# Patient Record
Sex: FEMALE | Race: WHITE
[De-identification: names, ages, dates, MRNs, and addresses within clinical notes are randomized per-mention and may not be internally consistent; named-entity substitution may affect disease eponyms.]

---

## 2017-03-13 ENCOUNTER — APPOINTMENT (OUTPATIENT)
Dept: HEMATOLOGY ONCOLOGY | Facility: CLINIC | Age: 77
End: 2017-03-13

## 2017-03-16 ENCOUNTER — APPOINTMENT (OUTPATIENT)
Dept: HEMATOLOGY ONCOLOGY | Facility: CLINIC | Age: 77
End: 2017-03-16

## 2017-03-16 ENCOUNTER — LABORATORY RESULT (OUTPATIENT)
Age: 77
End: 2017-03-16

## 2017-03-16 VITALS
BODY MASS INDEX: 32.57 KG/M2 | HEIGHT: 62 IN | HEART RATE: 60 BPM | DIASTOLIC BLOOD PRESSURE: 66 MMHG | WEIGHT: 177 LBS | TEMPERATURE: 97.8 F | SYSTOLIC BLOOD PRESSURE: 138 MMHG

## 2017-03-20 ENCOUNTER — APPOINTMENT (OUTPATIENT)
Dept: HEMATOLOGY ONCOLOGY | Facility: CLINIC | Age: 77
End: 2017-03-20

## 2017-03-20 VITALS
HEART RATE: 58 BPM | BODY MASS INDEX: 32.57 KG/M2 | DIASTOLIC BLOOD PRESSURE: 64 MMHG | HEIGHT: 62 IN | SYSTOLIC BLOOD PRESSURE: 126 MMHG | TEMPERATURE: 97.8 F | WEIGHT: 177 LBS

## 2017-03-20 DIAGNOSIS — D50.9 IRON DEFICIENCY ANEMIA, UNSPECIFIED: ICD-10-CM

## 2017-03-20 RX ORDER — LISINOPRIL 5 MG/1
5 TABLET ORAL
Refills: 0 | Status: ACTIVE | COMMUNITY

## 2017-03-31 LAB
HCT VFR BLD CALC: 37.7 %
HGB BLD-MCNC: 12.4 G/DL
MCHC RBC-ENTMCNC: 28.4 PG
MCHC RBC-ENTMCNC: 33 GM/DL
MCV RBC AUTO: 85.9 FL
PLATELET # BLD AUTO: 198 K/UL
RBC # BLD: 4.39 M/UL
RBC # FLD: 13.4 %
WBC # FLD AUTO: 7.3 K/UL

## 2017-06-20 ENCOUNTER — INPATIENT (INPATIENT)
Facility: HOSPITAL | Age: 77
LOS: 4 days | Discharge: HOME HEALTH | End: 2017-06-25
Attending: INTERNAL MEDICINE | Admitting: INTERNAL MEDICINE
Payer: MEDICARE

## 2017-06-20 VITALS
HEART RATE: 62 BPM | WEIGHT: 177.03 LBS | HEIGHT: 62 IN | RESPIRATION RATE: 20 BRPM | OXYGEN SATURATION: 100 % | SYSTOLIC BLOOD PRESSURE: 149 MMHG | DIASTOLIC BLOOD PRESSURE: 56 MMHG | TEMPERATURE: 98 F

## 2017-06-20 DIAGNOSIS — E11.9 TYPE 2 DIABETES MELLITUS WITHOUT COMPLICATIONS: ICD-10-CM

## 2017-06-20 DIAGNOSIS — I10 ESSENTIAL (PRIMARY) HYPERTENSION: ICD-10-CM

## 2017-06-20 DIAGNOSIS — R00.1 BRADYCARDIA, UNSPECIFIED: ICD-10-CM

## 2017-06-20 DIAGNOSIS — R55 SYNCOPE AND COLLAPSE: ICD-10-CM

## 2017-06-20 DIAGNOSIS — N18.9 CHRONIC KIDNEY DISEASE, UNSPECIFIED: ICD-10-CM

## 2017-06-20 DIAGNOSIS — J44.9 CHRONIC OBSTRUCTIVE PULMONARY DISEASE, UNSPECIFIED: ICD-10-CM

## 2017-06-20 LAB
ADD ON TEST-SPECIMEN IN LAB: SIGNIFICANT CHANGE UP
ALBUMIN SERPL ELPH-MCNC: 3.2 G/DL — LOW (ref 3.3–5)
ALP SERPL-CCNC: 103 U/L — SIGNIFICANT CHANGE UP (ref 40–120)
ALT FLD-CCNC: 24 U/L — SIGNIFICANT CHANGE UP (ref 12–78)
ANION GAP SERPL CALC-SCNC: 3 MMOL/L — LOW (ref 5–17)
ANION GAP SERPL CALC-SCNC: 6 MMOL/L — SIGNIFICANT CHANGE UP (ref 5–17)
APPEARANCE UR: CLEAR — SIGNIFICANT CHANGE UP
APTT BLD: 30.7 SEC — SIGNIFICANT CHANGE UP (ref 27.5–37.4)
AST SERPL-CCNC: 47 U/L — HIGH (ref 15–37)
BACTERIA # UR AUTO: (no result)
BASE EXCESS BLDCOV CALC-SCNC: -1.5 — SIGNIFICANT CHANGE UP
BASOPHILS # BLD AUTO: 0.1 K/UL — SIGNIFICANT CHANGE UP (ref 0–0.2)
BASOPHILS NFR BLD AUTO: 1.2 % — SIGNIFICANT CHANGE UP (ref 0–2)
BILIRUB SERPL-MCNC: 0.5 MG/DL — SIGNIFICANT CHANGE UP (ref 0.2–1.2)
BILIRUB UR-MCNC: NEGATIVE — SIGNIFICANT CHANGE UP
BUN SERPL-MCNC: 52 MG/DL — HIGH (ref 7–23)
BUN SERPL-MCNC: 55 MG/DL — HIGH (ref 7–23)
CALCIUM SERPL-MCNC: 8.7 MG/DL — SIGNIFICANT CHANGE UP (ref 8.5–10.1)
CALCIUM SERPL-MCNC: 8.8 MG/DL — SIGNIFICANT CHANGE UP (ref 8.5–10.1)
CHLORIDE SERPL-SCNC: 111 MMOL/L — HIGH (ref 96–108)
CHLORIDE SERPL-SCNC: 113 MMOL/L — HIGH (ref 96–108)
CO2 SERPL-SCNC: 25 MMOL/L — SIGNIFICANT CHANGE UP (ref 22–31)
CO2 SERPL-SCNC: 26 MMOL/L — SIGNIFICANT CHANGE UP (ref 22–31)
COLOR SPEC: YELLOW — SIGNIFICANT CHANGE UP
CREAT SERPL-MCNC: 2.05 MG/DL — HIGH (ref 0.5–1.3)
CREAT SERPL-MCNC: 2.29 MG/DL — HIGH (ref 0.5–1.3)
DIFF PNL FLD: (no result)
EOSINOPHIL # BLD AUTO: 0.2 K/UL — SIGNIFICANT CHANGE UP (ref 0–0.5)
EOSINOPHIL NFR BLD AUTO: 2.2 % — SIGNIFICANT CHANGE UP (ref 0–6)
EPI CELLS # UR: SIGNIFICANT CHANGE UP
GAS PNL BLDCOV: 7.28 — SIGNIFICANT CHANGE UP (ref 7.25–7.45)
GLUCOSE SERPL-MCNC: 150 MG/DL — HIGH (ref 70–99)
GLUCOSE SERPL-MCNC: 202 MG/DL — HIGH (ref 70–99)
GLUCOSE UR QL: NEGATIVE MG/DL — SIGNIFICANT CHANGE UP
HCO3 BLDCOV-SCNC: 26 MMOL/L — HIGH (ref 17–25)
HCT VFR BLD CALC: 35.5 % — SIGNIFICANT CHANGE UP (ref 34.5–45)
HGB BLD-MCNC: 12 G/DL — SIGNIFICANT CHANGE UP (ref 11.5–15.5)
INR BLD: 1.08 RATIO — SIGNIFICANT CHANGE UP (ref 0.88–1.16)
KETONES UR-MCNC: NEGATIVE — SIGNIFICANT CHANGE UP
LEUKOCYTE ESTERASE UR-ACNC: (no result)
LYMPHOCYTES # BLD AUTO: 0.8 K/UL — LOW (ref 1–3.3)
LYMPHOCYTES # BLD AUTO: 10.3 % — LOW (ref 13–44)
MCHC RBC-ENTMCNC: 29.9 PG — SIGNIFICANT CHANGE UP (ref 27–34)
MCHC RBC-ENTMCNC: 33.8 GM/DL — SIGNIFICANT CHANGE UP (ref 32–36)
MCV RBC AUTO: 88.3 FL — SIGNIFICANT CHANGE UP (ref 80–100)
MONOCYTES # BLD AUTO: 0.5 K/UL — SIGNIFICANT CHANGE UP (ref 0–0.9)
MONOCYTES NFR BLD AUTO: 6.1 % — SIGNIFICANT CHANGE UP (ref 2–14)
NEUTROPHILS # BLD AUTO: 6.5 K/UL — SIGNIFICANT CHANGE UP (ref 1.8–7.4)
NEUTROPHILS NFR BLD AUTO: 80.3 % — HIGH (ref 43–77)
NITRITE UR-MCNC: NEGATIVE — SIGNIFICANT CHANGE UP
NT-PROBNP SERPL-SCNC: 2308 PG/ML — HIGH (ref 0–450)
NT-PROBNP SERPL-SCNC: 3631 PG/ML — HIGH (ref 0–450)
PCO2 BLDCOV: 56 MMHG — HIGH (ref 27–49)
PH UR: 6 — SIGNIFICANT CHANGE UP (ref 5–8)
PLATELET # BLD AUTO: 178 K/UL — SIGNIFICANT CHANGE UP (ref 150–400)
PO2 BLDCOA: 73 MMHG — HIGH (ref 17–41)
POTASSIUM SERPL-MCNC: 4.9 MMOL/L — SIGNIFICANT CHANGE UP (ref 3.5–5.3)
POTASSIUM SERPL-MCNC: 5.4 MMOL/L — HIGH (ref 3.5–5.3)
POTASSIUM SERPL-SCNC: 4.9 MMOL/L — SIGNIFICANT CHANGE UP (ref 3.5–5.3)
POTASSIUM SERPL-SCNC: 5.4 MMOL/L — HIGH (ref 3.5–5.3)
PROT SERPL-MCNC: 6.6 GM/DL — SIGNIFICANT CHANGE UP (ref 6–8.3)
PROT UR-MCNC: 15 MG/DL
PROTHROM AB SERPL-ACNC: 11.7 SEC — SIGNIFICANT CHANGE UP (ref 9.8–12.7)
RBC # BLD: 4.02 M/UL — SIGNIFICANT CHANGE UP (ref 3.8–5.2)
RBC # FLD: 13.6 % — SIGNIFICANT CHANGE UP (ref 10.3–14.5)
RBC CASTS # UR COMP ASSIST: (no result) /HPF (ref 0–4)
SAO2 % BLDCOV: 92 % — HIGH (ref 20–75)
SODIUM SERPL-SCNC: 141 MMOL/L — SIGNIFICANT CHANGE UP (ref 135–145)
SODIUM SERPL-SCNC: 143 MMOL/L — SIGNIFICANT CHANGE UP (ref 135–145)
SP GR SPEC: 1.01 — SIGNIFICANT CHANGE UP (ref 1.01–1.02)
TROPONIN I SERPL-MCNC: 0.02 NG/ML — SIGNIFICANT CHANGE UP (ref 0.01–0.04)
TROPONIN I SERPL-MCNC: <0.015 NG/ML — SIGNIFICANT CHANGE UP (ref 0.01–0.04)
UROBILINOGEN FLD QL: NEGATIVE MG/DL — SIGNIFICANT CHANGE UP
WBC # BLD: 8.1 K/UL — SIGNIFICANT CHANGE UP (ref 3.8–10.5)
WBC # FLD AUTO: 8.1 K/UL — SIGNIFICANT CHANGE UP (ref 3.8–10.5)
WBC UR QL: SIGNIFICANT CHANGE UP

## 2017-06-20 PROCEDURE — 73110 X-RAY EXAM OF WRIST: CPT | Mod: 26,LT

## 2017-06-20 PROCEDURE — 73030 X-RAY EXAM OF SHOULDER: CPT | Mod: 26,RT

## 2017-06-20 PROCEDURE — 70450 CT HEAD/BRAIN W/O DYE: CPT | Mod: 26

## 2017-06-20 PROCEDURE — 72190 X-RAY EXAM OF PELVIS: CPT | Mod: 26

## 2017-06-20 PROCEDURE — 71100 X-RAY EXAM RIBS UNI 2 VIEWS: CPT | Mod: 26,RT

## 2017-06-20 PROCEDURE — 72125 CT NECK SPINE W/O DYE: CPT | Mod: 26

## 2017-06-20 PROCEDURE — 93010 ELECTROCARDIOGRAM REPORT: CPT

## 2017-06-20 PROCEDURE — 99285 EMERGENCY DEPT VISIT HI MDM: CPT

## 2017-06-20 PROCEDURE — 71010: CPT | Mod: 26

## 2017-06-20 RX ORDER — SODIUM CHLORIDE 9 MG/ML
1000 INJECTION, SOLUTION INTRAVENOUS
Qty: 0 | Refills: 0 | Status: DISCONTINUED | OUTPATIENT
Start: 2017-06-20 | End: 2017-06-25

## 2017-06-20 RX ORDER — TIOTROPIUM BROMIDE 18 UG/1
1 CAPSULE ORAL; RESPIRATORY (INHALATION) DAILY
Qty: 0 | Refills: 0 | Status: DISCONTINUED | OUTPATIENT
Start: 2017-06-20 | End: 2017-06-25

## 2017-06-20 RX ORDER — INSULIN GLARGINE 100 [IU]/ML
45 INJECTION, SOLUTION SUBCUTANEOUS AT BEDTIME
Qty: 0 | Refills: 0 | Status: DISCONTINUED | OUTPATIENT
Start: 2017-06-20 | End: 2017-06-25

## 2017-06-20 RX ORDER — GLUCAGON INJECTION, SOLUTION 0.5 MG/.1ML
1 INJECTION, SOLUTION SUBCUTANEOUS ONCE
Qty: 0 | Refills: 0 | Status: DISCONTINUED | OUTPATIENT
Start: 2017-06-20 | End: 2017-06-25

## 2017-06-20 RX ORDER — PANTOPRAZOLE SODIUM 20 MG/1
40 TABLET, DELAYED RELEASE ORAL
Qty: 0 | Refills: 0 | Status: DISCONTINUED | OUTPATIENT
Start: 2017-06-20 | End: 2017-06-25

## 2017-06-20 RX ORDER — ASPIRIN/CALCIUM CARB/MAGNESIUM 324 MG
81 TABLET ORAL DAILY
Qty: 0 | Refills: 0 | Status: DISCONTINUED | OUTPATIENT
Start: 2017-06-20 | End: 2017-06-25

## 2017-06-20 RX ORDER — DEXTROSE 50 % IN WATER 50 %
12.5 SYRINGE (ML) INTRAVENOUS ONCE
Qty: 0 | Refills: 0 | Status: DISCONTINUED | OUTPATIENT
Start: 2017-06-20 | End: 2017-06-25

## 2017-06-20 RX ORDER — ACETAMINOPHEN 500 MG
1000 TABLET ORAL ONCE
Qty: 0 | Refills: 0 | Status: COMPLETED | OUTPATIENT
Start: 2017-06-20 | End: 2017-06-20

## 2017-06-20 RX ORDER — LEVOTHYROXINE SODIUM 125 MCG
112 TABLET ORAL DAILY
Qty: 0 | Refills: 0 | Status: DISCONTINUED | OUTPATIENT
Start: 2017-06-20 | End: 2017-06-25

## 2017-06-20 RX ORDER — AMLODIPINE BESYLATE 2.5 MG/1
5 TABLET ORAL ONCE
Qty: 0 | Refills: 0 | Status: COMPLETED | OUTPATIENT
Start: 2017-06-20 | End: 2017-06-20

## 2017-06-20 RX ORDER — INSULIN LISPRO 100/ML
VIAL (ML) SUBCUTANEOUS
Qty: 0 | Refills: 0 | Status: DISCONTINUED | OUTPATIENT
Start: 2017-06-20 | End: 2017-06-25

## 2017-06-20 RX ORDER — ATORVASTATIN CALCIUM 80 MG/1
20 TABLET, FILM COATED ORAL AT BEDTIME
Qty: 0 | Refills: 0 | Status: DISCONTINUED | OUTPATIENT
Start: 2017-06-20 | End: 2017-06-25

## 2017-06-20 RX ORDER — SODIUM CHLORIDE 9 MG/ML
500 INJECTION INTRAMUSCULAR; INTRAVENOUS; SUBCUTANEOUS ONCE
Qty: 0 | Refills: 0 | Status: COMPLETED | OUTPATIENT
Start: 2017-06-20 | End: 2017-06-20

## 2017-06-20 RX ORDER — LISINOPRIL 2.5 MG/1
5 TABLET ORAL DAILY
Qty: 0 | Refills: 0 | Status: DISCONTINUED | OUTPATIENT
Start: 2017-06-20 | End: 2017-06-22

## 2017-06-20 RX ORDER — HEPARIN SODIUM 5000 [USP'U]/ML
5000 INJECTION INTRAVENOUS; SUBCUTANEOUS EVERY 12 HOURS
Qty: 0 | Refills: 0 | Status: DISCONTINUED | OUTPATIENT
Start: 2017-06-20 | End: 2017-06-25

## 2017-06-20 RX ORDER — DEXTROSE 50 % IN WATER 50 %
1 SYRINGE (ML) INTRAVENOUS ONCE
Qty: 0 | Refills: 0 | Status: DISCONTINUED | OUTPATIENT
Start: 2017-06-20 | End: 2017-06-25

## 2017-06-20 RX ORDER — SODIUM CHLORIDE 9 MG/ML
1000 INJECTION INTRAMUSCULAR; INTRAVENOUS; SUBCUTANEOUS
Qty: 0 | Refills: 0 | Status: DISCONTINUED | OUTPATIENT
Start: 2017-06-20 | End: 2017-06-21

## 2017-06-20 RX ADMIN — Medication 1000 MILLIGRAM(S): at 17:30

## 2017-06-20 RX ADMIN — Medication 400 MILLIGRAM(S): at 23:34

## 2017-06-20 RX ADMIN — SODIUM CHLORIDE 50 MILLILITER(S): 9 INJECTION INTRAMUSCULAR; INTRAVENOUS; SUBCUTANEOUS at 22:25

## 2017-06-20 RX ADMIN — Medication 400 MILLIGRAM(S): at 16:47

## 2017-06-20 RX ADMIN — SODIUM CHLORIDE 1000 MILLILITER(S): 9 INJECTION INTRAMUSCULAR; INTRAVENOUS; SUBCUTANEOUS at 14:15

## 2017-06-20 RX ADMIN — AMLODIPINE BESYLATE 5 MILLIGRAM(S): 2.5 TABLET ORAL at 22:59

## 2017-06-20 NOTE — H&P ADULT - HISTORY OF PRESENT ILLNESS
75 Y/O FEMALE WITH DIABETES, HTN, CHRONIC UTI'S, COPD - ON HOME O2 (BUT REFUSES TO WEAR IT ) PRESENTED TO ER AFTER FALLING WHILE IN THE 77 Y/O FEMALE WITH DIABETES, HTN, CHRONIC UTI'S, COPD - ON HOME O2 (BUT REFUSES TO WEAR IT ) PRESENTED TO ER AFTER FALLING WHILE IN THE KITCHEN TODAY.  STATES SHE REMEMBERS GOING UP THE STAIRS, ENTERING THE KITCHEN TO GET A GLASS OF WATER, WALKING OVER TO THE TABLE AND SEEING "LIGHTS" - THEREAFTER SHE ONLY REMEMBERS EMS BEING THERE.  HER GRANDAUGHTER WHO IS 14 HEARD A LOUD BANG AND FOUND THE PATIENT ON THE KITCHEN FLOOR, STATES SHE WAS UNRESPONSIVE, RUBBED HER CHEST HARD AND THE PATIENT AWOKE.  SHE CALLED EMS AND PT WAS BROUGHT TO THE ER.  SHE WAS AWAKE ONCE IN THE ER. SHE DOES REPORTS URINARY INCONTINENCE, HAS A CONTUSION TO HER RIGHT FOREHEAD AND REPORTS HER CHEST FEELING SORE.  SHE WAS ALSO NOTED TO HAVE RIGHT CLAVICULAR FRACTURE.  PATIENT REPORTS FEELING FINE ALL DAY AND YESTERDAY.  HER BGM WAS NORMAL, NO JERKING MOVEMENTS OF THE BODY NOTED.  PATIENT IS WIDE AWAKE NOW AND ALERT.

## 2017-06-20 NOTE — ED PROVIDER NOTE - OBJECTIVE STATEMENT
76F h/o COPD (supposed to be on home o2, does not use it), DM p/w ?syncope.  Pt states she was at home with her grandchildren and walked upstairs to get a drink of water and that is the last thing she remembers.  Granddaughter heard a thud and went upstairs and she appeared to have fallen off the chair, and was on her back unresponsive.  as per EMS pt was unresponsive and hypoxic when they got there place don o2, pt became more responsive on the way to ED.  Pt now ANOx3 c/o right shoulder pain.  Denies CP, SOB, abd pain n/v/d/hip pain or neck pain.

## 2017-06-20 NOTE — H&P ADULT - PROBLEM SELECTOR PLAN 1
UNCLEAR ETIOLOGY  POSSBILY FROM HYPOXIA, FROM BRADYCARDIA,  ADMIT TO TELE  R/O ARRYTHMIA, TACHY/ANOOP SYNDROME  CHECK ENZYMES  NEURO EVAL -- DID REPORT URINARY INCONTINENCE BUT NO OTHER SIGNS OF SEIZURE - NO POST ICTAL STATE  GENTLY HYDRATE

## 2017-06-20 NOTE — H&P ADULT - NSHPLABSRESULTS_GEN_ALL_CORE
12.0   8.1   )-----------( 178      ( 2017 13:54 )             35.5     06-20    141  |  113<H>  |  52<H>  ----------------------------<  150<H>  4.9   |  25  |  2.05<H>    Ca    8.8      2017 18:13    TPro  6.6  /  Alb  3.2<L>  /  TBili  0.5  /  DBili  x   /  AST  47<H>  /  ALT  24  /  AlkPhos  103  06-20    CARDIAC MARKERS ( 2017 21:26 )  0.021 ng/mL / x     / x     / x     / x      CARDIAC MARKERS ( 2017 13:54 )  <0.015 ng/mL / x     / x     / x     / x          LIVER FUNCTIONS - ( 2017 13:54 )  Alb: 3.2 g/dL / Pro: 6.6 gm/dL / ALK PHOS: 103 U/L / ALT: 24 U/L / AST: 47 U/L / GGT: x           PT/INR - ( 2017 13:54 )   PT: 11.7 sec;   INR: 1.08 ratio         PTT - ( 2017 13:54 )  PTT:30.7 sec  Urinalysis Basic - ( 2017 17:42 )    Color: Yellow / Appearance: Clear / S.010 / pH: x  Gluc: x / Ketone: Negative  / Bili: Negative / Urobili: Negative mg/dL   Blood: x / Protein: 15 mg/dL / Nitrite: Negative   Leuk Esterase: Trace / RBC: 3-5 /HPF / WBC 0-2   Sq Epi: x / Non Sq Epi: Occasional / Bacteria: Occasional          Blood, Urine: Trace ( @ 17:42)        EKG: SB WITH APCS      CT BRAIN                        no evidence of acute intracranial abnormality.  No evidence of   hemorrhage.      Age-related involutional changes as above.      SHOULDER COMP  MIN 2 VIEWS-RT                           Mildly displaced fracture of the distal clavicle. No   dislocation. Unremarkable soft tissues.    PELVIS: No fracture or dislocation. Vascular calcifications.    IMPRESSION:    CHEST: Mild pulmonary vascular congestion.  RIGHT SHOULDER: Fracture of the distal clavicle.  PELVIS: No fracture:

## 2017-06-20 NOTE — CONSULT NOTE ADULT - ASSESSMENT
A/P: Syncope, fall, right shoulder fracture, R forehead hematoma    -- Patient assessed by Dr. Woodruff/ trauma  -- Patient cleared from trauma-- no evidence of traumatic injury that would warrant a trauma admission  -- Admit to medicine  -- Orthopedic consult for right shoulder fracture

## 2017-06-20 NOTE — H&P ADULT - NSHPPHYSICALEXAM_GEN_ALL_CORE
Vital Signs Last 24 Hrs  T(C): 36.5, Max: 36.6 (06-20 @ 13:24)  T(F): 97.7, Max: 97.9 (06-20 @ 13:24)  HR: 55 (51 - 62)  BP: 175/37 (138/35 - 175/37)  BP(mean): --  RR: 18 (18 - 20)  SpO2: 93% (93% - 100%)    GEN: A and O, NAD,  mood stable  HEENT:   ECCHYMOSIS TO THE RIGHT FOREHEAD, EOMI, no oropharyngeal lesions, erythema, exudates, oral thrush    NECK:   supple, no palpable lymph nodes, no thyromegaly    CV:  +S1, +S2, regular, no murmurs or rubs    RESP:   lungs clear to auscultation bilaterally, no wheezing, rales, rhonchi, good air entry bilaterally, DECREASED BS ANGELICA    GI:  abdomen soft, non-tender, non-distended, normal BS, no abdominal masses, no palpable masses    RECTAL:  not examined    :  not examined    MSK:   normal muscle tone, no atrophy, no rigidity, no contractions    EXT:   no clubbing, no cyanosis, no edema, no calf pain, swelling or erythema; RUE IN SLING    VASCULAR:  pulses equal and symmetric in the upper and lower extremities    NEURO:  AAOX3, no focal neurological deficits, follows all commands, able to move extremities spontaneously    SKIN:  no ulcers, lesions or rashes

## 2017-06-20 NOTE — H&P ADULT - PMH
Chronic obstructive pulmonary disease, unspecified COPD type    Diabetes    Essential hypertension    Hyperlipidemia, unspecified hyperlipidemia type    Neuropathy

## 2017-06-20 NOTE — H&P ADULT - PSH
delivery delivered    H/O hernia repair    History of total knee replacement, unspecified laterality    S/P appendectomy

## 2017-06-20 NOTE — CONSULT NOTE ADULT - SUBJECTIVE AND OBJECTIVE BOX
Patient is a 76y old female who presents with a chief complaint of fall off chair-- patient with + syncope, does not remember what transpired.  + right shoulder pain.  Denies chest pain, SOB, dyspnea, headache.      PAST MEDICAL & SURGICAL HISTORY:  Diabetes  Chronic obstructive pulmonary disease, unspecified COPD type      Allergies    Bactrim (Other)  ciprofloxacin (Other (Mild))  clindamycin (Unknown)  ibuprofen (Unknown)  penicillins (Other)  sulfa drugs (Unknown)      Social history: former smoker      REVIEW OF SYSTEMS: Negative except for Right shoulder pain, diminished ROM        Vital Signs Last 24 Hrs  T(C): --  T(F): --  HR: 51 (51 - 62)  BP: 138/35 (138/35 - 149/56)  BP(mean): --  RR: 19 (19 - 20)  SpO2: 100% (100% - 100%)    PHYSICAL EXAM:      Constitutional: Well developed, in NAD    Eyes: good visual acuity    ENMT: R forehead STS, ecchymosis    Neck: supple    Back: non-tender    Respiratory: CTA B/L, poor inspiratory effort    Cardiovascular: S1 and S2    Gastrointestinal: + BS x 4, soft, NT    Extremities: L UE, B/L LE-- FROM x 4.  Diminished ROM R shoulder, + Tenderness upon palpation right shoulder    Neurological: A & O x 3    Skin: in tact, small abrasion r forehead                          12.0   8.1   )-----------( 178      ( 2017 13:54 )             35.5   06-20    143  |  111<H>  |  55<H>  ----------------------------<  202<H>  5.4<H>   |  26  |  2.29<H>    Ca    8.7      2017 13:54    TPro  6.6  /  Alb  3.2<L>  /  TBili  0.5  /  DBili  x   /  AST  47<H>  /  ALT  24  /  AlkPhos  103  06-20    PT/INR - ( 2017 13:54 )   PT: 11.7 sec;   INR: 1.08 ratio         PTT - ( 2017 13:54 )  PTT:30.7 secUrinalysis Basic - ( 2017 17:42 )    Color: Yellow / Appearance: Clear / S.010 / pH: x  Gluc: x / Ketone: Negative  / Bili: Negative / Urobili: Negative mg/dL   Blood: x / Protein: 15 mg/dL / Nitrite: Negative   Leuk Esterase: Trace / RBC: 3-5 /HPF / WBC 0-2   Sq Epi: x / Non Sq Epi: Occasional / Bacteria: Occasional    CT C-spine and Head: No Traumatic Injury  R shoulder x-ray" + distal clavicle fracture

## 2017-06-20 NOTE — H&P ADULT - PROBLEM SELECTOR PLAN 6
ADVANCED COPD  ON HOME O2 BUT CHOOSES NOT TO WEAR OXYGEN  NORMALLY HYPOXIC  NO ACUTE BRONCHOSPASM ON EXAM TODAY  WILL MONITOR

## 2017-06-20 NOTE — ED PROVIDER NOTE - MEDICAL DECISION MAKING DETAILS
pt with syncope possible 2/2 hypoxia vs profound bradycardia, pt has fluctuating HR between 40s-60s in the room, otherwise pt well appearing, ANOx3, GCS 15, satting in the mid 90s on 3L NC.  - CT, XRs, labs, EKG, adm

## 2017-06-20 NOTE — H&P ADULT - NSHPREVIEWOFSYSTEMS_GEN_ALL_CORE
REVIEW OF SYSTEMS:    CONSTITUTIONAL: No weakness, fevers or chills, POS FAINTING EPISODE  EYES/ENT: No visual changes;  No vertigo or throat pain POS HEADACHE  NECK: No pain or stiffness  RESPIRATORY: No cough, wheezing, hemoptysis; No shortness of breath  CARDIOVASCULAR: No chest pain or palpitation  GASTROINTESTINAL: No abdominal or epigastric pain. No nausea, vomiting, or hematemesis; No diarrhea or constipation. No melena or hematochezia.  GENITOURINARY: No dysuria, frequency or hematuria  NEUROLOGICAL: No numbness or weakness  SKIN: No itching, burning, rashes, or lesions   All other review of systems is negative unless indicated above.

## 2017-06-21 DIAGNOSIS — Z98.890 OTHER SPECIFIED POSTPROCEDURAL STATES: Chronic | ICD-10-CM

## 2017-06-21 DIAGNOSIS — Z90.49 ACQUIRED ABSENCE OF OTHER SPECIFIED PARTS OF DIGESTIVE TRACT: Chronic | ICD-10-CM

## 2017-06-21 DIAGNOSIS — S42.009A FRACTURE OF UNSPECIFIED PART OF UNSPECIFIED CLAVICLE, INITIAL ENCOUNTER FOR CLOSED FRACTURE: ICD-10-CM

## 2017-06-21 DIAGNOSIS — Z96.659 PRESENCE OF UNSPECIFIED ARTIFICIAL KNEE JOINT: Chronic | ICD-10-CM

## 2017-06-21 LAB
ANION GAP SERPL CALC-SCNC: 6 MMOL/L — SIGNIFICANT CHANGE UP (ref 5–17)
BUN SERPL-MCNC: 48 MG/DL — HIGH (ref 7–23)
CALCIUM SERPL-MCNC: 8.3 MG/DL — LOW (ref 8.5–10.1)
CHLORIDE SERPL-SCNC: 111 MMOL/L — HIGH (ref 96–108)
CHOLEST SERPL-MCNC: 111 MG/DL — SIGNIFICANT CHANGE UP (ref 10–199)
CK SERPL-CCNC: 74 U/L — SIGNIFICANT CHANGE UP (ref 26–192)
CO2 SERPL-SCNC: 26 MMOL/L — SIGNIFICANT CHANGE UP (ref 22–31)
CREAT SERPL-MCNC: 1.88 MG/DL — HIGH (ref 0.5–1.3)
GLUCOSE SERPL-MCNC: 125 MG/DL — HIGH (ref 70–99)
HBA1C BLD-MCNC: 6.5 % — HIGH (ref 4–5.6)
HCT VFR BLD CALC: 33.2 % — LOW (ref 34.5–45)
HDLC SERPL-MCNC: 29 MG/DL — LOW (ref 40–125)
HGB BLD-MCNC: 10.8 G/DL — LOW (ref 11.5–15.5)
LIPID PNL WITH DIRECT LDL SERPL: 58 MG/DL — SIGNIFICANT CHANGE UP
MAGNESIUM SERPL-MCNC: 2.2 MG/DL — SIGNIFICANT CHANGE UP (ref 1.6–2.6)
MCHC RBC-ENTMCNC: 28.8 PG — SIGNIFICANT CHANGE UP (ref 27–34)
MCHC RBC-ENTMCNC: 32.4 GM/DL — SIGNIFICANT CHANGE UP (ref 32–36)
MCV RBC AUTO: 88.8 FL — SIGNIFICANT CHANGE UP (ref 80–100)
MYOGLOBIN SERPL-MCNC: 129 NG/ML — HIGH (ref 9–82)
PHOSPHATE SERPL-MCNC: 3.9 MG/DL — SIGNIFICANT CHANGE UP (ref 2.5–4.5)
PLATELET # BLD AUTO: 188 K/UL — SIGNIFICANT CHANGE UP (ref 150–400)
POTASSIUM SERPL-MCNC: 4.4 MMOL/L — SIGNIFICANT CHANGE UP (ref 3.5–5.3)
POTASSIUM SERPL-SCNC: 4.4 MMOL/L — SIGNIFICANT CHANGE UP (ref 3.5–5.3)
RBC # BLD: 3.74 M/UL — LOW (ref 3.8–5.2)
RBC # FLD: 14 % — SIGNIFICANT CHANGE UP (ref 10.3–14.5)
SODIUM SERPL-SCNC: 143 MMOL/L — SIGNIFICANT CHANGE UP (ref 135–145)
TOTAL CHOLESTEROL/HDL RATIO MEASUREMENT: 3.8 RATIO — SIGNIFICANT CHANGE UP (ref 3.3–7.1)
TRIGL SERPL-MCNC: 120 MG/DL — SIGNIFICANT CHANGE UP (ref 10–149)
TROPONIN I SERPL-MCNC: 0.03 NG/ML — SIGNIFICANT CHANGE UP (ref 0.01–0.04)
TSH SERPL-MCNC: 0.43 UIU/ML — SIGNIFICANT CHANGE UP (ref 0.36–3.74)
WBC # BLD: 7.4 K/UL — SIGNIFICANT CHANGE UP (ref 3.8–10.5)
WBC # FLD AUTO: 7.4 K/UL — SIGNIFICANT CHANGE UP (ref 3.8–10.5)

## 2017-06-21 PROCEDURE — 99222 1ST HOSP IP/OBS MODERATE 55: CPT

## 2017-06-21 PROCEDURE — 95812 EEG 41-60 MINUTES: CPT | Mod: 26

## 2017-06-21 PROCEDURE — 93306 TTE W/DOPPLER COMPLETE: CPT | Mod: 26

## 2017-06-21 PROCEDURE — 93010 ELECTROCARDIOGRAM REPORT: CPT

## 2017-06-21 RX ORDER — MONTELUKAST 4 MG/1
10 TABLET, CHEWABLE ORAL AT BEDTIME
Qty: 0 | Refills: 0 | Status: DISCONTINUED | OUTPATIENT
Start: 2017-06-21 | End: 2017-06-25

## 2017-06-21 RX ORDER — ACETAMINOPHEN 500 MG
650 TABLET ORAL EVERY 4 HOURS
Qty: 0 | Refills: 0 | Status: DISCONTINUED | OUTPATIENT
Start: 2017-06-21 | End: 2017-06-22

## 2017-06-21 RX ORDER — LISINOPRIL 2.5 MG/1
5 TABLET ORAL ONCE
Qty: 0 | Refills: 0 | Status: COMPLETED | OUTPATIENT
Start: 2017-06-21 | End: 2017-06-21

## 2017-06-21 RX ADMIN — PANTOPRAZOLE SODIUM 40 MILLIGRAM(S): 20 TABLET, DELAYED RELEASE ORAL at 05:39

## 2017-06-21 RX ADMIN — ATORVASTATIN CALCIUM 20 MILLIGRAM(S): 80 TABLET, FILM COATED ORAL at 21:16

## 2017-06-21 RX ADMIN — Medication 1000 MILLIGRAM(S): at 00:20

## 2017-06-21 RX ADMIN — HEPARIN SODIUM 5000 UNIT(S): 5000 INJECTION INTRAVENOUS; SUBCUTANEOUS at 17:39

## 2017-06-21 RX ADMIN — Medication 81 MILLIGRAM(S): at 12:28

## 2017-06-21 RX ADMIN — Medication 650 MILLIGRAM(S): at 20:08

## 2017-06-21 RX ADMIN — Medication 150 MILLIGRAM(S): at 05:38

## 2017-06-21 RX ADMIN — LISINOPRIL 5 MILLIGRAM(S): 2.5 TABLET ORAL at 20:42

## 2017-06-21 RX ADMIN — Medication 650 MILLIGRAM(S): at 21:15

## 2017-06-21 RX ADMIN — MONTELUKAST 10 MILLIGRAM(S): 4 TABLET, CHEWABLE ORAL at 21:16

## 2017-06-21 RX ADMIN — Medication 2: at 17:38

## 2017-06-21 RX ADMIN — HEPARIN SODIUM 5000 UNIT(S): 5000 INJECTION INTRAVENOUS; SUBCUTANEOUS at 05:38

## 2017-06-21 RX ADMIN — Medication 650 MILLIGRAM(S): at 09:35

## 2017-06-21 RX ADMIN — LISINOPRIL 5 MILLIGRAM(S): 2.5 TABLET ORAL at 05:38

## 2017-06-21 RX ADMIN — INSULIN GLARGINE 45 UNIT(S): 100 INJECTION, SOLUTION SUBCUTANEOUS at 21:16

## 2017-06-21 RX ADMIN — TIOTROPIUM BROMIDE 1 CAPSULE(S): 18 CAPSULE ORAL; RESPIRATORY (INHALATION) at 07:49

## 2017-06-21 RX ADMIN — Medication 150 MILLIGRAM(S): at 17:39

## 2017-06-21 RX ADMIN — Medication 112 MICROGRAM(S): at 05:38

## 2017-06-21 RX ADMIN — Medication 4: at 12:27

## 2017-06-21 NOTE — PROVIDER CONTACT NOTE (OTHER) - SITUATION
notified service; spoke to Doug  notified orthopedic resident; spoke to notified service; spoke to Doug

## 2017-06-21 NOTE — CONSULT NOTE ADULT - ASSESSMENT
1. Syncope- differential includes vasovagal in setting of hypovolemia vs bradycardia vs hypoxia from COPD. Pt appears is azotemic and Cr is improving with IVF. Pt also admits to not drinking enough during the day. Would continue IVF, check orthostatics. 2Decho pending. Pt had outpt cardiac PET recently that was negative for ischemia.     2. Bradycardia- 1. Rule out possibility of seizure:     - EEG  - Continue Lyrica; works as A[employer]    2) Syncope / Fall - vasovagal / rule out andrez-arrythmia / rule out orthostatic hypotension    -work-up as per cardio

## 2017-06-21 NOTE — CONSULT NOTE ADULT - ASSESSMENT
76F with above hx p/w episode of syncope.    1. Syncope- differential includes vasovagal in setting of hypovolemia vs bradycardia vs hypoxia from COPD. Pt appears is azotemic and Cr is improving with IVF. Pt also admits to not drinking enough during the day. Would continue IVF, check orthostatics. 2Decho pending. Pt had outpt cardiac PET recently that was negative for ischemia.     2. Bradycardia- pt remains in 40-50s now. No dizziness currently. Hold Bbl, monitor on tele. Ambulate pt in hallway to assess ability to augment HR. Hold on EP eval for now.    3. HTN- avoid all AV ty agents for now. Continue amlodipine for BP control.     4. COPD- mgmt as per primary team.     5. DVT proph., replete lytes as needed.

## 2017-06-21 NOTE — PROGRESS NOTE ADULT - SUBJECTIVE AND OBJECTIVE BOX
History of Present Illness: 	  75 Y/O FEMALE WITH DIABETES, HTN, CHRONIC UTI'S, COPD - ON HOME O2 (BUT REFUSES TO WEAR IT ) PRESENTED TO ER AFTER FALLING WHILE IN THE KITCHEN TODAY.  STATES SHE REMEMBERS GOING UP THE STAIRS, ENTERING THE KITCHEN TO GET A GLASS OF WATER, WALKING OVER TO THE TABLE AND SEEING "LIGHTS" - THEREAFTER SHE ONLY REMEMBERS EMS BEING THERE.  HER GRANDAUGHTER WHO IS 14 HEARD A LOUD BANG AND FOUND THE PATIENT ON THE KITCHEN FLOOR, STATES SHE WAS UNRESPONSIVE, RUBBED HER CHEST HARD AND THE PATIENT AWOKE.  SHE CALLED EMS AND PT WAS BROUGHT TO THE ER.  SHE WAS AWAKE ONCE IN THE ER. SHE DOES REPORTS URINARY INCONTINENCE, HAS A CONTUSION TO HER RIGHT FOREHEAD AND REPORTS HER CHEST FEELING SORE.  SHE WAS ALSO NOTED TO HAVE RIGHT CLAVICULAR FRACTURE.    6/21/17:    ROS: as per HPI otherwise all other systems reviewed and are negative    PHYSICAL EXAM:    Vital Signs Last 24 Hrs  T(C): 36.6, Max: 36.6 (06-20 @ 13:24)  T(F): 97.8, Max: 97.9 (06-20 @ 13:24)  HR: 52 (51 - 62)  BP: 140/29 (138/35 - 175/37)  BP(mean): --  RR: 18 (18 - 20)  SpO2: 93% (93% - 100%)    GEN: A and O, NAD,  mood stable  HEENT:   ECCHYMOSIS TO THE RIGHT FOREHEAD, EOMI, no oropharyngeal lesions, erythema, exudates, oral thrush    NECK:   supple, no palpable lymph nodes, no thyromegaly    CV:  +S1, +S2, regular, no murmurs or rubs    RESP:   lungs clear to auscultation bilaterally, no wheezing, rales, rhonchi, good air entry bilaterally, DECREASED BS ANGELICA    GI:  abdomen soft, non-tender, non-distended, normal BS, no abdominal masses, no palpable masses    RECTAL:  not examined    :  not examined  MEDICATIONS  (STANDING):  heparin  Injectable 5000Unit(s) SubCutaneous every 12 hours  insulin lispro (HumaLOG) corrective regimen sliding scale  SubCutaneous three times a day before meals  dextrose 5%. 1000milliLiter(s) IV Continuous <Continuous>  dextrose 50% Injectable 12.5Gram(s) IV Push once  sodium chloride 0.9%. 1000milliLiter(s) IV Continuous <Continuous>  aspirin  chewable 81milliGRAM(s) Oral daily  lisinopril 5milliGRAM(s) Oral daily  pregabalin 150milliGRAM(s) Oral two times a day  insulin glargine Injectable (LANTUS) 45Unit(s) SubCutaneous at bedtime  atorvastatin 20milliGRAM(s) Oral at bedtime  tiotropium 18 MICROgram(s) Capsule 1Capsule(s) Inhalation daily  pantoprazole    Tablet 40milliGRAM(s) Oral before breakfast  levothyroxine 112MICROGram(s) Oral daily  montelukast 10milliGRAM(s) Oral at bedtime    MEDICATIONS  (PRN):  dextrose Gel 1Dose(s) Oral once PRN Blood Glucose LESS THAN 70 milliGRAM(s)/deciliter  glucagon  Injectable 1milliGRAM(s) IntraMuscular once PRN Glucose LESS THAN 70 milligrams/deciliter    MSK:   normal muscle tone, no atrophy, no rigidity, no contractions    EXT:   no clubbing, no cyanosis, no edema, no calf pain, swelling or erythema; RUE IN SLING    VASCULAR:  pulses equal and symmetric in the upper and lower extremities    NEURO:  AAOX3, no focal neurological deficits, follows all commands, able to move extremities spontaneously    SKIN:  no ulcers, lesions or rashes    LABS:                            10.8   7.4   )-----------( 188      ( 21 Jun 2017 03:44 )             33.2     06-21    143  |  111<H>  |  48<H>  ----------------------------<  125<H>  4.4   |  26  |  1.88<H>    Ca    8.3<L>      21 Jun 2017 03:44  Phos  3.9     06-21  Mg     2.2     06-21    TPro  6.6  /  Alb  3.2<L>  /  TBili  0.5  /  DBili  x   /  AST  47<H>  /  ALT  24  /  AlkPhos  103  06-20    CARDIAC MARKERS ( 21 Jun 2017 03:44 )  0.028 ng/mL / x     / 74 U/L / x     / x      CARDIAC MARKERS ( 20 Jun 2017 21:26 )  0.021 ng/mL / x     / x     / x     / x      CARDIAC MARKERS ( 20 Jun 2017 13:54 )  <0.015 ng/mL / x     / x     / x     / x            MEDICATIONS  (STANDING):  heparin  Injectable 5000Unit(s) SubCutaneous every 12 hours  insulin lispro (HumaLOG) corrective regimen sliding scale  SubCutaneous three times a day before meals  dextrose 5%. 1000milliLiter(s) IV Continuous <Continuous>  dextrose 50% Injectable 12.5Gram(s) IV Push once  sodium chloride 0.9%. 1000milliLiter(s) IV Continuous <Continuous>  aspirin  chewable 81milliGRAM(s) Oral daily  lisinopril 5milliGRAM(s) Oral daily  pregabalin 150milliGRAM(s) Oral two times a day  insulin glargine Injectable (LANTUS) 45Unit(s) SubCutaneous at bedtime  atorvastatin 20milliGRAM(s) Oral at bedtime  tiotropium 18 MICROgram(s) Capsule 1Capsule(s) Inhalation daily  pantoprazole    Tablet 40milliGRAM(s) Oral before breakfast  levothyroxine 112MICROGram(s) Oral daily  montelukast 10milliGRAM(s) Oral at bedtime    MEDICATIONS  (PRN):  dextrose Gel 1Dose(s) Oral once PRN Blood Glucose LESS THAN 70 milliGRAM(s)/deciliter  glucagon  Injectable 1milliGRAM(s) IntraMuscular once PRN Glucose LESS THAN 70 milligrams/deciliter History of Present Illness: 	  77 Y/O FEMALE WITH DIABETES, HTN, CHRONIC UTI'S, COPD - ON HOME O2 (BUT REFUSES TO WEAR IT ) PRESENTED TO ER AFTER FALLING WHILE IN THE KITCHEN TODAY.  STATES SHE REMEMBERS GOING UP THE STAIRS, ENTERING THE KITCHEN TO GET A GLASS OF WATER, WALKING OVER TO THE TABLE AND SEEING "LIGHTS" - THEREAFTER SHE ONLY REMEMBERS EMS BEING THERE.  HER GRANDAUGHTER WHO IS 14 HEARD A LOUD BANG AND FOUND THE PATIENT ON THE KITCHEN FLOOR, STATES SHE WAS UNRESPONSIVE, RUBBED HER CHEST HARD AND THE PATIENT AWOKE.  SHE CALLED EMS AND PT WAS BROUGHT TO THE ER.  SHE WAS AWAKE ONCE IN THE ER. SHE DOES REPORTS URINARY INCONTINENCE, HAS A CONTUSION TO HER RIGHT FOREHEAD AND REPORTS HER CHEST FEELING SORE.  SHE WAS ALSO NOTED TO HAVE RIGHT CLAVICULAR FRACTURE.    17: Pain on the side of fall - right sided chest, no SOB, headaches are better; no n/v/f/c Tele: SB 40-50's    ROS: as per HPI otherwise all other systems reviewed and are negative    PHYSICAL EXAM:    Vital Signs Last 24 Hrs  T(C): 36.6, Max: 36.6 (06-20 @ 13:24)  T(F): 97.8, Max: 97.9 (06-20 @ 13:24)  HR: 52 (51 - 62)  BP: 140/29 (138/35 - 175/37)  BP(mean): --  RR: 18 (18 - 20)  SpO2: 93% (93% - 100%)    GEN: A and O, NAD,  mood stable  HEENT:   ECCHYMOSIS TO THE RIGHT FOREHEAD, EOMI, no oropharyngeal lesions, erythema, exudates, oral thrush    NECK:   supple, no palpable lymph nodes, no thyromegaly    CV:  +S1, +S2, regular, no murmurs or rubs    RESP:   lungs clear to auscultation bilaterally, no wheezing, rales, rhonchi, good air entry bilaterally, DECREASED BS ANGELICA    GI:  abdomen soft, non-tender, non-distended, normal BS, no abdominal masses, no palpable masses    RECTAL:  not examined    :  not examined    MSK:   normal muscle tone, no atrophy, no rigidity, no contractions    EXT:   no clubbing, no cyanosis, no edema, no calf pain, swelling or erythema; RUE IN SLING    VASCULAR:  pulses equal and symmetric in the upper and lower extremities    NEURO:  AAOX3, no focal neurological deficits, follows all commands, able to move extremities spontaneously    SKIN:  no ulcers, lesions or rashes    LABS:                              10.8   7.4   )-----------( 188      ( 2017 03:44 )             33.2     -    143  |  111<H>  |  48<H>  ----------------------------<  125<H>  4.4   |  26  |  1.88<H>    Ca    8.3<L>      2017 03:44  Phos  3.9       Mg     2.2         TPro  6.6  /  Alb  3.2<L>  /  TBili  0.5  /  DBili  x   /  AST  47<H>  /  ALT  24  /  AlkPhos  103  20    CARDIAC MARKERS ( 2017 03:44 )  0.028 ng/mL / x     / 74 U/L / x     / x      CARDIAC MARKERS ( 2017 21:26 )  0.021 ng/mL / x     / x     / x     / x      CARDIAC MARKERS ( 2017 13:54 )  <0.015 ng/mL / x     / x     / x     / x          LIVER FUNCTIONS - ( 2017 13:54 )  Alb: 3.2 g/dL / Pro: 6.6 gm/dL / ALK PHOS: 103 U/L / ALT: 24 U/L / AST: 47 U/L / GGT: x           PT/INR - ( 2017 13:54 )   PT: 11.7 sec;   INR: 1.08 ratio         PTT - ( 2017 13:54 )  PTT:30.7 sec  Urinalysis Basic - ( 2017 17:42 )    Color: Yellow / Appearance: Clear / S.010 / pH: x  Gluc: x / Ketone: Negative  / Bili: Negative / Urobili: Negative mg/dL   Blood: x / Protein: 15 mg/dL / Nitrite: Negative   Leuk Esterase: Trace / RBC: 3-5 /HPF / WBC 0-2   Sq Epi: x / Non Sq Epi: Occasional / Bacteria: Occasional          Blood, Urine: Trace ( @ 17:42)      MEDICATIONS  (STANDING):  heparin  Injectable 5000Unit(s) SubCutaneous every 12 hours  insulin lispro (HumaLOG) corrective regimen sliding scale  SubCutaneous three times a day before meals  dextrose 5%. 1000milliLiter(s) IV Continuous <Continuous>  dextrose 50% Injectable 12.5Gram(s) IV Push once  aspirin  chewable 81milliGRAM(s) Oral daily  lisinopril 5milliGRAM(s) Oral daily  pregabalin 150milliGRAM(s) Oral two times a day  insulin glargine Injectable (LANTUS) 45Unit(s) SubCutaneous at bedtime  atorvastatin 20milliGRAM(s) Oral at bedtime  tiotropium 18 MICROgram(s) Capsule 1Capsule(s) Inhalation daily  pantoprazole    Tablet 40milliGRAM(s) Oral before breakfast  levothyroxine 112MICROGram(s) Oral daily  montelukast 10milliGRAM(s) Oral at bedtime    MEDICATIONS  (PRN):  dextrose Gel 1Dose(s) Oral once PRN Blood Glucose LESS THAN 70 milliGRAM(s)/deciliter  glucagon  Injectable 1milliGRAM(s) IntraMuscular once PRN Glucose LESS THAN 70 milligrams/deciliter

## 2017-06-21 NOTE — CONSULT NOTE ADULT - SUBJECTIVE AND OBJECTIVE BOX
76yFemale admitted to hospital s/p syncopal fall, consulted for R clavicle fx. +loc and likely head trauma. Denies any numbness/tingling or muscle weakness to the affected extremity. Has no other orthopedic complaints.    PMH:  Hyperlipidemia, unspecified hyperlipidemia type  Essential hypertension  Neuropathy  Diabetes  Chronic obstructive pulmonary disease, unspecified COPD type    PSH:  History of total knee replacement, unspecified laterality  H/O hernia repair   delivery delivered  S/P appendectomy    All: Bactrim, cipro, clinda, ibu, pcn    Meds: See med rec    T(C): 36.5  HR: 59  BP: 178/58  RR: 18  SpO2: 97%  Wt(kg): --    Gen: NAD, A&Ox3  HEENT: Sm contusion on forehead, otherwise NC/AT  Csp: No bony ttp  PE RUE:  Skin intact, no erythema/ecchymosis, +ttp over distal aspect of clavicle, SILT C5-T1, +AIN/PIN/Ulnar/Radial/Musc/Median, +shoulder/elbow and wrist ROM, +radial pulse, soft compartments, no calf ttp.    Secondary:  No TTP over bony landmarks, SILT BL, ROM intact BL, distal pulses palpable.    Imaging:  XR demonstrates R nondisplaced distal 1/3 clavicle fx

## 2017-06-21 NOTE — CONSULT NOTE ADULT - ASSESSMENT
76F with R nondisplaced distal 1/3rd clavicle fx  -pain control  -NWB in sling  -rest/ice  -cont current medical management  -pt instructed to f/u with Dr Jacinto as outpt one week from discharge, call office for appt  -Dr Jacinto aware and agrees with above plan  -No further acute orthopedic intervention indicated, ortho stable

## 2017-06-21 NOTE — PROGRESS NOTE ADULT - PROBLEM SELECTOR PLAN 5
CR ABOUT 2  GENTLY HYDRATE AND MONITOR  RECENT HYPERKALEMIA AS OUTPT -- BETTER NOW CR ABOUT 2  GENTLY HYDRATE AND MONITOR - CHARLIE RTODAY  RECENT HYPERKALEMIA AS OUTPT -- BETTER NOW

## 2017-06-21 NOTE — CONSULT NOTE ADULT - SUBJECTIVE AND OBJECTIVE BOX
CC: 76y old  Female who presents with a chief complaint of FALL (2017 19:51)      HPI:  75 Y/O FEMALE WITH PMH REMARKABLE DIABETES, HTN, CHRONIC UTI'S, COPD - ON HOME O2 (BUT REFUSES TO WEAR IT ) PRESENTED TO ER AFTER FALLING WHILE IN THE KITCHEN TODAY.  STATES SHE REMEMBERS GOING UP THE STAIRS, ENTERING THE KITCHEN TO GET A GLASS OF WATER, WALKING OVER TO THE TABLE AND SEEING "LIGHTS" - THEREAFTER SHE ONLY REMEMBERS EMS BEING THERE.  HER GRANDAUGHTER WHO IS 14 HEARD A LOUD BANG AND FOUND THE PATIENT ON THE KITCHEN FLOOR, STATES SHE WAS UNRESPONSIVE, RUBBED HER CHEST HARD AND THE PATIENT AWOKE.  SHE CALLED EMS AND PT WAS BROUGHT TO THE ER.  SHE WAS AWAKE ONCE IN THE ER. SHE DOES REPORTS URINARY INCONTINENCE, HAS A CONTUSION TO HER RIGHT FOREHEAD AND REPORTS HER CHEST FEELING SORE.  SHE WAS ALSO NOTED TO HAVE RIGHT CLAVICULAR FRACTURE.  PATIENT REPORTS FEELING FINE ALL DAY AND YESTERDAY.  HER BGM WAS NORMAL, NO JERKING MOVEMENTS OF THE BODY NOTED.  PATIENT IS WIDE AWAKE NOW AND ALERT. (2017 19:51)      PAST MEDICAL & SURGICAL HISTORY:  Hyperlipidemia, unspecified hyperlipidemia type  Essential hypertension  Neuropathy  Diabetes  Chronic obstructive pulmonary disease, unspecified COPD type  History of total knee replacement, unspecified laterality  H/O hernia repair   delivery delivered  S/P appendectomy      FAMILY HISTORY:  Family history of CHF (congestive heart failure) (Mother)      Social Hx:  Nonsmoker, no drug or alcohol use    MEDICATIONS  (STANDING):  heparin  Injectable 5000Unit(s) SubCutaneous every 12 hours  insulin lispro (HumaLOG) corrective regimen sliding scale  SubCutaneous three times a day before meals  dextrose 5%. 1000milliLiter(s) IV Continuous <Continuous>  dextrose 50% Injectable 12.5Gram(s) IV Push once  aspirin  chewable 81milliGRAM(s) Oral daily  lisinopril 5milliGRAM(s) Oral daily  pregabalin 150milliGRAM(s) Oral two times a day  insulin glargine Injectable (LANTUS) 45Unit(s) SubCutaneous at bedtime  atorvastatin 20milliGRAM(s) Oral at bedtime  tiotropium 18 MICROgram(s) Capsule 1Capsule(s) Inhalation daily  pantoprazole    Tablet 40milliGRAM(s) Oral before breakfast  levothyroxine 112MICROGram(s) Oral daily  montelukast 10milliGRAM(s) Oral at bedtime       Allergies    Bactrim (Other)  ciprofloxacin (Other (Mild))  clindamycin (Unknown)  ibuprofen (Unknown)  penicillins (Other)  sulfa drugs (Unknown)    Intolerances      ROS: Pertinent positives in HPI, all other ROS were reviewed and are negative.      Vital Signs Last 24 Hrs  T(C): 36.6, Max: 36.6 (-20 @ 13:24)  T(F): 97.8, Max: 97.9 (06-20 @ 13:24)  HR: 53 (51 - 62)  BP: 140/29 (138/35 - 175/37)  BP(mean): --  RR: 18 (18 - 20)  SpO2: 93% (93% - 100%)    GE:  Constitutional: awake and alert.  HEENT: PERRLA, EOMI,   Neck: Supple.  Respiratory: Breath sounds are clear bilaterally  Cardiovascular: S1 and S2, regular / irregular rhythm  Gastrointestinal: soft, nontender  Extremities:  no edema  Vascular: Caritid Bruit - no  Musculoskeletal: no joint swelling/tenderness, no abnormal movements  Skin: No rashes    Neurological exam:  HF: A x O x 3. Appropriately interactive, normal affect. Speech fluent, No Aphasia or paraphasic errors. Naming /repetition intact   CN: AYE, EOMI, VFF, facial sensation normal, no NLFD, tongue midline, Palate moves equally, SCM equal bilaterally  Motor: No pronator drift, Strength 5/5 in all 4 ext, normal bulk and tone, no tremor, rigidity or bradykinesia.    Sens: Intact to light touch / PP/ VS/ JS    Reflexes: Symmetric and normal . BJ 2+, BR 2+, KJ 2+, AJ 2+, downgoing toes b/l  Coord:  No FNFA, dysmetria, RANJIT intact   Gait/Balance: Normal/Cannot test    NIHSS:          Labs:       143  |  111<H>  |  48<H>  ----------------------------<  125<H>  4.4   |  26  |  1.88<H>    Ca    8.3<L>      2017 03:44  Phos  3.9       Mg     2.2     -    TPro  6.6  /  Alb  3.2<L>  /  TBili  0.5  /  DBili  x   /  AST  47<H>  /  ALT  24  /  AlkPhos  103  06-20    06-21 Chol 111 LDL 58 HDL 29<L> Trig 120  06- [id number] 6.5                          10.8   7.4   )-----------( 188      ( 2017 03:44 )             33.2       Radiology:  - CT Head:  - MRI brain  -MRA brain/Carotids  - EEG    A/P:    No IV tpa given because…    -ASA/PLAVIX  -Atorvastatin  -DVT prophylaxis  -Dysphagia screen  -Speech and swallow eval  -PT eval/ rehab eval    Mangement d/w Pt / family /     Total Critical Care Time spent: CC: 76y old  Female who presents with a chief complaint of FALL (2017 19:51)      HPI:  75 Y/O FEMALE WITH PMH REMARKABLE FOR DIABETES, HTN, COPD - ON HOME O2 (REFUSES TO WEAR IT ) PRESENTED TO ER AFTER SYNCOPE/FALL WHILE IN THE KITCHEN TODAY.     PT STATES SHE WAS BABYSITTING HER GREAT GRAND-DAUGHTER, SHE LEFT HER WITH THE GRAND-DAUGHTER THEN RECALLS GOING UP THE STAIRS, ENTERING THE KITCHEN TO GET A GLASS OF WATER, AND PUTTING THE BOTTLE TO HER MOUTH, THEN SHE BLACKED; UPON COMING AROUND WAS SURROUNDED BY EMS, SHE WAS CONFUSED AS TO WHAT HAD HAPPENED BUT KNEW WHERE SHE WAS AND WHAT SHE HAD BEEN DOING; SHE DOES RECALL HAVING HAD SLIGHT BLADDER INCONTINENCE,  .  AS PER THE HISTORY HER GRANDAUGHTER WHO IS 14 HEARD A LOUD BANG AND FOUND THE PATIENT ON THE KITCHEN FLOOR, SHE WAS UNRESPONSIVE, RUBBED HER CHEST HARD AND THE PATIENT AWOKE, CALLED EMS AND PT WAS BROUGHT TO THE ER, NO SEIZURE LIKE ACTIVITY WITNESSED.   .  PATIENT WAS AWAKE AND ALERT IN ER, HAS SUSTAINED CONTUSION TO HER RIGHT FOREHEAD AND FEELING SORE IN THE CHEST,  ALSO NOTED TO HAVE RIGHT CLAVICULAR FRACTURE. BGM WAS NORMAL.    PATIENT DENIES HAVING HAD EPISODES OF THIS NATURE, SHE HAS HAD ONE SYNCOPAL EPISODE PREVIOUSLY AS PER HER HUABAND.    PT DENIES HA, N, V, VERTIGO; SHE HAS NEUROPATHY; TAKES LYRICA     PAST MEDICAL & SURGICAL HISTORY:  Hyperlipidemia, unspecified hyperlipidemia type  Essential hypertension  Neuropathy  Diabetes  Chronic obstructive pulmonary disease, unspecified COPD type  History of total knee replacement, unspecified laterality  H/O hernia repair   delivery delivered  S/P appendectomy      FAMILY HISTORY:  Family history of CHF (congestive heart failure) (Mother)    Social Hx:  Former smoker, no drug or alcohol use    MEDICATIONS  (STANDING):  heparin  Injectable 5000Unit(s) SubCutaneous every 12 hours  insulin lispro (HumaLOG) corrective regimen sliding scale  SubCutaneous three times a day before meals  dextrose 5%. 1000milliLiter(s) IV Continuous <Continuous>  dextrose 50% Injectable 12.5Gram(s) IV Push once  aspirin  chewable 81milliGRAM(s) Oral daily  lisinopril 5milliGRAM(s) Oral daily  pregabalin 150milliGRAM(s) Oral two times a day  insulin glargine Injectable (LANTUS) 45Unit(s) SubCutaneous at bedtime  atorvastatin 20milliGRAM(s) Oral at bedtime  tiotropium 18 MICROgram(s) Capsule 1Capsule(s) Inhalation daily  pantoprazole    Tablet 40milliGRAM(s) Oral before breakfast  levothyroxine 112MICROGram(s) Oral daily  montelukast 10milliGRAM(s) Oral at bedtime       Allergies    Bactrim (Other)  ciprofloxacin (Other (Mild))  clindamycin (Unknown)  ibuprofen (Unknown)  penicillins (Other)  sulfa drugs (Unknown)    Intolerances    ROS: Pertinent positives in HPI, all other ROS were reviewed and are negative.      Vital Signs Last 24 Hrs  T(C): 36.6, Max: 36.6 ( @ 13:24)  T(F): 97.8, Max: 97.9 ( @ 13:24)  HR: 53 (51 - 62)  BP: 140/29 (138/35 - 175/37)  BP(mean): --  RR: 18 (18 - 20)  SpO2: 93% (93% - 100%)    GE:  Constitutional: awake and alert.  HEENT: no neck masses   Neck: Supple.  Respiratory: Breath sounds are clear bilaterally  Cardiovascular: S1 and S2, regular rhythm  Extremities:  no edema  Vascular: Caritid Bruit - no  Musculoskeletal: limited ROM right arm shoulder, no abnormal movements  Skin: hematoma right forehead    Neurological exam:  HF: A x O x 3. Speech fluent, No Aphasia or paraphasic errors. Naming /repetition intact   CN: AYE, EOMI, VFF, facial sensation normal, no NLFD, tongue midline, Palate moves equally, SCM equal bilaterally  Motor: No left pronator drift, Strength 5/5 in LUE, BLE ext, Right UE limited proximal due to clav #.    Sens: Intact to light touch     Reflexes: left BJ 2+, BR 2+, KJ 0, AJ 0, downgoing toes b/l  Coord:  No FNFA left   Gait/Balance: Not tested    Labs:       143  |  111<H>  |  48<H>  ----------------------------<  125<H>  4.4   |  26  |  1.88<H>    Ca    8.3<L>      2017 03:44  Phos  3.9       Mg     2.2         TPro  6.6  /  Alb  3.2<L>  /  TBili  0.5  /  DBili  x   /  AST  47<H>  /  ALT  24  /  AlkPhos  103   Chol 111 LDL 58 HDL 29<L> Trig 120   [id number] 6.5                          10.8   7.4   )-----------( 188      ( 2017 03:44 )             33.2       Radiology:  - CT Head:No evidence of acute intracranial abnormality.  No evidence of   hemorrhage.    Age-related involutional changes as above.

## 2017-06-21 NOTE — PROGRESS NOTE ADULT - PROBLEM SELECTOR PLAN 6
ADVANCED COPD  ON HOME O2 BUT CHOOSES NOT TO WEAR OXYGEN  NORMALLY HYPOXIC  NO ACUTE BRONCHOSPASM ON EXAM TODAY  WILL MONITOR ADVANCED COPD  ON HOME O2 BUT CHOOSES NOT TO WEAR OXYGEN  NORMALLY HYPOXIC, STABLE HERE ON O2 NASAL CANULA  NO ACUTE BRONCHOSPASM ON EXAM TODAY  WILL MONITOR

## 2017-06-21 NOTE — PROGRESS NOTE ADULT - PROBLEM SELECTOR PLAN 1
UNCLEAR ETIOLOGY  POSSBILY FROM HYPOXIA, FROM BRADYCARDIA,  ADMIT TO TELE  R/O ARRYTHMIA, TACHY/ANOOP SYNDROME  CHECK ENZYMES  NEURO EVAL -- DID REPORT URINARY INCONTINENCE BUT NO OTHER SIGNS OF SEIZURE - NO POST ICTAL STATE  GENTLY HYDRATE UNCLEAR ETIOLOGY  POSSIBLY FROM HYPOXIA, FROM BRADYCARDIA,  STILL ANOOP ON TELE - MONITOR  O2 SATS BETTER  R/O ARRYTHMIA, TACHY/ANOOP SYNDROME  CHECK ENZYMES  NEURO EVAL -- DID REPORT URINARY INCONTINENCE BUT NO OTHER SIGNS OF SEIZURE - NO POST ICTAL STATE  GENTLY HYDRATE

## 2017-06-21 NOTE — PROGRESS NOTE ADULT - PROBLEM SELECTOR PLAN 2
ON METOPROLOL  MONITOR ON TELE  HOLD OFF ON FURTHER METOPROLOL UNTIL HEART RATE IMPROVES  CARDIO EVAL ON METOPROLOL - HELD FOR NOW DUE TO BRADYCARDIA  MONITOR ON TELE  HOLD OFF ON FURTHER METOPROLOL UNTIL HEART RATE IMPROVES  CARDIO EVAL

## 2017-06-21 NOTE — CONSULT NOTE ADULT - SUBJECTIVE AND OBJECTIVE BOX
Cardiology Consultation    HPI: 75 Y/O FEMALE WITH DIABETES, HTN, CHRONIC UTI'S, COPD - ON HOME O2 (BUT REFUSES TO WEAR IT ) PRESENTED TO ER AFTER FALLING WHILE IN THE KITCHEN TODAY.  STATES SHE REMEMBERS GOING UP THE STAIRS, ENTERING THE KITCHEN TO GET A GLASS OF WATER, WALKING OVER TO THE TABLE AND SEEING "LIGHTS" - THEREAFTER SHE ONLY REMEMBERS EMS BEING THERE. HER GRANDAUGHTER WHO IS 14 HEARD A LOUD BANG AND FOUND THE PATIENT ON THE KITCHEN FLOOR, STATES SHE WAS UNRESPONSIVE, RUBBED HER CHEST HARD AND THE PATIENT AWOKE. SHE CALLED EMS AND PT WAS BROUGHT TO THE ER.  SHE WAS AWAKE ONCE IN THE ER. SHE DOES REPORTS URINARY INCONTINENCE, HAS A CONTUSION TO HER RIGHT FOREHEAD AND REPORTS HER CHEST FEELING SORE. SHE WAS ALSO NOTED TO HAVE RIGHT CLAVICULAR FRACTURE. PATIENT REPORTS FEELING FINE ALL DAY AND YESTERDAY. HER BGM WAS NORMAL, NO JERKING MOVEMENTS OF THE BODY NOTED.     - No CP/SOB. Tele- SB 40-50s. No dizziness. No fevers.     PAST MEDICAL & SURGICAL HISTORY:  Hyperlipidemia, unspecified hyperlipidemia type  Essential hypertension  Neuropathy  Diabetes  Chronic obstructive pulmonary disease, unspecified COPD type  History of total knee replacement, unspecified laterality  H/O hernia repair   delivery delivered  S/P appendectomy    Allergies  Bactrim (Other)  ciprofloxacin (Other (Mild))  clindamycin (Unknown)  ibuprofen (Unknown)  penicillins (Other)  sulfa drugs (Unknown)    SOCIAL HISTORY: Denies tobacco, etoh abuse or illicit drug use    FAMILY HISTORY: Family history of CHF (congestive heart failure) (Mother)    MEDICATIONS  (STANDING):  heparin  Injectable 5000Unit(s) SubCutaneous every 12 hours  insulin lispro (HumaLOG) corrective regimen sliding scale  SubCutaneous three times a day before meals  dextrose 5%. 1000milliLiter(s) IV Continuous <Continuous>  dextrose 50% Injectable 12.5Gram(s) IV Push once  aspirin  chewable 81milliGRAM(s) Oral daily  lisinopril 5milliGRAM(s) Oral daily  pregabalin 150milliGRAM(s) Oral two times a day  insulin glargine Injectable (LANTUS) 45Unit(s) SubCutaneous at bedtime  atorvastatin 20milliGRAM(s) Oral at bedtime  tiotropium 18 MICROgram(s) Capsule 1Capsule(s) Inhalation daily  pantoprazole    Tablet 40milliGRAM(s) Oral before breakfast  levothyroxine 112MICROGram(s) Oral daily  montelukast 10milliGRAM(s) Oral at bedtime    MEDICATIONS  (PRN):  dextrose Gel 1Dose(s) Oral once PRN Blood Glucose LESS THAN 70 milliGRAM(s)/deciliter  glucagon  Injectable 1milliGRAM(s) IntraMuscular once PRN Glucose LESS THAN 70 milligrams/deciliter  acetaminophen    Suspension. 650milliGRAM(s) Oral every 4 hours PRN Moderate Pain (4 - 6)    Vital Signs Last 24 Hrs  T(C): 36.6, Max: 36.6 (06-20 @ 13:24)  T(F): 97.8, Max: 97.9 (06-20 @ 13:24)  HR: 53 (51 - 62)  BP: 140/29 (138/35 - 175/37)  BP(mean): --  RR: 18 (18 - 20)  SpO2: 93% (93% - 100%)    REVIEW OF SYSTEMS:    CONSTITUTIONAL:  As per HPI.  HEENT:  Eyes:  No diplopia or blurred vision. ENT:  No earache, sore throat or runny nose.  CARDIOVASCULAR:  No pressure, squeezing, strangling, tightness, heaviness or aching about the chest, neck, axilla or epigastrium.  RESPIRATORY:  No cough, shortness of breath, PND or orthopnea.  GASTROINTESTINAL:  No nausea, vomiting or diarrhea.  GENITOURINARY:  No dysuria, frequency or urgency.  MUSCULOSKELETAL:  As per HPI.  SKIN:  No change in skin, hair or nails.  NEUROLOGIC:  No paresthesias, fasciculations, seizures or weakness.  PSYCHIATRIC:  No disorder of thought or mood.  ENDOCRINE:  No heat or cold intolerance, polyuria or polydipsia.  HEMATOLOGICAL:  No easy bruising or bleedings.     PHYSICAL EXAMINATION:    GENERAL APPEARANCE:  Pt. is not currently dyspneic, in no distress. Pt. is alert, oriented, and pleasant.  HEENT:  Pupils are normal and react normally. No icterus. Mucous membranes well colored.  NECK:  Supple. No lymphadenopathy. Jugular venous pressure not elevated. Carotids equal.   HEART:   The cardiac impulse has a normal quality. There are no murmurs, rubs or gallops noted  CHEST:  Chest is clear to auscultation. Normal respiratory effort.  ABDOMEN:  Soft and nontender.   EXTREMITIES:  There is no edema.     I&O's Summary    I & Os for current day (as of 2017 08:28)  =============================================  IN: 294 ml / OUT: 0 ml / NET: 294 ml    LABS:                        10.8   7.4   )-----------( 188      ( 2017 03:44 )             33.2     06-21    143  |  111<H>  |  48<H>  ----------------------------<  125<H>  4.4   |  26  |  1.88<H>    Ca    8.3<L>      2017 03:44  Phos  3.9     06-21  Mg     2.2     06-21    TPro  6.6  /  Alb  3.2<L>  /  TBili  0.5  /  DBili  x   /  AST  47<H>  /  ALT  24  /  AlkPhos  103  06-20    LIVER FUNCTIONS - ( 2017 13:54 )  Alb: 3.2 g/dL / Pro: 6.6 gm/dL / ALK PHOS: 103 U/L / ALT: 24 U/L / AST: 47 U/L / GGT: x           PT/INR - ( 2017 13:54 )   PT: 11.7 sec;   INR: 1.08 ratio       PTT - ( 2017 13:54 )  PTT:30.7 sec  CARDIAC MARKERS ( 2017 03:44 )  0.028 ng/mL / x     / 74 U/L / x     / x      CARDIAC MARKERS ( 2017 21:26 )  0.021 ng/mL / x     / x     / x     / x      CARDIAC MARKERS ( 2017 13:54 )  <0.015 ng/mL / x     / x     / x     / x        EKG: Sinus bradycardia  Otherwise normal ECG    TELEMETRY: SB 40-50s    CARDIAC TESTS: pending    RADIOLOGY & ADDITIONAL STUDIES: CT chest- Status post left lower lobe wedge resection with   postoperativechanges.  Emphysema.    ASSESSMENT & PLAN:

## 2017-06-22 LAB
ANION GAP SERPL CALC-SCNC: 6 MMOL/L — SIGNIFICANT CHANGE UP (ref 5–17)
BUN SERPL-MCNC: 44 MG/DL — HIGH (ref 7–23)
CALCIUM SERPL-MCNC: 9 MG/DL — SIGNIFICANT CHANGE UP (ref 8.5–10.1)
CHLORIDE SERPL-SCNC: 109 MMOL/L — HIGH (ref 96–108)
CO2 SERPL-SCNC: 28 MMOL/L — SIGNIFICANT CHANGE UP (ref 22–31)
CREAT SERPL-MCNC: 1.54 MG/DL — HIGH (ref 0.5–1.3)
GLUCOSE SERPL-MCNC: 163 MG/DL — HIGH (ref 70–99)
HCT VFR BLD CALC: 34.7 % — SIGNIFICANT CHANGE UP (ref 34.5–45)
HGB BLD-MCNC: 11.4 G/DL — LOW (ref 11.5–15.5)
MCHC RBC-ENTMCNC: 29 PG — SIGNIFICANT CHANGE UP (ref 27–34)
MCHC RBC-ENTMCNC: 33 GM/DL — SIGNIFICANT CHANGE UP (ref 32–36)
MCV RBC AUTO: 88 FL — SIGNIFICANT CHANGE UP (ref 80–100)
PLATELET # BLD AUTO: 196 K/UL — SIGNIFICANT CHANGE UP (ref 150–400)
POTASSIUM SERPL-MCNC: 5 MMOL/L — SIGNIFICANT CHANGE UP (ref 3.5–5.3)
POTASSIUM SERPL-SCNC: 5 MMOL/L — SIGNIFICANT CHANGE UP (ref 3.5–5.3)
RBC # BLD: 3.94 M/UL — SIGNIFICANT CHANGE UP (ref 3.8–5.2)
RBC # FLD: 14 % — SIGNIFICANT CHANGE UP (ref 10.3–14.5)
SODIUM SERPL-SCNC: 143 MMOL/L — SIGNIFICANT CHANGE UP (ref 135–145)
T3 SERPL-MCNC: 84 NG/DL — SIGNIFICANT CHANGE UP (ref 80–200)
T4 AB SER-ACNC: 7.4 UG/DL — SIGNIFICANT CHANGE UP (ref 4.6–12)
WBC # BLD: 7.5 K/UL — SIGNIFICANT CHANGE UP (ref 3.8–10.5)
WBC # FLD AUTO: 7.5 K/UL — SIGNIFICANT CHANGE UP (ref 3.8–10.5)

## 2017-06-22 PROCEDURE — 93010 ELECTROCARDIOGRAM REPORT: CPT | Mod: 76

## 2017-06-22 RX ORDER — LISINOPRIL 2.5 MG/1
10 TABLET ORAL DAILY
Qty: 0 | Refills: 0 | Status: DISCONTINUED | OUTPATIENT
Start: 2017-06-22 | End: 2017-06-24

## 2017-06-22 RX ORDER — SODIUM CHLORIDE 9 MG/ML
1000 INJECTION INTRAMUSCULAR; INTRAVENOUS; SUBCUTANEOUS
Qty: 0 | Refills: 0 | Status: DISCONTINUED | OUTPATIENT
Start: 2017-06-22 | End: 2017-06-24

## 2017-06-22 RX ORDER — ACETAMINOPHEN 500 MG
650 TABLET ORAL EVERY 4 HOURS
Qty: 0 | Refills: 0 | Status: DISCONTINUED | OUTPATIENT
Start: 2017-06-22 | End: 2017-06-25

## 2017-06-22 RX ORDER — ACETAMINOPHEN 500 MG
1000 TABLET ORAL ONCE
Qty: 0 | Refills: 0 | Status: COMPLETED | OUTPATIENT
Start: 2017-06-22 | End: 2017-06-22

## 2017-06-22 RX ADMIN — Medication 150 MILLIGRAM(S): at 05:38

## 2017-06-22 RX ADMIN — Medication 112 MICROGRAM(S): at 05:38

## 2017-06-22 RX ADMIN — Medication 81 MILLIGRAM(S): at 11:11

## 2017-06-22 RX ADMIN — LISINOPRIL 10 MILLIGRAM(S): 2.5 TABLET ORAL at 11:11

## 2017-06-22 RX ADMIN — HEPARIN SODIUM 5000 UNIT(S): 5000 INJECTION INTRAVENOUS; SUBCUTANEOUS at 17:33

## 2017-06-22 RX ADMIN — Medication 400 MILLIGRAM(S): at 17:33

## 2017-06-22 RX ADMIN — TIOTROPIUM BROMIDE 1 CAPSULE(S): 18 CAPSULE ORAL; RESPIRATORY (INHALATION) at 09:27

## 2017-06-22 RX ADMIN — MONTELUKAST 10 MILLIGRAM(S): 4 TABLET, CHEWABLE ORAL at 21:09

## 2017-06-22 RX ADMIN — Medication 2: at 08:01

## 2017-06-22 RX ADMIN — LISINOPRIL 5 MILLIGRAM(S): 2.5 TABLET ORAL at 05:38

## 2017-06-22 RX ADMIN — Medication 1000 MILLIGRAM(S): at 17:58

## 2017-06-22 RX ADMIN — HEPARIN SODIUM 5000 UNIT(S): 5000 INJECTION INTRAVENOUS; SUBCUTANEOUS at 05:38

## 2017-06-22 RX ADMIN — SODIUM CHLORIDE 50 MILLILITER(S): 9 INJECTION INTRAMUSCULAR; INTRAVENOUS; SUBCUTANEOUS at 17:36

## 2017-06-22 RX ADMIN — ATORVASTATIN CALCIUM 20 MILLIGRAM(S): 80 TABLET, FILM COATED ORAL at 21:09

## 2017-06-22 RX ADMIN — Medication 8: at 12:30

## 2017-06-22 RX ADMIN — Medication 4: at 17:15

## 2017-06-22 RX ADMIN — PANTOPRAZOLE SODIUM 40 MILLIGRAM(S): 20 TABLET, DELAYED RELEASE ORAL at 05:38

## 2017-06-22 RX ADMIN — INSULIN GLARGINE 45 UNIT(S): 100 INJECTION, SOLUTION SUBCUTANEOUS at 21:11

## 2017-06-22 RX ADMIN — Medication 150 MILLIGRAM(S): at 17:15

## 2017-06-22 NOTE — PROGRESS NOTE ADULT - SUBJECTIVE AND OBJECTIVE BOX
History of Present Illness: 	  75 Y/O FEMALE WITH DIABETES, HTN, CHRONIC UTI'S, COPD - ON HOME O2 (BUT REFUSES TO WEAR IT ) PRESENTED TO ER AFTER FALLING WHILE IN THE KITCHEN TODAY.  STATES SHE REMEMBERS GOING UP THE STAIRS, ENTERING THE KITCHEN TO GET A GLASS OF WATER, WALKING OVER TO THE TABLE AND SEEING "LIGHTS" - THEREAFTER SHE ONLY REMEMBERS EMS BEING THERE.  HER GRANDAUGHTER WHO IS 14 HEARD A LOUD BANG AND FOUND THE PATIENT ON THE KITCHEN FLOOR, STATES SHE WAS UNRESPONSIVE, RUBBED HER CHEST HARD AND THE PATIENT AWOKE.  SHE CALLED EMS AND PT WAS BROUGHT TO THE ER.  SHE WAS AWAKE ONCE IN THE ER. SHE DOES REPORTS URINARY INCONTINENCE, HAS A CONTUSION TO HER RIGHT FOREHEAD AND REPORTS HER CHEST FEELING SORE.  SHE WAS ALSO NOTED TO HAVE RIGHT CLAVICULAR FRACTURE.    17: Pain on the side of fall - right sided chest, no SOB, headaches are better; no n/v/f/c Tele: SB 40-50's  17: Left sided chest pain and shoulder pain; no dizziness, no sob; denies any n/v/f/c    ROS: as per HPI otherwise all other systems reviewed and are negative    PHYSICAL EXAM:    Vital Signs Last 24 Hrs  T(C): 36.9, Max: 36.9 (06-22 @ 04:50)  T(F): 98.5, Max: 98.5 (06-22 @ 04:50)  HR: 62 (56 - 62)  BP: 178/36 (152/33 - 178/58)  BP(mean): --  RR: 18 (18 - 19)  SpO2: 96% (96% - 98%)    GEN: A and O, NAD,  mood stable  HEENT:   ECCHYMOSIS TO THE RIGHT FOREHEAD, EOMI, no oropharyngeal lesions, erythema, exudates, oral thrush    NECK:   supple, no palpable lymph nodes, no thyromegaly    CV:  +S1, +S2, regular, no murmurs or rubs    RESP:   lungs clear to auscultation bilaterally, no wheezing, rales, rhonchi, good air entry bilaterally, DECREASED BS ANGELICA    GI:  abdomen soft, non-tender, non-distended, normal BS, no abdominal masses, no palpable masses    RECTAL:  not examined    :  not examined    MSK:   normal muscle tone, no atrophy, no rigidity, no contractions    EXT:   no clubbing, no cyanosis, no edema, no calf pain, swelling or erythema; RUE IN SLING    VASCULAR:  pulses equal and symmetric in the upper and lower extremities    NEURO:  AAOX3, no focal neurological deficits, follows all commands, able to move extremities spontaneously    SKIN:  no ulcers, lesions or rashes    LABS:                              11.4   7.5   )-----------( 196      ( 2017 06:20 )             34.7     06-22    143  |  109<H>  |  44<H>  ----------------------------<  163<H>  5.0   |  28  |  1.54<H>    Ca    9.0      2017 06:20  Phos  3.9     06-21  Mg     2.2     -21    TPro  6.6  /  Alb  3.2<L>  /  TBili  0.5  /  DBili  x   /  AST  47<H>  /  ALT  24  /  AlkPhos  103  -20    CARDIAC MARKERS ( 2017 03:44 )  0.028 ng/mL / x     / 74 U/L / x     / x      CARDIAC MARKERS ( 2017 21:26 )  0.021 ng/mL / x     / x     / x     / x      CARDIAC MARKERS ( 2017 13:54 )  <0.015 ng/mL / x     / x     / x     / x          LIVER FUNCTIONS - ( 2017 13:54 )  Alb: 3.2 g/dL / Pro: 6.6 gm/dL / ALK PHOS: 103 U/L / ALT: 24 U/L / AST: 47 U/L / GGT: x           PT/INR - ( 2017 13:54 )   PT: 11.7 sec;   INR: 1.08 ratio         PTT - ( 2017 13:54 )  PTT:30.7 sec  Urinalysis Basic - ( 2017 17:42 )    Color: Yellow / Appearance: Clear / S.010 / pH: x  Gluc: x / Ketone: Negative  / Bili: Negative / Urobili: Negative mg/dL   Blood: x / Protein: 15 mg/dL / Nitrite: Negative   Leuk Esterase: Trace / RBC: 3-5 /HPF / WBC 0-2   Sq Epi: x / Non Sq Epi: Occasional / Bacteria: Occasional        MEDICATIONS  (STANDING):  heparin  Injectable 5000Unit(s) SubCutaneous every 12 hours  insulin lispro (HumaLOG) corrective regimen sliding scale  SubCutaneous three times a day before meals  dextrose 5%. 1000milliLiter(s) IV Continuous <Continuous>  dextrose 50% Injectable 12.5Gram(s) IV Push once  aspirin  chewable 81milliGRAM(s) Oral daily  pregabalin 150milliGRAM(s) Oral two times a day  insulin glargine Injectable (LANTUS) 45Unit(s) SubCutaneous at bedtime  atorvastatin 20milliGRAM(s) Oral at bedtime  tiotropium 18 MICROgram(s) Capsule 1Capsule(s) Inhalation daily  pantoprazole    Tablet 40milliGRAM(s) Oral before breakfast  levothyroxine 112MICROGram(s) Oral daily  montelukast 10milliGRAM(s) Oral at bedtime  lisinopril 10milliGRAM(s) Oral daily    MEDICATIONS  (PRN):  dextrose Gel 1Dose(s) Oral once PRN Blood Glucose LESS THAN 70 milliGRAM(s)/deciliter  glucagon  Injectable 1milliGRAM(s) IntraMuscular once PRN Glucose LESS THAN 70 milligrams/deciliter  acetaminophen    Suspension. 650milliGRAM(s) Oral every 4 hours PRN Moderate Pain (4 - 6)

## 2017-06-22 NOTE — PROGRESS NOTE ADULT - PROBLEM SELECTOR PLAN 6
ADVANCED COPD  ON HOME O2 BUT CHOOSES NOT TO WEAR OXYGEN  NORMALLY HYPOXIC, STABLE HERE ON O2 NASAL CANULA  NO ACUTE BRONCHOSPASM ON EXAM TODAY  WILL MONITOR

## 2017-06-22 NOTE — PHYSICAL THERAPY INITIAL EVALUATION ADULT - GAIT DEVIATIONS NOTED, PT EVAL
Noted mild LOB w/ pt. able to self correct when pt. rotated head. VCs to decrease rotation of head and increase LUE arm swing to improve balance.

## 2017-06-22 NOTE — PHYSICAL THERAPY INITIAL EVALUATION ADULT - PERTINENT HX OF CURRENT PROBLEM, REHAB EVAL
Pt. presents to ER after falling while in kitchen. In ED, pt. noted to have sustained a R nondisplaced distal 1/3rd clavicle fracture and HR in 40s and 50s. No surgical intervention at present time. Pt. is on 2L O2 at home, however refuses to wear O2.

## 2017-06-22 NOTE — PHYSICAL THERAPY INITIAL EVALUATION ADULT - LEVEL OF INDEPENDENCE: STAIR NEGOTIATION, REHAB EVAL
Pt. refused to ambulate steps at present time due to c/o increased back pain which was relieved w/ sitting.

## 2017-06-22 NOTE — PHYSICAL THERAPY INITIAL EVALUATION ADULT - ADDITIONAL COMMENTS
Pt. resides in a private house w/ , daughter and granddaughter. There are 3 NIRU house w/ no HR and 13 steps inside w/ unilateral HR. Pt. ambulates w/out an AD at baseline and wears 2L O2 at home.

## 2017-06-22 NOTE — PROGRESS NOTE ADULT - PROBLEM SELECTOR PLAN 2
ON METOPROLOL - HELD FOR NOW DUE TO BRADYCARDIA  MONITOR ON TELE  HOLD OFF ON FURTHER METOPROLOL UNTIL HEART RATE IMPROVES  CARDIO EVAL

## 2017-06-22 NOTE — PROGRESS NOTE ADULT - PROBLEM SELECTOR PLAN 1
UNCLEAR ETIOLOGY TELE - WITH ANOOP WHICH IS NOW IMPROVING  NO PAUSES  CONT TO HOLD METOPROLOL  EEG DONE TO R/O SEIZURES  ORTHOSTATIC -- GIVE MORE HYDRATION  POSSIBLY FROM HYPOXIA, FROM BRADYCARDIA,  STILL ANOOP ON TELE - MONITOR  O2 SATS BETTER

## 2017-06-22 NOTE — CDI QUERY NOTE - NSCDIOTHERTXTBX_GEN_ALL_CORE_HH
1.     Documentation reveals patient presents found on floor unresponsive on EMS arrival/now A+Ox3. As per EMS pt was unresponsive and hypoxic when they got there place don o2. Pre hospital documentation reveals RR= 20, O2 Sat 76% and placed on NRB.  Also noted . ADVANCED COPD  ON HOME O2 BUT CHOOSES NOT TO WEAR OXYGEN, NORMALLY HYPOXIC, STABLE HERE ON O2 NASAL CANULA          Can you please clarify if these findings demonstrate a diagnosis    IE: Acute respiratory Failure   IE: Acute on Chronic Respiratory failure   IE: Other, please specify    2.      ACUTE ON CHRONIC KIDNEY DISEASE has been documented. Noted   6/20  BUN/Creat =55/2.29  GFR = 20 > BUN/Creat = 52/20.5  GFR = 23    6/21  BUN /Creat = 48/1.88  GFR = 25    6/22  BUN/Creat = 44/1.54  GFR = 32.           Can you please further clarify    ACUTE ON CHRONIC KIDNEY DISEASE     IE: ANI with CKD (Please clarify stage of CKD    IE:  CKD  only with no ANI (please clarify stage of CKD    IE: Other, please specify    3. Syncope has been documented. Please clarify underlying etiology when known so all diagnoses are reflected in record.  Thank you

## 2017-06-22 NOTE — PROGRESS NOTE ADULT - SUBJECTIVE AND OBJECTIVE BOX
Cardiology Consultation    HPI: 77 Y/O FEMALE WITH DIABETES, HTN, CHRONIC UTI'S, COPD - ON HOME O2 (BUT REFUSES TO WEAR IT ) PRESENTED TO ER AFTER FALLING WHILE IN THE KITCHEN.  STATES SHE REMEMBERS GOING UP THE STAIRS, ENTERING THE KITCHEN TO GET A GLASS OF WATER, WALKING OVER TO THE TABLE AND SEEING "LIGHTS" - THEREAFTER SHE ONLY REMEMBERS EMS BEING THERE. HER GRANDAUGHTER WHO IS 14 HEARD A LOUD BANG AND FOUND THE PATIENT ON THE KITCHEN FLOOR, STATES SHE WAS UNRESPONSIVE, RUBBED HER CHEST HARD AND THE PATIENT AWOKE. SHE CALLED EMS AND PT WAS BROUGHT TO THE ER.  SHE WAS AWAKE ONCE IN THE ER. SHE DOES REPORTS URINARY INCONTINENCE, HAS A CONTUSION TO HER RIGHT FOREHEAD AND REPORTS HER CHEST FEELING SORE. SHE WAS ALSO NOTED TO HAVE RIGHT CLAVICULAR FRACTURE. PATIENT REPORTS FEELING FINE ALL DAY AND YESTERDAY. HER BGM WAS NORMAL, NO JERKING MOVEMENTS OF THE BODY NOTED.     - No CP/SOB. Tele- SB 40-50s. No dizziness. No fevers.   - pt seen and examined by me today. Denies any symptoms.    PAST MEDICAL & SURGICAL HISTORY:  Hyperlipidemia, unspecified hyperlipidemia type  Essential hypertension  Neuropathy  Diabetes  Chronic obstructive pulmonary disease, unspecified COPD type  History of total knee replacement, unspecified laterality  H/O hernia repair   delivery delivered  S/P appendectomy    Allergies  Bactrim (Other)  ciprofloxacin (Other (Mild))  clindamycin (Unknown)  ibuprofen (Unknown)  penicillins (Other)  sulfa drugs (Unknown)    SOCIAL HISTORY: Denies tobacco, etoh abuse or illicit drug use    FAMILY HISTORY: Family history of CHF (congestive heart failure) (Mother)    MEDICATIONS  (STANDING):  heparin  Injectable 5000Unit(s) SubCutaneous every 12 hours  insulin lispro (HumaLOG) corrective regimen sliding scale  SubCutaneous three times a day before meals  dextrose 5%. 1000milliLiter(s) IV Continuous <Continuous>  dextrose 50% Injectable 12.5Gram(s) IV Push once  aspirin  chewable 81milliGRAM(s) Oral daily  lisinopril 5milliGRAM(s) Oral daily  pregabalin 150milliGRAM(s) Oral two times a day  insulin glargine Injectable (LANTUS) 45Unit(s) SubCutaneous at bedtime  atorvastatin 20milliGRAM(s) Oral at bedtime  tiotropium 18 MICROgram(s) Capsule 1Capsule(s) Inhalation daily  pantoprazole    Tablet 40milliGRAM(s) Oral before breakfast  levothyroxine 112MICROGram(s) Oral daily  montelukast 10milliGRAM(s) Oral at bedtime    MEDICATIONS  (PRN):  dextrose Gel 1Dose(s) Oral once PRN Blood Glucose LESS THAN 70 milliGRAM(s)/deciliter  glucagon  Injectable 1milliGRAM(s) IntraMuscular once PRN Glucose LESS THAN 70 milligrams/deciliter  acetaminophen    Suspension. 650milliGRAM(s) Oral every 4 hours PRN Moderate Pain (4 - 6)    Vital Signs Last 24 Hrs  T(C): 36.6, Max: 36.6 (06-20 @ 13:24)  T(F): 97.8, Max: 97.9 (06-20 @ 13:24)  HR: 53 (51 - 62)  BP: 140/29 (138/35 - 175/37)  BP(mean): --  RR: 18 (18 - 20)  SpO2: 93% (93% - 100%)    REVIEW OF SYSTEMS:    CONSTITUTIONAL:  As per HPI.  HEENT:  Eyes:  No diplopia or blurred vision. ENT:  No earache, sore throat or runny nose.  CARDIOVASCULAR:  No pressure, squeezing, strangling, tightness, heaviness or aching about the chest, neck, axilla or epigastrium.  RESPIRATORY:  No cough, shortness of breath, PND or orthopnea.  GASTROINTESTINAL:  No nausea, vomiting or diarrhea.  GENITOURINARY:  No dysuria, frequency or urgency.  MUSCULOSKELETAL:  As per HPI.  SKIN:  No change in skin, hair or nails.  NEUROLOGIC:  No paresthesias, fasciculations, seizures or weakness.  PSYCHIATRIC:  No disorder of thought or mood.  ENDOCRINE:  No heat or cold intolerance, polyuria or polydipsia.  HEMATOLOGICAL:  No easy bruising or bleedings.     PHYSICAL EXAMINATION:    GENERAL APPEARANCE:  Pt. is in no distress. Pt. is alert, oriented, and pleasant.  HEENT:  Pupils are normal and react normally. No icterus. Mucous membranes well colored.  NECK:  Supple. No lymphadenopathy. Jugular venous pressure not elevated. Carotids equal.   HEART:   The cardiac impulse has a normal quality. There are no murmurs, rubs or gallops noted  CHEST:  Chest is clear to auscultation. Normal respiratory effort.  ABDOMEN:  Soft and nontender.   EXTREMITIES:  There is no edema.   Skin- bruise noted  on the right temple area    I&O's Summary    I & Os for current day (as of 2017 08:28)  =============================================  IN: 294 ml / OUT: 0 ml / NET: 294 ml    LABS:                        10.8   7.4   )-----------( 188      ( 2017 03:44 )             33.2     06-21    143  |  111<H>  |  48<H>  ----------------------------<  125<H>  4.4   |  26  |  1.88<H>    Ca    8.3<L>      2017 03:44  Phos  3.9     -21  Mg     2.2     -21    TPro  6.6  /  Alb  3.2<L>  /  TBili  0.5  /  DBili  x   /  AST  47<H>  /  ALT  24  /  AlkPhos  103  06-20    LIVER FUNCTIONS - ( 2017 13:54 )  Alb: 3.2 g/dL / Pro: 6.6 gm/dL / ALK PHOS: 103 U/L / ALT: 24 U/L / AST: 47 U/L / GGT: x           PT/INR - ( 2017 13:54 )   PT: 11.7 sec;   INR: 1.08 ratio       PTT - ( 2017 13:54 )  PTT:30.7 sec  CARDIAC MARKERS ( 2017 03:44 )  0.028 ng/mL / x     / 74 U/L / x     / x      CARDIAC MARKERS ( 2017 21:26 )  0.021 ng/mL / x     / x     / x     / x      CARDIAC MARKERS ( 2017 13:54 )  <0.015 ng/mL / x     / x     / x     / x        EKG: Sinus bradycardia  Otherwise normal ECG    TELEMETRY: SB 40-50s    CARDIAC TESTS: pending    RADIOLOGY & ADDITIONAL STUDIES: CT chest- Status post left lower lobe wedge resection with   postoperativechanges.  Emphysema.    ASSESSMENT & PLAN:

## 2017-06-23 LAB
ANION GAP SERPL CALC-SCNC: 5 MMOL/L — SIGNIFICANT CHANGE UP (ref 5–17)
BUN SERPL-MCNC: 42 MG/DL — HIGH (ref 7–23)
CALCIUM SERPL-MCNC: 8.9 MG/DL — SIGNIFICANT CHANGE UP (ref 8.5–10.1)
CHLORIDE SERPL-SCNC: 109 MMOL/L — HIGH (ref 96–108)
CO2 SERPL-SCNC: 26 MMOL/L — SIGNIFICANT CHANGE UP (ref 22–31)
CREAT SERPL-MCNC: 1.28 MG/DL — SIGNIFICANT CHANGE UP (ref 0.5–1.3)
GLUCOSE SERPL-MCNC: 163 MG/DL — HIGH (ref 70–99)
HCT VFR BLD CALC: 33.5 % — LOW (ref 34.5–45)
HGB BLD-MCNC: 11.1 G/DL — LOW (ref 11.5–15.5)
MCHC RBC-ENTMCNC: 28.9 PG — SIGNIFICANT CHANGE UP (ref 27–34)
MCHC RBC-ENTMCNC: 33 GM/DL — SIGNIFICANT CHANGE UP (ref 32–36)
MCV RBC AUTO: 87.5 FL — SIGNIFICANT CHANGE UP (ref 80–100)
PLATELET # BLD AUTO: 177 K/UL — SIGNIFICANT CHANGE UP (ref 150–400)
POTASSIUM SERPL-MCNC: 5 MMOL/L — SIGNIFICANT CHANGE UP (ref 3.5–5.3)
POTASSIUM SERPL-SCNC: 5 MMOL/L — SIGNIFICANT CHANGE UP (ref 3.5–5.3)
RBC # BLD: 3.83 M/UL — SIGNIFICANT CHANGE UP (ref 3.8–5.2)
RBC # FLD: 13.8 % — SIGNIFICANT CHANGE UP (ref 10.3–14.5)
SODIUM SERPL-SCNC: 140 MMOL/L — SIGNIFICANT CHANGE UP (ref 135–145)
WBC # BLD: 7.3 K/UL — SIGNIFICANT CHANGE UP (ref 3.8–10.5)
WBC # FLD AUTO: 7.3 K/UL — SIGNIFICANT CHANGE UP (ref 3.8–10.5)

## 2017-06-23 PROCEDURE — 93010 ELECTROCARDIOGRAM REPORT: CPT

## 2017-06-23 RX ORDER — LISINOPRIL 2.5 MG/1
20 TABLET ORAL DAILY
Qty: 0 | Refills: 0 | Status: DISCONTINUED | OUTPATIENT
Start: 2017-06-23 | End: 2017-06-24

## 2017-06-23 RX ORDER — TRAMADOL HYDROCHLORIDE 50 MG/1
25 TABLET ORAL EVERY 12 HOURS
Qty: 0 | Refills: 0 | Status: DISCONTINUED | OUTPATIENT
Start: 2017-06-23 | End: 2017-06-25

## 2017-06-23 RX ORDER — TRAMADOL HYDROCHLORIDE 50 MG/1
25 TABLET ORAL EVERY 12 HOURS
Qty: 0 | Refills: 0 | Status: DISCONTINUED | OUTPATIENT
Start: 2017-06-23 | End: 2017-06-23

## 2017-06-23 RX ADMIN — INSULIN GLARGINE 45 UNIT(S): 100 INJECTION, SOLUTION SUBCUTANEOUS at 21:15

## 2017-06-23 RX ADMIN — Medication 650 MILLIGRAM(S): at 12:12

## 2017-06-23 RX ADMIN — Medication 650 MILLIGRAM(S): at 23:50

## 2017-06-23 RX ADMIN — LISINOPRIL 10 MILLIGRAM(S): 2.5 TABLET ORAL at 05:04

## 2017-06-23 RX ADMIN — HEPARIN SODIUM 5000 UNIT(S): 5000 INJECTION INTRAVENOUS; SUBCUTANEOUS at 17:07

## 2017-06-23 RX ADMIN — Medication 150 MILLIGRAM(S): at 17:10

## 2017-06-23 RX ADMIN — MONTELUKAST 10 MILLIGRAM(S): 4 TABLET, CHEWABLE ORAL at 21:14

## 2017-06-23 RX ADMIN — Medication 150 MILLIGRAM(S): at 05:04

## 2017-06-23 RX ADMIN — PANTOPRAZOLE SODIUM 40 MILLIGRAM(S): 20 TABLET, DELAYED RELEASE ORAL at 05:04

## 2017-06-23 RX ADMIN — Medication 4: at 17:07

## 2017-06-23 RX ADMIN — Medication 6: at 12:11

## 2017-06-23 RX ADMIN — Medication 650 MILLIGRAM(S): at 22:54

## 2017-06-23 RX ADMIN — Medication 650 MILLIGRAM(S): at 05:05

## 2017-06-23 RX ADMIN — Medication 81 MILLIGRAM(S): at 12:06

## 2017-06-23 RX ADMIN — HEPARIN SODIUM 5000 UNIT(S): 5000 INJECTION INTRAVENOUS; SUBCUTANEOUS at 05:05

## 2017-06-23 RX ADMIN — LISINOPRIL 20 MILLIGRAM(S): 2.5 TABLET ORAL at 12:13

## 2017-06-23 RX ADMIN — Medication 112 MICROGRAM(S): at 05:04

## 2017-06-23 RX ADMIN — ATORVASTATIN CALCIUM 20 MILLIGRAM(S): 80 TABLET, FILM COATED ORAL at 21:14

## 2017-06-23 RX ADMIN — Medication 2: at 08:25

## 2017-06-23 RX ADMIN — TIOTROPIUM BROMIDE 1 CAPSULE(S): 18 CAPSULE ORAL; RESPIRATORY (INHALATION) at 08:13

## 2017-06-23 NOTE — PROGRESS NOTE ADULT - PROBLEM SELECTOR PROBLEM 6
Chronic obstructive pulmonary disease, unspecified COPD type

## 2017-06-23 NOTE — PROGRESS NOTE ADULT - PROBLEM SELECTOR PLAN 7
RIGHT FRACTURE  PAIN CONTROL ALTHOUGH EXTREMELY SENSITIVE TO NARCOTICS  TYLENOL  ORTHO EVAL
RIGHT FRACTURE  PAIN CONTROL ALTHOUGH EXTREMELY SENSITIVE TO NARCOTICS  TYLENOL  ORTHO EVAL  CONT SLING
RIGHT FRACTURE  PAIN CONTROL ALTHOUGH EXTREMELY SENSITIVE TO NARCOTICS  TYLENOL

## 2017-06-23 NOTE — PROGRESS NOTE ADULT - PROBLEM SELECTOR PLAN 4
PLACE ON LANTUS WITH ISS  CHECK BGMS
PLACE ON LANTUS WITH ISS  CHECK BGMS  STABLE
PLACE ON LANTUS WITH ISS  CHECK BGMS  STABLE

## 2017-06-23 NOTE — PROGRESS NOTE ADULT - PROBLEM SELECTOR PLAN 2
ON METOPROLOL - HELD FOR NOW DUE TO BRADYCARDIA  MONITOR ON TELE  HOLD OFF ON FURTHER METOPROLOL UNTIL HEART RATE IMPROVES  IMPROVED  CARDIO EVAL

## 2017-06-23 NOTE — PROGRESS NOTE ADULT - PROBLEM SELECTOR PLAN 1
UNCLEAR ETIOLOGY TELE - WITH ANOOP WHICH IS NOW IMPROVING  NO PAUSES  CONT TO HOLD METOPROLOL - D/W CARDIO DC BB NOW  EEG DONE TO R/O SEIZURES  ORTHOSTATIC -- GIVE MORE HYDRATION  POSSIBLY FROM HYPOXIA, FROM BRADYCARDIA,  STILL ANOOP ON TELE - MONITOR  O2 SATS BETTER  EEG NORMAL

## 2017-06-23 NOTE — PROGRESS NOTE ADULT - PROBLEM SELECTOR PLAN 3
CONT HOME MEDS  HOLD DIURETIC FOR NOW
BP ELEVATED  INCREASE LISINOPRIL FURTHER TODAY  MONITOR BP
CONT HOME MEDS  HOLD DIURETIC FOR NOW  INCREASE LISINOPRIL FOR CHARLIE RBP CONTROL

## 2017-06-23 NOTE — PROGRESS NOTE ADULT - SUBJECTIVE AND OBJECTIVE BOX
History of Present Illness: 	  77 Y/O FEMALE WITH DIABETES, HTN, CHRONIC UTI'S, COPD - ON HOME O2 (BUT REFUSES TO WEAR IT ) PRESENTED TO ER AFTER FALLING WHILE IN THE KITCHEN TODAY.  STATES SHE REMEMBERS GOING UP THE STAIRS, ENTERING THE KITCHEN TO GET A GLASS OF WATER, WALKING OVER TO THE TABLE AND SEEING "LIGHTS" - THEREAFTER SHE ONLY REMEMBERS EMS BEING THERE.  HER GRANDAUGHTER WHO IS 14 HEARD A LOUD BANG AND FOUND THE PATIENT ON THE KITCHEN FLOOR, STATES SHE WAS UNRESPONSIVE, RUBBED HER CHEST HARD AND THE PATIENT AWOKE.  SHE CALLED EMS AND PT WAS BROUGHT TO THE ER.  SHE WAS AWAKE ONCE IN THE ER. SHE DOES REPORTS URINARY INCONTINENCE, HAS A CONTUSION TO HER RIGHT FOREHEAD AND REPORTS HER CHEST FEELING SORE.  SHE WAS ALSO NOTED TO HAVE RIGHT CLAVICULAR FRACTURE.    6/21/17: Pain on the side of fall - right sided chest, no SOB, headaches are better; no n/v/f/c Tele: SB 40-50's  6/22/17: Left sided chest pain and shoulder pain; no dizziness, no sob; denies any n/v/f/c  6/23/17: Left side pain where she fell; no sob; ambulated yest.     ROS: as per HPI otherwise all other systems reviewed and are negative    PHYSICAL EXAM:    Vital Signs Last 24 Hrs  T(C): 36.7, Max: 36.7 (06-23 @ 05:56)  T(F): 98.1, Max: 98.1 (06-23 @ 05:56)  HR: 60 (58 - 60)  BP: 186/50 (140/40 - 186/50)  BP(mean): --  RR: 18 (16 - 18)  SpO2: 99% (95% - 99%)    GEN: A and O, NAD,  mood stable  HEENT:   ECCHYMOSIS TO THE RIGHT FOREHEAD, EOMI, no oropharyngeal lesions, erythema, exudates, oral thrush    NECK:   supple, no palpable lymph nodes, no thyromegaly    CV:  +S1, +S2, regular, no murmurs or rubs    RESP:   lungs clear to auscultation bilaterally, rhonchi, good air entry bilaterally, DECREASED BS ANGELICA, SCATTERED CRACKLES BASES    GI:  abdomen soft, non-tender, non-distended, normal BS, no abdominal masses, no palpable masses    RECTAL:  not examined    :  not examined    MSK:   normal muscle tone, no atrophy, no rigidity, no contractions    EXT:   no clubbing, no cyanosis, no edema, no calf pain, swelling or erythema; RUE IN SLING    VASCULAR:  pulses equal and symmetric in the upper and lower extremities    NEURO:  AAOX3, no focal neurological deficits, follows all commands, able to move extremities spontaneously    SKIN:  no ulcers, lesions or rashes    LABS:                              11.1   7.3   )-----------( 177      ( 23 Jun 2017 05:37 )             33.5     06-23    140  |  109<H>  |  42<H>  ----------------------------<  163<H>  5.0   |  26  |  1.28    Ca    8.9      23 Jun 2017 05:37        MEDICATIONS  (STANDING):  heparin  Injectable 5000Unit(s) SubCutaneous every 12 hours  insulin lispro (HumaLOG) corrective regimen sliding scale  SubCutaneous three times a day before meals  dextrose 5%. 1000milliLiter(s) IV Continuous <Continuous>  dextrose 50% Injectable 12.5Gram(s) IV Push once  aspirin  chewable 81milliGRAM(s) Oral daily  pregabalin 150milliGRAM(s) Oral two times a day  insulin glargine Injectable (LANTUS) 45Unit(s) SubCutaneous at bedtime  atorvastatin 20milliGRAM(s) Oral at bedtime  tiotropium 18 MICROgram(s) Capsule 1Capsule(s) Inhalation daily  pantoprazole    Tablet 40milliGRAM(s) Oral before breakfast  levothyroxine 112MICROGram(s) Oral daily  montelukast 10milliGRAM(s) Oral at bedtime  lisinopril 10milliGRAM(s) Oral daily  sodium chloride 0.9%. 1000milliLiter(s) IV Continuous <Continuous>  lisinopril 20milliGRAM(s) Oral daily  traMADol 25milliGRAM(s) Oral every 12 hours    MEDICATIONS  (PRN):  dextrose Gel 1Dose(s) Oral once PRN Blood Glucose LESS THAN 70 milliGRAM(s)/deciliter  glucagon  Injectable 1milliGRAM(s) IntraMuscular once PRN Glucose LESS THAN 70 milligrams/deciliter  acetaminophen   Tablet. 650milliGRAM(s) Oral every 4 hours PRN Moderate Pain (4 - 6)

## 2017-06-23 NOTE — PROGRESS NOTE ADULT - SUBJECTIVE AND OBJECTIVE BOX
Cardiology Consultation    HPI: 75 Y/O FEMALE WITH DIABETES, HTN, CHRONIC UTI'S, COPD - ON HOME O2 (BUT REFUSES TO WEAR IT ) PRESENTED TO ER AFTER FALLING WHILE IN THE KITCHEN TODAY.  STATES SHE REMEMBERS GOING UP THE STAIRS, ENTERING THE KITCHEN TO GET A GLASS OF WATER, WALKING OVER TO THE TABLE AND SEEING "LIGHTS" - THEREAFTER SHE ONLY REMEMBERS EMS BEING THERE. HER GRANDAUGHTER WHO IS 14 HEARD A LOUD BANG AND FOUND THE PATIENT ON THE KITCHEN FLOOR, STATES SHE WAS UNRESPONSIVE, RUBBED HER CHEST HARD AND THE PATIENT AWOKE. SHE CALLED EMS AND PT WAS BROUGHT TO THE ER.  SHE WAS AWAKE ONCE IN THE ER. SHE DOES REPORTS URINARY INCONTINENCE, HAS A CONTUSION TO HER RIGHT FOREHEAD AND REPORTS HER CHEST FEELING SORE. SHE WAS ALSO NOTED TO HAVE RIGHT CLAVICULAR FRACTURE. PATIENT REPORTS FEELING FINE ALL DAY AND YESTERDAY. HER BGM WAS NORMAL, NO JERKING MOVEMENTS OF THE BODY NOTED.     - No CP/SOB. Tele- SB 40-50s. No dizziness. No fevers.     - Feels better today. HR improved 50-70 range on tele now. No CP/SOB, no dizziness.     PAST MEDICAL & SURGICAL HISTORY:  Hyperlipidemia, unspecified hyperlipidemia type  Essential hypertension  Neuropathy  Diabetes  Chronic obstructive pulmonary disease, unspecified COPD type  History of total knee replacement, unspecified laterality  H/O hernia repair   delivery delivered  S/P appendectomy    Allergies  Bactrim (Other)  ciprofloxacin (Other (Mild))  clindamycin (Unknown)  ibuprofen (Unknown)  penicillins (Other)  sulfa drugs (Unknown)    SOCIAL HISTORY: Denies tobacco, etoh abuse or illicit drug use    FAMILY HISTORY: Family history of CHF (congestive heart failure) (Mother)    MEDICATIONS  (STANDING):  heparin  Injectable 5000Unit(s) SubCutaneous every 12 hours  insulin lispro (HumaLOG) corrective regimen sliding scale  SubCutaneous three times a day before meals  dextrose 5%. 1000milliLiter(s) IV Continuous <Continuous>  dextrose 50% Injectable 12.5Gram(s) IV Push once  aspirin  chewable 81milliGRAM(s) Oral daily  lisinopril 5milliGRAM(s) Oral daily  pregabalin 150milliGRAM(s) Oral two times a day  insulin glargine Injectable (LANTUS) 45Unit(s) SubCutaneous at bedtime  atorvastatin 20milliGRAM(s) Oral at bedtime  tiotropium 18 MICROgram(s) Capsule 1Capsule(s) Inhalation daily  pantoprazole    Tablet 40milliGRAM(s) Oral before breakfast  levothyroxine 112MICROGram(s) Oral daily  montelukast 10milliGRAM(s) Oral at bedtime    MEDICATIONS  (PRN):  dextrose Gel 1Dose(s) Oral once PRN Blood Glucose LESS THAN 70 milliGRAM(s)/deciliter  glucagon  Injectable 1milliGRAM(s) IntraMuscular once PRN Glucose LESS THAN 70 milligrams/deciliter  acetaminophen    Suspension. 650milliGRAM(s) Oral every 4 hours PRN Moderate Pain (4 - 6)    Vital Signs Last 24 Hrs  T(C): 36.6, Max: 36.6 (06-20 @ 13:24)  T(F): 97.8, Max: 97.9 (06-20 @ 13:24)  HR: 53 (51 - 62)  BP: 140/29 (138/35 - 175/37)  BP(mean): --  RR: 18 (18 - 20)  SpO2: 93% (93% - 100%)    REVIEW OF SYSTEMS:    CONSTITUTIONAL:  As per HPI.  HEENT:  Eyes:  No diplopia or blurred vision. ENT:  No earache, sore throat or runny nose.  CARDIOVASCULAR:  No pressure, squeezing, strangling, tightness, heaviness or aching about the chest, neck, axilla or epigastrium.  RESPIRATORY:  No cough, shortness of breath, PND or orthopnea.  GASTROINTESTINAL:  No nausea, vomiting or diarrhea.  GENITOURINARY:  No dysuria, frequency or urgency.  MUSCULOSKELETAL:  As per HPI.  SKIN:  No change in skin, hair or nails.  NEUROLOGIC:  No paresthesias, fasciculations, seizures or weakness.  PSYCHIATRIC:  No disorder of thought or mood.  ENDOCRINE:  No heat or cold intolerance, polyuria or polydipsia.  HEMATOLOGICAL:  No easy bruising or bleedings.     PHYSICAL EXAMINATION:    GENERAL APPEARANCE:  Pt. is not currently dyspneic, in no distress. Pt. is alert, oriented, and pleasant.  HEENT:  Pupils are normal and react normally. No icterus. Mucous membranes well colored.  NECK:  Supple. No lymphadenopathy. Jugular venous pressure not elevated. Carotids equal.   HEART:   The cardiac impulse has a normal quality. There are no murmurs, rubs or gallops noted  CHEST:  Chest is clear to auscultation. Normal respiratory effort.  ABDOMEN:  Soft and nontender.   EXTREMITIES:  There is no edema.     I&O's Summary    I & Os for current day (as of 2017 08:28)  =============================================  IN: 294 ml / OUT: 0 ml / NET: 294 ml    LABS:                        10.8   7.4   )-----------( 188      ( 2017 03:44 )             33.2     06-21    143  |  111<H>  |  48<H>  ----------------------------<  125<H>  4.4   |  26  |  1.88<H>    Ca    8.3<L>      2017 03:44  Phos  3.9     06-21  Mg     2.2     06-21    TPro  6.6  /  Alb  3.2<L>  /  TBili  0.5  /  DBili  x   /  AST  47<H>  /  ALT  24  /  AlkPhos  103  06-20    LIVER FUNCTIONS - ( 2017 13:54 )  Alb: 3.2 g/dL / Pro: 6.6 gm/dL / ALK PHOS: 103 U/L / ALT: 24 U/L / AST: 47 U/L / GGT: x           PT/INR - ( 2017 13:54 )   PT: 11.7 sec;   INR: 1.08 ratio       PTT - ( 2017 13:54 )  PTT:30.7 sec  CARDIAC MARKERS ( 2017 03:44 )  0.028 ng/mL / x     / 74 U/L / x     / x      CARDIAC MARKERS ( 2017 21:26 )  0.021 ng/mL / x     / x     / x     / x      CARDIAC MARKERS ( 2017 13:54 )  <0.015 ng/mL / x     / x     / x     / x        EKG: Sinus bradycardia  Otherwise normal ECG    TELEMETRY: SB 40-50s    CARDIAC TESTS: 2Decho- Fibrocalcific changes noted to the mitral valve leaflets with minimally   restricted leaflet excursion.   Moderate mitral annular calcification is present.   The mitral valve leaflets appear thickened.   Mild (1+) mitral regurgitationis present.   Fibrocalcific changes noted to the aortic valve leaflets with preserved   excursion.   Trace aortic regurgitation is present.   Moderate (2+) tricuspid valve regurgitation is present.   Mild to Moderate pulmonic valvular regurgitation (2+) is present.   Normal appearing left atrium.   The left ventricle is normal in size, wall motion and contractility.   minimal concentric left ventricular hypertrophy is present.   Estimated left ventricular ejection fraction is 60-65 %.   Normal appearing right atrium.   The right ventricle appears minimally dilated.    RADIOLOGY & ADDITIONAL STUDIES: CT chest- Status post left lower lobe wedge resection with   postoperativechanges.  Emphysema.    ASSESSMENT & PLAN:

## 2017-06-24 LAB
ANION GAP SERPL CALC-SCNC: 4 MMOL/L — LOW (ref 5–17)
BUN SERPL-MCNC: 39 MG/DL — HIGH (ref 7–23)
CALCIUM SERPL-MCNC: 8.9 MG/DL — SIGNIFICANT CHANGE UP (ref 8.5–10.1)
CHLORIDE SERPL-SCNC: 109 MMOL/L — HIGH (ref 96–108)
CO2 SERPL-SCNC: 27 MMOL/L — SIGNIFICANT CHANGE UP (ref 22–31)
CREAT SERPL-MCNC: 1.28 MG/DL — SIGNIFICANT CHANGE UP (ref 0.5–1.3)
GLUCOSE SERPL-MCNC: 155 MG/DL — HIGH (ref 70–99)
HCT VFR BLD CALC: 34.1 % — LOW (ref 34.5–45)
HGB BLD-MCNC: 11.2 G/DL — LOW (ref 11.5–15.5)
MCHC RBC-ENTMCNC: 29 PG — SIGNIFICANT CHANGE UP (ref 27–34)
MCHC RBC-ENTMCNC: 32.8 GM/DL — SIGNIFICANT CHANGE UP (ref 32–36)
MCV RBC AUTO: 88.3 FL — SIGNIFICANT CHANGE UP (ref 80–100)
PLATELET # BLD AUTO: 181 K/UL — SIGNIFICANT CHANGE UP (ref 150–400)
POTASSIUM SERPL-MCNC: 5 MMOL/L — SIGNIFICANT CHANGE UP (ref 3.5–5.3)
POTASSIUM SERPL-SCNC: 5 MMOL/L — SIGNIFICANT CHANGE UP (ref 3.5–5.3)
RBC # BLD: 3.86 M/UL — SIGNIFICANT CHANGE UP (ref 3.8–5.2)
RBC # FLD: 13.5 % — SIGNIFICANT CHANGE UP (ref 10.3–14.5)
SODIUM SERPL-SCNC: 140 MMOL/L — SIGNIFICANT CHANGE UP (ref 135–145)
WBC # BLD: 6.8 K/UL — SIGNIFICANT CHANGE UP (ref 3.8–10.5)
WBC # FLD AUTO: 6.8 K/UL — SIGNIFICANT CHANGE UP (ref 3.8–10.5)

## 2017-06-24 RX ORDER — LISINOPRIL 2.5 MG/1
40 TABLET ORAL DAILY
Qty: 0 | Refills: 0 | Status: DISCONTINUED | OUTPATIENT
Start: 2017-06-24 | End: 2017-06-24

## 2017-06-24 RX ORDER — LISINOPRIL 2.5 MG/1
20 TABLET ORAL DAILY
Qty: 0 | Refills: 0 | Status: DISCONTINUED | OUTPATIENT
Start: 2017-06-24 | End: 2017-06-24

## 2017-06-24 RX ORDER — FUROSEMIDE 40 MG
1 TABLET ORAL
Qty: 0 | Refills: 0 | COMMUNITY

## 2017-06-24 RX ORDER — AMLODIPINE BESYLATE 2.5 MG/1
10 TABLET ORAL DAILY
Qty: 0 | Refills: 0 | Status: DISCONTINUED | OUTPATIENT
Start: 2017-06-24 | End: 2017-06-25

## 2017-06-24 RX ORDER — TRAMADOL HYDROCHLORIDE 50 MG/1
0.5 TABLET ORAL
Qty: 0 | Refills: 0 | COMMUNITY
Start: 2017-06-24

## 2017-06-24 RX ORDER — LISINOPRIL 2.5 MG/1
1 TABLET ORAL
Qty: 0 | Refills: 0 | COMMUNITY

## 2017-06-24 RX ORDER — LISINOPRIL 2.5 MG/1
1 TABLET ORAL
Qty: 0 | Refills: 0 | COMMUNITY
Start: 2017-06-24

## 2017-06-24 RX ORDER — METOPROLOL TARTRATE 50 MG
1 TABLET ORAL
Qty: 0 | Refills: 0 | COMMUNITY

## 2017-06-24 RX ADMIN — PANTOPRAZOLE SODIUM 40 MILLIGRAM(S): 20 TABLET, DELAYED RELEASE ORAL at 05:43

## 2017-06-24 RX ADMIN — LISINOPRIL 10 MILLIGRAM(S): 2.5 TABLET ORAL at 05:43

## 2017-06-24 RX ADMIN — MONTELUKAST 10 MILLIGRAM(S): 4 TABLET, CHEWABLE ORAL at 21:20

## 2017-06-24 RX ADMIN — Medication 4: at 17:08

## 2017-06-24 RX ADMIN — Medication 112 MICROGRAM(S): at 05:43

## 2017-06-24 RX ADMIN — Medication 650 MILLIGRAM(S): at 22:49

## 2017-06-24 RX ADMIN — Medication 150 MILLIGRAM(S): at 05:43

## 2017-06-24 RX ADMIN — TIOTROPIUM BROMIDE 1 CAPSULE(S): 18 CAPSULE ORAL; RESPIRATORY (INHALATION) at 10:44

## 2017-06-24 RX ADMIN — ATORVASTATIN CALCIUM 20 MILLIGRAM(S): 80 TABLET, FILM COATED ORAL at 21:20

## 2017-06-24 RX ADMIN — Medication 650 MILLIGRAM(S): at 10:00

## 2017-06-24 RX ADMIN — Medication 4: at 13:06

## 2017-06-24 RX ADMIN — AMLODIPINE BESYLATE 10 MILLIGRAM(S): 2.5 TABLET ORAL at 17:04

## 2017-06-24 RX ADMIN — Medication 650 MILLIGRAM(S): at 09:23

## 2017-06-24 RX ADMIN — Medication 81 MILLIGRAM(S): at 13:06

## 2017-06-24 RX ADMIN — Medication 150 MILLIGRAM(S): at 17:05

## 2017-06-24 RX ADMIN — Medication 650 MILLIGRAM(S): at 21:21

## 2017-06-24 RX ADMIN — HEPARIN SODIUM 5000 UNIT(S): 5000 INJECTION INTRAVENOUS; SUBCUTANEOUS at 05:44

## 2017-06-24 RX ADMIN — INSULIN GLARGINE 45 UNIT(S): 100 INJECTION, SOLUTION SUBCUTANEOUS at 21:20

## 2017-06-24 RX ADMIN — HEPARIN SODIUM 5000 UNIT(S): 5000 INJECTION INTRAVENOUS; SUBCUTANEOUS at 17:12

## 2017-06-24 NOTE — DISCHARGE NOTE ADULT - MEDICATION SUMMARY - MEDICATIONS TO CHANGE
I will SWITCH the dose or number of times a day I take the medications listed below when I get home from the hospital:    lisinopril 5 mg oral tablet  -- 1 tab(s) by mouth once a day  -- hold for SBP <120 I will SWITCH the dose or number of times a day I take the medications listed below when I get home from the hospital:  None

## 2017-06-24 NOTE — PROGRESS NOTE ADULT - SUBJECTIVE AND OBJECTIVE BOX
Cardiology Consultation    HPI: 77 Y/O FEMALE WITH DIABETES, HTN, CHRONIC UTI'S, COPD - ON HOME O2 (BUT REFUSES TO WEAR IT ) PRESENTED TO ER AFTER FALLING WHILE IN THE KITCHEN TODAY.  STATES SHE REMEMBERS GOING UP THE STAIRS, ENTERING THE KITCHEN TO GET A GLASS OF WATER, WALKING OVER TO THE TABLE AND SEEING "LIGHTS" - THEREAFTER SHE ONLY REMEMBERS EMS BEING THERE. HER GRANDAUGHTER WHO IS 14 HEARD A LOUD BANG AND FOUND THE PATIENT ON THE KITCHEN FLOOR, STATES SHE WAS UNRESPONSIVE, RUBBED HER CHEST HARD AND THE PATIENT AWOKE. SHE CALLED EMS AND PT WAS BROUGHT TO THE ER.  SHE WAS AWAKE ONCE IN THE ER. SHE DOES REPORTS URINARY INCONTINENCE, HAS A CONTUSION TO HER RIGHT FOREHEAD AND REPORTS HER CHEST FEELING SORE. SHE WAS ALSO NOTED TO HAVE RIGHT CLAVICULAR FRACTURE. PATIENT REPORTS FEELING FINE ALL DAY AND YESTERDAY. HER BGM WAS NORMAL, NO JERKING MOVEMENTS OF THE BODY NOTED.     - No CP/SOB. Tele- SB 40-50s. No dizziness. No fevers.     - Feels better today. HR improved 50-70 range on tele now. No CP/SOB, no dizziness.     - Feels better today. No CP/SOB. No events last pm. Case d/w daughter and pt at bedside.     PAST MEDICAL & SURGICAL HISTORY:  Hyperlipidemia, unspecified hyperlipidemia type  Essential hypertension  Neuropathy  Diabetes  Chronic obstructive pulmonary disease, unspecified COPD type  History of total knee replacement, unspecified laterality  H/O hernia repair   delivery delivered  S/P appendectomy    Allergies  Bactrim (Other)  ciprofloxacin (Other (Mild))  clindamycin (Unknown)  ibuprofen (Unknown)  penicillins (Other)  sulfa drugs (Unknown)    SOCIAL HISTORY: Denies tobacco, etoh abuse or illicit drug use    FAMILY HISTORY: Family history of CHF (congestive heart failure) (Mother)    MEDICATIONS  (STANDING):  heparin  Injectable 5000Unit(s) SubCutaneous every 12 hours  insulin lispro (HumaLOG) corrective regimen sliding scale  SubCutaneous three times a day before meals  dextrose 5%. 1000milliLiter(s) IV Continuous <Continuous>  dextrose 50% Injectable 12.5Gram(s) IV Push once  aspirin  chewable 81milliGRAM(s) Oral daily  lisinopril 5milliGRAM(s) Oral daily  pregabalin 150milliGRAM(s) Oral two times a day  insulin glargine Injectable (LANTUS) 45Unit(s) SubCutaneous at bedtime  atorvastatin 20milliGRAM(s) Oral at bedtime  tiotropium 18 MICROgram(s) Capsule 1Capsule(s) Inhalation daily  pantoprazole    Tablet 40milliGRAM(s) Oral before breakfast  levothyroxine 112MICROGram(s) Oral daily  montelukast 10milliGRAM(s) Oral at bedtime    MEDICATIONS  (PRN):  dextrose Gel 1Dose(s) Oral once PRN Blood Glucose LESS THAN 70 milliGRAM(s)/deciliter  glucagon  Injectable 1milliGRAM(s) IntraMuscular once PRN Glucose LESS THAN 70 milligrams/deciliter  acetaminophen    Suspension. 650milliGRAM(s) Oral every 4 hours PRN Moderate Pain (4 - 6)    Vital Signs Last 24 Hrs  T(C): 36.6, Max: 36.6 (06-20 @ 13:24)  T(F): 97.8, Max: 97.9 (06-20 @ 13:24)  HR: 53 (51 - 62)  BP: 140/29 (138/35 - 175/37)  BP(mean): --  RR: 18 (18 - 20)  SpO2: 93% (93% - 100%)    REVIEW OF SYSTEMS:    CONSTITUTIONAL:  As per HPI.  HEENT:  Eyes:  No diplopia or blurred vision. ENT:  No earache, sore throat or runny nose.  CARDIOVASCULAR:  No pressure, squeezing, strangling, tightness, heaviness or aching about the chest, neck, axilla or epigastrium.  RESPIRATORY:  No cough, shortness of breath, PND or orthopnea.  GASTROINTESTINAL:  No nausea, vomiting or diarrhea.  GENITOURINARY:  No dysuria, frequency or urgency.  MUSCULOSKELETAL:  As per HPI.  SKIN:  No change in skin, hair or nails.  NEUROLOGIC:  No paresthesias, fasciculations, seizures or weakness.  PSYCHIATRIC:  No disorder of thought or mood.  ENDOCRINE:  No heat or cold intolerance, polyuria or polydipsia.  HEMATOLOGICAL:  No easy bruising or bleedings.     PHYSICAL EXAMINATION:    GENERAL APPEARANCE:  Pt. is not currently dyspneic, in no distress. Pt. is alert, oriented, and pleasant.  HEENT:  Pupils are normal and react normally. No icterus. Mucous membranes well colored.  NECK:  Supple. No lymphadenopathy. Jugular venous pressure not elevated. Carotids equal.   HEART:   The cardiac impulse has a normal quality. There are no murmurs, rubs or gallops noted  CHEST:  Chest is clear to auscultation. Normal respiratory effort.  ABDOMEN:  Soft and nontender.   EXTREMITIES:  There is no edema.     I&O's Summary    I & Os for current day (as of 2017 08:28)  =============================================  IN: 294 ml / OUT: 0 ml / NET: 294 ml    LABS:                        10.8   7.4   )-----------( 188      ( 2017 03:44 )             33.2     06-21    143  |  111<H>  |  48<H>  ----------------------------<  125<H>  4.4   |  26  |  1.88<H>    Ca    8.3<L>      2017 03:44  Phos  3.9     06-21  Mg     2.2     06-21    TPro  6.6  /  Alb  3.2<L>  /  TBili  0.5  /  DBili  x   /  AST  47<H>  /  ALT  24  /  AlkPhos  103  06-20    LIVER FUNCTIONS - ( 2017 13:54 )  Alb: 3.2 g/dL / Pro: 6.6 gm/dL / ALK PHOS: 103 U/L / ALT: 24 U/L / AST: 47 U/L / GGT: x           PT/INR - ( 2017 13:54 )   PT: 11.7 sec;   INR: 1.08 ratio       PTT - ( 2017 13:54 )  PTT:30.7 sec  CARDIAC MARKERS ( 2017 03:44 )  0.028 ng/mL / x     / 74 U/L / x     / x      CARDIAC MARKERS ( 2017 21:26 )  0.021 ng/mL / x     / x     / x     / x      CARDIAC MARKERS ( 2017 13:54 )  <0.015 ng/mL / x     / x     / x     / x        EKG: Sinus bradycardia  Otherwise normal ECG    TELEMETRY: SR 50-70s    CARDIAC TESTS: 2Decho- Fibrocalcific changes noted to the mitral valve leaflets with minimally   restricted leaflet excursion.   Moderate mitral annular calcification is present.   The mitral valve leaflets appear thickened.   Mild (1+) mitral regurgitationis present.   Fibrocalcific changes noted to the aortic valve leaflets with preserved   excursion.   Trace aortic regurgitation is present.   Moderate (2+) tricuspid valve regurgitation is present.   Mild to Moderate pulmonic valvular regurgitation (2+) is present.   Normal appearing left atrium.   The left ventricle is normal in size, wall motion and contractility.   minimal concentric left ventricular hypertrophy is present.   Estimated left ventricular ejection fraction is 60-65 %.   Normal appearing right atrium.   The right ventricle appears minimally dilated.    RADIOLOGY & ADDITIONAL STUDIES: CT chest- Status post left lower lobe wedge resection with   postoperativechanges.  Emphysema.    ASSESSMENT & PLAN:

## 2017-06-24 NOTE — DISCHARGE NOTE ADULT - MEDICATION SUMMARY - MEDICATIONS TO TAKE
I will START or STAY ON the medications listed below when I get home from the hospital:    aspirin 81 mg oral tablet  -- 1 tab(s) by mouth once a day  -- Indication: For Heart Disease    traMADol 50 mg oral tablet  -- 0.5 tab(s) by mouth every 12 hours, As needed, pain  -- Indication: For Clavicle fracture pain    lisinopril 20 mg oral tablet  -- 1 tab(s) by mouth once a day  -- Indication: For Htn    Lyrica 150 mg oral capsule  -- 1 cap(s) by mouth 2 times a day  -- Indication: For Neuropathy    Toujeo SoloStar 300 units/mL subcutaneous solution  -- 45 unit(s) subcutaneous once a day (at bedtime)  -- Indication: For Diabetes    HumaLOG 100 units/mL subcutaneous solution  -- 16 unit(s) subcutaneous before breakfast  -- Indication: For Diabetes    HumaLOG 100 units/mL subcutaneous solution  -- 16 unit(s) subcutaneous before lunch  -- Indication: For Diabetes    HumaLOG 100 units/mL subcutaneous solution  -- 16 unit(s) subcutaneous before dinner   -- Indication: For Diabetes    HumaLOG 100 units/mL subcutaneous solution  --  subcutaneous sliding scale     -- Indication: For Diabetes    atorvastatin 20 mg oral tablet  -- 1 tab(s) by mouth once a day (in the evening)  -- Indication: For HL    Advair Diskus 250 mcg-50 mcg inhalation powder  -- 1 puff(s) inhaled 2 times a day  -- Indication: For COPD    Spiriva 18 mcg inhalation capsule  -- 1 cap(s) inhaled once a day (in the evening)  -- Indication: For COPD    montelukast 10 mg oral tablet  -- 1 tab(s) by mouth once a day (in the evening)  -- Indication: For Allergies    omeprazole 20 mg oral delayed release capsule  -- 1 cap(s) by mouth once a day  -- Indication: For Gerd    levothyroxine 112 mcg (0.112 mg) oral tablet  -- 1 tab(s) by mouth once a day  -- Indication: For Hypothyroidism    methenamine hippurate 1 g oral tablet  -- 1 tab(s) by mouth 2 times a day  -- Indication: For Chronic UTIs    multivitamin  --     -- Indication: For General Health    folic acid 1 mg oral tablet  -- 1 tab(s) by mouth once a day  -- Indication: For [facility] I will START or STAY ON the medications listed below when I get home from the hospital:    aspirin 81 mg oral tablet  -- 1 tab(s) by mouth once a day  -- Indication: For Heart Disease    Lyrica 150 mg oral capsule  -- 1 cap(s) by mouth 2 times a day  -- Indication: For Neuropathy    Toujeo SoloStar 300 units/mL subcutaneous solution  -- 45 unit(s) subcutaneous once a day (at bedtime)  -- Indication: For Diabetes    HumaLOG 100 units/mL subcutaneous solution  -- 16 unit(s) subcutaneous before breakfast  -- Indication: For Diabetes    HumaLOG 100 units/mL subcutaneous solution  -- 16 unit(s) subcutaneous before lunch  -- Indication: For Diabetes    HumaLOG 100 units/mL subcutaneous solution  -- 16 unit(s) subcutaneous before dinner   -- Indication: For Diabetes    HumaLOG 100 units/mL subcutaneous solution  --  subcutaneous sliding scale     -- Indication: For Diabetes    atorvastatin 20 mg oral tablet  -- 1 tab(s) by mouth once a day (in the evening)  -- Indication: For HL    Advair Diskus 250 mcg-50 mcg inhalation powder  -- 1 puff(s) inhaled 2 times a day  -- Indication: For COPD    Spiriva 18 mcg inhalation capsule  -- 1 cap(s) inhaled once a day (in the evening)  -- Indication: For COPD    amLODIPine 10 mg oral tablet  -- 1 tab(s) by mouth once a day  -- It is very important that you take or use this exactly as directed.  Do not skip doses or discontinue unless directed by your doctor.  Some non-prescription drugs may aggravate your condition.  Read all labels carefully.  If a warning appears, check with your doctor before taking.    -- Indication: For HTN    montelukast 10 mg oral tablet  -- 1 tab(s) by mouth once a day (in the evening)  -- Indication: For Allergies    omeprazole 20 mg oral delayed release capsule  -- 1 cap(s) by mouth once a day  -- Indication: For Gerd    levothyroxine 112 mcg (0.112 mg) oral tablet  -- 1 tab(s) by mouth once a day  -- Indication: For Hypothyroidism    methenamine hippurate 1 g oral tablet  -- 1 tab(s) by mouth 2 times a day  -- Indication: For Chronic UTIs    multivitamin  --     -- Indication: For General Health    folic acid 1 mg oral tablet  -- 1 tab(s) by mouth once a day  -- Indication: For [facility]

## 2017-06-24 NOTE — DISCHARGE NOTE ADULT - HOSPITAL COURSE
77 y/o female with DM, COPD (on home O2), HTN, CKD, HL, Hypothyroidism admitted with syncope. She was noted to be extremely bradycardic and suffered a right clavicle fracture.  She was admitted to [facility], ruled out for arrythmia and MI and taken off her beta blocker.  Heart rate improved and she will remain off metoprolol for now.  SHe was also ntoed to be orthostatic and was hydrated with Lasix held.  Htn was noted towards the end of her hospitalization -- medications will continue to be titrated on an outpatient basis.  EEG done was negative, 75 y/o female with DM, COPD (on home O2), HTN, CKD, HL, Hypothyroidism admitted with syncope. She was noted to be extremely bradycardic and suffered a right clavicle fracture.  She was admitted to [facility], ruled out for arrythmia and MI and taken off her beta blocker.  Heart rate improved and she will remain off metoprolol for now.  SHe was also ntoed to be orthostatic and was hydrated with Lasix held.  Htn was noted towards the end of her hospitalization -- medications will continue to be titrated on an outpatient basis.  EEG done was negative,

## 2017-06-24 NOTE — DISCHARGE NOTE ADULT - CARE PLAN
Principal Discharge DX:	Syncope, unspecified syncope type  Goal:	no further episodes  Instructions for follow-up, activity and diet:	Follow up with Cardio  No further Metoprolol  Secondary Diagnosis:	Bradycardia  Goal:	resolved  Instructions for follow-up, activity and diet:	Stop Metoprolol  Secondary Diagnosis:	Chronic kidney disease, unspecified stage  Goal:	Stable  Instructions for follow-up, activity and diet:	Follow up with Dr Cutler  Secondary Diagnosis:	Chronic obstructive pulmonary disease, unspecified COPD type  Goal:	Stable pulmonary status  Instructions for follow-up, activity and diet:	Wear continuous oxygen  Secondary Diagnosis:	Clavicle fracture  Goal:	pain control  Instructions for follow-up, activity and diet:	Follow up with Ortho  Secondary Diagnosis:	Diabetes  Goal:	good glycemic control  Instructions for follow-up, activity and diet:	Cont Insulin  Secondary Diagnosis:	Essential hypertension  Goal:	Stable BP levels  Instructions for follow-up, activity and diet:	Cont Lisinopril\  F/U with Dr Phelps within 1 week of discharge for BP check

## 2017-06-24 NOTE — DISCHARGE NOTE ADULT - PLAN OF CARE
no further episodes Follow up with Cardio  No further Metoprolol Stop Metoprolol resolved Stable Follow up with Dr Cutler Stable pulmonary status Wear continuous oxygen pain control Follow up with Ortho good glycemic control Cont Insulin Stable BP levels Cont Lisinopril\  F/U with Dr Phelps within 1 week of discharge for BP check

## 2017-06-24 NOTE — DISCHARGE NOTE ADULT - CARE PROVIDER_API CALL
Kathie Marquez (JUAN), Cardiovascular Disease; Internal Medicine  [address] [place] [address]  Phone: [phone]  Fax: [phone]    Toribio Cutler (DO), Nephrology  [address] [facility] [address] [place] [address]  Phone: [phone]  Fax: [phone]    Soco Phelps), Internal Medicine  [address] [place] [address] [address] [place] [address]  Phone: [phone]  Fax: [phone]

## 2017-06-24 NOTE — DISCHARGE NOTE ADULT - SECONDARY DIAGNOSIS.
Bradycardia Chronic kidney disease, unspecified stage Chronic obstructive pulmonary disease, unspecified COPD type Clavicle fracture Diabetes Essential hypertension

## 2017-06-24 NOTE — DISCHARGE NOTE ADULT - PATIENT PORTAL LINK FT
“You can access the [facility] Patient Portal, offered by [facility], by registering with the following website: [email or web address]”

## 2017-06-24 NOTE — DISCHARGE NOTE ADULT - FINDINGS/TREATMENT
stop metoprolol, furosemide and lisinopril.  F/u with cardiology.  Rt Clavicle fracture, NWB, wear sling, tylenol for pain, f/u with Dr Jacinto in 1-2 week Stop lasix and lisinorpil  Repeat blood work and f/u with Dr Aragon and Lenin in a few days.

## 2017-06-24 NOTE — PROGRESS NOTE ADULT - SUBJECTIVE AND OBJECTIVE BOX
Pt seen and examined, chart reviewed, case d/w Dr Phelps.  Pt c/o Rt shoulder pain, no headaches, CP or SOB.    Vital Signs Last 24 Hrs  T(C): 36.6, Max: 36.7 (06-23 @ 16:58)  T(F): 97.8, Max: 98.1 (06-23 @ 16:58)  HR: 83 (63 - 83)  BP: 177/48 (177/48 - 191/60)  BP(mean): --  RR: 18 (18 - 18)  SpO2: 100% (96% - 100%)    Daily     Daily     I&O's Detail    I & Os for current day (as of 24 Jun 2017 13:12)  =============================================  IN:    Oral Fluid: 800 ml    Total IN: 800 ml  ---------------------------------------------  OUT:    Total OUT: 0 ml  ---------------------------------------------  Total NET: 800 ml      CAPILLARY BLOOD GLUCOSE  236 (24 Jun 2017 12:04)  133 (24 Jun 2017 08:22)  225 (23 Jun 2017 21:10)                                      11.2   6.8   )-----------( 181      ( 24 Jun 2017 05:52 )             34.1       06-24    140  |  109<H>  |  39<H>  ----------------------------<  155<H>  5.0   |  27  |  1.28    Ca    8.9      24 Jun 2017 05:52                        MEDICATIONS  (STANDING):  heparin  Injectable 5000Unit(s) SubCutaneous every 12 hours  insulin lispro (HumaLOG) corrective regimen sliding scale  SubCutaneous three times a day before meals  dextrose 5%. 1000milliLiter(s) IV Continuous <Continuous>  dextrose 50% Injectable 12.5Gram(s) IV Push once  aspirin  chewable 81milliGRAM(s) Oral daily  pregabalin 150milliGRAM(s) Oral two times a day  insulin glargine Injectable (LANTUS) 45Unit(s) SubCutaneous at bedtime  atorvastatin 20milliGRAM(s) Oral at bedtime  tiotropium 18 MICROgram(s) Capsule 1Capsule(s) Inhalation daily  pantoprazole    Tablet 40milliGRAM(s) Oral before breakfast  levothyroxine 112MICROGram(s) Oral daily  montelukast 10milliGRAM(s) Oral at bedtime  amLODIPine   Tablet 10milliGRAM(s) Oral daily    MEDICATIONS  (PRN):  dextrose Gel 1Dose(s) Oral once PRN Blood Glucose LESS THAN 70 milliGRAM(s)/deciliter  glucagon  Injectable 1milliGRAM(s) IntraMuscular once PRN Glucose LESS THAN 70 milligrams/deciliter  acetaminophen   Tablet. 650milliGRAM(s) Oral every 4 hours PRN Moderate Pain (4 - 6)  traMADol 25milliGRAM(s) Oral every 12 hours PRN pain

## 2017-06-25 VITALS
HEART RATE: 67 BPM | OXYGEN SATURATION: 95 % | RESPIRATION RATE: 16 BRPM | DIASTOLIC BLOOD PRESSURE: 31 MMHG | SYSTOLIC BLOOD PRESSURE: 142 MMHG | TEMPERATURE: 98 F

## 2017-06-25 LAB
ANION GAP SERPL CALC-SCNC: 7 MMOL/L — SIGNIFICANT CHANGE UP (ref 5–17)
BUN SERPL-MCNC: 47 MG/DL — HIGH (ref 7–23)
CALCIUM SERPL-MCNC: 9.2 MG/DL — SIGNIFICANT CHANGE UP (ref 8.5–10.1)
CHLORIDE SERPL-SCNC: 109 MMOL/L — HIGH (ref 96–108)
CO2 SERPL-SCNC: 27 MMOL/L — SIGNIFICANT CHANGE UP (ref 22–31)
CREAT SERPL-MCNC: 1.43 MG/DL — HIGH (ref 0.5–1.3)
GLUCOSE SERPL-MCNC: 134 MG/DL — HIGH (ref 70–99)
POTASSIUM SERPL-MCNC: 5.2 MMOL/L — SIGNIFICANT CHANGE UP (ref 3.5–5.3)
POTASSIUM SERPL-SCNC: 5.2 MMOL/L — SIGNIFICANT CHANGE UP (ref 3.5–5.3)
SODIUM SERPL-SCNC: 143 MMOL/L — SIGNIFICANT CHANGE UP (ref 135–145)

## 2017-06-25 RX ORDER — AMLODIPINE BESYLATE 2.5 MG/1
1 TABLET ORAL
Qty: 30 | Refills: 0 | OUTPATIENT
Start: 2017-06-25 | End: 2017-07-25

## 2017-06-25 RX ORDER — AMLODIPINE BESYLATE 2.5 MG/1
0.5 TABLET ORAL
Qty: 30 | Refills: 0 | COMMUNITY
Start: 2017-06-25 | End: 2017-07-25

## 2017-06-25 RX ADMIN — TIOTROPIUM BROMIDE 1 CAPSULE(S): 18 CAPSULE ORAL; RESPIRATORY (INHALATION) at 14:29

## 2017-06-25 RX ADMIN — Medication 112 MICROGRAM(S): at 05:38

## 2017-06-25 RX ADMIN — Medication 81 MILLIGRAM(S): at 12:37

## 2017-06-25 RX ADMIN — Medication 2: at 12:32

## 2017-06-25 RX ADMIN — Medication 150 MILLIGRAM(S): at 05:38

## 2017-06-25 RX ADMIN — Medication 650 MILLIGRAM(S): at 10:16

## 2017-06-25 RX ADMIN — Medication 650 MILLIGRAM(S): at 09:18

## 2017-06-25 RX ADMIN — PANTOPRAZOLE SODIUM 40 MILLIGRAM(S): 20 TABLET, DELAYED RELEASE ORAL at 05:39

## 2017-06-25 RX ADMIN — HEPARIN SODIUM 5000 UNIT(S): 5000 INJECTION INTRAVENOUS; SUBCUTANEOUS at 05:38

## 2017-06-25 RX ADMIN — AMLODIPINE BESYLATE 10 MILLIGRAM(S): 2.5 TABLET ORAL at 05:38

## 2017-06-25 NOTE — PROGRESS NOTE ADULT - ASSESSMENT
75 Y/O FEMALE WITH THE ABOVE MED HISTORY ADMITTED WITH SYNCOPE.
75 Y/O FEMALE WITH THE ABOVE MED HISTORY ADMITTED WITH SYNCOPE.
76F with above hx p/w episode of syncope.    1. Syncope- differential includes vasovagal in setting of hypovolemia vs bradycardia vs hypoxia from COPD vs seizures.   Pt appears is azotemic and Cr is improving with IVF. Pt admits to not drinking enough during the day. Would continue IVF, check orthostatics today.  Pt had outpt cardiac PET recently that was negative for ischemia.   her echo showed normal LVEF.  EEG pending.  Bradycardia on tele is not severe. Her HR had been the lowest in 40/min. Off betablocker now. Will continue to hold it.      2.  HTN- poorly controlled. Will increase the lisinopril to 10mgpo daily.    4. COPD- mgmt as per primary team.     5. DVT proph., replete lytes as needed.
76F with above hx p/w episode of syncope.    1. Syncope- differential includes vasovagal in setting of hypovolemia vs bradycardia vs hypoxia from COPD. Pt appears euvolemic- encourage PO fluids. 2Decho reviewed with pt- Nl LV fxn., mild MR, mod TR, LVH.. Pt had outpt cardiac PET recently that was negative for ischemia. HR improved on tele.     2. Bradycardia- HR improved off Bbl- would continue to hold. No dizziness currently. Ambulate pt in hallway to assess ability to augment HR. Hold on EP eval for now.    3. HTN- avoid all AV ty agents for now. Will in crease lisinopril to BID dosing.      4. COPD- mgmt as per primary team.     5. DVT proph., replete lytes as needed.     6. F/u with Dr. Griggs in 1 week.
76F with above hx p/w episode of syncope.    1. Syncope- differential includes vasovagal in setting of hypovolemia vs bradycardia vs hypoxia from COPD. Pt appears is azotemic and Cr is improving with IVF. Pt also admits to not drinking enough during the day. Would continue IVF, check orthostatics. 2Decho- Nl LV fxn, mild MR, mod TR, LVH.. Pt had outpt cardiac PET recently that was negative for ischemia. EEG pending as well.    2. Bradycardia- HR improved off Bbl- would continue to hold. No dizziness currently. Ambulate pt in hallway to assess ability to augment HR. Hold on EP eval for now.    3. HTN- avoid all AV ty agents for now. Lisinopril just increased for better BP control. May need to increase therapy further.     4. COPD- mgmt as per primary team.     5. DVT proph., replete lytes as needed.
77 Y/O FEMALE WITH THE ABOVE MED HISTORY ADMITTED WITH SYNCOPE.
Pt is a 77 y/o female with h/o type II diabetes, HTN, chronic UTI's, stage 3 CKD, COPD with chronic hypoxic respiratory failure (refuses to wear O2) ,who presented with syncope, ANI on stage 3 CKD, Rt clavicle fracture and bradycardia.      * Syncope-? vagal ? bradyarrythmia exacerbated by metoprolol in setting of low CRCL ? from dehydration or combination.  HR improved off BB and avoid ty blockers in the future.    * HTN-better controlled on norvasc, avoid lisinopril especially with borderline potassium and CKD.  * ANI on stage 3 CKD- pre-renal, CR slightly inc ? still pre-renal or from ACE- which were d/c'ed.  1/2 NS at 75 cc/hr for 4 hours than d/c home, instructed pt to drink plenty of fluids. Monitor off lisinopril as outpt with Dr Aragon (Dr Aragon will see her in the office in 1-2 days).  * Rt Clavicle Facture supportive care with sling, pain control, PT, f/u with Dr Jacinto as outpt.  * Hypothyroidism continue synthroid  * Type II diabetes-continue insulin for now.  * Disp-OOB, d/c home today  * Comm- d/w pt and daughter in details, KALA
Pt is a 75 y/o female with h/o type II diabetes, HTN, chronic UTI's, stage 3 CKD, COPD with chronic hypoxic respiratory failure (refuses to wear O2) ,who presented with syncope, ANI on stage 3 CKD, Rt clavicle fracture and bradycardia.      * Syncope-? vagal ? bradyarrythmia exacerbated by metoprolol in setting of low CRCL ? from dehydration.  HR improved off BB and avoid ty blockers in the future.    * HTN-not well controlled, add norvasc, avoid lisinopril especially with borderline potassium and CKD.  * ANI on stage 3 CKD- pre-renal, improved.  Monitor off lisinopril for now.  * Rt Clavicle Facture supportive care with sling, pain control, PT, f/u with Dr Jacinto as outpt.  * Hypothyroidism continue synthroid  * Type II diabetes-continue insulin for now.  * Disp-OOB, PT, monitor for 1 more day for better bp control   * Comm- d/w pt and daughter in details, KALA

## 2017-06-25 NOTE — PROGRESS NOTE ADULT - SUBJECTIVE AND OBJECTIVE BOX
Pt looks and feels better, no CP or SOB.  Her BP improved, uneventful night.    Vital Signs Last 24 Hrs  T(C): 36.6, Max: 36.6 (06-24 @ 17:35)  T(F): 97.8, Max: 97.9 (06-24 @ 17:35)  HR: 61 (57 - 65)  BP: 123/48 (123/48 - 183/42)  BP(mean): --  RR: 16 (16 - 18)  SpO2: 95% (95% - 98%)    Daily     Daily     I&O's Detail      CAPILLARY BLOOD GLUCOSE  240 (24 Jun 2017 21:18)                                      11.2   6.8   )-----------( 181      ( 24 Jun 2017 05:52 )             34.1       06-25    143  |  109<H>  |  47<H>  ----------------------------<  134<H>  5.2   |  27  |  1.43<H>    Ca    9.2      25 Jun 2017 06:38                        MEDICATIONS  (STANDING):  heparin  Injectable 5000Unit(s) SubCutaneous every 12 hours  insulin lispro (HumaLOG) corrective regimen sliding scale  SubCutaneous three times a day before meals  dextrose 5%. 1000milliLiter(s) IV Continuous <Continuous>  dextrose 50% Injectable 12.5Gram(s) IV Push once  aspirin  chewable 81milliGRAM(s) Oral daily  pregabalin 150milliGRAM(s) Oral two times a day  insulin glargine Injectable (LANTUS) 45Unit(s) SubCutaneous at bedtime  atorvastatin 20milliGRAM(s) Oral at bedtime  tiotropium 18 MICROgram(s) Capsule 1Capsule(s) Inhalation daily  pantoprazole    Tablet 40milliGRAM(s) Oral before breakfast  levothyroxine 112MICROGram(s) Oral daily  montelukast 10milliGRAM(s) Oral at bedtime  amLODIPine   Tablet 10milliGRAM(s) Oral daily    MEDICATIONS  (PRN):  dextrose Gel 1Dose(s) Oral once PRN Blood Glucose LESS THAN 70 milliGRAM(s)/deciliter  glucagon  Injectable 1milliGRAM(s) IntraMuscular once PRN Glucose LESS THAN 70 milligrams/deciliter  acetaminophen   Tablet. 650milliGRAM(s) Oral every 4 hours PRN Moderate Pain (4 - 6)  traMADol 25milliGRAM(s) Oral every 12 hours PRN pain

## 2017-07-07 DIAGNOSIS — T44.7X5A ADVERSE EFFECT OF BETA-ADRENORECEPTOR ANTAGONISTS, INITIAL ENCOUNTER: ICD-10-CM

## 2017-07-07 DIAGNOSIS — J44.9 CHRONIC OBSTRUCTIVE PULMONARY DISEASE, UNSPECIFIED: ICD-10-CM

## 2017-07-07 DIAGNOSIS — Z99.81 DEPENDENCE ON SUPPLEMENTAL OXYGEN: ICD-10-CM

## 2017-07-07 DIAGNOSIS — R55 SYNCOPE AND COLLAPSE: ICD-10-CM

## 2017-07-07 DIAGNOSIS — E03.9 HYPOTHYROIDISM, UNSPECIFIED: ICD-10-CM

## 2017-07-07 DIAGNOSIS — I12.9 HYPERTENSIVE CHRONIC KIDNEY DISEASE WITH STAGE 1 THROUGH STAGE 4 CHRONIC KIDNEY DISEASE, OR UNSPECIFIED CHRONIC KIDNEY DISEASE: ICD-10-CM
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
## 2017-07-07 DIAGNOSIS — S42.031A DISPLACED FRACTURE OF LATERAL END OF RIGHT CLAVICLE, INITIAL ENCOUNTER FOR CLOSED FRACTURE: ICD-10-CM

## 2017-07-07 DIAGNOSIS — N17.9 ACUTE KIDNEY FAILURE, UNSPECIFIED: ICD-10-CM

## 2017-07-07 DIAGNOSIS — Z79.4 LONG TERM (CURRENT) USE OF INSULIN: ICD-10-CM
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
## 2017-07-07 DIAGNOSIS — E78.5 HYPERLIPIDEMIA, UNSPECIFIED: ICD-10-CM

## 2017-07-07 DIAGNOSIS — Z87.891 PERSONAL HISTORY OF NICOTINE DEPENDENCE: ICD-10-CM

## 2017-07-07 DIAGNOSIS — E86.0 DEHYDRATION: ICD-10-CM

## 2017-07-07 DIAGNOSIS — Z91.19 PATIENT'S NONCOMPLIANCE WITH OTHER MEDICAL TREATMENT AND REGIMEN: ICD-10-CM

## 2017-07-07 DIAGNOSIS — S00.83XA CONTUSION OF OTHER PART OF HEAD, INITIAL ENCOUNTER: ICD-10-CM

## 2017-07-07 DIAGNOSIS — J96.11 CHRONIC RESPIRATORY FAILURE WITH HYPOXIA: ICD-10-CM

## 2017-07-07 DIAGNOSIS — R00.1 BRADYCARDIA, UNSPECIFIED: ICD-10-CM

## 2017-07-07 DIAGNOSIS — G62.9 POLYNEUROPATHY, UNSPECIFIED: ICD-10-CM

## 2017-07-07 DIAGNOSIS — N18.3 CHRONIC KIDNEY DISEASE, STAGE 3 (MODERATE): ICD-10-CM

## 2017-07-07 DIAGNOSIS — E11.9 TYPE 2 DIABETES MELLITUS WITHOUT COMPLICATIONS: ICD-10-CM

## 2017-07-21 ENCOUNTER — INPATIENT (INPATIENT)
Facility: HOSPITAL | Age: 77
LOS: 8 days | Discharge: HOME | End: 2017-07-30
Attending: INTERNAL MEDICINE | Admitting: INTERNAL MEDICINE
Payer: MEDICARE

## 2017-07-21 VITALS
WEIGHT: 186.07 LBS | HEIGHT: 62 IN | SYSTOLIC BLOOD PRESSURE: 179 MMHG | OXYGEN SATURATION: 92 % | DIASTOLIC BLOOD PRESSURE: 65 MMHG | RESPIRATION RATE: 18 BRPM | HEART RATE: 70 BPM

## 2017-07-21 DIAGNOSIS — Z96.659 PRESENCE OF UNSPECIFIED ARTIFICIAL KNEE JOINT: Chronic | ICD-10-CM

## 2017-07-21 DIAGNOSIS — Z90.49 ACQUIRED ABSENCE OF OTHER SPECIFIED PARTS OF DIGESTIVE TRACT: Chronic | ICD-10-CM

## 2017-07-21 DIAGNOSIS — Z98.890 OTHER SPECIFIED POSTPROCEDURAL STATES: Chronic | ICD-10-CM

## 2017-07-21 PROBLEM — E11.9 TYPE 2 DIABETES MELLITUS WITHOUT COMPLICATIONS: Chronic | Status: ACTIVE | Noted: 2017-06-20

## 2017-07-21 PROBLEM — J44.9 CHRONIC OBSTRUCTIVE PULMONARY DISEASE, UNSPECIFIED: Chronic | Status: ACTIVE | Noted: 2017-06-20

## 2017-07-21 LAB
ALBUMIN SERPL ELPH-MCNC: 3.7 G/DL — SIGNIFICANT CHANGE UP (ref 3.3–5)
ALP SERPL-CCNC: 120 U/L — SIGNIFICANT CHANGE UP (ref 40–120)
ALT FLD-CCNC: 20 U/L — SIGNIFICANT CHANGE UP (ref 12–78)
ANION GAP SERPL CALC-SCNC: 5 MMOL/L — SIGNIFICANT CHANGE UP (ref 5–17)
AST SERPL-CCNC: 14 U/L — LOW (ref 15–37)
BASE EXCESS BLDA CALC-SCNC: 0 MMOL/L — SIGNIFICANT CHANGE UP (ref -2–2)
BASOPHILS # BLD AUTO: 0.1 K/UL — SIGNIFICANT CHANGE UP (ref 0–0.2)
BASOPHILS NFR BLD AUTO: 0.6 % — SIGNIFICANT CHANGE UP (ref 0–2)
BILIRUB SERPL-MCNC: 0.6 MG/DL — SIGNIFICANT CHANGE UP (ref 0.2–1.2)
BLOOD GAS COMMENTS ARTERIAL: SIGNIFICANT CHANGE UP
BUN SERPL-MCNC: 41 MG/DL — HIGH (ref 7–23)
CALCIUM SERPL-MCNC: 9.3 MG/DL — SIGNIFICANT CHANGE UP (ref 8.5–10.1)
CHLORIDE SERPL-SCNC: 111 MMOL/L — HIGH (ref 96–108)
CO2 SERPL-SCNC: 28 MMOL/L — SIGNIFICANT CHANGE UP (ref 22–31)
CREAT SERPL-MCNC: 1.62 MG/DL — HIGH (ref 0.5–1.3)
D DIMER BLD IA.RAPID-MCNC: 328 NG/ML DDU — HIGH
EOSINOPHIL # BLD AUTO: 0.2 K/UL — SIGNIFICANT CHANGE UP (ref 0–0.5)
EOSINOPHIL NFR BLD AUTO: 1.9 % — SIGNIFICANT CHANGE UP (ref 0–6)
GAS PNL BLDA: SIGNIFICANT CHANGE UP
GLUCOSE SERPL-MCNC: 214 MG/DL — HIGH (ref 70–99)
HCO3 BLDA-SCNC: 25 MMOL/L — SIGNIFICANT CHANGE UP (ref 21–29)
HCT VFR BLD CALC: 33.4 % — LOW (ref 34.5–45)
HGB BLD-MCNC: 11 G/DL — LOW (ref 11.5–15.5)
LACTATE SERPL-SCNC: 0.7 MMOL/L — SIGNIFICANT CHANGE UP (ref 0.7–2)
LYMPHOCYTES # BLD AUTO: 1 K/UL — SIGNIFICANT CHANGE UP (ref 1–3.3)
LYMPHOCYTES # BLD AUTO: 12.2 % — LOW (ref 13–44)
MCHC RBC-ENTMCNC: 29.7 PG — SIGNIFICANT CHANGE UP (ref 27–34)
MCHC RBC-ENTMCNC: 33 GM/DL — SIGNIFICANT CHANGE UP (ref 32–36)
MCV RBC AUTO: 90.1 FL — SIGNIFICANT CHANGE UP (ref 80–100)
MONOCYTES # BLD AUTO: 0.5 K/UL — SIGNIFICANT CHANGE UP (ref 0–0.9)
MONOCYTES NFR BLD AUTO: 6.3 % — SIGNIFICANT CHANGE UP (ref 2–14)
NEUTROPHILS # BLD AUTO: 6.3 K/UL — SIGNIFICANT CHANGE UP (ref 1.8–7.4)
NEUTROPHILS NFR BLD AUTO: 78.9 % — HIGH (ref 43–77)
NT-PROBNP SERPL-SCNC: 2969 PG/ML — HIGH (ref 0–450)
PCO2 BLDA: 42 MMHG — SIGNIFICANT CHANGE UP (ref 32–46)
PH BLDA: 7.39 — SIGNIFICANT CHANGE UP (ref 7.35–7.45)
PLATELET # BLD AUTO: 200 K/UL — SIGNIFICANT CHANGE UP (ref 150–400)
PO2 BLDA: 66 — SIGNIFICANT CHANGE UP
POTASSIUM SERPL-MCNC: 5 MMOL/L — SIGNIFICANT CHANGE UP (ref 3.5–5.3)
POTASSIUM SERPL-SCNC: 5 MMOL/L — SIGNIFICANT CHANGE UP (ref 3.5–5.3)
PROT SERPL-MCNC: 6.9 GM/DL — SIGNIFICANT CHANGE UP (ref 6–8.3)
RBC # BLD: 3.71 M/UL — LOW (ref 3.8–5.2)
RBC # FLD: 15.3 % — HIGH (ref 10.3–14.5)
SAO2 % BLDA: 92 — SIGNIFICANT CHANGE UP
SODIUM SERPL-SCNC: 144 MMOL/L — SIGNIFICANT CHANGE UP (ref 135–145)
TROPONIN I SERPL-MCNC: <0.015 NG/ML — SIGNIFICANT CHANGE UP (ref 0.01–0.04)
WBC # BLD: 8 K/UL — SIGNIFICANT CHANGE UP (ref 3.8–10.5)
WBC # FLD AUTO: 8 K/UL — SIGNIFICANT CHANGE UP (ref 3.8–10.5)

## 2017-07-21 PROCEDURE — 71250 CT THORAX DX C-: CPT | Mod: 26

## 2017-07-21 PROCEDURE — 71010: CPT | Mod: 26

## 2017-07-21 PROCEDURE — 93010 ELECTROCARDIOGRAM REPORT: CPT

## 2017-07-21 PROCEDURE — 99285 EMERGENCY DEPT VISIT HI MDM: CPT | Mod: 25

## 2017-07-21 RX ORDER — LEVOTHYROXINE SODIUM 125 MCG
112 TABLET ORAL DAILY
Qty: 0 | Refills: 0 | Status: DISCONTINUED | OUTPATIENT
Start: 2017-07-21 | End: 2017-07-30

## 2017-07-21 RX ORDER — GLUCAGON INJECTION, SOLUTION 0.5 MG/.1ML
1 INJECTION, SOLUTION SUBCUTANEOUS ONCE
Qty: 0 | Refills: 0 | Status: DISCONTINUED | OUTPATIENT
Start: 2017-07-21 | End: 2017-07-30

## 2017-07-21 RX ORDER — DEXTROSE 50 % IN WATER 50 %
1 SYRINGE (ML) INTRAVENOUS ONCE
Qty: 0 | Refills: 0 | Status: DISCONTINUED | OUTPATIENT
Start: 2017-07-21 | End: 2017-07-30

## 2017-07-21 RX ORDER — INSULIN LISPRO 100/ML
16 VIAL (ML) SUBCUTANEOUS
Qty: 0 | Refills: 0 | COMMUNITY

## 2017-07-21 RX ORDER — CEFEPIME 1 G/1
1000 INJECTION, POWDER, FOR SOLUTION INTRAMUSCULAR; INTRAVENOUS EVERY 12 HOURS
Qty: 0 | Refills: 0 | Status: DISCONTINUED | OUTPATIENT
Start: 2017-07-22 | End: 2017-07-25

## 2017-07-21 RX ORDER — ATORVASTATIN CALCIUM 80 MG/1
20 TABLET, FILM COATED ORAL AT BEDTIME
Qty: 0 | Refills: 0 | Status: DISCONTINUED | OUTPATIENT
Start: 2017-07-21 | End: 2017-07-30

## 2017-07-21 RX ORDER — CEFEPIME 1 G/1
1000 INJECTION, POWDER, FOR SOLUTION INTRAMUSCULAR; INTRAVENOUS ONCE
Qty: 0 | Refills: 0 | Status: COMPLETED | OUTPATIENT
Start: 2017-07-21 | End: 2017-07-21

## 2017-07-21 RX ORDER — IPRATROPIUM/ALBUTEROL SULFATE 18-103MCG
3 AEROSOL WITH ADAPTER (GRAM) INHALATION EVERY 6 HOURS
Qty: 0 | Refills: 0 | Status: DISCONTINUED | OUTPATIENT
Start: 2017-07-21 | End: 2017-07-24

## 2017-07-21 RX ORDER — INSULIN LISPRO 100/ML
VIAL (ML) SUBCUTANEOUS
Qty: 0 | Refills: 0 | Status: DISCONTINUED | OUTPATIENT
Start: 2017-07-21 | End: 2017-07-30

## 2017-07-21 RX ORDER — CEFEPIME 1 G/1
INJECTION, POWDER, FOR SOLUTION INTRAMUSCULAR; INTRAVENOUS
Qty: 0 | Refills: 0 | Status: DISCONTINUED | OUTPATIENT
Start: 2017-07-21 | End: 2017-07-25

## 2017-07-21 RX ORDER — DEXTROSE 50 % IN WATER 50 %
25 SYRINGE (ML) INTRAVENOUS ONCE
Qty: 0 | Refills: 0 | Status: DISCONTINUED | OUTPATIENT
Start: 2017-07-21 | End: 2017-07-30

## 2017-07-21 RX ORDER — INSULIN LISPRO 100/ML
VIAL (ML) SUBCUTANEOUS AT BEDTIME
Qty: 0 | Refills: 0 | Status: DISCONTINUED | OUTPATIENT
Start: 2017-07-21 | End: 2017-07-30

## 2017-07-21 RX ORDER — ASPIRIN/CALCIUM CARB/MAGNESIUM 324 MG
81 TABLET ORAL DAILY
Qty: 0 | Refills: 0 | Status: DISCONTINUED | OUTPATIENT
Start: 2017-07-21 | End: 2017-07-21

## 2017-07-21 RX ORDER — SODIUM CHLORIDE 9 MG/ML
1000 INJECTION INTRAMUSCULAR; INTRAVENOUS; SUBCUTANEOUS ONCE
Qty: 0 | Refills: 0 | Status: COMPLETED | OUTPATIENT
Start: 2017-07-21 | End: 2017-07-21

## 2017-07-21 RX ORDER — CITALOPRAM 10 MG/1
10 TABLET, FILM COATED ORAL DAILY
Qty: 0 | Refills: 0 | Status: DISCONTINUED | OUTPATIENT
Start: 2017-07-21 | End: 2017-07-30

## 2017-07-21 RX ORDER — INSULIN GLARGINE 100 [IU]/ML
45 INJECTION, SOLUTION SUBCUTANEOUS
Qty: 0 | Refills: 0 | COMMUNITY

## 2017-07-21 RX ORDER — LEVOTHYROXINE SODIUM 125 MCG
1 TABLET ORAL
Qty: 0 | Refills: 0 | COMMUNITY

## 2017-07-21 RX ORDER — FOLIC ACID 0.8 MG
1 TABLET ORAL
Qty: 0 | Refills: 0 | COMMUNITY

## 2017-07-21 RX ORDER — SODIUM CHLORIDE 9 MG/ML
1000 INJECTION, SOLUTION INTRAVENOUS
Qty: 0 | Refills: 0 | Status: DISCONTINUED | OUTPATIENT
Start: 2017-07-21 | End: 2017-07-22

## 2017-07-21 RX ORDER — FOLIC ACID 0.8 MG
1 TABLET ORAL DAILY
Qty: 0 | Refills: 0 | Status: DISCONTINUED | OUTPATIENT
Start: 2017-07-21 | End: 2017-07-30

## 2017-07-21 RX ORDER — DEXTROSE 50 % IN WATER 50 %
12.5 SYRINGE (ML) INTRAVENOUS ONCE
Qty: 0 | Refills: 0 | Status: DISCONTINUED | OUTPATIENT
Start: 2017-07-21 | End: 2017-07-30

## 2017-07-21 RX ORDER — HEPARIN SODIUM 5000 [USP'U]/ML
5000 INJECTION INTRAVENOUS; SUBCUTANEOUS EVERY 12 HOURS
Qty: 0 | Refills: 0 | Status: DISCONTINUED | OUTPATIENT
Start: 2017-07-21 | End: 2017-07-30

## 2017-07-21 RX ORDER — MONTELUKAST 4 MG/1
10 TABLET, CHEWABLE ORAL AT BEDTIME
Qty: 0 | Refills: 0 | Status: DISCONTINUED | OUTPATIENT
Start: 2017-07-21 | End: 2017-07-30

## 2017-07-21 RX ORDER — SODIUM CHLORIDE 9 MG/ML
1000 INJECTION, SOLUTION INTRAVENOUS
Qty: 0 | Refills: 0 | Status: DISCONTINUED | OUTPATIENT
Start: 2017-07-21 | End: 2017-07-30

## 2017-07-21 RX ORDER — CHOLECALCIFEROL (VITAMIN D3) 125 MCG
1000 CAPSULE ORAL DAILY
Qty: 0 | Refills: 0 | Status: DISCONTINUED | OUTPATIENT
Start: 2017-07-21 | End: 2017-07-30

## 2017-07-21 RX ORDER — PANTOPRAZOLE SODIUM 20 MG/1
40 TABLET, DELAYED RELEASE ORAL
Qty: 0 | Refills: 0 | Status: DISCONTINUED | OUTPATIENT
Start: 2017-07-21 | End: 2017-07-30

## 2017-07-21 RX ORDER — METHENAMINE MANDELATE 1 G
1 TABLET ORAL
Qty: 0 | Refills: 0 | COMMUNITY

## 2017-07-21 RX ORDER — OMEPRAZOLE 10 MG/1
1 CAPSULE, DELAYED RELEASE ORAL
Qty: 0 | Refills: 0 | COMMUNITY

## 2017-07-21 RX ORDER — IPRATROPIUM/ALBUTEROL SULFATE 18-103MCG
3 AEROSOL WITH ADAPTER (GRAM) INHALATION
Qty: 0 | Refills: 0 | Status: COMPLETED | OUTPATIENT
Start: 2017-07-21 | End: 2017-07-21

## 2017-07-21 RX ORDER — ASPIRIN/CALCIUM CARB/MAGNESIUM 324 MG
81 TABLET ORAL DAILY
Qty: 0 | Refills: 0 | Status: DISCONTINUED | OUTPATIENT
Start: 2017-07-21 | End: 2017-07-30

## 2017-07-21 RX ORDER — INSULIN GLARGINE 100 [IU]/ML
40 INJECTION, SOLUTION SUBCUTANEOUS AT BEDTIME
Qty: 0 | Refills: 0 | Status: DISCONTINUED | OUTPATIENT
Start: 2017-07-21 | End: 2017-07-23

## 2017-07-21 RX ADMIN — ATORVASTATIN CALCIUM 20 MILLIGRAM(S): 80 TABLET, FILM COATED ORAL at 22:09

## 2017-07-21 RX ADMIN — Medication 3 MILLILITER(S): at 16:46

## 2017-07-21 RX ADMIN — SODIUM CHLORIDE 1000 MILLILITER(S): 9 INJECTION INTRAMUSCULAR; INTRAVENOUS; SUBCUTANEOUS at 16:57

## 2017-07-21 RX ADMIN — MONTELUKAST 10 MILLIGRAM(S): 4 TABLET, CHEWABLE ORAL at 22:09

## 2017-07-21 RX ADMIN — INSULIN GLARGINE 40 UNIT(S): 100 INJECTION, SOLUTION SUBCUTANEOUS at 22:37

## 2017-07-21 RX ADMIN — Medication 3 MILLILITER(S): at 16:58

## 2017-07-21 RX ADMIN — Medication 3 MILLILITER(S): at 16:57

## 2017-07-21 RX ADMIN — SODIUM CHLORIDE 50 MILLILITER(S): 9 INJECTION, SOLUTION INTRAVENOUS at 22:35

## 2017-07-21 RX ADMIN — CEFEPIME 100 MILLIGRAM(S): 1 INJECTION, POWDER, FOR SOLUTION INTRAMUSCULAR; INTRAVENOUS at 20:10

## 2017-07-21 RX ADMIN — Medication 125 MILLIGRAM(S): at 16:57

## 2017-07-21 RX ADMIN — Medication 4: at 22:10

## 2017-07-21 RX ADMIN — Medication 40 MILLIGRAM(S): at 22:49

## 2017-07-21 RX ADMIN — Medication 150 MILLIGRAM(S): at 22:09

## 2017-07-21 NOTE — ED PROVIDER NOTE - CARE PLAN
Principal Discharge DX:	Chronic obstructive pulmonary disease, unspecified COPD type  Secondary Diagnosis:	Hypoxia

## 2017-07-21 NOTE — ED PROVIDER NOTE - OBJECTIVE STATEMENT
75 y/o F with a h/o COPD on 2 L home O2, orthostatic hypotension, renal insufficiency, leg swelling, DM, BIB daughter from Dr. Forte's (pulm) office for SOB and O2 sat in the 60s-70s today. +TINEO. Daughter states pt has never been intubated. Denies fevers, chills, cough. No h/o blood clots. Dr. Cutler (neph), Dr. Freitas (endo), Dr. Phelps (PCP).

## 2017-07-21 NOTE — ED ADULT NURSE NOTE - OBJECTIVE STATEMENT
Sent in from Dr Forte's office for low SpO2 in office on 2 L O2 (O2 dependent), per pt increased to 3L O2 and still had low SpO2. Denies CP/SOB, reports no symptoms at this time.

## 2017-07-21 NOTE — H&P ADULT - HISTORY OF PRESENT ILLNESS
Pt is a 75 y/o female with h/o type II diabetes, HTN, chronic UTI's, stage 3 CKD, COPD with chronic hypoxic respiratory failure (refuses to wear O2) ,who is know to me from a recent admission at  for syncope, ANI on stage 3 CKD, Rt clavicle fracture and bradycardia.  Since than she had been doing okay except for the past few days started to develop increasing SOB with TINEO.  She went to Dr Forte today and sats 60-70's, sent to ER.  Presently O2 sats 92% on 4L NC, presently no SOB.  Of note she was recently started on lasix for lower ext edema.  Pt denies any cough, CP, fevers or chills.         Vital Signs Last 24 Hrs  T(C): --  T(F): --  HR: 70 (21 Jul 2017 15:40) (70 - 70)  BP: 179/65 (21 Jul 2017 15:40) (179/65 - 179/65)  BP(mean): --  RR: 18 (21 Jul 2017 15:40) (18 - 18)  SpO2: 92% (21 Jul 2017 15:40) (92% - 92%)    I&O's Detail      Daily Height in cm: 157.48 (21 Jul 2017 15:40)    Daily     CARDIAC MARKERS ( 21 Jul 2017 16:01 )  <0.015 ng/mL / x     / x     / x     / x            ABG - ( 21 Jul 2017 16:40 )  pH: 7.39  /  pCO2: 42    /  pO2: 66    / HCO3: 25    / Base Excess: 0     /  SaO2: 92                                        11.0   8.0   )-----------( 200      ( 21 Jul 2017 16:01 )             33.4       07-21    144  |  111<H>  |  41<H>  ----------------------------<  214<H>  5.0   |  28  |  1.62<H>    Ca    9.3      21 Jul 2017 16:01    TPro  6.9  /  Alb  3.7  /  TBili  0.6  /  DBili  x   /  AST  14<L>  /  ALT  20  /  AlkPhos  120  07-21      LIVER FUNCTIONS - ( 21 Jul 2017 16:01 )  Alb: 3.7 g/dL / Pro: 6.9 gm/dL / ALK PHOS: 120 U/L / ALT: 20 U/L / AST: 14 U/L / GGT: x                       MEDICATIONS  (STANDING):    MEDICATIONS  (PRN):

## 2017-07-21 NOTE — H&P ADULT - ASSESSMENT
Pt is a 75 y/o female with h/o type II diabetes, HTN, chronic UTI's, stage 3 CKD, COPD with chronic hypoxic respiratory failure (refuses to wear O2) ,who is know to me from a recent admission at  for syncope, ANI on stage 3 CKD, Rt clavicle fracture and bradycardia.  Since than she had been doing okay except for the past few days started to develop increasing SOB with TINEO.  She went to Dr Forte today and sats 60-70's, sent to ER.  Presently O2 sats 92% on 4L NC, presently no SOB.  Of note she was recently started on lasix for lower ext edema.  She is being admitted for acute hypoxic respiratory failure.    * Acute Hypoxic respiratory failure-suspect due to ACOPD exacerbation with probable RLL pneumonia over atelesis given acute decline.  Admit, O2, IV solumedrol, IV cefepime, nebs, monitor response, pulm eval.  PE less likely given only mild elevation in this elderly female.  * HTN-stop amlodipine due to blt lower ext edema, start hydralazine.  * ANI on stage 3 CKD- pre-renal from poor po intake in setting of lasix.  Avoid lasix, gentle IVF, monitor, renal eval.  * Recent Rt Clavicle Facture supportive care with sling, pain control, PT   * Hypothyroidism continue synthroid  * Type II diabetes- lantus and ISS for now    * Disp-admit  * Proph-heparin  * Comm- d/w pt and daughter in details, ER MD

## 2017-07-22 LAB
ANION GAP SERPL CALC-SCNC: 6 MMOL/L — SIGNIFICANT CHANGE UP (ref 5–17)
ANION GAP SERPL CALC-SCNC: 6 MMOL/L — SIGNIFICANT CHANGE UP (ref 5–17)
BUN SERPL-MCNC: 42 MG/DL — HIGH (ref 7–23)
BUN SERPL-MCNC: 46 MG/DL — HIGH (ref 7–23)
CALCIUM SERPL-MCNC: 8.6 MG/DL — SIGNIFICANT CHANGE UP (ref 8.5–10.1)
CALCIUM SERPL-MCNC: 9.1 MG/DL — SIGNIFICANT CHANGE UP (ref 8.5–10.1)
CHLORIDE SERPL-SCNC: 109 MMOL/L — HIGH (ref 96–108)
CHLORIDE SERPL-SCNC: 109 MMOL/L — HIGH (ref 96–108)
CO2 SERPL-SCNC: 25 MMOL/L — SIGNIFICANT CHANGE UP (ref 22–31)
CO2 SERPL-SCNC: 25 MMOL/L — SIGNIFICANT CHANGE UP (ref 22–31)
CREAT SERPL-MCNC: 1.58 MG/DL — HIGH (ref 0.5–1.3)
CREAT SERPL-MCNC: 1.72 MG/DL — HIGH (ref 0.5–1.3)
GLUCOSE SERPL-MCNC: 408 MG/DL — HIGH (ref 70–99)
GLUCOSE SERPL-MCNC: 409 MG/DL — HIGH (ref 70–99)
HCT VFR BLD CALC: 29.2 % — LOW (ref 34.5–45)
HGB BLD-MCNC: 9.4 G/DL — LOW (ref 11.5–15.5)
MCHC RBC-ENTMCNC: 29.5 PG — SIGNIFICANT CHANGE UP (ref 27–34)
MCHC RBC-ENTMCNC: 32.2 GM/DL — SIGNIFICANT CHANGE UP (ref 32–36)
MCV RBC AUTO: 91.5 FL — SIGNIFICANT CHANGE UP (ref 80–100)
PLATELET # BLD AUTO: 170 K/UL — SIGNIFICANT CHANGE UP (ref 150–400)
POTASSIUM SERPL-MCNC: 5.1 MMOL/L — SIGNIFICANT CHANGE UP (ref 3.5–5.3)
POTASSIUM SERPL-MCNC: 5.8 MMOL/L — HIGH (ref 3.5–5.3)
POTASSIUM SERPL-SCNC: 5.1 MMOL/L — SIGNIFICANT CHANGE UP (ref 3.5–5.3)
POTASSIUM SERPL-SCNC: 5.8 MMOL/L — HIGH (ref 3.5–5.3)
RBC # BLD: 3.19 M/UL — LOW (ref 3.8–5.2)
RBC # FLD: 15.3 % — HIGH (ref 10.3–14.5)
SODIUM SERPL-SCNC: 140 MMOL/L — SIGNIFICANT CHANGE UP (ref 135–145)
SODIUM SERPL-SCNC: 140 MMOL/L — SIGNIFICANT CHANGE UP (ref 135–145)
WBC # BLD: 5.8 K/UL — SIGNIFICANT CHANGE UP (ref 3.8–10.5)
WBC # FLD AUTO: 5.8 K/UL — SIGNIFICANT CHANGE UP (ref 3.8–10.5)

## 2017-07-22 PROCEDURE — 93010 ELECTROCARDIOGRAM REPORT: CPT

## 2017-07-22 PROCEDURE — 93970 EXTREMITY STUDY: CPT | Mod: 26

## 2017-07-22 RX ORDER — INSULIN LISPRO 100/ML
14 VIAL (ML) SUBCUTANEOUS
Qty: 0 | Refills: 0 | Status: DISCONTINUED | OUTPATIENT
Start: 2017-07-22 | End: 2017-07-23

## 2017-07-22 RX ADMIN — HEPARIN SODIUM 5000 UNIT(S): 5000 INJECTION INTRAVENOUS; SUBCUTANEOUS at 05:30

## 2017-07-22 RX ADMIN — Medication 112 MICROGRAM(S): at 05:30

## 2017-07-22 RX ADMIN — Medication 40 MILLIGRAM(S): at 05:30

## 2017-07-22 RX ADMIN — Medication: at 17:27

## 2017-07-22 RX ADMIN — Medication 81 MILLIGRAM(S): at 12:04

## 2017-07-22 RX ADMIN — Medication 6: at 12:02

## 2017-07-22 RX ADMIN — CEFEPIME 100 MILLIGRAM(S): 1 INJECTION, POWDER, FOR SOLUTION INTRAMUSCULAR; INTRAVENOUS at 17:26

## 2017-07-22 RX ADMIN — Medication 1 MILLIGRAM(S): at 12:04

## 2017-07-22 RX ADMIN — Medication 40 MILLIGRAM(S): at 21:08

## 2017-07-22 RX ADMIN — Medication 8: at 21:09

## 2017-07-22 RX ADMIN — Medication 14 UNIT(S): at 17:27

## 2017-07-22 RX ADMIN — Medication 40 MILLIGRAM(S): at 14:16

## 2017-07-22 RX ADMIN — Medication 3 MILLILITER(S): at 00:37

## 2017-07-22 RX ADMIN — Medication 3 MILLILITER(S): at 07:51

## 2017-07-22 RX ADMIN — PANTOPRAZOLE SODIUM 40 MILLIGRAM(S): 20 TABLET, DELAYED RELEASE ORAL at 05:30

## 2017-07-22 RX ADMIN — CITALOPRAM 10 MILLIGRAM(S): 10 TABLET, FILM COATED ORAL at 12:04

## 2017-07-22 RX ADMIN — ATORVASTATIN CALCIUM 20 MILLIGRAM(S): 80 TABLET, FILM COATED ORAL at 21:07

## 2017-07-22 RX ADMIN — Medication 6: at 08:10

## 2017-07-22 RX ADMIN — Medication 150 MILLIGRAM(S): at 21:08

## 2017-07-22 RX ADMIN — CEFEPIME 100 MILLIGRAM(S): 1 INJECTION, POWDER, FOR SOLUTION INTRAMUSCULAR; INTRAVENOUS at 06:03

## 2017-07-22 RX ADMIN — Medication 1000 UNIT(S): at 12:04

## 2017-07-22 RX ADMIN — HEPARIN SODIUM 5000 UNIT(S): 5000 INJECTION INTRAVENOUS; SUBCUTANEOUS at 17:26

## 2017-07-22 RX ADMIN — MONTELUKAST 10 MILLIGRAM(S): 4 TABLET, CHEWABLE ORAL at 21:07

## 2017-07-22 RX ADMIN — Medication 3 MILLILITER(S): at 14:03

## 2017-07-22 RX ADMIN — INSULIN GLARGINE 40 UNIT(S): 100 INJECTION, SOLUTION SUBCUTANEOUS at 21:08

## 2017-07-22 RX ADMIN — Medication 3 MILLILITER(S): at 19:41

## 2017-07-22 NOTE — CONSULT NOTE ADULT - ASSESSMENT
75 yo woman  with COPD, and CKD admitted with resp insuff.   Pt was recently started on diuretic which may explain increase in creat on admission.  However CT scan c/w pulmonary edema.  Therefore, will need to evaluate for possible cardiac decompensation to explain recent event of ANI and CHF.  Will d/c further IVF.  Since resp status has stabilized, will hold off on adding diuretics until cardiology evaluation done.  Hyperkalemia of unclear reason.  Recheck to see if rx needed.

## 2017-07-22 NOTE — CONSULT NOTE ADULT - SUBJECTIVE AND OBJECTIVE BOX
Chief complaints.  Pt presented with several days hx of worsening SOB.    HPI:  77 yo woman with multiple medical problems including CKD (with baseline creat 1.2-1.4) and COPD admitted with complaints of SOB for several days.  Noted to be hypoxic in Dr Forte's office and sent to ER>  Pt apparently was started on Lasix for worsening LE edema.      PMHX and PSHX.  1.CKD (baseline creat 1.2-1.4)  2.CAD  3.COPD  4.Hypothyroidism  5.Anemia  6.Hx of Depression and Anxiety  7.HTN  8.Recurrent UTIs  9.Syncope--recent hosp.    FAMILY HISTORY:  Family history of CHF (congestive heart failure) (Mother)      SOCIAL HISTORY :  Former smoker, No ETOH.  Allergies    Bactrim (Other)  ciprofloxacin (Other (Mild))  clindamycin (Unknown)  ibuprofen (Unknown)  penicillins (Other)  sulfa drugs (Unknown)    Review of Systems.        MEDICATIONS  (STANDING):  pregabalin 150 milliGRAM(s) Oral at bedtime  citalopram 10 milliGRAM(s) Oral daily  atorvastatin 20 milliGRAM(s) Oral at bedtime  montelukast 10 milliGRAM(s) Oral at bedtime  pantoprazole    Tablet 40 milliGRAM(s) Oral before breakfast  levothyroxine 112 MICROGram(s) Oral daily  folic acid 1 milliGRAM(s) Oral daily  cholecalciferol 1000 Unit(s) Oral daily  aspirin  chewable 81 milliGRAM(s) Oral daily  heparin  Injectable 5000 Unit(s) SubCutaneous every 12 hours  sodium chloride 0.45%. 1000 milliLiter(s) (50 mL/Hr) IV Continuous <Continuous>  insulin glargine Injectable (LANTUS) 40 Unit(s) SubCutaneous at bedtime  insulin lispro (HumaLOG) corrective regimen sliding scale   SubCutaneous three times a day before meals  insulin lispro (HumaLOG) corrective regimen sliding scale   SubCutaneous at bedtime  dextrose 5%. 1000 milliLiter(s) (50 mL/Hr) IV Continuous <Continuous>  dextrose 50% Injectable 12.5 Gram(s) IV Push once  dextrose 50% Injectable 25 Gram(s) IV Push once  dextrose 50% Injectable 25 Gram(s) IV Push once  ALBUTerol/ipratropium for Nebulization 3 milliLiter(s) Nebulizer every 6 hours  cefepime  IVPB 1000 milliGRAM(s) IV Intermittent every 12 hours  cefepime  IVPB   IV Intermittent   methylPREDNISolone sodium succinate Injectable 40 milliGRAM(s) IV Push every 8 hours    MEDICATIONS  (PRN):  dextrose Gel 1 Dose(s) Oral once PRN Blood Glucose LESS THAN 70 milliGRAM(s)/deciliter  glucagon  Injectable 1 milliGRAM(s) IntraMuscular once PRN Glucose LESS THAN 70 milligrams/deciliter         Vital Signs Last 24 Hrs  T(C): 36.4 (2017 05:27), Max: 36.6 (2017 21:30)  T(F): 97.6 (2017 05:27), Max: 97.9 (2017 21:30)  HR: 80 (2017 05:27) (70 - 92)  BP: 162/40 (2017 05:27) (162/40 - 179/65)  BP(mean): 73 (2017 05:27) (73 - 73)  RR: 20 (2017 21:30) (18 - 20)  SpO2: 92% (2017 05:27) (92% - 92%)  Daily Height in cm: 157.48 (2017 15:40)    Daily Weight in k.9 (2017 21:30)  I&O's Summary    2017 07:01  -  2017 07:00  --------------------------------------------------------  IN: 120 mL / OUT: 0 mL / NET: 120 mL        PHYSICAL EXAM:  GEN:   HEENT:   NECK   CV:   LUNGS:   ABD:   EXT:     LABS:                        9.4    5.8   )-----------( 170      ( 2017 05:26 )             29.2     07-22    140  |  109<H>  |  42<H>  ----------------------------<  408<H>  5.8<H>   |  25  |  1.58<H>    Ca    8.6      2017 05:26    TPro  6.9  /  Alb  3.7  /  TBili  0.6  /  DBili  x   /  AST  14<L>  /  ALT  20  /  AlkPhos  120  07-21          ABG - ( 2017 16:40 )  pH: 7.39  /  pCO2: 42    /  pO2: 66    / HCO3: 25    / Base Excess: 0     /  SaO2: 92 Chief complaints.  Pt presented with several days hx of worsening SOB.    HPI:  77 yo woman with multiple medical problems including CKD (with baseline creat 1.2-1.4) and COPD admitted with complaints of SOB for several days.  Noted to be hypoxic in Dr Forte's office and sent to ER.   Pt apparently was started on Lasix for worsening LE edema. by Dr Cutler last week.  Pt had not been on diuretic therapy for sometime until started by Dr Cutler.  Pt also had her Amlodipine dose reduced since LE edema increased after pt's meds were adjusted during previous hosp.  On admission. noted with worsened renal fx and has been started on gentle hydration.      PMHX and PSHX.  1.CKD (baseline creat 1.2-1.4)  2.CAD  3.COPD  4.Hypothyroidism  5.Anemia  6.Hx of Depression and Anxiety  7.HTN  8.Recurrent UTIs  9.Syncope--recent hosp.    FAMILY HISTORY:  Family history of CHF (congestive heart failure) (Mother)      SOCIAL HISTORY :  Former smoker, No ETOH.  Allergies    Bactrim (Other)  ciprofloxacin (Other (Mild))  clindamycin (Unknown)  ibuprofen (Unknown)  penicillins (Other)  sulfa drugs (Unknown)    Review of Systems.  No acute symptoms this am  SOB much improved  Reports wheezing yesterday.  Denies fever/chills  Denies chest pain  Denies abdominal discomfort  Denies dysuria.        MEDICATIONS  (STANDING):  pregabalin 150 milliGRAM(s) Oral at bedtime  citalopram 10 milliGRAM(s) Oral daily  atorvastatin 20 milliGRAM(s) Oral at bedtime  montelukast 10 milliGRAM(s) Oral at bedtime  pantoprazole    Tablet 40 milliGRAM(s) Oral before breakfast  levothyroxine 112 MICROGram(s) Oral daily  folic acid 1 milliGRAM(s) Oral daily  cholecalciferol 1000 Unit(s) Oral daily  aspirin  chewable 81 milliGRAM(s) Oral daily  heparin  Injectable 5000 Unit(s) SubCutaneous every 12 hours  sodium chloride 0.45%. 1000 milliLiter(s) (50 mL/Hr) IV Continuous <Continuous>  insulin glargine Injectable (LANTUS) 40 Unit(s) SubCutaneous at bedtime  insulin lispro (HumaLOG) corrective regimen sliding scale   SubCutaneous three times a day before meals  insulin lispro (HumaLOG) corrective regimen sliding scale   SubCutaneous at bedtime  dextrose 5%. 1000 milliLiter(s) (50 mL/Hr) IV Continuous <Continuous>  dextrose 50% Injectable 12.5 Gram(s) IV Push once  dextrose 50% Injectable 25 Gram(s) IV Push once  dextrose 50% Injectable 25 Gram(s) IV Push once  ALBUTerol/ipratropium for Nebulization 3 milliLiter(s) Nebulizer every 6 hours  cefepime  IVPB 1000 milliGRAM(s) IV Intermittent every 12 hours  cefepime  IVPB   IV Intermittent   methylPREDNISolone sodium succinate Injectable 40 milliGRAM(s) IV Push every 8 hours    MEDICATIONS  (PRN):  dextrose Gel 1 Dose(s) Oral once PRN Blood Glucose LESS THAN 70 milliGRAM(s)/deciliter  glucagon  Injectable 1 milliGRAM(s) IntraMuscular once PRN Glucose LESS THAN 70 milligrams/deciliter         Vital Signs Last 24 Hrs  T(C): 36.4 (2017 05:27), Max: 36.6 (2017 21:30)  T(F): 97.6 (2017 05:27), Max: 97.9 (2017 21:30)  HR: 80 (2017 05:27) (70 - 92)  BP: 162/40 (2017 05:27) (162/40 - 179/65)  BP(mean): 73 (2017 05:27) (73 - 73)  RR: 20 (2017 21:30) (18 - 20)  SpO2: 92% (2017 05:27) (92% - 92%)  Daily Height in cm: 157.48 (2017 15:40)    Daily Weight in k.9 (2017 21:30)  I&O's Summary    2017 07:01  -  2017 07:00  --------------------------------------------------------  IN: 120 mL / OUT: 0 mL / NET: 120 mL        PHYSICAL EXAM:  Alert and appropriate  GEN: No acute distress.  HEENT: WNL  NECK : supple  CV: S1S2 RRR  LUNGS: basilar crackles.  ABD: Obese  EXT: trace edema    LABS:                        9.4    5.8   )-----------( 170      ( 2017 05:26 )             29.2     -22    140  |  109<H>  |  42<H>  ----------------------------<  408<H>  5.8<H>   |  25  |  1.58<H>    Ca    8.6      2017 05:26    TPro  6.9  /  Alb  3.7  /  TBili  0.6  /  DBili  x   /  AST  14<L>  /  ALT  20  /  AlkPhos  120  07-          ABG - ( 2017 16:40 )  pH: 7.39  /  pCO2: 42    /  pO2: 66    / HCO3: 25    / Base Excess: 0     /  SaO2: 92

## 2017-07-22 NOTE — CONSULT NOTE ADULT - SUBJECTIVE AND OBJECTIVE BOX
Patient is a 76y old  Female who presents with a chief complaint of I can't breath.    HPI:  Pt is a 77 y/o female with h/o type II diabetes, HTN, chronic UTI's, stage 3 CKD, COPD with chronic hypoxic respiratory failure (refuses to wear O2) , ANI on stage 3 CKD, Rt clavicle fracture.  For the past few days started to develop increasing SOB with TINEO.  She was seen by Dr Forte and O2  sat noted to be  60-70's and was sent to ER.      She had prior cardiac hx of NSTEMI and abnormal stress test in   showing small sized fixed defect in apical area .    She was having pedal edema few months ago and her lasix was adjusted to 40mg po daily instead of 20mg po BID and her pedal edema resolved.  She has very poor intake of fluids in day time and partly her dementia is playing a role with compliance.      Vital Signs Last 24 Hrs  T(C): --  T(F): --  HR: 70 (2017 15:40) (70 - 70)  BP: 179/65 (2017 15:40) (179/65 - 179/65)  BP(mean): --  RR: 18 (2017 15:40) (18 - 18)  SpO2: 92% (2017 15:40) (92% - 92%)    I&O's Detail      Daily Height in cm: 157.48 (2017 15:40)    Daily     CARDIAC MARKERS ( 2017 16:01 )  <0.015 ng/mL / x     / x     / x     / x            ABG - ( 2017 16:40 )  pH: 7.39  /  pCO2: 42    /  pO2: 66    / HCO3: 25    / Base Excess: 0     /  SaO2: 92                                        11.0   8.0   )-----------( 200      ( 2017 16:01 )             33.4       07-21    144  |  111<H>  |  41<H>  ----------------------------<  214<H>  5.0   |  28  |  1.62<H>    Ca    9.3      2017 16:01    TPro  6.9  /  Alb  3.7  /  TBili  0.6  /  DBili  x   /  AST  14<L>  /  ALT  20  /  AlkPhos  120  07-21      LIVER FUNCTIONS - ( 2017 16:01 )  Alb: 3.7 g/dL / Pro: 6.9 gm/dL / ALK PHOS: 120 U/L / ALT: 20 U/L / AST: 14 U/L / GGT: x                       MEDICATIONS  (STANDING):    MEDICATIONS  (PRN): (2017 19:26)      PAST MEDICAL & SURGICAL HISTORY:  Hyperlipidemia, unspecified hyperlipidemia type  Essential hypertension  Neuropathy  Diabetes  Chronic obstructive pulmonary disease, unspecified COPD type  History of total knee replacement, unspecified laterality  H/O hernia repair   delivery delivered  S/P appendectomy      MEDICATIONS  (STANDING):  pregabalin 150 milliGRAM(s) Oral at bedtime  citalopram 10 milliGRAM(s) Oral daily  atorvastatin 20 milliGRAM(s) Oral at bedtime  montelukast 10 milliGRAM(s) Oral at bedtime  pantoprazole    Tablet 40 milliGRAM(s) Oral before breakfast  levothyroxine 112 MICROGram(s) Oral daily  folic acid 1 milliGRAM(s) Oral daily  cholecalciferol 1000 Unit(s) Oral daily  aspirin  chewable 81 milliGRAM(s) Oral daily  heparin  Injectable 5000 Unit(s) SubCutaneous every 12 hours  insulin glargine Injectable (LANTUS) 40 Unit(s) SubCutaneous at bedtime  insulin lispro (HumaLOG) corrective regimen sliding scale   SubCutaneous three times a day before meals  insulin lispro (HumaLOG) corrective regimen sliding scale   SubCutaneous at bedtime  dextrose 5%. 1000 milliLiter(s) (50 mL/Hr) IV Continuous <Continuous>  dextrose 50% Injectable 12.5 Gram(s) IV Push once  dextrose 50% Injectable 25 Gram(s) IV Push once  dextrose 50% Injectable 25 Gram(s) IV Push once  ALBUTerol/ipratropium for Nebulization 3 milliLiter(s) Nebulizer every 6 hours  cefepime  IVPB 1000 milliGRAM(s) IV Intermittent every 12 hours  cefepime  IVPB   IV Intermittent   methylPREDNISolone sodium succinate Injectable 40 milliGRAM(s) IV Push every 8 hours    MEDICATIONS  (PRN):  dextrose Gel 1 Dose(s) Oral once PRN Blood Glucose LESS THAN 70 milliGRAM(s)/deciliter  glucagon  Injectable 1 milliGRAM(s) IntraMuscular once PRN Glucose LESS THAN 70 milligrams/deciliter      FAMILY HISTORY:  Family history of CHF (congestive heart failure) (Mother)      SOCIAL HISTORY:  non smoker, no alcohol use in recent past      REVIEW OF SYSTEMS:  CONSTITUTIONAL:  No night sweats.  No fatigue, malaise, lethargy.  No fever or chills.  HEENT:  Eyes:  No visual changes.  No eye pain.      ENT:  No runny nose.  No epistaxis.  No sinus pain.  No sore throat.  No odynophagia.  No ear pain.  No congestion.  RESPIRATORY:  No cough.  No wheeze.  No hemoptysis.  c/o  shortness of breath.  CARDIOVASCULAR:  No chest pains.  No palpitations. c/o shortness of breath and TINEO, No orthopnea or PND.  GASTROINTESTINAL:  No abdominal pain.  No nausea or vomiting.  No diarrhea or constipation.  No hematemesis.  No hematochezia.  No melena.  GENITOURINARY:  No urgency.  No frequency.  No dysuria.  No hematuria.  No obstructive symptoms.  No discharge.  No pain.  No significant abnormal bleeding.  MUSCULOSKELETAL:  No musculoskeletal pain.  No joint swelling.  No arthritis.  NEUROLOGICAL:  No tingling or numbness or weakness.  PSYCHIATRIC:  No confusion  SKIN:  No rashes.  No lesions.  No wounds.  ENDOCRINE:  No unexplained weight loss.  No polydipsia.  No polyuria.  No polyphagia.  HEMATOLOGIC:  No anemia.  No purpura.  No petechiae.  No prolonged or excessive bleeding.   ALLERGIC AND IMMUNOLOGIC:  No pruritus.  No swelling.         Vital Signs Last 24 Hrs  T(C): 36.4 (2017 05:27), Max: 36.6 (2017 21:30)  T(F): 97.6 (2017 05:27), Max: 97.9 (2017 21:30)  HR: 80 (2017 08:28) (70 - 92)  BP: 162/40 (2017 05:27) (162/40 - 179/65)  BP(mean): 73 (2017 05:27) (73 - 73)  RR: 20 (2017 21:30) (18 - 20)  SpO2: 99% (2017 08:30) (92% - 99%)    PHYSICAL EXAM-    Constitutional: The patient appears to be normal, well developed, well nourished and alert and oriented to time, place and person. The patient does not appear acutely ill.     Head: Head is normocephalic and atraumatic.      Neck: The patient's neck is supple without enlargement, has no palpable thyromegaly nor thyroid nodules and has no jugular venous distention. No audible carotid bruits. There are strong carotid pulses bilaterally. No JVD.     Cardiovascular: Regular rate and rhythm without S3, S4. No murmurs or rubs are appreciated.      Respiratory: Breath sounds are normal. No rales. No wheezing.    Abdomen: Soft, nontender, nondistended with positive bowel sounds.      Extremity: No tenderness. No  pitting edema No skin discoloration No clubbing No cyanosis.     Neurologic: The patient is alert and oriented.      Skin: No rash, no obvious lesions noted.      Psychiatric: The patient appears to be emotionally stable.      INTERPRETATION OF TELEMETRY: sinus rythm with PVCs     ECG: sinus rythm, normal axis and PVCs.    I&O's Detail    2017 07:  -  2017 07:00  --------------------------------------------------------  IN:    Oral Fluid: 120 mL  Total IN: 120 mL    OUT:  Total OUT: 0 mL    Total NET: 120 mL      2017 07:01  -  2017 10:56  --------------------------------------------------------  IN:    sodium chloride 0.45%: 495 mL  Total IN: 495 mL    OUT:  Total OUT: 0 mL    Total NET: 495 mL          LABS:                        9.4    5.8   )-----------( 170      ( 2017 05:26 )             29.2     -    140  |  109<H>  |  46<H>  ----------------------------<  409<H>  5.1   |  25  |  1.72<H>    Ca    9.1      2017 09:34    TPro  6.9  /  Alb  3.7  /  TBili  0.6  /  DBili  x   /  AST  14<L>  /  ALT  20  /  AlkPhos  120      CARDIAC MARKERS ( 2017 16:01 )  <0.015 ng/mL / x     / x     / x     / x              I&O's Summary    2017 07:  -  2017 07:00  --------------------------------------------------------  IN: 120 mL / OUT: 0 mL / NET: 120 mL    2017 07:  -  2017 10:56  --------------------------------------------------------  IN: 495 mL / OUT: 0 mL / NET: 495 mL      BNPSerum Pro-Brain Natriuretic Peptide: 2969 pg/mL ( @ 16:01)    RADIOLOGY & ADDITIONAL STUDIES:    < from: CT Chest No Cont (17 @ 18:52) >  EXAM:  CT CHEST                            PROCEDURE DATE:  2017          INTERPRETATION:  CT CHEST    HISTORY:  Shortness of breath                     TECHNIQUE: Noncontrast CT of the chest was performed. Coronal and   sagittal images were reconstructed. This study was performed using   automatic exposure control (radiation dose reduction software) to obtain   a diagnostic image quality scan with patient dose as low as reasonably   achievable.    COMPARISON: Chest x-ray 1 hour prior, chest CT 3/5/2013    FINDINGS:    LUNGS, AIRWAYS: The central airways are patent. Prior left lower lobe   wedge resection. Diffuse bilateral groundglass opacities and septal   thickening.    PLEURA: Small bilateral pleural effusions.    HEART AND VESSELS: Mild cardiomegaly. No pericardial effusion. Extensive   coronary artery, aortic valve, and mitral annular calcification. Normal   caliber of the thoracic aorta. Main pulmonary artery is enlarged up to   3.9 cm.    MEDIASTINUM AND LAVELL: Mild adenopathy, likely reactive.    UPPER ABDOMEN: Limited visualization shows cholecystectomy clips and   diffuse arterial vascular calcification.    BONES AND SOFT TISSUES: No acute bony abnormality.    IMPRESSION:     Pulmonary edema and small bilateral pleural effusions.                SAMEERA GIBBS   This document has been electronically signed. 2017  7:22PM                < end of copied text >  Blood Gas Profile - Arterial (17 @ 16:40)    pH, Arterial: 7.39    pCO2, Arterial: 42 mmHg    pO2, Arterial: 66    HCO3, Arterial: 25 mmoL/L    Base Excess, Arterial: 0 mmol/L    Oxygen Saturation, Arterial: 92    Blood Gas Comments Arterial: 5 LPM    Blood Gas Source Arterial: Arterial

## 2017-07-22 NOTE — CONSULT NOTE ADULT - ASSESSMENT
SOB/ TINEO and profound hypoxia- she clinically does not appear volume overloaded to the degree that it would cause O2 sat of 60%.  Her creatinine is elevated and so her proBNP.  She has hx of Chronic HFpEF.     Will hold lasix for now given her renal insufficiency.  Please evaluate for other etiologies for hypoxia like PE, COPD.  Venous doppler being ordered by Dr Jackson.  Discussed with Dr Kumar.  Continue O2 supplemenation.    HTN- BP noted to be high persistently and so will start her on low dose cardizem which would help her PVCs as well.  Had orthostatic hypotension in past and needs close monitoring.    Ventricular ectopy- Potassium and magnesium levels are optimal in the last 2 days.  Cardizem to be started as stated above.    CAD, NSTEMI in past- will continue ASA and statin.    Other medical issues- hypoxia, COPD, CKD, anemia, DM- Management pre primary team.    Thank you for allowing me to participate in the care of this patient. Please feel free to contact me with any questions.

## 2017-07-22 NOTE — PROGRESS NOTE ADULT - SUBJECTIVE AND OBJECTIVE BOX
Pt seen earlier this morning with daughter at bedside.  Pt feels better, less SOB, no CP.    Tele- nsr to sinus tach.    Vital Signs Last 24 Hrs  T(C): 36.4 (2017 05:27), Max: 36.6 (2017 21:30)  T(F): 97.6 (2017 05:27), Max: 97.9 (2017 21:30)  HR: 93 (2017 14:22) (78 - 95)  BP: 162/40 (2017 05:27) (162/40 - 169/114)  BP(mean): 73 (2017 05:27) (73 - 73)  RR: 20 (2017 21:30) (20 - 20)  SpO2: 94% (2017 14:22) (92% - 99%)    Daily     Daily Weight in k.9 (2017 21:30)    I&O's Detail    2017 07:01  -  2017 07:00  --------------------------------------------------------  IN:    Oral Fluid: 120 mL  Total IN: 120 mL    OUT:  Total OUT: 0 mL    Total NET: 120 mL      2017 07:01  -  2017 16:44  --------------------------------------------------------  IN:    sodium chloride 0.45%: 495 mL  Total IN: 495 mL    OUT:  Total OUT: 0 mL    Total NET: 495 mL          CAPILLARY BLOOD GLUCOSE  336 (2017 22:04)          ABG - ( 2017 16:40 )  pH: 7.39  /  pCO2: 42    /  pO2: 66    / HCO3: 25    / Base Excess: 0     /  SaO2: 92                  CARDIAC MARKERS ( 2017 16:01 )  <0.015 ng/mL / x     / x     / x     / x                                  9.4    5.8   )-----------( 170      ( 2017 05:26 )             29.2       07-    140  |  109<H>  |  46<H>  ----------------------------<  409<H>  5.1   |  25  |  1.72<H>    Ca    9.1      2017 09:34    TPro  6.9  /  Alb  3.7  /  TBili  0.6  /  DBili  x   /  AST  14<L>  /  ALT  20  /  AlkPhos  120  07-21          LIVER FUNCTIONS - ( 2017 16:01 )  Alb: 3.7 g/dL / Pro: 6.9 gm/dL / ALK PHOS: 120 U/L / ALT: 20 U/L / AST: 14 U/L / GGT: x                     MEDICATIONS  (STANDING):  pregabalin 150 milliGRAM(s) Oral at bedtime  citalopram 10 milliGRAM(s) Oral daily  atorvastatin 20 milliGRAM(s) Oral at bedtime  montelukast 10 milliGRAM(s) Oral at bedtime  pantoprazole    Tablet 40 milliGRAM(s) Oral before breakfast  levothyroxine 112 MICROGram(s) Oral daily  folic acid 1 milliGRAM(s) Oral daily  cholecalciferol 1000 Unit(s) Oral daily  aspirin  chewable 81 milliGRAM(s) Oral daily  heparin  Injectable 5000 Unit(s) SubCutaneous every 12 hours  insulin glargine Injectable (LANTUS) 40 Unit(s) SubCutaneous at bedtime  insulin lispro (HumaLOG) corrective regimen sliding scale   SubCutaneous three times a day before meals  insulin lispro (HumaLOG) corrective regimen sliding scale   SubCutaneous at bedtime  dextrose 5%. 1000 milliLiter(s) (50 mL/Hr) IV Continuous <Continuous>  dextrose 50% Injectable 12.5 Gram(s) IV Push once  dextrose 50% Injectable 25 Gram(s) IV Push once  dextrose 50% Injectable 25 Gram(s) IV Push once  ALBUTerol/ipratropium for Nebulization 3 milliLiter(s) Nebulizer every 6 hours  cefepime  IVPB 1000 milliGRAM(s) IV Intermittent every 12 hours  cefepime  IVPB   IV Intermittent   methylPREDNISolone sodium succinate Injectable 40 milliGRAM(s) IV Push every 8 hours  insulin lispro Injectable (HumaLOG) 14 Unit(s) SubCutaneous three times a day before meals    MEDICATIONS  (PRN):  dextrose Gel 1 Dose(s) Oral once PRN Blood Glucose LESS THAN 70 milliGRAM(s)/deciliter  glucagon  Injectable 1 milliGRAM(s) IntraMuscular once PRN Glucose LESS THAN 70 milligrams/deciliter

## 2017-07-22 NOTE — PROGRESS NOTE ADULT - ASSESSMENT
Pt is a 75 y/o female with h/o type II diabetes, HTN, chronic UTI's, stage 3 CKD, COPD with chronic hypoxic respiratory failure (refuses to wear O2) ,who is know to me from a recent admission at  for syncope, ANI on stage 3 CKD, Rt clavicle fracture and bradycardia.  Since than she had been doing okay except for the past few days started to develop increasing SOB with TINEO.  She went to Dr Forte today and sats 60-70's, sent to ER.  Presently O2 sats 92% on 4L NC, presently no SOB.  Of note she was recently started on lasix for lower ext edema.  She is being admitted for acute hypoxic respiratory failure.    * Acute Hypoxic respiratory failure-suspect due to ACOPD exacerbation with possible RLL pneumonia over atelesis given acute decline ? diastolic CHF ? PE (less likely).  Input from all consultants noted, monitor, O2, IV solumedrol, IV cefepime, nebs, monitor response, f/u dopplers.  * HTN- cardizem.  * ANI on stage 3 CKD- ? pre-renal from poor po intake in setting of lasix, renal following, monitor, hyperkalemia improved.  * Recent Rt Clavicle Facture supportive care with sling, pain control, PT   * Hypothyroidism continue synthroid  * Type II diabetes- uncontrolled due to steroids, continue lantus, add humalog pre-meal along with ISS for now    * Disp-OOB, ambulate with PT.  * Proph- heparin  * Comm- d/w pt and daughter in details, Dr Grijalva

## 2017-07-22 NOTE — CONSULT NOTE ADULT - SUBJECTIVE AND OBJECTIVE BOX
Chief Complaint: Shortness of breath    HPI: Pt is a 75 y/o female with h/o type II diabetes, HTN, chronic UTI's, stage 3 CKD, COPD with chronic hypoxic respiratory failure with a recent admission to  for syncope, who saw Dr. Forte in the office yesterday with c/o progressive dyspnea over the past week. She was sent to the ER from the office because of a low O2 sat. The patient was started on Lasix @ 1 week PTA by Dr. Cutler as she was noted  to have developed bilateral leg edema. She denies having a fever BUT was c/o feeling cold. She denies chest pain.       : Patient is presently breathing comfortably. She feels better since admission and treatment with steroids, antibiotics, and nebulized bronchodilators.        Vital Signs Last 24 Hrs  T(C): --  T(F): --  HR: 70 (2017 15:40) (70 - 70)  BP: 179/65 (2017 15:40) (179/65 - 179/65)  BP(mean): --  RR: 18 (2017 15:40) (18 - 18)  SpO2: 92% (2017 15:40) (92% - 92%)    I&O's Detail      Daily Height in cm: 157.48 (2017 15:40)    Daily     CARDIAC MARKERS ( 2017 16:01 )  <0.015 ng/mL / x     / x     / x     / x            ABG - ( 2017 16:40 )  pH: 7.39  /  pCO2: 42    /  pO2: 66    / HCO3: 25    / Base Excess: 0     /  SaO2: 92            LABS:                            11.0   8.0   )-----------( 200      ( 2017 16:01 )             33.4       07-21    144  |  111<H>  |  41<H>  ----------------------------<  214<H>  5.0   |  28  |  1.62<H>    Ca    9.3      2017 16:01    TPro  6.9  /  Alb  3.7  /  TBili  0.6  /  DBili  x   /  AST  14<L>  /  ALT  20  /  AlkPhos  120  07-      LIVER FUNCTIONS - ( 2017 16:01 )  Alb: 3.7 g/dL / Pro: 6.9 gm/dL / ALK PHOS: 120 U/L / ALT: 20 U/L / AST: 14 U/L / GGT: x                       MEDICATIONS  (STANDING):    MEDICATIONS  (PRN): (2017 19:26)      PAST MEDICAL & SURGICAL HISTORY:  Hyperlipidemia, unspecified hyperlipidemia type  Essential hypertension  Neuropathy  Diabetes  Chronic obstructive pulmonary disease, unspecified COPD type  History of total knee replacement, unspecified laterality  H/O hernia repair   delivery delivered  S/P appendectomy  CKD  Chronic dementia    REVIEW OF SYSTEMS:    CONSTITUTIONAL: No weakness, fevers or chills  EYES/ENT: No visual changes;  No vertigo or throat pain   NECK: No pain or stiffness  RESPIRATORY: No cough, wheezing, hemoptysis; No shortness of breath  CARDIOVASCULAR: No chest pain or palpitations  GASTROINTESTINAL: No abdominal or epigastric pain. No nausea, vomiting, or hematemesis; No diarrhea or constipation. No melena or hematochezia.  GENITOURINARY: No dysuria, frequency or hematuria  NEUROLOGICAL: No numbness or weakness  SKIN: No itching, burning, rashes, or lesions   All other review of systems is negative unless indicated above    Vital Signs Last 24 Hrs  T(C): 36.4 (2017 05:27), Max: 36.6 (2017 21:30)  T(F): 97.6 (2017 05:27), Max: 97.9 (2017 21:30)  HR: 80 (2017 08:28) (70 - 92)  BP: 162/40 (2017 05:27) (162/40 - 179/65)  BP(mean): 73 (2017 05:27) (73 - 73)  RR: 20 (2017 21:30) (18 - 20)  SpO2: 99% (2017 08:30) (92% - 99%)    I&O's Summary    2017 07:01  -  2017 07:00  --------------------------------------------------------  IN: 120 mL / OUT: 0 mL / NET: 120 mL    2017 07:01  -  2017 10:44  --------------------------------------------------------  IN: 495 mL / OUT: 0 mL / NET: 495 mL        PHYSICAL EXAM:    Constitutional: NAD, awake and alert, well-developed  Neck: Soft and supple, No LAD, No JVD  Respiratory: Breath sounds are equal bilaterally Decreased at bases. No wheezing. Crackles at bases.   Cardiovascular: S1 and S2  Gastrointestinal: Bowel Sounds present, soft, nontender, nondistended, no guarding, no rebound  Extremities: No peripheral edema  Neurological: A/O x 3, no focal deficits  Skin: No rashes    Medications:  MEDICATIONS  (STANDING):  pregabalin 150 milliGRAM(s) Oral at bedtime  citalopram 10 milliGRAM(s) Oral daily  atorvastatin 20 milliGRAM(s) Oral at bedtime  montelukast 10 milliGRAM(s) Oral at bedtime  pantoprazole    Tablet 40 milliGRAM(s) Oral before breakfast  levothyroxine 112 MICROGram(s) Oral daily  folic acid 1 milliGRAM(s) Oral daily  cholecalciferol 1000 Unit(s) Oral daily  aspirin  chewable 81 milliGRAM(s) Oral daily  heparin  Injectable 5000 Unit(s) SubCutaneous every 12 hours  insulin glargine Injectable (LANTUS) 40 Unit(s) SubCutaneous at bedtime  insulin lispro (HumaLOG) corrective regimen sliding scale   SubCutaneous three times a day before meals  insulin lispro (HumaLOG) corrective regimen sliding scale   SubCutaneous at bedtime  dextrose 5%. 1000 milliLiter(s) (50 mL/Hr) IV Continuous <Continuous>  dextrose 50% Injectable 12.5 Gram(s) IV Push once  dextrose 50% Injectable 25 Gram(s) IV Push once  dextrose 50% Injectable 25 Gram(s) IV Push once  ALBUTerol/ipratropium for Nebulization 3 milliLiter(s) Nebulizer every 6 hours  cefepime  IVPB 1000 milliGRAM(s) IV Intermittent every 12 hours  cefepime  IVPB   IV Intermittent   methylPREDNISolone sodium succinate Injectable 40 milliGRAM(s) IV Push every 8 hours      Labs: All Labs Reviewed:                        9.4    5.8   )-----------( 170      ( 2017 05:26 )             29.2     07-    140  |  109<H>  |  46<H>  ----------------------------<  409<H>  5.1   |  25  |  1.72<H>    Ca    9.1      2017 09:34    TPro  6.9  /  Alb  3.7  /  TBili  0.6  /  DBili  x   /  AST  14<L>  /  ALT  20  /  AlkPhos  120  07-21      CARDIAC MARKERS ( 2017 16:01 )  <0.015 ng/mL / x     / x     / x     / x          Blood Culture:     RADIOLOGY:      EXAM:  CT CHEST                            PROCEDURE DATE:  2017          INTERPRETATION:  CT CHEST    HISTORY:  Shortness of breath                     TECHNIQUE: Noncontrast CT of the chest was performed. Coronal and   sagittal images were reconstructed. This study was performed using   automatic exposure control (radiation dose reduction software) to obtain   a diagnostic image quality scan with patient dose as low as reasonably   achievable.    COMPARISON: Chest x-ray 1 hour prior, chest CT 3/5/2013    FINDINGS:    LUNGS, AIRWAYS: The central airways are patent. Prior left lower lobe   wedge resection. Diffuse bilateral groundglass opacities and septal   thickening.    PLEURA: Small bilateral pleural effusions.    HEART AND VESSELS: Mild cardiomegaly. No pericardial effusion. Extensive   coronary artery, aortic valve, and mitral annular calcification. Normal   caliber of the thoracic aorta. Main pulmonary artery is enlarged up to   3.9 cm.    MEDIASTINUM AND LAVELL: Mild adenopathy, likely reactive.    UPPER ABDOMEN: Limited visualization shows cholecystectomy clips and   diffuse arterial vascular calcification.    BONES AND SOFT TISSUES: No acute bony abnormality.    IMPRESSION:     Pulmonary edema and small bilateral pleural effusions.              Assessment/Plan:  1. Hypoxia.    2. COPD exacerbation.     3. CKD.    PLAN: Continue steroids, antibiotics, nebulized bronchodilators for COPD exacerbation. Fluid management per cardiology, nephrology. O2 supplement. Will order doppler studies of legs to R/O DVT; cannot have CTA because of CKD. My clinical suspicion for PE is low. Will not start full dose anticoagulation unless DVT is positive. Continue DVT prophylaxis. Any additional evaluation for PE will depend upon the clinical course.     Discussed with daughter at bedside.     Discussed plan with Dr. Egan.       Time Span: 60 minutes.

## 2017-07-23 LAB
ANION GAP SERPL CALC-SCNC: 7 MMOL/L — SIGNIFICANT CHANGE UP (ref 5–17)
BUN SERPL-MCNC: 58 MG/DL — HIGH (ref 7–23)
CALCIUM SERPL-MCNC: 9.3 MG/DL — SIGNIFICANT CHANGE UP (ref 8.5–10.1)
CHLORIDE SERPL-SCNC: 107 MMOL/L — SIGNIFICANT CHANGE UP (ref 96–108)
CO2 SERPL-SCNC: 26 MMOL/L — SIGNIFICANT CHANGE UP (ref 22–31)
CREAT SERPL-MCNC: 1.8 MG/DL — HIGH (ref 0.5–1.3)
GLUCOSE SERPL-MCNC: 387 MG/DL — HIGH (ref 70–99)
HCT VFR BLD CALC: 30.3 % — LOW (ref 34.5–45)
HGB BLD-MCNC: 9.6 G/DL — LOW (ref 11.5–15.5)
MCHC RBC-ENTMCNC: 29.3 PG — SIGNIFICANT CHANGE UP (ref 27–34)
MCHC RBC-ENTMCNC: 31.8 GM/DL — LOW (ref 32–36)
MCV RBC AUTO: 92 FL — SIGNIFICANT CHANGE UP (ref 80–100)
PLATELET # BLD AUTO: 176 K/UL — SIGNIFICANT CHANGE UP (ref 150–400)
POTASSIUM SERPL-MCNC: 5.4 MMOL/L — HIGH (ref 3.5–5.3)
POTASSIUM SERPL-SCNC: 5.4 MMOL/L — HIGH (ref 3.5–5.3)
RBC # BLD: 3.29 M/UL — LOW (ref 3.8–5.2)
RBC # FLD: 15.3 % — HIGH (ref 10.3–14.5)
SODIUM SERPL-SCNC: 140 MMOL/L — SIGNIFICANT CHANGE UP (ref 135–145)
WBC # BLD: 16 K/UL — HIGH (ref 3.8–10.5)
WBC # FLD AUTO: 16 K/UL — HIGH (ref 3.8–10.5)

## 2017-07-23 RX ORDER — INSULIN GLARGINE 100 [IU]/ML
50 INJECTION, SOLUTION SUBCUTANEOUS AT BEDTIME
Qty: 0 | Refills: 0 | Status: DISCONTINUED | OUTPATIENT
Start: 2017-07-23 | End: 2017-07-24

## 2017-07-23 RX ORDER — INSULIN LISPRO 100/ML
17 VIAL (ML) SUBCUTANEOUS
Qty: 0 | Refills: 0 | Status: DISCONTINUED | OUTPATIENT
Start: 2017-07-23 | End: 2017-07-30

## 2017-07-23 RX ORDER — SODIUM POLYSTYRENE SULFONATE 4.1 MEQ/G
30 POWDER, FOR SUSPENSION ORAL ONCE
Qty: 0 | Refills: 0 | Status: COMPLETED | OUTPATIENT
Start: 2017-07-23 | End: 2017-07-23

## 2017-07-23 RX ORDER — FUROSEMIDE 40 MG
80 TABLET ORAL ONCE
Qty: 0 | Refills: 0 | Status: COMPLETED | OUTPATIENT
Start: 2017-07-23 | End: 2017-07-23

## 2017-07-23 RX ADMIN — Medication 3 MILLILITER(S): at 20:18

## 2017-07-23 RX ADMIN — Medication 5: at 18:05

## 2017-07-23 RX ADMIN — Medication 80 MILLIGRAM(S): at 17:58

## 2017-07-23 RX ADMIN — Medication 150 MILLIGRAM(S): at 21:36

## 2017-07-23 RX ADMIN — SODIUM POLYSTYRENE SULFONATE 30 GRAM(S): 4.1 POWDER, FOR SUSPENSION ORAL at 14:40

## 2017-07-23 RX ADMIN — HEPARIN SODIUM 5000 UNIT(S): 5000 INJECTION INTRAVENOUS; SUBCUTANEOUS at 05:22

## 2017-07-23 RX ADMIN — Medication 17 UNIT(S): at 12:08

## 2017-07-23 RX ADMIN — Medication 6: at 08:29

## 2017-07-23 RX ADMIN — Medication 3 MILLILITER(S): at 08:23

## 2017-07-23 RX ADMIN — HEPARIN SODIUM 5000 UNIT(S): 5000 INJECTION INTRAVENOUS; SUBCUTANEOUS at 18:06

## 2017-07-23 RX ADMIN — CEFEPIME 100 MILLIGRAM(S): 1 INJECTION, POWDER, FOR SOLUTION INTRAMUSCULAR; INTRAVENOUS at 05:19

## 2017-07-23 RX ADMIN — Medication 40 MILLIGRAM(S): at 21:37

## 2017-07-23 RX ADMIN — Medication 6: at 12:08

## 2017-07-23 RX ADMIN — Medication 17 UNIT(S): at 18:05

## 2017-07-23 RX ADMIN — INSULIN GLARGINE 50 UNIT(S): 100 INJECTION, SOLUTION SUBCUTANEOUS at 21:39

## 2017-07-23 RX ADMIN — PANTOPRAZOLE SODIUM 40 MILLIGRAM(S): 20 TABLET, DELAYED RELEASE ORAL at 05:19

## 2017-07-23 RX ADMIN — Medication 81 MILLIGRAM(S): at 11:22

## 2017-07-23 RX ADMIN — Medication 112 MICROGRAM(S): at 05:19

## 2017-07-23 RX ADMIN — CITALOPRAM 10 MILLIGRAM(S): 10 TABLET, FILM COATED ORAL at 11:23

## 2017-07-23 RX ADMIN — CEFEPIME 100 MILLIGRAM(S): 1 INJECTION, POWDER, FOR SOLUTION INTRAMUSCULAR; INTRAVENOUS at 17:57

## 2017-07-23 RX ADMIN — Medication 40 MILLIGRAM(S): at 05:22

## 2017-07-23 RX ADMIN — Medication 1 MILLIGRAM(S): at 11:22

## 2017-07-23 RX ADMIN — Medication 1000 UNIT(S): at 11:22

## 2017-07-23 RX ADMIN — Medication 3 MILLILITER(S): at 02:02

## 2017-07-23 RX ADMIN — Medication 40 MILLIGRAM(S): at 14:40

## 2017-07-23 RX ADMIN — Medication 14 UNIT(S): at 08:29

## 2017-07-23 RX ADMIN — MONTELUKAST 10 MILLIGRAM(S): 4 TABLET, CHEWABLE ORAL at 21:36

## 2017-07-23 RX ADMIN — ATORVASTATIN CALCIUM 20 MILLIGRAM(S): 80 TABLET, FILM COATED ORAL at 21:36

## 2017-07-23 RX ADMIN — Medication 4: at 21:37

## 2017-07-23 NOTE — PROGRESS NOTE ADULT - ASSESSMENT
Pt is a 77 y/o female with h/o type II diabetes, HTN, chronic UTI's, stage 3 CKD, COPD with chronic hypoxic respiratory failure (refuses to wear O2) ,who is know to me from a recent admission at  for syncope, ANI on stage 3 CKD, Rt clavicle fracture and bradycardia.  Since than she had been doing okay except for the past few days started to develop increasing SOB with TINEO.  She went to Dr Forte today and sats 60-70's, sent to ER.  Presently O2 sats 92% on 4L NC, presently no SOB.  Of note she was recently started on lasix for lower ext edema.  She is being admitted for acute hypoxic respiratory failure.    * Acute Hypoxic respiratory failure-suspect due to ACOPD exacerbation with possible RLL pneumonia over atelesis given acute decline ? PE dopplers negative and mildly elevated d-dimer.  Input from all consultants noted, monitor, O2, IV solumedrol, IV cefepime, nebs, monitor response.  She is still dyspnic, order V/Q scan although I feel this is predominately COPD exacerbation.   * HTN- bp remains high, increase Cardizem, titrate further as needed  * ANI on stage 3 CKD- ? pre-renal from poor po intake in setting of lasix, renal following, monitor ? IVF since she has poor po intake.  * Recent Rt Clavicle Facture supportive care with sling, pain control, PT   * Hypothyroidism continue synthroid  * Type II diabetes- uncontrolled due to steroids, increase lantus, and pre meal humalog, continue ISS, titrate further as needed.    * Disp-OOB, ambulate with PT.  * Proph- heparin  * Comm- d/w pt and daughter in details, all questions answered, Dr Grijalva

## 2017-07-23 NOTE — PROGRESS NOTE ADULT - ASSESSMENT
75 yo woman  with COPD, and CKD admitted with resp insuff.   Pt was recently started on diuretic which may explain increase in creat on admission.  However CT scan c/w pulmonary edema.  Therefore, will need to evaluate for possible cardiac decompensation to explain recent event of ANI and CHF.  Will d/c further IVF.  Since resp status has stabilized, will hold off on adding diuretics until cardiology evaluation done.  Hyperkalemia of unclear reason.  Recheck to see if rx needed.    7/23 SY  --NAI/CKD    Unclear baseline fx at this point,  Renal parameters are quite variable.  Unclear if due to hyperglycemia as well.  --Resp insuff is persistent and very limiting in terms of pt's fx.   Therefore, will give a trial of IV lasix.     If resp status responds to diuresis, then will need to accept increased creat and continue ciuretics.  --Will check to see pt is able to fully able to empty bladder.

## 2017-07-23 NOTE — PROGRESS NOTE ADULT - SUBJECTIVE AND OBJECTIVE BOX
NEPHROLOGY INTERVAL HPI/OVERNIGHT EVENTS:  7/23 SY  Appearing fairly comfortable.  However reports significant TINEO with just walking to bathroom this am.    HPI:  75 yo woman with multiple medical problems including CKD (with baseline creat 1.2-1.4) and COPD admitted with complaints of SOB for several days.  Noted to be hypoxic in Dr Forte's office and sent to ER.   Pt apparently was started on Lasix for worsening LE edema. by Dr Cutler last week.  Pt had not been on diuretic therapy for sometime until started by Dr Cutler.  Pt also had her Amlodipine dose reduced since LE edema increased after pt's meds were adjusted during previous hosp.  On admission. noted with worsened renal fx and has been started on gentle hydration.      PMHX and PSHX.  1.CKD (baseline creat 1.2-1.4)  2.CAD  3.COPD  4.Hypothyroidism  5.Anemia  6.Hx of Depression and Anxiety  7.HTN  8.Recurrent UTIs  9.Syncope--recent hosp.      MEDICATIONS  (STANDING):  pregabalin 150 milliGRAM(s) Oral at bedtime  citalopram 10 milliGRAM(s) Oral daily  atorvastatin 20 milliGRAM(s) Oral at bedtime  montelukast 10 milliGRAM(s) Oral at bedtime  pantoprazole    Tablet 40 milliGRAM(s) Oral before breakfast  levothyroxine 112 MICROGram(s) Oral daily  folic acid 1 milliGRAM(s) Oral daily  cholecalciferol 1000 Unit(s) Oral daily  aspirin  chewable 81 milliGRAM(s) Oral daily  heparin  Injectable 5000 Unit(s) SubCutaneous every 12 hours  insulin lispro (HumaLOG) corrective regimen sliding scale   SubCutaneous three times a day before meals  insulin lispro (HumaLOG) corrective regimen sliding scale   SubCutaneous at bedtime  dextrose 5%. 1000 milliLiter(s) (50 mL/Hr) IV Continuous <Continuous>  dextrose 50% Injectable 12.5 Gram(s) IV Push once  dextrose 50% Injectable 25 Gram(s) IV Push once  dextrose 50% Injectable 25 Gram(s) IV Push once  ALBUTerol/ipratropium for Nebulization 3 milliLiter(s) Nebulizer every 6 hours  cefepime  IVPB 1000 milliGRAM(s) IV Intermittent every 12 hours  cefepime  IVPB   IV Intermittent   methylPREDNISolone sodium succinate Injectable 40 milliGRAM(s) IV Push every 8 hours  insulin glargine Injectable (LANTUS) 50 Unit(s) SubCutaneous at bedtime  insulin lispro Injectable (HumaLOG) 17 Unit(s) SubCutaneous three times a day before meals  diltiazem    Tablet 60 milliGRAM(s) Oral four times a day    MEDICATIONS  (PRN):  dextrose Gel 1 Dose(s) Oral once PRN Blood Glucose LESS THAN 70 milliGRAM(s)/deciliter  glucagon  Injectable 1 milliGRAM(s) IntraMuscular once PRN Glucose LESS THAN 70 milligrams/deciliter          Vital Signs Last 24 Hrs  T(C): 36.5 (23 Jul 2017 10:30), Max: 36.7 (22 Jul 2017 17:10)  T(F): 97.7 (23 Jul 2017 10:30), Max: 98.1 (22 Jul 2017 17:10)  HR: 73 (23 Jul 2017 10:30) (73 - 92)  BP: 160/40 (23 Jul 2017 10:30) (153/69 - 179/51)  BP(mean): --  RR: 17 (23 Jul 2017 10:30) (17 - 20)  SpO2: 95% (23 Jul 2017 10:30) (91% - 96%)  Daily     Daily     07-22 @ 07:01  -  07-23 @ 07:00  --------------------------------------------------------  IN: 545 mL / OUT: 0 mL / NET: 545 mL        PHYSICAL EXAM: Alert and appropriate  GENERAL: No apparent distress  CHEST/LUNG: Bibasilar scattered rhonchi  HEART: S1S2 RRR  ABDOMEN: soft  EXTREMITIES: trace edema  SKIN:     LABS:                        9.6    16.0  )-----------( 176      ( 23 Jul 2017 12:28 )             30.3     07-23    140  |  107  |  58<H>  ----------------------------<  387<H>  5.4<H>   |  26  |  1.80<H>    Ca    9.3      23 Jul 2017 05:42    TPro  6.9  /  Alb  3.7  /  TBili  0.6  /  DBili  x   /  AST  14<L>  /  ALT  20  /  AlkPhos  120  07-21          ABG - ( 21 Jul 2017 16:40 )  pH: 7.39  /  pCO2: 42    /  pO2: 66    / HCO3: 25    / Base Excess: 0     /  SaO2: 92                    RADIOLOGY & ADDITIONAL TESTS:

## 2017-07-23 NOTE — PROGRESS NOTE ADULT - SUBJECTIVE AND OBJECTIVE BOX
OLI ESCALERA  MRN: [id number]    S: Chief Complaint: Shortness of breath    HPI: Pt is a 75 y/o female with h/o type II diabetes, HTN, chronic UTI's, stage 3 CKD, COPD with chronic hypoxic respiratory failure with a recent admission to  for syncope, who saw Dr. Forte in the office yesterday with c/o progressive dyspnea over the past week. She was sent to the ER from the office because of a low O2 sat. The patient was started on Lasix @ 1 week PTA by Dr. Cutler as she was noted  to have developed bilateral leg edema. She denies having a fever BUT was c/o feeling cold. She denies chest pain.       : Patient is presently breathing comfortably. She feels better since admission and treatment with steroids, antibiotics, and nebulized bronchodilators.       : Comfortable at rest. Still dyspneic with exertion BUT feels as though there has been some improvement since admission. Daughter agrees.     PAST MEDICAL & SURGICAL HISTORY:  Hyperlipidemia, unspecified hyperlipidemia type  Essential hypertension  Neuropathy  Diabetes  Chronic obstructive pulmonary disease, unspecified COPD type  History of total knee replacement, unspecified laterality  H/O hernia repair   delivery delivered  S/P appendectomy      O: T(C): 36.5 (17 @ 10:30), Max: 36.7 (17 @ 17:10)  HR: 73 (17 @ 10:30) (73 - 95)  BP: 160/40 (17 @ 10:30) (153/69 - 179/51)  RR: 17 (17 @ 10:30) (17 - 20)  SpO2: 95% (17 @ 10:30) (91% - 96%)  Wt(kg): --    PHYSICAL EXAM:      GENERAL: comfortable at rest    NEURO: awake/alert    NECK: No JVD    CHEST: DISTANT    CARDIAC: RR    EXT: trace edema    ABG - ( 2017 16:40 )  pH: 7.39  /  pCO2: 42    /  pO2: 66    / HCO3: 25    / Base Excess: 0     /  SaO2: 92            LABS:    D-Dimer Assay, Quantitative (17 @ 16:01)    D-Dimer Assay, Quantitative: 328: D-Dimer result less than 230 ng/mL DDU correlates with the absence of  thrombosis in a patient with a low and moderate       pre-test probability of thrombosis.      Serum Pro-Brain Natriuretic Peptide (17 @ 16:01)    Serum Pro-Brain Natriuretic Peptide: 2969 pg/mL                            9.4    5.8   )-----------( 170      ( 2017 05:26 )             29.2           140  |  107  |  58<H>  ----------------------------<  387<H>  5.4<H>   |  26  |  1.80<H>    Ca    9.3      2017 05:42    TPro  6.9  /  Alb  3.7  /  TBili  0.6  /  DBili  x   /  AST  14<L>  /  ALT  20  /  AlkPhos  120        RADIOLOGY:    EXAM:  CT CHEST                            PROCEDURE DATE:  2017          INTERPRETATION:  CT CHEST    HISTORY:  Shortness of breath                     TECHNIQUE: Noncontrast CT of the chest was performed. Coronal and   sagittal images were reconstructed. This study was performed using   automatic exposure control (radiation dose reduction software) to obtain   a diagnostic image quality scan with patient dose as low as reasonably   achievable.    COMPARISON: Chest x-ray 1 hour prior, chest CT 3/5/2013    FINDINGS:    LUNGS, AIRWAYS: The central airways are patent. Prior left lower lobe   wedge resection. Diffuse bilateral groundglass opacities and septal   thickening.    PLEURA: Small bilateral pleural effusions.    HEART AND VESSELS: Mild cardiomegaly. No pericardial effusion. Extensive   coronary artery, aortic valve, and mitral annular calcification. Normal   caliber of the thoracic aorta. Main pulmonary artery is enlarged up to   3.9 cm.    MEDIASTINUM AND LAVELL: Mild adenopathy, likely reactive.    UPPER ABDOMEN: Limited visualization shows cholecystectomy clips and   diffuse arterial vascular calcification.    BONES AND SOFT TISSUES: No acute bony abnormality.    IMPRESSION:     Pulmonary edema and small bilateral pleural effusions.      EXAM:  US DPLX LWR EXT VEINS COMPL BI                            PROCEDURE DATE:  2017      INTERPRETATION:      Ultrasound of the right and left lower extremity deep venous system         CLINICAL INFORMATION:      Pain and swelling, deep venous thrombosis.    TECHNIQUE:   Doppler ultrasonography of the bilateral lower extremity   deep venous system was performed.  The veins evaluated included the   common femoral vein, the inflow of the greater saphenous vein, the   superficial femoral vein,  the popliteal vein and the posterior tibial   vein in the proximal calf.      FINDINGS:    No previous examinations are available for review.    The bilateral lower extremity deep venous system demonstrated no   abnormality.  The veins werepatent with intact Doppler flow.   This flow   varied with respiration.   Flow augmented with ipsilateral calf   compression. On transverse and longitudinal images no intraluminal   thrombus was found.  The veins were directly compressible by the   ultrasound transducer.            IMPRESSION:   Unremarkable ultrasound of the right and left lower   extremity deep venous system.           LUL THOMPSON M.D., ATTENDING RADIOLOGIST              MEDICATIONS  (STANDING):  pregabalin 150 milliGRAM(s) Oral at bedtime  citalopram 10 milliGRAM(s) Oral daily  atorvastatin 20 milliGRAM(s) Oral at bedtime  montelukast 10 milliGRAM(s) Oral at bedtime  pantoprazole    Tablet 40 milliGRAM(s) Oral before breakfast  levothyroxine 112 MICROGram(s) Oral daily  folic acid 1 milliGRAM(s) Oral daily  cholecalciferol 1000 Unit(s) Oral daily  aspirin  chewable 81 milliGRAM(s) Oral daily  heparin  Injectable 5000 Unit(s) SubCutaneous every 12 hours  insulin lispro (HumaLOG) corrective regimen sliding scale   SubCutaneous three times a day before meals  insulin lispro (HumaLOG) corrective regimen sliding scale   SubCutaneous at bedtime  dextrose 5%. 1000 milliLiter(s) (50 mL/Hr) IV Continuous <Continuous>  dextrose 50% Injectable 12.5 Gram(s) IV Push once  dextrose 50% Injectable 25 Gram(s) IV Push once  dextrose 50% Injectable 25 Gram(s) IV Push once  ALBUTerol/ipratropium for Nebulization 3 milliLiter(s) Nebulizer every 6 hours  cefepime  IVPB 1000 milliGRAM(s) IV Intermittent every 12 hours  cefepime  IVPB   IV Intermittent   methylPREDNISolone sodium succinate Injectable 40 milliGRAM(s) IV Push every 8 hours  insulin glargine Injectable (LANTUS) 50 Unit(s) SubCutaneous at bedtime  insulin lispro Injectable (HumaLOG) 17 Unit(s) SubCutaneous three times a day before meals  diltiazem    Tablet 60 milliGRAM(s) Oral four times a day    MEDICATIONS  (PRN):  dextrose Gel 1 Dose(s) Oral once PRN Blood Glucose LESS THAN 70 milliGRAM(s)/deciliter  glucagon  Injectable 1 milliGRAM(s) IntraMuscular once PRN Glucose LESS THAN 70 milligrams/deciliter        A/P: 1. Hypoxia. Multifactorial. Continue O2 prn. Optimize tx for COPD, HFpEF. Consider possibility of PE. D/W Dr. Egan - although clinical suspicion is low, cannot entirely exclude this possibility. Dopplers of legs negative for PE. D- dimer only slightly elevated. Continue DVT prophylaxis. To have V/Q scan. Ordered by Dr. Egan. Of note, clinical improvement AND oxygenation has improved with treatment with steroids, bronchodilators, and antibiotics.     2. COPD exacerbation. Continue steroids / antibiotics; nebulized bronchodilators. Supplement O2.     3. CKD. Monitor renal function.    4. Hx of diastolic CHF / ASHD. Per cardiology.    D/W daughter at bedside.    D/W Dr. Egan and Dr. Marquez.

## 2017-07-23 NOTE — PROGRESS NOTE ADULT - SUBJECTIVE AND OBJECTIVE BOX
Pt still c/o TINEO on minimal exertion (going to bathroom), bgm high.  No CP or fevers, c/o cough.    Vital Signs Last 24 Hrs  T(C): 36.5 (23 Jul 2017 10:30), Max: 36.7 (22 Jul 2017 17:10)  T(F): 97.7 (23 Jul 2017 10:30), Max: 98.1 (22 Jul 2017 17:10)  HR: 73 (23 Jul 2017 10:30) (73 - 95)  BP: 160/40 (23 Jul 2017 10:30) (153/69 - 179/51)  BP(mean): --  RR: 17 (23 Jul 2017 10:30) (17 - 20)  SpO2: 95% (23 Jul 2017 10:30) (91% - 96%)    Daily     Daily     I&O's Detail    22 Jul 2017 07:01  -  23 Jul 2017 07:00  --------------------------------------------------------  IN:    IV PiggyBack: 50 mL    sodium chloride 0.45%: 495 mL  Total IN: 545 mL    OUT:  Total OUT: 0 mL    Total NET: 545 mL          CAPILLARY BLOOD GLUCOSE  406 (23 Jul 2017 08:28)  412 (22 Jul 2017 21:06)          ABG - ( 21 Jul 2017 16:40 )  pH: 7.39  /  pCO2: 42    /  pO2: 66    / HCO3: 25    / Base Excess: 0     /  SaO2: 92                  CARDIAC MARKERS ( 21 Jul 2017 16:01 )  <0.015 ng/mL / x     / x     / x     / x                                  9.4    5.8   )-----------( 170      ( 22 Jul 2017 05:26 )             29.2       07-23    140  |  107  |  58<H>  ----------------------------<  387<H>  5.4<H>   |  26  |  1.80<H>    Ca    9.3      23 Jul 2017 05:42    TPro  6.9  /  Alb  3.7  /  TBili  0.6  /  DBili  x   /  AST  14<L>  /  ALT  20  /  AlkPhos  120  07-21          LIVER FUNCTIONS - ( 21 Jul 2017 16:01 )  Alb: 3.7 g/dL / Pro: 6.9 gm/dL / ALK PHOS: 120 U/L / ALT: 20 U/L / AST: 14 U/L / GGT: x                     MEDICATIONS  (STANDING):  pregabalin 150 milliGRAM(s) Oral at bedtime  citalopram 10 milliGRAM(s) Oral daily  atorvastatin 20 milliGRAM(s) Oral at bedtime  montelukast 10 milliGRAM(s) Oral at bedtime  pantoprazole    Tablet 40 milliGRAM(s) Oral before breakfast  levothyroxine 112 MICROGram(s) Oral daily  folic acid 1 milliGRAM(s) Oral daily  cholecalciferol 1000 Unit(s) Oral daily  aspirin  chewable 81 milliGRAM(s) Oral daily  heparin  Injectable 5000 Unit(s) SubCutaneous every 12 hours  insulin lispro (HumaLOG) corrective regimen sliding scale   SubCutaneous three times a day before meals  insulin lispro (HumaLOG) corrective regimen sliding scale   SubCutaneous at bedtime  dextrose 5%. 1000 milliLiter(s) (50 mL/Hr) IV Continuous <Continuous>  dextrose 50% Injectable 12.5 Gram(s) IV Push once  dextrose 50% Injectable 25 Gram(s) IV Push once  dextrose 50% Injectable 25 Gram(s) IV Push once  ALBUTerol/ipratropium for Nebulization 3 milliLiter(s) Nebulizer every 6 hours  cefepime  IVPB 1000 milliGRAM(s) IV Intermittent every 12 hours  cefepime  IVPB   IV Intermittent   methylPREDNISolone sodium succinate Injectable 40 milliGRAM(s) IV Push every 8 hours  insulin glargine Injectable (LANTUS) 50 Unit(s) SubCutaneous at bedtime  insulin lispro Injectable (HumaLOG) 17 Unit(s) SubCutaneous three times a day before meals  diltiazem    Tablet 60 milliGRAM(s) Oral four times a day    MEDICATIONS  (PRN):  dextrose Gel 1 Dose(s) Oral once PRN Blood Glucose LESS THAN 70 milliGRAM(s)/deciliter  glucagon  Injectable 1 milliGRAM(s) IntraMuscular once PRN Glucose LESS THAN 70 milligrams/deciliter

## 2017-07-23 NOTE — PROGRESS NOTE ADULT - ASSESSMENT
SOB/ TINEO and profound hypoxia- she clinically does not appear volume overloaded to the degree that it would cause O2 sat of 60%.  Her creatinine is elevated and so her proBNP.  She has hx of Chronic HFpEF.     Will hold lasix for now given her renal insufficiency.  Please evaluate for other etiologies for hypoxia like PE, COPD.  Venous doppler did not reveal any DVT. V/Q scan ordered by primary team and she was being treated for COPD and pneumonia currently.  Her 2 D echo one month ago revealed LVH with normal LVEF and moderate TR.  Discussed with Dr Kumar.  Continue O2 supplementation.    HTN- cardizem po to continue for now.     Ventricular ectopy- Potassium and magnesium levels are optimal in the last 2 days.  Infact will give kayexelate for hyperkalemia today.  Cardizem  started as stated above.    CAD, NSTEMI in past- will continue ASA and statin.    Other medical issues- hypoxia, COPD, CKD, anemia, DM- Management pre primary team.    Thank you for allowing me to participate in the care of this patient. Please feel free to contact me with any questions.

## 2017-07-23 NOTE — PROGRESS NOTE ADULT - SUBJECTIVE AND OBJECTIVE BOX
Patient is a 76y old  Female who presents with a chief complaint of I can't breath.    HPI:  Pt is a 75 y/o female with h/o type II diabetes, HTN, chronic UTI's, stage 3 CKD, COPD with chronic hypoxic respiratory failure (refuses to wear O2) , ANI on stage 3 CKD, Rt clavicle fracture.  For the past few days started to develop increasing SOB with TINEO.  She was seen by Dr Forte and O2  sat noted to be  60-70's and was sent to ER.      She had prior cardiac hx of NSTEMI and abnormal stress test in   showing small sized fixed defect in apical area .    She was having pedal edema few months ago and her lasix was adjusted to 40mg po daily instead of 20mg po BID and her pedal edema resolved.  She has very poor intake of fluids in day time and partly her dementia is playing a role with compliance.     - pt seen and examined. her daughter is at bedside this am.  Pt c/o TINEO with walking few steps but no SOB at rest.     Vital Signs Last 24 Hrs  T(C): --  T(F): --  HR: 70 (2017 15:40) (70 - 70)  BP: 179/65 (2017 15:40) (179/65 - 179/65)  BP(mean): --  RR: 18 (2017 15:40) (18 - 18)  SpO2: 92% (2017 15:40) (92% - 92%)    I&O's Detail      Daily Height in cm: 157.48 (2017 15:40)    Daily     CARDIAC MARKERS ( 2017 16:01 )  <0.015 ng/mL / x     / x     / x     / x            ABG - ( 2017 16:40 )  pH: 7.39  /  pCO2: 42    /  pO2: 66    / HCO3: 25    / Base Excess: 0     /  SaO2: 92                                        11.0   8.0   )-----------( 200      ( 2017 16:01 )             33.4       07-    144  |  111<H>  |  41<H>  ----------------------------<  214<H>  5.0   |  28  |  1.62<H>    Ca    9.3      2017 16:01    TPro  6.9  /  Alb  3.7  /  TBili  0.6  /  DBili  x   /  AST  14<L>  /  ALT  20  /  AlkPhos  120  07-21      LIVER FUNCTIONS - ( 2017 16:01 )  Alb: 3.7 g/dL / Pro: 6.9 gm/dL / ALK PHOS: 120 U/L / ALT: 20 U/L / AST: 14 U/L / GGT: x                       MEDICATIONS  (STANDING):    MEDICATIONS  (PRN): (2017 19:26)      PAST MEDICAL & SURGICAL HISTORY:  Hyperlipidemia, unspecified hyperlipidemia type  Essential hypertension  Neuropathy  Diabetes  Chronic obstructive pulmonary disease, unspecified COPD type  History of total knee replacement, unspecified laterality  H/O hernia repair   delivery delivered  S/P appendectomy      MEDICATIONS  (STANDING):  pregabalin 150 milliGRAM(s) Oral at bedtime  citalopram 10 milliGRAM(s) Oral daily  atorvastatin 20 milliGRAM(s) Oral at bedtime  montelukast 10 milliGRAM(s) Oral at bedtime  pantoprazole    Tablet 40 milliGRAM(s) Oral before breakfast  levothyroxine 112 MICROGram(s) Oral daily  folic acid 1 milliGRAM(s) Oral daily  cholecalciferol 1000 Unit(s) Oral daily  aspirin  chewable 81 milliGRAM(s) Oral daily  heparin  Injectable 5000 Unit(s) SubCutaneous every 12 hours  insulin glargine Injectable (LANTUS) 40 Unit(s) SubCutaneous at bedtime  insulin lispro (HumaLOG) corrective regimen sliding scale   SubCutaneous three times a day before meals  insulin lispro (HumaLOG) corrective regimen sliding scale   SubCutaneous at bedtime  dextrose 5%. 1000 milliLiter(s) (50 mL/Hr) IV Continuous <Continuous>  dextrose 50% Injectable 12.5 Gram(s) IV Push once  dextrose 50% Injectable 25 Gram(s) IV Push once  dextrose 50% Injectable 25 Gram(s) IV Push once  ALBUTerol/ipratropium for Nebulization 3 milliLiter(s) Nebulizer every 6 hours  cefepime  IVPB 1000 milliGRAM(s) IV Intermittent every 12 hours  cefepime  IVPB   IV Intermittent   methylPREDNISolone sodium succinate Injectable 40 milliGRAM(s) IV Push every 8 hours    MEDICATIONS  (PRN):  dextrose Gel 1 Dose(s) Oral once PRN Blood Glucose LESS THAN 70 milliGRAM(s)/deciliter  glucagon  Injectable 1 milliGRAM(s) IntraMuscular once PRN Glucose LESS THAN 70 milligrams/deciliter      FAMILY HISTORY:  Family history of CHF (congestive heart failure) (Mother)      SOCIAL HISTORY:  non smoker, no alcohol use in recent past      REVIEW OF SYSTEMS:  CONSTITUTIONAL:  No night sweats.  No fatigue, malaise, lethargy.  No fever or chills.  HEENT:  Eyes:  No visual changes.  No eye pain.      ENT:  No runny nose.  No epistaxis.  No sinus pain.  No sore throat.  No odynophagia.  No ear pain.  No congestion.  RESPIRATORY:  No cough.  No wheeze.  No hemoptysis.  c/o  shortness of breath.  CARDIOVASCULAR:  No chest pains.  No palpitations. c/o shortness of breath and TINEO, No orthopnea or PND.  GASTROINTESTINAL:  No abdominal pain.  No nausea or vomiting.  No diarrhea or constipation.  No hematemesis.  No hematochezia.  No melena.  GENITOURINARY:  No urgency.  No frequency.  No dysuria.  No hematuria.  No obstructive symptoms.  No discharge.  No pain.  No significant abnormal bleeding.  MUSCULOSKELETAL:  No musculoskeletal pain.  No joint swelling.  No arthritis.  NEUROLOGICAL:  No tingling or numbness or weakness.  PSYCHIATRIC:  No confusion  SKIN:  No rashes.  No lesions.  No wounds.  ENDOCRINE:  No unexplained weight loss.  No polydipsia.  No polyuria.  No polyphagia.  HEMATOLOGIC:  No anemia.  No purpura.  No petechiae.  No prolonged or excessive bleeding.   ALLERGIC AND IMMUNOLOGIC:  No pruritus.  No swelling.         Vital Signs Last 24 Hrs  T(C): 36.4 (2017 05:27), Max: 36.6 (2017 21:30)  T(F): 97.6 (2017 05:27), Max: 97.9 (2017 21:30)  HR: 80 (2017 08:28) (70 - 92)  BP: 162/40 (2017 05:27) (162/40 - 179/65)  BP(mean): 73 (2017 05:27) (73 - 73)  RR: 20 (2017 21:30) (18 - 20)  SpO2: 99% (2017 08:30) (92% - 99%)    PHYSICAL EXAM-    Constitutional: The patient appears to be normal, well developed, well nourished and alert and oriented to time, place and person. The patient does not appear acutely ill.     Head: Head is normocephalic and atraumatic.      Neck: The patient's neck is supple without enlargement, has no palpable thyromegaly nor thyroid nodules and has no jugular venous distention. No audible carotid bruits. There are strong carotid pulses bilaterally. No JVD.     Cardiovascular: Regular rate and rhythm without S3, S4. No murmurs or rubs are appreciated.      Respiratory: Breath sounds are normal. No rales. No wheezing.    Abdomen: Soft, nontender, nondistended with positive bowel sounds.      Extremity: No tenderness. No  pitting edema No skin discoloration No clubbing No cyanosis.     Neurologic: The patient is alert and oriented.      Skin: No rash, no obvious lesions noted.      Psychiatric: The patient appears to be emotionally stable.      INTERPRETATION OF TELEMETRY: sinus rythm with PVCs     ECG: sinus rythm, normal axis and PVCs.    I&O's Detail    2017 07:  -  2017 07:00  --------------------------------------------------------  IN:    Oral Fluid: 120 mL  Total IN: 120 mL    OUT:  Total OUT: 0 mL    Total NET: 120 mL      2017 07:  -  2017 10:56  --------------------------------------------------------  IN:    sodium chloride 0.45%: 495 mL  Total IN: 495 mL    OUT:  Total OUT: 0 mL    Total NET: 495 mL          LABS:                        9.4    5.8   )-----------( 170      ( 2017 05:26 )             29.2         140  |  109<H>  |  46<H>  ----------------------------<  409<H>  5.1   |  25  |  1.72<H>    Ca    9.1      2017 09:34    TPro  6.9  /  Alb  3.7  /  TBili  0.6  /  DBili  x   /  AST  14<L>  /  ALT  20  /  AlkPhos  120      CARDIAC MARKERS ( 2017 16:01 )  <0.015 ng/mL / x     / x     / x     / x              I&O's Summary    :  -  2017 07:00  --------------------------------------------------------  IN: 120 mL / OUT: 0 mL / NET: 120 mL    2017 07:  -  2017 10:56  --------------------------------------------------------  IN: 495 mL / OUT: 0 mL / NET: 495 mL      BNPSerum Pro-Brain Natriuretic Peptide: 2969 pg/mL ( @ 16:01)    RADIOLOGY & ADDITIONAL STUDIES:    < from: CT Chest No Cont (17 @ 18:52) >  EXAM:  CT CHEST                            PROCEDURE DATE:  2017          INTERPRETATION:  CT CHEST    HISTORY:  Shortness of breath                     TECHNIQUE: Noncontrast CT of the chest was performed. Coronal and   sagittal images were reconstructed. This study was performed using   automatic exposure control (radiation dose reduction software) to obtain   a diagnostic image quality scan with patient dose as low as reasonably   achievable.    COMPARISON: Chest x-ray 1 hour prior, chest CT 3/5/2013    FINDINGS:    LUNGS, AIRWAYS: The central airways are patent. Prior left lower lobe   wedge resection. Diffuse bilateral groundglass opacities and septal   thickening.    PLEURA: Small bilateral pleural effusions.    HEART AND VESSELS: Mild cardiomegaly. No pericardial effusion. Extensive   coronary artery, aortic valve, and mitral annular calcification. Normal   caliber of the thoracic aorta. Main pulmonary artery is enlarged up to   3.9 cm.    MEDIASTINUM AND LAVELL: Mild adenopathy, likely reactive.    UPPER ABDOMEN: Limited visualization shows cholecystectomy clips and   diffuse arterial vascular calcification.    BONES AND SOFT TISSUES: No acute bony abnormality.    IMPRESSION:     Pulmonary edema and small bilateral pleural effusions.                SAMEERA GIBBS   This document has been electronically signed. 2017  7:22PM                < end of copied text >  Blood Gas Profile - Arterial (17 @ 16:40)    pH, Arterial: 7.39    pCO2, Arterial: 42 mmHg    pO2, Arterial: 66    HCO3, Arterial: 25 mmoL/L    Base Excess, Arterial: 0 mmol/L    Oxygen Saturation, Arterial: 92    Blood Gas Comments Arterial: 5 LPM    Blood Gas Source Arterial: Arterial

## 2017-07-24 LAB
ANION GAP SERPL CALC-SCNC: 10 MMOL/L — SIGNIFICANT CHANGE UP (ref 5–17)
ANION GAP SERPL CALC-SCNC: 10 MMOL/L — SIGNIFICANT CHANGE UP (ref 5–17)
BUN SERPL-MCNC: 74 MG/DL — HIGH (ref 7–23)
BUN SERPL-MCNC: 75 MG/DL — HIGH (ref 7–23)
CALCIUM SERPL-MCNC: 9.1 MG/DL — SIGNIFICANT CHANGE UP (ref 8.5–10.1)
CALCIUM SERPL-MCNC: 9.3 MG/DL — SIGNIFICANT CHANGE UP (ref 8.5–10.1)
CHLORIDE SERPL-SCNC: 105 MMOL/L — SIGNIFICANT CHANGE UP (ref 96–108)
CHLORIDE SERPL-SCNC: 106 MMOL/L — SIGNIFICANT CHANGE UP (ref 96–108)
CO2 SERPL-SCNC: 26 MMOL/L — SIGNIFICANT CHANGE UP (ref 22–31)
CO2 SERPL-SCNC: 27 MMOL/L — SIGNIFICANT CHANGE UP (ref 22–31)
CREAT SERPL-MCNC: 1.8 MG/DL — HIGH (ref 0.5–1.3)
CREAT SERPL-MCNC: 1.95 MG/DL — HIGH (ref 0.5–1.3)
GLUCOSE SERPL-MCNC: 312 MG/DL — HIGH (ref 70–99)
GLUCOSE SERPL-MCNC: 360 MG/DL — HIGH (ref 70–99)
HCT VFR BLD CALC: 29.7 % — LOW (ref 34.5–45)
HGB BLD-MCNC: 9.9 G/DL — LOW (ref 11.5–15.5)
MCHC RBC-ENTMCNC: 30.1 PG — SIGNIFICANT CHANGE UP (ref 27–34)
MCHC RBC-ENTMCNC: 33.5 GM/DL — SIGNIFICANT CHANGE UP (ref 32–36)
MCV RBC AUTO: 90 FL — SIGNIFICANT CHANGE UP (ref 80–100)
PLATELET # BLD AUTO: 186 K/UL — SIGNIFICANT CHANGE UP (ref 150–400)
POTASSIUM SERPL-MCNC: 4 MMOL/L — SIGNIFICANT CHANGE UP (ref 3.5–5.3)
POTASSIUM SERPL-MCNC: 4.1 MMOL/L — SIGNIFICANT CHANGE UP (ref 3.5–5.3)
POTASSIUM SERPL-SCNC: 4 MMOL/L — SIGNIFICANT CHANGE UP (ref 3.5–5.3)
POTASSIUM SERPL-SCNC: 4.1 MMOL/L — SIGNIFICANT CHANGE UP (ref 3.5–5.3)
RBC # BLD: 3.3 M/UL — LOW (ref 3.8–5.2)
RBC # FLD: 14.9 % — HIGH (ref 10.3–14.5)
SODIUM SERPL-SCNC: 141 MMOL/L — SIGNIFICANT CHANGE UP (ref 135–145)
SODIUM SERPL-SCNC: 143 MMOL/L — SIGNIFICANT CHANGE UP (ref 135–145)
WBC # BLD: 14.8 K/UL — HIGH (ref 3.8–10.5)
WBC # FLD AUTO: 14.8 K/UL — HIGH (ref 3.8–10.5)

## 2017-07-24 PROCEDURE — 78582 LUNG VENTILAT&PERFUS IMAGING: CPT | Mod: 26

## 2017-07-24 PROCEDURE — 76770 US EXAM ABDO BACK WALL COMP: CPT | Mod: 26

## 2017-07-24 PROCEDURE — 71010: CPT | Mod: 26

## 2017-07-24 RX ORDER — IPRATROPIUM/ALBUTEROL SULFATE 18-103MCG
3 AEROSOL WITH ADAPTER (GRAM) INHALATION EVERY 4 HOURS
Qty: 0 | Refills: 0 | Status: DISCONTINUED | OUTPATIENT
Start: 2017-07-24 | End: 2017-07-26

## 2017-07-24 RX ORDER — ALBUTEROL 90 UG/1
1 AEROSOL, METERED ORAL EVERY 4 HOURS
Qty: 0 | Refills: 0 | Status: DISCONTINUED | OUTPATIENT
Start: 2017-07-24 | End: 2017-07-26

## 2017-07-24 RX ORDER — INSULIN GLARGINE 100 [IU]/ML
55 INJECTION, SOLUTION SUBCUTANEOUS AT BEDTIME
Qty: 0 | Refills: 0 | Status: DISCONTINUED | OUTPATIENT
Start: 2017-07-24 | End: 2017-07-28

## 2017-07-24 RX ORDER — FUROSEMIDE 40 MG
80 TABLET ORAL
Qty: 0 | Refills: 0 | Status: DISCONTINUED | OUTPATIENT
Start: 2017-07-24 | End: 2017-07-25

## 2017-07-24 RX ORDER — TIOTROPIUM BROMIDE 18 UG/1
1 CAPSULE ORAL; RESPIRATORY (INHALATION) DAILY
Qty: 0 | Refills: 0 | Status: DISCONTINUED | OUTPATIENT
Start: 2017-07-24 | End: 2017-07-30

## 2017-07-24 RX ADMIN — Medication 3 MILLILITER(S): at 01:41

## 2017-07-24 RX ADMIN — Medication 6: at 12:55

## 2017-07-24 RX ADMIN — Medication 17 UNIT(S): at 10:22

## 2017-07-24 RX ADMIN — Medication 40 MILLIGRAM(S): at 18:05

## 2017-07-24 RX ADMIN — Medication 40 MILLIGRAM(S): at 05:53

## 2017-07-24 RX ADMIN — Medication 3 MILLILITER(S): at 16:10

## 2017-07-24 RX ADMIN — Medication 150 MILLIGRAM(S): at 21:00

## 2017-07-24 RX ADMIN — ATORVASTATIN CALCIUM 20 MILLIGRAM(S): 80 TABLET, FILM COATED ORAL at 21:00

## 2017-07-24 RX ADMIN — Medication 80 MILLIGRAM(S): at 20:53

## 2017-07-24 RX ADMIN — INSULIN GLARGINE 55 UNIT(S): 100 INJECTION, SOLUTION SUBCUTANEOUS at 21:00

## 2017-07-24 RX ADMIN — Medication 3: at 18:01

## 2017-07-24 RX ADMIN — CEFEPIME 100 MILLIGRAM(S): 1 INJECTION, POWDER, FOR SOLUTION INTRAMUSCULAR; INTRAVENOUS at 05:55

## 2017-07-24 RX ADMIN — Medication 112 MICROGRAM(S): at 05:55

## 2017-07-24 RX ADMIN — MONTELUKAST 10 MILLIGRAM(S): 4 TABLET, CHEWABLE ORAL at 21:00

## 2017-07-24 RX ADMIN — Medication 1000 UNIT(S): at 12:34

## 2017-07-24 RX ADMIN — HEPARIN SODIUM 5000 UNIT(S): 5000 INJECTION INTRAVENOUS; SUBCUTANEOUS at 18:00

## 2017-07-24 RX ADMIN — Medication 3 MILLILITER(S): at 12:19

## 2017-07-24 RX ADMIN — Medication 81 MILLIGRAM(S): at 12:34

## 2017-07-24 RX ADMIN — Medication 80 MILLIGRAM(S): at 14:59

## 2017-07-24 RX ADMIN — CITALOPRAM 10 MILLIGRAM(S): 10 TABLET, FILM COATED ORAL at 12:33

## 2017-07-24 RX ADMIN — PANTOPRAZOLE SODIUM 40 MILLIGRAM(S): 20 TABLET, DELAYED RELEASE ORAL at 05:55

## 2017-07-24 RX ADMIN — CEFEPIME 100 MILLIGRAM(S): 1 INJECTION, POWDER, FOR SOLUTION INTRAMUSCULAR; INTRAVENOUS at 20:05

## 2017-07-24 RX ADMIN — Medication 3 MILLILITER(S): at 08:06

## 2017-07-24 RX ADMIN — Medication 3 MILLILITER(S): at 19:29

## 2017-07-24 RX ADMIN — HEPARIN SODIUM 5000 UNIT(S): 5000 INJECTION INTRAVENOUS; SUBCUTANEOUS at 05:54

## 2017-07-24 RX ADMIN — Medication 17 UNIT(S): at 18:01

## 2017-07-24 RX ADMIN — Medication 1 MILLIGRAM(S): at 12:34

## 2017-07-24 RX ADMIN — Medication 2: at 21:01

## 2017-07-24 RX ADMIN — Medication: at 10:45

## 2017-07-24 RX ADMIN — Medication 17 UNIT(S): at 12:54

## 2017-07-24 NOTE — PHYSICAL THERAPY INITIAL EVALUATION ADULT - LIVES WITH, PROFILE
spouse/children/lives with  daughter and grand daughter in private home with 3 steps to enter without HRs,1 flight of interior steps with single HR lives with , daughter and grand daughter in private home with 3 steps to enter without HRs,1 flight of interior steps with single HR/children/spouse

## 2017-07-24 NOTE — PHYSICAL THERAPY INITIAL EVALUATION ADULT - CRITERIA FOR SKILLED THERAPEUTIC INTERVENTIONS
impairments found/risk reduction/prevention/functional limitations in following categories/predicted duration of therapy intervention/anticipated equipment needs at discharge/anticipated discharge recommendation/therapy frequency/rehab potential

## 2017-07-24 NOTE — PROGRESS NOTE ADULT - ASSESSMENT
- sob is multifactorial with the chf and copd and negative work up for the PE suspected superimposed pulmonary HTN with markedly diffusion capacity   - chf with small effusion with interstertial prominence   - D.M   - lung cancer resected stage 1 with  no recurrence     PLAN     - taper the steroids to q 12 hrly   - lasix stat dose to get the negative balance while monitoring renal function   - nebs for the wheezing   - 0 2 supplementation and vq scan low probability of PE .  - patient condition was discussed with the renal and cardio   - consider repeating the echo for the pulmonary htn and to see any new changes

## 2017-07-24 NOTE — PROGRESS NOTE ADULT - SUBJECTIVE AND OBJECTIVE BOX
7/24/17: Wheezing this am -- no cough, no cp, sob with exertion but fine at rest right now.  No n/v/f/c    ROS: as per HPI otherwise all other systems reviewed and are negative      PHYSICAL EXAM:  Vital Signs Last 24 Hrs  T(C): 36.9 (24 Jul 2017 05:39), Max: 36.9 (24 Jul 2017 05:39)  T(F): 98.4 (24 Jul 2017 05:39), Max: 98.4 (24 Jul 2017 05:39)  HR: 78 (24 Jul 2017 08:09) (73 - 91)  BP: 148/71 (24 Jul 2017 05:39) (148/71 - 171/47)  BP(mean): --  RR: 18 (24 Jul 2017 05:39) (17 - 18)  SpO2: 97% (24 Jul 2017 08:09) (92% - 97%)        GEN: A and O, NAD, NOT TACHYPNEIC AT REST, mood stable  HEENT:   NC/AT, EOMI, no oropharyngeal lesions    NECK:   supple    CV:  +S1, +S2, regular, no murmurs or rubs    RESP:   B/L EXP WHEEZES, NO RALES    GI:  abdomen soft, non-tender, non-distended, normal BS,  no abdominal masses, no palpable masses    RECTAL:  not examined    :  not examined    MSK:   normal muscle tone, no atrophy, no rigidity, no contractions    EXT:   no clubbing, no cyanosis, no edema, no calf pain, swelling or erythema    VASCULAR:  pulses equal and symmetric in the upper and lower extremities    NEURO:  AAOX3, no focal neurological deficits, follows all commands, able to move extremities spontaneously    SKIN:  no ulcers, lesions or rashes                              9.9    14.8  )-----------( 186      ( 24 Jul 2017 05:58 )             29.7     07-24    141  |  105  |  74<H>  ----------------------------<  360<H>  4.0   |  26  |  1.80<H>    Ca    9.1      24 Jul 2017 05:58      MEDICATIONS  (STANDING):  pregabalin 150 milliGRAM(s) Oral at bedtime  citalopram 10 milliGRAM(s) Oral daily  atorvastatin 20 milliGRAM(s) Oral at bedtime  montelukast 10 milliGRAM(s) Oral at bedtime  pantoprazole    Tablet 40 milliGRAM(s) Oral before breakfast  levothyroxine 112 MICROGram(s) Oral daily  folic acid 1 milliGRAM(s) Oral daily  cholecalciferol 1000 Unit(s) Oral daily  aspirin  chewable 81 milliGRAM(s) Oral daily  heparin  Injectable 5000 Unit(s) SubCutaneous every 12 hours  insulin lispro (HumaLOG) corrective regimen sliding scale   SubCutaneous three times a day before meals  insulin lispro (HumaLOG) corrective regimen sliding scale   SubCutaneous at bedtime  dextrose 5%. 1000 milliLiter(s) (50 mL/Hr) IV Continuous <Continuous>  dextrose 50% Injectable 12.5 Gram(s) IV Push once  dextrose 50% Injectable 25 Gram(s) IV Push once  dextrose 50% Injectable 25 Gram(s) IV Push once  cefepime  IVPB 1000 milliGRAM(s) IV Intermittent every 12 hours  cefepime  IVPB   IV Intermittent   methylPREDNISolone sodium succinate Injectable 40 milliGRAM(s) IV Push every 8 hours  insulin glargine Injectable (LANTUS) 50 Unit(s) SubCutaneous at bedtime  insulin lispro Injectable (HumaLOG) 17 Unit(s) SubCutaneous three times a day before meals  diltiazem    Tablet 60 milliGRAM(s) Oral four times a day  ALBUTerol/ipratropium for Nebulization 3 milliLiter(s) Nebulizer every 4 hours  ALBUTerol    90 MICROgram(s) HFA Inhaler 1 Puff(s) Inhalation every 4 hours  tiotropium 18 MICROgram(s) Capsule 1 Capsule(s) Inhalation daily    MEDICATIONS  (PRN):  dextrose Gel 1 Dose(s) Oral once PRN Blood Glucose LESS THAN 70 milliGRAM(s)/deciliter  glucagon  Injectable 1 milliGRAM(s) IntraMuscular once PRN Glucose LESS THAN 70 milligrams/deciliter                                      :

## 2017-07-24 NOTE — PROGRESS NOTE ADULT - SUBJECTIVE AND OBJECTIVE BOX
Patient is a 76y old  Female who presents with a chief complaint of I can't breath.    HPI:  Pt is a 75 y/o female with h/o type II diabetes, HTN, chronic UTI's, stage 3 CKD, COPD with chronic hypoxic respiratory failure (refuses to wear O2) , ANI on stage 3 CKD, Rt clavicle fracture.  For the past few days started to develop increasing SOB with TINEO.  She was seen by Dr Forte and O2  sat noted to be  60-70's and was sent to ER.      She had prior cardiac hx of NSTEMI and abnormal stress test in   showing small sized fixed defect in apical area .    She was having pedal edema few months ago and her lasix was adjusted to 40mg po daily instead of 20mg po BID and her pedal edema resolved.  She has very poor intake of fluids in day time and partly her dementia is playing a role with compliance.     - pt seen and examined. her daughter is at bedside this am.  Pt c/o TINEO with walking few steps but no SOB at rest.  - Pt seen and examined by me today.  She still c/o TINEO with exertion but slightly better, however last night had one episode of SOB earlier in the night .  Had been getting nebs.      Vital Signs Last 24 Hrs  T(C): --  T(F): --  HR: 70 (2017 15:40) (70 - 70)  BP: 179/65 (2017 15:40) (179/65 - 179/65)  BP(mean): --  RR: 18 (2017 15:40) (18 - 18)  SpO2: 92% (2017 15:40) (92% - 92%)    I&O's Detail      Daily Height in cm: 157.48 (2017 15:40)    Daily     CARDIAC MARKERS ( 2017 16:01 )  <0.015 ng/mL / x     / x     / x     / x            ABG - ( 2017 16:40 )  pH: 7.39  /  pCO2: 42    /  pO2: 66    / HCO3: 25    / Base Excess: 0     /  SaO2: 92                                        11.0   8.0   )-----------( 200      ( 2017 16:01 )             33.4       07-21    144  |  111<H>  |  41<H>  ----------------------------<  214<H>  5.0   |  28  |  1.62<H>    Ca    9.3      2017 16:01    TPro  6.9  /  Alb  3.7  /  TBili  0.6  /  DBili  x   /  AST  14<L>  /  ALT  20  /  AlkPhos  120  07-21      LIVER FUNCTIONS - ( 2017 16:01 )  Alb: 3.7 g/dL / Pro: 6.9 gm/dL / ALK PHOS: 120 U/L / ALT: 20 U/L / AST: 14 U/L / GGT: x                       MEDICATIONS  (STANDING):    MEDICATIONS  (PRN): (2017 19:26)      PAST MEDICAL & SURGICAL HISTORY:  Hyperlipidemia, unspecified hyperlipidemia type  Essential hypertension  Neuropathy  Diabetes  Chronic obstructive pulmonary disease, unspecified COPD type  History of total knee replacement, unspecified laterality  H/O hernia repair   delivery delivered  S/P appendectomy      MEDICATIONS  (STANDING):  pregabalin 150 milliGRAM(s) Oral at bedtime  citalopram 10 milliGRAM(s) Oral daily  atorvastatin 20 milliGRAM(s) Oral at bedtime  montelukast 10 milliGRAM(s) Oral at bedtime  pantoprazole    Tablet 40 milliGRAM(s) Oral before breakfast  levothyroxine 112 MICROGram(s) Oral daily  folic acid 1 milliGRAM(s) Oral daily  cholecalciferol 1000 Unit(s) Oral daily  aspirin  chewable 81 milliGRAM(s) Oral daily  heparin  Injectable 5000 Unit(s) SubCutaneous every 12 hours  insulin glargine Injectable (LANTUS) 40 Unit(s) SubCutaneous at bedtime  insulin lispro (HumaLOG) corrective regimen sliding scale   SubCutaneous three times a day before meals  insulin lispro (HumaLOG) corrective regimen sliding scale   SubCutaneous at bedtime  dextrose 5%. 1000 milliLiter(s) (50 mL/Hr) IV Continuous <Continuous>  dextrose 50% Injectable 12.5 Gram(s) IV Push once  dextrose 50% Injectable 25 Gram(s) IV Push once  dextrose 50% Injectable 25 Gram(s) IV Push once  ALBUTerol/ipratropium for Nebulization 3 milliLiter(s) Nebulizer every 6 hours  cefepime  IVPB 1000 milliGRAM(s) IV Intermittent every 12 hours  cefepime  IVPB   IV Intermittent   methylPREDNISolone sodium succinate Injectable 40 milliGRAM(s) IV Push every 8 hours    MEDICATIONS  (PRN):  dextrose Gel 1 Dose(s) Oral once PRN Blood Glucose LESS THAN 70 milliGRAM(s)/deciliter  glucagon  Injectable 1 milliGRAM(s) IntraMuscular once PRN Glucose LESS THAN 70 milligrams/deciliter      FAMILY HISTORY:  Family history of CHF (congestive heart failure) (Mother)      SOCIAL HISTORY:  non smoker, no alcohol use in recent past      REVIEW OF SYSTEMS:  CONSTITUTIONAL:  No night sweats.  No fatigue, malaise, lethargy.  No fever or chills.  HEENT:  Eyes:  No visual changes.  No eye pain.      ENT:  No runny nose.  No epistaxis.  No sinus pain.  No sore throat.  No odynophagia.  No ear pain.  No congestion.  RESPIRATORY:  No cough.  No wheeze.  No hemoptysis.  c/o  shortness of breath.  CARDIOVASCULAR:  No chest pains.  No palpitations. c/o shortness of breath and TINEO, No orthopnea or PND.  GASTROINTESTINAL:  No abdominal pain.  No nausea or vomiting.  No diarrhea or constipation.  No hematemesis.  No hematochezia.  No melena.  GENITOURINARY:  No urgency.  No frequency.  No dysuria.  No hematuria.  No obstructive symptoms.  No discharge.  No pain.  No significant abnormal bleeding.  MUSCULOSKELETAL:  No musculoskeletal pain.  No joint swelling.  No arthritis.  NEUROLOGICAL:  No tingling or numbness or weakness.  PSYCHIATRIC:  No confusion  SKIN:  No rashes.  No lesions.  No wounds.  ENDOCRINE:  No unexplained weight loss.  No polydipsia.  No polyuria.  No polyphagia.  HEMATOLOGIC:  No anemia.  No purpura.  No petechiae.  No prolonged or excessive bleeding.   ALLERGIC AND IMMUNOLOGIC:  No pruritus.  No swelling.         Vital Signs Last 24 Hrs  T(C): 36.4 (2017 05:27), Max: 36.6 (2017 21:30)  T(F): 97.6 (2017 05:27), Max: 97.9 (2017 21:30)  HR: 80 (2017 08:28) (70 - 92)  BP: 162/40 (2017 05:27) (162/40 - 179/65)  BP(mean): 73 (2017 05:27) (73 - 73)  RR: 20 (2017 21:30) (18 - 20)  SpO2: 99% (2017 08:30) (92% - 99%)    PHYSICAL EXAM-    Constitutional: The patient appears to be normal, well developed, well nourished and alert and oriented to time, place and person. The patient does not appear acutely ill.     Head: Head is normocephalic and atraumatic.      Neck: The patient's neck is supple without enlargement, has no palpable thyromegaly nor thyroid nodules and has no jugular venous distention. No audible carotid bruits. There are strong carotid pulses bilaterally. No JVD.     Cardiovascular: Regular rate and rhythm without S3, S4. No murmurs or rubs are appreciated.      Respiratory: B/l scattered wheezing.    Abdomen: Soft, nontender, nondistended with positive bowel sounds.      Extremity: No tenderness. No  pitting edema No skin discoloration No clubbing No cyanosis.     Neurologic: The patient is alert and oriented.      Skin: No rash, no obvious lesions noted.      Psychiatric: The patient appears to be emotionally stable.      INTERPRETATION OF TELEMETRY: sinus rythm with PVCs     ECG: sinus rythm, normal axis and PVCs.    I&O's Detail    2017 07:  -  2017 07:00  --------------------------------------------------------  IN:    Oral Fluid: 120 mL  Total IN: 120 mL    OUT:  Total OUT: 0 mL    Total NET: 120 mL      2017 07:01  -  2017 10:56  --------------------------------------------------------  IN:    sodium chloride 0.45%: 495 mL  Total IN: 495 mL    OUT:  Total OUT: 0 mL    Total NET: 495 mL          LABS:                        9.4    5.8   )-----------( 170      ( 2017 05:26 )             29.2     07-    140  |  109<H>  |  46<H>  ----------------------------<  409<H>  5.1   |  25  |  1.72<H>    Ca    9.1      2017 09:34    TPro  6.9  /  Alb  3.7  /  TBili  0.6  /  DBili  x   /  AST  14<L>  /  ALT  20  /  AlkPhos  120  07    CARDIAC MARKERS ( 2017 16:01 )  <0.015 ng/mL / x     / x     / x     / x              I&O's Summary    2017 07:  -  2017 07:00  --------------------------------------------------------  IN: 120 mL / OUT: 0 mL / NET: 120 mL    2017 07:  -  2017 10:56  --------------------------------------------------------  IN: 495 mL / OUT: 0 mL / NET: 495 mL      BNPSerum Pro-Brain Natriuretic Peptide: 2969 pg/mL ( @ 16:01)    RADIOLOGY & ADDITIONAL STUDIES:    < from: CT Chest No Cont (17 @ 18:52) >  EXAM:  CT CHEST                            PROCEDURE DATE:  2017          INTERPRETATION:  CT CHEST    HISTORY:  Shortness of breath                     TECHNIQUE: Noncontrast CT of the chest was performed. Coronal and   sagittal images were reconstructed. This study was performed using   automatic exposure control (radiation dose reduction software) to obtain   a diagnostic image quality scan with patient dose as low as reasonably   achievable.    COMPARISON: Chest x-ray 1 hour prior, chest CT 3/5/2013    FINDINGS:    LUNGS, AIRWAYS: The central airways are patent. Prior left lower lobe   wedge resection. Diffuse bilateral groundglass opacities and septal   thickening.    PLEURA: Small bilateral pleural effusions.    HEART AND VESSELS: Mild cardiomegaly. No pericardial effusion. Extensive   coronary artery, aortic valve, and mitral annular calcification. Normal   caliber of the thoracic aorta. Main pulmonary artery is enlarged up to   3.9 cm.    MEDIASTINUM AND LAVELL: Mild adenopathy, likely reactive.    UPPER ABDOMEN: Limited visualization shows cholecystectomy clips and   diffuse arterial vascular calcification.    BONES AND SOFT TISSUES: No acute bony abnormality.    IMPRESSION:     Pulmonary edema and small bilateral pleural effusions.                SAMEERA GIBBS   This document has been electronically signed. 2017  7:22PM                < end of copied text >  Blood Gas Profile - Arterial (17 @ 16:40)    pH, Arterial: 7.39    pCO2, Arterial: 42 mmHg    pO2, Arterial: 66    HCO3, Arterial: 25 mmoL/L    Base Excess, Arterial: 0 mmol/L    Oxygen Saturation, Arterial: 92    Blood Gas Comments Arterial: 5 LPM    Blood Gas Source Arterial: Arterial

## 2017-07-24 NOTE — PHYSICAL THERAPY INITIAL EVALUATION ADULT - DISCHARGE DISPOSITION, PT EVAL
outpatient PT/home w/ home PT/pt was receiving physical therapy prior to admission s/p recent R clavicular fx including home exercise program

## 2017-07-24 NOTE — PHYSICAL THERAPY INITIAL EVALUATION ADULT - DIAGNOSIS, PT EVAL
impaired endurance/activity tolerance;acute exacerbation of COPD,r/o Pulmonary embolus,?CHF,+pulmonary edema/B pleural effusions,s/p R distal clavicular fx (non-displaced) being managed conservatively in sling impaired endurance/activity tolerance;acute exacerbation of COPD,r/o Pulmonary embolus(Negative),?CHF,+pulmonary edema/B pleural effusions,s/p R distal clavicular fx (non-displaced) being managed conservatively in sling impaired endurance/activity tolerance;acute exacerbation of COPD,r/o Pulmonary embolus(Negative),?CHF,+pulmonary edema/B pleural effusions,s/p R distal clavicular fx (non-displaced) being managed conservatively ,sling discontinued

## 2017-07-24 NOTE — PHYSICAL THERAPY INITIAL EVALUATION ADULT - ADDITIONAL COMMENTS
last PT Evaluation 6/22 pt ambulated 100ft without assistive device in R arm sling last PT Evaluation 6/22 pt ambulated 100ft without assistive device in R arm sling; has a cane, rolling walker, shower chair in home; she has a full flight of stairs to climb to 2nd floor bedroom,3 steps to enter

## 2017-07-24 NOTE — PROGRESS NOTE ADULT - ASSESSMENT
Pt is a 77 y/o female with h/o type II diabetes, HTN, chronic UTI's, stage 3 CKD, COPD with chronic hypoxic respiratory failure (refuses to wear O2) ,who is know to me from a recent admission at  for syncope, ANI on stage 3 CKD, Rt clavicle fracture and bradycardia admitted for acute hypoxic respiratory failure.    * Acute Hypoxic respiratory failure-suspect due to ACOPD exacerbation with possible RLL pneumonia over atelesis given acute decline ? PE dopplers negative and mildly elevated d-dimer.  Input from all consultants noted, monitor, O2, IV solumedrol, IV cefepime, nebs, monitor response. Cont Solumedrol at current dose since still wheezing extensively; doubt CHF component.  * HTN- bp remains high, increase Cardizem, titrate further as needed  * ANI on stage 3 CKD- ? pre-renal from poor po intake in setting of lasix, renal following, monitor. CR 1.8; given dose of lasix yest  * Recent Rt Clavicle Facture supportive care with sling, pain control, PT   * Hypothyroidism continue synthroid  * Type II diabetes- uncontrolled due to steroids, increase lantus, and pre meal humalog, continue ISS, titrate further as needed.    * Disp-OOB, ambulate with PT.  * Proph- heparin  * Comm- d/w pt and daughter in details, KALA

## 2017-07-24 NOTE — PROGRESS NOTE ADULT - ASSESSMENT
77 yo woman  with COPD, and CKD admitted with resp insuff.   Pt was recently started on diuretic which may explain increase in creat on admission.  However CT scan c/w pulmonary edema.  Therefore, will need to evaluate for possible cardiac decompensation to explain recent event of ANI and CHF.  Will d/c further IVF.  Since resp status has stabilized, will hold off on adding diuretics until cardiology evaluation done.  Hyperkalemia of unclear reason.  Recheck to see if rx needed.    7/23 SY  --ANI/CKD    Unclear baseline fx at this point,  Renal parameters are quite variable.  Unclear if due to hyperglycemia as well.  --Resp insuff is persistent and very limiting in terms of pt's fx.   Therefore, will give a trial of IV lasix.     If resp status responds to diuresis, then will need to accept increased creat and continue diuretics.  --Will check to see pt is able to fully able to empty bladder.    7/24 SY  --ANI/CKD   as above    Creat now holding fairly steady.  --CXR more c/w CHF.   Will diurese more consistently and may need to allow creat to increase to stabilize resp status first.  Again, will need to consider whether this is primarily decompensated CHF.  --Follow up renal sonogram report. 77 yo woman  with COPD, and CKD admitted with resp insuff.   Pt was recently started on diuretic which may explain increase in creat on admission.  However CT scan c/w pulmonary edema.  Therefore, will need to evaluate for possible cardiac decompensation to explain recent event of ANI and CHF.  Will d/c further IVF.  Since resp status has stabilized, will hold off on adding diuretics until cardiology evaluation done.  Hyperkalemia of unclear reason.  Recheck to see if rx needed.    7/23 SY  --ANI/CKD    Unclear baseline fx at this point,  Renal parameters are quite variable.  Unclear if due to hyperglycemia as well.  --Resp insuff is persistent and very limiting in terms of pt's fx.   Therefore, will give a trial of IV lasix.     If resp status responds to diuresis, then will need to accept increased creat and continue diuretics.  --Will check to see pt is able to fully able to empty bladder.    7/24 SY  --ANI/CKD   as above    Creat now holding fairly steady.  --CXR more c/w CHF.   Will diurese more consistently and may need to allow creat to increase to stabilize resp status first.  Again, will need to consider whether this is primarily decompensated CHF.  --Follow up renal sonogram report.  --Close monitoring of I and O.

## 2017-07-24 NOTE — PROGRESS NOTE ADULT - ASSESSMENT
SOB/ TINEO and profound hypoxia- I do not think there is a major component of HFPef in causing hypoxia here.  Being treated for COPD exacerbation. Venous doppler did not reveal any DVT. V/Q scan ordered by primary team and she was being treated for COPD and pneumonia currently.  Her 2 D echo one month ago revealed LVH with normal LVEF and moderate TR.  Yesterday she received lasix.  Close monitoring of the renal funciton is recommended.  Will hold further diuretic dose today.  Continue O2 supplementation.    HTN- cardizem po to continue for now.     Ventricular ectopy-Electorlyte levels noted this am. Her potassium level is optimal after the kayexelate dose she received for hyperkalemia yesterday.    HTN-   Cardizem  to continue.    CAD, NSTEMI in past- will continue ASA and statin.    Other medical issues- hypoxia, COPD, CKD, anemia, DM- Management pre primary team.    Thank you for allowing me to participate in the care of this patient. Please feel free to contact me with any questions.

## 2017-07-24 NOTE — PHYSICAL THERAPY INITIAL EVALUATION ADULT - GENERAL OBSERVATIONS, REHAB EVAL
resting in bed with O2 via NC , ,son & dtr in law at bedside ; sling RUE was discontinued 1 week ago and patient was to complete course of physical therapy later this week

## 2017-07-24 NOTE — PHYSICAL THERAPY INITIAL EVALUATION ADULT - ACTIVE RANGE OF MOTION EXAMINATION, REHAB EVAL
bilateral upper extremity Active ROM was WFL (within functional limits)/GOOD AROM R dominant shoulder despite recent clavicular fx,has been receiving PT/bilateral  lower extremity Active ROM was WFL (within functional limits)

## 2017-07-24 NOTE — PROGRESS NOTE ADULT - SUBJECTIVE AND OBJECTIVE BOX
SUBJECTIVE     patient has an episode of sob last night and still need high flow of 02 noted to have some cough and wheeze . No  fever and went for the V.Q scan low probability for the P.E         PAST MEDICAL & SURGICAL HISTORY:  Hyperlipidemia, unspecified hyperlipidemia type  Essential hypertension  Neuropathy  Diabetes  Chronic obstructive pulmonary disease, unspecified COPD type.  lung cancer stage 1 status post wedge resection   History of total knee replacement, unspecified laterality  H/O hernia repair   delivery delivered  S/P appendectomy          OBJECTIVE       Vital Signs Last 24 Hrs  T(C): 36.9 (2017 05:39), Max: 36.9 (2017 05:39)  T(F): 98.4 (2017 05:39), Max: 98.4 (2017 05:39)  HR: 78 (2017 08:09) (75 - 91)  BP: 148/71 (2017 05:39) (148/71 - 171/47)  BP(mean): --  RR: 18 (2017 05:39) (18 - 18)  SpO2: 97% (2017 08:09) (92% - 97%)    PHYSICAL EXAM:    Constitutional: , awake and alert, not in distress.     HEENT: Normo cephalic atraumatic     Neck: Soft and supple, No J.V.D     Respiratory: vesicular breathing has few rales over the bases with diffuse bilateral wheeze     Cardiovascular: S1 and S2, regular rate .     Gastrointestinal:  soft, nontender,     Extremities: minimal edema of the feet     Neurological: A/O x 3, no focal deficits.      Medications:  MEDICATIONS  (STANDING):  pregabalin 150 milliGRAM(s) Oral at bedtime  citalopram 10 milliGRAM(s) Oral daily  atorvastatin 20 milliGRAM(s) Oral at bedtime  montelukast 10 milliGRAM(s) Oral at bedtime  pantoprazole    Tablet 40 milliGRAM(s) Oral before breakfast  levothyroxine 112 MICROGram(s) Oral daily  folic acid 1 milliGRAM(s) Oral daily  cholecalciferol 1000 Unit(s) Oral daily  aspirin  chewable 81 milliGRAM(s) Oral daily  heparin  Injectable 5000 Unit(s) SubCutaneous every 12 hours  insulin lispro (HumaLOG) corrective regimen sliding scale   SubCutaneous three times a day before meals  insulin lispro (HumaLOG) corrective regimen sliding scale   SubCutaneous at bedtime  dextrose 5%. 1000 milliLiter(s) (50 mL/Hr) IV Continuous <Continuous>  dextrose 50% Injectable 12.5 Gram(s) IV Push once  dextrose 50% Injectable 25 Gram(s) IV Push once  dextrose 50% Injectable 25 Gram(s) IV Push once  cefepime  IVPB 1000 milliGRAM(s) IV Intermittent every 12 hours  cefepime  IVPB   IV Intermittent   methylPREDNISolone sodium succinate Injectable 40 milliGRAM(s) IV Push every 8 hours  insulin lispro Injectable (HumaLOG) 17 Unit(s) SubCutaneous three times a day before meals  diltiazem    Tablet 60 milliGRAM(s) Oral four times a day  ALBUTerol/ipratropium for Nebulization 3 milliLiter(s) Nebulizer every 4 hours  ALBUTerol    90 MICROgram(s) HFA Inhaler 1 Puff(s) Inhalation every 4 hours  tiotropium 18 MICROgram(s) Capsule 1 Capsule(s) Inhalation daily  insulin glargine Injectable (LANTUS) 55 Unit(s) SubCutaneous at bedtime      Labs: All Labs Reviewed:                        9.9    14.8  )-----------( 186      ( 2017 05:58 )             29.7     07-    141  |  105  |  74<H>  ----------------------------<  360<H>  4.0   |  26  |  1.80<H>    Ca    9.1      2017 05:58

## 2017-07-24 NOTE — PROGRESS NOTE ADULT - SUBJECTIVE AND OBJECTIVE BOX
NEPHROLOGY INTERVAL HPI/OVERNIGHT EVENTS:  7/24 SY  Feeling fair.  Reports improved TINEO last night but this am again felt SOB walking back from bathroom.    7/23 SY  Appearing fairly comfortable.  However reports significant TINEO with just walking to bathroom this am.    HPI:  75 yo woman with multiple medical problems including CKD (with baseline creat 1.2-1.4) and COPD admitted with complaints of SOB for several days.  Noted to be hypoxic in Dr Forte's office and sent to ER.   Pt apparently was started on Lasix for worsening LE edema. by Dr Cutler last week.  Pt had not been on diuretic therapy for sometime until started by Dr Cutler.  Pt also had her Amlodipine dose reduced since LE edema increased after pt's meds were adjusted during previous hosp.  On admission. noted with worsened renal fx and has been started on gentle hydration.      PMHX and PSHX.  1.CKD (baseline creat 1.2-1.4)  2.CAD  3.COPD  4.Hypothyroidism  5.Anemia  6.Hx of Depression and Anxiety  7.HTN  8.Recurrent UTIs  9.Syncope--recent hosp.         MEDICATIONS  (STANDING):  pregabalin 150 milliGRAM(s) Oral at bedtime  citalopram 10 milliGRAM(s) Oral daily  atorvastatin 20 milliGRAM(s) Oral at bedtime  montelukast 10 milliGRAM(s) Oral at bedtime  pantoprazole    Tablet 40 milliGRAM(s) Oral before breakfast  levothyroxine 112 MICROGram(s) Oral daily  folic acid 1 milliGRAM(s) Oral daily  cholecalciferol 1000 Unit(s) Oral daily  aspirin  chewable 81 milliGRAM(s) Oral daily  heparin  Injectable 5000 Unit(s) SubCutaneous every 12 hours  insulin lispro (HumaLOG) corrective regimen sliding scale   SubCutaneous three times a day before meals  insulin lispro (HumaLOG) corrective regimen sliding scale   SubCutaneous at bedtime  dextrose 5%. 1000 milliLiter(s) (50 mL/Hr) IV Continuous <Continuous>  dextrose 50% Injectable 12.5 Gram(s) IV Push once  dextrose 50% Injectable 25 Gram(s) IV Push once  dextrose 50% Injectable 25 Gram(s) IV Push once  cefepime  IVPB 1000 milliGRAM(s) IV Intermittent every 12 hours  cefepime  IVPB   IV Intermittent   methylPREDNISolone sodium succinate Injectable 40 milliGRAM(s) IV Push every 8 hours  insulin lispro Injectable (HumaLOG) 17 Unit(s) SubCutaneous three times a day before meals  diltiazem    Tablet 60 milliGRAM(s) Oral four times a day  ALBUTerol/ipratropium for Nebulization 3 milliLiter(s) Nebulizer every 4 hours  ALBUTerol    90 MICROgram(s) HFA Inhaler 1 Puff(s) Inhalation every 4 hours  tiotropium 18 MICROgram(s) Capsule 1 Capsule(s) Inhalation daily  insulin glargine Injectable (LANTUS) 55 Unit(s) SubCutaneous at bedtime    MEDICATIONS  (PRN):  dextrose Gel 1 Dose(s) Oral once PRN Blood Glucose LESS THAN 70 milliGRAM(s)/deciliter  glucagon  Injectable 1 milliGRAM(s) IntraMuscular once PRN Glucose LESS THAN 70 milligrams/deciliter          Vital Signs Last 24 Hrs  T(C): 36.9 (24 Jul 2017 05:39), Max: 36.9 (24 Jul 2017 05:39)  T(F): 98.4 (24 Jul 2017 05:39), Max: 98.4 (24 Jul 2017 05:39)  HR: 78 (24 Jul 2017 08:09) (75 - 91)  BP: 148/71 (24 Jul 2017 05:39) (148/71 - 171/47)  BP(mean): --  RR: 18 (24 Jul 2017 05:39) (18 - 18)  SpO2: 97% (24 Jul 2017 08:09) (92% - 97%)  Daily     Daily       PHYSICAL EXAM:  Alert and appropriate  GENERAL: No distress  CHEST/LUNG: Bibasilar rales with wheezing  HEART: S1S2 RRR  ABDOMEN: soft  EXTREMITIES: decreased edema.  SKIN:     LABS:                        9.9    14.8  )-----------( 186      ( 24 Jul 2017 05:58 )             29.7     07-24    141  |  105  |  74<H>  ----------------------------<  360<H>  4.0   |  26  |  1.80<H>    Ca    9.1      24 Jul 2017 05:58                  RADIOLOGY & ADDITIONAL TESTS:

## 2017-07-25 LAB
ANION GAP SERPL CALC-SCNC: 10 MMOL/L — SIGNIFICANT CHANGE UP (ref 5–17)
BUN SERPL-MCNC: 84 MG/DL — HIGH (ref 7–23)
CALCIUM SERPL-MCNC: 9.1 MG/DL — SIGNIFICANT CHANGE UP (ref 8.5–10.1)
CHLORIDE SERPL-SCNC: 103 MMOL/L — SIGNIFICANT CHANGE UP (ref 96–108)
CO2 SERPL-SCNC: 29 MMOL/L — SIGNIFICANT CHANGE UP (ref 22–31)
CREAT SERPL-MCNC: 1.91 MG/DL — HIGH (ref 0.5–1.3)
GLUCOSE SERPL-MCNC: 348 MG/DL — HIGH (ref 70–99)
HCT VFR BLD CALC: 29.5 % — LOW (ref 34.5–45)
HGB BLD-MCNC: 9.6 G/DL — LOW (ref 11.5–15.5)
MCHC RBC-ENTMCNC: 29.3 PG — SIGNIFICANT CHANGE UP (ref 27–34)
MCHC RBC-ENTMCNC: 32.6 GM/DL — SIGNIFICANT CHANGE UP (ref 32–36)
MCV RBC AUTO: 89.8 FL — SIGNIFICANT CHANGE UP (ref 80–100)
PLATELET # BLD AUTO: 164 K/UL — SIGNIFICANT CHANGE UP (ref 150–400)
POTASSIUM SERPL-MCNC: 4.2 MMOL/L — SIGNIFICANT CHANGE UP (ref 3.5–5.3)
POTASSIUM SERPL-SCNC: 4.2 MMOL/L — SIGNIFICANT CHANGE UP (ref 3.5–5.3)
RBC # BLD: 3.29 M/UL — LOW (ref 3.8–5.2)
RBC # FLD: 14.9 % — HIGH (ref 10.3–14.5)
SODIUM SERPL-SCNC: 142 MMOL/L — SIGNIFICANT CHANGE UP (ref 135–145)
WBC # BLD: 12 K/UL — HIGH (ref 3.8–10.5)
WBC # FLD AUTO: 12 K/UL — HIGH (ref 3.8–10.5)

## 2017-07-25 RX ORDER — FUROSEMIDE 40 MG
40 TABLET ORAL DAILY
Qty: 0 | Refills: 0 | Status: DISCONTINUED | OUTPATIENT
Start: 2017-07-25 | End: 2017-07-26

## 2017-07-25 RX ADMIN — Medication 3 MILLILITER(S): at 07:41

## 2017-07-25 RX ADMIN — Medication 1 MILLIGRAM(S): at 11:06

## 2017-07-25 RX ADMIN — Medication 3 MILLILITER(S): at 04:11

## 2017-07-25 RX ADMIN — Medication 17 UNIT(S): at 08:07

## 2017-07-25 RX ADMIN — Medication 40 MILLIGRAM(S): at 07:51

## 2017-07-25 RX ADMIN — Medication 6: at 17:23

## 2017-07-25 RX ADMIN — Medication 6: at 12:00

## 2017-07-25 RX ADMIN — PANTOPRAZOLE SODIUM 40 MILLIGRAM(S): 20 TABLET, DELAYED RELEASE ORAL at 07:51

## 2017-07-25 RX ADMIN — HEPARIN SODIUM 5000 UNIT(S): 5000 INJECTION INTRAVENOUS; SUBCUTANEOUS at 17:24

## 2017-07-25 RX ADMIN — Medication 112 MICROGRAM(S): at 05:22

## 2017-07-25 RX ADMIN — CEFEPIME 100 MILLIGRAM(S): 1 INJECTION, POWDER, FOR SOLUTION INTRAMUSCULAR; INTRAVENOUS at 05:24

## 2017-07-25 RX ADMIN — Medication 17 UNIT(S): at 17:23

## 2017-07-25 RX ADMIN — Medication 80 MILLIGRAM(S): at 05:23

## 2017-07-25 RX ADMIN — INSULIN GLARGINE 55 UNIT(S): 100 INJECTION, SOLUTION SUBCUTANEOUS at 22:16

## 2017-07-25 RX ADMIN — Medication 5: at 08:06

## 2017-07-25 RX ADMIN — Medication 3 MILLILITER(S): at 16:29

## 2017-07-25 RX ADMIN — Medication 81 MILLIGRAM(S): at 11:06

## 2017-07-25 RX ADMIN — HEPARIN SODIUM 5000 UNIT(S): 5000 INJECTION INTRAVENOUS; SUBCUTANEOUS at 05:23

## 2017-07-25 RX ADMIN — Medication 1000 UNIT(S): at 11:06

## 2017-07-25 RX ADMIN — Medication 3 MILLILITER(S): at 00:25

## 2017-07-25 RX ADMIN — CITALOPRAM 10 MILLIGRAM(S): 10 TABLET, FILM COATED ORAL at 11:06

## 2017-07-25 RX ADMIN — ATORVASTATIN CALCIUM 20 MILLIGRAM(S): 80 TABLET, FILM COATED ORAL at 22:16

## 2017-07-25 RX ADMIN — Medication 2: at 22:16

## 2017-07-25 RX ADMIN — Medication 3 MILLILITER(S): at 20:23

## 2017-07-25 RX ADMIN — Medication 3 MILLILITER(S): at 11:26

## 2017-07-25 RX ADMIN — MONTELUKAST 10 MILLIGRAM(S): 4 TABLET, CHEWABLE ORAL at 22:16

## 2017-07-25 RX ADMIN — Medication 17 UNIT(S): at 12:00

## 2017-07-25 RX ADMIN — Medication 150 MILLIGRAM(S): at 22:18

## 2017-07-25 NOTE — PROGRESS NOTE ADULT - ASSESSMENT
77 yo woman  with COPD, and CKD admitted with resp insuff.   Pt was recently started on diuretic which may explain increase in creat on admission.  However CT scan c/w pulmonary edema.  Therefore, will need to evaluate for possible cardiac decompensation to explain recent event of ANI and CHF.  Will d/c further IVF.  Since resp status has stabilized, will hold off on adding diuretics until cardiology evaluation done.  Hyperkalemia of unclear reason.  Recheck to see if rx needed.    7/23 SY  --ANI/CKD    Unclear baseline fx at this point,  Renal parameters are quite variable.  Unclear if due to hyperglycemia as well.  --Resp insuff is persistent and very limiting in terms of pt's fx.   Therefore, will give a trial of IV lasix.     If resp status responds to diuresis, then will need to accept increased creat and continue diuretics.  --Will check to see pt is able to fully able to empty bladder.    7/24 SY  --ANI/CKD   as above    Creat now holding fairly steady.  --CXR more c/w CHF.   Will diurese more consistently and may need to allow creat to increase to stabilize resp status first.  Again, will need to consider whether this is primarily decompensated CHF.  --Follow up renal sonogram report.  --Close monitoring of I and O.    7/25  feels well on iv lasix  d/w Dr Griggs, stress test 1 yr ago, small defect.  Cont lasix 40 mg iv qd

## 2017-07-25 NOTE — PROGRESS NOTE ADULT - SUBJECTIVE AND OBJECTIVE BOX
7/24/17: Wheezing this am -- no cough, no cp, sob with exertion but fine at rest right now.  No n/v/f/c  7/25/17: Wheezing much improved; ambulated the halls this morning; feels much better - no sob this am, no cp, n/v/f/    ROS: as per HPI otherwise all other systems reviewed and are negative      PHYSICAL EXAM:    Vital Signs Last 24 Hrs  T(C): 37.2 (25 Jul 2017 05:34), Max: 37.2 (25 Jul 2017 05:34)  T(F): 99 (25 Jul 2017 05:34), Max: 99 (25 Jul 2017 05:34)  HR: 80 (25 Jul 2017 07:35) (75 - 86)  BP: 169/50 (25 Jul 2017 05:34) (154/40 - 169/50)  BP(mean): 75 (24 Jul 2017 17:23) (75 - 75)  RR: 16 (25 Jul 2017 05:34) (16 - 17)  SpO2: 94% (25 Jul 2017 05:34) (94% - 100%)    GEN: A and O, NAD, mood stable  HEENT:   NC/AT, EOMI, no oropharyngeal lesions    NECK:   supple    CV:  +S1, +S2, regular, no murmurs or rubs    RESP:   CTA ANGELICA, NO RALES OR WHEEZES THIS AM    GI:  abdomen soft, non-tender, non-distended, normal BS,  no abdominal masses, no palpable masses    RECTAL:  not examined    :  not examined    MSK:   normal muscle tone, no atrophy, no rigidity, no contractions    EXT:   no clubbing, no cyanosis, no edema, no calf pain, swelling or erythema    VASCULAR:  pulses equal and symmetric in the upper and lower extremities    NEURO:  AAOX3, no focal neurological deficits, follows all commands, able to move extremities spontaneously    SKIN:  no ulcers, lesions or rashes    LABS:                            9.6    12.0  )-----------( 164      ( 25 Jul 2017 05:35 )             29.5     07-25    142  |  103  |  84<H>  ----------------------------<  348<H>  4.2   |  29  |  1.91<H>    Ca    9.1      25 Jul 2017 05:35          MEDICATIONS  (STANDING):  pregabalin 150 milliGRAM(s) Oral at bedtime  citalopram 10 milliGRAM(s) Oral daily  atorvastatin 20 milliGRAM(s) Oral at bedtime  montelukast 10 milliGRAM(s) Oral at bedtime  pantoprazole    Tablet 40 milliGRAM(s) Oral before breakfast  levothyroxine 112 MICROGram(s) Oral daily  folic acid 1 milliGRAM(s) Oral daily  cholecalciferol 1000 Unit(s) Oral daily  aspirin  chewable 81 milliGRAM(s) Oral daily  heparin  Injectable 5000 Unit(s) SubCutaneous every 12 hours  insulin lispro (HumaLOG) corrective regimen sliding scale   SubCutaneous three times a day before meals  insulin lispro (HumaLOG) corrective regimen sliding scale   SubCutaneous at bedtime  dextrose 5%. 1000 milliLiter(s) (50 mL/Hr) IV Continuous <Continuous>  dextrose 50% Injectable 12.5 Gram(s) IV Push once  dextrose 50% Injectable 25 Gram(s) IV Push once  dextrose 50% Injectable 25 Gram(s) IV Push once  cefepime  IVPB 1000 milliGRAM(s) IV Intermittent every 12 hours  cefepime  IVPB   IV Intermittent   insulin lispro Injectable (HumaLOG) 17 Unit(s) SubCutaneous three times a day before meals  diltiazem    Tablet 60 milliGRAM(s) Oral four times a day  ALBUTerol/ipratropium for Nebulization 3 milliLiter(s) Nebulizer every 4 hours  ALBUTerol    90 MICROgram(s) HFA Inhaler 1 Puff(s) Inhalation every 4 hours  tiotropium 18 MICROgram(s) Capsule 1 Capsule(s) Inhalation daily  insulin glargine Injectable (LANTUS) 55 Unit(s) SubCutaneous at bedtime  methylPREDNISolone sodium succinate Injectable 40 milliGRAM(s) IV Push two times a day    MEDICATIONS  (PRN):  dextrose Gel 1 Dose(s) Oral once PRN Blood Glucose LESS THAN 70 milliGRAM(s)/deciliter  glucagon  Injectable 1 milliGRAM(s) IntraMuscular once PRN Glucose LESS THAN 70 milligrams/deciliter

## 2017-07-25 NOTE — PROGRESS NOTE ADULT - ASSESSMENT
Pt is a 77 y/o female with h/o type II diabetes, HTN, chronic UTI's, stage 3 CKD, COPD with chronic hypoxic respiratory failure (refuses to wear O2) ,who is know to me from a recent admission at  for syncope, ANI on stage 3 CKD, Rt clavicle fracture and bradycardia admitted for acute hypoxic respiratory failure.    * Acute Hypoxic respiratory failure-SECONDARY  to ACOPD exacerbation with possible RLL pneumonia AND COMPONENT OF CHF. MUCH IMPROVED WITH DIURESIS AND STEROIDS.  CONT STEROIDS AT LOWER DOSE AND HOLD LASIX THIS AM DUE TO ELEVATED CR LEVELS.   * HTN- bp remains high, increase Cardizem, DOES NOT TOLERATE ACEINH/ARB DUE TO HYPERKALEMIA IN THE PAST. MONITOR  * ANI on stage 3 CKD- CR INCREASED FURTHER TODAY LIKELY FROM DIURESIS; HOLD LASIX TODAY AND MONITOR  * Recent Rt Clavicle Facture supportive care with sling, pain control, PT   * Hypothyroidism continue synthroid  * Type II diabetes- uncontrolled due to steroids, INCREASED LANTUS AGAIN YEST BUT NOW STEROIDS BEING TAPERED - WILL MONITOR AND ADJUST AS NEEDED.  * Disp-OOB, ambulate with PT.  * Proph- heparin  * Comm- d/w pt and daughter in details, RN

## 2017-07-25 NOTE — PROGRESS NOTE ADULT - ASSESSMENT
- sob is multifactorial with the chf and copd improved with the iv lasix with resolution of the wheezing   - chf with small effusion with interstertial prominence   - D.M   - lung cancer resected stage 1 with  no recurrence     PLAN     - discontinue iv steroids and change to po prednisone 20 and discontinue cefipime no pneumonia   - lasix as needed to get the negative balance   - nebs for the wheezing   - 0 2 supplementation and vq scan low probability of PE .  - dvt and G.I prophylaxis

## 2017-07-25 NOTE — PROGRESS NOTE ADULT - SUBJECTIVE AND OBJECTIVE BOX
NEPHROLOGY INTERVAL HPI/OVERNIGHT EVENTS:    7/25  feels much better after IV lasix  no dyspnea  daughter at bedside    7/24 SY  Feeling fair.  Reports improved TINEO last night but this am again felt SOB walking back from bathroom.    7/23 SY  Appearing fairly comfortable.  However reports significant TINEO with just walking to bathroom this am.    HPI:  75 yo woman with multiple medical problems including CKD (with baseline creat 1.2-1.4) and COPD admitted with complaints of SOB for several days.  Noted to be hypoxic in Dr Forte's office and sent to ER.   Pt apparently was started on Lasix for worsening LE edema. by Dr Cutler last week.  Pt had not been on diuretic therapy for sometime until started by Dr Cutler.  Pt also had her Amlodipine dose reduced since LE edema increased after pt's meds were adjusted during previous hosp.  On admission. noted with worsened renal fx and has been started on gentle hydration.      PMHX and PSHX.  1.CKD (baseline creat 1.2-1.4)  2.CAD  3.COPD  4.Hypothyroidism  5.Anemia  6.Hx of Depression and Anxiety  7.HTN  8.Recurrent UTIs  9.Syncope--recent hosp.         MEDICATIONS  (STANDING):  pregabalin 150 milliGRAM(s) Oral at bedtime  citalopram 10 milliGRAM(s) Oral daily  atorvastatin 20 milliGRAM(s) Oral at bedtime  montelukast 10 milliGRAM(s) Oral at bedtime  pantoprazole    Tablet 40 milliGRAM(s) Oral before breakfast  levothyroxine 112 MICROGram(s) Oral daily  folic acid 1 milliGRAM(s) Oral daily  cholecalciferol 1000 Unit(s) Oral daily  aspirin  chewable 81 milliGRAM(s) Oral daily  heparin  Injectable 5000 Unit(s) SubCutaneous every 12 hours  insulin lispro (HumaLOG) corrective regimen sliding scale   SubCutaneous three times a day before meals  insulin lispro (HumaLOG) corrective regimen sliding scale   SubCutaneous at bedtime  dextrose 5%. 1000 milliLiter(s) (50 mL/Hr) IV Continuous <Continuous>  dextrose 50% Injectable 12.5 Gram(s) IV Push once  dextrose 50% Injectable 25 Gram(s) IV Push once  dextrose 50% Injectable 25 Gram(s) IV Push once  insulin lispro Injectable (HumaLOG) 17 Unit(s) SubCutaneous three times a day before meals  diltiazem    Tablet 60 milliGRAM(s) Oral four times a day  ALBUTerol/ipratropium for Nebulization 3 milliLiter(s) Nebulizer every 4 hours  ALBUTerol    90 MICROgram(s) HFA Inhaler 1 Puff(s) Inhalation every 4 hours  tiotropium 18 MICROgram(s) Capsule 1 Capsule(s) Inhalation daily  insulin glargine Injectable (LANTUS) 55 Unit(s) SubCutaneous at bedtime  furosemide   Injectable 40 milliGRAM(s) IV Push daily    MEDICATIONS  (PRN):  dextrose Gel 1 Dose(s) Oral once PRN Blood Glucose LESS THAN 70 milliGRAM(s)/deciliter  glucagon  Injectable 1 milliGRAM(s) IntraMuscular once PRN Glucose LESS THAN 70 milligrams/deciliter          Vital Signs Last 24 Hrs  T(C): 36.9 (24 Jul 2017 05:39), Max: 36.9 (24 Jul 2017 05:39)  T(F): 98.4 (24 Jul 2017 05:39), Max: 98.4 (24 Jul 2017 05:39)  HR: 78 (24 Jul 2017 08:09) (75 - 91)  BP: 148/71 (24 Jul 2017 05:39) (148/71 - 171/47)  BP(mean): --  RR: 18 (24 Jul 2017 05:39) (18 - 18)  SpO2: 97% (24 Jul 2017 08:09) (92% - 97%)  Daily     Daily       PHYSICAL EXAM:  Alert and appropriate  GENERAL: No distress  CHEST/LUNG: clear  HEART: S1S2 RRR  ABDOMEN: soft  EXTREMITIES: decreased edema.  SKIN:                           9.6    12.0  )-----------( 164      ( 25 Jul 2017 05:35 )             29.5     07-25    142  |  103  |  84<H>  ----------------------------<  348<H>  4.2   |  29  |  1.91<H>    Ca    9.1      25 Jul 2017 05:35

## 2017-07-25 NOTE — PROGRESS NOTE ADULT - ASSESSMENT
SOB/ TINEO and profound hypoxia- She was being treated as COPD exacerbation with steroids and nebs and HFPEF decompensation with iv diuretics.   Her renal function is slowly worsening but stable in the last 2 days.  I would recommend decreasing diuretics for now.  Close monitoring of renal function and electrolytes.  She is actively wheezing now and pt says that she is due for nebs now.   Continue O2 supplementation.  Continue nebs and management of her COPD.      HTN- Cardizem po to continue for now.     Ventricular ectopy-  close monitoring of the electrolytes  with goal K of 4 and magnesium of 2.    HTN-   Cardizem  to continue.    CAD, NSTEMI in past- will continue ASA and statin.  Her last stress test showed small fixed defect.      Other medical issues- hypoxia, COPD, CKD, anemia, DM- Management pre primary team.    Thank you for allowing me to participate in the care of this patient. Please feel free to contact me with any questions.

## 2017-07-25 NOTE — DIETITIAN INITIAL EVALUATION ADULT. - OTHER INFO
Pt w/ good po intake tolerating current diet rx. PTA followed no therapeutic restrictions. Skin intact w/ +1 edema noted.  CKD, DM, Cardiac history noted- diet appropriate at this time BG remain uncontrolled (336-412mg/dl secondary to steroid tx and DM. Consistent carb, Renal, DASH/TLC diet . Family requesting diet instruction, provided educational materials and reviewed diet.

## 2017-07-25 NOTE — PROGRESS NOTE ADULT - SUBJECTIVE AND OBJECTIVE BOX
SUBJECTIVE     patient feels better with the iv lasix yesterday received 160 mg lasix with improvement in wheezing and cr is stable and bun is elevated with the steroids . no fever or chest pain         PAST MEDICAL & SURGICAL HISTORY:  Hyperlipidemia, unspecified hyperlipidemia type  Essential hypertension  Neuropathy  Diabetes  Chronic obstructive pulmonary disease, unspecified COPD type.  lung cancer stage 1 status post wedge resection   History of total knee replacement, unspecified laterality  H/O hernia repair   delivery delivered  S/P appendectomy          OBJECTIVE       Vital Signs Last 24 Hrs  T(C): 36.6 (2017 10:45), Max: 37.2 (2017 05:34)  T(F): 97.9 (2017 10:45), Max: 99 (2017 05:34)  HR: 70 (2017 10:45) (70 - 86)  BP: 154/44 (2017 10:45) (154/40 - 169/50)  BP(mean): 75 (2017 17:23) (75 - 75)  RR: 17 (2017 10:45) (16 - 17)  SpO2: 97% (2017 10:45) (94% - 100%)    PHYSICAL EXAM:    Constitutional: , awake and alert, not in distress.     HEENT: Normo cephalic atraumatic     Neck: Soft and supple, No J.V.D     Respiratory: vesicular breathing has improved wheeze with occasional rales     Cardiovascular: S1 and S2, regular rate .     Gastrointestinal:  soft, nontender,     Extremities: minimal edema of the feet     Neurological: A/O x 3, no focal deficits.      Medications:  MEDICATIONS  (STANDING):  pregabalin 150 milliGRAM(s) Oral at bedtime  citalopram 10 milliGRAM(s) Oral daily  atorvastatin 20 milliGRAM(s) Oral at bedtime  montelukast 10 milliGRAM(s) Oral at bedtime  pantoprazole    Tablet 40 milliGRAM(s) Oral before breakfast  levothyroxine 112 MICROGram(s) Oral daily  folic acid 1 milliGRAM(s) Oral daily  cholecalciferol 1000 Unit(s) Oral daily  aspirin  chewable 81 milliGRAM(s) Oral daily  heparin  Injectable 5000 Unit(s) SubCutaneous every 12 hours  insulin lispro (HumaLOG) corrective regimen sliding scale   SubCutaneous three times a day before meals  insulin lispro (HumaLOG) corrective regimen sliding scale   SubCutaneous at bedtime  dextrose 5%. 1000 milliLiter(s) (50 mL/Hr) IV Continuous <Continuous>  dextrose 50% Injectable 12.5 Gram(s) IV Push once  dextrose 50% Injectable 25 Gram(s) IV Push once  dextrose 50% Injectable 25 Gram(s) IV Push once  cefepime  IVPB 1000 milliGRAM(s) IV Intermittent every 12 hours  cefepime  IVPB   IV Intermittent   methylPREDNISolone sodium succinate Injectable 40 milliGRAM(s) IV Push every 8 hours  insulin lispro Injectable (HumaLOG) 17 Unit(s) SubCutaneous three times a day before meals  diltiazem    Tablet 60 milliGRAM(s) Oral four times a day  ALBUTerol/ipratropium for Nebulization 3 milliLiter(s) Nebulizer every 4 hours  ALBUTerol    90 MICROgram(s) HFA Inhaler 1 Puff(s) Inhalation every 4 hours  tiotropium 18 MICROgram(s) Capsule 1 Capsule(s) Inhalation daily  insulin glargine Injectable (LANTUS) 55 Unit(s) SubCutaneous at bedtime      Labs: All Labs Reviewed:                        9.9    14.8  )-----------( 186      ( 2017 05:58 )             29.7     07-    141  |  105  |  74<H>  ----------------------------<  360<H>  4.0   |  26  |  1.80<H>    Ca    9.1      2017 05:58

## 2017-07-25 NOTE — PROGRESS NOTE ADULT - SUBJECTIVE AND OBJECTIVE BOX
Patient is a 76y old  Female who presents with a chief complaint of I can't breath.    HPI:  Pt is a 75 y/o female with h/o type II diabetes, HTN, chronic UTI's, stage 3 CKD, COPD with chronic hypoxic respiratory failure (refuses to wear O2) , ANI on stage 3 CKD, Rt clavicle fracture.  For the past few days started to develop increasing SOB with TINEO.  She was seen by Dr Forte and O2  sat noted to be  60-70's and was sent to ER.      She had prior cardiac hx of NSTEMI and abnormal stress test in   showing small sized fixed defect in apical area .    She was having pedal edema few months ago and her lasix was adjusted to 40mg po daily instead of 20mg po BID and her pedal edema resolved.  She has very poor intake of fluids in day time and partly her dementia is playing a role with compliance.     - pt seen and examined. her daughter is at bedside this am.  Pt c/o TINEO with walking few steps but no SOB at rest.  - Pt seen and examined by me today.  She still c/o TINEO with exertion but slightly better, however last night had one episode of SOB earlier in the night .  Had been getting nebs.   - Pt seen and examined by me today.  She notes improvement in her SOB.       Vital Signs Last 24 Hrs  T(C): --  T(F): --  HR: 70 (2017 15:40) (70 - 70)  BP: 179/65 (2017 15:40) (179/65 - 179/65)  BP(mean): --  RR: 18 (2017 15:40) (18 - 18)  SpO2: 92% (2017 15:40) (92% - 92%)    I&O's Detail      Daily Height in cm: 157.48 (2017 15:40)    Daily     CARDIAC MARKERS ( 2017 16:01 )  <0.015 ng/mL / x     / x     / x     / x            ABG - ( 2017 16:40 )  pH: 7.39  /  pCO2: 42    /  pO2: 66    / HCO3: 25    / Base Excess: 0     /  SaO2: 92                                        11.0   8.0   )-----------( 200      ( 2017 16:01 )             33.4       07-    144  |  111<H>  |  41<H>  ----------------------------<  214<H>  5.0   |  28  |  1.62<H>    Ca    9.3      2017 16:01    TPro  6.9  /  Alb  3.7  /  TBili  0.6  /  DBili  x   /  AST  14<L>  /  ALT  20  /  AlkPhos  120        LIVER FUNCTIONS - ( 2017 16:01 )  Alb: 3.7 g/dL / Pro: 6.9 gm/dL / ALK PHOS: 120 U/L / ALT: 20 U/L / AST: 14 U/L / GGT: x                       MEDICATIONS  (STANDING):    MEDICATIONS  (PRN): (2017 19:26)      PAST MEDICAL & SURGICAL HISTORY:  Hyperlipidemia, unspecified hyperlipidemia type  Essential hypertension  Neuropathy  Diabetes  Chronic obstructive pulmonary disease, unspecified COPD type  History of total knee replacement, unspecified laterality  H/O hernia repair   delivery delivered  S/P appendectomy      MEDICATIONS  (STANDING):  pregabalin 150 milliGRAM(s) Oral at bedtime  citalopram 10 milliGRAM(s) Oral daily  atorvastatin 20 milliGRAM(s) Oral at bedtime  montelukast 10 milliGRAM(s) Oral at bedtime  pantoprazole    Tablet 40 milliGRAM(s) Oral before breakfast  levothyroxine 112 MICROGram(s) Oral daily  folic acid 1 milliGRAM(s) Oral daily  cholecalciferol 1000 Unit(s) Oral daily  aspirin  chewable 81 milliGRAM(s) Oral daily  heparin  Injectable 5000 Unit(s) SubCutaneous every 12 hours  insulin glargine Injectable (LANTUS) 40 Unit(s) SubCutaneous at bedtime  insulin lispro (HumaLOG) corrective regimen sliding scale   SubCutaneous three times a day before meals  insulin lispro (HumaLOG) corrective regimen sliding scale   SubCutaneous at bedtime  dextrose 5%. 1000 milliLiter(s) (50 mL/Hr) IV Continuous <Continuous>  dextrose 50% Injectable 12.5 Gram(s) IV Push once  dextrose 50% Injectable 25 Gram(s) IV Push once  dextrose 50% Injectable 25 Gram(s) IV Push once  ALBUTerol/ipratropium for Nebulization 3 milliLiter(s) Nebulizer every 6 hours  cefepime  IVPB 1000 milliGRAM(s) IV Intermittent every 12 hours  cefepime  IVPB   IV Intermittent   methylPREDNISolone sodium succinate Injectable 40 milliGRAM(s) IV Push every 8 hours    MEDICATIONS  (PRN):  dextrose Gel 1 Dose(s) Oral once PRN Blood Glucose LESS THAN 70 milliGRAM(s)/deciliter  glucagon  Injectable 1 milliGRAM(s) IntraMuscular once PRN Glucose LESS THAN 70 milligrams/deciliter      FAMILY HISTORY:  Family history of CHF (congestive heart failure) (Mother)      SOCIAL HISTORY:  non smoker, no alcohol use in recent past      REVIEW OF SYSTEMS:  CONSTITUTIONAL:  No night sweats.  No fatigue, malaise, lethargy.  No fever or chills.  HEENT:  Eyes:  No visual changes.  No eye pain.      ENT:  No runny nose.  No epistaxis.  No sinus pain.  No sore throat.  No odynophagia.  No ear pain.  No congestion.  RESPIRATORY:  No cough.  No wheeze.  No hemoptysis.  c/o  shortness of breath.  CARDIOVASCULAR:  No chest pains.  No palpitations. c/o shortness of breath and TINEO, No orthopnea or PND.  GASTROINTESTINAL:  No abdominal pain.  No nausea or vomiting.  No diarrhea or constipation.  No hematemesis.  No hematochezia.  No melena.  GENITOURINARY:  No urgency.  No frequency.  No dysuria.  No hematuria.  No obstructive symptoms.  No discharge.  No pain.  No significant abnormal bleeding.  MUSCULOSKELETAL:  No musculoskeletal pain.  No joint swelling.  No arthritis.  NEUROLOGICAL:  No tingling or numbness or weakness.  PSYCHIATRIC:  No confusion  SKIN:  No rashes.  No lesions.  No wounds.  ENDOCRINE:  No unexplained weight loss.  No polydipsia.  No polyuria.  No polyphagia.  HEMATOLOGIC:  No anemia.  No purpura.  No petechiae.  No prolonged or excessive bleeding.   ALLERGIC AND IMMUNOLOGIC:  No pruritus.  No swelling.         Vital Signs Last 24 Hrs  T(C): 36.4 (2017 05:27), Max: 36.6 (2017 21:30)  T(F): 97.6 (2017 05:27), Max: 97.9 (2017 21:30)  HR: 80 (2017 08:28) (70 - 92)  BP: 162/40 (2017 05:27) (162/40 - 179/65)  BP(mean): 73 (2017 05:27) (73 - 73)  RR: 20 (2017 21:30) (18 - 20)  SpO2: 99% (2017 08:30) (92% - 99%)    PHYSICAL EXAM-    Constitutional: obese, no distress    Head: Head is normocephalic and atraumatic.      Neck: The patient's neck is supple without enlargement, has no palpable thyromegaly nor thyroid nodules and has no jugular venous distention. No audible carotid bruits. There are strong carotid pulses bilaterally. No JVD.     Cardiovascular: Regular rate and rhythm without S3, S4. No murmurs or rubs are appreciated.      Respiratory: B/l scattered wheezing.    Abdomen: Soft, nontender, nondistended with positive bowel sounds.      Extremity: No tenderness. No  pitting edema No skin discoloration No clubbing No cyanosis.     Neurologic: The patient is alert and oriented.      Skin: No rash, no obvious lesions noted.      Psychiatric: The patient appears to be emotionally stable.      INTERPRETATION OF TELEMETRY: sinus rythm with PVCs     ECG: sinus rythm, normal axis and PVCs.    I&O's Detail    2017 07:  -  2017 07:00  --------------------------------------------------------  IN:    Oral Fluid: 120 mL  Total IN: 120 mL    OUT:  Total OUT: 0 mL    Total NET: 120 mL      2017 07:01  -  2017 10:56  --------------------------------------------------------  IN:    sodium chloride 0.45%: 495 mL  Total IN: 495 mL    OUT:  Total OUT: 0 mL    Total NET: 495 mL          LABS:                        9.4    5.8   )-----------( 170      ( 2017 05:26 )             29.2     07-22    140  |  109<H>  |  46<H>  ----------------------------<  409<H>  5.1   |  25  |  1.72<H>    Ca    9.1      2017 09:34    TPro  6.9  /  Alb  3.7  /  TBili  0.6  /  DBili  x   /  AST  14<L>  /  ALT  20  /  AlkPhos  120  07    CARDIAC MARKERS ( 2017 16:01 )  <0.015 ng/mL / x     / x     / x     / x              I&O's Summary    2017 07:  -  2017 07:00  --------------------------------------------------------  IN: 120 mL / OUT: 0 mL / NET: 120 mL    2017 07:01  -  2017 10:56  --------------------------------------------------------  IN: 495 mL / OUT: 0 mL / NET: 495 mL      BNPSerum Pro-Brain Natriuretic Peptide: 2969 pg/mL ( @ 16:01)    RADIOLOGY & ADDITIONAL STUDIES:    < from: CT Chest No Cont (17 @ 18:52) >  EXAM:  CT CHEST                            PROCEDURE DATE:  2017          INTERPRETATION:  CT CHEST    HISTORY:  Shortness of breath                     TECHNIQUE: Noncontrast CT of the chest was performed. Coronal and   sagittal images were reconstructed. This study was performed using   automatic exposure control (radiation dose reduction software) to obtain   a diagnostic image quality scan with patient dose as low as reasonably   achievable.    COMPARISON: Chest x-ray 1 hour prior, chest CT 3/5/2013    FINDINGS:    LUNGS, AIRWAYS: The central airways are patent. Prior left lower lobe   wedge resection. Diffuse bilateral groundglass opacities and septal   thickening.    PLEURA: Small bilateral pleural effusions.    HEART AND VESSELS: Mild cardiomegaly. No pericardial effusion. Extensive   coronary artery, aortic valve, and mitral annular calcification. Normal   caliber of the thoracic aorta. Main pulmonary artery is enlarged up to   3.9 cm.    MEDIASTINUM AND LAVELL: Mild adenopathy, likely reactive.    UPPER ABDOMEN: Limited visualization shows cholecystectomy clips and   diffuse arterial vascular calcification.    BONES AND SOFT TISSUES: No acute bony abnormality.    IMPRESSION:     Pulmonary edema and small bilateral pleural effusions.                SAMEERA GIBBS   This document has been electronically signed. 2017  7:22PM                < end of copied text >  Blood Gas Profile - Arterial (17 @ 16:40)    pH, Arterial: 7.39    pCO2, Arterial: 42 mmHg    pO2, Arterial: 66    HCO3, Arterial: 25 mmoL/L    Base Excess, Arterial: 0 mmol/L    Oxygen Saturation, Arterial: 92    Blood Gas Comments Arterial: 5 LPM    Blood Gas Source Arterial: Arterial

## 2017-07-26 LAB
ANION GAP SERPL CALC-SCNC: 7 MMOL/L — SIGNIFICANT CHANGE UP (ref 5–17)
BUN SERPL-MCNC: 90 MG/DL — HIGH (ref 7–23)
CALCIUM SERPL-MCNC: 8.9 MG/DL — SIGNIFICANT CHANGE UP (ref 8.5–10.1)
CHLORIDE SERPL-SCNC: 104 MMOL/L — SIGNIFICANT CHANGE UP (ref 96–108)
CO2 SERPL-SCNC: 31 MMOL/L — SIGNIFICANT CHANGE UP (ref 22–31)
CREAT SERPL-MCNC: 1.82 MG/DL — HIGH (ref 0.5–1.3)
CULTURE RESULTS: SIGNIFICANT CHANGE UP
GLUCOSE SERPL-MCNC: 290 MG/DL — HIGH (ref 70–99)
GLUCOSE SERPL-MCNC: 386 MG/DL — HIGH (ref 70–99)
HCT VFR BLD CALC: 28.6 % — LOW (ref 34.5–45)
HGB BLD-MCNC: 9.5 G/DL — LOW (ref 11.5–15.5)
MCHC RBC-ENTMCNC: 29.6 PG — SIGNIFICANT CHANGE UP (ref 27–34)
MCHC RBC-ENTMCNC: 33.3 GM/DL — SIGNIFICANT CHANGE UP (ref 32–36)
MCV RBC AUTO: 88.6 FL — SIGNIFICANT CHANGE UP (ref 80–100)
PLATELET # BLD AUTO: 186 K/UL — SIGNIFICANT CHANGE UP (ref 150–400)
POTASSIUM SERPL-MCNC: 3.9 MMOL/L — SIGNIFICANT CHANGE UP (ref 3.5–5.3)
POTASSIUM SERPL-SCNC: 3.9 MMOL/L — SIGNIFICANT CHANGE UP (ref 3.5–5.3)
RBC # BLD: 3.23 M/UL — LOW (ref 3.8–5.2)
RBC # FLD: 14.7 % — HIGH (ref 10.3–14.5)
SODIUM SERPL-SCNC: 142 MMOL/L — SIGNIFICANT CHANGE UP (ref 135–145)
SPECIMEN SOURCE: SIGNIFICANT CHANGE UP
WBC # BLD: 9.8 K/UL — SIGNIFICANT CHANGE UP (ref 3.8–10.5)
WBC # FLD AUTO: 9.8 K/UL — SIGNIFICANT CHANGE UP (ref 3.8–10.5)

## 2017-07-26 RX ORDER — FUROSEMIDE 40 MG
80 TABLET ORAL DAILY
Qty: 0 | Refills: 0 | Status: DISCONTINUED | OUTPATIENT
Start: 2017-07-26 | End: 2017-07-29

## 2017-07-26 RX ADMIN — Medication 112 MICROGRAM(S): at 06:04

## 2017-07-26 RX ADMIN — Medication 3: at 17:05

## 2017-07-26 RX ADMIN — ATORVASTATIN CALCIUM 20 MILLIGRAM(S): 80 TABLET, FILM COATED ORAL at 21:45

## 2017-07-26 RX ADMIN — Medication 17 UNIT(S): at 17:05

## 2017-07-26 RX ADMIN — Medication: at 08:53

## 2017-07-26 RX ADMIN — Medication 6: at 11:31

## 2017-07-26 RX ADMIN — Medication 3 MILLILITER(S): at 04:39

## 2017-07-26 RX ADMIN — Medication 1000 UNIT(S): at 11:29

## 2017-07-26 RX ADMIN — MONTELUKAST 10 MILLIGRAM(S): 4 TABLET, CHEWABLE ORAL at 21:46

## 2017-07-26 RX ADMIN — HEPARIN SODIUM 5000 UNIT(S): 5000 INJECTION INTRAVENOUS; SUBCUTANEOUS at 06:04

## 2017-07-26 RX ADMIN — Medication 3 MILLILITER(S): at 01:37

## 2017-07-26 RX ADMIN — Medication 17 UNIT(S): at 08:54

## 2017-07-26 RX ADMIN — Medication 3 MILLILITER(S): at 07:40

## 2017-07-26 RX ADMIN — PANTOPRAZOLE SODIUM 40 MILLIGRAM(S): 20 TABLET, DELAYED RELEASE ORAL at 06:04

## 2017-07-26 RX ADMIN — HEPARIN SODIUM 5000 UNIT(S): 5000 INJECTION INTRAVENOUS; SUBCUTANEOUS at 17:04

## 2017-07-26 RX ADMIN — Medication 1 MILLIGRAM(S): at 11:29

## 2017-07-26 RX ADMIN — Medication 40 MILLIGRAM(S): at 06:04

## 2017-07-26 RX ADMIN — Medication 80 MILLIGRAM(S): at 11:31

## 2017-07-26 RX ADMIN — Medication 20 MILLIGRAM(S): at 06:04

## 2017-07-26 RX ADMIN — Medication 17 UNIT(S): at 11:31

## 2017-07-26 RX ADMIN — CITALOPRAM 10 MILLIGRAM(S): 10 TABLET, FILM COATED ORAL at 11:29

## 2017-07-26 RX ADMIN — Medication 150 MILLIGRAM(S): at 21:45

## 2017-07-26 RX ADMIN — INSULIN GLARGINE 55 UNIT(S): 100 INJECTION, SOLUTION SUBCUTANEOUS at 21:44

## 2017-07-26 RX ADMIN — Medication 81 MILLIGRAM(S): at 11:29

## 2017-07-26 NOTE — PROGRESS NOTE ADULT - SUBJECTIVE AND OBJECTIVE BOX
SUBJECTIVE     patient complains of shakiness and some chills with no fever or cough and wheezing improved         PAST MEDICAL & SURGICAL HISTORY:  Hyperlipidemia, unspecified hyperlipidemia type  Essential hypertension  Neuropathy  Diabetes  Chronic obstructive pulmonary disease, unspecified COPD type.  lung cancer stage 1 status post wedge resection   History of total knee replacement, unspecified laterality  H/O hernia repair   delivery delivered  S/P appendectomy          OBJECTIVE       Vital Signs Last 24 Hrs  T(C): 34.4 (2017 04:56), Max: 36.6 (2017 10:45)  T(F): 94 (2017 04:56), Max: 97.9 (2017 10:45)  HR: 82 (2017 07:20) (69 - 87)  BP: 157/42 (2017 04:56) (154/44 - 157/42)  BP(mean): --  RR: 18 (2017 04:56) (17 - 18)  SpO2: 94% (2017 04:56) (93% - 98%)  PHYSICAL EXAM:    Constitutional: , awake and alert, not in distress.     HEENT: Normo cephalic atraumatic     Neck: Soft and supple, No J.V.D     Respiratory: vesicular breathing has no wheeze or rales     Cardiovascular: S1 and S2, regular rate .     Gastrointestinal:  soft, nontender,     Extremities: minimal edema of the feet     Neurological: A/O x 3, no focal deficits.      Medications:  MEDICATIONS  (STANDING):  pregabalin 150 milliGRAM(s) Oral at bedtime  citalopram 10 milliGRAM(s) Oral daily  atorvastatin 20 milliGRAM(s) Oral at bedtime  montelukast 10 milliGRAM(s) Oral at bedtime  pantoprazole    Tablet 40 milliGRAM(s) Oral before breakfast  levothyroxine 112 MICROGram(s) Oral daily  folic acid 1 milliGRAM(s) Oral daily  cholecalciferol 1000 Unit(s) Oral daily  aspirin  chewable 81 milliGRAM(s) Oral daily  heparin  Injectable 5000 Unit(s) SubCutaneous every 12 hours  insulin lispro (HumaLOG) corrective regimen sliding scale   SubCutaneous three times a day before meals  insulin lispro (HumaLOG) corrective regimen sliding scale   SubCutaneous at bedtime  dextrose 5%. 1000 milliLiter(s) (50 mL/Hr) IV Continuous <Continuous>  dextrose 50% Injectable 12.5 Gram(s) IV Push once  dextrose 50% Injectable 25 Gram(s) IV Push once  dextrose 50% Injectable 25 Gram(s) IV Push once  insulin lispro Injectable (HumaLOG) 17 Unit(s) SubCutaneous three times a day before meals  diltiazem    Tablet 60 milliGRAM(s) Oral four times a day  ALBUTerol/ipratropium for Nebulization 3 milliLiter(s) Nebulizer every 4 hours  ALBUTerol    90 MICROgram(s) HFA Inhaler 1 Puff(s) Inhalation every 4 hours  tiotropium 18 MICROgram(s) Capsule 1 Capsule(s) Inhalation daily  insulin glargine Injectable (LANTUS) 55 Unit(s) SubCutaneous at bedtime  predniSONE   Tablet 20 milliGRAM(s) Oral daily  furosemide   Injectable 40 milliGRAM(s) IV Push daily      Labs: All Labs Reviewed:                                 9.5    9.8   )-----------( 186      ( 2017 05:52 )             28.6       07-    142  |  104  |  90<H>  ----------------------------<  290<H>  3.9   |  31  |  1.82<H>    Ca    8.9      2017 05:52

## 2017-07-26 NOTE — PROGRESS NOTE ADULT - ASSESSMENT
- Improved sob and wheezing with diuresis   - chf with small effusion with interstertial prominence   - D.M   - lung cancer resected stage 1 with  no recurrence     PLAN     - discontinue prednisone   - change lasix to po after renal can cardio follow up   - nebs for the wheezing only and discontinue standing   - 0 2 supplementation and vq scan low probability of PE .  - dvt and G.I prophylaxis.  - recheck sat with low flow 02   - discharge planning in A.M if stable

## 2017-07-26 NOTE — PROGRESS NOTE ADULT - ASSESSMENT
SOB/ TINEO and profound hypoxia- She was being treated as COPD exacerbation with steroids and nebs and HFPEF decompensation with iv diuretics.   Continue lasix at 40mg iv for now with close monitoring of renal function and electrolytes.  Continue O2 supplementation.  Continue nebs and management of her COPD.    She had ketchup packets from [employer] on her table in the room and she says that dietary team gave it to her with her cheese burger.  She says that she likes [employer] better than [employer].  Will recommend nutrition consultation to educate pt and family.  Pt has dementia and so the family to be educated thoroughly.  Informed nurse manager.    HTN- Cardizem po to continue for now.     Ventricular ectopy-  close monitoring of the electrolytes  with goal K of 4 and magnesium of 2.    HTN-   Cardizem  to continue.    CAD, NSTEMI in past- will continue ASA and statin.  Her last stress test showed small fixed defect.      Other medical issues- hypoxia, COPD, CKD, anemia, DM- Management pre primary team.    Thank you for allowing me to participate in the care of this patient. Please feel free to contact me with any questions.

## 2017-07-26 NOTE — PROGRESS NOTE ADULT - SUBJECTIVE AND OBJECTIVE BOX
7/24/17: Wheezing this am -- no cough, no cp, sob with exertion but fine at rest right now.  No n/v/f/c  7/25/17: Wheezing much improved; ambulated the halls this morning; feels much better - no sob this am, no cp, n/v/f/  7/26/17: Feels much better in terms of breathing this am but is very shaky from the nebs and steroids; no cough, no cp, sob, is able to ambulate    ROS: as per HPI otherwise all other systems reviewed and are negative      PHYSICAL EXAM:    Vital Signs Last 24 Hrs  T(C): 36.3 (26 Jul 2017 10:50), Max: 36.4 (25 Jul 2017 16:50)  T(F): 97.3 (26 Jul 2017 10:50), Max: 97.6 (25 Jul 2017 16:50)  HR: 75 (26 Jul 2017 10:50) (69 - 87)  BP: 160/60 (26 Jul 2017 10:50) (155/36 - 160/60)  BP(mean): --  RR: 17 (26 Jul 2017 10:50) (17 - 18)  SpO2: 96% (26 Jul 2017 10:50) (93% - 98%)    GEN: A and O, NAD, mood stable  HEENT:   NC/AT, EOMI, no oropharyngeal lesions    NECK:   supple    CV:  +S1, +S2, regular, no murmurs or rubs    RESP:   CTA ANGELICA, NO RALES OR WHEEZES THIS AM, dereased bs angelica    GI:  abdomen soft, non-tender, non-distended, normal BS,  no abdominal masses, no palpable masses    RECTAL:  not examined    :  not examined    MSK:   normal muscle tone, no atrophy, no rigidity, no contractions    EXT:   no clubbing, no cyanosis, no edema, no calf pain, swelling or erythema    VASCULAR:  pulses equal and symmetric in the upper and lower extremities    NEURO:  AAOX3, no focal neurological deficits, follows all commands, able to move extremities spontaneously    SKIN:  no ulcers, lesions or rashes    LABS:                              9.5    9.8   )-----------( 186      ( 26 Jul 2017 05:52 )             28.6     07-26    x   |  x   |  x   ----------------------------<  386<H>  x    |  x   |  x     Ca    8.9      26 Jul 2017 05:52          MEDICATIONS  (STANDING):  pregabalin 150 milliGRAM(s) Oral at bedtime  citalopram 10 milliGRAM(s) Oral daily  atorvastatin 20 milliGRAM(s) Oral at bedtime  montelukast 10 milliGRAM(s) Oral at bedtime  pantoprazole    Tablet 40 milliGRAM(s) Oral before breakfast  levothyroxine 112 MICROGram(s) Oral daily  folic acid 1 milliGRAM(s) Oral daily  cholecalciferol 1000 Unit(s) Oral daily  aspirin  chewable 81 milliGRAM(s) Oral daily  heparin  Injectable 5000 Unit(s) SubCutaneous every 12 hours  insulin lispro (HumaLOG) corrective regimen sliding scale   SubCutaneous three times a day before meals  insulin lispro (HumaLOG) corrective regimen sliding scale   SubCutaneous at bedtime  dextrose 5%. 1000 milliLiter(s) (50 mL/Hr) IV Continuous <Continuous>  dextrose 50% Injectable 12.5 Gram(s) IV Push once  dextrose 50% Injectable 25 Gram(s) IV Push once  dextrose 50% Injectable 25 Gram(s) IV Push once  insulin lispro Injectable (HumaLOG) 17 Unit(s) SubCutaneous three times a day before meals  diltiazem    Tablet 60 milliGRAM(s) Oral four times a day  tiotropium 18 MICROgram(s) Capsule 1 Capsule(s) Inhalation daily  insulin glargine Injectable (LANTUS) 55 Unit(s) SubCutaneous at bedtime  furosemide    Tablet 80 milliGRAM(s) Oral daily    MEDICATIONS  (PRN):  dextrose Gel 1 Dose(s) Oral once PRN Blood Glucose LESS THAN 70 milliGRAM(s)/deciliter  glucagon  Injectable 1 milliGRAM(s) IntraMuscular once PRN Glucose LESS THAN 70 milligrams/deciliter

## 2017-07-26 NOTE — PROGRESS NOTE ADULT - SUBJECTIVE AND OBJECTIVE BOX
NEPHROLOGY INTERVAL HPI/OVERNIGHT EVENTS:   SY  Feeling better  Ambulated x2 in the hallway.  Denies significant TINEO.      feels much better after IV lasix  no dyspnea  daughter at bedside     SY  Feeling fair.  Reports improved TINEO last night but this am again felt SOB walking back from bathroom.     SY  Appearing fairly comfortable.  However reports significant TINEO with just walking to bathroom this am.    HPI:  75 yo woman with multiple medical problems including CKD (with baseline creat 1.2-1.4) and COPD admitted with complaints of SOB for several days.  Noted to be hypoxic in Dr Forte's office and sent to ER.   Pt apparently was started on Lasix for worsening LE edema. by Dr Cutler last week.  Pt had not been on diuretic therapy for sometime until started by Dr Cutler.  Pt also had her Amlodipine dose reduced since LE edema increased after pt's meds were adjusted during previous hosp.  On admission. noted with worsened renal fx and has been started on gentle hydration.      PMHX and PSHX.  1.CKD (baseline creat 1.2-1.4)  2.CAD  3.COPD  4.Hypothyroidism  5.Anemia  6.Hx of Depression and Anxiety  7.HTN  8.Recurrent UTIs  9.Syncope--recent hosp.       feels much better after IV lasix  no dyspnea  daughter at bedside     SY  Feeling fair.  Reports improved TINEO last night but this am again felt SOB walking back from bathroom.     SY  Appearing fairly comfortable.  However reports significant TINEO with just walking to bathroom this am.    HPI:  75 yo woman with multiple medical problems including CKD (with baseline creat 1.2-1.4) and COPD admitted with complaints of SOB for several days.  Noted to be hypoxic in Dr Forte's office and sent to ER.   Pt apparently was started on Lasix for worsening LE edema. by Dr Cutler last week.  Pt had not been on diuretic therapy for sometime until started by Dr Cutler.  Pt also had her Amlodipine dose reduced since LE edema increased after pt's meds were adjusted during previous hosp.  On admission. noted with worsened renal fx and has been started on gentle hydration.      PMHX and PSHX.  1.CKD (baseline creat 1.2-1.4)  2.CAD  3.COPD  4.Hypothyroidism  5.Anemia  6.Hx of Depression and Anxiety  7.HTN  8.Recurrent UTIs  9.Syncope--recent hosp.             MEDICATIONS  (STANDING):  pregabalin 150 milliGRAM(s) Oral at bedtime  citalopram 10 milliGRAM(s) Oral daily  atorvastatin 20 milliGRAM(s) Oral at bedtime  montelukast 10 milliGRAM(s) Oral at bedtime  pantoprazole    Tablet 40 milliGRAM(s) Oral before breakfast  levothyroxine 112 MICROGram(s) Oral daily  folic acid 1 milliGRAM(s) Oral daily  cholecalciferol 1000 Unit(s) Oral daily  aspirin  chewable 81 milliGRAM(s) Oral daily  heparin  Injectable 5000 Unit(s) SubCutaneous every 12 hours  insulin lispro (HumaLOG) corrective regimen sliding scale   SubCutaneous three times a day before meals  insulin lispro (HumaLOG) corrective regimen sliding scale   SubCutaneous at bedtime  dextrose 5%. 1000 milliLiter(s) (50 mL/Hr) IV Continuous <Continuous>  dextrose 50% Injectable 12.5 Gram(s) IV Push once  dextrose 50% Injectable 25 Gram(s) IV Push once  dextrose 50% Injectable 25 Gram(s) IV Push once  insulin lispro Injectable (HumaLOG) 17 Unit(s) SubCutaneous three times a day before meals  diltiazem    Tablet 60 milliGRAM(s) Oral four times a day  tiotropium 18 MICROgram(s) Capsule 1 Capsule(s) Inhalation daily  insulin glargine Injectable (LANTUS) 55 Unit(s) SubCutaneous at bedtime  furosemide   Injectable 40 milliGRAM(s) IV Push daily    MEDICATIONS  (PRN):  dextrose Gel 1 Dose(s) Oral once PRN Blood Glucose LESS THAN 70 milliGRAM(s)/deciliter  glucagon  Injectable 1 milliGRAM(s) IntraMuscular once PRN Glucose LESS THAN 70 milligrams/deciliter          Vital Signs Last 24 Hrs  T(C): 34.4 (2017 04:56), Max: 36.6 (2017 10:45)  T(F): 94 (2017 04:56), Max: 97.9 (2017 10:45)  HR: 82 (2017 07:20) (69 - 87)  BP: 157/42 (2017 04:56) (154/44 - 157/42)  BP(mean): --  RR: 18 (2017 04:56) (17 - 18)  SpO2: 94% (2017 04:56) (93% - 98%)  Daily     Daily Weight in k.2 (2017 14:56)     @ 07:01  -   @ 07:00  --------------------------------------------------------  IN: 120 mL / OUT: 0 mL / NET: 120 mL        PHYSICAL EXAM:  Alert and approriate  GENERAL: No acute distress  CHEST/LUNG: much improved air entry  HEART: S1S2 RRR  ABDOMEN: soft  EXTREMITIES: no edema  SKIN:     LABS:                        9.5    9.8   )-----------( 186      ( 2017 05:52 )             28.6         142  |  104  |  90<H>  ----------------------------<  290<H>  3.9   |  31  |  1.82<H>    Ca    8.9      2017 05:52          RADIOLOGY & ADDITIONAL TESTS:  < from: US Kidney and Bladder (17 @ 12:06) >  EXAM:  US KIDNEYS AND BLADDER                            PROCEDURE DATE:  2017          INTERPRETATION:  Clinical information: Acute kidney injury. Chronic   kidney disease. Rule out obstruction.    COMPARISON: Report from CT scan 2016. Renal ultrasound   2016    TECHNIQUE: Sonography of the kidneys and bladder    FINDINGS:    Right kidney:  10.8 cm. No renal mass, hydronephrosis or calculus.    Normal cortical echogenicity.    Left kidney:  11.5 cm. No renal mass,hydronephrosis or calculus.  Normal   cortical echogenicity.    Urinary bladder: Within normal limits. Bilateral ureteral jets were   visualized.    IMPRESSION:     Normal renal ultrasound.      < end of copied text >

## 2017-07-26 NOTE — PROGRESS NOTE ADULT - SUBJECTIVE AND OBJECTIVE BOX
Patient is a 76y old  Female who presents with a chief complaint of I can't breath.    HPI:  Pt is a 77 y/o female with h/o type II diabetes, HTN, chronic UTI's, stage 3 CKD, COPD with chronic hypoxic respiratory failure (refuses to wear O2) , ANI on stage 3 CKD, Rt clavicle fracture.  For the past few days started to develop increasing SOB with TINEO.  She was seen by Dr Forte and O2  sat noted to be  60-70's and was sent to ER.      She had prior cardiac hx of NSTEMI and abnormal stress test in   showing small sized fixed defect in apical area .    She was having pedal edema few months ago and her lasix was adjusted to 40mg po daily instead of 20mg po BID and her pedal edema resolved.  She has very poor intake of fluids in day time and partly her dementia is playing a role with compliance.     - pt seen and examined. her daughter is at bedside this am.  Pt c/o TINEO with walking few steps but no SOB at rest.  - Pt seen and examined by me today.  She still c/o TINEO with exertion but slightly better, however last night had one episode of SOB earlier in the night .  Had been getting nebs.   - Pt seen and examined by me today.  She notes improvement in her SOB.    - Pt seen and examined by me today.  She says that her SOB improved from yesterday but still has TINEO.     Vital Signs Last 24 Hrs  T(C): --  T(F): --  HR: 70 (2017 15:40) (70 - 70)  BP: 179/65 (2017 15:40) (179/65 - 179/65)  BP(mean): --  RR: 18 (2017 15:40) (18 - 18)  SpO2: 92% (2017 15:40) (92% - 92%)    I&O's Detail      Daily Height in cm: 157.48 (2017 15:40)    Daily     CARDIAC MARKERS ( 2017 16:01 )  <0.015 ng/mL / x     / x     / x     / x            ABG - ( 2017 16:40 )  pH: 7.39  /  pCO2: 42    /  pO2: 66    / HCO3: 25    / Base Excess: 0     /  SaO2: 92                                        11.0   8.0   )-----------( 200      ( 2017 16:01 )             33.4       07-21    144  |  111<H>  |  41<H>  ----------------------------<  214<H>  5.0   |  28  |  1.62<H>    Ca    9.3      2017 16:01    TPro  6.9  /  Alb  3.7  /  TBili  0.6  /  DBili  x   /  AST  14<L>  /  ALT  20  /  AlkPhos  120  07-21      LIVER FUNCTIONS - ( 2017 16:01 )  Alb: 3.7 g/dL / Pro: 6.9 gm/dL / ALK PHOS: 120 U/L / ALT: 20 U/L / AST: 14 U/L / GGT: x                       MEDICATIONS  (STANDING):    MEDICATIONS  (PRN): (2017 19:26)      PAST MEDICAL & SURGICAL HISTORY:  Hyperlipidemia, unspecified hyperlipidemia type  Essential hypertension  Neuropathy  Diabetes  Chronic obstructive pulmonary disease, unspecified COPD type  History of total knee replacement, unspecified laterality  H/O hernia repair   delivery delivered  S/P appendectomy      MEDICATIONS  (STANDING):  pregabalin 150 milliGRAM(s) Oral at bedtime  citalopram 10 milliGRAM(s) Oral daily  atorvastatin 20 milliGRAM(s) Oral at bedtime  montelukast 10 milliGRAM(s) Oral at bedtime  pantoprazole    Tablet 40 milliGRAM(s) Oral before breakfast  levothyroxine 112 MICROGram(s) Oral daily  folic acid 1 milliGRAM(s) Oral daily  cholecalciferol 1000 Unit(s) Oral daily  aspirin  chewable 81 milliGRAM(s) Oral daily  heparin  Injectable 5000 Unit(s) SubCutaneous every 12 hours  insulin glargine Injectable (LANTUS) 40 Unit(s) SubCutaneous at bedtime  insulin lispro (HumaLOG) corrective regimen sliding scale   SubCutaneous three times a day before meals  insulin lispro (HumaLOG) corrective regimen sliding scale   SubCutaneous at bedtime  dextrose 5%. 1000 milliLiter(s) (50 mL/Hr) IV Continuous <Continuous>  dextrose 50% Injectable 12.5 Gram(s) IV Push once  dextrose 50% Injectable 25 Gram(s) IV Push once  dextrose 50% Injectable 25 Gram(s) IV Push once  ALBUTerol/ipratropium for Nebulization 3 milliLiter(s) Nebulizer every 6 hours  cefepime  IVPB 1000 milliGRAM(s) IV Intermittent every 12 hours  cefepime  IVPB   IV Intermittent   methylPREDNISolone sodium succinate Injectable 40 milliGRAM(s) IV Push every 8 hours    MEDICATIONS  (PRN):  dextrose Gel 1 Dose(s) Oral once PRN Blood Glucose LESS THAN 70 milliGRAM(s)/deciliter  glucagon  Injectable 1 milliGRAM(s) IntraMuscular once PRN Glucose LESS THAN 70 milligrams/deciliter      FAMILY HISTORY:  Family history of CHF (congestive heart failure) (Mother)      SOCIAL HISTORY:  non smoker, no alcohol use in recent past      REVIEW OF SYSTEMS:  CONSTITUTIONAL:  No night sweats.  No fatigue, malaise, lethargy.  No fever or chills.  HEENT:  Eyes:  No visual changes.  No eye pain.      ENT:  No runny nose.  No epistaxis.  No sinus pain.  No sore throat.  No odynophagia.  No ear pain.  No congestion.  RESPIRATORY:  No cough.  No wheeze.  No hemoptysis.  c/o  shortness of breath.  CARDIOVASCULAR:  No chest pains.  No palpitations. c/o shortness of breath and TINEO, No orthopnea or PND.  GASTROINTESTINAL:  No abdominal pain.  No nausea or vomiting.  No diarrhea or constipation.  No hematemesis.  No hematochezia.  No melena.  GENITOURINARY:  No urgency.  No frequency.  No dysuria.  No hematuria.  No obstructive symptoms.  No discharge.  No pain.  No significant abnormal bleeding.  MUSCULOSKELETAL:  No musculoskeletal pain.  No joint swelling.  No arthritis.  NEUROLOGICAL:  No tingling or numbness or weakness.  PSYCHIATRIC:  No confusion  SKIN:  No rashes.  No lesions.  No wounds.  ENDOCRINE:  No unexplained weight loss.  No polydipsia.  No polyuria.  No polyphagia.  HEMATOLOGIC:  No anemia.  No purpura.  No petechiae.  No prolonged or excessive bleeding.   ALLERGIC AND IMMUNOLOGIC:  No pruritus.  No swelling.         Vital Signs Last 24 Hrs  T(C): 36.4 (2017 05:27), Max: 36.6 (2017 21:30)  T(F): 97.6 (2017 05:27), Max: 97.9 (2017 21:30)  HR: 80 (2017 08:28) (70 - 92)  BP: 162/40 (2017 05:27) (162/40 - 179/65)  BP(mean): 73 (2017 05:27) (73 - 73)  RR: 20 (2017 21:30) (18 - 20)  SpO2: 99% (2017 08:30) (92% - 99%)    PHYSICAL EXAM-    Constitutional: obese, no distress    Head: Head is normocephalic and atraumatic.      Neck: The patient's neck is supple without enlargement, has no palpable thyromegaly nor thyroid nodules and has no jugular venous distention. No audible carotid bruits. There are strong carotid pulses bilaterally. No JVD.     Cardiovascular: Regular rate and rhythm without S3, S4. No murmurs or rubs are appreciated.      Respiratory: B/l scattered wheezing.    Abdomen: Soft, nontender, nondistended with positive bowel sounds.      Extremity: No tenderness. No  pitting edema No skin discoloration No clubbing No cyanosis.     Neurologic: The patient is alert and oriented.      Skin: No rash, no obvious lesions noted.      Psychiatric: The patient appears to be emotionally stable.      INTERPRETATION OF TELEMETRY: sinus rythm with PVCs     ECG: sinus rythm, normal axis and PVCs.    I&O's Detail    2017 07:  -  2017 07:00  --------------------------------------------------------  IN:    Oral Fluid: 120 mL  Total IN: 120 mL    OUT:  Total OUT: 0 mL    Total NET: 120 mL      2017 07:01  -  2017 10:56  --------------------------------------------------------  IN:    sodium chloride 0.45%: 495 mL  Total IN: 495 mL    OUT:  Total OUT: 0 mL    Total NET: 495 mL          LABS:                        9.4    5.8   )-----------( 170      ( 2017 05:26 )             29.2         140  |  109<H>  |  46<H>  ----------------------------<  409<H>  5.1   |  25  |  1.72<H>    Ca    9.1      2017 09:34    TPro  6.9  /  Alb  3.7  /  TBili  0.6  /  DBili  x   /  AST  14<L>  /  ALT  20  /  AlkPhos  120      CARDIAC MARKERS ( 2017 16:01 )  <0.015 ng/mL / x     / x     / x     / x              I&O's Summary    :  -  2017 07:00  --------------------------------------------------------  IN: 120 mL / OUT: 0 mL / NET: 120 mL    2017 07:  -  2017 10:56  --------------------------------------------------------  IN: 495 mL / OUT: 0 mL / NET: 495 mL      BNPSerum Pro-Brain Natriuretic Peptide: 2969 pg/mL ( @ 16:01)    RADIOLOGY & ADDITIONAL STUDIES:    < from: CT Chest No Cont (17 @ 18:52) >  EXAM:  CT CHEST                            PROCEDURE DATE:  2017          INTERPRETATION:  CT CHEST    HISTORY:  Shortness of breath                     TECHNIQUE: Noncontrast CT of the chest was performed. Coronal and   sagittal images were reconstructed. This study was performed using   automatic exposure control (radiation dose reduction software) to obtain   a diagnostic image quality scan with patient dose as low as reasonably   achievable.    COMPARISON: Chest x-ray 1 hour prior, chest CT 3/5/2013    FINDINGS:    LUNGS, AIRWAYS: The central airways are patent. Prior left lower lobe   wedge resection. Diffuse bilateral groundglass opacities and septal   thickening.    PLEURA: Small bilateral pleural effusions.    HEART AND VESSELS: Mild cardiomegaly. No pericardial effusion. Extensive   coronary artery, aortic valve, and mitral annular calcification. Normal   caliber of the thoracic aorta. Main pulmonary artery is enlarged up to   3.9 cm.    MEDIASTINUM AND LAVELL: Mild adenopathy, likely reactive.    UPPER ABDOMEN: Limited visualization shows cholecystectomy clips and   diffuse arterial vascular calcification.    BONES AND SOFT TISSUES: No acute bony abnormality.    IMPRESSION:     Pulmonary edema and small bilateral pleural effusions.                SAMEERAJANETTE GIBBS   This document has been electronically signed. 2017  7:22PM                < end of copied text >  Blood Gas Profile - Arterial (17 @ 16:40)    pH, Arterial: 7.39    pCO2, Arterial: 42 mmHg    pO2, Arterial: 66    HCO3, Arterial: 25 mmoL/L    Base Excess, Arterial: 0 mmol/L    Oxygen Saturation, Arterial: 92    Blood Gas Comments Arterial: 5 LPM    Blood Gas Source Arterial: Arterial

## 2017-07-26 NOTE — PROGRESS NOTE ADULT - ASSESSMENT
75 yo woman  with COPD, and CKD admitted with resp insuff.   Pt was recently started on diuretic which may explain increase in creat on admission.  However CT scan c/w pulmonary edema.  Therefore, will need to evaluate for possible cardiac decompensation to explain recent event of ANI and CHF.  Will d/c further IVF.  Since resp status has stabilized, will hold off on adding diuretics until cardiology evaluation done.  Hyperkalemia of unclear reason.  Recheck to see if rx needed.    7/23 SY  --ANI/CKD    Unclear baseline fx at this point,  Renal parameters are quite variable.  Unclear if due to hyperglycemia as well.  --Resp insuff is persistent and very limiting in terms of pt's fx.   Therefore, will give a trial of IV lasix.     If resp status responds to diuresis, then will need to accept increased creat and continue diuretics.  --Will check to see pt is able to fully able to empty bladder.    7/24 SY  --ANI/CKD   as above    Creat now holding fairly steady.  --CXR more c/w CHF.   Will diurese more consistently and may need to allow creat to increase to stabilize resp status first.  Again, will need to consider whether this is primarily decompensated CHF.  --Follow up renal sonogram report.  --Close monitoring of I and O.    7/25  feels well on iv lasix  d/w Dr Griggs, stress test 1 yr ago, small defect.  Cont lasix 40 mg iv qd    7/26 SY  --ANI/CKD   Creat holding fairly stably with diuresis.  Continue current diuretics.  Will change to po in preparation for possible d/c in am  --Resp insuff with CHF --now improved.

## 2017-07-26 NOTE — PROGRESS NOTE ADULT - ASSESSMENT
Pt is a 75 y/o female with h/o type II diabetes, HTN, chronic UTI's, stage 3 CKD, COPD with chronic hypoxic respiratory failure (refuses to wear O2) ,who is know to me from a recent admission at  for syncope, ANI on stage 3 CKD, Rt clavicle fracture and bradycardia admitted for acute hypoxic respiratory failure.    * Acute Hypoxic respiratory failure-SECONDARY  to ACOPD exacerbation wi AND COMPONENT OF CHF. MUCH IMPROVED WITH DIURESIS AND STEROIDS.  STEROIDS DISCONITNUED DUE TO SHAKINESS AND LASIX SWITCHED TO PO. CONT TO MONITOR ONE MORE DAY  * HTN- bp remains high, increase Cardizem, DOES NOT TOLERATE ACEINH/ARB DUE TO HYPERKALEMIA IN THE PAST. MONITOR SLIGHTLY BETTER TODAY  * ANI on stage 3 CKD- CR STABLE;   * Recent Rt Clavicle Facture supportive care  * Hypothyroidism continue synthroid  * Type II diabetes- uncontrolled due to steroids, LANTUS WAS INCREASED AND NOW OFF ALL STEROIDS, WILL MONITOR  * Disp-OOB, ambulate with PT.  * Proph- heparin  * Comm- d/w pt , RN  DC PLANNING FOR AM

## 2017-07-27 ENCOUNTER — TRANSCRIPTION ENCOUNTER (OUTPATIENT)
Age: 77
End: 2017-07-27

## 2017-07-27 LAB
ANION GAP SERPL CALC-SCNC: 6 MMOL/L — SIGNIFICANT CHANGE UP (ref 5–17)
BUN SERPL-MCNC: 99 MG/DL — HIGH (ref 7–23)
CALCIUM SERPL-MCNC: 8.9 MG/DL — SIGNIFICANT CHANGE UP (ref 8.5–10.1)
CHLORIDE SERPL-SCNC: 106 MMOL/L — SIGNIFICANT CHANGE UP (ref 96–108)
CO2 SERPL-SCNC: 33 MMOL/L — HIGH (ref 22–31)
CREAT SERPL-MCNC: 1.78 MG/DL — HIGH (ref 0.5–1.3)
CULTURE RESULTS: SIGNIFICANT CHANGE UP
GLUCOSE SERPL-MCNC: 50 MG/DL — LOW (ref 70–99)
HCT VFR BLD CALC: 29.4 % — LOW (ref 34.5–45)
HGB BLD-MCNC: 9.6 G/DL — LOW (ref 11.5–15.5)
MCHC RBC-ENTMCNC: 29.2 PG — SIGNIFICANT CHANGE UP (ref 27–34)
MCHC RBC-ENTMCNC: 32.7 GM/DL — SIGNIFICANT CHANGE UP (ref 32–36)
MCV RBC AUTO: 89.2 FL — SIGNIFICANT CHANGE UP (ref 80–100)
PLATELET # BLD AUTO: 171 K/UL — SIGNIFICANT CHANGE UP (ref 150–400)
POTASSIUM SERPL-MCNC: 3.6 MMOL/L — SIGNIFICANT CHANGE UP (ref 3.5–5.3)
POTASSIUM SERPL-SCNC: 3.6 MMOL/L — SIGNIFICANT CHANGE UP (ref 3.5–5.3)
RBC # BLD: 3.3 M/UL — LOW (ref 3.8–5.2)
RBC # FLD: 14.6 % — HIGH (ref 10.3–14.5)
SODIUM SERPL-SCNC: 145 MMOL/L — SIGNIFICANT CHANGE UP (ref 135–145)
SPECIMEN SOURCE: SIGNIFICANT CHANGE UP
WBC # BLD: 11.7 K/UL — HIGH (ref 3.8–10.5)
WBC # FLD AUTO: 11.7 K/UL — HIGH (ref 3.8–10.5)

## 2017-07-27 PROCEDURE — 93308 TTE F-UP OR LMTD: CPT | Mod: 26

## 2017-07-27 RX ORDER — FLUTICASONE PROPIONATE AND SALMETEROL 50; 250 UG/1; UG/1
1 POWDER ORAL; RESPIRATORY (INHALATION)
Qty: 0 | Refills: 0 | COMMUNITY

## 2017-07-27 RX ORDER — FUROSEMIDE 40 MG
1 TABLET ORAL
Qty: 0 | Refills: 0 | COMMUNITY
Start: 2017-07-27

## 2017-07-27 RX ORDER — DILTIAZEM HCL 120 MG
1 CAPSULE, EXT RELEASE 24 HR ORAL
Qty: 30 | Refills: 2 | OUTPATIENT
Start: 2017-07-27 | End: 2017-10-24

## 2017-07-27 RX ORDER — DILTIAZEM HCL 120 MG
1 CAPSULE, EXT RELEASE 24 HR ORAL
Qty: 0 | Refills: 0 | COMMUNITY
Start: 2017-07-27

## 2017-07-27 RX ORDER — BUDESONIDE AND FORMOTEROL FUMARATE DIHYDRATE 160; 4.5 UG/1; UG/1
2 AEROSOL RESPIRATORY (INHALATION)
Qty: 0 | Refills: 0 | Status: DISCONTINUED | OUTPATIENT
Start: 2017-07-27 | End: 2017-07-30

## 2017-07-27 RX ORDER — MONTELUKAST 4 MG/1
1 TABLET, CHEWABLE ORAL
Qty: 0 | Refills: 0 | COMMUNITY
Start: 2017-07-27

## 2017-07-27 RX ORDER — MONTELUKAST 4 MG/1
1 TABLET, CHEWABLE ORAL
Qty: 0 | Refills: 0 | COMMUNITY

## 2017-07-27 RX ORDER — BUMETANIDE 0.25 MG/ML
1 INJECTION INTRAMUSCULAR; INTRAVENOUS
Qty: 30 | Refills: 2 | OUTPATIENT
Start: 2017-07-27 | End: 2017-10-24

## 2017-07-27 RX ORDER — FUROSEMIDE 40 MG
1 TABLET ORAL
Qty: 0 | Refills: 0 | COMMUNITY

## 2017-07-27 RX ORDER — POTASSIUM CHLORIDE 20 MEQ
40 PACKET (EA) ORAL ONCE
Qty: 0 | Refills: 0 | Status: COMPLETED | OUTPATIENT
Start: 2017-07-27 | End: 2017-07-27

## 2017-07-27 RX ADMIN — Medication 112 MICROGRAM(S): at 05:01

## 2017-07-27 RX ADMIN — Medication 1: at 17:36

## 2017-07-27 RX ADMIN — Medication 150 MILLIGRAM(S): at 21:39

## 2017-07-27 RX ADMIN — Medication 2: at 12:18

## 2017-07-27 RX ADMIN — PANTOPRAZOLE SODIUM 40 MILLIGRAM(S): 20 TABLET, DELAYED RELEASE ORAL at 05:01

## 2017-07-27 RX ADMIN — HEPARIN SODIUM 5000 UNIT(S): 5000 INJECTION INTRAVENOUS; SUBCUTANEOUS at 17:33

## 2017-07-27 RX ADMIN — ATORVASTATIN CALCIUM 20 MILLIGRAM(S): 80 TABLET, FILM COATED ORAL at 21:39

## 2017-07-27 RX ADMIN — Medication 80 MILLIGRAM(S): at 05:01

## 2017-07-27 RX ADMIN — Medication 1 MILLIGRAM(S): at 12:33

## 2017-07-27 RX ADMIN — CITALOPRAM 10 MILLIGRAM(S): 10 TABLET, FILM COATED ORAL at 12:32

## 2017-07-27 RX ADMIN — Medication 81 MILLIGRAM(S): at 12:32

## 2017-07-27 RX ADMIN — Medication 17 UNIT(S): at 12:19

## 2017-07-27 RX ADMIN — Medication 40 MILLIEQUIVALENT(S): at 14:05

## 2017-07-27 RX ADMIN — HEPARIN SODIUM 5000 UNIT(S): 5000 INJECTION INTRAVENOUS; SUBCUTANEOUS at 05:01

## 2017-07-27 RX ADMIN — Medication 1000 UNIT(S): at 12:33

## 2017-07-27 RX ADMIN — MONTELUKAST 10 MILLIGRAM(S): 4 TABLET, CHEWABLE ORAL at 21:40

## 2017-07-27 RX ADMIN — Medication 17 UNIT(S): at 17:36

## 2017-07-27 NOTE — DISCHARGE NOTE ADULT - MEDICATION SUMMARY - MEDICATIONS TO TAKE
I will START or STAY ON the medications listed below when I get home from the hospital:    predniSONE 10 mg oral tablet  -- take 3 tabs daily for one more day, then 2 tabs daily for 2 days then 1 tab daily for 2 days then stop -for bronchospasm  -- Indication: For Copd    aspirin 81 mg oral tablet  -- 1 tab(s) by mouth once a day  -- Indication: For ht disease    Caltrate  --  by mouth   -- Indication: For Calcium    Cardizem  mg/24 hours oral capsule, extended release  -- 1 cap(s) by mouth once a day  -- It is very important that you take or use this exactly as directed.  Do not skip doses or discontinue unless directed by your doctor.  Some non-prescription drugs may aggravate your condition.  Read all labels carefully.  If a warning appears, check with your doctor before taking.  Swallow whole.  Do not crush.    -- Indication: For bp    Lyrica 150 mg oral capsule  -- 1 cap(s) by mouth once a day (at bedtime)  -- Indication: For nreuopathy    citalopram 10 mg oral tablet  -- 1 tab(s) by mouth once a day (in the morning)  -- Indication: For Depression/anxiety    Toujeo SoloStar 300 units/mL subcutaneous solution  -- 40 unit(s) subcutaneous once a day (at bedtime)  -- Indication: For Diabetes    HumaLOG 100 units/mL subcutaneous solution  -- 14 unit(s) subcutaneous at each meal  -- Indication: For Diabetes    HumaLOG 100 units/mL subcutaneous solution  --  subcutaneous sliding scale     -- Indication: For Diabetes    atorvastatin 20 mg oral tablet  -- 1 tab(s) by mouth once a day (in the evening)  -- Indication: For hyperlipidemia    Spiriva 18 mcg inhalation capsule  -- 1 cap(s) inhaled once a day (in the evening)  -- Indication: For Copd    ProAir HFA 90 mcg/inh inhalation aerosol  -- 2 puff(s) inhaled 4 times a day, As Needed  -- Indication: For Copd    furosemide 40 mg oral tablet  -- 1 tab(s) by mouth once a day  -- Indication: For CHf    montelukast 10 mg oral tablet  -- 1 tab(s) by mouth once a day (at bedtime)  -- Indication: For allergies    omeprazole 20 mg oral delayed release capsule  -- 1 cap(s) by mouth once a day (in the morning)  -- Indication: For ulcer disease/gerd    levothyroxine 112 mcg (0.112 mg) oral tablet  -- 1 tab(s) by mouth once a day (in the morning)  -- Indication: For hypothryoidism    methenamine hippurate 1 g oral tablet  -- 1 tab(s) by mouth once a day (at bedtime)  -- Indication: For CHRONIC utis    multivitamin  --     -- Indication: For gen health    Vitamin D3 1000 intl units oral capsule  -- 1 cap(s) by mouth once a day  -- Indication: For vit d def    folic acid 1 mg oral tablet  -- 1 tab(s) by mouth once a day (in the morning)  -- Indication: For genYCD Multimedia

## 2017-07-27 NOTE — PROGRESS NOTE ADULT - SUBJECTIVE AND OBJECTIVE BOX
Patient is a 76y old  Female who presents with a chief complaint of I can't breath.    HPI:  Pt is a 77 y/o female with h/o type II diabetes, HTN, chronic UTI's, stage 3 CKD, COPD with chronic hypoxic respiratory failure (refuses to wear O2) , ANI on stage 3 CKD, Rt clavicle fracture.  For the past few days started to develop increasing SOB with TINEO.  She was seen by Dr Forte and O2  sat noted to be  60-70's and was sent to ER.      She had prior cardiac hx of NSTEMI and abnormal stress test in   showing small sized fixed defect in apical area .    She was having pedal edema few months ago and her lasix was adjusted to 40mg po daily instead of 20mg po BID and her pedal edema resolved.  She has very poor intake of fluids in day time and partly her dementia is playing a role with compliance.     - pt seen and examined. her daughter is at bedside this am.  Pt c/o TINEO with walking few steps but no SOB at rest.  - Pt seen and examined by me today.  She still c/o TINEO with exertion but slightly better, however last night had one episode of SOB earlier in the night .  Had been getting nebs.   - Pt seen and examined by me today.  She notes improvement in her SOB.    - Pt seen and examined by me today.  She says that her SOB improved from yesterday but still has TINEO.  - Pt seen and examined.  this am her BS noted to be low in 50s.  She denies any CP or SOB.      Vital Signs Last 24 Hrs  T(C): --  T(F): --  HR: 70 (2017 15:40) (70 - 70)  BP: 179/65 (2017 15:40) (179/65 - 179/65)  BP(mean): --  RR: 18 (2017 15:40) (18 - 18)  SpO2: 92% (2017 15:40) (92% - 92%)    I&O's Detail      Daily Height in cm: 157.48 (2017 15:40)    Daily     CARDIAC MARKERS ( 2017 16:01 )  <0.015 ng/mL / x     / x     / x     / x            ABG - ( 2017 16:40 )  pH: 7.39  /  pCO2: 42    /  pO2: 66    / HCO3: 25    / Base Excess: 0     /  SaO2: 92                                        11.0   8.0   )-----------( 200      ( 2017 16:01 )             33.4       07-21    144  |  111<H>  |  41<H>  ----------------------------<  214<H>  5.0   |  28  |  1.62<H>    Ca    9.3      2017 16:01    TPro  6.9  /  Alb  3.7  /  TBili  0.6  /  DBili  x   /  AST  14<L>  /  ALT  20  /  AlkPhos  120        LIVER FUNCTIONS - ( 2017 16:01 )  Alb: 3.7 g/dL / Pro: 6.9 gm/dL / ALK PHOS: 120 U/L / ALT: 20 U/L / AST: 14 U/L / GGT: x                       MEDICATIONS  (STANDING):    MEDICATIONS  (PRN): (2017 19:26)      PAST MEDICAL & SURGICAL HISTORY:  Hyperlipidemia, unspecified hyperlipidemia type  Essential hypertension  Neuropathy  Diabetes  Chronic obstructive pulmonary disease, unspecified COPD type  History of total knee replacement, unspecified laterality  H/O hernia repair   delivery delivered  S/P appendectomy      MEDICATIONS  (STANDING):  pregabalin 150 milliGRAM(s) Oral at bedtime  citalopram 10 milliGRAM(s) Oral daily  atorvastatin 20 milliGRAM(s) Oral at bedtime  montelukast 10 milliGRAM(s) Oral at bedtime  pantoprazole    Tablet 40 milliGRAM(s) Oral before breakfast  levothyroxine 112 MICROGram(s) Oral daily  folic acid 1 milliGRAM(s) Oral daily  cholecalciferol 1000 Unit(s) Oral daily  aspirin  chewable 81 milliGRAM(s) Oral daily  heparin  Injectable 5000 Unit(s) SubCutaneous every 12 hours  insulin glargine Injectable (LANTUS) 40 Unit(s) SubCutaneous at bedtime  insulin lispro (HumaLOG) corrective regimen sliding scale   SubCutaneous three times a day before meals  insulin lispro (HumaLOG) corrective regimen sliding scale   SubCutaneous at bedtime  dextrose 5%. 1000 milliLiter(s) (50 mL/Hr) IV Continuous <Continuous>  dextrose 50% Injectable 12.5 Gram(s) IV Push once  dextrose 50% Injectable 25 Gram(s) IV Push once  dextrose 50% Injectable 25 Gram(s) IV Push once  ALBUTerol/ipratropium for Nebulization 3 milliLiter(s) Nebulizer every 6 hours  cefepime  IVPB 1000 milliGRAM(s) IV Intermittent every 12 hours  cefepime  IVPB   IV Intermittent   methylPREDNISolone sodium succinate Injectable 40 milliGRAM(s) IV Push every 8 hours    MEDICATIONS  (PRN):  dextrose Gel 1 Dose(s) Oral once PRN Blood Glucose LESS THAN 70 milliGRAM(s)/deciliter  glucagon  Injectable 1 milliGRAM(s) IntraMuscular once PRN Glucose LESS THAN 70 milligrams/deciliter      FAMILY HISTORY:  Family history of CHF (congestive heart failure) (Mother)      SOCIAL HISTORY:  non smoker, no alcohol use in recent past      REVIEW OF SYSTEMS:  CONSTITUTIONAL:  No night sweats.  No fatigue, malaise, lethargy.  No fever or chills.  HEENT:  Eyes:  No visual changes.  No eye pain.      ENT:  No runny nose.  No epistaxis.  No sinus pain.  No sore throat.  No odynophagia.  No ear pain.  No congestion.  RESPIRATORY:  No cough.  No wheeze.  No hemoptysis.  c/o  shortness of breath.  CARDIOVASCULAR:  No chest pains.  No palpitations. c/o shortness of breath and TINEO, No orthopnea or PND.  GASTROINTESTINAL:  No abdominal pain.  No nausea or vomiting.  No diarrhea or constipation.  No hematemesis.  No hematochezia.  No melena.  GENITOURINARY:  No urgency.  No frequency.  No dysuria.  No hematuria.  No obstructive symptoms.  No discharge.  No pain.  No significant abnormal bleeding.  MUSCULOSKELETAL:  No musculoskeletal pain.  No joint swelling.  No arthritis.  NEUROLOGICAL:  No tingling or numbness or weakness.  PSYCHIATRIC:  No confusion  SKIN:  No rashes.  No lesions.  No wounds.  ENDOCRINE:  No unexplained weight loss.  No polydipsia.  No polyuria.  No polyphagia.  HEMATOLOGIC:  No anemia.  No purpura.  No petechiae.  No prolonged or excessive bleeding.   ALLERGIC AND IMMUNOLOGIC:  No pruritus.  No swelling.         Vital Signs Last 24 Hrs  T(C): 36.4 (2017 05:27), Max: 36.6 (2017 21:30)  T(F): 97.6 (2017 05:27), Max: 97.9 (2017 21:30)  HR: 80 (2017 08:28) (70 - 92)  BP: 162/40 (2017 05:27) (162/40 - 179/65)  BP(mean): 73 (2017 05:27) (73 - 73)  RR: 20 (2017 21:30) (18 - 20)  SpO2: 99% (2017 08:30) (92% - 99%)    PHYSICAL EXAM-    Constitutional: obese, no distress    Head: Head is normocephalic and atraumatic.      Neck: The patient's neck is supple without enlargement, has no palpable thyromegaly nor thyroid nodules and has no jugular venous distention. No audible carotid bruits. There are strong carotid pulses bilaterally. No JVD.     Cardiovascular: Regular rate and rhythm without S3, S4. No murmurs or rubs are appreciated.      Respiratory: B/l scattered wheezing.    Abdomen: Soft, nontender, nondistended with positive bowel sounds.      Extremity: No tenderness. No  pitting edema No skin discoloration No clubbing No cyanosis.     Neurologic: The patient is alert and oriented.      Skin: No rash, no obvious lesions noted.      Psychiatric: The patient appears to be emotionally stable.      INTERPRETATION OF TELEMETRY: sinus rythm with PVCs     ECG: sinus rythm, normal axis and PVCs.    I&O's Detail    2017 07:  -  2017 07:00  --------------------------------------------------------  IN:    Oral Fluid: 120 mL  Total IN: 120 mL    OUT:  Total OUT: 0 mL    Total NET: 120 mL      2017 07:  -  2017 10:56  --------------------------------------------------------  IN:    sodium chloride 0.45%: 495 mL  Total IN: 495 mL    OUT:  Total OUT: 0 mL    Total NET: 495 mL          LABS:                        9.4    5.8   )-----------( 170      ( 2017 05:26 )             29.2         140  |  109<H>  |  46<H>  ----------------------------<  409<H>  5.1   |  25  |  1.72<H>    Ca    9.1      2017 09:34    TPro  6.9  /  Alb  3.7  /  TBili  0.6  /  DBili  x   /  AST  14<L>  /  ALT  20  /  AlkPhos  120      CARDIAC MARKERS ( 2017 16:01 )  <0.015 ng/mL / x     / x     / x     / x              I&O's Summary    2017 07:  -  2017 07:00  --------------------------------------------------------  IN: 120 mL / OUT: 0 mL / NET: 120 mL    2017 07:  -  2017 10:56  --------------------------------------------------------  IN: 495 mL / OUT: 0 mL / NET: 495 mL      BNPSerum Pro-Brain Natriuretic Peptide: 2969 pg/mL ( @ 16:01)    RADIOLOGY & ADDITIONAL STUDIES:    < from: CT Chest No Cont (17 @ 18:52) >  EXAM:  CT CHEST                            PROCEDURE DATE:  2017          INTERPRETATION:  CT CHEST    HISTORY:  Shortness of breath                     TECHNIQUE: Noncontrast CT of the chest was performed. Coronal and   sagittal images were reconstructed. This study was performed using   automatic exposure control (radiation dose reduction software) to obtain   a diagnostic image quality scan with patient dose as low as reasonably   achievable.    COMPARISON: Chest x-ray 1 hour prior, chest CT 3/5/2013    FINDINGS:    LUNGS, AIRWAYS: The central airways are patent. Prior left lower lobe   wedge resection. Diffuse bilateral groundglass opacities and septal   thickening.    PLEURA: Small bilateral pleural effusions.    HEART AND VESSELS: Mild cardiomegaly. No pericardial effusion. Extensive   coronary artery, aortic valve, and mitral annular calcification. Normal   caliber of the thoracic aorta. Main pulmonary artery is enlarged up to   3.9 cm.    MEDIASTINUM AND LAVELL: Mild adenopathy, likely reactive.    UPPER ABDOMEN: Limited visualization shows cholecystectomy clips and   diffuse arterial vascular calcification.    BONES AND SOFT TISSUES: No acute bony abnormality.    IMPRESSION:     Pulmonary edema and small bilateral pleural effusions.                SAMEERAJANETTE GIBBS   This document has been electronically signed. 2017  7:22PM                < end of copied text >  Blood Gas Profile - Arterial (17 @ 16:40)    pH, Arterial: 7.39    pCO2, Arterial: 42 mmHg    pO2, Arterial: 66    HCO3, Arterial: 25 mmoL/L    Base Excess, Arterial: 0 mmol/L    Oxygen Saturation, Arterial: 92    Blood Gas Comments Arterial: 5 LPM    Blood Gas Source Arterial: Arterial

## 2017-07-27 NOTE — PROGRESS NOTE ADULT - ASSESSMENT
Pt is a 77 y/o female with h/o type II diabetes, HTN, chronic UTI's, stage 3 CKD, COPD with chronic hypoxic respiratory failure (refuses to wear O2) ,who is know to me from a recent admission at  for syncope, ANI on stage 3 CKD, Rt clavicle fracture and bradycardia admitted for acute hypoxic respiratory failure.    * Acute Hypoxic respiratory failure-SECONDARY  to ACOPD exacerbation wi AND COMPONENT OF CHF. MUCH IMPROVED WITH DIURESIS AND STEROIDS. OFF STEROIDS NOW COMPLETELY AND STABLE, CHANGED TO BUMEX FOR DIURESIS  * HTN- bp BETTER CONTROLLED, increase Cardizem, DOES NOT TOLERATE ACEINH/ARB DUE TO HYPERKALEMIA IN THE PAST.   * ANI on stage 3 CKD- CR STABLE;   * Recent Rt Clavicle Facture supportive care  * Hypothyroidism continue synthroid  * Type II diabetes- uncontrolled due to steroids, HYPOGLYCEMIA THIS AM -- LIKELY SINCE STEROIDS DISCONTINUED COMPLETELY; GO BACK TO USUAL DOSE OF 40UNITS DAILY; CONT TO MONITOR BLOOD SUGARS  * Disp-OOB, ambulate with PT.  * Proph- heparin  * Comm- d/w pt , RN  DC TODAY

## 2017-07-27 NOTE — DISCHARGE NOTE ADULT - PLAN OF CARE
resolution of shortness of breath cont o2 supplementation  off steroids  cont bronchodilators  pulm follow up  cont diuretics for any component of CHF return to usual dosing of tujeo  hold sliding scale insulin for today as she was low monitor bp  follow up with connie in 4-5 days after discahrge for bp checka nd labs cont o2 supplementation  prednisone taper  diuretics  cont bronchodilators  pulm follow up  cont diuretics for any component of CHF return to usual dosing of tujeo  cont humalog for coverage monitor bp  follow up with cnonie in 4-5 days after discharge for bp check and labs

## 2017-07-27 NOTE — DISCHARGE NOTE ADULT - CARE PROVIDER_API CALL
Scoo Phelps (MD), Internal Medicine  [address] [facility] [address] [place] [address]  Phone: [phone]  Fax: [phone]

## 2017-07-27 NOTE — DISCHARGE NOTE ADULT - CARE PLAN
Principal Discharge DX:	Chronic obstructive pulmonary disease, unspecified COPD type  Goal:	resolution of shortness of breath  Instructions for follow-up, activity and diet:	cont o2 supplementation  off steroids  cont bronchodilators  pulm follow up  cont diuretics for any component of CHF  Secondary Diagnosis:	Diabetes  Instructions for follow-up, activity and diet:	return to usual dosing of tujeo  hold sliding scale insulin for today as she was low  Secondary Diagnosis:	Essential hypertension  Instructions for follow-up, activity and diet:	monitor bp  follow up with connie in 4-5 days after discahrge for bp checka nd labs Principal Discharge DX:	Chronic obstructive pulmonary disease, unspecified COPD type  Goal:	resolution of shortness of breath  Instructions for follow-up, activity and diet:	cont o2 supplementation  prednisone taper  diuretics  cont bronchodilators  pulm follow up  cont diuretics for any component of CHF  Secondary Diagnosis:	Diabetes  Instructions for follow-up, activity and diet:	return to usual dosing of tujeo  cont humalog for coverage  Secondary Diagnosis:	Essential hypertension  Instructions for follow-up, activity and diet:	monitor bp  follow up with connie in 4-5 days after discharge for bp check and labs

## 2017-07-27 NOTE — PROGRESS NOTE ADULT - SUBJECTIVE AND OBJECTIVE BOX
SUBJECTIVE     patient still has episodes of low sat with the ambulation with desaturation to 80 . comfortable at rest with the 02 on 3lit nasal canula . no cough or discolored sputum.        PAST MEDICAL & SURGICAL HISTORY:  Hyperlipidemia, unspecified hyperlipidemia type  Essential hypertension  Neuropathy  Diabetes  Chronic obstructive pulmonary disease, unspecified COPD type.  lung cancer stage 1 status post wedge resection   History of total knee replacement, unspecified laterality  H/O hernia repair   delivery delivered  S/P appendectomy          OBJECTIVE       Vital Signs Last 24 Hrs  T(C): 36.7 (2017 17:34), Max: 36.7 (2017 11:35)  T(F): 98.1 (2017 17:34), Max: 98.1 (2017 11:35)  HR: 58 (2017 17:34) (58 - 75)  BP: 138/35 (2017 17:34) (133/46 - 145/37)  BP(mean): 67 (2017 11:35) (67 - 68)  RR: 17 (2017 17:34) (16 - 17)  SpO2: 97% (2017 17:34) (90% - 97%)  PHYSICAL EXAM:    Constitutional: , awake and alert, not in distress.     HEENT: Normo cephalic atraumatic     Neck: Soft and supple, No J.V.D     Respiratory: vesicular breathing has no wheeze or rales has mildly  decreased breath sound in the bases     Cardiovascular: S1 and S2, regular rate .     Gastrointestinal:  soft, nontender,     Extremities: minimal edema of the feet     Neurological: A/O x 3, no focal deficits.      Medications:    MEDICATIONS  (STANDING):  pregabalin 150 milliGRAM(s) Oral at bedtime  citalopram 10 milliGRAM(s) Oral daily  atorvastatin 20 milliGRAM(s) Oral at bedtime  montelukast 10 milliGRAM(s) Oral at bedtime  pantoprazole    Tablet 40 milliGRAM(s) Oral before breakfast  levothyroxine 112 MICROGram(s) Oral daily  folic acid 1 milliGRAM(s) Oral daily  cholecalciferol 1000 Unit(s) Oral daily  aspirin  chewable 81 milliGRAM(s) Oral daily  heparin  Injectable 5000 Unit(s) SubCutaneous every 12 hours  insulin lispro (HumaLOG) corrective regimen sliding scale   SubCutaneous three times a day before meals  insulin lispro (HumaLOG) corrective regimen sliding scale   SubCutaneous at bedtime  dextrose 5%. 1000 milliLiter(s) (50 mL/Hr) IV Continuous <Continuous>  dextrose 50% Injectable 12.5 Gram(s) IV Push once  dextrose 50% Injectable 25 Gram(s) IV Push once  dextrose 50% Injectable 25 Gram(s) IV Push once  insulin lispro Injectable (HumaLOG) 17 Unit(s) SubCutaneous three times a day before meals  diltiazem    Tablet 60 milliGRAM(s) Oral four times a day  tiotropium 18 MICROgram(s) Capsule 1 Capsule(s) Inhalation daily  insulin glargine Injectable (LANTUS) 55 Unit(s) SubCutaneous at bedtime  furosemide    Tablet 80 milliGRAM(s) Oral daily      Labs: All Labs Reviewed:                                 9.5    9.8   )-----------( 186      ( 2017 05:52 )             28.6           142  |  104  |  90<H>  ----------------------------<  290<H>  3.9   |  31  |  1.82<H>    Ca    8.9      2017 05:52

## 2017-07-27 NOTE — PROGRESS NOTE ADULT - SUBJECTIVE AND OBJECTIVE BOX
7/24/17: Wheezing this am -- no cough, no cp, sob with exertion but fine at rest right now.  No n/v/f/c  7/25/17: Wheezing much improved; ambulated the halls this morning; feels much better - no sob this am, no cp, n/v/f/  7/26/17: Feels much better in terms of breathing this am but is very shaky from the nebs and steroids; no cough, no cp, sob, is able to ambulate  7/27/17: No cp, sob, n/v/f/c; was hypoglycemic this am -- sugar better now; but feels very tired.    ROS: as per HPI otherwise all other systems reviewed and are negative      PHYSICAL EXAM:  Vital Signs Last 24 Hrs  T(C): 36.6 (27 Jul 2017 05:04), Max: 36.6 (27 Jul 2017 05:04)  T(F): 97.9 (27 Jul 2017 05:04), Max: 97.9 (27 Jul 2017 05:04)  HR: 61 (27 Jul 2017 05:04) (61 - 75)  BP: 143/40 (27 Jul 2017 05:04) (143/40 - 160/60)  BP(mean): 68 (27 Jul 2017 05:04) (68 - 68)  RR: 17 (26 Jul 2017 16:43) (17 - 17)  SpO2: 97% (27 Jul 2017 05:04) (96% - 100%)    GEN: A and O, NAD, mood stable, fatigued  HEENT:   NC/AT, EOMI, no oropharyngeal lesions    NECK:   supple    CV:  +S1, +S2, regular, no murmurs or rubs    RESP:   CTA ANGELICA, NO RALES OR WHEEZES THIS AM    GI:  abdomen soft, non-tender, non-distended, normal BS,  no abdominal masses, no palpable masses    RECTAL:  not examined    :  not examined    MSK:   normal muscle tone, no atrophy, no rigidity, no contractions    EXT:   no clubbing, no cyanosis, no edema, no calf pain, swelling or erythema    VASCULAR:  pulses equal and symmetric in the upper and lower extremities    NEURO:  AAOX3, no focal neurological deficits, follows all commands, able to move extremities spontaneously    SKIN:  no ulcers, lesions or rashes    LABS:                              9.6    11.7  )-----------( 171      ( 27 Jul 2017 05:48 )             29.4     07-27    145  |  106  |  99<H>  ----------------------------<  50<L>  3.6   |  33<H>  |  1.78<H>    Ca    8.9      27 Jul 2017 05:48    MEDICATIONS  (STANDING):  pregabalin 150 milliGRAM(s) Oral at bedtime  citalopram 10 milliGRAM(s) Oral daily  atorvastatin 20 milliGRAM(s) Oral at bedtime  montelukast 10 milliGRAM(s) Oral at bedtime  pantoprazole    Tablet 40 milliGRAM(s) Oral before breakfast  levothyroxine 112 MICROGram(s) Oral daily  folic acid 1 milliGRAM(s) Oral daily  cholecalciferol 1000 Unit(s) Oral daily  aspirin  chewable 81 milliGRAM(s) Oral daily  heparin  Injectable 5000 Unit(s) SubCutaneous every 12 hours  insulin lispro (HumaLOG) corrective regimen sliding scale   SubCutaneous three times a day before meals  insulin lispro (HumaLOG) corrective regimen sliding scale   SubCutaneous at bedtime  dextrose 5%. 1000 milliLiter(s) (50 mL/Hr) IV Continuous <Continuous>  dextrose 50% Injectable 12.5 Gram(s) IV Push once  dextrose 50% Injectable 25 Gram(s) IV Push once  dextrose 50% Injectable 25 Gram(s) IV Push once  insulin lispro Injectable (HumaLOG) 17 Unit(s) SubCutaneous three times a day before meals  diltiazem    Tablet 60 milliGRAM(s) Oral four times a day  tiotropium 18 MICROgram(s) Capsule 1 Capsule(s) Inhalation daily  insulin glargine Injectable (LANTUS) 55 Unit(s) SubCutaneous at bedtime  furosemide    Tablet 80 milliGRAM(s) Oral daily    MEDICATIONS  (PRN):  dextrose Gel 1 Dose(s) Oral once PRN Blood Glucose LESS THAN 70 milliGRAM(s)/deciliter  glucagon  Injectable 1 milliGRAM(s) IntraMuscular once PRN Glucose LESS THAN 70 milligrams/deciliter

## 2017-07-27 NOTE — DISCHARGE NOTE ADULT - PATIENT PORTAL LINK FT
“You can access the [employer] Patient Portal, offered by [facility], by registering with the following website: [email or web address]”

## 2017-07-27 NOTE — PROGRESS NOTE ADULT - ASSESSMENT
SOB/ TINEO and profound hypoxia- Combination of COPD exacerbation and decompensated HFpEF- NYHA class III- Will DC home on bumex 2mg po daily and potassium 40meq po today once.  Continue O2 supplementation.  Continue nebs and management of her COPD.    Low sodium diet strictly recommended.    HTN- Cardizem po to continue for now.     Ventricular ectopy-  close monitoring of the electrolytes  with goal K of 4 and magnesium of 2.    HTN-   Cardizem  to continue.    CAD, NSTEMI in past- will continue ASA and statin.  Her last stress test showed small fixed defect.      Other medical issues- hypoxia, COPD, CKD, anemia, DM- Management pre primary team.    Thank you for allowing me to participate in the care of this patient. Please feel free to contact me with any questions.

## 2017-07-27 NOTE — PROGRESS NOTE ADULT - ASSESSMENT
- desaturation with ambulation and doutful copd symptoms   - chf with small effusion with interstertial prominence   - D.M   - lung cancer resected stage 1 with  no recurrence     PLAN     - continue with po lasix and would add symbicort    - continue with 02 suplementation   - nebs for the wheezing only and discontinue standing   - consider repeating echo after cardiology follow up .  - dvt and G.I prophylaxis.  - might need suplemental 02 with the combination of copd chf.  - monitor BGM closely as the patient is off the steroids

## 2017-07-27 NOTE — DISCHARGE NOTE ADULT - HOSPITAL COURSE
Patient was admitted for acute hypoxic respiratory failure secondary to copd exacerbation and CHF.  She was diuresed and improved with steroids.  Please see today's progress notes for full exam, details of plan of care.

## 2017-07-28 RX ORDER — POTASSIUM CHLORIDE 20 MEQ
40 PACKET (EA) ORAL EVERY 4 HOURS
Qty: 0 | Refills: 0 | Status: COMPLETED | OUTPATIENT
Start: 2017-07-28 | End: 2017-07-28

## 2017-07-28 RX ORDER — DEXTROSE 50 % IN WATER 50 %
25 SYRINGE (ML) INTRAVENOUS ONCE
Qty: 0 | Refills: 0 | Status: COMPLETED | OUTPATIENT
Start: 2017-07-28 | End: 2017-07-28

## 2017-07-28 RX ORDER — ACETYLCYSTEINE 200 MG/ML
1200 VIAL (ML) MISCELLANEOUS
Qty: 0 | Refills: 0 | Status: DISCONTINUED | OUTPATIENT
Start: 2017-07-28 | End: 2017-07-30

## 2017-07-28 RX ORDER — INSULIN GLARGINE 100 [IU]/ML
20 INJECTION, SOLUTION SUBCUTANEOUS AT BEDTIME
Qty: 0 | Refills: 0 | Status: DISCONTINUED | OUTPATIENT
Start: 2017-07-28 | End: 2017-07-30

## 2017-07-28 RX ADMIN — Medication 1200 MILLIGRAM(S): at 16:46

## 2017-07-28 RX ADMIN — Medication 81 MILLIGRAM(S): at 12:58

## 2017-07-28 RX ADMIN — ATORVASTATIN CALCIUM 20 MILLIGRAM(S): 80 TABLET, FILM COATED ORAL at 22:39

## 2017-07-28 RX ADMIN — BUDESONIDE AND FORMOTEROL FUMARATE DIHYDRATE 2 PUFF(S): 160; 4.5 AEROSOL RESPIRATORY (INHALATION) at 13:38

## 2017-07-28 RX ADMIN — Medication 25 GRAM(S): at 06:56

## 2017-07-28 RX ADMIN — HEPARIN SODIUM 5000 UNIT(S): 5000 INJECTION INTRAVENOUS; SUBCUTANEOUS at 18:05

## 2017-07-28 RX ADMIN — CITALOPRAM 10 MILLIGRAM(S): 10 TABLET, FILM COATED ORAL at 12:59

## 2017-07-28 RX ADMIN — Medication 2: at 18:01

## 2017-07-28 RX ADMIN — INSULIN GLARGINE 20 UNIT(S): 100 INJECTION, SOLUTION SUBCUTANEOUS at 22:39

## 2017-07-28 RX ADMIN — BUDESONIDE AND FORMOTEROL FUMARATE DIHYDRATE 2 PUFF(S): 160; 4.5 AEROSOL RESPIRATORY (INHALATION) at 19:35

## 2017-07-28 RX ADMIN — Medication 40 MILLIEQUIVALENT(S): at 13:06

## 2017-07-28 RX ADMIN — PANTOPRAZOLE SODIUM 40 MILLIGRAM(S): 20 TABLET, DELAYED RELEASE ORAL at 06:39

## 2017-07-28 RX ADMIN — Medication 17 UNIT(S): at 18:03

## 2017-07-28 RX ADMIN — Medication 150 MILLIGRAM(S): at 22:39

## 2017-07-28 RX ADMIN — Medication 1 MILLIGRAM(S): at 12:59

## 2017-07-28 RX ADMIN — Medication 40 MILLIEQUIVALENT(S): at 18:15

## 2017-07-28 RX ADMIN — Medication 1000 UNIT(S): at 12:58

## 2017-07-28 RX ADMIN — Medication 1200 MILLIGRAM(S): at 19:46

## 2017-07-28 RX ADMIN — Medication 112 MICROGRAM(S): at 06:39

## 2017-07-28 RX ADMIN — MONTELUKAST 10 MILLIGRAM(S): 4 TABLET, CHEWABLE ORAL at 22:40

## 2017-07-28 RX ADMIN — Medication 80 MILLIGRAM(S): at 06:39

## 2017-07-28 NOTE — PROGRESS NOTE ADULT - SUBJECTIVE AND OBJECTIVE BOX
Patient is a 76y old  Female who presents with a chief complaint of I can't breath.    HPI:  Pt is a 75 y/o female with h/o type II diabetes, HTN, chronic UTI's, stage 3 CKD, COPD with chronic hypoxic respiratory failure (refuses to wear O2) , ANI on stage 3 CKD, Rt clavicle fracture.  For the past few days started to develop increasing SOB with TINEO.  She was seen by Dr Forte and O2  sat noted to be  60-70's and was sent to ER.      She had prior cardiac hx of NSTEMI and abnormal stress test in   showing small sized fixed defect in apical area .    She was having pedal edema few months ago and her lasix was adjusted to 40mg po daily instead of 20mg po BID and her pedal edema resolved.  She has very poor intake of fluids in day time and partly her dementia is playing a role with compliance.     - pt seen and examined. her daughter is at bedside this am.  Pt c/o TINEO with walking few steps but no SOB at rest.  - Pt seen and examined by me today.  She still c/o TINEO with exertion but slightly better, however last night had one episode of SOB earlier in the night .  Had been getting nebs.   - Pt seen and examined by me today.  She notes improvement in her SOB.    - Pt seen and examined by me today.  She says that her SOB improved from yesterday but still has TINEO.  - Pt seen and examined.  this am her BS noted to be low in 50s.  She denies any CP or SOB.     - Denies any SOB at rest. yesterday hypoxic with ambulation.     Vital Signs Last 24 Hrs  T(C): --  T(F): --  HR: 70 (2017 15:40) (70 - 70)  BP: 179/65 (2017 15:40) (179/65 - 179/65)  BP(mean): --  RR: 18 (2017 15:40) (18 - 18)  SpO2: 92% (2017 15:40) (92% - 92%)    I&O's Detail      Daily Height in cm: 157.48 (2017 15:40)    Daily     CARDIAC MARKERS ( 2017 16:01 )  <0.015 ng/mL / x     / x     / x     / x            ABG - ( 2017 16:40 )  pH: 7.39  /  pCO2: 42    /  pO2: 66    / HCO3: 25    / Base Excess: 0     /  SaO2: 92                                        11.0   8.0   )-----------( 200      ( 2017 16:01 )             33.4       07-21    144  |  111<H>  |  41<H>  ----------------------------<  214<H>  5.0   |  28  |  1.62<H>    Ca    9.3      2017 16:01    TPro  6.9  /  Alb  3.7  /  TBili  0.6  /  DBili  x   /  AST  14<L>  /  ALT  20  /  AlkPhos  120        LIVER FUNCTIONS - ( 2017 16:01 )  Alb: 3.7 g/dL / Pro: 6.9 gm/dL / ALK PHOS: 120 U/L / ALT: 20 U/L / AST: 14 U/L / GGT: x                       MEDICATIONS  (STANDING):    MEDICATIONS  (PRN): (2017 19:26)      PAST MEDICAL & SURGICAL HISTORY:  Hyperlipidemia, unspecified hyperlipidemia type  Essential hypertension  Neuropathy  Diabetes  Chronic obstructive pulmonary disease, unspecified COPD type  History of total knee replacement, unspecified laterality  H/O hernia repair   delivery delivered  S/P appendectomy      MEDICATIONS  (STANDING):  pregabalin 150 milliGRAM(s) Oral at bedtime  citalopram 10 milliGRAM(s) Oral daily  atorvastatin 20 milliGRAM(s) Oral at bedtime  montelukast 10 milliGRAM(s) Oral at bedtime  pantoprazole    Tablet 40 milliGRAM(s) Oral before breakfast  levothyroxine 112 MICROGram(s) Oral daily  folic acid 1 milliGRAM(s) Oral daily  cholecalciferol 1000 Unit(s) Oral daily  aspirin  chewable 81 milliGRAM(s) Oral daily  heparin  Injectable 5000 Unit(s) SubCutaneous every 12 hours  insulin glargine Injectable (LANTUS) 40 Unit(s) SubCutaneous at bedtime  insulin lispro (HumaLOG) corrective regimen sliding scale   SubCutaneous three times a day before meals  insulin lispro (HumaLOG) corrective regimen sliding scale   SubCutaneous at bedtime  dextrose 5%. 1000 milliLiter(s) (50 mL/Hr) IV Continuous <Continuous>  dextrose 50% Injectable 12.5 Gram(s) IV Push once  dextrose 50% Injectable 25 Gram(s) IV Push once  dextrose 50% Injectable 25 Gram(s) IV Push once  ALBUTerol/ipratropium for Nebulization 3 milliLiter(s) Nebulizer every 6 hours  cefepime  IVPB 1000 milliGRAM(s) IV Intermittent every 12 hours  cefepime  IVPB   IV Intermittent   methylPREDNISolone sodium succinate Injectable 40 milliGRAM(s) IV Push every 8 hours    MEDICATIONS  (PRN):  dextrose Gel 1 Dose(s) Oral once PRN Blood Glucose LESS THAN 70 milliGRAM(s)/deciliter  glucagon  Injectable 1 milliGRAM(s) IntraMuscular once PRN Glucose LESS THAN 70 milligrams/deciliter      FAMILY HISTORY:  Family history of CHF (congestive heart failure) (Mother)      SOCIAL HISTORY:  non smoker, no alcohol use in recent past      REVIEW OF SYSTEMS:  CONSTITUTIONAL:  No night sweats.  No fatigue, malaise, lethargy.  No fever or chills.  HEENT:  Eyes:  No visual changes.  No eye pain.      ENT:  No runny nose.  No epistaxis.  No sinus pain.  No sore throat.  No odynophagia.  No ear pain.  No congestion.  RESPIRATORY:  No cough.  No wheeze.  No hemoptysis.  c/o  TINEO  CARDIOVASCULAR:  No chest pains.  No palpitations. c/o TINEO, No orthopnea or PND.  GASTROINTESTINAL:  No abdominal pain.  No nausea or vomiting.  No diarrhea or constipation.  No hematemesis.  No hematochezia.  No melena.  GENITOURINARY:  No urgency.  No frequency.  No dysuria.  No hematuria.  No obstructive symptoms.  No discharge.  No pain.  No significant abnormal bleeding.  MUSCULOSKELETAL:  No musculoskeletal pain.  No joint swelling.  No arthritis.  NEUROLOGICAL:  No tingling or numbness or weakness.  PSYCHIATRIC:  No confusion  SKIN:  No rashes.  No lesions.  No wounds.  ENDOCRINE:  No unexplained weight loss.  No polydipsia.  No polyuria.  No polyphagia.  HEMATOLOGIC:  No anemia.  No purpura.  No petechiae.  No prolonged or excessive bleeding.   ALLERGIC AND IMMUNOLOGIC:  No pruritus.  No swelling.         Vital Signs Last 24 Hrs  T(C): 36.4 (2017 05:27), Max: 36.6 (2017 21:30)  T(F): 97.6 (2017 05:27), Max: 97.9 (2017 21:30)  HR: 80 (2017 08:28) (70 - 92)  BP: 162/40 (2017 05:27) (162/40 - 179/65)  BP(mean): 73 (2017 05:27) (73 - 73)  RR: 20 (2017 21:30) (18 - 20)  SpO2: 99% (2017 08:30) (92% - 99%)    PHYSICAL EXAM-    Constitutional: obese, no distress    Head: Head is normocephalic and atraumatic.      Neck: The patient's neck is supple without enlargement, has no palpable thyromegaly nor thyroid nodules and has no jugular venous distention. No audible carotid bruits. There are strong carotid pulses bilaterally. No JVD.     Cardiovascular: Regular rate and rhythm without S3, S4. No murmurs or rubs are appreciated.      Respiratory: CTA b/l with scattered wheeze occasionally    Abdomen: Soft, nontender, nondistended with positive bowel sounds.      Extremity: No tenderness. No  pitting edema No skin discoloration No clubbing No cyanosis.     Neurologic: The patient is alert and oriented.      Skin: No rash, no obvious lesions noted.      Psychiatric: The patient appears to be emotionally stable.      INTERPRETATION OF TELEMETRY: sinus rythm with PVCs     ECG: sinus rythm, normal axis and PVCs.    I&O's Detail    2017 07:  -  2017 07:00  --------------------------------------------------------  IN:    Oral Fluid: 120 mL  Total IN: 120 mL    OUT:  Total OUT: 0 mL    Total NET: 120 mL      2017 07:  -  2017 10:56  --------------------------------------------------------  IN:    sodium chloride 0.45%: 495 mL  Total IN: 495 mL    OUT:  Total OUT: 0 mL    Total NET: 495 mL          LABS:                        9.4    5.8   )-----------( 170      ( 2017 05:26 )             29.2         140  |  109<H>  |  46<H>  ----------------------------<  409<H>  5.1   |  25  |  1.72<H>    Ca    9.1      2017 09:34    TPro  6.9  /  Alb  3.7  /  TBili  0.6  /  DBili  x   /  AST  14<L>  /  ALT  20  /  AlkPhos  120      CARDIAC MARKERS ( 2017 16:01 )  <0.015 ng/mL / x     / x     / x     / x              I&O's Summary    2017 07:  -  2017 07:00  --------------------------------------------------------  IN: 120 mL / OUT: 0 mL / NET: 120 mL    2017 07:  -  2017 10:56  --------------------------------------------------------  IN: 495 mL / OUT: 0 mL / NET: 495 mL      BNPSerum Pro-Brain Natriuretic Peptide: 2969 pg/mL ( @ 16:01)    RADIOLOGY & ADDITIONAL STUDIES:    < from: CT Chest No Cont (17 @ 18:52) >  EXAM:  CT CHEST                            PROCEDURE DATE:  2017          INTERPRETATION:  CT CHEST    HISTORY:  Shortness of breath                     TECHNIQUE: Noncontrast CT of the chest was performed. Coronal and   sagittal images were reconstructed. This study was performed using   automatic exposure control (radiation dose reduction software) to obtain   a diagnostic image quality scan with patient dose as low as reasonably   achievable.    COMPARISON: Chest x-ray 1 hour prior, chest CT 3/5/2013    FINDINGS:    LUNGS, AIRWAYS: The central airways are patent. Prior left lower lobe   wedge resection. Diffuse bilateral groundglass opacities and septal   thickening.    PLEURA: Small bilateral pleural effusions.    HEART AND VESSELS: Mild cardiomegaly. No pericardial effusion. Extensive   coronary artery, aortic valve, and mitral annular calcification. Normal   caliber of the thoracic aorta. Main pulmonary artery is enlarged up to   3.9 cm.    MEDIASTINUM AND LAVELL: Mild adenopathy, likely reactive.    UPPER ABDOMEN: Limited visualization shows cholecystectomy clips and   diffuse arterial vascular calcification.    BONES AND SOFT TISSUES: No acute bony abnormality.    IMPRESSION:     Pulmonary edema and small bilateral pleural effusions.                SAMEERA GIBBS   This document has been electronically signed. 2017  7:22PM                < end of copied text >  Blood Gas Profile - Arterial (17 @ 16:40)    pH, Arterial: 7.39    pCO2, Arterial: 42 mmHg    pO2, Arterial: 66    HCO3, Arterial: 25 mmoL/L    Base Excess, Arterial: 0 mmol/L    Oxygen Saturation, Arterial: 92    Blood Gas Comments Arterial: 5 LPM    Blood Gas Source Arterial: Arterial

## 2017-07-28 NOTE — PROGRESS NOTE ADULT - SUBJECTIVE AND OBJECTIVE BOX
Patient underwent right hear catheterization this am for quantification of her hemodynamic parameters.  Her hemodynamic parameters do not suggest florid volume overload to account for patient symptoms and her severe hypoxia of 60% o2 sat.    I would recommend continuing lasix iv as long as pt is in the hospital and once she is ready to be discharged she can be started on bumex 2mg po daily since per pt po lasix is not giving her significant diuresis.  Continue treatment of COPD per primary and pulmonary team.    Discussed with patient, her daughter Sara and her  at length and they expressed full understanding of this.

## 2017-07-28 NOTE — PROGRESS NOTE ADULT - ASSESSMENT
75 yo woman  with COPD, and CKD admitted with resp insuff.   Pt was recently started on diuretic which may explain increase in creat on admission.  However CT scan c/w pulmonary edema.  Therefore, will need to evaluate for possible cardiac decompensation to explain recent event of ANI and CHF.  Will d/c further IVF.  Since resp status has stabilized, will hold off on adding diuretics until cardiology evaluation done.  Hyperkalemia of unclear reason.  Recheck to see if rx needed.    7/23 SY  --ANI/CKD    Unclear baseline fx at this point,  Renal parameters are quite variable.  Unclear if due to hyperglycemia as well.  --Resp insuff is persistent and very limiting in terms of pt's fx.   Therefore, will give a trial of IV lasix.     If resp status responds to diuresis, then will need to accept increased creat and continue diuretics.  --Will check to see pt is able to fully able to empty bladder.    7/24 SY  --ANI/CKD   as above    Creat now holding fairly steady.  --CXR more c/w CHF.   Will diurese more consistently and may need to allow creat to increase to stabilize resp status first.  Again, will need to consider whether this is primarily decompensated CHF.  --Follow up renal sonogram report.  --Close monitoring of I and O.    7/25  feels well on iv lasix  d/w Dr Griggs, stress test 1 yr ago, small defect.  Cont lasix 40 mg iv qd    7/26 SY  --ANI/CKD   Creat holding fairly stably with diuresis.  Continue current diuretics.  Will change to po in preparation for possible d/c in am  --Resp insuff with CHF --now improved.    7/28  Hypoxic while ambulating yesterday, Cath today in 1/2 hour  Will start acetyl cysteine 1200 mg po bid x 2 days, 1st dose stat   informed pt and family of risk of ANI, MIGUEL A

## 2017-07-28 NOTE — PROGRESS NOTE ADULT - ASSESSMENT
Pt is a 75 y/o female with h/o type II diabetes, HTN, chronic UTI's, stage 3 CKD, COPD with chronic hypoxic respiratory failure (refuses to wear O2) ,who is know to me from a recent admission at  for syncope, ANI on stage 3 CKD, Rt clavicle fracture and bradycardia admitted for acute hypoxic respiratory failure.    * Acute Hypoxic respiratory failure-SECONDARY  to ACOPD exacerbation wi AND COMPONENT OF CHF. MUCH IMPROVED WITH DIURESIS AND STEROIDS. PRIOR TO DC YEST SHE BECAME HYPOXIC AGAIN WITH AMBULATION  2D ECHO DONE LAST NIGHT  AND FOR CATH TODAY  DC WAS HELD  * HTN- bp BETTER CONTROLLED, increase Cardizem, DOES NOT TOLERATE ACEINH/ARB DUE TO HYPERKALEMIA IN THE PAST.   * ANI on stage 3 CKD- CR STABLE;   * Recent Rt Clavicle Facture supportive care  * Hypothyroidism continue synthroid  * Type II diabetes- uncontrolled due to steroids, HYPOGLYCEMIA THIS AM -- CONT TO HOLD LANTUS UNTIL BLOOD SUGARS IMPROVE' WILL MONITOR CLOSELY (D/W NURSE YAN)  * Disp-OOB, ambulate with PT.  * Proph- heparin  * Comm- d/w pt , RN, DR ADAMS, DR MEANS

## 2017-07-28 NOTE — PROGRESS NOTE ADULT - SUBJECTIVE AND OBJECTIVE BOX
7/24/17: Wheezing this am -- no cough, no cp, sob with exertion but fine at rest right now.  No n/v/f/c  7/25/17: Wheezing much improved; ambulated the halls this morning; feels much better - no sob this am, no cp, n/v/f/  7/26/17: Feels much better in terms of breathing this am but is very shaky from the nebs and steroids; no cough, no cp, sob, is able to ambulate  7/27/17: No cp, sob, n/v/f/c; was hypoglycemic this am -- sugar better now; but feels very tired.  7/28/17: No cp, sob, n/v/f/c; pt is fatigued as blood sugar was low this am again and she didnt receive any lantus last night; Pt was to be discharged but then desatted with ambulation and started wheezing    ROS: as per HPI otherwise all other systems reviewed and are negative      PHYSICAL EXAM:  Vital Signs Last 24 Hrs  T(C): 36.9 (28 Jul 2017 05:30), Max: 36.9 (28 Jul 2017 05:30)  T(F): 98.5 (28 Jul 2017 05:30), Max: 98.5 (28 Jul 2017 05:30)  HR: 63 (28 Jul 2017 05:30) (58 - 64)  BP: 137/47 (28 Jul 2017 05:30) (133/46 - 138/35)  BP(mean): 67 (27 Jul 2017 11:35) (67 - 67)  RR: 18 (28 Jul 2017 05:30) (16 - 18)  SpO2: 98% (28 Jul 2017 05:30) (90% - 98%)    GEN: A and O, NAD, mood stable, FATIGUED  HEENT:   NC/AT, EOMI, no oropharyngeal lesions    NECK:   supple    CV:  +S1, +S2, regular, no murmurs or rubs    RESP:   CTA ANGELICA, NO RALES OR WHEEZES THIS AM    GI:  abdomen soft, non-tender, non-distended, normal BS,  no abdominal masses, no palpable masses    RECTAL:  not examined    :  not examined    MSK:   normal muscle tone, no atrophy, no rigidity, no contractions    EXT:   no clubbing, no cyanosis, no edema, no calf pain, swelling or erythema    VASCULAR:  pulses equal and symmetric in the upper and lower extremities    NEURO:  AAOX3, no focal neurological deficits, follows all commands, able to move extremities spontaneously    SKIN:  no ulcers, lesions or rashes    LABS:                                9.6    11.7  )-----------( 171      ( 27 Jul 2017 05:48 )             29.4     07-27    145  |  106  |  99<H>  ----------------------------<  50<L>  3.6   |  33<H>  |  1.78<H>    Ca    8.9      27 Jul 2017 05:48        MEDICATIONS  (STANDING):  pregabalin 150 milliGRAM(s) Oral at bedtime  citalopram 10 milliGRAM(s) Oral daily  atorvastatin 20 milliGRAM(s) Oral at bedtime  montelukast 10 milliGRAM(s) Oral at bedtime  pantoprazole    Tablet 40 milliGRAM(s) Oral before breakfast  levothyroxine 112 MICROGram(s) Oral daily  folic acid 1 milliGRAM(s) Oral daily  cholecalciferol 1000 Unit(s) Oral daily  aspirin  chewable 81 milliGRAM(s) Oral daily  heparin  Injectable 5000 Unit(s) SubCutaneous every 12 hours  insulin lispro (HumaLOG) corrective regimen sliding scale   SubCutaneous three times a day before meals  insulin lispro (HumaLOG) corrective regimen sliding scale   SubCutaneous at bedtime  dextrose 5%. 1000 milliLiter(s) (50 mL/Hr) IV Continuous <Continuous>  dextrose 50% Injectable 12.5 Gram(s) IV Push once  dextrose 50% Injectable 25 Gram(s) IV Push once  dextrose 50% Injectable 25 Gram(s) IV Push once  insulin lispro Injectable (HumaLOG) 17 Unit(s) SubCutaneous three times a day before meals  diltiazem    Tablet 60 milliGRAM(s) Oral four times a day  tiotropium 18 MICROgram(s) Capsule 1 Capsule(s) Inhalation daily  insulin glargine Injectable (LANTUS) 55 Unit(s) SubCutaneous at bedtime  furosemide    Tablet 80 milliGRAM(s) Oral daily  buDESOnide 160 MICROgram(s)/formoterol 4.5 MICROgram(s) Inhaler 2 Puff(s) Inhalation two times a day  potassium chloride    Tablet ER 40 milliEquivalent(s) Oral every 4 hours    MEDICATIONS  (PRN):  dextrose Gel 1 Dose(s) Oral once PRN Blood Glucose LESS THAN 70 milliGRAM(s)/deciliter  glucagon  Injectable 1 milliGRAM(s) IntraMuscular once PRN Glucose LESS THAN 70 milligrams/deciliter

## 2017-07-28 NOTE — PROGRESS NOTE ADULT - ASSESSMENT
- copd restarted on low dose prednisone and status post right cath with low pressure  - chf with small effusion with interstertial prominence with the last cxr   - D.M   - lung cancer resected stage 1 with  no recurrence     PLAN     - continue with po lasix with Symbicort and spiriva along with the 02 for the copd and patient does have markedly reduced diffusion capacity .  - patient takes advair as an out pt in the place of symbicort .   - cardio follow up   - patient encourage to use the 02 continuously now - copd restarted on low dose prednisone and status post right cath with mildly elevated wedge pressure which is more than 18 indicative of heart failure component   - chf with small effusion with interstertial prominence with the last cxr   - D.M   - lung cancer resected stage 1 with  no recurrence     PLAN     - continue with po lasix with Symbicort and spiriva along with the 02 for the copd and patient does have markedly reduced diffusion capacity as an out pt with the copd and probable pulmonary HTN   - patient takes advair as an out pt in the place of Symbicort .   - cardio follow up for the optimization of diuretic regimen   - patient encourage to use the 02 continuously now

## 2017-07-28 NOTE — PROGRESS NOTE ADULT - SUBJECTIVE AND OBJECTIVE BOX
s/p RHC revealing normal pressures    Patient feels well.  Denies chest pain, shortness of breath, dizziness or palpitations at this time    Right brachial/cephalic site dressing and site CDI, no bleeding, no hematoma, able to move all digits with capillary refill < 3 seconds, fingers warm       Vital Signs Last 24 Hrs  T(C): --  T(F): --  HR: 70 (21 Jul 2017 15:40) (70 - 70)  BP: 179/65 (21 Jul 2017 15:40) (179/65 - 179/65)  BP(mean): --  RR: 18 (21 Jul 2017 15:40) (18 - 18)  SpO2: 92% (21 Jul 2017 15:40) (92% - 92%)      HPI:  Pt is a 77 y/o female with h/o type II diabetes, HTN, chronic UTI's, stage 3 CKD, COPD with chronic hypoxic respiratory failure (refuses to wear O2) recent admission  for syncope, ANI on stage 3 CKD, Rt clavicle fracture and bradycardia.  started to develop increasing SOB with TINEO.  She went to Dr Forte and sats 60-70's, sent to  ER.    now s/p RHC revealing normal R sided heart pressures    -continue tele  -continue hospitalist service, MD Phelps notified of RHC results  -vital signs, labs, diet and activity per post procedure orders  -ambulate ad mihir post bedrest  -plan of care discussed with patient, family and MD Kelly  -continue medical management  -Discussed therapeutic lifestyle changes to reduce risk factors such as following a cardiac diet, weight loss, maintaining a healthy weight, exercise, smoking cessation, medication compliance, and regular follow-up  with MD to know your numbers (BP, cholesterol, weight, and glucose) s/p RHC revealing mildly elevated PCWP   plan to Manage with medical therapy.    Patient feels well.  Denies chest pain, shortness of breath, dizziness or palpitations at this time    Right brachial/cephalic site dressing and site CDI, no bleeding, no hematoma, able to move all digits with capillary refill < 3 seconds, fingers warm       Vital Signs Last 24 Hrs  T(C): --  T(F): --  HR: 70 (21 Jul 2017 15:40) (70 - 70)  BP: 179/65 (21 Jul 2017 15:40) (179/65 - 179/65)  BP(mean): --  RR: 18 (21 Jul 2017 15:40) (18 - 18)  SpO2: 92% (21 Jul 2017 15:40) (92% - 92%)      HPI:  Pt is a 75 y/o female with h/o type II diabetes, HTN, chronic UTI's, stage 3 CKD, COPD with chronic hypoxic respiratory failure (refuses to wear O2) recent admission  for syncope, ANI on stage 3 CKD, Rt clavicle fracture and bradycardia.  started to develop increasing SOB with TINEO.  She went to Dr Forte and sats 60-70's, sent to  ER.    now s/p RHC revealing mildly elevated PCWP     Plan:  -Manage with medical therapy  -continue tele  -continue hospitalist service, MD Phelps notified of RHC results  -vital signs, labs, diet and activity per post procedure orders  -ambulate ad mihir post bedrest  -plan of care discussed with patient, family and MD Kelly  -continue medical management  -Discussed therapeutic lifestyle changes to reduce risk factors such as following a cardiac diet, weight loss, maintaining a healthy weight, exercise, smoking cessation, medication compliance, and regular follow-up  with MD to know your numbers (BP, cholesterol, weight, and glucose) s/p RHC revealing mildly elevated PCWP   plan to Manage with medical therapy.    Patient feels well.  Denies chest pain, shortness of breath, dizziness or palpitations at this time    Right basilic site dressing and site CDI, no bleeding, no hematoma, able to move all digits with capillary refill < 3 seconds, fingers warm       Vital Signs Last 24 Hrs  T(C): --  T(F): --  HR: 70 (21 Jul 2017 15:40) (70 - 70)  BP: 179/65 (21 Jul 2017 15:40) (179/65 - 179/65)  BP(mean): --  RR: 18 (21 Jul 2017 15:40) (18 - 18)  SpO2: 92% (21 Jul 2017 15:40) (92% - 92%)      HPI:  Pt is a 75 y/o female with h/o type II diabetes, HTN, chronic UTI's, stage 3 CKD, COPD with chronic hypoxic respiratory failure (refuses to wear O2) recent admission  for syncope, ANI on stage 3 CKD, Rt clavicle fracture and bradycardia.  started to develop increasing SOB with TINEO.  She went to Dr Forte and sats 60-70's, sent to  ER.    now s/p RHC revealing mildly elevated PCWP     Plan:  -Manage with medical therapy  -continue tele  -continue hospitalist service, MD Phelps notified of RHC results  -vital signs, labs, diet and activity per post procedure orders  -ambulate ad mihir post bedrest  -plan of care discussed with patient, family and MD Kelly  -continue medical management  -cardiology to follow  -Discussed therapeutic lifestyle changes to reduce risk factors such as following a cardiac diet, weight loss, maintaining a healthy weight, exercise, smoking cessation, medication compliance, and regular follow-up  with MD to know your numbers (BP, cholesterol, weight, and glucose)

## 2017-07-28 NOTE — PROGRESS NOTE ADULT - ASSESSMENT
SOB/ TINEO and profound hypoxia- Combination of COPD exacerbation and decompensated HFpEF- NYHA class III- Conitnue iv lasix . Right heart cath today.  Echo results pending.  Continue O2 supplementation.  Continue nebs and management of her COPD.    Low sodium diet strictly recommended.    HTN- Cardizem po to continue for now.     Ventricular ectopy-  close monitoring of the electrolytes  with goal K of 4 and magnesium of 2.    HTN-   Cardizem  to continue.    CAD, NSTEMI in past- will continue ASA and statin.  Her last stress test showed small fixed defect.      Other medical issues- hypoxia, COPD, CKD, anemia, DM- Management pre primary team.    Thank you for allowing me to participate in the care of this patient. Please feel free to contact me with any questions.

## 2017-07-28 NOTE — PROGRESS NOTE ADULT - SUBJECTIVE AND OBJECTIVE BOX
SUBJECTIVE     patient has right cath and has low wedge pressures . today denies any wheezing and started on low dose prednisone for the copd component .         PAST MEDICAL & SURGICAL HISTORY:  Hyperlipidemia, unspecified hyperlipidemia type  Essential hypertension  Neuropathy  Diabetes  Chronic obstructive pulmonary disease, unspecified COPD type.  lung cancer stage 1 status post wedge resection   History of total knee replacement, unspecified laterality  H/O hernia repair   delivery delivered  S/P appendectomy          OBJECTIVE     Vital Signs Last 24 Hrs  T(C): 36.9 (2017 05:30), Max: 36.9 (2017 05:30)  T(F): 98.5 (2017 05:30), Max: 98.5 (2017 05:30)  HR: 70 (2017 14:14) (58 - 72)  BP: 152/54 (2017 13:04) (133/46 - 166/50)  BP(mean): --  RR: 18 (2017 12:15) (17 - 18)  SpO2: 97% (2017 12:15) (93% - 100%)      PHYSICAL EXAM:    Constitutional: , awake and alert, not in distress.     HEENT: Normo cephalic atraumatic     Neck: Soft and supple, No J.V.D     Respiratory: vesicular breathing has no wheeze  mildly  decreased breath sound in the bases     Cardiovascular: S1 and S2, regular rate .     Gastrointestinal:  soft, nontender,     Extremities: minimal edema of the feet     Neurological: A/O x 3, no focal deficits.      Medications:    MEDICATIONS  (STANDING):  pregabalin 150 milliGRAM(s) Oral at bedtime  citalopram 10 milliGRAM(s) Oral daily  atorvastatin 20 milliGRAM(s) Oral at bedtime  montelukast 10 milliGRAM(s) Oral at bedtime  pantoprazole    Tablet 40 milliGRAM(s) Oral before breakfast  levothyroxine 112 MICROGram(s) Oral daily  folic acid 1 milliGRAM(s) Oral daily  cholecalciferol 1000 Unit(s) Oral daily  aspirin  chewable 81 milliGRAM(s) Oral daily  heparin  Injectable 5000 Unit(s) SubCutaneous every 12 hours  insulin lispro (HumaLOG) corrective regimen sliding scale   SubCutaneous three times a day before meals  insulin lispro (HumaLOG) corrective regimen sliding scale   SubCutaneous at bedtime  dextrose 5%. 1000 milliLiter(s) (50 mL/Hr) IV Continuous <Continuous>  dextrose 50% Injectable 12.5 Gram(s) IV Push once  dextrose 50% Injectable 25 Gram(s) IV Push once  dextrose 50% Injectable 25 Gram(s) IV Push once  insulin lispro Injectable (HumaLOG) 17 Unit(s) SubCutaneous three times a day before meals  diltiazem    Tablet 60 milliGRAM(s) Oral four times a day  tiotropium 18 MICROgram(s) Capsule 1 Capsule(s) Inhalation daily  furosemide    Tablet 80 milliGRAM(s) Oral daily  buDESOnide 160 MICROgram(s)/formoterol 4.5 MICROgram(s) Inhaler 2 Puff(s) Inhalation two times a day  potassium chloride    Tablet ER 40 milliEquivalent(s) Oral every 4 hours  insulin glargine Injectable (LANTUS) 20 Unit(s) SubCutaneous at bedtime  acetylcysteine  Oral Solution 1200 milliGRAM(s) Oral two times a day  predniSONE   Tablet 40 milliGRAM(s) Oral daily      Labs: All Labs Reviewed:                                 9.5    9.8   )-----------( 186      ( 2017 05:52 )             28.6           142  |  104  |  90<H>  ----------------------------<  290<H>  3.9   |  31  |  1.82<H>    Ca    8.9      2017 05:52 SUBJECTIVE     patient has right cath and has mildly elevated wedge pressures of 21 with normal of 12 indicative component of left heart failure . she also has mild pulmonary htn likely secondary  . today denies any wheezing and started on low dose prednisone for the copd component .         PAST MEDICAL & SURGICAL HISTORY:  Hyperlipidemia, unspecified hyperlipidemia type  Essential hypertension  Neuropathy  Diabetes  Chronic obstructive pulmonary disease, unspecified COPD type.  lung cancer stage 1 status post wedge resection   History of total knee replacement, unspecified laterality  H/O hernia repair   delivery delivered  S/P appendectomy          OBJECTIVE     Vital Signs Last 24 Hrs  T(C): 36.9 (2017 05:30), Max: 36.9 (2017 05:30)  T(F): 98.5 (2017 05:30), Max: 98.5 (2017 05:30)  HR: 70 (2017 14:14) (58 - 72)  BP: 152/54 (2017 13:04) (133/46 - 166/50)  BP(mean): --  RR: 18 (2017 12:15) (17 - 18)  SpO2: 97% (2017 12:15) (93% - 100%)      PHYSICAL EXAM:    Constitutional: , awake and alert, not in distress.     HEENT: Normo cephalic atraumatic     Neck: Soft and supple, No J.V.D     Respiratory: vesicular breathing has no wheeze  mildly  decreased breath sound in the bases     Cardiovascular: S1 and S2, regular rate .     Gastrointestinal:  soft, nontender,     Extremities: minimal edema of the feet     Neurological: A/O x 3, no focal deficits.      Medications:    MEDICATIONS  (STANDING):  pregabalin 150 milliGRAM(s) Oral at bedtime  citalopram 10 milliGRAM(s) Oral daily  atorvastatin 20 milliGRAM(s) Oral at bedtime  montelukast 10 milliGRAM(s) Oral at bedtime  pantoprazole    Tablet 40 milliGRAM(s) Oral before breakfast  levothyroxine 112 MICROGram(s) Oral daily  folic acid 1 milliGRAM(s) Oral daily  cholecalciferol 1000 Unit(s) Oral daily  aspirin  chewable 81 milliGRAM(s) Oral daily  heparin  Injectable 5000 Unit(s) SubCutaneous every 12 hours  insulin lispro (HumaLOG) corrective regimen sliding scale   SubCutaneous three times a day before meals  insulin lispro (HumaLOG) corrective regimen sliding scale   SubCutaneous at bedtime  dextrose 5%. 1000 milliLiter(s) (50 mL/Hr) IV Continuous <Continuous>  dextrose 50% Injectable 12.5 Gram(s) IV Push once  dextrose 50% Injectable 25 Gram(s) IV Push once  dextrose 50% Injectable 25 Gram(s) IV Push once  insulin lispro Injectable (HumaLOG) 17 Unit(s) SubCutaneous three times a day before meals  diltiazem    Tablet 60 milliGRAM(s) Oral four times a day  tiotropium 18 MICROgram(s) Capsule 1 Capsule(s) Inhalation daily  furosemide    Tablet 80 milliGRAM(s) Oral daily  buDESOnide 160 MICROgram(s)/formoterol 4.5 MICROgram(s) Inhaler 2 Puff(s) Inhalation two times a day  potassium chloride    Tablet ER 40 milliEquivalent(s) Oral every 4 hours  insulin glargine Injectable (LANTUS) 20 Unit(s) SubCutaneous at bedtime  acetylcysteine  Oral Solution 1200 milliGRAM(s) Oral two times a day  predniSONE   Tablet 40 milliGRAM(s) Oral daily      Labs: All Labs Reviewed:                                 9.5    9.8   )-----------( 186      ( 2017 05:52 )             28.6           142  |  104  |  90<H>  ----------------------------<  290<H>  3.9   |  31  |  1.82<H>    Ca    8.9      2017 05:52

## 2017-07-28 NOTE — PROGRESS NOTE ADULT - SUBJECTIVE AND OBJECTIVE BOX
NEPHROLOGY INTERVAL HPI/OVERNIGHT EVENTS:      Hypoxic yesterday ambulating  getting cardiac cath today  acetylcysteine ordered     SY  Feeling better  Ambulated x2 in the hallway.  Denies significant TINEO.      feels much better after IV lasix  no dyspnea  daughter at bedside     SY  Feeling fair.  Reports improved TINEO last night but this am again felt SOB walking back from bathroom.     SY  Appearing fairly comfortable.  However reports significant TINEO with just walking to bathroom this am.    HPI:  75 yo woman with multiple medical problems including CKD (with baseline creat 1.2-1.4) and COPD admitted with complaints of SOB for several days.  Noted to be hypoxic in Dr Forte's office and sent to ER.   Pt apparently was started on Lasix for worsening LE edema. by Dr Cutler last week.  Pt had not been on diuretic therapy for sometime until started by Dr Cutler.  Pt also had her Amlodipine dose reduced since LE edema increased after pt's meds were adjusted during previous hosp.  On admission. noted with worsened renal fx and has been started on gentle hydration.      PMHX and PSHX.  1.CKD (baseline creat 1.2-1.4)  2.CAD  3.COPD  4.Hypothyroidism  5.Anemia  6.Hx of Depression and Anxiety  7.HTN  8.Recurrent UTIs  9.Syncope--recent hosp.         PMHX and PSHX.  1.CKD (baseline creat 1.2-1.4)  2.CAD  3.COPD  4.Hypothyroidism  5.Anemia  6.Hx of Depression and Anxiety  7.HTN  8.Recurrent UTIs  9.Syncope--recent hosp.             MEDICATIONS  (STANDING):  pregabalin 150 milliGRAM(s) Oral at bedtime  citalopram 10 milliGRAM(s) Oral daily  atorvastatin 20 milliGRAM(s) Oral at bedtime  montelukast 10 milliGRAM(s) Oral at bedtime  pantoprazole    Tablet 40 milliGRAM(s) Oral before breakfast  levothyroxine 112 MICROGram(s) Oral daily  folic acid 1 milliGRAM(s) Oral daily  cholecalciferol 1000 Unit(s) Oral daily  aspirin  chewable 81 milliGRAM(s) Oral daily  heparin  Injectable 5000 Unit(s) SubCutaneous every 12 hours  insulin lispro (HumaLOG) corrective regimen sliding scale   SubCutaneous three times a day before meals  insulin lispro (HumaLOG) corrective regimen sliding scale   SubCutaneous at bedtime  dextrose 5%. 1000 milliLiter(s) (50 mL/Hr) IV Continuous <Continuous>  dextrose 50% Injectable 12.5 Gram(s) IV Push once  dextrose 50% Injectable 25 Gram(s) IV Push once  dextrose 50% Injectable 25 Gram(s) IV Push once  insulin lispro Injectable (HumaLOG) 17 Unit(s) SubCutaneous three times a day before meals  diltiazem    Tablet 60 milliGRAM(s) Oral four times a day  tiotropium 18 MICROgram(s) Capsule 1 Capsule(s) Inhalation daily  furosemide    Tablet 80 milliGRAM(s) Oral daily  buDESOnide 160 MICROgram(s)/formoterol 4.5 MICROgram(s) Inhaler 2 Puff(s) Inhalation two times a day  potassium chloride    Tablet ER 40 milliEquivalent(s) Oral every 4 hours  insulin glargine Injectable (LANTUS) 20 Unit(s) SubCutaneous at bedtime  acetylcysteine  Oral Solution 1200 milliGRAM(s) Oral two times a day    MEDICATIONS  (PRN):  dextrose Gel 1 Dose(s) Oral once PRN Blood Glucose LESS THAN 70 milliGRAM(s)/deciliter  glucagon  Injectable 1 milliGRAM(s) IntraMuscular once PRN Glucose LESS THAN 70 milligrams/deciliter            Vital Signs Last 24 Hrs  T(C): 34.4 (2017 04:56), Max: 36.6 (2017 10:45)  T(F): 94 (2017 04:56), Max: 97.9 (2017 10:45)  HR: 82 (2017 07:20) (69 - 87)  BP: 157/42 (2017 04:56) (154/44 - 157/42)  BP(mean): --  RR: 18 (2017 04:56) (17 - 18)  SpO2: 94% (2017 04:56) (93% - 98%)  Daily     Daily Weight in k.2 (2017 14:56)     @ 07:01  -  - @ 07:00  --------------------------------------------------------  IN: 120 mL / OUT: 0 mL / NET: 120 mL        PHYSICAL EXAM:  Alert and appropriate  GENERAL: No acute distress  CHEST/LUNG: much improved air entry  HEART: S1S2 RRR  ABDOMEN: soft  EXTREMITIES: no edema  SKIN:                           9.6    11.7  )-----------( 171      ( 2017 05:48 )             29.4       07-    145  |  106  |  99<H>  ----------------------------<  50<L>  3.6   |  33<H>  |  1.78<H>    Ca    8.9      2017 05:48            INTERPRETATION:  Clinical information: Acute kidney injury. Chronic   kidney disease. Rule out obstruction.    COMPARISON: Report from CT scan 2016. Renal ultrasound   2016    TECHNIQUE: Sonography of the kidneys and bladder    FINDINGS:    Right kidney:  10.8 cm. No renal mass, hydronephrosis or calculus.    Normal cortical echogenicity.    Left kidney:  11.5 cm. No renal mass,hydronephrosis or calculus.  Normal   cortical echogenicity.    Urinary bladder: Within normal limits. Bilateral ureteral jets were   visualized.    IMPRESSION:     Normal renal ultrasound.      < end of copied text >

## 2017-07-29 RX ORDER — FUROSEMIDE 40 MG
40 TABLET ORAL DAILY
Qty: 0 | Refills: 0 | Status: DISCONTINUED | OUTPATIENT
Start: 2017-07-29 | End: 2017-07-30

## 2017-07-29 RX ADMIN — CITALOPRAM 10 MILLIGRAM(S): 10 TABLET, FILM COATED ORAL at 12:24

## 2017-07-29 RX ADMIN — Medication 81 MILLIGRAM(S): at 12:24

## 2017-07-29 RX ADMIN — HEPARIN SODIUM 5000 UNIT(S): 5000 INJECTION INTRAVENOUS; SUBCUTANEOUS at 17:34

## 2017-07-29 RX ADMIN — ATORVASTATIN CALCIUM 20 MILLIGRAM(S): 80 TABLET, FILM COATED ORAL at 21:38

## 2017-07-29 RX ADMIN — Medication 30 MILLIGRAM(S): at 17:34

## 2017-07-29 RX ADMIN — Medication 80 MILLIGRAM(S): at 05:57

## 2017-07-29 RX ADMIN — HEPARIN SODIUM 5000 UNIT(S): 5000 INJECTION INTRAVENOUS; SUBCUTANEOUS at 05:58

## 2017-07-29 RX ADMIN — Medication 17 UNIT(S): at 17:34

## 2017-07-29 RX ADMIN — PANTOPRAZOLE SODIUM 40 MILLIGRAM(S): 20 TABLET, DELAYED RELEASE ORAL at 05:57

## 2017-07-29 RX ADMIN — BUDESONIDE AND FORMOTEROL FUMARATE DIHYDRATE 2 PUFF(S): 160; 4.5 AEROSOL RESPIRATORY (INHALATION) at 07:58

## 2017-07-29 RX ADMIN — MONTELUKAST 10 MILLIGRAM(S): 4 TABLET, CHEWABLE ORAL at 21:38

## 2017-07-29 RX ADMIN — Medication 40 MILLIGRAM(S): at 12:24

## 2017-07-29 RX ADMIN — Medication 17 UNIT(S): at 08:29

## 2017-07-29 RX ADMIN — Medication 112 MICROGRAM(S): at 05:57

## 2017-07-29 RX ADMIN — Medication 1000 UNIT(S): at 12:24

## 2017-07-29 RX ADMIN — Medication 1 MILLIGRAM(S): at 12:24

## 2017-07-29 RX ADMIN — Medication 1200 MILLIGRAM(S): at 17:34

## 2017-07-29 RX ADMIN — BUDESONIDE AND FORMOTEROL FUMARATE DIHYDRATE 2 PUFF(S): 160; 4.5 AEROSOL RESPIRATORY (INHALATION) at 19:37

## 2017-07-29 RX ADMIN — Medication 1200 MILLIGRAM(S): at 05:57

## 2017-07-29 RX ADMIN — Medication 17 UNIT(S): at 12:23

## 2017-07-29 NOTE — PROGRESS NOTE ADULT - SUBJECTIVE AND OBJECTIVE BOX
SUBJECTIVE     patient denies any new symptoms and comfortable at base line with no sob or new wheezing or fever or chest pain .       PAST MEDICAL & SURGICAL HISTORY:  Hyperlipidemia, unspecified hyperlipidemia type  Essential hypertension  Neuropathy  Diabetes  Chronic obstructive pulmonary disease, unspecified COPD type.  lung cancer stage 1 status post wedge resection   History of total knee replacement, unspecified laterality  H/O hernia repair   delivery delivered  S/P appendectomy          OBJECTIVE     Vital Signs Last 24 Hrs  T(C): 37.1 (2017 05:38), Max: 37.1 (2017 05:38)  T(F): 98.8 (2017 05:38), Max: 98.8 (2017 05:38)  HR: 66 (2017 07:59) (60 - 72)  BP: 137/40 (2017 05:38) (113/36 - 166/50)  BP(mean): 40 (2017 05:38) (40 - 40)  RR: 17 (2017 05:38) (17 - 18)  SpO2: 98% (2017 05:38) (97% - 100%)    PHYSICAL EXAM:    Constitutional: , awake and alert, not in distress.     HEENT: Normo cephalic atraumatic     Neck: Soft and supple, No J.V.D     Respiratory: vesicular breathing has no wheeze  mildly  decreased breath sound in the base on the right side with no wheeze      Cardiovascular: S1 and S2, regular rate .     Gastrointestinal:  soft, nontender,     Extremities: no  edema of the feet     Neurological: A/O x 3, no focal deficits.      Medications:    MEDICATIONS  (STANDING):  pregabalin 150 milliGRAM(s) Oral at bedtime  citalopram 10 milliGRAM(s) Oral daily  atorvastatin 20 milliGRAM(s) Oral at bedtime  montelukast 10 milliGRAM(s) Oral at bedtime  pantoprazole    Tablet 40 milliGRAM(s) Oral before breakfast  levothyroxine 112 MICROGram(s) Oral daily  folic acid 1 milliGRAM(s) Oral daily  cholecalciferol 1000 Unit(s) Oral daily  aspirin  chewable 81 milliGRAM(s) Oral daily  heparin  Injectable 5000 Unit(s) SubCutaneous every 12 hours  insulin lispro (HumaLOG) corrective regimen sliding scale   SubCutaneous three times a day before meals  insulin lispro (HumaLOG) corrective regimen sliding scale   SubCutaneous at bedtime  dextrose 5%. 1000 milliLiter(s) (50 mL/Hr) IV Continuous <Continuous>  dextrose 50% Injectable 12.5 Gram(s) IV Push once  dextrose 50% Injectable 25 Gram(s) IV Push once  dextrose 50% Injectable 25 Gram(s) IV Push once  insulin lispro Injectable (HumaLOG) 17 Unit(s) SubCutaneous three times a day before meals  diltiazem    Tablet 60 milliGRAM(s) Oral four times a day  tiotropium 18 MICROgram(s) Capsule 1 Capsule(s) Inhalation daily  furosemide    Tablet 80 milliGRAM(s) Oral daily  buDESOnide 160 MICROgram(s)/formoterol 4.5 MICROgram(s) Inhaler 2 Puff(s) Inhalation two times a day  potassium chloride    Tablet ER 40 milliEquivalent(s) Oral every 4 hours  insulin glargine Injectable (LANTUS) 20 Unit(s) SubCutaneous at bedtime  acetylcysteine  Oral Solution 1200 milliGRAM(s) Oral two times a day  predniSONE   Tablet 40 milliGRAM(s) Oral daily      Labs: All Labs Reviewed:

## 2017-07-29 NOTE — PROGRESS NOTE ADULT - SUBJECTIVE AND OBJECTIVE BOX
7/24/17: Wheezing this am -- no cough, no cp, sob with exertion but fine at rest right now.  No n/v/f/c  7/25/17: Wheezing much improved; ambulated the halls this morning; feels much better - no sob this am, no cp, n/v/f/  7/26/17: Feels much better in terms of breathing this am but is very shaky from the nebs and steroids; no cough, no cp, sob, is able to ambulate  7/27/17: No cp, sob, n/v/f/c; was hypoglycemic this am -- sugar better now; but feels very tired.  7/28/17: No cp, sob, n/v/f/c; pt is fatigued as blood sugar was low this am again and she didnt receive any lantus last night; Pt was to be discharged but then desatted with ambulation and started wheezing  7/29/17: Feels better; s/p cath yest - ambulated but tired this morning now; no cp, sob, n/v/f/c    ROS: as per HPI otherwise all other systems reviewed and are negative      PHYSICAL EXAM:  Vital Signs Last 24 Hrs  T(C): 37.1 (29 Jul 2017 05:38), Max: 37.1 (29 Jul 2017 05:38)  T(F): 98.8 (29 Jul 2017 05:38), Max: 98.8 (29 Jul 2017 05:38)  HR: 66 (29 Jul 2017 07:59) (60 - 72)  BP: 137/40 (29 Jul 2017 05:38) (113/36 - 166/50)  BP(mean): 40 (29 Jul 2017 05:38) (40 - 40)  RR: 17 (29 Jul 2017 05:38) (17 - 18)  SpO2: 98% (29 Jul 2017 05:38) (97% - 100%)    GEN: A and O, NAD, mood stable  HEENT:   NC/AT, EOMI, no oropharyngeal lesions    NECK:   supple    CV:  +S1, +S2, regular, no murmurs or rubs    RESP:   CTA ANGELICA, NO RALES OR WHEEZES    GI:  abdomen soft, non-tender, non-distended, normal BS,  no abdominal masses, no palpable masses    RECTAL:  not examined    :  not examined    MSK:   normal muscle tone, no atrophy, no rigidity, no contractions    EXT:   no clubbing, no cyanosis, no edema, no calf pain, swelling or erythema    VASCULAR:  pulses equal and symmetric in the upper and lower extremities    NEURO:  AAOX3, no focal neurological deficits, follows all commands, able to move extremities spontaneously    SKIN:  no ulcers, lesions or rashes    LABS:    NO NEW LABS    MEDICATIONS  (STANDING):  citalopram 10 milliGRAM(s) Oral daily  atorvastatin 20 milliGRAM(s) Oral at bedtime  montelukast 10 milliGRAM(s) Oral at bedtime  pantoprazole    Tablet 40 milliGRAM(s) Oral before breakfast  levothyroxine 112 MICROGram(s) Oral daily  folic acid 1 milliGRAM(s) Oral daily  cholecalciferol 1000 Unit(s) Oral daily  aspirin  chewable 81 milliGRAM(s) Oral daily  heparin  Injectable 5000 Unit(s) SubCutaneous every 12 hours  insulin lispro (HumaLOG) corrective regimen sliding scale   SubCutaneous three times a day before meals  insulin lispro (HumaLOG) corrective regimen sliding scale   SubCutaneous at bedtime  dextrose 5%. 1000 milliLiter(s) (50 mL/Hr) IV Continuous <Continuous>  dextrose 50% Injectable 12.5 Gram(s) IV Push once  dextrose 50% Injectable 25 Gram(s) IV Push once  dextrose 50% Injectable 25 Gram(s) IV Push once  insulin lispro Injectable (HumaLOG) 17 Unit(s) SubCutaneous three times a day before meals  diltiazem    Tablet 60 milliGRAM(s) Oral four times a day  tiotropium 18 MICROgram(s) Capsule 1 Capsule(s) Inhalation daily  furosemide    Tablet 80 milliGRAM(s) Oral daily  buDESOnide 160 MICROgram(s)/formoterol 4.5 MICROgram(s) Inhaler 2 Puff(s) Inhalation two times a day  insulin glargine Injectable (LANTUS) 20 Unit(s) SubCutaneous at bedtime  acetylcysteine  Oral Solution 1200 milliGRAM(s) Oral two times a day  predniSONE   Tablet 40 milliGRAM(s) Oral daily    MEDICATIONS  (PRN):  dextrose Gel 1 Dose(s) Oral once PRN Blood Glucose LESS THAN 70 milliGRAM(s)/deciliter  glucagon  Injectable 1 milliGRAM(s) IntraMuscular once PRN Glucose LESS THAN 70 milligrams/deciliter

## 2017-07-29 NOTE — PROGRESS NOTE ADULT - ASSESSMENT
- copd restarted on low dose prednisone and status post right cath with mildly elevated wedge pressure which is more than 18 indicative of heart failure component   - chf with small effusion   - D.M   - lung cancer resected stage 1 with  no recurrence     PLAN     - continue with po lasix with Symbicort and spiriva along with the 02 for the copd and patient does have markedly reduced diffusion capacity as an out pt with the copd and probable pulmonary HTN   - patient takes advair as an out pt in the place of Symbicort .   - cardio follow up for the optimization of diuretic regimen   - patient encourage to use the 02 continuously now   - taper the prednisone to 30 mg daily dose   - repeat baseline cxr for future follow up

## 2017-07-29 NOTE — PROGRESS NOTE ADULT - ASSESSMENT
Pt is a 75 y/o female with h/o type II diabetes, HTN, chronic UTI's, stage 3 CKD, COPD with chronic hypoxic respiratory failure (refuses to wear O2) ,who is know to me from a recent admission at  for syncope, ANI on stage 3 CKD, Rt clavicle fracture and bradycardia admitted for acute hypoxic respiratory failure.    * Acute Hypoxic respiratory failure-SECONDARY  to ACOPD exacerbation wi AND COMPONENT OF CHF. MUCH IMPROVED WITH DIURESIS AND STEROIDS. PRIOR TO DC THE OTHER DAY SHE BECAME HYPOXIC AGAIN WITH AMBULATION; S/P CATH YEST CVP 10, PWP 21; RESTARTED ON A STEROID TAPER AND WILL CONT DIURETICS.  AMBULATE TODAY AND MONITOR SATS  2D ECHO EF 65%, PULM HTN  * HTN- bp BETTER CONTROLLED, DOES NOT TOLERATE ACEINH/ARB DUE TO HYPERKALEMIA IN THE PAST. HAD SOME BRADYCARDIA IN THE 30-40'S YEST, WILL DECREASED DOSE OF CARDIZEM  * ANI on stage 3 CKD- CR STABLE;   * Recent Rt Clavicle Facture supportive care  * Hypothyroidism continue synthroid  * Type II diabetes- uncontrolled due to steroids, HYPOGLYCEMIA RESOLVED AS LANTUS DOSE WAS DECREASED   * Disp-OOB, ambulate with PT.  * Proph- heparin  * Comm- d/w pt , RN, DR ADAMS,

## 2017-07-29 NOTE — PROGRESS NOTE ADULT - SUBJECTIVE AND OBJECTIVE BOX
NEPHROLOGY INTERVAL HPI/OVERNIGHT EVENTS:  no c/o doing better.  ambulating in hallway without any evidence of SOB/TINEO.  much improved symptoms          Vital Signs Last 24 Hrs  T(C): --  T(F): --  HR: 70 (21 Jul 2017 15:40) (70 - 70)  BP: 179/65 (21 Jul 2017 15:40) (179/65 - 179/65)  BP(mean): --  RR: 18 (21 Jul 2017 15:40) (18 - 18)  SpO2: 92% (21 Jul 2017 15:40) (92% - 92%)    I&O's Detail      Daily Height in cm: 157.48 (21 Jul 2017 15:40)    Daily     CARDIAC MARKERS ( 21 Jul 2017 16:01 )  <0.015 ng/mL / x     / x     / x     / x            ABG - ( 21 Jul 2017 16:40 )  pH: 7.39  /  pCO2: 42    /  pO2: 66    / HCO3: 25    / Base Excess: 0     /  SaO2: 92                                        11.0   8.0   )-----------( 200      ( 21 Jul 2017 16:01 )             33.4       07-21    144  |  111<H>  |  41<H>  ----------------------------<  214<H>  5.0   |  28  |  1.62<H>    Ca    9.3      21 Jul 2017 16:01    TPro  6.9  /  Alb  3.7  /  TBili  0.6  /  DBili  x   /  AST  14<L>  /  ALT  20  /  AlkPhos  120  07-21      LIVER FUNCTIONS - ( 21 Jul 2017 16:01 )  Alb: 3.7 g/dL / Pro: 6.9 gm/dL / ALK PHOS: 120 U/L / ALT: 20 U/L / AST: 14 U/L / GGT: x                       MEDICATIONS  (STANDING):    MEDICATIONS  (PRN): (21 Jul 2017 19:26)      Patient is a 76y old  Female who presents with a chief complaint of I can't breath (27 Jul 2017 09:13)      MEDICATIONS  (STANDING):  citalopram 10 milliGRAM(s) Oral daily  atorvastatin 20 milliGRAM(s) Oral at bedtime  montelukast 10 milliGRAM(s) Oral at bedtime  pantoprazole    Tablet 40 milliGRAM(s) Oral before breakfast  levothyroxine 112 MICROGram(s) Oral daily  folic acid 1 milliGRAM(s) Oral daily  cholecalciferol 1000 Unit(s) Oral daily  aspirin  chewable 81 milliGRAM(s) Oral daily  heparin  Injectable 5000 Unit(s) SubCutaneous every 12 hours  insulin lispro (HumaLOG) corrective regimen sliding scale   SubCutaneous three times a day before meals  insulin lispro (HumaLOG) corrective regimen sliding scale   SubCutaneous at bedtime  dextrose 5%. 1000 milliLiter(s) (50 mL/Hr) IV Continuous <Continuous>  dextrose 50% Injectable 12.5 Gram(s) IV Push once  dextrose 50% Injectable 25 Gram(s) IV Push once  dextrose 50% Injectable 25 Gram(s) IV Push once  insulin lispro Injectable (HumaLOG) 17 Unit(s) SubCutaneous three times a day before meals  tiotropium 18 MICROgram(s) Capsule 1 Capsule(s) Inhalation daily  buDESOnide 160 MICROgram(s)/formoterol 4.5 MICROgram(s) Inhaler 2 Puff(s) Inhalation two times a day  insulin glargine Injectable (LANTUS) 20 Unit(s) SubCutaneous at bedtime  acetylcysteine  Oral Solution 1200 milliGRAM(s) Oral two times a day  predniSONE   Tablet 40 milliGRAM(s) Oral daily  diltiazem    Tablet 60 milliGRAM(s) Oral every 8 hours  predniSONE   Tablet 30 milliGRAM(s) Oral daily  furosemide    Tablet 40 milliGRAM(s) Oral daily    MEDICATIONS  (PRN):  dextrose Gel 1 Dose(s) Oral once PRN Blood Glucose LESS THAN 70 milliGRAM(s)/deciliter  glucagon  Injectable 1 milliGRAM(s) IntraMuscular once PRN Glucose LESS THAN 70 milligrams/deciliter      Allergies    Bactrim (Other)  ciprofloxacin (Other (Mild))  clindamycin (Unknown)  ibuprofen (Unknown)  penicillins (Other)  sulfa drugs (Unknown)    Intolerances        I&O's Detail    28 Jul 2017 07:01  -  29 Jul 2017 07:00  --------------------------------------------------------  IN:  Total IN: 0 mL    OUT:    Voided: 900 mL  Total OUT: 900 mL    Total NET: -900 mL              Vital Signs Last 24 Hrs  T(C): 36.6 (29 Jul 2017 11:00), Max: 37.1 (29 Jul 2017 05:38)  T(F): 97.8 (29 Jul 2017 11:00), Max: 98.8 (29 Jul 2017 05:38)  HR: 65 (29 Jul 2017 11:00) (60 - 68)  BP: 132/40 (29 Jul 2017 11:00) (113/36 - 137/40)  BP(mean): 62 (29 Jul 2017 11:00) (40 - 62)  RR: 12 (29 Jul 2017 11:00) (12 - 18)  SpO2: 100% (29 Jul 2017 11:00) (98% - 100%)  Daily     Daily     PHYSICAL EXAM:  General: alert. awake Ox3  HEENT: MMM  CV: s1s2 rrr  LUNGS: B/L CTA  EXT: no edema    LABS:

## 2017-07-29 NOTE — PROGRESS NOTE ADULT - ASSESSMENT
77 yo woman  with COPD, and CKD admitted with resp insuff.   Pt was recently started on diuretic which may explain increase in creat on admission.  However CT scan c/w pulmonary edema.  Therefore, will need to evaluate for possible cardiac decompensation to explain recent event of ANI and CHF.  Will d/c further IVF.  Since resp status has stabilized, will hold off on adding diuretics until cardiology evaluation done.  Hyperkalemia of unclear reason.  Recheck to see if rx needed.    7/23 SY  --ANI/CKD    Unclear baseline fx at this point,  Renal parameters are quite variable.  Unclear if due to hyperglycemia as well.  --Resp insuff is persistent and very limiting in terms of pt's fx.   Therefore, will give a trial of IV lasix.     If resp status responds to diuresis, then will need to accept increased creat and continue diuretics.  --Will check to see pt is able to fully able to empty bladder.    7/24 SY  --ANI/CKD   as above    Creat now holding fairly steady.  --CXR more c/w CHF.   Will diurese more consistently and may need to allow creat to increase to stabilize resp status first.  Again, will need to consider whether this is primarily decompensated CHF.  --Follow up renal sonogram report.  --Close monitoring of I and O.    7/25  feels well on iv lasix  d/w Dr Griggs, stress test 1 yr ago, small defect.  Cont lasix 40 mg iv qd    7/26 SY  --ANI/CKD   Creat holding fairly stably with diuresis.  Continue current diuretics.  Will change to po in preparation for possible d/c in am  --Resp insuff with CHF --now improved.    7/28  Hypoxic while ambulating yesterday, Cath today in 1/2 hour  Will start acetyl cysteine 1200 mg po bid x 2 days, 1st dose stat   informed pt and family of risk of ANI, MIGUEL A    7/29 MK  - CKD with stable renal parameters  - SOB; with cath negative, continue with lasix and DB, steroids as per dr gould  - fluid restriction along with low salt discussed with pt  - not renal barrier for dc tomorrow if with stable renal parameters.

## 2017-07-30 VITALS
SYSTOLIC BLOOD PRESSURE: 152 MMHG | RESPIRATION RATE: 15 BRPM | HEART RATE: 61 BPM | DIASTOLIC BLOOD PRESSURE: 45 MMHG | TEMPERATURE: 98 F | OXYGEN SATURATION: 99 %

## 2017-07-30 LAB
ALBUMIN SERPL ELPH-MCNC: 2.7 G/DL — LOW (ref 3.3–5)
ALP SERPL-CCNC: 95 U/L — SIGNIFICANT CHANGE UP (ref 40–120)
ALT FLD-CCNC: 30 U/L — SIGNIFICANT CHANGE UP (ref 12–78)
ANION GAP SERPL CALC-SCNC: 11 MMOL/L — SIGNIFICANT CHANGE UP (ref 5–17)
AST SERPL-CCNC: 25 U/L — SIGNIFICANT CHANGE UP (ref 15–37)
BILIRUB DIRECT SERPL-MCNC: <0.1 MG/DL — SIGNIFICANT CHANGE UP (ref 0–0.2)
BILIRUB INDIRECT FLD-MCNC: >0.4 MG/DL — SIGNIFICANT CHANGE UP (ref 0.2–1)
BILIRUB SERPL-MCNC: 0.5 MG/DL — SIGNIFICANT CHANGE UP (ref 0.2–1.2)
BUN SERPL-MCNC: 82 MG/DL — HIGH (ref 7–23)
CALCIUM SERPL-MCNC: 8.7 MG/DL — SIGNIFICANT CHANGE UP (ref 8.5–10.1)
CHLORIDE SERPL-SCNC: 101 MMOL/L — SIGNIFICANT CHANGE UP (ref 96–108)
CO2 SERPL-SCNC: 26 MMOL/L — SIGNIFICANT CHANGE UP (ref 22–31)
CREAT SERPL-MCNC: 2.04 MG/DL — HIGH (ref 0.5–1.3)
GLUCOSE SERPL-MCNC: 388 MG/DL — HIGH (ref 70–99)
HCT VFR BLD CALC: 30.4 % — LOW (ref 34.5–45)
HGB BLD-MCNC: 9.8 G/DL — LOW (ref 11.5–15.5)
MCHC RBC-ENTMCNC: 29.3 PG — SIGNIFICANT CHANGE UP (ref 27–34)
MCHC RBC-ENTMCNC: 32.4 GM/DL — SIGNIFICANT CHANGE UP (ref 32–36)
MCV RBC AUTO: 90.7 FL — SIGNIFICANT CHANGE UP (ref 80–100)
PLATELET # BLD AUTO: 188 K/UL — SIGNIFICANT CHANGE UP (ref 150–400)
POTASSIUM SERPL-MCNC: 5 MMOL/L — SIGNIFICANT CHANGE UP (ref 3.5–5.3)
POTASSIUM SERPL-SCNC: 5 MMOL/L — SIGNIFICANT CHANGE UP (ref 3.5–5.3)
PROT SERPL-MCNC: 6 GM/DL — SIGNIFICANT CHANGE UP (ref 6–8.3)
RBC # BLD: 3.35 M/UL — LOW (ref 3.8–5.2)
RBC # FLD: 14.6 % — HIGH (ref 10.3–14.5)
SODIUM SERPL-SCNC: 138 MMOL/L — SIGNIFICANT CHANGE UP (ref 135–145)
WBC # BLD: 14.5 K/UL — HIGH (ref 3.8–10.5)
WBC # FLD AUTO: 14.5 K/UL — HIGH (ref 3.8–10.5)

## 2017-07-30 RX ORDER — FUROSEMIDE 40 MG
1 TABLET ORAL
Qty: 0 | Refills: 0 | COMMUNITY
Start: 2017-07-30

## 2017-07-30 RX ORDER — DILTIAZEM HCL 120 MG
1 CAPSULE, EXT RELEASE 24 HR ORAL
Qty: 30 | Refills: 0 | OUTPATIENT
Start: 2017-07-30

## 2017-07-30 RX ADMIN — CITALOPRAM 10 MILLIGRAM(S): 10 TABLET, FILM COATED ORAL at 10:49

## 2017-07-30 RX ADMIN — Medication 1200 MILLIGRAM(S): at 05:16

## 2017-07-30 RX ADMIN — Medication 30 MILLIGRAM(S): at 06:00

## 2017-07-30 RX ADMIN — BUDESONIDE AND FORMOTEROL FUMARATE DIHYDRATE 2 PUFF(S): 160; 4.5 AEROSOL RESPIRATORY (INHALATION) at 08:37

## 2017-07-30 RX ADMIN — Medication 17 UNIT(S): at 08:15

## 2017-07-30 RX ADMIN — Medication 1 MILLIGRAM(S): at 10:49

## 2017-07-30 RX ADMIN — Medication 40 MILLIGRAM(S): at 05:17

## 2017-07-30 RX ADMIN — PANTOPRAZOLE SODIUM 40 MILLIGRAM(S): 20 TABLET, DELAYED RELEASE ORAL at 05:16

## 2017-07-30 RX ADMIN — Medication 81 MILLIGRAM(S): at 10:49

## 2017-07-30 RX ADMIN — Medication 6: at 08:15

## 2017-07-30 RX ADMIN — Medication 1000 UNIT(S): at 10:49

## 2017-07-30 RX ADMIN — HEPARIN SODIUM 5000 UNIT(S): 5000 INJECTION INTRAVENOUS; SUBCUTANEOUS at 05:20

## 2017-07-30 RX ADMIN — Medication 112 MICROGRAM(S): at 05:16

## 2017-07-30 NOTE — PROGRESS NOTE ADULT - SUBJECTIVE AND OBJECTIVE BOX
7/24/17: Wheezing this am -- no cough, no cp, sob with exertion but fine at rest right now.  No n/v/f/c  7/25/17: Wheezing much improved; ambulated the halls this morning; feels much better - no sob this am, no cp, n/v/f/  7/26/17: Feels much better in terms of breathing this am but is very shaky from the nebs and steroids; no cough, no cp, sob, is able to ambulate  7/27/17: No cp, sob, n/v/f/c; was hypoglycemic this am -- sugar better now; but feels very tired.  7/28/17: No cp, sob, n/v/f/c; pt is fatigued as blood sugar was low this am again and she didnt receive any lantus last night; Pt was to be discharged but then desatted with ambulation and started wheezing  7/29/17: Feels better; s/p cath yest - ambulated but tired this morning now; no cp, sob, n/v/f/c  7/30/17: No cp, sob, n/v/f/c; ambulated without difficulty; feels great today and rady to go home    ROS: as per HPI otherwise all other systems reviewed and are negative      PHYSICAL EXAM:  Vital Signs Last 24 Hrs  T(C): 37.2 (30 Jul 2017 05:06), Max: 37.2 (30 Jul 2017 05:06)  T(F): 98.9 (30 Jul 2017 05:06), Max: 98.9 (30 Jul 2017 05:06)  HR: 65 (30 Jul 2017 05:06) (65 - 69)  BP: 146/35 (30 Jul 2017 05:06) (120/45 - 146/35)  BP(mean): 65 (29 Jul 2017 16:24) (62 - 65)  RR: 14 (30 Jul 2017 05:06) (12 - 14)  SpO2: 98% (30 Jul 2017 05:06) (98% - 100%)  GEN: A and O, NAD, mood stable  HEENT:   NC/AT, EOMI, no oropharyngeal lesions    NECK:   supple    CV:  +S1, +S2, regular, no murmurs or rubs    RESP:   CTA ANGELICA, NO RALES OR WHEEZES    GI:  abdomen soft, non-tender, non-distended, normal BS,  no abdominal masses, no palpable masses    RECTAL:  not examined    :  not examined    MSK:   normal muscle tone, no atrophy, no rigidity, no contractions    EXT:   no clubbing, no cyanosis, no edema, no calf pain, swelling or erythema    VASCULAR:  pulses equal and symmetric in the upper and lower extremities    NEURO:  AAOX3, no focal neurological deficits, follows all commands, able to move extremities spontaneously    SKIN:  no ulcers, lesions or rashes    LABS:                              9.8    14.5  )-----------( 188      ( 30 Jul 2017 05:41 )             30.4     07-30    138  |  101  |  82<H>  ----------------------------<  388<H>  5.0   |  26  |  2.04<H>    Ca    8.7      30 Jul 2017 05:41    TPro  6.0  /  Alb  2.7<L>  /  TBili  0.5  /  DBili  <0.1  /  AST  25  /  ALT  30  /  AlkPhos  95  07-30        LIVER FUNCTIONS - ( 30 Jul 2017 05:41 )  Alb: 2.7 g/dL / Pro: 6.0 gm/dL / ALK PHOS: 95 U/L / ALT: 30 U/L / AST: 25 U/L / GGT: x             MEDICATIONS  (STANDING):  citalopram 10 milliGRAM(s) Oral daily  atorvastatin 20 milliGRAM(s) Oral at bedtime  montelukast 10 milliGRAM(s) Oral at bedtime  pantoprazole    Tablet 40 milliGRAM(s) Oral before breakfast  levothyroxine 112 MICROGram(s) Oral daily  folic acid 1 milliGRAM(s) Oral daily  cholecalciferol 1000 Unit(s) Oral daily  aspirin  chewable 81 milliGRAM(s) Oral daily  heparin  Injectable 5000 Unit(s) SubCutaneous every 12 hours  insulin lispro (HumaLOG) corrective regimen sliding scale   SubCutaneous three times a day before meals  insulin lispro (HumaLOG) corrective regimen sliding scale   SubCutaneous at bedtime  dextrose 5%. 1000 milliLiter(s) (50 mL/Hr) IV Continuous <Continuous>  dextrose 50% Injectable 12.5 Gram(s) IV Push once  dextrose 50% Injectable 25 Gram(s) IV Push once  dextrose 50% Injectable 25 Gram(s) IV Push once  insulin lispro Injectable (HumaLOG) 17 Unit(s) SubCutaneous three times a day before meals  tiotropium 18 MICROgram(s) Capsule 1 Capsule(s) Inhalation daily  buDESOnide 160 MICROgram(s)/formoterol 4.5 MICROgram(s) Inhaler 2 Puff(s) Inhalation two times a day  insulin glargine Injectable (LANTUS) 20 Unit(s) SubCutaneous at bedtime  acetylcysteine  Oral Solution 1200 milliGRAM(s) Oral two times a day  diltiazem    Tablet 60 milliGRAM(s) Oral every 8 hours  predniSONE   Tablet 30 milliGRAM(s) Oral daily  furosemide    Tablet 40 milliGRAM(s) Oral daily    MEDICATIONS  (PRN):  dextrose Gel 1 Dose(s) Oral once PRN Blood Glucose LESS THAN 70 milliGRAM(s)/deciliter  glucagon  Injectable 1 milliGRAM(s) IntraMuscular once PRN Glucose LESS THAN 70 milligrams/deciliter

## 2017-07-30 NOTE — PROGRESS NOTE ADULT - ASSESSMENT
- copd stable on tapering steroids   - chf on po lasix with mildly elevated wedge pressure   - D.M   - lung cancer resected stage 1 with  no recurrence .  - moderate pulmonary HTN     PLAN     - continue with po lasix with Symbicort and spiriva along with the 02 for the copd and patient does have markedly reduced diffusion capacity as an out pt with the copd and probable pulmonary HTN   - patient takes advair as an out pt in the place of Symbicort .   - continue with po lasix while monitoring daily weights  - taper the prednisone 30 mg po daily for 2 days then 20 mg po daily for 3 days then 10 mg po daily for 3 days .  - use 02 all the time for now and out pt follow up in 10 days to see me as an out pt .

## 2017-07-30 NOTE — PROGRESS NOTE ADULT - SUBJECTIVE AND OBJECTIVE BOX
SUBJECTIVE     patient clinically feeling better with no cough and wheezing and on 3 lit nasal canula . lasix has been changed to po and echo noted       PAST MEDICAL & SURGICAL HISTORY:  Hyperlipidemia, unspecified hyperlipidemia type  Essential hypertension  Neuropathy  Diabetes  Chronic obstructive pulmonary disease, unspecified COPD type.  lung cancer stage 1 status post wedge resection   History of total knee replacement, unspecified laterality  H/O hernia repair   delivery delivered  S/P appendectomy          OBJECTIVE     Vital Signs Last 24 Hrs  T(C): 37.2 (2017 05:06), Max: 37.2 (2017 05:06)  T(F): 98.9 (2017 05:06), Max: 98.9 (2017 05:06)  HR: 65 (2017 05:06) (65 - 69)  BP: 146/35 (2017 05:06) (120/45 - 146/35)  BP(mean): 65 (2017 16:24) (62 - 65)  RR: 14 (2017 05:06) (12 - 14)  SpO2: 98% (2017 05:06) (98% - 100%)  PHYSICAL EXAM:    Constitutional: , awake and alert, not in distress.     HEENT: Normo cephalic atraumatic     Neck: Soft and supple, No J.V.D     Respiratory: vesicular breathing has no wheeze or rales today      Cardiovascular: S1 and S2, regular rate .     Gastrointestinal:  soft, nontender,     Extremities: no  edema of the feet     Neurological: A/O x 3, no focal deficits.      Medications:    MEDICATIONS  (STANDING):  citalopram 10 milliGRAM(s) Oral daily  atorvastatin 20 milliGRAM(s) Oral at bedtime  montelukast 10 milliGRAM(s) Oral at bedtime  pantoprazole    Tablet 40 milliGRAM(s) Oral before breakfast  levothyroxine 112 MICROGram(s) Oral daily  folic acid 1 milliGRAM(s) Oral daily  cholecalciferol 1000 Unit(s) Oral daily  aspirin  chewable 81 milliGRAM(s) Oral daily  heparin  Injectable 5000 Unit(s) SubCutaneous every 12 hours  insulin lispro (HumaLOG) corrective regimen sliding scale   SubCutaneous three times a day before meals  insulin lispro (HumaLOG) corrective regimen sliding scale   SubCutaneous at bedtime  dextrose 5%. 1000 milliLiter(s) (50 mL/Hr) IV Continuous <Continuous>  dextrose 50% Injectable 12.5 Gram(s) IV Push once  dextrose 50% Injectable 25 Gram(s) IV Push once  dextrose 50% Injectable 25 Gram(s) IV Push once  insulin lispro Injectable (HumaLOG) 17 Unit(s) SubCutaneous three times a day before meals  tiotropium 18 MICROgram(s) Capsule 1 Capsule(s) Inhalation daily  buDESOnide 160 MICROgram(s)/formoterol 4.5 MICROgram(s) Inhaler 2 Puff(s) Inhalation two times a day  insulin glargine Injectable (LANTUS) 20 Unit(s) SubCutaneous at bedtime  acetylcysteine  Oral Solution 1200 milliGRAM(s) Oral two times a day  diltiazem    Tablet 60 milliGRAM(s) Oral every 8 hours  predniSONE   Tablet 30 milliGRAM(s) Oral daily  furosemide    Tablet 40 milliGRAM(s) Oral daily

## 2017-08-02 DIAGNOSIS — I25.10 ATHEROSCLEROTIC HEART DISEASE OF NATIVE CORONARY ARTERY WITHOUT ANGINA PECTORIS: ICD-10-CM

## 2017-08-02 DIAGNOSIS — D64.9 ANEMIA, UNSPECIFIED: ICD-10-CM

## 2017-08-02 DIAGNOSIS — Z88.0 ALLERGY STATUS TO PENICILLIN: ICD-10-CM

## 2017-08-02 DIAGNOSIS — E11.9 TYPE 2 DIABETES MELLITUS WITHOUT COMPLICATIONS: ICD-10-CM

## 2017-08-02 DIAGNOSIS — F32.9 MAJOR DEPRESSIVE DISORDER, SINGLE EPISODE, UNSPECIFIED: ICD-10-CM

## 2017-08-02 DIAGNOSIS — J44.1 CHRONIC OBSTRUCTIVE PULMONARY DISEASE WITH (ACUTE) EXACERBATION: ICD-10-CM

## 2017-08-02 DIAGNOSIS — Z85.118 PERSONAL HISTORY OF OTHER MALIGNANT NEOPLASM OF BRONCHUS AND LUNG: ICD-10-CM

## 2017-08-02 DIAGNOSIS — I25.2 OLD MYOCARDIAL INFARCTION: ICD-10-CM

## 2017-08-02 DIAGNOSIS — Z79.4 LONG TERM (CURRENT) USE OF INSULIN: ICD-10-CM

## 2017-08-02 DIAGNOSIS — J18.9 PNEUMONIA, UNSPECIFIED ORGANISM: ICD-10-CM

## 2017-08-02 DIAGNOSIS — N18.3 CHRONIC KIDNEY DISEASE, STAGE 3 (MODERATE): ICD-10-CM

## 2017-08-02 DIAGNOSIS — I50.33 ACUTE ON CHRONIC DIASTOLIC (CONGESTIVE) HEART FAILURE: ICD-10-CM

## 2017-08-02 DIAGNOSIS — Z87.440 PERSONAL HISTORY OF URINARY (TRACT) INFECTIONS: ICD-10-CM

## 2017-08-02 DIAGNOSIS — E03.9 HYPOTHYROIDISM, UNSPECIFIED: ICD-10-CM

## 2017-08-02 DIAGNOSIS — I27.2 OTHER SECONDARY PULMONARY HYPERTENSION: ICD-10-CM

## 2017-08-02 DIAGNOSIS — Z79.899 OTHER LONG TERM (CURRENT) DRUG THERAPY: ICD-10-CM

## 2017-08-02 DIAGNOSIS — Z79.82 LONG TERM (CURRENT) USE OF ASPIRIN: ICD-10-CM

## 2017-08-02 DIAGNOSIS — Z91.19 PATIENT'S NONCOMPLIANCE WITH OTHER MEDICAL TREATMENT AND REGIMEN: ICD-10-CM

## 2017-08-02 DIAGNOSIS — J96.21 ACUTE AND CHRONIC RESPIRATORY FAILURE WITH HYPOXIA: ICD-10-CM

## 2017-08-02 DIAGNOSIS — I12.9 HYPERTENSIVE CHRONIC KIDNEY DISEASE WITH STAGE 1 THROUGH STAGE 4 CHRONIC KIDNEY DISEASE, OR UNSPECIFIED CHRONIC KIDNEY DISEASE: ICD-10-CM

## 2017-08-02 DIAGNOSIS — F41.9 ANXIETY DISORDER, UNSPECIFIED: ICD-10-CM

## 2017-08-02 DIAGNOSIS — J44.0 CHRONIC OBSTRUCTIVE PULMONARY DISEASE WITH ACUTE LOWER RESPIRATORY INFECTION: ICD-10-CM

## 2017-08-02 DIAGNOSIS — N17.9 ACUTE KIDNEY FAILURE, UNSPECIFIED: ICD-10-CM

## 2017-08-02 DIAGNOSIS — Z88.8 ALLERGY STATUS TO OTHER DRUGS, MEDICAMENTS AND BIOLOGICAL SUBSTANCES STATUS: ICD-10-CM

## 2017-08-02 DIAGNOSIS — Z88.2 ALLERGY STATUS TO SULFONAMIDES: ICD-10-CM

## 2017-08-03 DIAGNOSIS — I50.30 UNSPECIFIED DIASTOLIC (CONGESTIVE) HEART FAILURE: ICD-10-CM

## 2017-08-28 RX ORDER — CHOLECALCIFEROL (VITAMIN D3) 125 MCG
1 CAPSULE ORAL
Qty: 0 | Refills: 0 | COMMUNITY

## 2017-08-28 RX ORDER — INSULIN GLARGINE 100 [IU]/ML
40 INJECTION, SOLUTION SUBCUTANEOUS
Qty: 0 | Refills: 0 | COMMUNITY

## 2017-08-28 RX ORDER — CALCIUM CARBONATE 500(1250)
0 TABLET ORAL
Qty: 0 | Refills: 0 | COMMUNITY

## 2017-08-28 RX ORDER — ALBUTEROL 90 UG/1
2 AEROSOL, METERED ORAL
Qty: 0 | Refills: 0 | COMMUNITY

## 2017-08-28 RX ORDER — INSULIN LISPRO 100/ML
14 VIAL (ML) SUBCUTANEOUS
Qty: 0 | Refills: 0 | COMMUNITY

## 2017-08-28 NOTE — ASU PATIENT PROFILE, ADULT - MEDICATIONS TO HOLD
Hold humalog insulin and bumetanide in a.m. of procedure; take 1/2 toujeo dose the night before procedure

## 2017-08-29 NOTE — H&P ADULT - HISTORY OF PRESENT ILLNESS
76 year old female with PMHx HTN, HLD, MI, chronic diastolic heart failure and CKD with recent admission with severe hypoxia . Referred for Cardio Mems.

## 2017-08-29 NOTE — H&P ADULT - PMH
Chronic diastolic congestive heart failure    Chronic obstructive pulmonary disease, unspecified COPD type    Diabetes    Essential hypertension    Hyperlipidemia, unspecified hyperlipidemia type    Neuropathy

## 2017-08-30 ENCOUNTER — OUTPATIENT (OUTPATIENT)
Dept: OUTPATIENT SERVICES | Facility: HOSPITAL | Age: 77
LOS: 1 days | Discharge: HOME | End: 2017-08-30

## 2017-08-30 VITALS
WEIGHT: 179.9 LBS | TEMPERATURE: 97 F | DIASTOLIC BLOOD PRESSURE: 72 MMHG | RESPIRATION RATE: 16 BRPM | HEIGHT: 62 IN | SYSTOLIC BLOOD PRESSURE: 196 MMHG | HEART RATE: 67 BPM | OXYGEN SATURATION: 95 %

## 2017-08-30 VITALS
OXYGEN SATURATION: 92 % | HEART RATE: 72 BPM | RESPIRATION RATE: 16 BRPM | SYSTOLIC BLOOD PRESSURE: 124 MMHG | DIASTOLIC BLOOD PRESSURE: 64 MMHG

## 2017-08-30 DIAGNOSIS — Z90.49 ACQUIRED ABSENCE OF OTHER SPECIFIED PARTS OF DIGESTIVE TRACT: Chronic | ICD-10-CM

## 2017-08-30 DIAGNOSIS — Z96.659 PRESENCE OF UNSPECIFIED ARTIFICIAL KNEE JOINT: Chronic | ICD-10-CM

## 2017-08-30 DIAGNOSIS — Z98.890 OTHER SPECIFIED POSTPROCEDURAL STATES: Chronic | ICD-10-CM

## 2017-08-30 RX ORDER — ASPIRIN/CALCIUM CARB/MAGNESIUM 324 MG
1 TABLET ORAL
Qty: 0 | Refills: 0 | COMMUNITY
Start: 2017-08-30

## 2017-08-30 RX ORDER — INSULIN LISPRO 100/ML
0 VIAL (ML) SUBCUTANEOUS
Qty: 0 | Refills: 0 | COMMUNITY

## 2017-08-30 RX ORDER — DILTIAZEM HCL 120 MG
1 CAPSULE, EXT RELEASE 24 HR ORAL
Qty: 30 | Refills: 3 | OUTPATIENT
Start: 2017-08-30 | End: 2017-12-27

## 2017-08-30 RX ORDER — ASPIRIN/CALCIUM CARB/MAGNESIUM 324 MG
1 TABLET ORAL
Qty: 0 | Refills: 0 | COMMUNITY

## 2017-08-30 RX ORDER — SODIUM CHLORIDE 9 MG/ML
1000 INJECTION INTRAMUSCULAR; INTRAVENOUS; SUBCUTANEOUS
Qty: 0 | Refills: 0 | Status: DISCONTINUED | OUTPATIENT
Start: 2017-08-30 | End: 2017-09-14

## 2017-08-30 RX ORDER — CLOPIDOGREL BISULFATE 75 MG/1
1 TABLET, FILM COATED ORAL
Qty: 30 | Refills: 3 | OUTPATIENT
Start: 2017-08-30 | End: 2017-12-27

## 2017-08-30 RX ORDER — FUROSEMIDE 40 MG
80 TABLET ORAL ONCE
Qty: 0 | Refills: 0 | Status: COMPLETED | OUTPATIENT
Start: 2017-08-30 | End: 2017-08-30

## 2017-08-30 RX ADMIN — Medication 80 MILLIGRAM(S): at 10:23

## 2017-08-30 NOTE — CHART NOTE - NSCHARTNOTEFT_GEN_A_CORE
Nurse Practitioner Progress note:     HPI:  76 year old female with PMHx HTN, HLD, MI, chronic diastolic heart failure and CKD with recent admission with severe hypoxia . Referred for Cardio Mems. (29 Aug 2017 16:22)    T(C): 36.1 (08-30-17 @ 08:00), Max: 36.1 (08-30-17 @ 07:58)  HR: 70 (08-30-17 @ 12:00) (67 - 73)  BP: 175/50 (08-30-17 @ 12:00) (165/46 - 206/62)  RR: 18 (08-30-17 @ 12:00) (16 - 18)  SpO2: 97% (08-30-17 @ 12:00) (94% - 97%)  Wt(kg): --    PHYSICAL EXAM:  Neurologic: Non-focal, AxOx3.  No neuro deficits  Vascular: Peripheral pulses palpable 2+ bilaterally  Procedure Site: Rt. femoral venous sheath pulled manual pressure applied x20 minutes no bleeding no hematoma +1PP        PROCEDURE RESULTS: Cardio Mems placed     ASSESSMENT/PLAN: 	  76 year old female with PMHx HTN, HLD, MI, chronic diastolic heart failure and CKD with recent admission with severe hypoxia . S/P Cardio Mems.      -VS, labs, diet, activity as per post cath orders  -IV hydration  -Encourage PO fluids  -Continue current medications  -Pt. to be discharged home today  -Plan of care D/W pt. and MD  -Post cath instructions reviewed with pt., pt. verbalizes and understands instructions  -Follow-up with attending

## 2017-08-30 NOTE — PACU DISCHARGE NOTE - COMMENTS
Discharged to home w/ daughter; dtr. and pt. verb. and teach back understanding to written and verbal discharge instructions, follow up w/ MD, discharge medications and care & maintenance of right femoral punct. site.

## 2017-09-06 ENCOUNTER — OUTPATIENT (OUTPATIENT)
Dept: OUTPATIENT SERVICES | Facility: HOSPITAL | Age: 77
LOS: 1 days | Discharge: HOME | End: 2017-09-06
Payer: MEDICARE

## 2017-09-06 VITALS
HEART RATE: 63 BPM | DIASTOLIC BLOOD PRESSURE: 44 MMHG | SYSTOLIC BLOOD PRESSURE: 173 MMHG | WEIGHT: 178.35 LBS | OXYGEN SATURATION: 93 % | HEIGHT: 62 IN | TEMPERATURE: 98 F | RESPIRATION RATE: 18 BRPM

## 2017-09-06 VITALS
HEART RATE: 65 BPM | RESPIRATION RATE: 18 BRPM | SYSTOLIC BLOOD PRESSURE: 139 MMHG | DIASTOLIC BLOOD PRESSURE: 40 MMHG | OXYGEN SATURATION: 90 %

## 2017-09-06 DIAGNOSIS — Z96.659 PRESENCE OF UNSPECIFIED ARTIFICIAL KNEE JOINT: Chronic | ICD-10-CM

## 2017-09-06 DIAGNOSIS — Z90.49 ACQUIRED ABSENCE OF OTHER SPECIFIED PARTS OF DIGESTIVE TRACT: Chronic | ICD-10-CM

## 2017-09-06 DIAGNOSIS — Z98.890 OTHER SPECIFIED POSTPROCEDURAL STATES: Chronic | ICD-10-CM

## 2017-09-06 LAB
ADD ON TEST-SPECIMEN IN LAB: SIGNIFICANT CHANGE UP
ANION GAP SERPL CALC-SCNC: 4 MMOL/L — LOW (ref 5–17)
BUN SERPL-MCNC: 51 MG/DL — HIGH (ref 7–23)
CALCIUM SERPL-MCNC: 8.8 MG/DL — SIGNIFICANT CHANGE UP (ref 8.5–10.1)
CHLORIDE SERPL-SCNC: 106 MMOL/L — SIGNIFICANT CHANGE UP (ref 96–108)
CO2 SERPL-SCNC: 31 MMOL/L — SIGNIFICANT CHANGE UP (ref 22–31)
CREAT SERPL-MCNC: 1.98 MG/DL — HIGH (ref 0.5–1.3)
GLUCOSE SERPL-MCNC: 160 MG/DL — HIGH (ref 70–99)
HCT VFR BLD CALC: 31.6 % — LOW (ref 34.5–45)
HGB BLD-MCNC: 10.4 G/DL — LOW (ref 11.5–15.5)
MAGNESIUM SERPL-MCNC: 2.2 MG/DL — SIGNIFICANT CHANGE UP (ref 1.6–2.6)
MCHC RBC-ENTMCNC: 28.6 PG — SIGNIFICANT CHANGE UP (ref 27–34)
MCHC RBC-ENTMCNC: 32.9 GM/DL — SIGNIFICANT CHANGE UP (ref 32–36)
MCV RBC AUTO: 86.7 FL — SIGNIFICANT CHANGE UP (ref 80–100)
PLATELET # BLD AUTO: 243 K/UL — SIGNIFICANT CHANGE UP (ref 150–400)
POTASSIUM SERPL-MCNC: 4.7 MMOL/L — SIGNIFICANT CHANGE UP (ref 3.5–5.3)
POTASSIUM SERPL-SCNC: 4.7 MMOL/L — SIGNIFICANT CHANGE UP (ref 3.5–5.3)
RBC # BLD: 3.64 M/UL — LOW (ref 3.8–5.2)
RBC # FLD: 14 % — SIGNIFICANT CHANGE UP (ref 10.3–14.5)
SODIUM SERPL-SCNC: 141 MMOL/L — SIGNIFICANT CHANGE UP (ref 135–145)
WBC # BLD: 7.5 K/UL — SIGNIFICANT CHANGE UP (ref 3.8–10.5)
WBC # FLD AUTO: 7.5 K/UL — SIGNIFICANT CHANGE UP (ref 3.8–10.5)

## 2017-09-06 PROCEDURE — 93312 ECHO TRANSESOPHAGEAL: CPT | Mod: 26

## 2017-09-06 NOTE — PACU DISCHARGE NOTE - COMMENTS
Pt. and  verb. agreement and understanding to discharge instructions; MD follow up.  Discharged to home w/ ; escorted to private auto via w/c.

## 2017-09-06 NOTE — BEDSIDE PROCEDURE TIME OUT CHECKLIST - NSPOSTCOMMENTSFT_GEN_ALL_CORE
Sedation medications initiated/ administered at 0820; probe inserted by Dr. Marquez without difficulty at 0831; bubble study completed at 0842; probe removed by Dr. Marquez at 0846; jude wallace

## 2017-09-07 DIAGNOSIS — I25.10 ATHEROSCLEROTIC HEART DISEASE OF NATIVE CORONARY ARTERY WITHOUT ANGINA PECTORIS: ICD-10-CM

## 2017-09-07 DIAGNOSIS — Z96.659 PRESENCE OF UNSPECIFIED ARTIFICIAL KNEE JOINT: ICD-10-CM

## 2017-09-07 DIAGNOSIS — E78.2 MIXED HYPERLIPIDEMIA: ICD-10-CM

## 2017-09-07 DIAGNOSIS — E11.22 TYPE 2 DIABETES MELLITUS WITH DIABETIC CHRONIC KIDNEY DISEASE: ICD-10-CM

## 2017-09-07 DIAGNOSIS — I87.2 VENOUS INSUFFICIENCY (CHRONIC) (PERIPHERAL): ICD-10-CM

## 2017-09-07 DIAGNOSIS — I25.2 OLD MYOCARDIAL INFARCTION: ICD-10-CM

## 2017-09-07 DIAGNOSIS — Z79.4 LONG TERM (CURRENT) USE OF INSULIN: ICD-10-CM

## 2017-09-07 DIAGNOSIS — N18.9 CHRONIC KIDNEY DISEASE, UNSPECIFIED: ICD-10-CM

## 2017-09-07 DIAGNOSIS — I11.0 HYPERTENSIVE HEART DISEASE WITH HEART FAILURE: ICD-10-CM

## 2017-09-07 DIAGNOSIS — G62.9 POLYNEUROPATHY, UNSPECIFIED: ICD-10-CM

## 2017-09-07 DIAGNOSIS — E66.9 OBESITY, UNSPECIFIED: ICD-10-CM

## 2017-09-07 DIAGNOSIS — I50.32 CHRONIC DIASTOLIC (CONGESTIVE) HEART FAILURE: ICD-10-CM

## 2017-09-07 DIAGNOSIS — J44.9 CHRONIC OBSTRUCTIVE PULMONARY DISEASE, UNSPECIFIED: ICD-10-CM

## 2017-09-07 DIAGNOSIS — I13.0 HYPERTENSIVE HEART AND CHRONIC KIDNEY DISEASE WITH HEART FAILURE AND STAGE 1 THROUGH STAGE 4 CHRONIC KIDNEY DISEASE, OR UNSPECIFIED CHRONIC KIDNEY DISEASE: ICD-10-CM

## 2017-09-08 DIAGNOSIS — N18.9 CHRONIC KIDNEY DISEASE, UNSPECIFIED: ICD-10-CM

## 2017-09-08 DIAGNOSIS — I34.0 NONRHEUMATIC MITRAL (VALVE) INSUFFICIENCY: ICD-10-CM

## 2017-09-08 DIAGNOSIS — I13.0 HYPERTENSIVE HEART AND CHRONIC KIDNEY DISEASE WITH HEART FAILURE AND STAGE 1 THROUGH STAGE 4 CHRONIC KIDNEY DISEASE, OR UNSPECIFIED CHRONIC KIDNEY DISEASE: ICD-10-CM

## 2017-09-08 DIAGNOSIS — I87.2 VENOUS INSUFFICIENCY (CHRONIC) (PERIPHERAL): ICD-10-CM

## 2017-09-08 DIAGNOSIS — I25.2 OLD MYOCARDIAL INFARCTION: ICD-10-CM

## 2017-09-08 DIAGNOSIS — I08.8 OTHER RHEUMATIC MULTIPLE VALVE DISEASES: ICD-10-CM

## 2017-09-08 DIAGNOSIS — E66.9 OBESITY, UNSPECIFIED: ICD-10-CM

## 2017-09-08 DIAGNOSIS — I50.32 CHRONIC DIASTOLIC (CONGESTIVE) HEART FAILURE: ICD-10-CM

## 2017-09-08 DIAGNOSIS — E78.2 MIXED HYPERLIPIDEMIA: ICD-10-CM

## 2017-09-27 ENCOUNTER — INPATIENT (INPATIENT)
Facility: HOSPITAL | Age: 77
LOS: 14 days | Discharge: NURSING, REHAB OR LONG-TERM CARE | End: 2017-10-12
Attending: INTERNAL MEDICINE | Admitting: INTERNAL MEDICINE
Payer: MEDICARE

## 2017-09-27 VITALS
WEIGHT: 177.91 LBS | RESPIRATION RATE: 20 BRPM | TEMPERATURE: 98 F | HEART RATE: 51 BPM | HEIGHT: 62 IN | OXYGEN SATURATION: 95 % | SYSTOLIC BLOOD PRESSURE: 126 MMHG | DIASTOLIC BLOOD PRESSURE: 50 MMHG

## 2017-09-27 DIAGNOSIS — E78.5 HYPERLIPIDEMIA, UNSPECIFIED: ICD-10-CM

## 2017-09-27 DIAGNOSIS — N18.9 CHRONIC KIDNEY DISEASE, UNSPECIFIED: ICD-10-CM

## 2017-09-27 DIAGNOSIS — I10 ESSENTIAL (PRIMARY) HYPERTENSION: ICD-10-CM

## 2017-09-27 DIAGNOSIS — J44.9 CHRONIC OBSTRUCTIVE PULMONARY DISEASE, UNSPECIFIED: ICD-10-CM

## 2017-09-27 DIAGNOSIS — Z90.49 ACQUIRED ABSENCE OF OTHER SPECIFIED PARTS OF DIGESTIVE TRACT: Chronic | ICD-10-CM

## 2017-09-27 DIAGNOSIS — Z98.890 OTHER SPECIFIED POSTPROCEDURAL STATES: Chronic | ICD-10-CM

## 2017-09-27 DIAGNOSIS — I50.32 CHRONIC DIASTOLIC (CONGESTIVE) HEART FAILURE: ICD-10-CM

## 2017-09-27 DIAGNOSIS — Z96.659 PRESENCE OF UNSPECIFIED ARTIFICIAL KNEE JOINT: Chronic | ICD-10-CM

## 2017-09-27 DIAGNOSIS — E11.9 TYPE 2 DIABETES MELLITUS WITHOUT COMPLICATIONS: ICD-10-CM

## 2017-09-27 DIAGNOSIS — S72.145A NONDISPLACED INTERTROCHANTERIC FRACTURE OF LEFT FEMUR, INITIAL ENCOUNTER FOR CLOSED FRACTURE: ICD-10-CM

## 2017-09-27 DIAGNOSIS — I25.10 ATHEROSCLEROTIC HEART DISEASE OF NATIVE CORONARY ARTERY WITHOUT ANGINA PECTORIS: ICD-10-CM

## 2017-09-27 LAB
ALBUMIN SERPL ELPH-MCNC: 3.4 G/DL — SIGNIFICANT CHANGE UP (ref 3.3–5)
ALP SERPL-CCNC: 109 U/L — SIGNIFICANT CHANGE UP (ref 40–120)
ALT FLD-CCNC: 15 U/L — SIGNIFICANT CHANGE UP (ref 12–78)
ANION GAP SERPL CALC-SCNC: 7 MMOL/L — SIGNIFICANT CHANGE UP (ref 5–17)
ANISOCYTOSIS BLD QL: SLIGHT — SIGNIFICANT CHANGE UP
APTT BLD: 29.1 SEC — SIGNIFICANT CHANGE UP (ref 27.5–37.4)
AST SERPL-CCNC: 14 U/L — LOW (ref 15–37)
BASOPHILS # BLD AUTO: 0 K/UL — SIGNIFICANT CHANGE UP (ref 0–0.2)
BASOPHILS NFR BLD AUTO: 0.5 % — SIGNIFICANT CHANGE UP (ref 0–2)
BILIRUB SERPL-MCNC: 0.4 MG/DL — SIGNIFICANT CHANGE UP (ref 0.2–1.2)
BLD GP AB SCN SERPL QL: SIGNIFICANT CHANGE UP
BUN SERPL-MCNC: 70 MG/DL — HIGH (ref 7–23)
CALCIUM SERPL-MCNC: 8.8 MG/DL — SIGNIFICANT CHANGE UP (ref 8.5–10.1)
CHLORIDE SERPL-SCNC: 103 MMOL/L — SIGNIFICANT CHANGE UP (ref 96–108)
CO2 SERPL-SCNC: 30 MMOL/L — SIGNIFICANT CHANGE UP (ref 22–31)
CREAT SERPL-MCNC: 2.27 MG/DL — HIGH (ref 0.5–1.3)
DACRYOCYTES BLD QL SMEAR: SLIGHT — SIGNIFICANT CHANGE UP
EOSINOPHIL # BLD AUTO: 0.3 K/UL — SIGNIFICANT CHANGE UP (ref 0–0.5)
EOSINOPHIL NFR BLD AUTO: 3.1 % — SIGNIFICANT CHANGE UP (ref 0–6)
GLUCOSE SERPL-MCNC: 182 MG/DL — HIGH (ref 70–99)
HCT VFR BLD CALC: 28.9 % — LOW (ref 34.5–45)
HGB BLD-MCNC: 9.5 G/DL — LOW (ref 11.5–15.5)
INR BLD: 1.16 RATIO — SIGNIFICANT CHANGE UP (ref 0.88–1.16)
LYMPHOCYTES # BLD AUTO: 0.9 K/UL — LOW (ref 1–3.3)
LYMPHOCYTES # BLD AUTO: 9.8 % — LOW (ref 13–44)
MACROCYTES BLD QL: SLIGHT — SIGNIFICANT CHANGE UP
MANUAL DIF COMMENT BLD-IMP: SIGNIFICANT CHANGE UP
MCHC RBC-ENTMCNC: 28 PG — SIGNIFICANT CHANGE UP (ref 27–34)
MCHC RBC-ENTMCNC: 32.8 GM/DL — SIGNIFICANT CHANGE UP (ref 32–36)
MCV RBC AUTO: 85.1 FL — SIGNIFICANT CHANGE UP (ref 80–100)
MICROCYTES BLD QL: SLIGHT — SIGNIFICANT CHANGE UP
MONOCYTES # BLD AUTO: 0.7 K/UL — SIGNIFICANT CHANGE UP (ref 0–0.9)
MONOCYTES NFR BLD AUTO: 8.5 % — SIGNIFICANT CHANGE UP (ref 2–14)
NEUTROPHILS # BLD AUTO: 6.8 K/UL — SIGNIFICANT CHANGE UP (ref 1.8–7.4)
NEUTROPHILS NFR BLD AUTO: 78.1 % — HIGH (ref 43–77)
OVALOCYTES BLD QL SMEAR: SLIGHT — SIGNIFICANT CHANGE UP
PLAT MORPH BLD: NORMAL — SIGNIFICANT CHANGE UP
PLATELET # BLD AUTO: 205 K/UL — SIGNIFICANT CHANGE UP (ref 150–400)
POIKILOCYTOSIS BLD QL AUTO: SLIGHT — SIGNIFICANT CHANGE UP
POLYCHROMASIA BLD QL SMEAR: SLIGHT — SIGNIFICANT CHANGE UP
POTASSIUM SERPL-MCNC: 4.6 MMOL/L — SIGNIFICANT CHANGE UP (ref 3.5–5.3)
POTASSIUM SERPL-SCNC: 4.6 MMOL/L — SIGNIFICANT CHANGE UP (ref 3.5–5.3)
PROT SERPL-MCNC: 7 GM/DL — SIGNIFICANT CHANGE UP (ref 6–8.3)
PROTHROM AB SERPL-ACNC: 12.6 SEC — SIGNIFICANT CHANGE UP (ref 9.8–12.7)
RBC # BLD: 3.39 M/UL — LOW (ref 3.8–5.2)
RBC # FLD: 13.8 % — SIGNIFICANT CHANGE UP (ref 10.3–14.5)
RBC BLD AUTO: (no result)
SODIUM SERPL-SCNC: 140 MMOL/L — SIGNIFICANT CHANGE UP (ref 135–145)
TYPE + AB SCN PNL BLD: SIGNIFICANT CHANGE UP
WBC # BLD: 8.7 K/UL — SIGNIFICANT CHANGE UP (ref 3.8–10.5)
WBC # FLD AUTO: 8.7 K/UL — SIGNIFICANT CHANGE UP (ref 3.8–10.5)

## 2017-09-27 PROCEDURE — 70450 CT HEAD/BRAIN W/O DYE: CPT | Mod: 26

## 2017-09-27 PROCEDURE — 71010: CPT | Mod: 26

## 2017-09-27 PROCEDURE — 73552 X-RAY EXAM OF FEMUR 2/>: CPT | Mod: 26

## 2017-09-27 PROCEDURE — 73502 X-RAY EXAM HIP UNI 2-3 VIEWS: CPT | Mod: 26

## 2017-09-27 PROCEDURE — 73562 X-RAY EXAM OF KNEE 3: CPT | Mod: 26,LT

## 2017-09-27 PROCEDURE — 99285 EMERGENCY DEPT VISIT HI MDM: CPT

## 2017-09-27 PROCEDURE — 93010 ELECTROCARDIOGRAM REPORT: CPT

## 2017-09-27 PROCEDURE — 72125 CT NECK SPINE W/O DYE: CPT | Mod: 26

## 2017-09-27 RX ORDER — OXYCODONE HYDROCHLORIDE 5 MG/1
10 TABLET ORAL EVERY 4 HOURS
Qty: 0 | Refills: 0 | Status: DISCONTINUED | OUTPATIENT
Start: 2017-09-27 | End: 2017-09-28

## 2017-09-27 RX ORDER — BENZOCAINE AND MENTHOL 5; 1 G/100ML; G/100ML
1 LIQUID ORAL EVERY 4 HOURS
Qty: 0 | Refills: 0 | Status: DISCONTINUED | OUTPATIENT
Start: 2017-09-27 | End: 2017-09-29

## 2017-09-27 RX ORDER — DILTIAZEM HCL 120 MG
180 CAPSULE, EXT RELEASE 24 HR ORAL DAILY
Qty: 0 | Refills: 0 | Status: DISCONTINUED | OUTPATIENT
Start: 2017-09-27 | End: 2017-09-29

## 2017-09-27 RX ORDER — PANTOPRAZOLE SODIUM 20 MG/1
40 TABLET, DELAYED RELEASE ORAL
Qty: 0 | Refills: 0 | Status: DISCONTINUED | OUTPATIENT
Start: 2017-09-27 | End: 2017-09-29

## 2017-09-27 RX ORDER — SODIUM CHLORIDE 9 MG/ML
1000 INJECTION, SOLUTION INTRAVENOUS
Qty: 0 | Refills: 0 | Status: DISCONTINUED | OUTPATIENT
Start: 2017-09-27 | End: 2017-09-29

## 2017-09-27 RX ORDER — TIOTROPIUM BROMIDE 18 UG/1
1 CAPSULE ORAL; RESPIRATORY (INHALATION) ONCE
Qty: 0 | Refills: 0 | Status: COMPLETED | OUTPATIENT
Start: 2017-09-27 | End: 2017-09-27

## 2017-09-27 RX ORDER — DIPHENHYDRAMINE HCL 50 MG
25 CAPSULE ORAL EVERY 4 HOURS
Qty: 0 | Refills: 0 | Status: DISCONTINUED | OUTPATIENT
Start: 2017-09-27 | End: 2017-09-29

## 2017-09-27 RX ORDER — INSULIN LISPRO 100/ML
VIAL (ML) SUBCUTANEOUS
Qty: 0 | Refills: 0 | Status: DISCONTINUED | OUTPATIENT
Start: 2017-09-27 | End: 2017-09-29

## 2017-09-27 RX ORDER — MONTELUKAST 4 MG/1
10 TABLET, CHEWABLE ORAL ONCE
Qty: 0 | Refills: 0 | Status: COMPLETED | OUTPATIENT
Start: 2017-09-27 | End: 2017-09-27

## 2017-09-27 RX ORDER — CITALOPRAM 10 MG/1
10 TABLET, FILM COATED ORAL DAILY
Qty: 0 | Refills: 0 | Status: DISCONTINUED | OUTPATIENT
Start: 2017-09-27 | End: 2017-09-29

## 2017-09-27 RX ORDER — ATORVASTATIN CALCIUM 80 MG/1
20 TABLET, FILM COATED ORAL AT BEDTIME
Qty: 0 | Refills: 0 | Status: DISCONTINUED | OUTPATIENT
Start: 2017-09-27 | End: 2017-09-29

## 2017-09-27 RX ORDER — DIPHENHYDRAMINE HCL 50 MG
25 CAPSULE ORAL AT BEDTIME
Qty: 0 | Refills: 0 | Status: DISCONTINUED | OUTPATIENT
Start: 2017-09-27 | End: 2017-09-29

## 2017-09-27 RX ORDER — DEXTROSE 50 % IN WATER 50 %
25 SYRINGE (ML) INTRAVENOUS ONCE
Qty: 0 | Refills: 0 | Status: DISCONTINUED | OUTPATIENT
Start: 2017-09-27 | End: 2017-09-29

## 2017-09-27 RX ORDER — MORPHINE SULFATE 50 MG/1
4 CAPSULE, EXTENDED RELEASE ORAL ONCE
Qty: 0 | Refills: 0 | Status: DISCONTINUED | OUTPATIENT
Start: 2017-09-27 | End: 2017-09-27

## 2017-09-27 RX ORDER — HEPARIN SODIUM 5000 [USP'U]/ML
5000 INJECTION INTRAVENOUS; SUBCUTANEOUS EVERY 12 HOURS
Qty: 0 | Refills: 0 | Status: DISCONTINUED | OUTPATIENT
Start: 2017-09-27 | End: 2017-09-27

## 2017-09-27 RX ORDER — FOLIC ACID 0.8 MG
1 TABLET ORAL DAILY
Qty: 0 | Refills: 0 | Status: DISCONTINUED | OUTPATIENT
Start: 2017-09-27 | End: 2017-09-29

## 2017-09-27 RX ORDER — DOCUSATE SODIUM 100 MG
100 CAPSULE ORAL THREE TIMES A DAY
Qty: 0 | Refills: 0 | Status: DISCONTINUED | OUTPATIENT
Start: 2017-09-27 | End: 2017-09-29

## 2017-09-27 RX ORDER — OXYCODONE HYDROCHLORIDE 5 MG/1
5 TABLET ORAL EVERY 4 HOURS
Qty: 0 | Refills: 0 | Status: DISCONTINUED | OUTPATIENT
Start: 2017-09-27 | End: 2017-09-28

## 2017-09-27 RX ORDER — LEVOTHYROXINE SODIUM 125 MCG
112 TABLET ORAL DAILY
Qty: 0 | Refills: 0 | Status: DISCONTINUED | OUTPATIENT
Start: 2017-09-27 | End: 2017-09-29

## 2017-09-27 RX ORDER — GLUCAGON INJECTION, SOLUTION 0.5 MG/.1ML
1 INJECTION, SOLUTION SUBCUTANEOUS ONCE
Qty: 0 | Refills: 0 | Status: DISCONTINUED | OUTPATIENT
Start: 2017-09-27 | End: 2017-09-29

## 2017-09-27 RX ORDER — ACETAMINOPHEN 500 MG
650 TABLET ORAL EVERY 6 HOURS
Qty: 0 | Refills: 0 | Status: DISCONTINUED | OUTPATIENT
Start: 2017-09-27 | End: 2017-09-28

## 2017-09-27 RX ORDER — HYDROMORPHONE HYDROCHLORIDE 2 MG/ML
0.5 INJECTION INTRAMUSCULAR; INTRAVENOUS; SUBCUTANEOUS EVERY 4 HOURS
Qty: 0 | Refills: 0 | Status: DISCONTINUED | OUTPATIENT
Start: 2017-09-27 | End: 2017-09-29

## 2017-09-27 RX ORDER — DEXTROSE 50 % IN WATER 50 %
1 SYRINGE (ML) INTRAVENOUS ONCE
Qty: 0 | Refills: 0 | Status: DISCONTINUED | OUTPATIENT
Start: 2017-09-27 | End: 2017-09-29

## 2017-09-27 RX ORDER — DEXTROSE 50 % IN WATER 50 %
12.5 SYRINGE (ML) INTRAVENOUS ONCE
Qty: 0 | Refills: 0 | Status: DISCONTINUED | OUTPATIENT
Start: 2017-09-27 | End: 2017-09-29

## 2017-09-27 RX ORDER — INSULIN GLARGINE 100 [IU]/ML
22 INJECTION, SOLUTION SUBCUTANEOUS AT BEDTIME
Qty: 0 | Refills: 0 | Status: DISCONTINUED | OUTPATIENT
Start: 2017-09-27 | End: 2017-09-29

## 2017-09-27 RX ORDER — ONDANSETRON 8 MG/1
4 TABLET, FILM COATED ORAL ONCE
Qty: 0 | Refills: 0 | Status: COMPLETED | OUTPATIENT
Start: 2017-09-27 | End: 2017-09-27

## 2017-09-27 RX ORDER — HEPARIN SODIUM 5000 [USP'U]/ML
5000 INJECTION INTRAVENOUS; SUBCUTANEOUS EVERY 12 HOURS
Qty: 0 | Refills: 0 | Status: COMPLETED | OUTPATIENT
Start: 2017-09-27 | End: 2017-09-27

## 2017-09-27 RX ORDER — BUMETANIDE 0.25 MG/ML
1 INJECTION INTRAMUSCULAR; INTRAVENOUS ONCE
Qty: 0 | Refills: 0 | Status: DISCONTINUED | OUTPATIENT
Start: 2017-09-27 | End: 2017-09-27

## 2017-09-27 RX ORDER — SODIUM CHLORIDE 9 MG/ML
1000 INJECTION INTRAMUSCULAR; INTRAVENOUS; SUBCUTANEOUS
Qty: 0 | Refills: 0 | Status: DISCONTINUED | OUTPATIENT
Start: 2017-09-27 | End: 2017-09-28

## 2017-09-27 RX ORDER — ACETAMINOPHEN 500 MG
650 TABLET ORAL EVERY 6 HOURS
Qty: 0 | Refills: 0 | Status: DISCONTINUED | OUTPATIENT
Start: 2017-09-27 | End: 2017-09-29

## 2017-09-27 RX ORDER — SODIUM CHLORIDE 9 MG/ML
1000 INJECTION INTRAMUSCULAR; INTRAVENOUS; SUBCUTANEOUS
Qty: 0 | Refills: 0 | Status: DISCONTINUED | OUTPATIENT
Start: 2017-09-27 | End: 2017-09-27

## 2017-09-27 RX ADMIN — Medication 100 MILLIGRAM(S): at 22:44

## 2017-09-27 RX ADMIN — MORPHINE SULFATE 4 MILLIGRAM(S): 50 CAPSULE, EXTENDED RELEASE ORAL at 16:30

## 2017-09-27 RX ADMIN — MONTELUKAST 10 MILLIGRAM(S): 4 TABLET, CHEWABLE ORAL at 20:45

## 2017-09-27 RX ADMIN — ATORVASTATIN CALCIUM 20 MILLIGRAM(S): 80 TABLET, FILM COATED ORAL at 22:44

## 2017-09-27 RX ADMIN — HYDROMORPHONE HYDROCHLORIDE 0.5 MILLIGRAM(S): 2 INJECTION INTRAMUSCULAR; INTRAVENOUS; SUBCUTANEOUS at 18:50

## 2017-09-27 RX ADMIN — INSULIN GLARGINE 22 UNIT(S): 100 INJECTION, SOLUTION SUBCUTANEOUS at 22:44

## 2017-09-27 RX ADMIN — OXYCODONE HYDROCHLORIDE 5 MILLIGRAM(S): 5 TABLET ORAL at 21:21

## 2017-09-27 RX ADMIN — HEPARIN SODIUM 5000 UNIT(S): 5000 INJECTION INTRAVENOUS; SUBCUTANEOUS at 20:45

## 2017-09-27 RX ADMIN — ONDANSETRON 4 MILLIGRAM(S): 8 TABLET, FILM COATED ORAL at 16:30

## 2017-09-27 RX ADMIN — MORPHINE SULFATE 4 MILLIGRAM(S): 50 CAPSULE, EXTENDED RELEASE ORAL at 17:00

## 2017-09-27 NOTE — ED ADULT NURSE NOTE - OBJECTIVE STATEMENT
75 y/o F c/o left hip pain s/p mechanical fall at [email or web address] today. Pt denies dizziness, sob, chest pain. Pt states she was nauseous s/p fall from pain. Denies LOC, or hitting head.

## 2017-09-27 NOTE — H&P ADULT - HISTORY OF PRESENT ILLNESS
76 year old female with PMHx HTN, HLD, CAD, ADVANCED COPD ON HOME O2 (3LITERS), IDDM, CHRONIC DIASTOLIC HEART FAILURE, RECENT ADMISSIONS FOR HYPOXIC RESPIRATORY FAILURE STATES SHE WAS LEAVING [employer] EARLIER TODAY WITH HER BROTHER AND MISSTEPPED RESULTING IN A FALL.  SHE REPORTS HAVING NECK AND LOWER BACK PAIN, ALSO LEFT HIP PAIN.  PT DENIES ANY CHEST PAIN OR SHORTNESS OF BREATH, NO NAUSEA OR VOMITING. JUST RECEIVED DILAUDID AND FEELS SOMEWHAT BETTER.

## 2017-09-27 NOTE — ED ADULT NURSE REASSESSMENT NOTE - NS ED NURSE REASSESS COMMENT FT1
Ortho at the bedside evaluating pt. Will continue to monitor for safety and comfort.
As per Ortho residents. Pt to be admitted for surgery, possibly tomorrow. Pt and family aware. Will continue to monitor for safety and comfort.

## 2017-09-27 NOTE — H&P ADULT - NSHPPHYSICALEXAM_GEN_ALL_CORE
` Vital Signs Last 24 Hrs  T(C): 36.8 (27 Sep 2017 21:00), Max: 36.9 (27 Sep 2017 20:16)  T(F): 98.2 (27 Sep 2017 21:00), Max: 98.4 (27 Sep 2017 20:16)  HR: 67 (27 Sep 2017 21:00) (51 - 67)  BP: 129/41 (27 Sep 2017 21:00) (126/50 - 131/45)  BP(mean): --  RR: 13 (27 Sep 2017 20:16) (13 - 20)  SpO2: 94% (27 Sep 2017 21:00) (94% - 96%)    GEN: A and O, NAD, mood stable  HEENT:   pupils equal and reactive, EOMI, no oropharyngeal lesions, erythema,    NECK:   supple, no palpable lymph nodes,    CV:  +S1, +S2, regular, no murmurs or rubs    RESP:   lungs clear to auscultation bilaterally, no wheezing, rales, rhonchi, good air entry bilaterally, DECREASED BS ANGELICA    GI:  abdomen soft, non-tender, non-distended, normal BS, no abdominal masses, no palpable masses    RECTAL:  not examined    :  not examined    MSK:   normal muscle tone, no atrophy, no rigidity, no contractions    EXT:   no clubbing, no cyanosis, no edema, no calf pain, swelling or erythema, LEFT LE EXTERNALLY ROTATED AND SHORTENED; LEFT UE WITH ABRASIONS     VASCULAR:  pulses equal and symmetric in the upper and lower extremities    NEURO:  AAOX3, no focal neurological deficits, follows all commands, able to move extremities spontaneously    SKIN:  no ulcers, lesions or rashes

## 2017-09-27 NOTE — CONSULT NOTE ADULT - SUBJECTIVE AND OBJECTIVE BOX
76yFemale c/o L hip pain s/p mechanical fall. Patient cannot recall if she hit her head. Denies LOC. Patient denies numbness or tingling in the LLE. Patient denies any other injuries at this time.     PMH:  Chronic diastolic congestive heart failure  Hyperlipidemia, unspecified hyperlipidemia type  Essential hypertension  Neuropathy  Diabetes  Chronic obstructive pulmonary disease, unspecified COPD type  CKD  Recent Rt clavicle fracture on 17    PSH:  History of total knee replacement, unspecified laterality  H/O hernia repair   delivery x3  S/P appendectomy  S/P cholecystectomy  S/P Lt TKR in  with Dr. Pio Gurrola  S/P Rt TKR in  with Dr. Anya De La Rosa     AH: Bactrim, Cipro, Clinda, ibuprofen, Penicillin, Sulfa drugs    Meds: See med rec    T(C): 36.8 (17 @ 16:02)  HR: 51 (17 @ 16:02)  BP: 126/50 (17 @ 16:02)  RR: 20 (17 @ 16:02)  SpO2: 95% (17 @ 16:02)    PE LLE:  Skin intact, ecchymosis, Visible deformity of Left Leg, shortened and externally rotated, SILT L2-S1, +EHL/FHL/TA/Gastroc, full painless Knee/ankle ROM, hip ROM limited 2/2 pain, +log roll, DP+, soft compartments, no calf ttp.    Secondary:  Superficial abrasion over R elbow and forearm, Paraspinal TTP along C spine, mild TTP along R clavicle, no TTP along any other bony prominence, SILT BL, Full Painless ROM intact BL, - SLR on RLE, distal pulses palpable.    Imaging:  XR demonstrating L hip fracture  CXR demostrates a healing distal clavicle fracture.  CT Head/C Spine: Pending

## 2017-09-27 NOTE — H&P ADULT - NSHPLABSRESULTS_GEN_ALL_CORE
EKG: NSR@ 61BPM EKG: NSR@ 61BPM                              9.5    8.7   )-----------( 205      ( 27 Sep 2017 16:46 )             28.9     09-27    140  |  103  |  70<H>  ----------------------------<  182<H>  4.6   |  30  |  2.27<H>    Ca    8.8      27 Sep 2017 16:46    TPro  7.0  /  Alb  3.4  /  TBili  0.4  /  DBili  x   /  AST  14<L>  /  ALT  15  /  AlkPhos  109  09-27        LIVER FUNCTIONS - ( 27 Sep 2017 16:46 )  Alb: 3.4 g/dL / Pro: 7.0 gm/dL / ALK PHOS: 109 U/L / ALT: 15 U/L / AST: 14 U/L / GGT: x           PT/INR - ( 27 Sep 2017 16:46 )   PT: 12.6 sec;   INR: 1.16 ratio         PTT - ( 27 Sep 2017 16:46 )  PTT:29.1 sec      RADIOLOGY:  CT HEAD AND C SPINE - NEGATIVE  CXR: PENDING  HIP XR: LEFT INTER TROCH FX (PRELIM)

## 2017-09-27 NOTE — ED PROVIDER NOTE - MEDICAL DECISION MAKING DETAILS
Suspect femur fx. Plan for CT head, Neck, Chest, left hip and femur XR, preop labs, EKG, and pain control

## 2017-09-27 NOTE — CONSULT NOTE ADULT - ASSESSMENT
77 yo F s/p L IT Fx  Admit to Medical Team  Plan for OR Tomorrow with Dr. Jacinto  Pain Control  NWB LLE  DVT ppx- hold after midnight  NPO After breakfast  Dedicated right clavicle x-rays  FU Labs/Imaging  Will Discuss with attending and advise if plan changes

## 2017-09-27 NOTE — H&P ADULT - PROBLEM SELECTOR PLAN 2
CURRENTLY COMPENSATED  MAY ACTUALLY BE A BIT DRY  HOLD BUMEX FOR NOW  MONITOR   CARDIO EVAL IN AM  EKG STABLE  CHECK CXR

## 2017-09-27 NOTE — ED PROVIDER NOTE - CARE PLAN
Principal Discharge DX:	Closed nondisplaced intertrochanteric fracture of left femur, initial encounter

## 2017-09-27 NOTE — H&P ADULT - PROBLEM SELECTOR PLAN 8
CR USUALLY A BIT LOWER   WILL GET RENAL EVAL -- KNOWN TO DR PALMA ON OUTSIDE  POTASSIUM LEVELS STABLE  OFF ACE INH OR ARB DUE TO HYPERKALEMIA  HOLD BUMEX.   RECHECK RENAL FUNCTION IN AM

## 2017-09-27 NOTE — H&P ADULT - NSHPREVIEWOFSYSTEMS_GEN_ALL_CORE
REVIEW OF SYSTEMS:    CONSTITUTIONAL: No weakness, fevers or chills, POS FALL  EYES/ENT: No visual changes;  No vertigo or throat pain   NECK: POS NECK PAIN  RESPIRATORY: No cough, wheezing, hemoptysis; No shortness of breath  CARDIOVASCULAR: No chest pain or palpitations  GASTROINTESTINAL: No abdominal or epigastric pain. No nausea, vomiting, or hematemesis; No diarrhea or constipation. No melena or hematochezia.  GENITOURINARY: No dysuria, frequency or hematuria  NEUROLOGICAL: No numbness or weakness  SKIN: No itching, burning, rashes, or lesions   MUSCULOSKELETAL: LEFT ARM PAIN, LEFT HIP PAIN  All other review of systems is negative unless indicated above.

## 2017-09-27 NOTE — H&P ADULT - ASSESSMENT
75 Y/O FEMALE WITH THE ABOV EMED HX ADMITTED WITH LEFT INTERTROCHANTERIC FRACTURE POST MECHANICAL FALL

## 2017-09-27 NOTE — ED PROVIDER NOTE - OBJECTIVE STATEMENT
77y/o F with hx of COPD (3L oxygen), DM, B/L knee replacement with Dr Dewitt presents to the ED BIB EMS s/p fall off of a curb at [employer] while picking up her medication c/o left hip pain. Pt denies hitting her head, and landed on her left arm. Pt is allergic to penicillin, naprosyn, and Motrin. Pt denies LOC. 77y/o F with hx of COPD (3L oxygen), DM, B/L knee replacement with Dr De La Rosa presents to the ED BIB EMS s/p fall off of a curb at [employer] and landed on her left arm. Pt is c/o left hip pain. Pt denies hitting her head or LOC. Pt is allergic to penicillin, naprosyn, and Motrin. Pt denies LOC or any other sx.

## 2017-09-28 DIAGNOSIS — N18.9 CHRONIC KIDNEY DISEASE, UNSPECIFIED: ICD-10-CM

## 2017-09-28 LAB
ANION GAP SERPL CALC-SCNC: 4 MMOL/L — LOW (ref 5–17)
APPEARANCE UR: CLEAR — SIGNIFICANT CHANGE UP
APTT BLD: 28.6 SEC — SIGNIFICANT CHANGE UP (ref 27.5–37.4)
BACTERIA # UR AUTO: (no result)
BILIRUB UR-MCNC: NEGATIVE — SIGNIFICANT CHANGE UP
BUN SERPL-MCNC: 71 MG/DL — HIGH (ref 7–23)
CALCIUM SERPL-MCNC: 8.6 MG/DL — SIGNIFICANT CHANGE UP (ref 8.5–10.1)
CHLORIDE SERPL-SCNC: 104 MMOL/L — SIGNIFICANT CHANGE UP (ref 96–108)
CO2 SERPL-SCNC: 32 MMOL/L — HIGH (ref 22–31)
COLOR SPEC: YELLOW — SIGNIFICANT CHANGE UP
CREAT SERPL-MCNC: 2.59 MG/DL — HIGH (ref 0.5–1.3)
DIFF PNL FLD: NEGATIVE — SIGNIFICANT CHANGE UP
EPI CELLS # UR: SIGNIFICANT CHANGE UP
GLUCOSE SERPL-MCNC: 159 MG/DL — HIGH (ref 70–99)
GLUCOSE UR QL: NEGATIVE MG/DL — SIGNIFICANT CHANGE UP
HBA1C BLD-MCNC: 6.8 % — HIGH (ref 4–5.6)
HCT VFR BLD CALC: 27.1 % — LOW (ref 34.5–45)
HGB BLD-MCNC: 8.9 G/DL — LOW (ref 11.5–15.5)
INR BLD: 1.18 RATIO — HIGH (ref 0.88–1.16)
KETONES UR-MCNC: NEGATIVE — SIGNIFICANT CHANGE UP
LEUKOCYTE ESTERASE UR-ACNC: (no result)
MCHC RBC-ENTMCNC: 27.8 PG — SIGNIFICANT CHANGE UP (ref 27–34)
MCHC RBC-ENTMCNC: 32.8 GM/DL — SIGNIFICANT CHANGE UP (ref 32–36)
MCV RBC AUTO: 84.6 FL — SIGNIFICANT CHANGE UP (ref 80–100)
NITRITE UR-MCNC: NEGATIVE — SIGNIFICANT CHANGE UP
PH UR: 5 — SIGNIFICANT CHANGE UP (ref 5–8)
PLATELET # BLD AUTO: 185 K/UL — SIGNIFICANT CHANGE UP (ref 150–400)
POTASSIUM SERPL-MCNC: 5.3 MMOL/L — SIGNIFICANT CHANGE UP (ref 3.5–5.3)
POTASSIUM SERPL-SCNC: 5.3 MMOL/L — SIGNIFICANT CHANGE UP (ref 3.5–5.3)
PROT UR-MCNC: 30 MG/DL
PROTHROM AB SERPL-ACNC: 12.8 SEC — HIGH (ref 9.8–12.7)
RBC # BLD: 3.21 M/UL — LOW (ref 3.8–5.2)
RBC # FLD: 14.6 % — HIGH (ref 10.3–14.5)
RBC CASTS # UR COMP ASSIST: SIGNIFICANT CHANGE UP /HPF (ref 0–4)
SODIUM SERPL-SCNC: 140 MMOL/L — SIGNIFICANT CHANGE UP (ref 135–145)
SP GR SPEC: 1.01 — SIGNIFICANT CHANGE UP (ref 1.01–1.02)
UROBILINOGEN FLD QL: NEGATIVE MG/DL — SIGNIFICANT CHANGE UP
WBC # BLD: 8.9 K/UL — SIGNIFICANT CHANGE UP (ref 3.8–10.5)
WBC # FLD AUTO: 8.9 K/UL — SIGNIFICANT CHANGE UP (ref 3.8–10.5)
WBC UR QL: (no result)

## 2017-09-28 RX ORDER — HEPARIN SODIUM 5000 [USP'U]/ML
5000 INJECTION INTRAVENOUS; SUBCUTANEOUS EVERY 12 HOURS
Qty: 0 | Refills: 0 | Status: DISCONTINUED | OUTPATIENT
Start: 2017-09-28 | End: 2017-09-28

## 2017-09-28 RX ORDER — CEFTRIAXONE 500 MG/1
1 INJECTION, POWDER, FOR SOLUTION INTRAMUSCULAR; INTRAVENOUS EVERY 24 HOURS
Qty: 0 | Refills: 0 | Status: DISCONTINUED | OUTPATIENT
Start: 2017-09-29 | End: 2017-09-29

## 2017-09-28 RX ORDER — ALBUTEROL 90 UG/1
2.5 AEROSOL, METERED ORAL EVERY 6 HOURS
Qty: 0 | Refills: 0 | Status: DISCONTINUED | OUTPATIENT
Start: 2017-09-28 | End: 2017-09-29

## 2017-09-28 RX ORDER — INFLUENZA VIRUS VACCINE 15; 15; 15; 15 UG/.5ML; UG/.5ML; UG/.5ML; UG/.5ML
0.5 SUSPENSION INTRAMUSCULAR ONCE
Qty: 0 | Refills: 0 | Status: DISCONTINUED | OUTPATIENT
Start: 2017-09-28 | End: 2017-10-12

## 2017-09-28 RX ORDER — CEFTRIAXONE 500 MG/1
INJECTION, POWDER, FOR SOLUTION INTRAMUSCULAR; INTRAVENOUS
Qty: 0 | Refills: 0 | Status: DISCONTINUED | OUTPATIENT
Start: 2017-09-28 | End: 2017-09-29

## 2017-09-28 RX ORDER — SODIUM CHLORIDE 9 MG/ML
1000 INJECTION INTRAMUSCULAR; INTRAVENOUS; SUBCUTANEOUS
Qty: 0 | Refills: 0 | Status: DISCONTINUED | OUTPATIENT
Start: 2017-09-28 | End: 2017-09-29

## 2017-09-28 RX ORDER — ALBUTEROL 90 UG/1
1 AEROSOL, METERED ORAL EVERY 4 HOURS
Qty: 0 | Refills: 0 | Status: DISCONTINUED | OUTPATIENT
Start: 2017-09-28 | End: 2017-09-29

## 2017-09-28 RX ORDER — ACETAMINOPHEN 500 MG
1000 TABLET ORAL ONCE
Qty: 0 | Refills: 0 | Status: COMPLETED | OUTPATIENT
Start: 2017-09-28 | End: 2017-09-28

## 2017-09-28 RX ORDER — HEPARIN SODIUM 5000 [USP'U]/ML
5000 INJECTION INTRAVENOUS; SUBCUTANEOUS EVERY 12 HOURS
Qty: 0 | Refills: 0 | Status: COMPLETED | OUTPATIENT
Start: 2017-09-28 | End: 2017-09-28

## 2017-09-28 RX ORDER — TIOTROPIUM BROMIDE 18 UG/1
1 CAPSULE ORAL; RESPIRATORY (INHALATION) DAILY
Qty: 0 | Refills: 0 | Status: DISCONTINUED | OUTPATIENT
Start: 2017-09-28 | End: 2017-09-29

## 2017-09-28 RX ORDER — CEFTRIAXONE 500 MG/1
1 INJECTION, POWDER, FOR SOLUTION INTRAMUSCULAR; INTRAVENOUS ONCE
Qty: 0 | Refills: 0 | Status: COMPLETED | OUTPATIENT
Start: 2017-09-28 | End: 2017-09-28

## 2017-09-28 RX ORDER — BUDESONIDE AND FORMOTEROL FUMARATE DIHYDRATE 160; 4.5 UG/1; UG/1
2 AEROSOL RESPIRATORY (INHALATION)
Qty: 0 | Refills: 0 | Status: DISCONTINUED | OUTPATIENT
Start: 2017-09-28 | End: 2017-09-29

## 2017-09-28 RX ORDER — INSULIN GLARGINE 100 [IU]/ML
11 INJECTION, SOLUTION SUBCUTANEOUS ONCE
Qty: 0 | Refills: 0 | Status: COMPLETED | OUTPATIENT
Start: 2017-09-28 | End: 2017-09-28

## 2017-09-28 RX ADMIN — ATORVASTATIN CALCIUM 20 MILLIGRAM(S): 80 TABLET, FILM COATED ORAL at 21:13

## 2017-09-28 RX ADMIN — Medication 400 MILLIGRAM(S): at 13:02

## 2017-09-28 RX ADMIN — INSULIN GLARGINE 11 UNIT(S): 100 INJECTION, SOLUTION SUBCUTANEOUS at 23:39

## 2017-09-28 RX ADMIN — SODIUM CHLORIDE 50 MILLILITER(S): 9 INJECTION INTRAMUSCULAR; INTRAVENOUS; SUBCUTANEOUS at 10:10

## 2017-09-28 RX ADMIN — HYDROMORPHONE HYDROCHLORIDE 0.5 MILLIGRAM(S): 2 INJECTION INTRAMUSCULAR; INTRAVENOUS; SUBCUTANEOUS at 22:09

## 2017-09-28 RX ADMIN — Medication 100 MILLIGRAM(S): at 21:13

## 2017-09-28 RX ADMIN — HEPARIN SODIUM 5000 UNIT(S): 5000 INJECTION INTRAVENOUS; SUBCUTANEOUS at 21:12

## 2017-09-28 RX ADMIN — CEFTRIAXONE 100 GRAM(S): 500 INJECTION, POWDER, FOR SOLUTION INTRAMUSCULAR; INTRAVENOUS at 21:12

## 2017-09-28 NOTE — CONSULT NOTE ADULT - SUBJECTIVE AND OBJECTIVE BOX
NEPHROLOGY CONSULT  HPI:  76 year old female with PMHx HTN, HLD, CAD, ADVANCED COPD ON HOME O2 (3LITERS), IDDM, CHRONIC DIASTOLIC HEART FAILURE, RECENT ADMISSIONS FOR HYPOXIC RESPIRATORY FAILURE STATES SHE WAS LEAVING [facility] [employer] [facility] EARLIER TODAY WITH HER BROTHER AND MISSTEPPED RESULTING IN A FALL.  SHE REPORTS HAVING NECK AND LOWER BACK PAIN, ALSO LEFT HIP PAIN.  PT DENIES ANY CHEST PAIN OR SHORTNESS OF BREATH, NO NAUSEA OR VOMITING. JUST RECEIVED DILAUDID AND FEELS SOMEWHAT BETTER. (27 Sep 2017 19:56)  lethargic due to pain meds, last dose last night  family at bedside  for OR either today or in am        PAST MEDICAL & SURGICAL HISTORY:  Chronic diastolic congestive heart failure  Hyperlipidemia, unspecified hyperlipidemia type  Essential hypertension  Neuropathy  Diabetes  Chronic obstructive pulmonary disease, unspecified COPD type  History of total knee replacement, unspecified laterality  H/O hernia repair   delivery delivered  S/P appendectomy      FAMILY HISTORY:  Family history of CHF (congestive heart failure) (Mother)    MEDICATIONS  (STANDING):  docusate sodium 100 milliGRAM(s) Oral three times a day  diltiazem    milliGRAM(s) Oral daily  citalopram 10 milliGRAM(s) Oral daily  atorvastatin 20 milliGRAM(s) Oral at bedtime  pantoprazole    Tablet 40 milliGRAM(s) Oral before breakfast  levothyroxine 112 MICROGram(s) Oral daily  folic acid 1 milliGRAM(s) Oral daily  insulin glargine Injectable (LANTUS) 22 Unit(s) SubCutaneous at bedtime  insulin lispro (HumaLOG) corrective regimen sliding scale   SubCutaneous three times a day before meals  dextrose 5%. 1000 milliLiter(s) (50 mL/Hr) IV Continuous <Continuous>  dextrose 50% Injectable 12.5 Gram(s) IV Push once  dextrose 50% Injectable 25 Gram(s) IV Push once  dextrose 50% Injectable 25 Gram(s) IV Push once  sodium chloride 0.9%. 1000 milliLiter(s) (50 mL/Hr) IV Continuous <Continuous>  influenza   Vaccine 0.5 milliLiter(s) IntraMuscular once  buDESOnide 160 MICROgram(s)/formoterol 4.5 MICROgram(s) Inhaler 2 Puff(s) Inhalation two times a day  tiotropium 18 MICROgram(s) Capsule 1 Capsule(s) Inhalation daily  ALBUTerol    90 MICROgram(s) HFA Inhaler 1 Puff(s) Inhalation every 4 hours    MEDICATIONS  (PRN):  acetaminophen   Tablet. 650 milliGRAM(s) Oral every 6 hours PRN headache  oxyCODONE    IR 10 milliGRAM(s) Oral every 4 hours PRN Moderate Pain  oxyCODONE    IR 5 milliGRAM(s) Oral every 4 hours PRN Mild Pain  HYDROmorphone  Injectable 0.5 milliGRAM(s) IV Push every 4 hours PRN Severe Pain (7 - 10)  diphenhydrAMINE   Capsule 25 milliGRAM(s) Oral at bedtime PRN Insomnia  diphenhydrAMINE   Capsule 25 milliGRAM(s) Oral every 4 hours PRN Rash and/or Itching  benzocaine 15 mG/menthol 3.6 mG Lozenge 1 Lozenge Oral every 4 hours PRN Sore Throat  dextrose Gel 1 Dose(s) Oral once PRN Blood Glucose LESS THAN 70 milliGRAM(s)/deciliter  glucagon  Injectable 1 milliGRAM(s) IntraMuscular once PRN Glucose LESS THAN 70 milligrams/deciliter  ALBUTerol    0.083% 2.5 milliGRAM(s) Nebulizer every 6 hours PRN Shortness of Breath and/or Wheezing        Allergies    Bactrim (Other)  ciprofloxacin (Other (Mild))  clindamycin (Unknown)  ibuprofen (Unknown)  penicillins (Other)  sulfa drugs (Unknown)    Intolerances        I&O's Summary    27 Sep 2017 07:01  -  28 Sep 2017 07:00  --------------------------------------------------------  IN: 0 mL / OUT: 650 mL / NET: -650 mL          REVIEW OF SYSTEMS:    CONSTITUTIONAL:  As per HPI.  CONSTITUTIONAL: No weakness, fevers or chills  EYES/ENT: No visual changes;  No vertigo or throat pain   NECK: No pain or stiffness  CARDIOVASCULAR: No chest pain or palpitations  GASTROINTESTINAL: No abdominal or epigastric pain. No nausea, vomiting, or hematemesis; No diarrhea or constipation. No melena or hematochezia.  GENITOURINARY: No dysuria, frequency or hematuria  NEUROLOGICAL: No numbness or weakness  SKIN: No itching, burning, rashes, or lesions   All other review of systems is negative unless indicated above      Vital Signs Last 24 Hrs  T(C): 38.6 (28 Sep 2017 15:10), Max: 39.4 (28 Sep 2017 05:47)  T(F): 101.4 (28 Sep 2017 15:10), Max: 102.9 (28 Sep 2017 05:47)  HR: 77 (28 Sep 2017 16:37) (66 - 86)  BP: 140/37 (28 Sep 2017 16:37) (129/41 - 144/39)  BP(mean): --  RR: 8 (28 Sep 2017 16:37) (8 - 13)  SpO2: 96% (28 Sep 2017 16:37) (92% - 96%)        PHYSICAL EXAM:    General:  lethargic got oxy codone last night    Neuro:  lethargic    HEENT:  No JVD, no masses, Eyes anicteric, No carotid bruits.No lymphadenopathy,    Cardiovascular:  Regular rate and rhythm, with normal S1 and S2. No murmurs, rubs,  or gallops. No JVD.     Lungs:  clear. no rales, no wheezing, .    Abdomen:  Normoactive bowel sounds. Soft, flat, non-tender, and non-distended.  No hepatosplenomegaly, positive bowel sounds    Skin:  Warm, dry, well-perfused. No rashes or other lesions.     Extremities:  warm no edema    LABS:                                   8.9    8.9   )-----------( 185      ( 28 Sep 2017 04:30 )             27.1     09-28    140  |  104  |  71<H>  ----------------------------<  159<H>  5.3   |  32<H>  |  2.59<H>    Ca    8.6      28 Sep 2017 04:30

## 2017-09-28 NOTE — PROGRESS NOTE ADULT - ASSESSMENT
77 YO F with L hip IT fracture  Pain control  NWB LLE  NPO  IVF  Hold AC  Plan for OR today with Dr. Jacinto PENDING MED/CARDS/PULM CLEARANCE

## 2017-09-28 NOTE — CONSULT NOTE ADULT - ASSESSMENT
Preoperative cardiac assesement- pt is euvolemic from  cardiac standpoint.  If need be we can give NSS 50cc/hr for 8 hrs.  Her last stress test was 1/2017 which showed small sized mild intensity fixed defect near apex which represents scar and priro MI.  Her last QIAN showed moderate MR with normal LV systolic function.    Patient is a low to moderate risk patient from cardiac standpoint for a moderate risk procedure with poor functional tolerance.  Patient can proceed with anticipated procedure without any further cardiac testing.  Patient is optimized from cardiac standpoint.    Close monitoring of the volume status periop.  Strict I/O and daily wt checks. Low sodium diet.   Hold diuretics today.    Chronic diastolic heart failure- euvolemic.  Baseline creatinine averages around 2.    HTN- continue current meds    Hyperlipidemia- continue statin.    Other medical issues- Management per primary team.   Thank you for allowing me to participate in the care of this patient. Please feel free to contact me with any questions.

## 2017-09-28 NOTE — CONSULT NOTE ADULT - ASSESSMENT
76 year old female with PMHx HTN, HLD, CAD, ADVANCED COPD ON HOME O2 (3LITERS), IDDM, CHRONIC DIASTOLIC HEART FAILURE, RECENT ADMISSIONS FOR HYPOXIC RESPIRATORY FAILURE STATES SHE WAS LEAVING [facility] EARLIER TODAY WITH HER BROTHER AND MISSTEPPED RESULTING IN A FALL.  SHE REPORTS HAVING NECK AND LOWER BACK PAIN, ALSO LEFT HIP PAIN.  PT DENIES ANY CHEST PAIN OR SHORTNESS OF BREATH, NO NAUSEA OR VOMITING. JUST RECEIVED DILAUDID AND FEELS SOMEWHAT BETTER. (27 Sep 2017 19:56)  lethargic due to pain meds, last dose last night  family at bedside  for OR in am    addendum:  spoke to pts nurse sol  5 pm  pt is more awake and alert 76 year old female with PMHx HTN, HLD, CAD, ADVANCED COPD ON HOME O2 (3LITERS), IDDM, CHRONIC DIASTOLIC HEART FAILURE, RECENT ADMISSIONS FOR HYPOXIC RESPIRATORY FAILURE STATES SHE WAS LEAVING [facility] EARLIER TODAY WITH HER BROTHER AND MISSTEPPED RESULTING IN A FALL.  SHE REPORTS HAVING NECK AND LOWER BACK PAIN, ALSO LEFT HIP PAIN.  PT DENIES ANY CHEST PAIN OR SHORTNESS OF BREATH, NO NAUSEA OR VOMITING. JUST RECEIVED DILAUDID AND FEELS SOMEWHAT BETTER. (27 Sep 2017 19:56)  lethargic due to pain meds, last dose last night  family at bedside  for OR in am    addendum:  spoke to pts nurse sol  5 pm  pt is more awake and alert  will d/c oxycodone 10 mg  cont oxy 5 mg and hydromorphone 0.5 mg

## 2017-09-28 NOTE — PROGRESS NOTE ADULT - ASSESSMENT
77 Y/O FEMALE WITH THE [facility] HX ADMITTED WITH LEFT INTERTROCHANTERIC FRACTURE POST MECHANICAL FALL

## 2017-09-28 NOTE — PROGRESS NOTE ADULT - PROBLEM SELECTOR PLAN 1
POST MECHANICAL FALL  FOR IM NAILING TODAY WITH DR HARPER  NEEDS CARDIO CLEARANCE.  PULM AND RENAL EVALS AS CR MORE ELEVATED AND ADVANCED COPD HX  COAGS STABLE, H/H SLIGHTLY LOW - BUT STABLE  EKG AND TELE STABLE  CXR PENDING  ELECTROLYTES STABLE  PT WILL BE MOD TO HIGH RISK FOR SURGERY DUE TO HER COMORBID CONDITIONS.    THERE ARE NO MEDICAL CONTRAINDICATIONS TO PROPOSED SURGERY  KEEP NPO POST MN TONIGHT  DECREASE INSULIN DOSING TO HALF  WOULD HOLD DIURETIC WITH ELEVATED CR LEVEL

## 2017-09-28 NOTE — CONSULT NOTE ADULT - SUBJECTIVE AND OBJECTIVE BOX
Patient is a 76y old  Female who presents with a chief complaint of S/P TRIP AND FALL WITH HIP PAIN.    HPI:  76 year old female with pmh of HTN, hyperlipidemia, CAD, MI in past, chronic diastolic heart failure, s/p ambulatory pulmonary pressure monitor implantation after the recent admission for hypoxic respiratory failure which was thought to be a combination of the diastolic heart failure and COPD exacerbation, on home O2 presented after a fall when she missed a step at [employer].  She was diagnosed with L hip fracture.  From the heart failure standpoint lately she had been doing very well.  One week ago her isosorbide was discontinued as pt was feeling dizzy and since one week her dizziness resolved.  She had sore throat for about a week.   Her pulmonary artery pressure monitor pressures were noted to be optimal.  Pt this am is sleeping but arousable.  NO CP or SOB.     PAST MEDICAL & SURGICAL HISTORY:  Chronic diastolic congestive heart failure  Hyperlipidemia, unspecified hyperlipidemia type  Essential hypertension  Neuropathy  Diabetes  Chronic obstructive pulmonary disease, unspecified COPD type  History of total knee replacement, unspecified laterality  H/O hernia repair   delivery delivered  S/P appendectomy      MEDICATIONS  (STANDING):  docusate sodium 100 milliGRAM(s) Oral three times a day  diltiazem    milliGRAM(s) Oral daily  citalopram 10 milliGRAM(s) Oral daily  atorvastatin 20 milliGRAM(s) Oral at bedtime  pantoprazole    Tablet 40 milliGRAM(s) Oral before breakfast  levothyroxine 112 MICROGram(s) Oral daily  folic acid 1 milliGRAM(s) Oral daily  insulin glargine Injectable (LANTUS) 22 Unit(s) SubCutaneous at bedtime  insulin lispro (HumaLOG) corrective regimen sliding scale   SubCutaneous three times a day before meals  dextrose 5%. 1000 milliLiter(s) (50 mL/Hr) IV Continuous <Continuous>  dextrose 50% Injectable 12.5 Gram(s) IV Push once  dextrose 50% Injectable 25 Gram(s) IV Push once  dextrose 50% Injectable 25 Gram(s) IV Push once  sodium chloride 0.9%. 1000 milliLiter(s) (50 mL/Hr) IV Continuous <Continuous>  influenza   Vaccine 0.5 milliLiter(s) IntraMuscular once    MEDICATIONS  (PRN):  acetaminophen   Tablet 650 milliGRAM(s) Oral every 6 hours PRN For Temp greater than 38 C (100.4 F)  acetaminophen   Tablet. 650 milliGRAM(s) Oral every 6 hours PRN headache  oxyCODONE    IR 10 milliGRAM(s) Oral every 4 hours PRN Moderate Pain  oxyCODONE    IR 5 milliGRAM(s) Oral every 4 hours PRN Mild Pain  HYDROmorphone  Injectable 0.5 milliGRAM(s) IV Push every 4 hours PRN Severe Pain (7 - 10)  diphenhydrAMINE   Capsule 25 milliGRAM(s) Oral at bedtime PRN Insomnia  diphenhydrAMINE   Capsule 25 milliGRAM(s) Oral every 4 hours PRN Rash and/or Itching  benzocaine 15 mG/menthol 3.6 mG Lozenge 1 Lozenge Oral every 4 hours PRN Sore Throat  dextrose Gel 1 Dose(s) Oral once PRN Blood Glucose LESS THAN 70 milliGRAM(s)/deciliter  glucagon  Injectable 1 milliGRAM(s) IntraMuscular once PRN Glucose LESS THAN 70 milligrams/deciliter      FAMILY HISTORY:  Family history of CHF (congestive heart failure) (Mother)      SOCIAL HISTORY:  non smoker, no alcohol use in recent past.    REVIEW OF SYSTEMS:  CONSTITUTIONAL:  No night sweats.  No fatigue, malaise, lethargy.  No fever or chills.  HEENT:  Eyes:  No visual changes.  No eye pain.      ENT:  No runny nose.  No epistaxis.  No sinus pain.  No sore throat.  No odynophagia.  No ear pain.  No congestion.  RESPIRATORY:  No cough.  No wheeze.  No hemoptysis.  No shortness of breath.  CARDIOVASCULAR:  No chest pains.  No palpitations. No shortness of breath, No orthopnea or PND.  GASTROINTESTINAL:  No abdominal pain.  No nausea or vomiting.  No diarrhea or constipation.  No hematemesis.  No hematochezia.  No melena.  GENITOURINARY:  No urgency.  No frequency.  No dysuria.  No hematuria.  No obstructive symptoms.  No discharge.  No pain.  No significant abnormal bleeding.  MUSCULOSKELETAL:  No musculoskeletal pain.  No joint swelling.  No arthritis.  NEUROLOGICAL:  No tingling or numbness or weakness.  PSYCHIATRIC:  No confusion  SKIN:  No rashes.  No lesions.  No wounds.  ENDOCRINE:  No unexplained weight loss.  No polydipsia.  No polyuria.  No polyphagia.  HEMATOLOGIC:  No anemia.  No purpura.  No petechiae.  No prolonged or excessive bleeding.   ALLERGIC AND IMMUNOLOGIC:  No pruritus.  No swelling.         Vital Signs Last 24 Hrs  T(C): 37.4 (28 Sep 2017 05:13), Max: 37.4 (28 Sep 2017 05:13)  T(F): 99.4 (28 Sep 2017 05:13), Max: 99.4 (28 Sep 2017 05:13)  HR: 84 (28 Sep 2017 05:13) (51 - 84)  BP: 142/38 (28 Sep 2017 05:13) (126/50 - 142/38)  BP(mean): --  RR: 13 (27 Sep 2017 20:16) (13 - 20)  SpO2: 92% (28 Sep 2017 05:13) (92% - 96%)    PHYSICAL EXAM-    Constitutional: The patient appears to be normal, well developed, well nourished and alert and oriented to time, place and person. The patient does not appear acutely ill.     Head: Head is normocephalic and atraumatic.      Neck: The patient's neck is supple without enlargement, has no palpable thyromegaly nor thyroid nodules and has no jugular venous distention. No audible carotid bruits. There are strong carotid pulses bilaterally. No JVD.     Cardiovascular: Regular rate and rhythm without S3, S4. No murmurs or rubs are appreciated.      Respiratory: Breath sounds are normal. No rales. No wheezing.    Abdomen: Soft, nontender, nondistended with positive bowel sounds.      Extremity: No tenderness. No  pitting edema No skin discoloration No clubbing No cyanosis.     Neurologic: The patient is alert and oriented.      Skin: No rash, no obvious lesions noted.      Psychiatric: The patient appears to be emotionally stable.      INTERPRETATION OF TELEMETRY: sinus rythm.     ECG: sinus rythm, normal axis, no ST T changes.     I&O's Detail    27 Sep 2017 07:01  -  28 Sep 2017 07:00  --------------------------------------------------------  IN:  Total IN: 0 mL    OUT:    Voided: 650 mL  Total OUT: 650 mL    Total NET: -650 mL          LABS:                        8.9    8.9   )-----------( 185      ( 28 Sep 2017 04:30 )             27.1         140  |  104  |  71<H>  ----------------------------<  159<H>  5.3   |  32<H>  |  2.59<H>    Ca    8.6      28 Sep 2017 04:30    TPro  7.0  /  Alb  3.4  /  TBili  0.4  /  DBili  x   /  AST  14<L>  /  ALT  15  /  AlkPhos  109          PT/INR - ( 28 Sep 2017 04:30 )   PT: 12.8 sec;   INR: 1.18 ratio         PTT - ( 28 Sep 2017 04:30 )  PTT:28.6 sec  Urinalysis Basic - ( 28 Sep 2017 06:00 )    Color: Yellow / Appearance: Clear / S.015 / pH: x  Gluc: x / Ketone: Negative  / Bili: Negative / Urobili: Negative mg/dL   Blood: x / Protein: 30 mg/dL / Nitrite: Negative   Leuk Esterase: Small / RBC: x / WBC x   Sq Epi: x / Non Sq Epi: x / Bacteria: x      I&O's Summary    27 Sep 2017 07:01  -  28 Sep 2017 07:00  --------------------------------------------------------  IN: 0 mL / OUT: 650 mL / NET: -650 mL      BNP  RADIOLOGY & ADDITIONAL STUDIES:

## 2017-09-29 ENCOUNTER — TRANSCRIPTION ENCOUNTER (OUTPATIENT)
Age: 77
End: 2017-09-29

## 2017-09-29 LAB
ANION GAP SERPL CALC-SCNC: 8 MMOL/L — SIGNIFICANT CHANGE UP (ref 5–17)
ANION GAP SERPL CALC-SCNC: 9 MMOL/L — SIGNIFICANT CHANGE UP (ref 5–17)
APTT BLD: 29.6 SEC — SIGNIFICANT CHANGE UP (ref 27.5–37.4)
BUN SERPL-MCNC: 70 MG/DL — HIGH (ref 7–23)
BUN SERPL-MCNC: 74 MG/DL — HIGH (ref 7–23)
CALCIUM SERPL-MCNC: 8.5 MG/DL — SIGNIFICANT CHANGE UP (ref 8.5–10.1)
CALCIUM SERPL-MCNC: 9 MG/DL — SIGNIFICANT CHANGE UP (ref 8.5–10.1)
CHLORIDE SERPL-SCNC: 106 MMOL/L — SIGNIFICANT CHANGE UP (ref 96–108)
CHLORIDE SERPL-SCNC: 110 MMOL/L — HIGH (ref 96–108)
CO2 SERPL-SCNC: 27 MMOL/L — SIGNIFICANT CHANGE UP (ref 22–31)
CO2 SERPL-SCNC: 30 MMOL/L — SIGNIFICANT CHANGE UP (ref 22–31)
CREAT SERPL-MCNC: 2.34 MG/DL — HIGH (ref 0.5–1.3)
CREAT SERPL-MCNC: 2.61 MG/DL — HIGH (ref 0.5–1.3)
CULTURE RESULTS: SIGNIFICANT CHANGE UP
GLUCOSE SERPL-MCNC: 143 MG/DL — HIGH (ref 70–99)
GLUCOSE SERPL-MCNC: 191 MG/DL — HIGH (ref 70–99)
HCT VFR BLD CALC: 27.9 % — LOW (ref 34.5–45)
HCT VFR BLD CALC: 28 % — LOW (ref 34.5–45)
HGB BLD-MCNC: 9.1 G/DL — LOW (ref 11.5–15.5)
HGB BLD-MCNC: 9.2 G/DL — LOW (ref 11.5–15.5)
INR BLD: 1.32 RATIO — HIGH (ref 0.88–1.16)
INR BLD: 1.38 RATIO — HIGH (ref 0.88–1.16)
MCHC RBC-ENTMCNC: 28.1 PG — SIGNIFICANT CHANGE UP (ref 27–34)
MCHC RBC-ENTMCNC: 28.1 PG — SIGNIFICANT CHANGE UP (ref 27–34)
MCHC RBC-ENTMCNC: 32.7 GM/DL — SIGNIFICANT CHANGE UP (ref 32–36)
MCHC RBC-ENTMCNC: 32.9 GM/DL — SIGNIFICANT CHANGE UP (ref 32–36)
MCV RBC AUTO: 85.4 FL — SIGNIFICANT CHANGE UP (ref 80–100)
MCV RBC AUTO: 86 FL — SIGNIFICANT CHANGE UP (ref 80–100)
PLATELET # BLD AUTO: 177 K/UL — SIGNIFICANT CHANGE UP (ref 150–400)
PLATELET # BLD AUTO: 195 K/UL — SIGNIFICANT CHANGE UP (ref 150–400)
POTASSIUM SERPL-MCNC: 4.5 MMOL/L — SIGNIFICANT CHANGE UP (ref 3.5–5.3)
POTASSIUM SERPL-MCNC: 5.1 MMOL/L — SIGNIFICANT CHANGE UP (ref 3.5–5.3)
POTASSIUM SERPL-SCNC: 4.5 MMOL/L — SIGNIFICANT CHANGE UP (ref 3.5–5.3)
POTASSIUM SERPL-SCNC: 5.1 MMOL/L — SIGNIFICANT CHANGE UP (ref 3.5–5.3)
PROTHROM AB SERPL-ACNC: 14.3 SEC — HIGH (ref 9.8–12.7)
PROTHROM AB SERPL-ACNC: 15 SEC — HIGH (ref 9.8–12.7)
RAPID RVP RESULT: SIGNIFICANT CHANGE UP
RBC # BLD: 3.25 M/UL — LOW (ref 3.8–5.2)
RBC # BLD: 3.26 M/UL — LOW (ref 3.8–5.2)
RBC # FLD: 14 % — SIGNIFICANT CHANGE UP (ref 10.3–14.5)
RBC # FLD: 14.4 % — SIGNIFICANT CHANGE UP (ref 10.3–14.5)
SODIUM SERPL-SCNC: 144 MMOL/L — SIGNIFICANT CHANGE UP (ref 135–145)
SODIUM SERPL-SCNC: 146 MMOL/L — HIGH (ref 135–145)
SPECIMEN SOURCE: SIGNIFICANT CHANGE UP
WBC # BLD: 11.8 K/UL — HIGH (ref 3.8–10.5)
WBC # BLD: 21.2 K/UL — HIGH (ref 3.8–10.5)
WBC # FLD AUTO: 11.8 K/UL — HIGH (ref 3.8–10.5)
WBC # FLD AUTO: 21.2 K/UL — HIGH (ref 3.8–10.5)

## 2017-09-29 RX ORDER — DEXTROSE 50 % IN WATER 50 %
25 SYRINGE (ML) INTRAVENOUS ONCE
Qty: 0 | Refills: 0 | Status: DISCONTINUED | OUTPATIENT
Start: 2017-09-29 | End: 2017-10-12

## 2017-09-29 RX ORDER — ONDANSETRON 8 MG/1
4 TABLET, FILM COATED ORAL ONCE
Qty: 0 | Refills: 0 | Status: DISCONTINUED | OUTPATIENT
Start: 2017-09-29 | End: 2017-09-29

## 2017-09-29 RX ORDER — PANTOPRAZOLE SODIUM 20 MG/1
40 TABLET, DELAYED RELEASE ORAL DAILY
Qty: 0 | Refills: 0 | Status: DISCONTINUED | OUTPATIENT
Start: 2017-09-29 | End: 2017-10-12

## 2017-09-29 RX ORDER — DEXTROSE 50 % IN WATER 50 %
1 SYRINGE (ML) INTRAVENOUS ONCE
Qty: 0 | Refills: 0 | Status: DISCONTINUED | OUTPATIENT
Start: 2017-09-29 | End: 2017-10-12

## 2017-09-29 RX ORDER — OXYCODONE HYDROCHLORIDE 5 MG/1
10 TABLET ORAL EVERY 4 HOURS
Qty: 0 | Refills: 0 | Status: DISCONTINUED | OUTPATIENT
Start: 2017-09-29 | End: 2017-10-06

## 2017-09-29 RX ORDER — ONDANSETRON 8 MG/1
4 TABLET, FILM COATED ORAL EVERY 6 HOURS
Qty: 0 | Refills: 0 | Status: DISCONTINUED | OUTPATIENT
Start: 2017-09-29 | End: 2017-10-12

## 2017-09-29 RX ORDER — MEPERIDINE HYDROCHLORIDE 50 MG/ML
12.5 INJECTION INTRAMUSCULAR; INTRAVENOUS; SUBCUTANEOUS
Qty: 0 | Refills: 0 | Status: DISCONTINUED | OUTPATIENT
Start: 2017-09-29 | End: 2017-09-29

## 2017-09-29 RX ORDER — INSULIN GLARGINE 100 [IU]/ML
22 INJECTION, SOLUTION SUBCUTANEOUS AT BEDTIME
Qty: 0 | Refills: 0 | Status: DISCONTINUED | OUTPATIENT
Start: 2017-09-29 | End: 2017-10-01

## 2017-09-29 RX ORDER — ATORVASTATIN CALCIUM 80 MG/1
20 TABLET, FILM COATED ORAL AT BEDTIME
Qty: 0 | Refills: 0 | Status: DISCONTINUED | OUTPATIENT
Start: 2017-09-29 | End: 2017-10-12

## 2017-09-29 RX ORDER — GLUCAGON INJECTION, SOLUTION 0.5 MG/.1ML
1 INJECTION, SOLUTION SUBCUTANEOUS ONCE
Qty: 0 | Refills: 0 | Status: DISCONTINUED | OUTPATIENT
Start: 2017-09-29 | End: 2017-10-12

## 2017-09-29 RX ORDER — SODIUM CHLORIDE 9 MG/ML
1000 INJECTION INTRAMUSCULAR; INTRAVENOUS; SUBCUTANEOUS
Qty: 0 | Refills: 0 | Status: DISCONTINUED | OUTPATIENT
Start: 2017-09-29 | End: 2017-09-29

## 2017-09-29 RX ORDER — DEXTROSE 50 % IN WATER 50 %
12.5 SYRINGE (ML) INTRAVENOUS ONCE
Qty: 0 | Refills: 0 | Status: DISCONTINUED | OUTPATIENT
Start: 2017-09-29 | End: 2017-10-12

## 2017-09-29 RX ORDER — SODIUM CHLORIDE 9 MG/ML
300 INJECTION INTRAMUSCULAR; INTRAVENOUS; SUBCUTANEOUS
Qty: 0 | Refills: 0 | Status: DISCONTINUED | OUTPATIENT
Start: 2017-09-29 | End: 2017-09-29

## 2017-09-29 RX ORDER — HYDROMORPHONE HYDROCHLORIDE 2 MG/ML
0.5 INJECTION INTRAMUSCULAR; INTRAVENOUS; SUBCUTANEOUS
Qty: 0 | Refills: 0 | Status: DISCONTINUED | OUTPATIENT
Start: 2017-09-29 | End: 2017-09-29

## 2017-09-29 RX ORDER — TIOTROPIUM BROMIDE 18 UG/1
1 CAPSULE ORAL; RESPIRATORY (INHALATION) DAILY
Qty: 0 | Refills: 0 | Status: DISCONTINUED | OUTPATIENT
Start: 2017-09-29 | End: 2017-09-30

## 2017-09-29 RX ORDER — POLYETHYLENE GLYCOL 3350 17 G/17G
17 POWDER, FOR SOLUTION ORAL DAILY
Qty: 0 | Refills: 0 | Status: DISCONTINUED | OUTPATIENT
Start: 2017-09-29 | End: 2017-10-12

## 2017-09-29 RX ORDER — HYDROMORPHONE HYDROCHLORIDE 2 MG/ML
0.5 INJECTION INTRAMUSCULAR; INTRAVENOUS; SUBCUTANEOUS ONCE
Qty: 0 | Refills: 0 | Status: DISCONTINUED | OUTPATIENT
Start: 2017-09-29 | End: 2017-09-29

## 2017-09-29 RX ORDER — HEPARIN SODIUM 5000 [USP'U]/ML
5000 INJECTION INTRAVENOUS; SUBCUTANEOUS EVERY 12 HOURS
Qty: 0 | Refills: 0 | Status: DISCONTINUED | OUTPATIENT
Start: 2017-09-30 | End: 2017-10-05

## 2017-09-29 RX ORDER — SENNA PLUS 8.6 MG/1
2 TABLET ORAL AT BEDTIME
Qty: 0 | Refills: 0 | Status: DISCONTINUED | OUTPATIENT
Start: 2017-09-29 | End: 2017-10-12

## 2017-09-29 RX ORDER — DIPHENHYDRAMINE HCL 50 MG
25 CAPSULE ORAL AT BEDTIME
Qty: 0 | Refills: 0 | Status: DISCONTINUED | OUTPATIENT
Start: 2017-09-29 | End: 2017-10-12

## 2017-09-29 RX ORDER — OXYCODONE HYDROCHLORIDE 5 MG/1
5 TABLET ORAL EVERY 4 HOURS
Qty: 0 | Refills: 0 | Status: DISCONTINUED | OUTPATIENT
Start: 2017-09-29 | End: 2017-10-06

## 2017-09-29 RX ORDER — INSULIN LISPRO 100/ML
VIAL (ML) SUBCUTANEOUS
Qty: 0 | Refills: 0 | Status: DISCONTINUED | OUTPATIENT
Start: 2017-09-29 | End: 2017-10-12

## 2017-09-29 RX ORDER — CEFTRIAXONE 500 MG/1
1 INJECTION, POWDER, FOR SOLUTION INTRAMUSCULAR; INTRAVENOUS EVERY 24 HOURS
Qty: 0 | Refills: 0 | Status: DISCONTINUED | OUTPATIENT
Start: 2017-09-29 | End: 2017-10-05

## 2017-09-29 RX ORDER — OXYCODONE HYDROCHLORIDE 5 MG/1
5 TABLET ORAL EVERY 4 HOURS
Qty: 0 | Refills: 0 | Status: DISCONTINUED | OUTPATIENT
Start: 2017-09-29 | End: 2017-09-29

## 2017-09-29 RX ORDER — ACETAMINOPHEN 500 MG
650 TABLET ORAL EVERY 6 HOURS
Qty: 0 | Refills: 0 | Status: DISCONTINUED | OUTPATIENT
Start: 2017-09-29 | End: 2017-10-12

## 2017-09-29 RX ORDER — TRAMADOL HYDROCHLORIDE 50 MG/1
50 TABLET ORAL EVERY 6 HOURS
Qty: 0 | Refills: 0 | Status: DISCONTINUED | OUTPATIENT
Start: 2017-09-29 | End: 2017-10-06

## 2017-09-29 RX ORDER — CITALOPRAM 10 MG/1
10 TABLET, FILM COATED ORAL DAILY
Qty: 0 | Refills: 0 | Status: DISCONTINUED | OUTPATIENT
Start: 2017-09-29 | End: 2017-10-07

## 2017-09-29 RX ORDER — BUDESONIDE AND FORMOTEROL FUMARATE DIHYDRATE 160; 4.5 UG/1; UG/1
2 AEROSOL RESPIRATORY (INHALATION)
Qty: 0 | Refills: 0 | Status: DISCONTINUED | OUTPATIENT
Start: 2017-09-29 | End: 2017-09-30

## 2017-09-29 RX ORDER — ALBUTEROL 90 UG/1
1 AEROSOL, METERED ORAL EVERY 4 HOURS
Qty: 0 | Refills: 0 | Status: DISCONTINUED | OUTPATIENT
Start: 2017-09-29 | End: 2017-10-06

## 2017-09-29 RX ORDER — DOCUSATE SODIUM 100 MG
100 CAPSULE ORAL THREE TIMES A DAY
Qty: 0 | Refills: 0 | Status: DISCONTINUED | OUTPATIENT
Start: 2017-09-29 | End: 2017-10-12

## 2017-09-29 RX ORDER — LEVOTHYROXINE SODIUM 125 MCG
112 TABLET ORAL DAILY
Qty: 0 | Refills: 0 | Status: DISCONTINUED | OUTPATIENT
Start: 2017-09-29 | End: 2017-10-01

## 2017-09-29 RX ADMIN — Medication 112 MICROGRAM(S): at 05:45

## 2017-09-29 RX ADMIN — Medication 180 MILLIGRAM(S): at 05:45

## 2017-09-29 RX ADMIN — HYDROMORPHONE HYDROCHLORIDE 0.5 MILLIGRAM(S): 2 INJECTION INTRAMUSCULAR; INTRAVENOUS; SUBCUTANEOUS at 04:43

## 2017-09-29 RX ADMIN — HYDROMORPHONE HYDROCHLORIDE 0.5 MILLIGRAM(S): 2 INJECTION INTRAMUSCULAR; INTRAVENOUS; SUBCUTANEOUS at 19:50

## 2017-09-29 RX ADMIN — CEFTRIAXONE 100 GRAM(S): 500 INJECTION, POWDER, FOR SOLUTION INTRAMUSCULAR; INTRAVENOUS at 21:57

## 2017-09-29 RX ADMIN — PANTOPRAZOLE SODIUM 40 MILLIGRAM(S): 20 TABLET, DELAYED RELEASE ORAL at 05:47

## 2017-09-29 RX ADMIN — HYDROMORPHONE HYDROCHLORIDE 0.5 MILLIGRAM(S): 2 INJECTION INTRAMUSCULAR; INTRAVENOUS; SUBCUTANEOUS at 20:15

## 2017-09-29 RX ADMIN — Medication 100 MILLIGRAM(S): at 05:45

## 2017-09-29 RX ADMIN — HYDROMORPHONE HYDROCHLORIDE 0.5 MILLIGRAM(S): 2 INJECTION INTRAMUSCULAR; INTRAVENOUS; SUBCUTANEOUS at 07:34

## 2017-09-29 RX ADMIN — HYDROMORPHONE HYDROCHLORIDE 0.5 MILLIGRAM(S): 2 INJECTION INTRAMUSCULAR; INTRAVENOUS; SUBCUTANEOUS at 01:36

## 2017-09-29 RX ADMIN — SODIUM CHLORIDE 100 MILLILITER(S): 9 INJECTION INTRAMUSCULAR; INTRAVENOUS; SUBCUTANEOUS at 21:57

## 2017-09-29 RX ADMIN — TIOTROPIUM BROMIDE 1 CAPSULE(S): 18 CAPSULE ORAL; RESPIRATORY (INHALATION) at 09:34

## 2017-09-29 RX ADMIN — BUDESONIDE AND FORMOTEROL FUMARATE DIHYDRATE 2 PUFF(S): 160; 4.5 AEROSOL RESPIRATORY (INHALATION) at 08:20

## 2017-09-29 RX ADMIN — HYDROMORPHONE HYDROCHLORIDE 0.5 MILLIGRAM(S): 2 INJECTION INTRAMUSCULAR; INTRAVENOUS; SUBCUTANEOUS at 12:08

## 2017-09-29 RX ADMIN — SODIUM CHLORIDE 50 MILLILITER(S): 9 INJECTION INTRAMUSCULAR; INTRAVENOUS; SUBCUTANEOUS at 15:13

## 2017-09-29 NOTE — DISCHARGE NOTE ADULT - PATIENT PORTAL LINK FT
“You can access the [facility] Patient Portal, offered by [facility], by registering with the following website: [email or web address]”

## 2017-09-29 NOTE — DISCHARGE NOTE ADULT - MEDICATION SUMMARY - MEDICATIONS TO TAKE
I will START or STAY ON the medications listed below when I get home from the hospital:    acetaminophen 325 mg oral tablet  -- 2 tab(s) by mouth every 6 hours, As needed, For Temp over 38.3 C (100.94 F)  -- Indication: For Pain    traMADol 50 mg oral tablet  -- 1 tab(s) by mouth every 8 hours, As needed, Moderate Pain (4 - 6)  -- Indication: For Pain    dilTIAZem 180 mg/24 hours oral capsule, extended release  -- 1 cap(s) by mouth once a day  -- Indication: For Htn    heparin  -- 5000 unit(s) subcutaneous every 12 hours  -- Indication: For Dvt prophy    citalopram 20 mg oral tablet  -- 1 tab(s) by mouth once a day  -- Indication: For Depression    insulin lispro 100 units/mL subcutaneous solution  -- 2  subcutaneous 3 times a day (before meals) ; 1 Unit(s) if Glucose 151 - 200  2 Unit(s) if Glucose 201 - 250  3 Unit(s) if Glucose 251 - 300  4 Unit(s) if Glucose 301 - 350  5 Unit(s) if Glucose 351 - 400  6 Unit(s) if Glucose Greater Than 400  -- Indication: For Diabetes    insulin glargine  -- 40 unit(s) subcutaneous once a day (at bedtime)  -- Indication: For Diabetes    ondansetron 2 mg/mL injectable solution  -- 4 milligram(s) injectable every 6 hours, As Needed  -- Indication: For nausea    diphenhydrAMINE 25 mg oral capsule  -- 1 cap(s) by mouth once a day (at bedtime), As needed, Insomnia  -- Indication: For itching    atorvastatin 20 mg oral tablet  -- 1 tab(s) by mouth once a day (at bedtime)  -- Indication: For Hl    clopidogrel 75 mg oral tablet  -- 1 tab(s) by mouth once a day  -- Indication: For CAD (coronary artery disease)    ALPRAZolam 0.25 mg oral tablet  -- 1 tab(s) by mouth every 8 hours, As needed, anxiety  -- Indication: For Anxiety    Spiriva 18 mcg inhalation capsule  -- 1 cap(s) inhaled once a day (in the evening)  -- Indication: For Copd    Advair Diskus 250 mcg-50 mcg inhalation powder  -- 1 puff(s) inhaled 2 times a day  -- Indication: For Copd    senna oral tablet  -- 2 tab(s) by mouth once a day (at bedtime)  -- Indication: For Constipation    pantoprazole 40 mg oral delayed release tablet  -- 1 tab(s) by mouth once a day  -- Indication: For ulcer prophy    levothyroxine 125 mcg (0.125 mg) oral tablet  -- 1 tab(s) by mouth once a day  -- Indication: For Hypothyroidism    methenamine hippurate 1 g oral tablet  -- 1 tab(s) by mouth once a day (at bedtime)  -- Indication: For recurrent uti    multivitamin  --     -- Indication: For gen health    folic acid 1 mg oral tablet  -- 1 tab(s) by mouth once a day (in the morning)  -- Indication: For gen health

## 2017-09-29 NOTE — PROGRESS NOTE ADULT - ASSESSMENT
75 yo F s/p L IMN  Analgesia  DVT ppx  Incentive spirometry  50% weight bearing LLE  FU Labs  FU transfer to Step down per medicine   P/T  Discharge planning

## 2017-09-29 NOTE — PROGRESS NOTE ADULT - ASSESSMENT
Preoperative cardiac assesement- pt is euvolemic to slightly hypovolemic from  cardiac standpoint.  Currently NPO for anticipated surgery.  NSS 50cc/hr for total of 300cc.  Her last stress test was 1/2017 which showed small sized mild intensity fixed defect near apex which represents scar and priro MI.  Her last QIAN showed moderate MR with normal LV systolic function.    Patient is a low to moderate risk patient from cardiac standpoint for a moderate risk procedure with poor functional tolerance.  Patient can proceed with anticipated procedure without any further cardiac testing.  Patient is optimized from cardiac standpoint.    Close monitoring of the volume status periop.  Strict I/O and daily wt checks. Low sodium diet.   Hold diuretics today.    Fever -  On antibiotics now.  Management pre primary team.     Chronic diastolic heart failure- euvolemic to slightly hypovolemic.   Baseline creatinine averages around 2.    HTN- continue current meds    Hyperlipidemia- continue statin.    Other medical issues- Management per primary team.   Thank you for allowing me to participate in the care of this patient. Please feel free to contact me with any questions.

## 2017-09-29 NOTE — PROGRESS NOTE ADULT - ASSESSMENT
77 YO F with L hip IT fracture  Pain control  NWB LLE  NPO  IVF  Hold AC  Plan for OR today with Dr. Jacinto

## 2017-09-29 NOTE — DISCHARGE NOTE ADULT - CARE PROVIDER_API CALL
Brandon Jacinto (MD), Orthopaedic Surgery  [address] [place] [address]  Phone: [phone]  Fax: [phone] Brandon Jacinto), Orthopaedic Surgery  [address] [facility] [place] [address]  Phone: [phone]  Fax: [phone]    Soco Phelps), Internal Medicine  [address] [place] [address] [address]  [place] [address]  Phone: [phone]  Fax: [phone]    Kathie Marquez (JUAN), Cardiovascular Disease; Internal Medicine  [address] [place] [address]  Phone: [phone]  Fax: [phone]

## 2017-09-29 NOTE — DISCHARGE NOTE ADULT - PLAN OF CARE
Return To ADL's IM Nail DC Instructions:    1.	Resume previous diet, regular or diabetic as appropriate  2.	Weight Bearing as Tolerated with assistance and rolling walker  3.	Continue DVT/PE Prophylaxis. Lovenox SC***. See Med Rec for Duration and dose.  4.	PT daily  5.	Follow up with Orthopedic Surgeon Dr. Jacinto in 10-14 Days after Discharge from the Hospital. Call Office asap For Appointment.  6.	Staples to be removed Post-Op Day 14, provided wound is healed, no open areas or copious drainage.  7.	Ice the hip as much as possible  8.	Keep Aquacel bandage on Hip. Change only if wet or soiled using Tegaderm/Paper tape and dry gauze.  9.                OK to shower with Aquacel beige bandage, otherwise sponge bathe only. Will need assistance to shower.

## 2017-09-29 NOTE — DISCHARGE NOTE ADULT - CARE PLAN
Principal Discharge DX:	Closed hip fracture, left, initial encounter  Goal:	Return To ADL's  Instructions for follow-up, activity and diet:	IM Nail DC Instructions:    1.	Resume previous diet, regular or diabetic as appropriate  2.	Weight Bearing as Tolerated with assistance and rolling walker  3.	Continue DVT/PE Prophylaxis. Lovenox SC***. See Med Rec for Duration and dose.  4.	PT daily  5.	Follow up with Orthopedic Surgeon Dr. Jacinto in 10-14 Days after Discharge from the Hospital. Call Office asap For Appointment.  6.	Staples to be removed Post-Op Day 14, provided wound is healed, no open areas or copious drainage.  7.	Ice the hip as much as possible  8.	Keep Aquacel bandage on Hip. Change only if wet or soiled using Tegaderm/Paper tape and dry gauze.  9.                OK to shower with Aquacel beige bandage, otherwise sponge bathe only. Will need assistance to shower.

## 2017-09-29 NOTE — PROGRESS NOTE ADULT - ASSESSMENT
76 year old female with PMHx HTN, HLD, CAD, ADVANCED COPD ON HOME O2 (3LITERS), IDDM, CHRONIC DIASTOLIC HEART FAILURE, RECENT ADMISSIONS FOR HYPOXIC RESPIRATORY FAILURE STATES SHE WAS LEAVING [employer] [facility] EARLIER TODAY WITH HER BROTHER AND MISSTEPPED RESULTING IN A FALL.  SHE REPORTS HAVING NECK AND LOWER BACK PAIN, ALSO LEFT HIP PAIN.  PT DENIES ANY CHEST PAIN OR SHORTNESS OF BREATH, NO NAUSEA OR VOMITING. JUST RECEIVED DILAUDID AND FEELS SOMEWHAT BETTER. (27 Sep 2017 19:56)  lethargic due to pain meds, last dose last night  family at bedside  for OR in am    addendum:  spoke to pts nurse sol  5 pm  pt is more awake and alert  will d/c oxycodone 10 mg  cont oxy 5 mg and hydromorphone 0.5 mg    9/29  for OR later vvs  low grade temp  renal function stable

## 2017-09-29 NOTE — DISCHARGE NOTE ADULT - HOSPITAL COURSE
76 y/o femalew ith multiple comorbidities including COPD (O2 dependent), CHF, HTN, IDDM s/p fall resultin gin left intertrochanteric fracture.  Patient tolerated IM nailing procedure but had significant acute renal injury post procedure.  baseline CR is about 2.2  Patient is much improved,  Please see today's progress note for full details of HPI, plan of care and exam.

## 2017-09-29 NOTE — PROGRESS NOTE ADULT - PROBLEM SELECTOR PLAN 1
POST MECHANICAL FALL  FOR IM NAILING TODAY WITH DR HARPER  CARDIO AND PULM CLEARANCES APPRECIATED  NO SIGN OF PNA ON CXR  FEVERS SUBSIDING -- BLOOD CULTURES, URINE CULTURES DRAWN, STARTED ON ROCEPHIN EMPIRICALLY ?UTI? VIRAL  H/H STABLE THIS AM, INR AT 1.32 BUT D/W ORTHO AND OK  EKG AND TELE STABLE  ELECTROLYTES STABLE  PT WILL BE MOD TO HIGH RISK FOR SURGERY DUE TO HER COMORBID CONDITIONS.    THERE ARE NO ABSOLUTE MEDICAL CONTRAINDICATIONS TO PROPOSED SURGERY  KEEP NPO   BGMS WELL CONTROLLED   IF PATIENT BEGINS SPIKING TEMPS AGAIN WOULD WAIT UNTIL CULTURES RESULTED

## 2017-09-29 NOTE — CDI QUERY NOTE - NSCDIOTHERTXTBX_GEN_ALL_CORE_HH
1) Ckd baseline around 2, documented.  BUN= 70 > 71 > 70  Creat= 2.27 > 2.59 > 2.34 ,   GFR= 20 > 17 > 20  Please clarify the Stage of the CKD ?    2) Advanced COPD on home O2 documented. O2 sats 92-96% with supplemental O2  Please clarify if this is indicative of a further diagnosis ?  ie... Chronic respiratory failure ?  ....... Chronic hypoxic respiratory failure ?  ......  No clinical indication of respiratory failure ? Other ?

## 2017-09-30 DIAGNOSIS — E87.5 HYPERKALEMIA: ICD-10-CM

## 2017-09-30 DIAGNOSIS — N17.9 ACUTE KIDNEY FAILURE, UNSPECIFIED: ICD-10-CM

## 2017-09-30 DIAGNOSIS — Z98.890 OTHER SPECIFIED POSTPROCEDURAL STATES: ICD-10-CM

## 2017-09-30 LAB
ANION GAP SERPL CALC-SCNC: 10 MMOL/L — SIGNIFICANT CHANGE UP (ref 5–17)
ANION GAP SERPL CALC-SCNC: 12 MMOL/L — SIGNIFICANT CHANGE UP (ref 5–17)
BUN SERPL-MCNC: 90 MG/DL — HIGH (ref 7–23)
BUN SERPL-MCNC: 98 MG/DL — HIGH (ref 7–23)
CALCIUM SERPL-MCNC: 8.6 MG/DL — SIGNIFICANT CHANGE UP (ref 8.5–10.1)
CALCIUM SERPL-MCNC: 8.7 MG/DL — SIGNIFICANT CHANGE UP (ref 8.5–10.1)
CHLORIDE SERPL-SCNC: 109 MMOL/L — HIGH (ref 96–108)
CHLORIDE SERPL-SCNC: 110 MMOL/L — HIGH (ref 96–108)
CO2 SERPL-SCNC: 23 MMOL/L — SIGNIFICANT CHANGE UP (ref 22–31)
CO2 SERPL-SCNC: 25 MMOL/L — SIGNIFICANT CHANGE UP (ref 22–31)
CREAT SERPL-MCNC: 3.73 MG/DL — HIGH (ref 0.5–1.3)
CREAT SERPL-MCNC: 4.49 MG/DL — HIGH (ref 0.5–1.3)
GLUCOSE SERPL-MCNC: 204 MG/DL — HIGH (ref 70–99)
GLUCOSE SERPL-MCNC: 231 MG/DL — HIGH (ref 70–99)
HCT VFR BLD CALC: 25.6 % — LOW (ref 34.5–45)
HGB BLD-MCNC: 8.7 G/DL — LOW (ref 11.5–15.5)
MCHC RBC-ENTMCNC: 28.9 PG — SIGNIFICANT CHANGE UP (ref 27–34)
MCHC RBC-ENTMCNC: 33.8 GM/DL — SIGNIFICANT CHANGE UP (ref 32–36)
MCV RBC AUTO: 85.5 FL — SIGNIFICANT CHANGE UP (ref 80–100)
PLATELET # BLD AUTO: 173 K/UL — SIGNIFICANT CHANGE UP (ref 150–400)
POTASSIUM SERPL-MCNC: 5.4 MMOL/L — HIGH (ref 3.5–5.3)
POTASSIUM SERPL-MCNC: 5.5 MMOL/L — HIGH (ref 3.5–5.3)
POTASSIUM SERPL-SCNC: 5.4 MMOL/L — HIGH (ref 3.5–5.3)
POTASSIUM SERPL-SCNC: 5.5 MMOL/L — HIGH (ref 3.5–5.3)
RBC # BLD: 3 M/UL — LOW (ref 3.8–5.2)
RBC # FLD: 14.6 % — HIGH (ref 10.3–14.5)
SODIUM SERPL-SCNC: 144 MMOL/L — SIGNIFICANT CHANGE UP (ref 135–145)
SODIUM SERPL-SCNC: 145 MMOL/L — SIGNIFICANT CHANGE UP (ref 135–145)
WBC # BLD: 15.1 K/UL — HIGH (ref 3.8–10.5)
WBC # FLD AUTO: 15.1 K/UL — HIGH (ref 3.8–10.5)

## 2017-09-30 PROCEDURE — 71010: CPT | Mod: 26

## 2017-09-30 PROCEDURE — 99223 1ST HOSP IP/OBS HIGH 75: CPT

## 2017-09-30 RX ORDER — HYDROMORPHONE HYDROCHLORIDE 2 MG/ML
0.5 INJECTION INTRAMUSCULAR; INTRAVENOUS; SUBCUTANEOUS
Qty: 0 | Refills: 0 | Status: DISCONTINUED | OUTPATIENT
Start: 2017-09-30 | End: 2017-10-07

## 2017-09-30 RX ORDER — SODIUM CHLORIDE 9 MG/ML
1000 INJECTION INTRAMUSCULAR; INTRAVENOUS; SUBCUTANEOUS
Qty: 0 | Refills: 0 | Status: DISCONTINUED | OUTPATIENT
Start: 2017-09-30 | End: 2017-10-01

## 2017-09-30 RX ORDER — IPRATROPIUM/ALBUTEROL SULFATE 18-103MCG
3 AEROSOL WITH ADAPTER (GRAM) INHALATION EVERY 6 HOURS
Qty: 0 | Refills: 0 | Status: DISCONTINUED | OUTPATIENT
Start: 2017-09-30 | End: 2017-10-06

## 2017-09-30 RX ORDER — FUROSEMIDE 40 MG
100 TABLET ORAL ONCE
Qty: 0 | Refills: 0 | Status: COMPLETED | OUTPATIENT
Start: 2017-09-30 | End: 2017-09-30

## 2017-09-30 RX ORDER — FUROSEMIDE 40 MG
60 TABLET ORAL ONCE
Qty: 0 | Refills: 0 | Status: COMPLETED | OUTPATIENT
Start: 2017-09-30 | End: 2017-09-30

## 2017-09-30 RX ADMIN — Medication 60 MILLIGRAM(S): at 16:27

## 2017-09-30 RX ADMIN — HEPARIN SODIUM 5000 UNIT(S): 5000 INJECTION INTRAVENOUS; SUBCUTANEOUS at 06:26

## 2017-09-30 RX ADMIN — Medication 60 MILLIGRAM(S): at 21:55

## 2017-09-30 RX ADMIN — HYDROMORPHONE HYDROCHLORIDE 0.5 MILLIGRAM(S): 2 INJECTION INTRAMUSCULAR; INTRAVENOUS; SUBCUTANEOUS at 18:33

## 2017-09-30 RX ADMIN — HEPARIN SODIUM 5000 UNIT(S): 5000 INJECTION INTRAVENOUS; SUBCUTANEOUS at 18:33

## 2017-09-30 RX ADMIN — Medication 3 MILLILITER(S): at 14:04

## 2017-09-30 RX ADMIN — Medication 2: at 13:51

## 2017-09-30 RX ADMIN — HYDROMORPHONE HYDROCHLORIDE 0.5 MILLIGRAM(S): 2 INJECTION INTRAMUSCULAR; INTRAVENOUS; SUBCUTANEOUS at 06:08

## 2017-09-30 RX ADMIN — CEFTRIAXONE 100 GRAM(S): 500 INJECTION, POWDER, FOR SOLUTION INTRAMUSCULAR; INTRAVENOUS at 22:12

## 2017-09-30 RX ADMIN — Medication 3: at 18:53

## 2017-09-30 RX ADMIN — Medication 3 MILLILITER(S): at 20:11

## 2017-09-30 NOTE — PROGRESS NOTE ADULT - ASSESSMENT
75 yo F s/p L IMN POD 1  Analgesia  DVT ppx  Incentive spirometry  50% Weight Bearing LLE   Continue medical management in Step down per primary team   P/T  Dispo Planning

## 2017-09-30 NOTE — PROGRESS NOTE ADULT - ASSESSMENT
76 year old female with PMHx HTN, HLD, CAD, ADVANCED COPD ON HOME O2 (3LITERS), IDDM, CHRONIC DIASTOLIC HEART FAILURE, RECENT ADMISSIONS FOR HYPOXIC RESPIRATORY FAILURE STATES SHE WAS LEAVING [employer] [facility] EARLIER TODAY WITH HER BROTHER AND MISSTEPPED RESULTING IN A FALL.  SHE REPORTS HAVING NECK AND LOWER BACK PAIN, ALSO LEFT HIP PAIN.  PT DENIES ANY CHEST PAIN OR SHORTNESS OF BREATH, NO NAUSEA OR VOMITING. JUST RECEIVED DILAUDID AND FEELS SOMEWHAT BETTER. (27 Sep 2017 19:56)  lethargic due to pain meds, last dose last night  family at bedside  for OR in am    addendum:  spoke to pts nurse sol  5 pm  pt is more awake and alert  will d/c oxycodone 10 mg  cont oxy 5 mg and hydromorphone 0.5 mg    9/29  for OR later vvs  low grade temp  renal function stable    9/30  discussed w daughter in law  creatinine climbing  pt w altered mental state due to pain meds anesthesia  has h/o intolerance to narcotics as per daughter  no po intake  on ivf 50 ml/hr, increased to 75 ml/hr  will get cxr due to h/o chf  Sánchez in place, purulent, urine c/s neg due to abx received prior to cxs  may need iv diuretic prn if uo does not  after adequate hydration

## 2017-09-30 NOTE — PROGRESS NOTE ADULT - PROBLEM SELECTOR PLAN 1
POD#1  TOLERATED PROCEDURE WELL  CONFUSION LIKELY FROM ANESTHESIA AND PAIN MEDS  INCENTIVE SPIROMETRY  KEEP ON STEPDOWN FOR CLOSER MONITORING  PHYSICAL THERAPY ONCE MORE AWAKE  MONITOR UOP - HAD TO STRAIGHT CATH A FEW TIMES, MAY NEED TO PLACE SAEED FOR BETTER UOP MONITORING  LEUKOCYTOSIS - STRESS RESPONSE WITH POSSIBLE INFECTION; UTI? UNCLEAR AS INITIAL URINE CULTURE WAS NEGATIVE

## 2017-09-30 NOTE — PROGRESS NOTE ADULT - ASSESSMENT
Preoperative cardiac assesemen    t- pt is euvolemic from  cardiac standpoint.  Her last stress test was 1/2017 which showed small sized mild intensity fixed defect near apex which represents scar and priro MI.  Her last QIAN showed moderate MR with normal LV systolic function.    Pt. is s/p hip repair and is doing well postoperatively except for some lethargy from pain med    Close monitoring of the volume status periop.  Strict I/O and daily wt checks. Low sodium diet.   Hold diuretics today.    Chronic diastolic heart failure- euvolemic.  Baseline creatinine averages around 2.    HTN- continue current meds    Hyperlipidemia- continue statin.    Other medical issues- Management per primary team.   Thank you for allowing me to participate in the care of this patient. Please feel free to contact me with any questions.

## 2017-09-30 NOTE — CONSULT NOTE ADULT - ASSESSMENT
This is a 76 year old female s/p fall causing fx of her left hip.  Now s/p Left hip IM nail.  Pt has high thrombosis risk and requires anticoagulation prophylaxis.    :heparin 5,000 units sq q12h will increase to q8h once pt is stable  :daily cbc/bmp  :le venodynes  :increase mobility as tolerated.  :thanks for consult will f/u

## 2017-09-30 NOTE — CONSULT NOTE ADULT - SUBJECTIVE AND OBJECTIVE BOX
HPI:  76 year old female with PMHx HTN, HLD, CAD, ADVANCED COPD ON HOME O2 (3LITERS), IDDM, CHRONIC DIASTOLIC HEART FAILURE, RECENT ADMISSIONS FOR HYPOXIC RESPIRATORY FAILURE STATES SHE WAS LEAVING [facility] EARLIER TODAY WITH HER BROTHER AND MISSTEPPED RESULTING IN A FALL.  SHE REPORTS HAVING NECK AND LOWER BACK PAIN, ALSO LEFT HIP PAIN.  PT DENIES ANY CHEST PAIN OR SHORTNESS OF BREATH, NO NAUSEA OR VOMITING. JUST RECEIVED DILAUDID AND FEELS SOMEWHAT BETTER. (27 Sep 2017 19:56)      Patient is a 76y old  Female who presents with a chief complaint of Left Hip Fracture (29 Sep 2017 20:21)  s/p IMN ON 17    Consulted by Dr. Brandon Jacinto for VTE prophylaxis, risk stratification, and anticoagulation management.    PAST MEDICAL & SURGICAL HISTORY:  Chronic diastolic congestive heart failure  Hyperlipidemia, unspecified hyperlipidemia type  Essential hypertension  Neuropathy  Diabetes  Chronic obstructive pulmonary disease, unspecified COPD type  History of total knee replacement, unspecified laterality  H/O hernia repair   delivery delivered  S/P appendectomy    Caprini VTE Risk Score: 5 HIGH    IMPROVE Bleeding Risk Score: 6.5 HIGH    Falls Risk:   High ( X )  Mod (  )  Low (  )    EBL:   200ml  CR CL: 16.35  17 PT SEEN AT BEDSIDE IN sd.  pT IS NON VERBAL. EYES OPEN, Moving arms and rt leg.  Vital Signs Last 24 Hrs  T(C): 36.2 (30 Sep 2017 08:50), Max: 37.8 (29 Sep 2017 23:39)  T(F): 97.1 (30 Sep 2017 08:50), Max: 100.1 (29 Sep 2017 23:39)  HR: 91 (30 Sep 2017 13:00) (75 - 91)  BP: 149/38 (30 Sep 2017 13:00) (115/33 - 149/38)  BP(mean): 64 (30 Sep 2017 13:00) (53 - 77)  RR: 16 (30 Sep 2017 13:00) (9 - 20)  SpO2: 91% (30 Sep 2017 13:00) (91% - 100%)  FAMILY HISTORY:  Family history of CHF (congestive heart failure) (Mother)    Denies any personal or familial history of clotting or bleeding disorders.    Allergies    Bactrim (Other)  ciprofloxacin (Other (Mild))  clindamycin (Unknown)  ibuprofen (Unknown)  penicillins (Other)  sulfa drugs (Unknown)    Intolerances    REVIEW OF SYSTEMS    (  )Fever	     (  )Constipation	(  )SOB				(  )Headache	(  )Dysuria  (  )Chills	     (  )Melena	(  )Dyspnea present on exertion	                    (  )Dizziness                    (  )Polyuria  (  )Nausea	     (  )Hematochezia	(  )Cough			                    (  )Syncope   	(  )Hematuria  (  )Vomiting    (  )Chest Pain	(  )Wheezing			(  )Weakness  (  )Diarrhea     (  )Palpitations	(  )Anorexia			(  )Myalgia    Unable to obtain review of systems due to: pt is somulent      PHYSICAL EXAM:    Constitutional: Appears Well    Neurological: non verbal    Skin: Warm    Respiratory and Thorax: normal effort; Breath sounds: normal; No rales/wheezing/rhonchi  	  Cardiovascular: S1, S2, regular, NMBR	    Gastrointestinal: BS + x 4Q, nontender	    Genitourinary:  Bladder nondistended, nontender    Musculoskeletal:   General Right:   no muscle/joint tenderness,   normal tone, no joint swelling,   ROM: full	    General Left:   no muscle/joint tenderness,   normal tone, no joint swelling,   ROM: limitedl    Hip: Left: Dressing CDI;    Lower extrems:   Right: no calf tenderness              negative gamal's sign               + pedal pulses    Left:   no calf tenderness              negative gamal's sign               + pedal pulses                          8.7    15.1  )-----------( 173      ( 30 Sep 2017 05:30 )             25.6       09-30    145  |  110<H>  |  98<H>  ----------------------------<  231<H>  5.5<H>   |  23  |  4.49<H>    Ca    8.7      30 Sep 2017 13:31        PT/INR - ( 29 Sep 2017 20:30 )   PT: 15.0 sec;   INR: 1.38 ratio         PTT - ( 29 Sep 2017 06:08 )  PTT:29.6 sec				    MEDICATIONS  (STANDING):  influenza   Vaccine 0.5 milliLiter(s) IntraMuscular once  pantoprazole    Tablet 40 milliGRAM(s) Oral daily  polyethylene glycol 3350 17 Gram(s) Oral daily  senna 2 Tablet(s) Oral at bedtime  docusate sodium 100 milliGRAM(s) Oral three times a day  diltiazem    milliGRAM(s) Oral daily  citalopram 10 milliGRAM(s) Oral daily  atorvastatin 20 milliGRAM(s) Oral at bedtime  levothyroxine 112 MICROGram(s) Oral daily  insulin glargine Injectable (LANTUS) 22 Unit(s) SubCutaneous at bedtime  insulin lispro (HumaLOG) corrective regimen sliding scale   SubCutaneous three times a day before meals  dextrose 50% Injectable 12.5 Gram(s) IV Push once  dextrose 50% Injectable 25 Gram(s) IV Push once  dextrose 50% Injectable 25 Gram(s) IV Push once  ALBUTerol    90 MICROgram(s) HFA Inhaler 1 Puff(s) Inhalation every 4 hours  cefTRIAXone   IVPB 1 Gram(s) IV Intermittent every 24 hours  heparin  Injectable 5000 Unit(s) SubCutaneous every 12 hours  sodium chloride 0.9%. 1000 milliLiter(s) IV Continuous <Continuous>  ALBUTerol/ipratropium for Nebulization 3 milliLiter(s) Nebulizer every 6 hours          DVT Prophylaxis:  LMWH                   (  )  Heparin SQ           ( x )  Coumadin             (  )  Xarelto                  (  )  Eliquis                   (  )  Venodynes           ( x )  Ambulation          (x  )  UFH                       (  )  Contraindicated  (  )

## 2017-10-01 DIAGNOSIS — R47.01 APHASIA: ICD-10-CM

## 2017-10-01 LAB
ANION GAP SERPL CALC-SCNC: 8 MMOL/L — SIGNIFICANT CHANGE UP (ref 5–17)
ANION GAP SERPL CALC-SCNC: 8 MMOL/L — SIGNIFICANT CHANGE UP (ref 5–17)
BLD GP AB SCN SERPL QL: SIGNIFICANT CHANGE UP
BUN SERPL-MCNC: 105 MG/DL — HIGH (ref 7–23)
BUN SERPL-MCNC: 113 MG/DL — HIGH (ref 7–23)
CALCIUM SERPL-MCNC: 8.4 MG/DL — LOW (ref 8.5–10.1)
CALCIUM SERPL-MCNC: 8.6 MG/DL — SIGNIFICANT CHANGE UP (ref 8.5–10.1)
CHLORIDE SERPL-SCNC: 110 MMOL/L — HIGH (ref 96–108)
CHLORIDE SERPL-SCNC: 115 MMOL/L — HIGH (ref 96–108)
CO2 SERPL-SCNC: 23 MMOL/L — SIGNIFICANT CHANGE UP (ref 22–31)
CO2 SERPL-SCNC: 27 MMOL/L — SIGNIFICANT CHANGE UP (ref 22–31)
CREAT SERPL-MCNC: 5.44 MG/DL — HIGH (ref 0.5–1.3)
CREAT SERPL-MCNC: 5.68 MG/DL — HIGH (ref 0.5–1.3)
GLUCOSE SERPL-MCNC: 217 MG/DL — HIGH (ref 70–99)
GLUCOSE SERPL-MCNC: 262 MG/DL — HIGH (ref 70–99)
HCT VFR BLD CALC: 23.1 % — LOW (ref 34.5–45)
HGB BLD-MCNC: 7.5 G/DL — LOW (ref 11.5–15.5)
MCHC RBC-ENTMCNC: 28.1 PG — SIGNIFICANT CHANGE UP (ref 27–34)
MCHC RBC-ENTMCNC: 32.7 GM/DL — SIGNIFICANT CHANGE UP (ref 32–36)
MCV RBC AUTO: 85.8 FL — SIGNIFICANT CHANGE UP (ref 80–100)
PLATELET # BLD AUTO: 175 K/UL — SIGNIFICANT CHANGE UP (ref 150–400)
POTASSIUM SERPL-MCNC: 5.4 MMOL/L — HIGH (ref 3.5–5.3)
POTASSIUM SERPL-MCNC: 5.5 MMOL/L — HIGH (ref 3.5–5.3)
POTASSIUM SERPL-SCNC: 5.4 MMOL/L — HIGH (ref 3.5–5.3)
POTASSIUM SERPL-SCNC: 5.5 MMOL/L — HIGH (ref 3.5–5.3)
RBC # BLD: 2.69 M/UL — LOW (ref 3.8–5.2)
RBC # FLD: 14.6 % — HIGH (ref 10.3–14.5)
SODIUM SERPL-SCNC: 145 MMOL/L — SIGNIFICANT CHANGE UP (ref 135–145)
SODIUM SERPL-SCNC: 146 MMOL/L — HIGH (ref 135–145)
TYPE + AB SCN PNL BLD: SIGNIFICANT CHANGE UP
WBC # BLD: 11.5 K/UL — HIGH (ref 3.8–10.5)
WBC # FLD AUTO: 11.5 K/UL — HIGH (ref 3.8–10.5)

## 2017-10-01 PROCEDURE — 70450 CT HEAD/BRAIN W/O DYE: CPT | Mod: 26

## 2017-10-01 PROCEDURE — 71010: CPT | Mod: 26

## 2017-10-01 RX ORDER — SODIUM CHLORIDE 9 MG/ML
500 INJECTION INTRAMUSCULAR; INTRAVENOUS; SUBCUTANEOUS ONCE
Qty: 0 | Refills: 0 | Status: COMPLETED | OUTPATIENT
Start: 2017-10-01 | End: 2017-10-01

## 2017-10-01 RX ORDER — INSULIN GLARGINE 100 [IU]/ML
28 INJECTION, SOLUTION SUBCUTANEOUS AT BEDTIME
Qty: 0 | Refills: 0 | Status: DISCONTINUED | OUTPATIENT
Start: 2017-10-01 | End: 2017-10-02

## 2017-10-01 RX ORDER — SODIUM CHLORIDE 9 MG/ML
1000 INJECTION INTRAMUSCULAR; INTRAVENOUS; SUBCUTANEOUS
Qty: 0 | Refills: 0 | Status: DISCONTINUED | OUTPATIENT
Start: 2017-10-01 | End: 2017-10-02

## 2017-10-01 RX ORDER — FUROSEMIDE 40 MG
60 TABLET ORAL ONCE
Qty: 0 | Refills: 0 | Status: COMPLETED | OUTPATIENT
Start: 2017-10-01 | End: 2017-10-01

## 2017-10-01 RX ORDER — LEVOTHYROXINE SODIUM 125 MCG
60 TABLET ORAL DAILY
Qty: 0 | Refills: 0 | Status: DISCONTINUED | OUTPATIENT
Start: 2017-10-01 | End: 2017-10-08

## 2017-10-01 RX ADMIN — HYDROMORPHONE HYDROCHLORIDE 0.5 MILLIGRAM(S): 2 INJECTION INTRAMUSCULAR; INTRAVENOUS; SUBCUTANEOUS at 13:30

## 2017-10-01 RX ADMIN — SODIUM CHLORIDE 500 MILLILITER(S): 9 INJECTION INTRAMUSCULAR; INTRAVENOUS; SUBCUTANEOUS at 09:08

## 2017-10-01 RX ADMIN — Medication 3 MILLILITER(S): at 01:18

## 2017-10-01 RX ADMIN — Medication 3: at 07:47

## 2017-10-01 RX ADMIN — Medication 60 MILLIGRAM(S): at 18:19

## 2017-10-01 RX ADMIN — Medication 3 MILLILITER(S): at 20:48

## 2017-10-01 RX ADMIN — Medication 60 MICROGRAM(S): at 12:28

## 2017-10-01 RX ADMIN — Medication 3 MILLILITER(S): at 10:20

## 2017-10-01 RX ADMIN — HYDROMORPHONE HYDROCHLORIDE 0.5 MILLIGRAM(S): 2 INJECTION INTRAMUSCULAR; INTRAVENOUS; SUBCUTANEOUS at 13:07

## 2017-10-01 RX ADMIN — HYDROMORPHONE HYDROCHLORIDE 0.5 MILLIGRAM(S): 2 INJECTION INTRAMUSCULAR; INTRAVENOUS; SUBCUTANEOUS at 04:27

## 2017-10-01 RX ADMIN — Medication 2: at 12:27

## 2017-10-01 RX ADMIN — INSULIN GLARGINE 28 UNIT(S): 100 INJECTION, SOLUTION SUBCUTANEOUS at 22:30

## 2017-10-01 RX ADMIN — CEFTRIAXONE 100 GRAM(S): 500 INJECTION, POWDER, FOR SOLUTION INTRAMUSCULAR; INTRAVENOUS at 21:00

## 2017-10-01 RX ADMIN — SODIUM CHLORIDE 125 MILLILITER(S): 9 INJECTION INTRAMUSCULAR; INTRAVENOUS; SUBCUTANEOUS at 09:09

## 2017-10-01 RX ADMIN — Medication 3 MILLILITER(S): at 15:48

## 2017-10-01 RX ADMIN — HYDROMORPHONE HYDROCHLORIDE 0.5 MILLIGRAM(S): 2 INJECTION INTRAMUSCULAR; INTRAVENOUS; SUBCUTANEOUS at 20:00

## 2017-10-01 RX ADMIN — HEPARIN SODIUM 5000 UNIT(S): 5000 INJECTION INTRAVENOUS; SUBCUTANEOUS at 06:44

## 2017-10-01 RX ADMIN — HYDROMORPHONE HYDROCHLORIDE 0.5 MILLIGRAM(S): 2 INJECTION INTRAMUSCULAR; INTRAVENOUS; SUBCUTANEOUS at 19:50

## 2017-10-01 RX ADMIN — HEPARIN SODIUM 5000 UNIT(S): 5000 INJECTION INTRAVENOUS; SUBCUTANEOUS at 17:27

## 2017-10-01 RX ADMIN — HYDROMORPHONE HYDROCHLORIDE 0.5 MILLIGRAM(S): 2 INJECTION INTRAMUSCULAR; INTRAVENOUS; SUBCUTANEOUS at 05:00

## 2017-10-01 RX ADMIN — Medication 2: at 17:26

## 2017-10-01 RX ADMIN — INSULIN GLARGINE 22 UNIT(S): 100 INJECTION, SOLUTION SUBCUTANEOUS at 00:05

## 2017-10-01 NOTE — PROGRESS NOTE ADULT - ASSESSMENT
76 year old female with PMHx HTN, HLD, CAD, ADVANCED COPD ON HOME O2 (3LITERS), IDDM, CHRONIC DIASTOLIC HEART FAILURE, RECENT ADMISSIONS FOR HYPOXIC RESPIRATORY FAILURE STATES SHE WAS LEAVING [facility] EARLIER TODAY WITH HER BROTHER AND MISSTEPPED RESULTING IN A FALL.  SHE REPORTS HAVING NECK AND LOWER BACK PAIN, ALSO LEFT HIP PAIN.  PT DENIES ANY CHEST PAIN OR SHORTNESS OF BREATH, NO NAUSEA OR VOMITING. JUST RECEIVED DILAUDID AND FEELS SOMEWHAT BETTER. (27 Sep 2017 19:56)  lethargic due to pain meds, last dose last night  family at bedside  for OR in am    addendum:  spoke to pts nurse sol  5 pm  pt is more awake and alert  will d/c oxycodone 10 mg  cont oxy 5 mg and hydromorphone 0.5 mg    9/29  for OR later vvs  low grade temp  renal function stable    9/30  discussed w daughter in law  creatinine climbing  pt w altered mental state due to pain meds anesthesia  has h/o intolerance to narcotics as per daughter  no po intake  on ivf 50 ml/hr, increased to 75 ml/hr  will get cxr due to h/o chf  Sharma in place, purulent, urine c/s neg due to abx received prior to cxs  may need iv diuretic prn if uo does not  after adequate hydration    10/1  more awake alert, although still lethargic  VVS  got 60 then 100 lasix IV yesterday  was anuric after sharma placement (300 ml residual) on 9/30  UO last night increased to 150 ml night shift, now 20-45 ml/hr  IV bolus 500 ml over an hour given  now on 125 ml/hr  hgb down to 7.5, may be due to dilution + some blood loss in wound  case d/w Dr Phelps and pts family  check cxr to r/o CHF

## 2017-10-01 NOTE — PROGRESS NOTE ADULT - PROBLEM SELECTOR PLAN 1
POD#2  TOLERATED PROCEDURE WELL BUT POST OP COMPLICATED BY ANI  CONFUSION LIKELY FROM ANESTHESIA AND PAIN MEDS VERSUS UREMIA  KEEP ON STEPDOWN FOR CLOSER MONITORING  LEUKOCYTOSIS - IMPROVING ON ROCEPHIN; CLOUDY URINE, REPEAT URINE CULTURE SENT YEST.

## 2017-10-01 NOTE — PHYSICAL THERAPY INITIAL EVALUATION ADULT - IMPAIRMENTS FOUND, PT EVAL
aerobic capacity/endurance/muscle strength/gait, locomotion, and balance/ROM/arousal, attention, and cognition/cognitive impairment/gross motor

## 2017-10-01 NOTE — PHYSICAL THERAPY INITIAL EVALUATION ADULT - GENERAL OBSERVATIONS, REHAB EVAL
Patient received in bed in SDU, 2 daughters at bedside.  Patient with IV, 4L O2 via nc, Flowtrons, IV in use.  Out of bed held today as patient with low H/H (7.5/23.1) and lethargic

## 2017-10-01 NOTE — PROGRESS NOTE ADULT - ASSESSMENT
75 yo F s/p L IMN POD 2  Analgesia  DVT ppx  Incentive spirometry  50% Weight Bearing LLE   Continue medical management in Step down per primary team   P/T  Dispo Planning

## 2017-10-01 NOTE — PHYSICAL THERAPY INITIAL EVALUATION ADULT - ADDITIONAL COMMENTS
Community ambulator.  Daughter reported low endurance due to COPD on home O2 3L.  All hx from family as patient non verbal.  Baseline mentation is A and O x4.

## 2017-10-01 NOTE — PHYSICAL THERAPY INITIAL EVALUATION ADULT - PASSIVE RANGE OF MOTION EXAMINATION, REHAB EVAL
B LE: difficult to assess as patient not allowing knee or hip flexion due to non-verbal signs of pain./bilateral upper extremity Passive ROM was WNL (within normal limits)

## 2017-10-02 LAB
ANION GAP SERPL CALC-SCNC: 11 MMOL/L — SIGNIFICANT CHANGE UP (ref 5–17)
ANION GAP SERPL CALC-SCNC: 9 MMOL/L — SIGNIFICANT CHANGE UP (ref 5–17)
BUN SERPL-MCNC: 112 MG/DL — HIGH (ref 7–23)
BUN SERPL-MCNC: 112 MG/DL — HIGH (ref 7–23)
CALCIUM SERPL-MCNC: 8.8 MG/DL — SIGNIFICANT CHANGE UP (ref 8.5–10.1)
CALCIUM SERPL-MCNC: 8.9 MG/DL — SIGNIFICANT CHANGE UP (ref 8.5–10.1)
CHLORIDE SERPL-SCNC: 116 MMOL/L — HIGH (ref 96–108)
CHLORIDE SERPL-SCNC: 116 MMOL/L — HIGH (ref 96–108)
CO2 SERPL-SCNC: 21 MMOL/L — LOW (ref 22–31)
CO2 SERPL-SCNC: 24 MMOL/L — SIGNIFICANT CHANGE UP (ref 22–31)
CREAT SERPL-MCNC: 5.04 MG/DL — HIGH (ref 0.5–1.3)
CREAT SERPL-MCNC: 5.14 MG/DL — HIGH (ref 0.5–1.3)
GLUCOSE SERPL-MCNC: 221 MG/DL — HIGH (ref 70–99)
GLUCOSE SERPL-MCNC: 233 MG/DL — HIGH (ref 70–99)
HCT VFR BLD CALC: 23.8 % — LOW (ref 34.5–45)
HGB BLD-MCNC: 7.8 G/DL — LOW (ref 11.5–15.5)
MCHC RBC-ENTMCNC: 28.7 PG — SIGNIFICANT CHANGE UP (ref 27–34)
MCHC RBC-ENTMCNC: 32.8 GM/DL — SIGNIFICANT CHANGE UP (ref 32–36)
MCV RBC AUTO: 87.6 FL — SIGNIFICANT CHANGE UP (ref 80–100)
PLATELET # BLD AUTO: 169 K/UL — SIGNIFICANT CHANGE UP (ref 150–400)
POTASSIUM SERPL-MCNC: 5 MMOL/L — SIGNIFICANT CHANGE UP (ref 3.5–5.3)
POTASSIUM SERPL-MCNC: 5.5 MMOL/L — HIGH (ref 3.5–5.3)
POTASSIUM SERPL-SCNC: 5 MMOL/L — SIGNIFICANT CHANGE UP (ref 3.5–5.3)
POTASSIUM SERPL-SCNC: 5.5 MMOL/L — HIGH (ref 3.5–5.3)
RBC # BLD: 2.72 M/UL — LOW (ref 3.8–5.2)
RBC # FLD: 14.6 % — HIGH (ref 10.3–14.5)
SODIUM SERPL-SCNC: 148 MMOL/L — HIGH (ref 135–145)
SODIUM SERPL-SCNC: 149 MMOL/L — HIGH (ref 135–145)
WBC # BLD: 7.9 K/UL — SIGNIFICANT CHANGE UP (ref 3.8–10.5)
WBC # FLD AUTO: 7.9 K/UL — SIGNIFICANT CHANGE UP (ref 3.8–10.5)

## 2017-10-02 PROCEDURE — 99233 SBSQ HOSP IP/OBS HIGH 50: CPT

## 2017-10-02 PROCEDURE — 71010: CPT | Mod: 26

## 2017-10-02 RX ORDER — SODIUM CHLORIDE 9 MG/ML
1000 INJECTION, SOLUTION INTRAVENOUS
Qty: 0 | Refills: 0 | Status: DISCONTINUED | OUTPATIENT
Start: 2017-10-02 | End: 2017-10-03

## 2017-10-02 RX ORDER — INSULIN GLARGINE 100 [IU]/ML
34 INJECTION, SOLUTION SUBCUTANEOUS AT BEDTIME
Qty: 0 | Refills: 0 | Status: DISCONTINUED | OUTPATIENT
Start: 2017-10-02 | End: 2017-10-04

## 2017-10-02 RX ORDER — FUROSEMIDE 40 MG
60 TABLET ORAL ONCE
Qty: 0 | Refills: 0 | Status: COMPLETED | OUTPATIENT
Start: 2017-10-02 | End: 2017-10-02

## 2017-10-02 RX ADMIN — HYDROMORPHONE HYDROCHLORIDE 0.5 MILLIGRAM(S): 2 INJECTION INTRAMUSCULAR; INTRAVENOUS; SUBCUTANEOUS at 10:53

## 2017-10-02 RX ADMIN — HYDROMORPHONE HYDROCHLORIDE 0.5 MILLIGRAM(S): 2 INJECTION INTRAMUSCULAR; INTRAVENOUS; SUBCUTANEOUS at 17:17

## 2017-10-02 RX ADMIN — Medication 2: at 08:13

## 2017-10-02 RX ADMIN — HEPARIN SODIUM 5000 UNIT(S): 5000 INJECTION INTRAVENOUS; SUBCUTANEOUS at 05:45

## 2017-10-02 RX ADMIN — CITALOPRAM 10 MILLIGRAM(S): 10 TABLET, FILM COATED ORAL at 12:36

## 2017-10-02 RX ADMIN — HYDROMORPHONE HYDROCHLORIDE 0.5 MILLIGRAM(S): 2 INJECTION INTRAMUSCULAR; INTRAVENOUS; SUBCUTANEOUS at 00:00

## 2017-10-02 RX ADMIN — INSULIN GLARGINE 34 UNIT(S): 100 INJECTION, SOLUTION SUBCUTANEOUS at 21:55

## 2017-10-02 RX ADMIN — Medication 3 MILLILITER(S): at 14:00

## 2017-10-02 RX ADMIN — HYDROMORPHONE HYDROCHLORIDE 0.5 MILLIGRAM(S): 2 INJECTION INTRAMUSCULAR; INTRAVENOUS; SUBCUTANEOUS at 21:54

## 2017-10-02 RX ADMIN — SODIUM CHLORIDE 50 MILLILITER(S): 9 INJECTION, SOLUTION INTRAVENOUS at 07:57

## 2017-10-02 RX ADMIN — Medication 3: at 17:19

## 2017-10-02 RX ADMIN — Medication 3 MILLILITER(S): at 07:39

## 2017-10-02 RX ADMIN — Medication 60 MILLIGRAM(S): at 08:11

## 2017-10-02 RX ADMIN — Medication 60 MICROGRAM(S): at 06:19

## 2017-10-02 RX ADMIN — HYDROMORPHONE HYDROCHLORIDE 0.5 MILLIGRAM(S): 2 INJECTION INTRAMUSCULAR; INTRAVENOUS; SUBCUTANEOUS at 22:10

## 2017-10-02 RX ADMIN — Medication 2: at 12:36

## 2017-10-02 RX ADMIN — HEPARIN SODIUM 5000 UNIT(S): 5000 INJECTION INTRAVENOUS; SUBCUTANEOUS at 17:18

## 2017-10-02 RX ADMIN — CEFTRIAXONE 100 GRAM(S): 500 INJECTION, POWDER, FOR SOLUTION INTRAMUSCULAR; INTRAVENOUS at 21:53

## 2017-10-02 NOTE — PROGRESS NOTE ADULT - PROBLEM SELECTOR PLAN 1
POD#3  TOLERATED PROCEDURE WELL BUT POST OP COMPLICATED BY ANI  CONFUSION LIKELY FROM ANESTHESIA AND PAIN MEDS VERSUS UREMIA  KEEP ON STEPDOWN FOR CLOSER MONITORING  LEUKOCYTOSIS - IMPROVING ON ROCEPHIN; CLOUDY URINE, REPEAT URINE CULTURE SENT YEST. NOR NORMAL WBC TODAY

## 2017-10-02 NOTE — PROGRESS NOTE ADULT - ASSESSMENT
Chronic diastolic heart failure now complicated by ANI postop L hip surgery-  Nephrology team in put appreciated.  Close monitoring of the volume status is recommended.  The oversigns of fluid overload she tends to develop are hypoxia and calf edema without ankle edema.  Please monitor her for these signs.  Now the family could not bring her pulmonary pressure monitor pillow to hospital. At this time she was even not able to move with her hip pain.    Anemia- postop.  Please maintain Hgb 9 with her prior MI and CAD history.    HTN- continue current meds    Hyperlipidemia- continue statin.    Other medical issues- Management per primary team.   Thank you for allowing me to participate in the care of this patient. Please feel free to contact me with any questions.

## 2017-10-02 NOTE — PROGRESS NOTE ADULT - ASSESSMENT
76 year old female with PMHx HTN, HLD, CAD, ADVANCED COPD ON HOME O2 (3LITERS), IDDM, CHRONIC DIASTOLIC HEART FAILURE, RECENT ADMISSIONS FOR HYPOXIC RESPIRATORY FAILURE STATES SHE WAS LEAVING [facility] EARLIER TODAY WITH HER BROTHER AND MISSTEPPED RESULTING IN A FALL.  SHE REPORTS HAVING NECK AND LOWER BACK PAIN, ALSO LEFT HIP PAIN.  PT DENIES ANY CHEST PAIN OR SHORTNESS OF BREATH, NO NAUSEA OR VOMITING. JUST RECEIVED DILAUDID AND FEELS SOMEWHAT BETTER. (27 Sep 2017 19:56)  lethargic due to pain meds, last dose last night  family at bedside  for OR in am    addendum:  spoke to pts nurse sol  5 pm  pt is more awake and alert  will d/c oxycodone 10 mg  cont oxy 5 mg and hydromorphone 0.5 mg    9/29  for OR later vvs  low grade temp  renal function stable    9/30  discussed w daughter in law  creatinine climbing  pt w altered mental state due to pain meds anesthesia  has h/o intolerance to narcotics as per daughter  no po intake  on ivf 50 ml/hr, increased to 75 ml/hr  will get cxr due to h/o chf  Sharma in place, purulent, urine c/s neg due to abx received prior to cxs  may need iv diuretic prn if uo does not  after adequate hydration    10/1  more awake alert, although still lethargic  VVS  got 60 then 100 lasix IV yesterday  was anuric after sharma placement (300 ml residual) on 9/30  UO last night increased to 150 ml night shift, now 20-45 ml/hr  IV bolus 500 ml over an hour given  now on 125 ml/hr  hgb down to 7.5, may be due to dilution + some blood loss in wound  case d/w Dr Phelps and pts family  check cxr to r/o CHF    10/2 SY  --ANI/CKD   responding to IVF with increased urine output.  Continue current 1/2 NS and follow.  No signs of fluid overload.  --Left Hip fx --post repair.  Rehab  --Fluid status stable.  Electrolytes acceptable/

## 2017-10-03 LAB
ANION GAP SERPL CALC-SCNC: 10 MMOL/L — SIGNIFICANT CHANGE UP (ref 5–17)
ANION GAP SERPL CALC-SCNC: 9 MMOL/L — SIGNIFICANT CHANGE UP (ref 5–17)
BUN SERPL-MCNC: 107 MG/DL — HIGH (ref 7–23)
BUN SERPL-MCNC: 92 MG/DL — HIGH (ref 7–23)
CALCIUM SERPL-MCNC: 8.6 MG/DL — SIGNIFICANT CHANGE UP (ref 8.5–10.1)
CALCIUM SERPL-MCNC: 8.9 MG/DL — SIGNIFICANT CHANGE UP (ref 8.5–10.1)
CHLORIDE SERPL-SCNC: 115 MMOL/L — HIGH (ref 96–108)
CHLORIDE SERPL-SCNC: 116 MMOL/L — HIGH (ref 96–108)
CO2 SERPL-SCNC: 23 MMOL/L — SIGNIFICANT CHANGE UP (ref 22–31)
CO2 SERPL-SCNC: 25 MMOL/L — SIGNIFICANT CHANGE UP (ref 22–31)
CREAT SERPL-MCNC: 3.94 MG/DL — HIGH (ref 0.5–1.3)
CREAT SERPL-MCNC: 4.56 MG/DL — HIGH (ref 0.5–1.3)
GLUCOSE SERPL-MCNC: 218 MG/DL — HIGH (ref 70–99)
GLUCOSE SERPL-MCNC: 258 MG/DL — HIGH (ref 70–99)
HCT VFR BLD CALC: 25.1 % — LOW (ref 34.5–45)
HGB BLD-MCNC: 8.3 G/DL — LOW (ref 11.5–15.5)
MCHC RBC-ENTMCNC: 28.9 PG — SIGNIFICANT CHANGE UP (ref 27–34)
MCHC RBC-ENTMCNC: 33.1 GM/DL — SIGNIFICANT CHANGE UP (ref 32–36)
MCV RBC AUTO: 87.4 FL — SIGNIFICANT CHANGE UP (ref 80–100)
PLATELET # BLD AUTO: 191 K/UL — SIGNIFICANT CHANGE UP (ref 150–400)
POTASSIUM SERPL-MCNC: 4 MMOL/L — SIGNIFICANT CHANGE UP (ref 3.5–5.3)
POTASSIUM SERPL-MCNC: 4.3 MMOL/L — SIGNIFICANT CHANGE UP (ref 3.5–5.3)
POTASSIUM SERPL-SCNC: 4 MMOL/L — SIGNIFICANT CHANGE UP (ref 3.5–5.3)
POTASSIUM SERPL-SCNC: 4.3 MMOL/L — SIGNIFICANT CHANGE UP (ref 3.5–5.3)
RBC # BLD: 2.87 M/UL — LOW (ref 3.8–5.2)
RBC # FLD: 14.7 % — HIGH (ref 10.3–14.5)
SODIUM SERPL-SCNC: 147 MMOL/L — HIGH (ref 135–145)
SODIUM SERPL-SCNC: 151 MMOL/L — HIGH (ref 135–145)
WBC # BLD: 7.1 K/UL — SIGNIFICANT CHANGE UP (ref 3.8–10.5)
WBC # FLD AUTO: 7.1 K/UL — SIGNIFICANT CHANGE UP (ref 3.8–10.5)

## 2017-10-03 PROCEDURE — 99233 SBSQ HOSP IP/OBS HIGH 50: CPT

## 2017-10-03 RX ORDER — CLOPIDOGREL BISULFATE 75 MG/1
75 TABLET, FILM COATED ORAL DAILY
Qty: 0 | Refills: 0 | Status: DISCONTINUED | OUTPATIENT
Start: 2017-10-03 | End: 2017-10-12

## 2017-10-03 RX ORDER — HYDRALAZINE HCL 50 MG
20 TABLET ORAL ONCE
Qty: 0 | Refills: 0 | Status: COMPLETED | OUTPATIENT
Start: 2017-10-03 | End: 2017-10-03

## 2017-10-03 RX ORDER — HYDRALAZINE HCL 50 MG
10 TABLET ORAL EVERY 8 HOURS
Qty: 0 | Refills: 0 | Status: DISCONTINUED | OUTPATIENT
Start: 2017-10-03 | End: 2017-10-12

## 2017-10-03 RX ORDER — SODIUM CHLORIDE 9 MG/ML
1000 INJECTION, SOLUTION INTRAVENOUS
Qty: 0 | Refills: 0 | Status: DISCONTINUED | OUTPATIENT
Start: 2017-10-03 | End: 2017-10-04

## 2017-10-03 RX ORDER — DILTIAZEM HCL 120 MG
180 CAPSULE, EXT RELEASE 24 HR ORAL DAILY
Qty: 0 | Refills: 0 | Status: DISCONTINUED | OUTPATIENT
Start: 2017-10-03 | End: 2017-10-12

## 2017-10-03 RX ADMIN — HYDROMORPHONE HYDROCHLORIDE 0.5 MILLIGRAM(S): 2 INJECTION INTRAMUSCULAR; INTRAVENOUS; SUBCUTANEOUS at 15:23

## 2017-10-03 RX ADMIN — Medication 2: at 16:49

## 2017-10-03 RX ADMIN — HYDROMORPHONE HYDROCHLORIDE 0.5 MILLIGRAM(S): 2 INJECTION INTRAMUSCULAR; INTRAVENOUS; SUBCUTANEOUS at 14:39

## 2017-10-03 RX ADMIN — Medication 2: at 07:47

## 2017-10-03 RX ADMIN — HYDROMORPHONE HYDROCHLORIDE 0.5 MILLIGRAM(S): 2 INJECTION INTRAMUSCULAR; INTRAVENOUS; SUBCUTANEOUS at 18:34

## 2017-10-03 RX ADMIN — Medication 60 MICROGRAM(S): at 05:20

## 2017-10-03 RX ADMIN — Medication 3 MILLILITER(S): at 20:45

## 2017-10-03 RX ADMIN — HYDROMORPHONE HYDROCHLORIDE 0.5 MILLIGRAM(S): 2 INJECTION INTRAMUSCULAR; INTRAVENOUS; SUBCUTANEOUS at 01:39

## 2017-10-03 RX ADMIN — CITALOPRAM 10 MILLIGRAM(S): 10 TABLET, FILM COATED ORAL at 12:21

## 2017-10-03 RX ADMIN — HYDROMORPHONE HYDROCHLORIDE 0.5 MILLIGRAM(S): 2 INJECTION INTRAMUSCULAR; INTRAVENOUS; SUBCUTANEOUS at 11:34

## 2017-10-03 RX ADMIN — PANTOPRAZOLE SODIUM 40 MILLIGRAM(S): 20 TABLET, DELAYED RELEASE ORAL at 12:22

## 2017-10-03 RX ADMIN — Medication 100 MILLIGRAM(S): at 13:50

## 2017-10-03 RX ADMIN — CLOPIDOGREL BISULFATE 75 MILLIGRAM(S): 75 TABLET, FILM COATED ORAL at 12:22

## 2017-10-03 RX ADMIN — INSULIN GLARGINE 34 UNIT(S): 100 INJECTION, SOLUTION SUBCUTANEOUS at 23:05

## 2017-10-03 RX ADMIN — SODIUM CHLORIDE 75 MILLILITER(S): 9 INJECTION, SOLUTION INTRAVENOUS at 12:46

## 2017-10-03 RX ADMIN — HYDROMORPHONE HYDROCHLORIDE 0.5 MILLIGRAM(S): 2 INJECTION INTRAMUSCULAR; INTRAVENOUS; SUBCUTANEOUS at 12:22

## 2017-10-03 RX ADMIN — Medication 10 MILLIGRAM(S): at 16:36

## 2017-10-03 RX ADMIN — Medication 3 MILLILITER(S): at 07:45

## 2017-10-03 RX ADMIN — HEPARIN SODIUM 5000 UNIT(S): 5000 INJECTION INTRAVENOUS; SUBCUTANEOUS at 17:16

## 2017-10-03 RX ADMIN — HYDROMORPHONE HYDROCHLORIDE 0.5 MILLIGRAM(S): 2 INJECTION INTRAMUSCULAR; INTRAVENOUS; SUBCUTANEOUS at 05:45

## 2017-10-03 RX ADMIN — POLYETHYLENE GLYCOL 3350 17 GRAM(S): 17 POWDER, FOR SOLUTION ORAL at 13:50

## 2017-10-03 RX ADMIN — CEFTRIAXONE 100 GRAM(S): 500 INJECTION, POWDER, FOR SOLUTION INTRAMUSCULAR; INTRAVENOUS at 21:24

## 2017-10-03 RX ADMIN — HYDROMORPHONE HYDROCHLORIDE 0.5 MILLIGRAM(S): 2 INJECTION INTRAMUSCULAR; INTRAVENOUS; SUBCUTANEOUS at 05:29

## 2017-10-03 RX ADMIN — Medication 20 MILLIGRAM(S): at 21:24

## 2017-10-03 RX ADMIN — Medication 3 MILLILITER(S): at 13:25

## 2017-10-03 RX ADMIN — HYDROMORPHONE HYDROCHLORIDE 0.5 MILLIGRAM(S): 2 INJECTION INTRAMUSCULAR; INTRAVENOUS; SUBCUTANEOUS at 02:00

## 2017-10-03 RX ADMIN — HEPARIN SODIUM 5000 UNIT(S): 5000 INJECTION INTRAVENOUS; SUBCUTANEOUS at 05:21

## 2017-10-03 RX ADMIN — Medication 1: at 12:20

## 2017-10-03 NOTE — PROGRESS NOTE ADULT - PROBLEM SELECTOR PLAN 1
POD#4  TOLERATED PROCEDURE WELL BUT POST OP COMPLICATED BY ANI  CONFUSION LIKELY FROM ANESTHESIA AND PAIN MEDS VERSUS UREMIA  KEEP ON STEPDOWN FOR CLOSER MONITORING  LEUKOCYTOSIS - NOW RESOLVED; LIKELY URINE INF ALTHOUGH CULTURE WAS CONTAMINANT  ON ROCEPHIN. DC CHRISTOPHE TOMORROW IF STABLE

## 2017-10-03 NOTE — PROVIDER CONTACT NOTE (CHANGE IN STATUS NOTIFICATION) - SITUATION
spoke to Dr Phelps regarding high . She is aware hydralazine was given earlier and that patient was going to be medicated for pain now

## 2017-10-03 NOTE — PROGRESS NOTE ADULT - ASSESSMENT
75 Y/O FEMALE WITH THE ABOVE MED HX ADMITTED WITH LEFT INTERTROCHANTERIC FRACTURE POST MECHANICAL FALL

## 2017-10-03 NOTE — PROGRESS NOTE ADULT - ASSESSMENT
76 year old female with PMHx HTN, HLD, CAD, ADVANCED COPD ON HOME O2 (3LITERS), IDDM, CHRONIC DIASTOLIC HEART FAILURE, RECENT ADMISSIONS FOR HYPOXIC RESPIRATORY FAILURE STATES SHE WAS LEAVING [facility] EARLIER TODAY WITH HER BROTHER AND MISSTEPPED RESULTING IN A FALL.  SHE REPORTS HAVING NECK AND LOWER BACK PAIN, ALSO LEFT HIP PAIN.  PT DENIES ANY CHEST PAIN OR SHORTNESS OF BREATH, NO NAUSEA OR VOMITING. JUST RECEIVED DILAUDID AND FEELS SOMEWHAT BETTER. (27 Sep 2017 19:56)  lethargic due to pain meds, last dose last night  family at bedside  for OR in am    addendum:  spoke to pts nurse sol  5 pm  pt is more awake and alert  will d/c oxycodone 10 mg  cont oxy 5 mg and hydromorphone 0.5 mg    9/29  for OR later vvs  low grade temp  renal function stable    9/30  discussed w daughter in law  creatinine climbing  pt w altered mental state due to pain meds anesthesia  has h/o intolerance to narcotics as per daughter  no po intake  on ivf 50 ml/hr, increased to 75 ml/hr  will get cxr due to h/o chf  Sharma in place, purulent, urine c/s neg due to abx received prior to cxs  may need iv diuretic prn if uo does not  after adequate hydration    10/1  more awake alert, although still lethargic  VVS  got 60 then 100 lasix IV yesterday  was anuric after sharma placement (300 ml residual) on 9/30  UO last night increased to 150 ml night shift, now 20-45 ml/hr  IV bolus 500 ml over an hour given  now on 125 ml/hr  hgb down to 7.5, may be due to dilution + some blood loss in wound  case d/w Dr Phelps and pts family  check cxr to r/o CHF    10/2 SY  --ANI/CKD   responding to IVF with increased urine output.  Continue current 1/2 NS and follow.  No signs of fluid overload.  --Left Hip fx --post repair.  Rehab  --Fluid status stable.  Electrolytes acceptable    10/3  hypernatremia due to Saline and diuretics  will switch to d5w 75 ml/hr x 12 hrs then dc   AM labs, will determine fluids  d/w Dr Forte

## 2017-10-03 NOTE — PROGRESS NOTE ADULT - ASSESSMENT
This is a 76 year old female s/p fall causing fx of her left hip.  Now s/p Left hip IM nail.  Pt has high thrombosis risk and requires anticoagulation prophylaxis.    Plan:  ::heparin 5,000 units sq q12h will increase to q8h once pt/ HH is stable  ::daily cbc/bmp  ::le venodynes  ::increase mobility as tolerated.      Will continue to follow.

## 2017-10-03 NOTE — PROGRESS NOTE ADULT - ASSESSMENT
Chronic diastolic heart failure now complicated by ANI postop L hip surgery- complicated by hypernatremia as well  Nephrology team in put appreciated.  Close monitoring of the volume status is recommended.  The overtsigns of fluid overload she tends to develop are hypoxia and calf edema without ankle edema.  Please monitor her for these signs.  I advised the  family to bring in  her pulmonary pressure monitor pillow to hospital today since pt was able to move and sit up so we can insert it in the back and measure the pressures.    Anemia- postop.  Please maintain Hgb 9 with her prior MI and CAD history.    HTN- continue current meds    Hyperlipidemia- continue statin.    Other medical issues- Management per primary team.   Thank you for allowing me to participate in the care of this patient. Please feel free to contact me with any questions.

## 2017-10-04 LAB
ANION GAP SERPL CALC-SCNC: 9 MMOL/L — SIGNIFICANT CHANGE UP (ref 5–17)
BUN SERPL-MCNC: 92 MG/DL — HIGH (ref 7–23)
CALCIUM SERPL-MCNC: 8.6 MG/DL — SIGNIFICANT CHANGE UP (ref 8.5–10.1)
CHLORIDE SERPL-SCNC: 116 MMOL/L — HIGH (ref 96–108)
CO2 SERPL-SCNC: 25 MMOL/L — SIGNIFICANT CHANGE UP (ref 22–31)
CREAT SERPL-MCNC: 3.92 MG/DL — HIGH (ref 0.5–1.3)
CULTURE RESULTS: NO GROWTH — SIGNIFICANT CHANGE UP
CULTURE RESULTS: SIGNIFICANT CHANGE UP
GLUCOSE SERPL-MCNC: 263 MG/DL — HIGH (ref 70–99)
HCT VFR BLD CALC: 27.1 % — LOW (ref 34.5–45)
HGB BLD-MCNC: 9 G/DL — LOW (ref 11.5–15.5)
MCHC RBC-ENTMCNC: 28.4 PG — SIGNIFICANT CHANGE UP (ref 27–34)
MCHC RBC-ENTMCNC: 33.2 GM/DL — SIGNIFICANT CHANGE UP (ref 32–36)
MCV RBC AUTO: 85.6 FL — SIGNIFICANT CHANGE UP (ref 80–100)
PLATELET # BLD AUTO: 218 K/UL — SIGNIFICANT CHANGE UP (ref 150–400)
POTASSIUM SERPL-MCNC: 3.8 MMOL/L — SIGNIFICANT CHANGE UP (ref 3.5–5.3)
POTASSIUM SERPL-SCNC: 3.8 MMOL/L — SIGNIFICANT CHANGE UP (ref 3.5–5.3)
RBC # BLD: 3.17 M/UL — LOW (ref 3.8–5.2)
RBC # FLD: 14.5 % — SIGNIFICANT CHANGE UP (ref 10.3–14.5)
SODIUM SERPL-SCNC: 150 MMOL/L — HIGH (ref 135–145)
SPECIMEN SOURCE: SIGNIFICANT CHANGE UP
SPECIMEN SOURCE: SIGNIFICANT CHANGE UP
WBC # BLD: 11.9 K/UL — HIGH (ref 3.8–10.5)
WBC # FLD AUTO: 11.9 K/UL — HIGH (ref 3.8–10.5)

## 2017-10-04 PROCEDURE — 99233 SBSQ HOSP IP/OBS HIGH 50: CPT

## 2017-10-04 RX ORDER — HYDRALAZINE HCL 50 MG
20 TABLET ORAL ONCE
Qty: 0 | Refills: 0 | Status: COMPLETED | OUTPATIENT
Start: 2017-10-04 | End: 2017-10-04

## 2017-10-04 RX ORDER — SODIUM CHLORIDE 9 MG/ML
1000 INJECTION, SOLUTION INTRAVENOUS
Qty: 0 | Refills: 0 | Status: DISCONTINUED | OUTPATIENT
Start: 2017-10-04 | End: 2017-10-05

## 2017-10-04 RX ORDER — INSULIN GLARGINE 100 [IU]/ML
40 INJECTION, SOLUTION SUBCUTANEOUS AT BEDTIME
Qty: 0 | Refills: 0 | Status: DISCONTINUED | OUTPATIENT
Start: 2017-10-04 | End: 2017-10-05

## 2017-10-04 RX ADMIN — CLOPIDOGREL BISULFATE 75 MILLIGRAM(S): 75 TABLET, FILM COATED ORAL at 12:33

## 2017-10-04 RX ADMIN — Medication 10 MILLIGRAM(S): at 12:33

## 2017-10-04 RX ADMIN — Medication 10 MILLIGRAM(S): at 23:24

## 2017-10-04 RX ADMIN — POLYETHYLENE GLYCOL 3350 17 GRAM(S): 17 POWDER, FOR SOLUTION ORAL at 12:32

## 2017-10-04 RX ADMIN — Medication 3 MILLILITER(S): at 02:22

## 2017-10-04 RX ADMIN — CITALOPRAM 10 MILLIGRAM(S): 10 TABLET, FILM COATED ORAL at 12:33

## 2017-10-04 RX ADMIN — PANTOPRAZOLE SODIUM 40 MILLIGRAM(S): 20 TABLET, DELAYED RELEASE ORAL at 12:32

## 2017-10-04 RX ADMIN — HYDROMORPHONE HYDROCHLORIDE 0.5 MILLIGRAM(S): 2 INJECTION INTRAMUSCULAR; INTRAVENOUS; SUBCUTANEOUS at 11:30

## 2017-10-04 RX ADMIN — Medication 3: at 12:33

## 2017-10-04 RX ADMIN — Medication 20 MILLIGRAM(S): at 05:18

## 2017-10-04 RX ADMIN — ATORVASTATIN CALCIUM 20 MILLIGRAM(S): 80 TABLET, FILM COATED ORAL at 22:21

## 2017-10-04 RX ADMIN — HEPARIN SODIUM 5000 UNIT(S): 5000 INJECTION INTRAVENOUS; SUBCUTANEOUS at 05:18

## 2017-10-04 RX ADMIN — HYDROMORPHONE HYDROCHLORIDE 0.5 MILLIGRAM(S): 2 INJECTION INTRAMUSCULAR; INTRAVENOUS; SUBCUTANEOUS at 11:13

## 2017-10-04 RX ADMIN — Medication 3: at 08:14

## 2017-10-04 RX ADMIN — CEFTRIAXONE 100 GRAM(S): 500 INJECTION, POWDER, FOR SOLUTION INTRAMUSCULAR; INTRAVENOUS at 22:20

## 2017-10-04 RX ADMIN — HYDROMORPHONE HYDROCHLORIDE 0.5 MILLIGRAM(S): 2 INJECTION INTRAMUSCULAR; INTRAVENOUS; SUBCUTANEOUS at 20:52

## 2017-10-04 RX ADMIN — Medication 3 MILLILITER(S): at 14:50

## 2017-10-04 RX ADMIN — SODIUM CHLORIDE 100 MILLILITER(S): 9 INJECTION, SOLUTION INTRAVENOUS at 10:29

## 2017-10-04 RX ADMIN — Medication 60 MICROGRAM(S): at 05:18

## 2017-10-04 RX ADMIN — HYDROMORPHONE HYDROCHLORIDE 0.5 MILLIGRAM(S): 2 INJECTION INTRAMUSCULAR; INTRAVENOUS; SUBCUTANEOUS at 17:00

## 2017-10-04 RX ADMIN — INSULIN GLARGINE 40 UNIT(S): 100 INJECTION, SOLUTION SUBCUTANEOUS at 22:21

## 2017-10-04 RX ADMIN — HYDROMORPHONE HYDROCHLORIDE 0.5 MILLIGRAM(S): 2 INJECTION INTRAMUSCULAR; INTRAVENOUS; SUBCUTANEOUS at 03:31

## 2017-10-04 RX ADMIN — Medication 3: at 17:31

## 2017-10-04 RX ADMIN — Medication 100 MILLIGRAM(S): at 13:51

## 2017-10-04 RX ADMIN — HYDROMORPHONE HYDROCHLORIDE 0.5 MILLIGRAM(S): 2 INJECTION INTRAMUSCULAR; INTRAVENOUS; SUBCUTANEOUS at 21:10

## 2017-10-04 RX ADMIN — HEPARIN SODIUM 5000 UNIT(S): 5000 INJECTION INTRAVENOUS; SUBCUTANEOUS at 17:31

## 2017-10-04 RX ADMIN — HYDROMORPHONE HYDROCHLORIDE 0.5 MILLIGRAM(S): 2 INJECTION INTRAMUSCULAR; INTRAVENOUS; SUBCUTANEOUS at 16:40

## 2017-10-04 NOTE — PROGRESS NOTE ADULT - ASSESSMENT
76 year old female with PMHx HTN, HLD, CAD, ADVANCED COPD ON HOME O2 (3LITERS), IDDM, CHRONIC DIASTOLIC HEART FAILURE, RECENT ADMISSIONS FOR HYPOXIC RESPIRATORY FAILURE STATES SHE WAS LEAVING [facility] EARLIER TODAY WITH HER BROTHER AND MISSTEPPED RESULTING IN A FALL.  SHE REPORTS HAVING NECK AND LOWER BACK PAIN, ALSO LEFT HIP PAIN.  PT DENIES ANY CHEST PAIN OR SHORTNESS OF BREATH, NO NAUSEA OR VOMITING. JUST RECEIVED DILAUDID AND FEELS SOMEWHAT BETTER. (27 Sep 2017 19:56)  lethargic due to pain meds, last dose last night  family at bedside  for OR in am    addendum:  spoke to pts nurse sol  5 pm  pt is more awake and alert  will d/c oxycodone 10 mg  cont oxy 5 mg and hydromorphone 0.5 mg    9/29  for OR later vvs  low grade temp  renal function stable    9/30  discussed w daughter in law  creatinine climbing  pt w altered mental state due to pain meds anesthesia  has h/o intolerance to narcotics as per daughter  no po intake  on ivf 50 ml/hr, increased to 75 ml/hr  will get cxr due to h/o chf  Sharma in place, purulent, urine c/s neg due to abx received prior to cxs  may need iv diuretic prn if uo does not  after adequate hydration    10/1  more awake alert, although still lethargic  VVS  got 60 then 100 lasix IV yesterday  was anuric after sharma placement (300 ml residual) on 9/30  UO last night increased to 150 ml night shift, now 20-45 ml/hr  IV bolus 500 ml over an hour given  now on 125 ml/hr  hgb down to 7.5, may be due to dilution + some blood loss in wound  case d/w Dr Phelps and pts family  check cxr to r/o CHF    10/2 SY  --ANI/CKD   responding to IVF with increased urine output.  Continue current 1/2 NS and follow.  No signs of fluid overload.  --Left Hip fx --post repair.  Rehab  --Fluid status stable.  Electrolytes acceptable    10/3  hypernatremia due to Saline and diuretics  will switch to d5w 75 ml/hr x 12 hrs then dc   AM labs, will determine fluids  d/w Dr Forte    10/4 SY  --ANI/CKD    Renal fx improving with increased urine output.   Pt with no po intake.  Continue maintenance fluid.  --Hypernatremia--Continue and increase D5W.  --Left Hip fx  Post repair  For rehab.

## 2017-10-04 NOTE — PROGRESS NOTE ADULT - PROBLEM SELECTOR PLAN 1
POD#5  TOLERATED PROCEDURE WELL BUT POST OP COMPLICATED BY ANI  CONFUSION LIKELY FROM ANESTHESIA AND PAIN MEDS -- NOW GETTING A BIT BETTER  KEEP ON STEPDOWN FOR CLOSER MONITORING  LEUKOCYTOSIS - SLIGHT AGAIN, MONITOR FOR TEMPS  ON ROCEPHIN. BONITA SAEED TOMORROW IF STABLE

## 2017-10-04 NOTE — PROGRESS NOTE ADULT - ASSESSMENT
77 yo F s/p L IMN POD 5  Analgesia  DVT ppx  Incentive spirometry  50% Weight Bearing LLE   Continue medical management in Step down per primary team   P/T  Orthopedic stable for discharge, reconsult as necessary  FOllow up as outpatient in 7-10 days from discharge

## 2017-10-04 NOTE — PROGRESS NOTE ADULT - ASSESSMENT
77 Y/O FEMALE WITH THE ABOVE MED HX ADMITTED WITH LEFT INTERTROCHANTERIC FRACTURE POST MECHANICAL FALL

## 2017-10-04 NOTE — ADVANCED PRACTICE NURSE CONSULT - RECOMMEDATIONS
1) Turn and position every 2 hours   2) Elevate heels off mattress  3) Apply Bubba to inner buttocks.  4) Would suggest not using diapers to prevent moisture build up of gluteal cleft.

## 2017-10-04 NOTE — ADVANCED PRACTICE NURSE CONSULT - ASSESSMENT
This is a 76 year old female that was admitted to the hospital on 9/27/2017 for left hip fracture. PMH-  HTN, HLD, CAD, ADVANCED COPD ON HOME O2 (3LITERS), IDDM, CHRONIC DIASTOLIC HEART FAILURE.     Requested by MD to assess patient's right buttock and sacrum. Patient presents on an [employer] 9000 MRS on her backside. Upon assessment of sacrum and right buttocks, no pressure ulcers assessed. This was also confirmed with 2 RNs. Gluteal cleft noted to have linear tissue loss, mostly likely related to moisture buildup from patient being incontinent of stool. Would classify as intertrigo. Bubba moisture barrier applied for protection. Right buttocks intact as well as patient's entire upper and lower back. Patient noted to be incontinent of loose stool. Skin care rendered. Patient positioned to her left side with a pillow. Both heels elevated off mattress.

## 2017-10-04 NOTE — CDI QUERY NOTE - NSCDIOTHERTXTBX_GEN_ALL_CORE_HH
1) Ckd baseline around 2, documented.  BUN= 70 > 71 > 70,   Creat= 2.27 > 2.59 > 2.34 ,   GFR= 20 > 17 > 20  Please clarify the Stage of the CKD ?    2)  AS of 9/30 patient with altered mental status due to pain meds anesthesia.  Now with improvement in mental status.    Can the AMS be further clarified ?  ie... Toxic-metabolic encephalopathy now resolving ?....... Toxic encephalopathy now resolving ?  ....... Other encephalopathy ?   ......  Other clinical diagnosis ? Please clarify

## 2017-10-04 NOTE — PROGRESS NOTE ADULT - ASSESSMENT
Chronic diastolic heart failure now complicated by ANI postop L hip surgery- complicated by hypernatremia as well  Nephrology team in put appreciated.  Close monitoring of the volume status is recommended.  No overtsigns of heart failure noted.  On IVF and lasix with improving renal function.  Pulmonary pressure monitor pillow to be brought in by family.    Anemia- postop.  Please maintain Hgb 9 with her prior MI and CAD history.    HTN- continue current meds    Hyperlipidemia- continue statin.    Other medical issues- Management per primary team.   Thank you for allowing me to participate in the care of this patient. Please feel free to contact me with any questions.

## 2017-10-05 LAB
ANION GAP SERPL CALC-SCNC: 10 MMOL/L — SIGNIFICANT CHANGE UP (ref 5–17)
BUN SERPL-MCNC: 82 MG/DL — HIGH (ref 7–23)
CALCIUM SERPL-MCNC: 8.7 MG/DL — SIGNIFICANT CHANGE UP (ref 8.5–10.1)
CHLORIDE SERPL-SCNC: 111 MMOL/L — HIGH (ref 96–108)
CO2 SERPL-SCNC: 23 MMOL/L — SIGNIFICANT CHANGE UP (ref 22–31)
CREAT SERPL-MCNC: 3.24 MG/DL — HIGH (ref 0.5–1.3)
GLUCOSE SERPL-MCNC: 370 MG/DL — HIGH (ref 70–99)
HCT VFR BLD CALC: 27.5 % — LOW (ref 34.5–45)
HGB BLD-MCNC: 9.1 G/DL — LOW (ref 11.5–15.5)
MCHC RBC-ENTMCNC: 28.6 PG — SIGNIFICANT CHANGE UP (ref 27–34)
MCHC RBC-ENTMCNC: 33.2 GM/DL — SIGNIFICANT CHANGE UP (ref 32–36)
MCV RBC AUTO: 86.1 FL — SIGNIFICANT CHANGE UP (ref 80–100)
PLATELET # BLD AUTO: 233 K/UL — SIGNIFICANT CHANGE UP (ref 150–400)
POTASSIUM SERPL-MCNC: 3.7 MMOL/L — SIGNIFICANT CHANGE UP (ref 3.5–5.3)
POTASSIUM SERPL-SCNC: 3.7 MMOL/L — SIGNIFICANT CHANGE UP (ref 3.5–5.3)
RBC # BLD: 3.2 M/UL — LOW (ref 3.8–5.2)
RBC # FLD: 15.4 % — HIGH (ref 10.3–14.5)
SODIUM SERPL-SCNC: 144 MMOL/L — SIGNIFICANT CHANGE UP (ref 135–145)
WBC # BLD: 14.7 K/UL — HIGH (ref 3.8–10.5)
WBC # FLD AUTO: 14.7 K/UL — HIGH (ref 3.8–10.5)

## 2017-10-05 PROCEDURE — 99233 SBSQ HOSP IP/OBS HIGH 50: CPT

## 2017-10-05 RX ORDER — INSULIN GLARGINE 100 [IU]/ML
48 INJECTION, SOLUTION SUBCUTANEOUS AT BEDTIME
Qty: 0 | Refills: 0 | Status: DISCONTINUED | OUTPATIENT
Start: 2017-10-05 | End: 2017-10-07

## 2017-10-05 RX ORDER — HEPARIN SODIUM 5000 [USP'U]/ML
5000 INJECTION INTRAVENOUS; SUBCUTANEOUS EVERY 8 HOURS
Qty: 0 | Refills: 0 | Status: DISCONTINUED | OUTPATIENT
Start: 2017-10-05 | End: 2017-10-12

## 2017-10-05 RX ORDER — DILTIAZEM HCL 120 MG
180 CAPSULE, EXT RELEASE 24 HR ORAL ONCE
Qty: 0 | Refills: 0 | Status: COMPLETED | OUTPATIENT
Start: 2017-10-05 | End: 2017-10-05

## 2017-10-05 RX ORDER — ALPRAZOLAM 0.25 MG
0.25 TABLET ORAL ONCE
Qty: 0 | Refills: 0 | Status: DISCONTINUED | OUTPATIENT
Start: 2017-10-05 | End: 2017-10-05

## 2017-10-05 RX ADMIN — Medication 5: at 08:31

## 2017-10-05 RX ADMIN — CLOPIDOGREL BISULFATE 75 MILLIGRAM(S): 75 TABLET, FILM COATED ORAL at 12:19

## 2017-10-05 RX ADMIN — Medication 3 MILLILITER(S): at 07:53

## 2017-10-05 RX ADMIN — HYDROMORPHONE HYDROCHLORIDE 0.5 MILLIGRAM(S): 2 INJECTION INTRAMUSCULAR; INTRAVENOUS; SUBCUTANEOUS at 17:15

## 2017-10-05 RX ADMIN — HEPARIN SODIUM 5000 UNIT(S): 5000 INJECTION INTRAVENOUS; SUBCUTANEOUS at 06:14

## 2017-10-05 RX ADMIN — HYDROMORPHONE HYDROCHLORIDE 0.5 MILLIGRAM(S): 2 INJECTION INTRAMUSCULAR; INTRAVENOUS; SUBCUTANEOUS at 10:30

## 2017-10-05 RX ADMIN — Medication 3 MILLILITER(S): at 00:27

## 2017-10-05 RX ADMIN — ATORVASTATIN CALCIUM 20 MILLIGRAM(S): 80 TABLET, FILM COATED ORAL at 23:53

## 2017-10-05 RX ADMIN — PANTOPRAZOLE SODIUM 40 MILLIGRAM(S): 20 TABLET, DELAYED RELEASE ORAL at 12:18

## 2017-10-05 RX ADMIN — Medication 0.25 MILLIGRAM(S): at 23:53

## 2017-10-05 RX ADMIN — Medication 3 MILLILITER(S): at 20:45

## 2017-10-05 RX ADMIN — Medication 180 MILLIGRAM(S): at 15:37

## 2017-10-05 RX ADMIN — HYDROMORPHONE HYDROCHLORIDE 0.5 MILLIGRAM(S): 2 INJECTION INTRAMUSCULAR; INTRAVENOUS; SUBCUTANEOUS at 16:48

## 2017-10-05 RX ADMIN — Medication 3: at 12:19

## 2017-10-05 RX ADMIN — CITALOPRAM 10 MILLIGRAM(S): 10 TABLET, FILM COATED ORAL at 12:20

## 2017-10-05 RX ADMIN — HYDROMORPHONE HYDROCHLORIDE 0.5 MILLIGRAM(S): 2 INJECTION INTRAMUSCULAR; INTRAVENOUS; SUBCUTANEOUS at 10:09

## 2017-10-05 RX ADMIN — INSULIN GLARGINE 48 UNIT(S): 100 INJECTION, SOLUTION SUBCUTANEOUS at 23:53

## 2017-10-05 RX ADMIN — Medication 60 MICROGRAM(S): at 06:13

## 2017-10-05 RX ADMIN — Medication 3 MILLILITER(S): at 15:22

## 2017-10-05 RX ADMIN — Medication 2: at 17:21

## 2017-10-05 RX ADMIN — HEPARIN SODIUM 5000 UNIT(S): 5000 INJECTION INTRAVENOUS; SUBCUTANEOUS at 23:57

## 2017-10-05 RX ADMIN — POLYETHYLENE GLYCOL 3350 17 GRAM(S): 17 POWDER, FOR SOLUTION ORAL at 12:18

## 2017-10-05 RX ADMIN — HEPARIN SODIUM 5000 UNIT(S): 5000 INJECTION INTRAVENOUS; SUBCUTANEOUS at 13:58

## 2017-10-05 NOTE — PROGRESS NOTE ADULT - ASSESSMENT
This is a 76 year old female s/p fall causing fx of her left hip.  Now s/p Left hip IM nail.  Pt has high thrombosis risk and requires anticoagulation prophylaxis.    Plan:  ::cont heparin 5,000 units sq q12h will increase to q8h once pt/ HH is stable  ::daily cbc/bmp  ::le venodynes  ::increase mobility as tolerated.      Will continue to follow.

## 2017-10-05 NOTE — PROGRESS NOTE ADULT - ASSESSMENT
Chronic diastolic heart failure now complicated by ANI postop L hip surgery- complicated by hypernatremia as well  Nephrology team in put appreciated.  Close monitoring of the volume status is recommended.  No overtsigns of heart failure noted.  On IVF and lasix with improving renal function.  Pulmonary pressure monitor pillow to be brought in by family.    Anemia- postop.  Please maintain Hgb 9 with her prior MI and CAD history.    HTN- will giv give a dose of cardizem now.     Hyperlipidemia- continue statin.    Other medical issues- Management per primary team.   Thank you for allowing me to participate in the care of this patient. Please feel free to contact me with any questions.

## 2017-10-05 NOTE — PROGRESS NOTE ADULT - PROBLEM SELECTOR PLAN 1
POD#6  TOLERATED PROCEDURE WELL BUT POST OP COMPLICATED BY ANI  CONFUSION LIKELY FROM ANESTHESIA AND PAIN MEDS -- NOW IMPROVING  KEEP ON STEPDOWN FOR CLOSER MONITORING  LEUKOCYTOSIS - INCREASING AGAIN; DC'D ROCEPHIN; CULTURES WERE NEGATIVE; DC SAEED; MONITOR FOR TEMPS

## 2017-10-05 NOTE — PROGRESS NOTE ADULT - ASSESSMENT
76 year old female with PMHx HTN, HLD, CAD, ADVANCED COPD ON HOME O2 (3LITERS), IDDM, CHRONIC DIASTOLIC HEART FAILURE, RECENT ADMISSIONS FOR HYPOXIC RESPIRATORY FAILURE STATES SHE WAS LEAVING [facility] EARLIER TODAY WITH HER BROTHER AND MISSTEPPED RESULTING IN A FALL.  SHE REPORTS HAVING NECK AND LOWER BACK PAIN, ALSO LEFT HIP PAIN.  PT DENIES ANY CHEST PAIN OR SHORTNESS OF BREATH, NO NAUSEA OR VOMITING. JUST RECEIVED DILAUDID AND FEELS SOMEWHAT BETTER. (27 Sep 2017 19:56)  lethargic due to pain meds, last dose last night  family at bedside  for OR in am    addendum:  spoke to pts nurse sol  5 pm  pt is more awake and alert  will d/c oxycodone 10 mg  cont oxy 5 mg and hydromorphone 0.5 mg    9/29  for OR later vvs  low grade temp  renal function stable    9/30  discussed w daughter in law  creatinine climbing  pt w altered mental state due to pain meds anesthesia  has h/o intolerance to narcotics as per daughter  no po intake  on ivf 50 ml/hr, increased to 75 ml/hr  will get cxr due to h/o chf  Sharma in place, purulent, urine c/s neg due to abx received prior to cxs  may need iv diuretic prn if uo does not  after adequate hydration    10/1  more awake alert, although still lethargic  VVS  got 60 then 100 lasix IV yesterday  was anuric after sharma placement (300 ml residual) on 9/30  UO last night increased to 150 ml night shift, now 20-45 ml/hr  IV bolus 500 ml over an hour given  now on 125 ml/hr  hgb down to 7.5, may be due to dilution + some blood loss in wound  case d/w Dr Phelps and pts family  check cxr to r/o CHF    10/2 SY  --ANI/CKD   responding to IVF with increased urine output.  Continue current 1/2 NS and follow.  No signs of fluid overload.  --Left Hip fx --post repair.  Rehab  --Fluid status stable.  Electrolytes acceptable    10/3  hypernatremia due to Saline and diuretics  will switch to d5w 75 ml/hr x 12 hrs then dc   AM labs, will determine fluids  d/w Dr Forte    10/4 SY  --ANI/CKD    Renal fx improving with increased urine output.   Pt with no po intake.  Continue maintenance fluid.  --Hypernatremia--Continue and increase D5W.  --Left Hip fx  Post repair  For rehab.    10/5 SY  --ANI/CKD   Renal fx improving.  Urine output adequate.  Hypernatremia improved.   Will d/c D5W since pt now with increased po intake with concern for hyperglycemia and at risk for CHF.  --Left Hip fx repair.   PT and rehab.  --Resp status stable for now

## 2017-10-06 LAB
ANION GAP SERPL CALC-SCNC: 8 MMOL/L — SIGNIFICANT CHANGE UP (ref 5–17)
BUN SERPL-MCNC: 84 MG/DL — HIGH (ref 7–23)
CALCIUM SERPL-MCNC: 8.9 MG/DL — SIGNIFICANT CHANGE UP (ref 8.5–10.1)
CHLORIDE SERPL-SCNC: 112 MMOL/L — HIGH (ref 96–108)
CO2 SERPL-SCNC: 26 MMOL/L — SIGNIFICANT CHANGE UP (ref 22–31)
CREAT SERPL-MCNC: 3.32 MG/DL — HIGH (ref 0.5–1.3)
GLUCOSE SERPL-MCNC: 138 MG/DL — HIGH (ref 70–99)
HCT VFR BLD CALC: 26.2 % — LOW (ref 34.5–45)
HGB BLD-MCNC: 8.7 G/DL — LOW (ref 11.5–15.5)
MCHC RBC-ENTMCNC: 28.7 PG — SIGNIFICANT CHANGE UP (ref 27–34)
MCHC RBC-ENTMCNC: 33.1 GM/DL — SIGNIFICANT CHANGE UP (ref 32–36)
MCV RBC AUTO: 86.8 FL — SIGNIFICANT CHANGE UP (ref 80–100)
PLATELET # BLD AUTO: 268 K/UL — SIGNIFICANT CHANGE UP (ref 150–400)
POTASSIUM SERPL-MCNC: 3.6 MMOL/L — SIGNIFICANT CHANGE UP (ref 3.5–5.3)
POTASSIUM SERPL-SCNC: 3.6 MMOL/L — SIGNIFICANT CHANGE UP (ref 3.5–5.3)
RBC # BLD: 3.02 M/UL — LOW (ref 3.8–5.2)
RBC # FLD: 15.4 % — HIGH (ref 10.3–14.5)
SODIUM SERPL-SCNC: 146 MMOL/L — HIGH (ref 135–145)
WBC # BLD: 10.5 K/UL — SIGNIFICANT CHANGE UP (ref 3.8–10.5)
WBC # FLD AUTO: 10.5 K/UL — SIGNIFICANT CHANGE UP (ref 3.8–10.5)

## 2017-10-06 PROCEDURE — 99233 SBSQ HOSP IP/OBS HIGH 50: CPT

## 2017-10-06 RX ORDER — BUDESONIDE AND FORMOTEROL FUMARATE DIHYDRATE 160; 4.5 UG/1; UG/1
2 AEROSOL RESPIRATORY (INHALATION)
Qty: 0 | Refills: 0 | Status: DISCONTINUED | OUTPATIENT
Start: 2017-10-06 | End: 2017-10-12

## 2017-10-06 RX ORDER — ALPRAZOLAM 0.25 MG
0.25 TABLET ORAL ONCE
Qty: 0 | Refills: 0 | Status: DISCONTINUED | OUTPATIENT
Start: 2017-10-06 | End: 2017-10-06

## 2017-10-06 RX ORDER — TIOTROPIUM BROMIDE 18 UG/1
1 CAPSULE ORAL; RESPIRATORY (INHALATION) DAILY
Qty: 0 | Refills: 0 | Status: DISCONTINUED | OUTPATIENT
Start: 2017-10-06 | End: 2017-10-12

## 2017-10-06 RX ADMIN — PANTOPRAZOLE SODIUM 40 MILLIGRAM(S): 20 TABLET, DELAYED RELEASE ORAL at 14:23

## 2017-10-06 RX ADMIN — HEPARIN SODIUM 5000 UNIT(S): 5000 INJECTION INTRAVENOUS; SUBCUTANEOUS at 14:05

## 2017-10-06 RX ADMIN — Medication 100 MILLIGRAM(S): at 14:04

## 2017-10-06 RX ADMIN — SENNA PLUS 2 TABLET(S): 8.6 TABLET ORAL at 21:12

## 2017-10-06 RX ADMIN — CLOPIDOGREL BISULFATE 75 MILLIGRAM(S): 75 TABLET, FILM COATED ORAL at 11:24

## 2017-10-06 RX ADMIN — Medication 0.25 MILLIGRAM(S): at 22:54

## 2017-10-06 RX ADMIN — HYDROMORPHONE HYDROCHLORIDE 0.5 MILLIGRAM(S): 2 INJECTION INTRAMUSCULAR; INTRAVENOUS; SUBCUTANEOUS at 19:56

## 2017-10-06 RX ADMIN — Medication 3 MILLILITER(S): at 01:46

## 2017-10-06 RX ADMIN — HYDROMORPHONE HYDROCHLORIDE 0.5 MILLIGRAM(S): 2 INJECTION INTRAMUSCULAR; INTRAVENOUS; SUBCUTANEOUS at 08:33

## 2017-10-06 RX ADMIN — POLYETHYLENE GLYCOL 3350 17 GRAM(S): 17 POWDER, FOR SOLUTION ORAL at 11:25

## 2017-10-06 RX ADMIN — CITALOPRAM 10 MILLIGRAM(S): 10 TABLET, FILM COATED ORAL at 11:29

## 2017-10-06 RX ADMIN — Medication 60 MICROGRAM(S): at 05:41

## 2017-10-06 RX ADMIN — Medication 3 MILLILITER(S): at 07:55

## 2017-10-06 RX ADMIN — Medication 180 MILLIGRAM(S): at 05:39

## 2017-10-06 RX ADMIN — INSULIN GLARGINE 48 UNIT(S): 100 INJECTION, SOLUTION SUBCUTANEOUS at 21:38

## 2017-10-06 RX ADMIN — HYDROMORPHONE HYDROCHLORIDE 0.5 MILLIGRAM(S): 2 INJECTION INTRAMUSCULAR; INTRAVENOUS; SUBCUTANEOUS at 05:39

## 2017-10-06 RX ADMIN — HEPARIN SODIUM 5000 UNIT(S): 5000 INJECTION INTRAVENOUS; SUBCUTANEOUS at 21:13

## 2017-10-06 RX ADMIN — OXYCODONE HYDROCHLORIDE 10 MILLIGRAM(S): 5 TABLET ORAL at 11:08

## 2017-10-06 RX ADMIN — Medication 1: at 14:04

## 2017-10-06 RX ADMIN — ATORVASTATIN CALCIUM 20 MILLIGRAM(S): 80 TABLET, FILM COATED ORAL at 21:12

## 2017-10-06 RX ADMIN — HYDROMORPHONE HYDROCHLORIDE 0.5 MILLIGRAM(S): 2 INJECTION INTRAMUSCULAR; INTRAVENOUS; SUBCUTANEOUS at 06:00

## 2017-10-06 RX ADMIN — Medication 100 MILLIGRAM(S): at 21:12

## 2017-10-06 RX ADMIN — BUDESONIDE AND FORMOTEROL FUMARATE DIHYDRATE 2 PUFF(S): 160; 4.5 AEROSOL RESPIRATORY (INHALATION) at 19:44

## 2017-10-06 RX ADMIN — HEPARIN SODIUM 5000 UNIT(S): 5000 INJECTION INTRAVENOUS; SUBCUTANEOUS at 05:39

## 2017-10-06 NOTE — PROGRESS NOTE ADULT - ASSESSMENT
Calf pain L - check venous doppler to asses for DVT.     Chronic diastolic heart failure- stable  Pt family to check the MEMS readings today.  Today there are crackles noted in her lungs.        ANI-  complicated by hypernatremia as well  Nephrology team in put appreciated.  Close monitoring of the volume status is recommended.    On IVF and lasix with stable renal function.       Anemia- postop.  Please maintain Hgb 9 with her prior MI and CAD history.    HTN- continue current meds.    Hyperlipidemia- continue statin.    Other medical issues- Management per primary team.   Thank you for allowing me to participate in the care of this patient. Please feel free to contact me with any questions.

## 2017-10-06 NOTE — PROGRESS NOTE ADULT - ASSESSMENT
76 year old female with PMHx HTN, HLD, CAD, ADVANCED COPD ON HOME O2 (3LITERS), IDDM, CHRONIC DIASTOLIC HEART FAILURE, RECENT ADMISSIONS FOR HYPOXIC RESPIRATORY FAILURE STATES SHE WAS LEAVING [facility] EARLIER TODAY WITH HER BROTHER AND MISSTEPPED RESULTING IN A FALL.  SHE REPORTS HAVING NECK AND LOWER BACK PAIN, ALSO LEFT HIP PAIN.  PT DENIES ANY CHEST PAIN OR SHORTNESS OF BREATH, NO NAUSEA OR VOMITING. JUST RECEIVED DILAUDID AND FEELS SOMEWHAT BETTER. (27 Sep 2017 19:56)  lethargic due to pain meds, last dose last night  family at bedside  for OR in am    addendum:  spoke to pts nurse sol  5 pm  pt is more awake and alert  will d/c oxycodone 10 mg  cont oxy 5 mg and hydromorphone 0.5 mg    9/29  for OR later vvs  low grade temp  renal function stable    9/30  discussed w daughter in law  creatinine climbing  pt w altered mental state due to pain meds anesthesia  has h/o intolerance to narcotics as per daughter  no po intake  on ivf 50 ml/hr, increased to 75 ml/hr  will get cxr due to h/o chf  Sharma in place, purulent, urine c/s neg due to abx received prior to cxs  may need iv diuretic prn if uo does not  after adequate hydration    10/1  more awake alert, although still lethargic  VVS  got 60 then 100 lasix IV yesterday  was anuric after sharma placement (300 ml residual) on 9/30  UO last night increased to 150 ml night shift, now 20-45 ml/hr  IV bolus 500 ml over an hour given  now on 125 ml/hr  hgb down to 7.5, may be due to dilution + some blood loss in wound  case d/w Dr Phelps and pts family  check cxr to r/o CHF    10/2 SY  --ANI/CKD   responding to IVF with increased urine output.  Continue current 1/2 NS and follow.  No signs of fluid overload.  --Left Hip fx --post repair.  Rehab  --Fluid status stable.  Electrolytes acceptable    10/3  hypernatremia due to Saline and diuretics  will switch to d5w 75 ml/hr x 12 hrs then dc   AM labs, will determine fluids  d/w Dr Forte    10/4 SY  --ANI/CKD    Renal fx improving with increased urine output.   Pt with no po intake.  Continue maintenance fluid.  --Hypernatremia--Continue and increase D5W.  --Left Hip fx  Post repair  For rehab.    10/5 SY  --ANI/CKD   Renal fx improving.  Urine output adequate.  Hypernatremia improved.   Will d/c D5W since pt now with increased po intake with concern for hyperglycemia and at risk for CHF.  --Left Hip fx repair.   PT and rehab.  --Resp status stable for now    10/6 MK  - ANI/CKD slowly improving with variability due to intake  - Hypermatremia: will continue to encourage po fluid intake  - left hip fracture: s/p repair

## 2017-10-06 NOTE — PROGRESS NOTE ADULT - PROBLEM SELECTOR PLAN 1
POD#7  TOLERATED PROCEDURE WELL BUT POST OP COMPLICATED BY ANI  POST OP CONFUSION NOW FINALLY RESOLVED  CAN DOWNGRADE TO TELE  LEUKOCYTOSIS - RESOLVED, MONITOR  PHYSICAL THERAPY

## 2017-10-07 LAB
ANION GAP SERPL CALC-SCNC: 8 MMOL/L — SIGNIFICANT CHANGE UP (ref 5–17)
BUN SERPL-MCNC: 86 MG/DL — HIGH (ref 7–23)
CALCIUM SERPL-MCNC: 8.9 MG/DL — SIGNIFICANT CHANGE UP (ref 8.5–10.1)
CHLORIDE SERPL-SCNC: 115 MMOL/L — HIGH (ref 96–108)
CO2 SERPL-SCNC: 24 MMOL/L — SIGNIFICANT CHANGE UP (ref 22–31)
CREAT SERPL-MCNC: 3.56 MG/DL — HIGH (ref 0.5–1.3)
GLUCOSE SERPL-MCNC: 102 MG/DL — HIGH (ref 70–99)
GLUCOSE SERPL-MCNC: 47 MG/DL — LOW (ref 70–99)
HCT VFR BLD CALC: 28 % — LOW (ref 34.5–45)
HGB BLD-MCNC: 9.1 G/DL — LOW (ref 11.5–15.5)
MCHC RBC-ENTMCNC: 28 PG — SIGNIFICANT CHANGE UP (ref 27–34)
MCHC RBC-ENTMCNC: 32.4 GM/DL — SIGNIFICANT CHANGE UP (ref 32–36)
MCV RBC AUTO: 86.5 FL — SIGNIFICANT CHANGE UP (ref 80–100)
PLATELET # BLD AUTO: 313 K/UL — SIGNIFICANT CHANGE UP (ref 150–400)
POTASSIUM SERPL-MCNC: 3.5 MMOL/L — SIGNIFICANT CHANGE UP (ref 3.5–5.3)
POTASSIUM SERPL-SCNC: 3.5 MMOL/L — SIGNIFICANT CHANGE UP (ref 3.5–5.3)
RBC # BLD: 3.24 M/UL — LOW (ref 3.8–5.2)
RBC # FLD: 15.4 % — HIGH (ref 10.3–14.5)
SODIUM SERPL-SCNC: 147 MMOL/L — HIGH (ref 135–145)
WBC # BLD: 10.2 K/UL — SIGNIFICANT CHANGE UP (ref 3.8–10.5)
WBC # FLD AUTO: 10.2 K/UL — SIGNIFICANT CHANGE UP (ref 3.8–10.5)

## 2017-10-07 PROCEDURE — 93970 EXTREMITY STUDY: CPT | Mod: 26

## 2017-10-07 PROCEDURE — 99233 SBSQ HOSP IP/OBS HIGH 50: CPT

## 2017-10-07 RX ORDER — INSULIN GLARGINE 100 [IU]/ML
40 INJECTION, SOLUTION SUBCUTANEOUS AT BEDTIME
Qty: 0 | Refills: 0 | Status: DISCONTINUED | OUTPATIENT
Start: 2017-10-07 | End: 2017-10-12

## 2017-10-07 RX ORDER — SODIUM CHLORIDE 9 MG/ML
1000 INJECTION, SOLUTION INTRAVENOUS
Qty: 0 | Refills: 0 | Status: DISCONTINUED | OUTPATIENT
Start: 2017-10-07 | End: 2017-10-12

## 2017-10-07 RX ORDER — ALPRAZOLAM 0.25 MG
0.25 TABLET ORAL ONCE
Qty: 0 | Refills: 0 | Status: DISCONTINUED | OUTPATIENT
Start: 2017-10-07 | End: 2017-10-07

## 2017-10-07 RX ORDER — CITALOPRAM 10 MG/1
20 TABLET, FILM COATED ORAL DAILY
Qty: 0 | Refills: 0 | Status: DISCONTINUED | OUTPATIENT
Start: 2017-10-07 | End: 2017-10-12

## 2017-10-07 RX ADMIN — Medication 0.25 MILLIGRAM(S): at 21:18

## 2017-10-07 RX ADMIN — BUDESONIDE AND FORMOTEROL FUMARATE DIHYDRATE 2 PUFF(S): 160; 4.5 AEROSOL RESPIRATORY (INHALATION) at 08:27

## 2017-10-07 RX ADMIN — HYDROMORPHONE HYDROCHLORIDE 0.5 MILLIGRAM(S): 2 INJECTION INTRAMUSCULAR; INTRAVENOUS; SUBCUTANEOUS at 14:04

## 2017-10-07 RX ADMIN — HYDROMORPHONE HYDROCHLORIDE 0.5 MILLIGRAM(S): 2 INJECTION INTRAMUSCULAR; INTRAVENOUS; SUBCUTANEOUS at 10:40

## 2017-10-07 RX ADMIN — CITALOPRAM 20 MILLIGRAM(S): 10 TABLET, FILM COATED ORAL at 22:43

## 2017-10-07 RX ADMIN — BUDESONIDE AND FORMOTEROL FUMARATE DIHYDRATE 2 PUFF(S): 160; 4.5 AEROSOL RESPIRATORY (INHALATION) at 08:29

## 2017-10-07 RX ADMIN — POLYETHYLENE GLYCOL 3350 17 GRAM(S): 17 POWDER, FOR SOLUTION ORAL at 12:35

## 2017-10-07 RX ADMIN — SODIUM CHLORIDE 50 MILLILITER(S): 9 INJECTION, SOLUTION INTRAVENOUS at 15:41

## 2017-10-07 RX ADMIN — CITALOPRAM 10 MILLIGRAM(S): 10 TABLET, FILM COATED ORAL at 12:35

## 2017-10-07 RX ADMIN — PANTOPRAZOLE SODIUM 40 MILLIGRAM(S): 20 TABLET, DELAYED RELEASE ORAL at 12:35

## 2017-10-07 RX ADMIN — Medication 100 MILLIGRAM(S): at 21:19

## 2017-10-07 RX ADMIN — HYDROMORPHONE HYDROCHLORIDE 0.5 MILLIGRAM(S): 2 INJECTION INTRAMUSCULAR; INTRAVENOUS; SUBCUTANEOUS at 14:10

## 2017-10-07 RX ADMIN — Medication 180 MILLIGRAM(S): at 06:01

## 2017-10-07 RX ADMIN — SENNA PLUS 2 TABLET(S): 8.6 TABLET ORAL at 21:20

## 2017-10-07 RX ADMIN — BUDESONIDE AND FORMOTEROL FUMARATE DIHYDRATE 2 PUFF(S): 160; 4.5 AEROSOL RESPIRATORY (INHALATION) at 19:41

## 2017-10-07 RX ADMIN — Medication 60 MICROGRAM(S): at 06:02

## 2017-10-07 RX ADMIN — HEPARIN SODIUM 5000 UNIT(S): 5000 INJECTION INTRAVENOUS; SUBCUTANEOUS at 06:02

## 2017-10-07 RX ADMIN — CLOPIDOGREL BISULFATE 75 MILLIGRAM(S): 75 TABLET, FILM COATED ORAL at 12:35

## 2017-10-07 RX ADMIN — ATORVASTATIN CALCIUM 20 MILLIGRAM(S): 80 TABLET, FILM COATED ORAL at 21:19

## 2017-10-07 RX ADMIN — TIOTROPIUM BROMIDE 1 CAPSULE(S): 18 CAPSULE ORAL; RESPIRATORY (INHALATION) at 08:28

## 2017-10-07 RX ADMIN — HEPARIN SODIUM 5000 UNIT(S): 5000 INJECTION INTRAVENOUS; SUBCUTANEOUS at 14:12

## 2017-10-07 RX ADMIN — Medication 100 MILLIGRAM(S): at 06:01

## 2017-10-07 RX ADMIN — HEPARIN SODIUM 5000 UNIT(S): 5000 INJECTION INTRAVENOUS; SUBCUTANEOUS at 21:19

## 2017-10-07 NOTE — DIETITIAN INITIAL EVALUATION ADULT. - SIGNS/SYMPTOMS
BMI 30.6, Edema, Uncontrolled BG, Renal labs ^ Poor intake/appetite <50% x 10 days, +Signif. wt loss, Edema, Total assist. w/ meals, Unstageable PU

## 2017-10-07 NOTE — DIETITIAN INITIAL EVALUATION ADULT. - NUTRITIONGOAL OUTCOME1
Adherence to diet. BMI <25, controlled BG, No signs of edema Rosalind of dt and supplements with >75% intake, No s/s of malnutrition, No signs of edema, healed wounds

## 2017-10-07 NOTE — DIETITIAN INITIAL EVALUATION ADULT. - PROBLEM SELECTOR PLAN 1
POST MECHANICAL FALL  FOR IM NAILING TOMORROW WITH DR HARPER  NEEDS CARDIO CLEARANCE.    KEEP NPO POST MN TONIGHT  DECREASE INSULIN DOSING TO HALF  WOULD HOLD DIURETIC WITH ELEVATED CR LEVEL

## 2017-10-07 NOTE — PROGRESS NOTE ADULT - ASSESSMENT
This is a 76 year old female s/p fall causing fx of her left hip.  Now s/p Left hip IM nail.  Pt has high thrombosis risk and requires anticoagulation prophylaxis.    Plan: awaiting doppler LLE R/O DVT  :cont heparin 5,000 units sq q8h  ::daily cbc/bmp  ::le venodynes  ::increase mobility as tolerated.      Will continue to follow.

## 2017-10-07 NOTE — PROGRESS NOTE ADULT - ASSESSMENT
76 year old female with PMHx HTN, HLD, CAD, ADVANCED COPD ON HOME O2 (3LITERS), IDDM, CHRONIC DIASTOLIC HEART FAILURE, RECENT ADMISSIONS FOR HYPOXIC RESPIRATORY FAILURE STATES SHE WAS LEAVING [facility] EARLIER TODAY WITH HER BROTHER AND MISSTEPPED RESULTING IN A FALL.  SHE REPORTS HAVING NECK AND LOWER BACK PAIN, ALSO LEFT HIP PAIN.  PT DENIES ANY CHEST PAIN OR SHORTNESS OF BREATH, NO NAUSEA OR VOMITING. JUST RECEIVED DILAUDID AND FEELS SOMEWHAT BETTER. (27 Sep 2017 19:56)  lethargic due to pain meds, last dose last night  family at bedside  for OR in am    addendum:  spoke to pts nurse sol  5 pm  pt is more awake and alert  will d/c oxycodone 10 mg  cont oxy 5 mg and hydromorphone 0.5 mg    9/29  for OR later vvs  low grade temp  renal function stable    9/30  discussed w daughter in law  creatinine climbing  pt w altered mental state due to pain meds anesthesia  has h/o intolerance to narcotics as per daughter  no po intake  on ivf 50 ml/hr, increased to 75 ml/hr  will get cxr due to h/o chf  Sharma in place, purulent, urine c/s neg due to abx received prior to cxs  may need iv diuretic prn if uo does not  after adequate hydration    10/1  more awake alert, although still lethargic  VVS  got 60 then 100 lasix IV yesterday  was anuric after sharma placement (300 ml residual) on 9/30  UO last night increased to 150 ml night shift, now 20-45 ml/hr  IV bolus 500 ml over an hour given  now on 125 ml/hr  hgb down to 7.5, may be due to dilution + some blood loss in wound  case d/w Dr Phelps and pts family  check cxr to r/o CHF    10/2 SY  --ANI/CKD   responding to IVF with increased urine output.  Continue current 1/2 NS and follow.  No signs of fluid overload.  --Left Hip fx --post repair.  Rehab  --Fluid status stable.  Electrolytes acceptable    10/3  hypernatremia due to Saline and diuretics  will switch to d5w 75 ml/hr x 12 hrs then dc   AM labs, will determine fluids  d/w Dr Forte    10/4 SY  --ANI/CKD    Renal fx improving with increased urine output.   Pt with no po intake.  Continue maintenance fluid.  --Hypernatremia--Continue and increase D5W.  --Left Hip fx  Post repair  For rehab.    10/5 SY  --ANI/CKD   Renal fx improving.  Urine output adequate.  Hypernatremia improved.   Will d/c D5W since pt now with increased po intake with concern for hyperglycemia and at risk for CHF.  --Left Hip fx repair.   PT and rehab.  --Resp status stable for now    10/6 MK  - ANI/CKD slowly improving with variability due to intake  - Hypermatremia: will continue to encourage po fluid intake  - left hip fracture: s/p repair    10/7 MK  - ANI/CKD with worsening creatinine and hypernatremia: restart 1/2 ns at low rate and monitor response  - Anxiety: will increase celexa and xanax x1 now  aline lion at bedside

## 2017-10-07 NOTE — CHART NOTE - NSCHARTNOTEFT_GEN_A_CORE
Upon Nutritional Assessment by the Registered Dietitian your patient was determined to meet criteria has evidence of the following diagnosis/diagnoses:        [ ]  Mild Protein Calorie Malnutrition        [ ]  Moderate Protein Calorie Malnutrition        [ x] Severe Protein Calorie Malnutrition        [ ] Unspecified Protein Calorie Malnutrition    Findings as based on:  •  Comprehensive nutrition assessment and Nutrition focused physical examination  •  Insufficient food/energy intake  •  Weight loss over time(Please specify time period)  •  Loss of muscle mass  •  Loss of fat mass  •  Fluid accumulation    Findings relevant to patient:  1. Fluid accumulation +1 generalized edema  2. Poor intake <50% x 10 days  3 Unhealed PU (unstageable)  4. unable to feed self  5. Significant weight loss x 2.5 months (~10%).  6. AMS    Nutrition Interventions:  1. Prosource 1oz. po TID mixed with juice  2. Glucerna 8oz. po TID   3. MVI w/ minerals, Vitamin C, and Zinc sulfate  4. Assistance with meals as needed.       PROVIDER Section:     By signing this assessment you are acknowledging and agree with the diagnosis/diagnoses assigned by the Registered Dietitian    Comments:

## 2017-10-07 NOTE — DIETITIAN INITIAL EVALUATION ADULT. - NS AS NUTRI DX NUTRIENT
Malnutrition/Pt meets criteria for severe protein/calorie malnutrition in context of acute illness secondary to poor intake <50% of estimated needs > 5 days, and Significant weight loss ~10% x 2.5 months.

## 2017-10-07 NOTE — DIETITIAN INITIAL EVALUATION ADULT. - OTHER INFO
Pt w/ good po intake tolerating current diet rx. PTA followed no therapeutic restrictions. Skin intact w/ +1 edema noted.  CKD, DM, Cardiac history noted- diet appropriate at this time BG remain uncontrolled (336-412mg/dl secondary to steroid tx and DM. Consistent carb, Renal, DASH/TLC diet . Family requesting diet instruction, provided educational materials and reviewed diet. Pt with poor intake <50% of meals consumed since admission and before as per daughter. Pt appears slightly confused (AMS noted). Total assistance needed during meals currently. Skin: Medial sacrum unstageable, w/ +1 generalized edema noted. Significant weight loss secondary to a combination of fluid loss and poor intake. S/P fall with hip fx (S/P surgery). Pt meets criteria for severe protein/calorie malnutrition in context of acute illness secondary to poor intake <50% of estimated needs > 5 days, and Significant weight loss ~10% x 2.5 months. Recommend: 1) Prosource 1oz. po TID mixed with juice. 2) Glucerna 8oz po TID due to poor intake/malnutrition. 3) MVI w/ minerals, Vitamin C, and Zinc Sulfate for wound healing. 4) continue to order meals at  for optimal intake and food preferences.

## 2017-10-07 NOTE — DIETITIAN INITIAL EVALUATION ADULT. - ETIOLOGY
Pt not following therapeutic restictions (consumes foods high in Na+/Chol/Fat/Carbohydrates) S/P hip fx, AMS, unhealed wounds

## 2017-10-08 LAB
ANION GAP SERPL CALC-SCNC: 7 MMOL/L — SIGNIFICANT CHANGE UP (ref 5–17)
BUN SERPL-MCNC: 82 MG/DL — HIGH (ref 7–23)
CALCIUM SERPL-MCNC: 8.3 MG/DL — LOW (ref 8.5–10.1)
CHLORIDE SERPL-SCNC: 111 MMOL/L — HIGH (ref 96–108)
CO2 SERPL-SCNC: 24 MMOL/L — SIGNIFICANT CHANGE UP (ref 22–31)
CREAT SERPL-MCNC: 3.41 MG/DL — HIGH (ref 0.5–1.3)
GLUCOSE SERPL-MCNC: 106 MG/DL — HIGH (ref 70–99)
HCT VFR BLD CALC: 26.1 % — LOW (ref 34.5–45)
HGB BLD-MCNC: 8.6 G/DL — LOW (ref 11.5–15.5)
MCHC RBC-ENTMCNC: 28.4 PG — SIGNIFICANT CHANGE UP (ref 27–34)
MCHC RBC-ENTMCNC: 32.9 GM/DL — SIGNIFICANT CHANGE UP (ref 32–36)
MCV RBC AUTO: 86.6 FL — SIGNIFICANT CHANGE UP (ref 80–100)
PLATELET # BLD AUTO: 295 K/UL — SIGNIFICANT CHANGE UP (ref 150–400)
POTASSIUM SERPL-MCNC: 3.7 MMOL/L — SIGNIFICANT CHANGE UP (ref 3.5–5.3)
POTASSIUM SERPL-SCNC: 3.7 MMOL/L — SIGNIFICANT CHANGE UP (ref 3.5–5.3)
RBC # BLD: 3.02 M/UL — LOW (ref 3.8–5.2)
RBC # FLD: 16 % — HIGH (ref 10.3–14.5)
SODIUM SERPL-SCNC: 142 MMOL/L — SIGNIFICANT CHANGE UP (ref 135–145)
WBC # BLD: 8.9 K/UL — SIGNIFICANT CHANGE UP (ref 3.8–10.5)
WBC # FLD AUTO: 8.9 K/UL — SIGNIFICANT CHANGE UP (ref 3.8–10.5)

## 2017-10-08 RX ORDER — LEVOTHYROXINE SODIUM 125 MCG
125 TABLET ORAL DAILY
Qty: 0 | Refills: 0 | Status: DISCONTINUED | OUTPATIENT
Start: 2017-10-08 | End: 2017-10-12

## 2017-10-08 RX ORDER — HYDROMORPHONE HYDROCHLORIDE 2 MG/ML
0.5 INJECTION INTRAMUSCULAR; INTRAVENOUS; SUBCUTANEOUS
Qty: 0 | Refills: 0 | Status: DISCONTINUED | OUTPATIENT
Start: 2017-10-08 | End: 2017-10-10

## 2017-10-08 RX ORDER — ALPRAZOLAM 0.25 MG
0.25 TABLET ORAL EVERY 8 HOURS
Qty: 0 | Refills: 0 | Status: DISCONTINUED | OUTPATIENT
Start: 2017-10-08 | End: 2017-10-12

## 2017-10-08 RX ADMIN — HYDROMORPHONE HYDROCHLORIDE 0.5 MILLIGRAM(S): 2 INJECTION INTRAMUSCULAR; INTRAVENOUS; SUBCUTANEOUS at 15:07

## 2017-10-08 RX ADMIN — PANTOPRAZOLE SODIUM 40 MILLIGRAM(S): 20 TABLET, DELAYED RELEASE ORAL at 15:06

## 2017-10-08 RX ADMIN — Medication 0.25 MILLIGRAM(S): at 08:02

## 2017-10-08 RX ADMIN — HEPARIN SODIUM 5000 UNIT(S): 5000 INJECTION INTRAVENOUS; SUBCUTANEOUS at 21:34

## 2017-10-08 RX ADMIN — CLOPIDOGREL BISULFATE 75 MILLIGRAM(S): 75 TABLET, FILM COATED ORAL at 14:54

## 2017-10-08 RX ADMIN — BUDESONIDE AND FORMOTEROL FUMARATE DIHYDRATE 2 PUFF(S): 160; 4.5 AEROSOL RESPIRATORY (INHALATION) at 19:50

## 2017-10-08 RX ADMIN — ATORVASTATIN CALCIUM 20 MILLIGRAM(S): 80 TABLET, FILM COATED ORAL at 21:34

## 2017-10-08 RX ADMIN — Medication 180 MILLIGRAM(S): at 06:35

## 2017-10-08 RX ADMIN — CITALOPRAM 20 MILLIGRAM(S): 10 TABLET, FILM COATED ORAL at 14:54

## 2017-10-08 RX ADMIN — Medication 100 MILLIGRAM(S): at 06:35

## 2017-10-08 RX ADMIN — Medication 60 MICROGRAM(S): at 06:34

## 2017-10-08 RX ADMIN — SODIUM CHLORIDE 50 MILLILITER(S): 9 INJECTION, SOLUTION INTRAVENOUS at 11:08

## 2017-10-08 RX ADMIN — HYDROMORPHONE HYDROCHLORIDE 0.5 MILLIGRAM(S): 2 INJECTION INTRAMUSCULAR; INTRAVENOUS; SUBCUTANEOUS at 17:30

## 2017-10-08 RX ADMIN — HEPARIN SODIUM 5000 UNIT(S): 5000 INJECTION INTRAVENOUS; SUBCUTANEOUS at 06:34

## 2017-10-08 RX ADMIN — BUDESONIDE AND FORMOTEROL FUMARATE DIHYDRATE 2 PUFF(S): 160; 4.5 AEROSOL RESPIRATORY (INHALATION) at 07:42

## 2017-10-08 RX ADMIN — HYDROMORPHONE HYDROCHLORIDE 0.5 MILLIGRAM(S): 2 INJECTION INTRAMUSCULAR; INTRAVENOUS; SUBCUTANEOUS at 08:01

## 2017-10-08 RX ADMIN — Medication 100 MILLIGRAM(S): at 21:33

## 2017-10-08 RX ADMIN — TIOTROPIUM BROMIDE 1 CAPSULE(S): 18 CAPSULE ORAL; RESPIRATORY (INHALATION) at 07:44

## 2017-10-08 RX ADMIN — ONDANSETRON 4 MILLIGRAM(S): 8 TABLET, FILM COATED ORAL at 06:48

## 2017-10-08 RX ADMIN — HYDROMORPHONE HYDROCHLORIDE 0.5 MILLIGRAM(S): 2 INJECTION INTRAMUSCULAR; INTRAVENOUS; SUBCUTANEOUS at 14:55

## 2017-10-08 RX ADMIN — HEPARIN SODIUM 5000 UNIT(S): 5000 INJECTION INTRAVENOUS; SUBCUTANEOUS at 15:30

## 2017-10-08 RX ADMIN — SENNA PLUS 2 TABLET(S): 8.6 TABLET ORAL at 21:33

## 2017-10-08 NOTE — PROGRESS NOTE ADULT - ASSESSMENT
76 year old female with PMHx HTN, HLD, CAD, ADVANCED COPD ON HOME O2 (3LITERS), IDDM, CHRONIC DIASTOLIC HEART FAILURE, RECENT ADMISSIONS FOR HYPOXIC RESPIRATORY FAILURE STATES SHE WAS LEAVING [facility] EARLIER TODAY WITH HER BROTHER AND MISSTEPPED RESULTING IN A FALL.  SHE REPORTS HAVING NECK AND LOWER BACK PAIN, ALSO LEFT HIP PAIN.  PT DENIES ANY CHEST PAIN OR SHORTNESS OF BREATH, NO NAUSEA OR VOMITING. JUST RECEIVED DILAUDID AND FEELS SOMEWHAT BETTER. (27 Sep 2017 19:56)  lethargic due to pain meds, last dose last night  family at bedside  for OR in am    addendum:  spoke to pts nurse sol  5 pm  pt is more awake and alert  will d/c oxycodone 10 mg  cont oxy 5 mg and hydromorphone 0.5 mg    9/29  for OR later vvs  low grade temp  renal function stable    9/30  discussed w daughter in law  creatinine climbing  pt w altered mental state due to pain meds anesthesia  has h/o intolerance to narcotics as per daughter  no po intake  on ivf 50 ml/hr, increased to 75 ml/hr  will get cxr due to h/o chf  Sharma in place, purulent, urine c/s neg due to abx received prior to cxs  may need iv diuretic prn if uo does not  after adequate hydration    10/1  more awake alert, although still lethargic  VVS  got 60 then 100 lasix IV yesterday  was anuric after sharma placement (300 ml residual) on 9/30  UO last night increased to 150 ml night shift, now 20-45 ml/hr  IV bolus 500 ml over an hour given  now on 125 ml/hr  hgb down to 7.5, may be due to dilution + some blood loss in wound  case d/w Dr Phelps and pts family  check cxr to r/o CHF    10/2 SY  --ANI/CKD   responding to IVF with increased urine output.  Continue current 1/2 NS and follow.  No signs of fluid overload.  --Left Hip fx --post repair.  Rehab  --Fluid status stable.  Electrolytes acceptable    10/3  hypernatremia due to Saline and diuretics  will switch to d5w 75 ml/hr x 12 hrs then dc   AM labs, will determine fluids  d/w Dr Forte    10/4 SY  --ANI/CKD    Renal fx improving with increased urine output.   Pt with no po intake.  Continue maintenance fluid.  --Hypernatremia--Continue and increase D5W.  --Left Hip fx  Post repair  For rehab.    10/5 SY  --ANI/CKD   Renal fx improving.  Urine output adequate.  Hypernatremia improved.   Will d/c D5W since pt now with increased po intake with concern for hyperglycemia and at risk for CHF.  --Left Hip fx repair.   PT and rehab.  --Resp status stable for now    10/6 MK  - ANI/CKD slowly improving with variability due to intake  - Hypermatremia: will continue to encourage po fluid intake  - left hip fracture: s/p repair    10/7 MK  - ANI/CKD with worsening creatinine and hypernatremia: restart 1/2 ns at low rate and monitor response  - Anxiety: will increase celexa and xanax x1 now  aline lion at bedside     10/8 MK  - ANI/CKD, improving with ivf, will plant to monitor for s/sx of CHF.  continue current rate and composition  - Anorexia: monitor on increased celexa  - Anxiety : better with xanax prn  - s/p left hip s/p repair; pain control

## 2017-10-09 PROCEDURE — 99233 SBSQ HOSP IP/OBS HIGH 50: CPT

## 2017-10-09 PROCEDURE — 71010: CPT | Mod: 26

## 2017-10-09 RX ADMIN — Medication 125 MICROGRAM(S): at 06:14

## 2017-10-09 RX ADMIN — HEPARIN SODIUM 5000 UNIT(S): 5000 INJECTION INTRAVENOUS; SUBCUTANEOUS at 05:16

## 2017-10-09 RX ADMIN — BUDESONIDE AND FORMOTEROL FUMARATE DIHYDRATE 2 PUFF(S): 160; 4.5 AEROSOL RESPIRATORY (INHALATION) at 19:36

## 2017-10-09 RX ADMIN — PANTOPRAZOLE SODIUM 40 MILLIGRAM(S): 20 TABLET, DELAYED RELEASE ORAL at 11:45

## 2017-10-09 RX ADMIN — CLOPIDOGREL BISULFATE 75 MILLIGRAM(S): 75 TABLET, FILM COATED ORAL at 11:45

## 2017-10-09 RX ADMIN — HEPARIN SODIUM 5000 UNIT(S): 5000 INJECTION INTRAVENOUS; SUBCUTANEOUS at 15:35

## 2017-10-09 RX ADMIN — ONDANSETRON 4 MILLIGRAM(S): 8 TABLET, FILM COATED ORAL at 17:31

## 2017-10-09 RX ADMIN — BUDESONIDE AND FORMOTEROL FUMARATE DIHYDRATE 2 PUFF(S): 160; 4.5 AEROSOL RESPIRATORY (INHALATION) at 08:10

## 2017-10-09 RX ADMIN — Medication 100 MILLIGRAM(S): at 06:13

## 2017-10-09 RX ADMIN — Medication 1: at 11:41

## 2017-10-09 RX ADMIN — HYDROMORPHONE HYDROCHLORIDE 0.5 MILLIGRAM(S): 2 INJECTION INTRAMUSCULAR; INTRAVENOUS; SUBCUTANEOUS at 11:49

## 2017-10-09 RX ADMIN — Medication 180 MILLIGRAM(S): at 06:14

## 2017-10-09 RX ADMIN — Medication 100 MILLIGRAM(S): at 21:45

## 2017-10-09 RX ADMIN — ATORVASTATIN CALCIUM 20 MILLIGRAM(S): 80 TABLET, FILM COATED ORAL at 21:45

## 2017-10-09 RX ADMIN — HYDROMORPHONE HYDROCHLORIDE 0.5 MILLIGRAM(S): 2 INJECTION INTRAMUSCULAR; INTRAVENOUS; SUBCUTANEOUS at 10:49

## 2017-10-09 RX ADMIN — POLYETHYLENE GLYCOL 3350 17 GRAM(S): 17 POWDER, FOR SOLUTION ORAL at 11:45

## 2017-10-09 RX ADMIN — HYDROMORPHONE HYDROCHLORIDE 0.5 MILLIGRAM(S): 2 INJECTION INTRAMUSCULAR; INTRAVENOUS; SUBCUTANEOUS at 06:42

## 2017-10-09 RX ADMIN — HYDROMORPHONE HYDROCHLORIDE 0.5 MILLIGRAM(S): 2 INJECTION INTRAMUSCULAR; INTRAVENOUS; SUBCUTANEOUS at 22:02

## 2017-10-09 RX ADMIN — TIOTROPIUM BROMIDE 1 CAPSULE(S): 18 CAPSULE ORAL; RESPIRATORY (INHALATION) at 08:10

## 2017-10-09 RX ADMIN — HEPARIN SODIUM 5000 UNIT(S): 5000 INJECTION INTRAVENOUS; SUBCUTANEOUS at 21:45

## 2017-10-09 RX ADMIN — CITALOPRAM 20 MILLIGRAM(S): 10 TABLET, FILM COATED ORAL at 11:37

## 2017-10-09 RX ADMIN — HYDROMORPHONE HYDROCHLORIDE 0.5 MILLIGRAM(S): 2 INJECTION INTRAMUSCULAR; INTRAVENOUS; SUBCUTANEOUS at 05:16

## 2017-10-09 RX ADMIN — Medication 100 MILLIGRAM(S): at 15:36

## 2017-10-09 RX ADMIN — Medication 0.25 MILLIGRAM(S): at 17:55

## 2017-10-09 NOTE — PROGRESS NOTE ADULT - ASSESSMENT
76 year old female with PMHx HTN, HLD, CAD, ADVANCED COPD ON HOME O2 (3LITERS), IDDM, CHRONIC DIASTOLIC HEART FAILURE, RECENT ADMISSIONS FOR HYPOXIC RESPIRATORY FAILURE STATES SHE WAS LEAVING [facility] EARLIER TODAY WITH HER BROTHER AND MISSTEPPED RESULTING IN A FALL.  SHE REPORTS HAVING NECK AND LOWER BACK PAIN, ALSO LEFT HIP PAIN.  PT DENIES ANY CHEST PAIN OR SHORTNESS OF BREATH, NO NAUSEA OR VOMITING. JUST RECEIVED DILAUDID AND FEELS SOMEWHAT BETTER. (27 Sep 2017 19:56)  lethargic due to pain meds, last dose last night  family at bedside  for OR in am    addendum:  spoke to pts nurse sol  5 pm  pt is more awake and alert  will d/c oxycodone 10 mg  cont oxy 5 mg and hydromorphone 0.5 mg    9/29  for OR later vvs  low grade temp  renal function stable    9/30  discussed w daughter in law  creatinine climbing  pt w altered mental state due to pain meds anesthesia  has h/o intolerance to narcotics as per daughter  no po intake  on ivf 50 ml/hr, increased to 75 ml/hr  will get cxr due to h/o chf  Sharma in place, purulent, urine c/s neg due to abx received prior to cxs  may need iv diuretic prn if uo does not  after adequate hydration    10/1  more awake alert, although still lethargic  VVS  got 60 then 100 lasix IV yesterday  was anuric after sharma placement (300 ml residual) on 9/30  UO last night increased to 150 ml night shift, now 20-45 ml/hr  IV bolus 500 ml over an hour given  now on 125 ml/hr  hgb down to 7.5, may be due to dilution + some blood loss in wound  case d/w Dr Phelps and pts family  check cxr to r/o CHF    10/2 SY  --ANI/CKD   responding to IVF with increased urine output.  Continue current 1/2 NS and follow.  No signs of fluid overload.  --Left Hip fx --post repair.  Rehab  --Fluid status stable.  Electrolytes acceptable    10/3  hypernatremia due to Saline and diuretics  will switch to d5w 75 ml/hr x 12 hrs then dc   AM labs, will determine fluids  d/w Dr Forte    10/4 SY  --ANI/CKD    Renal fx improving with increased urine output.   Pt with no po intake.  Continue maintenance fluid.  --Hypernatremia--Continue and increase D5W.  --Left Hip fx  Post repair  For rehab.    10/5 SY  --ANI/CKD   Renal fx improving.  Urine output adequate.  Hypernatremia improved.   Will d/c D5W since pt now with increased po intake with concern for hyperglycemia and at risk for CHF.  --Left Hip fx repair.   PT and rehab.  --Resp status stable for now    10/6 MK  - ANI/CKD slowly improving with variability due to intake  - Hypermatremia: will continue to encourage po fluid intake  - left hip fracture: s/p repair    10/7 MK  - ANI/CKD with worsening creatinine and hypernatremia: restart 1/2 ns at low rate and monitor response  - Anxiety: will increase celexa and xanax x1 now  aline lion at bedside     10/8 MK  - ANI/CKD, improving with ivf, will plant to monitor for s/sx of CHF.  continue current rate and composition  - Anorexia: monitor on increased celexa  - Anxiety : better with xanax prn  - s/p left hip s/p repair; pain control    10/9 SY  --ANI/CKD   Responding to IVF.   Continue 1/2 NS  --Electrolytes stable and acceptable  --Post Left Hip fx repair.   Pain control acceptable.  PT.  --Continue to monitor po intake.

## 2017-10-09 NOTE — PROGRESS NOTE ADULT - PROBLEM SELECTOR PLAN 1
POD#8  TOLERATED PROCEDURE WELL BUT POST OP COMPLICATED BY ANI  POST OP CONFUSION NOW FINALLY RESOLVED  LEUKOCYTOSIS - RESOLVED, MONITOR  PHYSICAL THERAPY

## 2017-10-09 NOTE — PROGRESS NOTE ADULT - ASSESSMENT
Calf pain L - no evidence of DVT.    Chronic diastolic heart failure- stable  Advised pt to record the cardioMEMS readings today.  Today there are crackles noted in her lungs.  Close monitoring of the volume status needed with the [facility] challange nephrology team recommending.   So far improvement in her renal function noted.      ANI-  complicated by hypernatremia as well which is  improving. Currently on half NSS.   Nephrology team in put appreciated.  Close monitoring of the volume status is recommended.      Anemia- postop.  Please maintain Hgb 9 with her prior MI and CAD history.    HTN- continue current meds.    Hyperlipidemia- continue statin.    L hip fracture s/p surgery- recommend daily PT while she is here.  Management pre primary team and ortho team.    Other medical issues- Management per primary team.   Thank you for allowing me to participate in the care of this patient. Please feel free to contact me with any questions.

## 2017-10-10 LAB
ANION GAP SERPL CALC-SCNC: 9 MMOL/L — SIGNIFICANT CHANGE UP (ref 5–17)
BUN SERPL-MCNC: 67 MG/DL — HIGH (ref 7–23)
CALCIUM SERPL-MCNC: 8.8 MG/DL — SIGNIFICANT CHANGE UP (ref 8.5–10.1)
CHLORIDE SERPL-SCNC: 110 MMOL/L — HIGH (ref 96–108)
CO2 SERPL-SCNC: 21 MMOL/L — LOW (ref 22–31)
CREAT SERPL-MCNC: 2.83 MG/DL — HIGH (ref 0.5–1.3)
GLUCOSE BLDC GLUCOMTR-MCNC: 216 MG/DL — HIGH (ref 70–99)
GLUCOSE BLDC GLUCOMTR-MCNC: 232 MG/DL — HIGH (ref 70–99)
GLUCOSE SERPL-MCNC: 181 MG/DL — HIGH (ref 70–99)
HCT VFR BLD CALC: 25.1 % — LOW (ref 34.5–45)
HGB BLD-MCNC: 8.2 G/DL — LOW (ref 11.5–15.5)
MCHC RBC-ENTMCNC: 28.6 PG — SIGNIFICANT CHANGE UP (ref 27–34)
MCHC RBC-ENTMCNC: 32.8 GM/DL — SIGNIFICANT CHANGE UP (ref 32–36)
MCV RBC AUTO: 87.3 FL — SIGNIFICANT CHANGE UP (ref 80–100)
PLATELET # BLD AUTO: 299 K/UL — SIGNIFICANT CHANGE UP (ref 150–400)
POTASSIUM SERPL-MCNC: 4 MMOL/L — SIGNIFICANT CHANGE UP (ref 3.5–5.3)
POTASSIUM SERPL-SCNC: 4 MMOL/L — SIGNIFICANT CHANGE UP (ref 3.5–5.3)
RBC # BLD: 2.88 M/UL — LOW (ref 3.8–5.2)
RBC # FLD: 16.2 % — HIGH (ref 10.3–14.5)
SODIUM SERPL-SCNC: 140 MMOL/L — SIGNIFICANT CHANGE UP (ref 135–145)
WBC # BLD: 10.7 K/UL — HIGH (ref 3.8–10.5)
WBC # FLD AUTO: 10.7 K/UL — HIGH (ref 3.8–10.5)

## 2017-10-10 RX ORDER — TRAMADOL HYDROCHLORIDE 50 MG/1
50 TABLET ORAL EVERY 8 HOURS
Qty: 0 | Refills: 0 | Status: DISCONTINUED | OUTPATIENT
Start: 2017-10-10 | End: 2017-10-12

## 2017-10-10 RX ADMIN — HEPARIN SODIUM 5000 UNIT(S): 5000 INJECTION INTRAVENOUS; SUBCUTANEOUS at 22:36

## 2017-10-10 RX ADMIN — Medication 2: at 18:36

## 2017-10-10 RX ADMIN — Medication 180 MILLIGRAM(S): at 05:19

## 2017-10-10 RX ADMIN — ONDANSETRON 4 MILLIGRAM(S): 8 TABLET, FILM COATED ORAL at 12:33

## 2017-10-10 RX ADMIN — HEPARIN SODIUM 5000 UNIT(S): 5000 INJECTION INTRAVENOUS; SUBCUTANEOUS at 05:19

## 2017-10-10 RX ADMIN — CLOPIDOGREL BISULFATE 75 MILLIGRAM(S): 75 TABLET, FILM COATED ORAL at 12:32

## 2017-10-10 RX ADMIN — Medication 10 MILLIGRAM(S): at 05:18

## 2017-10-10 RX ADMIN — Medication 1: at 12:29

## 2017-10-10 RX ADMIN — CITALOPRAM 20 MILLIGRAM(S): 10 TABLET, FILM COATED ORAL at 12:32

## 2017-10-10 RX ADMIN — BUDESONIDE AND FORMOTEROL FUMARATE DIHYDRATE 2 PUFF(S): 160; 4.5 AEROSOL RESPIRATORY (INHALATION) at 10:28

## 2017-10-10 RX ADMIN — BUDESONIDE AND FORMOTEROL FUMARATE DIHYDRATE 2 PUFF(S): 160; 4.5 AEROSOL RESPIRATORY (INHALATION) at 19:57

## 2017-10-10 RX ADMIN — HEPARIN SODIUM 5000 UNIT(S): 5000 INJECTION INTRAVENOUS; SUBCUTANEOUS at 16:04

## 2017-10-10 RX ADMIN — INSULIN GLARGINE 40 UNIT(S): 100 INJECTION, SOLUTION SUBCUTANEOUS at 23:57

## 2017-10-10 RX ADMIN — SENNA PLUS 2 TABLET(S): 8.6 TABLET ORAL at 22:36

## 2017-10-10 RX ADMIN — PANTOPRAZOLE SODIUM 40 MILLIGRAM(S): 20 TABLET, DELAYED RELEASE ORAL at 12:39

## 2017-10-10 RX ADMIN — Medication 100 MILLIGRAM(S): at 22:36

## 2017-10-10 RX ADMIN — TIOTROPIUM BROMIDE 1 CAPSULE(S): 18 CAPSULE ORAL; RESPIRATORY (INHALATION) at 10:27

## 2017-10-10 RX ADMIN — TRAMADOL HYDROCHLORIDE 50 MILLIGRAM(S): 50 TABLET ORAL at 22:41

## 2017-10-10 RX ADMIN — POLYETHYLENE GLYCOL 3350 17 GRAM(S): 17 POWDER, FOR SOLUTION ORAL at 16:03

## 2017-10-10 RX ADMIN — Medication 125 MICROGRAM(S): at 05:19

## 2017-10-10 RX ADMIN — Medication 1: at 09:35

## 2017-10-10 RX ADMIN — Medication 100 MILLIGRAM(S): at 16:04

## 2017-10-10 RX ADMIN — ONDANSETRON 4 MILLIGRAM(S): 8 TABLET, FILM COATED ORAL at 02:06

## 2017-10-10 RX ADMIN — Medication 10 MILLIGRAM(S): at 19:33

## 2017-10-10 RX ADMIN — ATORVASTATIN CALCIUM 20 MILLIGRAM(S): 80 TABLET, FILM COATED ORAL at 22:36

## 2017-10-10 RX ADMIN — TRAMADOL HYDROCHLORIDE 50 MILLIGRAM(S): 50 TABLET ORAL at 15:16

## 2017-10-10 NOTE — PROGRESS NOTE ADULT - ASSESSMENT
76 year old female with PMHx HTN, HLD, CAD, ADVANCED COPD ON HOME O2 (3LITERS), IDDM, CHRONIC DIASTOLIC HEART FAILURE, RECENT ADMISSIONS FOR HYPOXIC RESPIRATORY FAILURE STATES SHE WAS LEAVING [facility] EARLIER TODAY WITH HER BROTHER AND MISSTEPPED RESULTING IN A FALL.  SHE REPORTS HAVING NECK AND LOWER BACK PAIN, ALSO LEFT HIP PAIN.  PT DENIES ANY CHEST PAIN OR SHORTNESS OF BREATH, NO NAUSEA OR VOMITING. JUST RECEIVED DILAUDID AND FEELS SOMEWHAT BETTER. (27 Sep 2017 19:56)  lethargic due to pain meds, last dose last night  family at bedside  for OR in am    addendum:  spoke to pts nurse sol  5 pm  pt is more awake and alert  will d/c oxycodone 10 mg  cont oxy 5 mg and hydromorphone 0.5 mg    9/29  for OR later vvs  low grade temp  renal function stable    9/30  discussed w daughter in law  creatinine climbing  pt w altered mental state due to pain meds anesthesia  has h/o intolerance to narcotics as per daughter  no po intake  on ivf 50 ml/hr, increased to 75 ml/hr  will get cxr due to h/o chf  Sharma in place, purulent, urine c/s neg due to abx received prior to cxs  may need iv diuretic prn if uo does not  after adequate hydration    10/1  more awake alert, although still lethargic  VVS  got 60 then 100 lasix IV yesterday  was anuric after sharma placement (300 ml residual) on 9/30  UO last night increased to 150 ml night shift, now 20-45 ml/hr  IV bolus 500 ml over an hour given  now on 125 ml/hr  hgb down to 7.5, may be due to dilution + some blood loss in wound  case d/w Dr Phelps and pts family  check cxr to r/o CHF    10/2 SY  --ANI/CKD   responding to IVF with increased urine output.  Continue current 1/2 NS and follow.  No signs of fluid overload.  --Left Hip fx --post repair.  Rehab  --Fluid status stable.  Electrolytes acceptable    10/3  hypernatremia due to Saline and diuretics  will switch to d5w 75 ml/hr x 12 hrs then dc   AM labs, will determine fluids  d/w Dr Forte    10/4 SY  --ANI/CKD    Renal fx improving with increased urine output.   Pt with no po intake.  Continue maintenance fluid.  --Hypernatremia--Continue and increase D5W.  --Left Hip fx  Post repair  For rehab.    10/5 SY  --ANI/CKD   Renal fx improving.  Urine output adequate.  Hypernatremia improved.   Will d/c D5W since pt now with increased po intake with concern for hyperglycemia and at risk for CHF.  --Left Hip fx repair.   PT and rehab.  --Resp status stable for now    10/6 MK  - ANI/CKD slowly improving with variability due to intake  - Hypermatremia: will continue to encourage po fluid intake  - left hip fracture: s/p repair    10/7 MK  - ANI/CKD with worsening creatinine and hypernatremia: restart 1/2 ns at low rate and monitor response  - Anxiety: will increase celexa and xanax x1 now  aline lion at bedside     10/8 MK  - ANI/CKD, improving with ivf, will plant to monitor for s/sx of CHF.  continue current rate and composition  - Anorexia: monitor on increased celexa  - Anxiety : better with xanax prn  - s/p left hip s/p repair; pain control    10/9 SY  --ANI/CKD   Responding to IVF.   Continue 1/2 NS  --Electrolytes stable and acceptable  --Post Left Hip fx repair.   Pain control acceptable.  PT.  --Continue to monitor po intake.    10/10  awake  feels better but not eating, still in pain  more appropriate, but still confused and slow in mentation   at the bedside  d/w Dr Phelps, will change pain med to tramadol, monitor for seizure, tramadol may lower the threshold  cont IVF at 50 ml/hr since not taking much po

## 2017-10-10 NOTE — PROGRESS NOTE ADULT - PROBLEM SELECTOR PLAN 1
POD#9  TOLERATED PROCEDURE WELL BUT POST OP COMPLICATED BY ANI  POST OP CONFUSION NOW FINALLY RESOLVED  LEUKOCYTOSIS - SLIGHT BUT NO SIGNS OF ACUTE INFECTION  PHYSICAL THERAPY  DC DILAUDID AS MAY BE MAKING HER NAUSEATED  TRIAL OF TRAMADOL

## 2017-10-10 NOTE — PROGRESS NOTE ADULT - ASSESSMENT
Anxiety- Management pre primary team. Already on prn xanax.     Chronic diastolic heart failure- stable  Advised pt to record the cardioMEMS readings but she does not have the whole set up here to do this and I advised pts daughter that we need the monitor to be brought in to the hospital.  So far improvement in her renal function noted.      ANI-  improving.  Nephrology team evaluating pt.      Anemia- postop.  Please maintain Hgb 9 with her prior MI and CAD history.    HTN- continue current meds.    Hyperlipidemia- continue statin.    L hip fracture s/p surgery- recommend daily PT while she is here.  pt anxious and refused her PT today.  Management pre primary team and ortho team.    Other medical issues- Management per primary team.   Thank you for allowing me to participate in the care of this patient. Please feel free to contact me with any questions.

## 2017-10-10 NOTE — PROGRESS NOTE ADULT - ASSESSMENT
This is a 76 year old female s/p fall causing fx of her left hip.  Now s/p Left hip IM nail.  Pt has high thrombosis risk and requires anticoagulation prophylaxis.  10-7-17 doppler neg bl le.  Plan:   :cont heparin 5,000 units sq q8h  ::daily cbc/bmp  ::le venodynes  ::increase mobility as tolerated.      Will continue to follow.

## 2017-10-11 LAB
ANION GAP SERPL CALC-SCNC: 8 MMOL/L — SIGNIFICANT CHANGE UP (ref 5–17)
BUN SERPL-MCNC: 53 MG/DL — HIGH (ref 7–23)
CALCIUM SERPL-MCNC: 9.1 MG/DL — SIGNIFICANT CHANGE UP (ref 8.5–10.1)
CHLORIDE SERPL-SCNC: 112 MMOL/L — HIGH (ref 96–108)
CO2 SERPL-SCNC: 24 MMOL/L — SIGNIFICANT CHANGE UP (ref 22–31)
CREAT SERPL-MCNC: 2.54 MG/DL — HIGH (ref 0.5–1.3)
GLUCOSE BLDC GLUCOMTR-MCNC: 223 MG/DL — HIGH (ref 70–99)
GLUCOSE BLDC GLUCOMTR-MCNC: 86 MG/DL — SIGNIFICANT CHANGE UP (ref 70–99)
GLUCOSE BLDC GLUCOMTR-MCNC: 90 MG/DL — SIGNIFICANT CHANGE UP (ref 70–99)
GLUCOSE SERPL-MCNC: 73 MG/DL — SIGNIFICANT CHANGE UP (ref 70–99)
HCT VFR BLD CALC: 25.6 % — LOW (ref 34.5–45)
HGB BLD-MCNC: 8.6 G/DL — LOW (ref 11.5–15.5)
MCHC RBC-ENTMCNC: 29 PG — SIGNIFICANT CHANGE UP (ref 27–34)
MCHC RBC-ENTMCNC: 33.6 GM/DL — SIGNIFICANT CHANGE UP (ref 32–36)
MCV RBC AUTO: 86.1 FL — SIGNIFICANT CHANGE UP (ref 80–100)
PLATELET # BLD AUTO: 361 K/UL — SIGNIFICANT CHANGE UP (ref 150–400)
POTASSIUM SERPL-MCNC: 4.1 MMOL/L — SIGNIFICANT CHANGE UP (ref 3.5–5.3)
POTASSIUM SERPL-SCNC: 4.1 MMOL/L — SIGNIFICANT CHANGE UP (ref 3.5–5.3)
RBC # BLD: 2.97 M/UL — LOW (ref 3.8–5.2)
RBC # FLD: 16.8 % — HIGH (ref 10.3–14.5)
SODIUM SERPL-SCNC: 144 MMOL/L — SIGNIFICANT CHANGE UP (ref 135–145)
WBC # BLD: 12.4 K/UL — HIGH (ref 3.8–10.5)
WBC # FLD AUTO: 12.4 K/UL — HIGH (ref 3.8–10.5)

## 2017-10-11 PROCEDURE — 99233 SBSQ HOSP IP/OBS HIGH 50: CPT

## 2017-10-11 RX ADMIN — PANTOPRAZOLE SODIUM 40 MILLIGRAM(S): 20 TABLET, DELAYED RELEASE ORAL at 12:54

## 2017-10-11 RX ADMIN — BUDESONIDE AND FORMOTEROL FUMARATE DIHYDRATE 2 PUFF(S): 160; 4.5 AEROSOL RESPIRATORY (INHALATION) at 19:32

## 2017-10-11 RX ADMIN — Medication 10 MILLIGRAM(S): at 23:44

## 2017-10-11 RX ADMIN — ATORVASTATIN CALCIUM 20 MILLIGRAM(S): 80 TABLET, FILM COATED ORAL at 22:07

## 2017-10-11 RX ADMIN — SENNA PLUS 2 TABLET(S): 8.6 TABLET ORAL at 22:07

## 2017-10-11 RX ADMIN — TRAMADOL HYDROCHLORIDE 50 MILLIGRAM(S): 50 TABLET ORAL at 20:30

## 2017-10-11 RX ADMIN — ONDANSETRON 4 MILLIGRAM(S): 8 TABLET, FILM COATED ORAL at 19:44

## 2017-10-11 RX ADMIN — HEPARIN SODIUM 5000 UNIT(S): 5000 INJECTION INTRAVENOUS; SUBCUTANEOUS at 06:01

## 2017-10-11 RX ADMIN — CLOPIDOGREL BISULFATE 75 MILLIGRAM(S): 75 TABLET, FILM COATED ORAL at 12:54

## 2017-10-11 RX ADMIN — TRAMADOL HYDROCHLORIDE 50 MILLIGRAM(S): 50 TABLET ORAL at 19:43

## 2017-10-11 RX ADMIN — Medication 100 MILLIGRAM(S): at 22:07

## 2017-10-11 RX ADMIN — ONDANSETRON 4 MILLIGRAM(S): 8 TABLET, FILM COATED ORAL at 11:25

## 2017-10-11 RX ADMIN — INSULIN GLARGINE 40 UNIT(S): 100 INJECTION, SOLUTION SUBCUTANEOUS at 22:08

## 2017-10-11 RX ADMIN — HEPARIN SODIUM 5000 UNIT(S): 5000 INJECTION INTRAVENOUS; SUBCUTANEOUS at 22:07

## 2017-10-11 RX ADMIN — BUDESONIDE AND FORMOTEROL FUMARATE DIHYDRATE 2 PUFF(S): 160; 4.5 AEROSOL RESPIRATORY (INHALATION) at 10:34

## 2017-10-11 RX ADMIN — Medication 180 MILLIGRAM(S): at 06:01

## 2017-10-11 RX ADMIN — TIOTROPIUM BROMIDE 1 CAPSULE(S): 18 CAPSULE ORAL; RESPIRATORY (INHALATION) at 10:34

## 2017-10-11 RX ADMIN — HEPARIN SODIUM 5000 UNIT(S): 5000 INJECTION INTRAVENOUS; SUBCUTANEOUS at 15:19

## 2017-10-11 RX ADMIN — Medication 125 MICROGRAM(S): at 06:01

## 2017-10-11 RX ADMIN — CITALOPRAM 20 MILLIGRAM(S): 10 TABLET, FILM COATED ORAL at 12:54

## 2017-10-11 RX ADMIN — Medication 100 MILLIGRAM(S): at 06:01

## 2017-10-11 NOTE — PROGRESS NOTE ADULT - ASSESSMENT
Anxiety- Management pre primary team. Already on prn xanax.     Chronic diastolic heart failure- she might be becoming slightly hypervolemic.  am labs pending now.  I think if renal function is further improving it will be a good idea to hold IVF at this time.  Her respiratory status is very delicate with severe heart failure and COPD she has .  She is currently on 4 lit O2 supplmentation.      ANI-  improving.  Nephrology team evaluating pt.      Anemia- postop.  Please maintain Hgb 9 with her prior MI and CAD history.    HTN- continue current meds.    Hyperlipidemia- continue statin.    L hip fracture s/p surgery- recommend daily PT while she is here.    Management pre primary team and ortho team.    Other medical issues- Management per primary team.   Thank you for allowing me to participate in the care of this patient. Please feel free to contact me with any questions.

## 2017-10-11 NOTE — PROGRESS NOTE ADULT - PROBLEM SELECTOR PLAN 1
POD#10  TOLERATED PROCEDURE WELL BUT POST OP COMPLICATED BY ANI  POST OP CONFUSION NOW FINALLY RESOLVED  LEUKOCYTOSIS - SLIGHT BUT NO SIGNS OF ACUTE INFECTION  PHYSICAL THERAPY  DC DILAUDID AS MAY BE MAKING HER NAUSEATED  TRIAL OF TRAMADOL  DOING BETTER POD#10  TOLERATED PROCEDURE WELL BUT POST OP COMPLICATED BY ANI  POST OP CONFUSION s=SECONDARY TO ACUE METABOLIC ENCEPHALOPATHY NOW RESOLVED AS ACUTE RENAL FUNCTION RESOLVING.  LEUKOCYTOSIS - SLIGHT BUT NO SIGNS OF ACUTE INFECTION  PHYSICAL THERAPY  DC DILAUDID AS MAY BE MAKING HER NAUSEATED  TRIAL OF TRAMADOL  DOING BETTER

## 2017-10-11 NOTE — PROGRESS NOTE ADULT - ASSESSMENT
76 year old female with PMHx HTN, HLD, CAD, ADVANCED COPD ON HOME O2 (3LITERS), IDDM, CHRONIC DIASTOLIC HEART FAILURE, RECENT ADMISSIONS FOR HYPOXIC RESPIRATORY FAILURE STATES SHE WAS LEAVING [facility] EARLIER TODAY WITH HER BROTHER AND MISSTEPPED RESULTING IN A FALL.  SHE REPORTS HAVING NECK AND LOWER BACK PAIN, ALSO LEFT HIP PAIN.  PT DENIES ANY CHEST PAIN OR SHORTNESS OF BREATH, NO NAUSEA OR VOMITING. JUST RECEIVED DILAUDID AND FEELS SOMEWHAT BETTER. (27 Sep 2017 19:56)  lethargic due to pain meds, last dose last night  family at bedside  for OR in am    addendum:  spoke to pts nurse sol  5 pm  pt is more awake and alert  will d/c oxycodone 10 mg  cont oxy 5 mg and hydromorphone 0.5 mg    9/29  for OR later vvs  low grade temp  renal function stable    9/30  discussed w daughter in law  creatinine climbing  pt w altered mental state due to pain meds anesthesia  has h/o intolerance to narcotics as per daughter  no po intake  on ivf 50 ml/hr, increased to 75 ml/hr  will get cxr due to h/o chf  Sharma in place, purulent, urine c/s neg due to abx received prior to cxs  may need iv diuretic prn if uo does not  after adequate hydration    10/1  more awake alert, although still lethargic  VVS  got 60 then 100 lasix IV yesterday  was anuric after sharma placement (300 ml residual) on 9/30  UO last night increased to 150 ml night shift, now 20-45 ml/hr  IV bolus 500 ml over an hour given  now on 125 ml/hr  hgb down to 7.5, may be due to dilution + some blood loss in wound  case d/w Dr Phelps and pts family  check cxr to r/o CHF    10/2 SY  --ANI/CKD   responding to IVF with increased urine output.  Continue current 1/2 NS and follow.  No signs of fluid overload.  --Left Hip fx --post repair.  Rehab  --Fluid status stable.  Electrolytes acceptable    10/3  hypernatremia due to Saline and diuretics  will switch to d5w 75 ml/hr x 12 hrs then dc   AM labs, will determine fluids  d/w Dr Forte    10/4 SY  --ANI/CKD    Renal fx improving with increased urine output.   Pt with no po intake.  Continue maintenance fluid.  --Hypernatremia--Continue and increase D5W.  --Left Hip fx  Post repair  For rehab.    10/5 SY  --ANI/CKD   Renal fx improving.  Urine output adequate.  Hypernatremia improved.   Will d/c D5W since pt now with increased po intake with concern for hyperglycemia and at risk for CHF.  --Left Hip fx repair.   PT and rehab.  --Resp status stable for now    10/6 MK  - ANI/CKD slowly improving with variability due to intake  - Hypermatremia: will continue to encourage po fluid intake  - left hip fracture: s/p repair    10/7 MK  - ANI/CKD with worsening creatinine and hypernatremia: restart 1/2 ns at low rate and monitor response  - Anxiety: will increase celexa and xanax x1 now  aline lion at bedside     10/8 MK  - ANI/CKD, improving with ivf, will plant to monitor for s/sx of CHF.  continue current rate and composition  - Anorexia: monitor on increased celexa  - Anxiety : better with xanax prn  - s/p left hip s/p repair; pain control    10/9 SY  --ANI/CKD   Responding to IVF.   Continue 1/2 NS  --Electrolytes stable and acceptable  --Post Left Hip fx repair.   Pain control acceptable.  PT.  --Continue to monitor po intake.    10/10  awake  feels better but not eating, still in pain  more appropriate, but still confused and slow in mentation   at the bedside  d/w Dr Phelps, will change pain med to tramadol, monitor for seizure, tramadol may lower the threshold  cont IVF at 50 ml/hr since not taking much po    10/11 MK  - ANI/CKD, improving with ivf, continue with 1/2 ns  - s/p left hip fx repair: pain controlled

## 2017-10-12 VITALS
TEMPERATURE: 98 F | HEART RATE: 66 BPM | RESPIRATION RATE: 16 BRPM | DIASTOLIC BLOOD PRESSURE: 36 MMHG | OXYGEN SATURATION: 99 % | SYSTOLIC BLOOD PRESSURE: 142 MMHG

## 2017-10-12 LAB
ANION GAP SERPL CALC-SCNC: 8 MMOL/L — SIGNIFICANT CHANGE UP (ref 5–17)
BUN SERPL-MCNC: 47 MG/DL — HIGH (ref 7–23)
CALCIUM SERPL-MCNC: 9 MG/DL — SIGNIFICANT CHANGE UP (ref 8.5–10.1)
CHLORIDE SERPL-SCNC: 112 MMOL/L — HIGH (ref 96–108)
CO2 SERPL-SCNC: 22 MMOL/L — SIGNIFICANT CHANGE UP (ref 22–31)
CREAT SERPL-MCNC: 2.53 MG/DL — HIGH (ref 0.5–1.3)
GLUCOSE BLDC GLUCOMTR-MCNC: 139 MG/DL — HIGH (ref 70–99)
GLUCOSE BLDC GLUCOMTR-MCNC: 160 MG/DL — HIGH (ref 70–99)
GLUCOSE SERPL-MCNC: 96 MG/DL — SIGNIFICANT CHANGE UP (ref 70–99)
HCT VFR BLD CALC: 28 % — LOW (ref 34.5–45)
HGB BLD-MCNC: 8.8 G/DL — LOW (ref 11.5–15.5)
MCHC RBC-ENTMCNC: 27.6 PG — SIGNIFICANT CHANGE UP (ref 27–34)
MCHC RBC-ENTMCNC: 31.6 GM/DL — LOW (ref 32–36)
MCV RBC AUTO: 87.6 FL — SIGNIFICANT CHANGE UP (ref 80–100)
PLATELET # BLD AUTO: 345 K/UL — SIGNIFICANT CHANGE UP (ref 150–400)
POTASSIUM SERPL-MCNC: 4.3 MMOL/L — SIGNIFICANT CHANGE UP (ref 3.5–5.3)
POTASSIUM SERPL-SCNC: 4.3 MMOL/L — SIGNIFICANT CHANGE UP (ref 3.5–5.3)
RBC # BLD: 3.2 M/UL — LOW (ref 3.8–5.2)
RBC # FLD: 17.4 % — HIGH (ref 10.3–14.5)
SODIUM SERPL-SCNC: 142 MMOL/L — SIGNIFICANT CHANGE UP (ref 135–145)
WBC # BLD: 11.2 K/UL — HIGH (ref 3.8–10.5)
WBC # FLD AUTO: 11.2 K/UL — HIGH (ref 3.8–10.5)

## 2017-10-12 PROCEDURE — 99233 SBSQ HOSP IP/OBS HIGH 50: CPT

## 2017-10-12 RX ORDER — CITALOPRAM 10 MG/1
1 TABLET, FILM COATED ORAL
Qty: 0 | Refills: 0 | COMMUNITY
Start: 2017-10-12

## 2017-10-12 RX ORDER — LEVOTHYROXINE SODIUM 125 MCG
1 TABLET ORAL
Qty: 0 | Refills: 0 | COMMUNITY

## 2017-10-12 RX ORDER — INSULIN GLARGINE 100 [IU]/ML
40 INJECTION, SOLUTION SUBCUTANEOUS
Qty: 0 | Refills: 0 | COMMUNITY
Start: 2017-10-12

## 2017-10-12 RX ORDER — DILTIAZEM HCL 120 MG
1 CAPSULE, EXT RELEASE 24 HR ORAL
Qty: 0 | Refills: 0 | DISCHARGE
Start: 2017-10-12

## 2017-10-12 RX ORDER — SENNA PLUS 8.6 MG/1
2 TABLET ORAL
Qty: 0 | Refills: 0 | COMMUNITY
Start: 2017-10-12

## 2017-10-12 RX ORDER — ACETAMINOPHEN 500 MG
2 TABLET ORAL
Qty: 0 | Refills: 0 | COMMUNITY
Start: 2017-10-12

## 2017-10-12 RX ORDER — ONDANSETRON 8 MG/1
4 TABLET, FILM COATED ORAL
Qty: 0 | Refills: 0 | COMMUNITY
Start: 2017-10-12

## 2017-10-12 RX ORDER — INSULIN LISPRO 100/ML
10 VIAL (ML) SUBCUTANEOUS
Qty: 0 | Refills: 0 | COMMUNITY

## 2017-10-12 RX ORDER — HEPARIN SODIUM 5000 [USP'U]/ML
5000 INJECTION INTRAVENOUS; SUBCUTANEOUS
Qty: 0 | Refills: 0 | COMMUNITY
Start: 2017-10-12

## 2017-10-12 RX ORDER — LEVOTHYROXINE SODIUM 125 MCG
1 TABLET ORAL
Qty: 0 | Refills: 0 | DISCHARGE
Start: 2017-10-12

## 2017-10-12 RX ORDER — BUMETANIDE 0.25 MG/ML
1 INJECTION INTRAMUSCULAR; INTRAVENOUS
Qty: 0 | Refills: 0 | COMMUNITY

## 2017-10-12 RX ORDER — LEVOTHYROXINE SODIUM 125 MCG
1 TABLET ORAL
Qty: 0 | Refills: 0 | COMMUNITY
Start: 2017-10-12

## 2017-10-12 RX ORDER — INSULIN GLARGINE 100 [IU]/ML
45 INJECTION, SOLUTION SUBCUTANEOUS
Qty: 0 | Refills: 0 | COMMUNITY

## 2017-10-12 RX ORDER — ATORVASTATIN CALCIUM 80 MG/1
1 TABLET, FILM COATED ORAL
Qty: 0 | Refills: 0 | COMMUNITY

## 2017-10-12 RX ORDER — OMEPRAZOLE 10 MG/1
1 CAPSULE, DELAYED RELEASE ORAL
Qty: 0 | Refills: 0 | COMMUNITY

## 2017-10-12 RX ORDER — CITALOPRAM 10 MG/1
1 TABLET, FILM COATED ORAL
Qty: 0 | Refills: 0 | COMMUNITY

## 2017-10-12 RX ORDER — ATORVASTATIN CALCIUM 80 MG/1
1 TABLET, FILM COATED ORAL
Qty: 0 | Refills: 0 | DISCHARGE
Start: 2017-10-12

## 2017-10-12 RX ORDER — DILTIAZEM HCL 120 MG
1 CAPSULE, EXT RELEASE 24 HR ORAL
Qty: 0 | Refills: 0 | COMMUNITY
Start: 2017-10-12

## 2017-10-12 RX ORDER — DILTIAZEM HCL 120 MG
120 CAPSULE, EXT RELEASE 24 HR ORAL ONCE
Qty: 0 | Refills: 0 | Status: COMPLETED | OUTPATIENT
Start: 2017-10-12 | End: 2017-10-12

## 2017-10-12 RX ORDER — INSULIN LISPRO 100/ML
2 VIAL (ML) SUBCUTANEOUS
Qty: 0 | Refills: 0 | COMMUNITY
Start: 2017-10-12

## 2017-10-12 RX ORDER — ALPRAZOLAM 0.25 MG
1 TABLET ORAL
Qty: 0 | Refills: 0 | COMMUNITY
Start: 2017-10-12

## 2017-10-12 RX ORDER — PANTOPRAZOLE SODIUM 20 MG/1
1 TABLET, DELAYED RELEASE ORAL
Qty: 0 | Refills: 0 | COMMUNITY
Start: 2017-10-12

## 2017-10-12 RX ORDER — CLOPIDOGREL BISULFATE 75 MG/1
1 TABLET, FILM COATED ORAL
Qty: 0 | Refills: 0 | COMMUNITY
Start: 2017-10-12

## 2017-10-12 RX ORDER — TRAMADOL HYDROCHLORIDE 50 MG/1
1 TABLET ORAL
Qty: 0 | Refills: 0 | COMMUNITY
Start: 2017-10-12

## 2017-10-12 RX ORDER — DIPHENHYDRAMINE HCL 50 MG
1 CAPSULE ORAL
Qty: 0 | Refills: 0 | COMMUNITY
Start: 2017-10-12

## 2017-10-12 RX ADMIN — Medication 1: at 13:23

## 2017-10-12 RX ADMIN — TRAMADOL HYDROCHLORIDE 50 MILLIGRAM(S): 50 TABLET ORAL at 19:47

## 2017-10-12 RX ADMIN — PANTOPRAZOLE SODIUM 40 MILLIGRAM(S): 20 TABLET, DELAYED RELEASE ORAL at 11:28

## 2017-10-12 RX ADMIN — TRAMADOL HYDROCHLORIDE 50 MILLIGRAM(S): 50 TABLET ORAL at 06:45

## 2017-10-12 RX ADMIN — BUDESONIDE AND FORMOTEROL FUMARATE DIHYDRATE 2 PUFF(S): 160; 4.5 AEROSOL RESPIRATORY (INHALATION) at 19:07

## 2017-10-12 RX ADMIN — CITALOPRAM 20 MILLIGRAM(S): 10 TABLET, FILM COATED ORAL at 11:28

## 2017-10-12 RX ADMIN — Medication 0.25 MILLIGRAM(S): at 07:07

## 2017-10-12 RX ADMIN — HEPARIN SODIUM 5000 UNIT(S): 5000 INJECTION INTRAVENOUS; SUBCUTANEOUS at 06:46

## 2017-10-12 RX ADMIN — HEPARIN SODIUM 5000 UNIT(S): 5000 INJECTION INTRAVENOUS; SUBCUTANEOUS at 15:18

## 2017-10-12 RX ADMIN — Medication 100 MILLIGRAM(S): at 06:45

## 2017-10-12 RX ADMIN — Medication 180 MILLIGRAM(S): at 06:45

## 2017-10-12 RX ADMIN — Medication 125 MICROGRAM(S): at 06:45

## 2017-10-12 RX ADMIN — TIOTROPIUM BROMIDE 1 CAPSULE(S): 18 CAPSULE ORAL; RESPIRATORY (INHALATION) at 08:16

## 2017-10-12 RX ADMIN — TRAMADOL HYDROCHLORIDE 50 MILLIGRAM(S): 50 TABLET ORAL at 07:30

## 2017-10-12 RX ADMIN — Medication 120 MILLIGRAM(S): at 09:52

## 2017-10-12 RX ADMIN — BUDESONIDE AND FORMOTEROL FUMARATE DIHYDRATE 2 PUFF(S): 160; 4.5 AEROSOL RESPIRATORY (INHALATION) at 08:16

## 2017-10-12 RX ADMIN — CLOPIDOGREL BISULFATE 75 MILLIGRAM(S): 75 TABLET, FILM COATED ORAL at 11:28

## 2017-10-12 NOTE — PROGRESS NOTE ADULT - PROBLEM SELECTOR PROBLEM 9
Anemia due to chronic kidney disease
Chronic obstructive pulmonary disease, unspecified COPD type
Anemia due to chronic kidney disease
Chronic obstructive pulmonary disease, unspecified COPD type

## 2017-10-12 NOTE — PROGRESS NOTE ADULT - PROBLEM SELECTOR PLAN 6
RESTART ASPIRIN AND PLAVIX ONCE ASTRID PO  UNABLE TO TOLERATE ACEINH OR ARB DUE TO HYPERKALEMIA
RESTART ASPIRIN AND PLAVIX ONCE ASTRID PO  UNABLE TO TOLERATE ACEINH OR ARB DUE TO HYPERKALEMIA
RESTARTED ASPIRIN AND PLAVIX   UNABLE TO TOLERATE ACEINH OR ARB DUE TO HYPERKALEMIA
CAN HOLD ASA AND PLAVIX  UNABLE TO TOLERATE ACEINH OR ARB DUE TO HYPERKALEMIA
RESTARTED ASPIRIN AND PLAVIX   UNABLE TO TOLERATE ACEINH OR ARB DUE TO HYPERKALEMIA
RESTARTED ASPIRIN AND PLAVIX   UNABLE TO TOLERATE ACEINH OR ARB DUE TO HYPERKALEMIA
CAN HOLD ASA AND PLAVIX  UNABLE TO TOLERATE ACEINH OR ARB DUE TO HYPERKALEMIA
CAN HOLD ASA AND PLAVIX  UNABLE TO TOLERATE ACEINH OR ARB DUE TO HYPERKALEMIA
DECREASE BASAL INSULIN TO HALF TONIGHT SINCE WILL BE NPO  PLACE ON ISS  HOLD ANY ORAL AGENTS
RESTART ASPIRIN AND PLAVIX ONCE ASTRID PO  UNABLE TO TOLERATE ACEINH OR ARB DUE TO HYPERKALEMIA
RESTARTED ASPIRIN AND PLAVIX   UNABLE TO TOLERATE ACEINH OR ARB DUE TO HYPERKALEMIA
DECREASED BASAL INSULIN TO HALF LAST NIGHT SINCE WILL BE NPO  PLACE ON ISS  HOLD ANY ORAL AGENTS

## 2017-10-12 NOTE — PROGRESS NOTE ADULT - PROBLEM SELECTOR PROBLEM 1
Aphasia
Postprocedural state
Closed nondisplaced intertrochanteric fracture of left femur, initial encounter
Postprocedural state
Closed nondisplaced intertrochanteric fracture of left femur, initial encounter
Postprocedural state

## 2017-10-12 NOTE — PROGRESS NOTE ADULT - PROBLEM SELECTOR PLAN 4
EUVOLEMIC  BEING HYDRATED WITH LASIX  MONITOR   CARDIO EVAL NOTED
EUVOLEMIC  OFF DIURETICS AND IVF FOR NOW
EUVOLEMIC  OFF DIURETICS AND IVF FOR NOW
- well controlled
CURRENTLY VOLUME DEPLETED  CONT TO HYDRATE  ALSO GIVING LASIX  MONITOR   CARDIO EVAL NOTED  EKG STABLE  CLEARED BY CARDIO FOR PROCEDURE
EUVOLEMIC  OFF DIURETICS AND IVF FOR NOW
EUVOLEMIC  OFF DIURETICS AND IVF FOR NOW
CAN HOLD ASA AND PLAVIX  UNABLE TO TOLERATE ACEINH OR ARB DUE TO HYPERKALEMIA
CURRENTLY COMPENSATED  CONT TO HYDRATE  HOLD BUMEX FOR NOW  MONITOR   CARDIO EVAL NOTED  EKG STABLE  CLEARED BY CARDIO FOR PROCEDURE
CURRENTLY COMPENSATED  CONT TO HYDRATE  MAY GIVE LASIX LATER TODAY AS PER RENAL IF NO URINE OUTPUT  MONITOR   CARDIO EVAL NOTED  EKG STABLE  CLEARED BY CARDIO FOR PROCEDURE
EUVOLEMIC  OFF DIURETICS AND IVF FOR NOW
VOLUME DEPLETED  BEING HYDRATED WITH LASIX  MONITOR   CARDIO EVAL NOTED  EKG STABLE  CLEARED BY CARDIO FOR PROCEDURE
CAN HOLD ASA AND PLAVIX  UNABLE TO TOLERATE ACEINH OR ARB DUE TO HYPERKALEMIA

## 2017-10-12 NOTE — PROGRESS NOTE ADULT - SUBJECTIVE AND OBJECTIVE BOX
Chief Complaint/Reason for Admission: S/P TRIP AND FALL WITH HIP PAIN	  History of Present Illness: 	  76 year old female with PMHx HTN, HLD, CAD, ADVANCED COPD ON HOME O2 (3LITERS), IDDM, CHRONIC DIASTOLIC HEART FAILURE, RECENT ADMISSIONS FOR HYPOXIC RESPIRATORY FAILURE STATES SHE WAS LEAVING [employer] EARLIER TODAY WITH HER BROTHER AND MISSTEPPED RESULTING IN A FALL.    9/27/17: SHE REPORTS HAVING NECK AND LOWER BACK PAIN, ALSO LEFT HIP PAIN.  PT DENIES ANY CHEST PAIN OR SHORTNESS OF BREATH, NO NAUSEA OR VOMITING. JUST RECEIVED DILAUDID AND FEELS SOMEWHAT BETTER.    9/28/17: She is tired this morning, knows where she is. No cp, sob, but reports feeling very tired. Hip pain is controlled; She was straight cathed this am  9/29/17: Hip pain currently, awaiting for pain meds; did better overnight; received dilaudid without much lethargy per nurse.  Denies any CP, SOB, N/V/F/C  9/30/17: s/p left hip surgery last night -- confused this am but stable, doesnt appear in pain, opening eyes to her name but not answering questions appropritately. Cant remember where she is.  10/02/17: Still confused but more awake this am; made urine overnight; is mendy fluids. unable to understand patient clearly  ROS: CONFUSED UNABLE TO OBTAIN CLEAR ROS    PHYSICAL EXAM:  Vital Signs Last 24 Hrs  T(C): 36.4 (02 Oct 2017 08:34), Max: 37 (01 Oct 2017 22:15)  T(F): 97.5 (02 Oct 2017 08:34), Max: 98.6 (01 Oct 2017 22:15)  HR: 83 (02 Oct 2017 07:58) (80 - 90)  BP: 141/41 (02 Oct 2017 04:00) (123/37 - 159/45)  BP(mean): 67 (02 Oct 2017 04:00) (56 - 75)  RR: 10 (02 Oct 2017 05:00) (8 - 18)  SpO2: 100% (02 Oct 2017 05:00) (94% - 100%)    GEN: MORE AWAKE AND ALERT, mood stable, CONFUSED THIS AM  HEENT:  NC/AT, EOMI, no oropharyngeal lesions, erythema, exudates, oral thrush    NECK:   supple    CV:  +S1, +S2, regular, no murmurs or rubs    RESP:   lungs clear to auscultation bilaterally, no wheezing, rales, DECREASED BS BASES, scattered crackles at bases    GI:  abdomen soft, non-tender, non-distended, normal BS,  no abdominal masses, no palpable masses    RECTAL:  not examined    :  not examined    MSK:   normal muscle tone, no atrophy, no rigidity, no contractions    EXT:   no clubbing, no cyanosis, no edema, no calf pain, swelling or erythema, LEFT HIP DRESSING C/D/I    VASCULAR:  pulses equal and symmetric in the upper and lower extremities    NEURO:  AAOX3, no focal neurological deficits, follows all commands, able to move extremities spontaneously,     SKIN:  no ulcers, lesions or rashes    LABS:                              7.8    7.9   )-----------( 169      ( 02 Oct 2017 06:10 )             23.8     10-02    149<H>  |  116<H>  |  112<H>  ----------------------------<  221<H>  5.0   |  24  |  5.14<H>    Ca    8.8      02 Oct 2017 06:10        MEDICATIONS  (STANDING):  influenza   Vaccine 0.5 milliLiter(s) IntraMuscular once  pantoprazole    Tablet 40 milliGRAM(s) Oral daily  polyethylene glycol 3350 17 Gram(s) Oral daily  senna 2 Tablet(s) Oral at bedtime  docusate sodium 100 milliGRAM(s) Oral three times a day  citalopram 10 milliGRAM(s) Oral daily  atorvastatin 20 milliGRAM(s) Oral at bedtime  insulin lispro (HumaLOG) corrective regimen sliding scale   SubCutaneous three times a day before meals  dextrose 50% Injectable 12.5 Gram(s) IV Push once  dextrose 50% Injectable 25 Gram(s) IV Push once  dextrose 50% Injectable 25 Gram(s) IV Push once  ALBUTerol    90 MICROgram(s) HFA Inhaler 1 Puff(s) Inhalation every 4 hours  cefTRIAXone   IVPB 1 Gram(s) IV Intermittent every 24 hours  heparin  Injectable 5000 Unit(s) SubCutaneous every 12 hours  ALBUTerol/ipratropium for Nebulization 3 milliLiter(s) Nebulizer every 6 hours  levothyroxine Injectable 60 MICROGram(s) IV Push daily  sodium chloride 0.45%. 1000 milliLiter(s) (50 mL/Hr) IV Continuous <Continuous>  insulin glargine Injectable (LANTUS) 34 Unit(s) SubCutaneous at bedtime    MEDICATIONS  (PRN):  acetaminophen   Tablet 650 milliGRAM(s) Oral every 6 hours PRN For Temp over 38.3 C (100.94 F)  oxyCODONE    IR 5 milliGRAM(s) Oral every 4 hours PRN Moderate Pain (4 - 6)  oxyCODONE    IR 10 milliGRAM(s) Oral every 4 hours PRN Severe Pain (7 - 10)  traMADol 50 milliGRAM(s) Oral every 6 hours PRN Mild Pain (1 - 3)  ondansetron Injectable 4 milliGRAM(s) IV Push every 6 hours PRN Nausea and/or Vomiting  diphenhydrAMINE   Capsule 25 milliGRAM(s) Oral at bedtime PRN Insomnia  bisacodyl Suppository 10 milliGRAM(s) Rectal daily PRN If no bowel movement by POD#2  dextrose Gel 1 Dose(s) Oral once PRN Blood Glucose LESS THAN 70 milliGRAM(s)/deciliter  glucagon  Injectable 1 milliGRAM(s) IntraMuscular once PRN Glucose LESS THAN 70 milligrams/deciliter  HYDROmorphone  Injectable 0.5 milliGRAM(s) IV Push every 3 hours PRN Severe Pain (7 - 10)                                                                                            EKG:     RADIOLOGY STUDIES:
Chief Complaint/Reason for Admission: S/P TRIP AND FALL WITH HIP PAIN	  History of Present Illness: 	  76 year old female with PMHx HTN, HLD, CAD, ADVANCED COPD ON HOME O2 (3LITERS), IDDM, CHRONIC DIASTOLIC HEART FAILURE, RECENT ADMISSIONS FOR HYPOXIC RESPIRATORY FAILURE STATES SHE WAS LEAVING [employer] EARLIER TODAY WITH HER BROTHER AND MISSTEPPED RESULTING IN A FALL.    9/27/17: SHE REPORTS HAVING NECK AND LOWER BACK PAIN, ALSO LEFT HIP PAIN.  PT DENIES ANY CHEST PAIN OR SHORTNESS OF BREATH, NO NAUSEA OR VOMITING. JUST RECEIVED DILAUDID AND FEELS SOMEWHAT BETTER.    9/28/17: She is tired this morning, knows where she is. No cp, sob, but reports feeling very tired. Hip pain is controlled; She was straight cathed this am  9/29/17: Hip pain currently, awaiting for pain meds; did better overnight; received dilaudid without much lethargy per nurse.  Denies any CP, SOB, N/V/F/C  9/30/17: s/p left hip surgery last night -- confused this am but stable, doesnt appear in pain, opening eyes to her name but not answering questions appropriately. Cant remember where she is.  10/02/17: Still confused but more awake this am; made urine overnight; is mendy fluids. unable to understand patient clearly  10/03/17: More awake today but emotionally labile -- crying, unable to obtain clear ros; then falls asleep; Doesnt appear in pain. Does look at you to verbal stimuli.  10/04/17: Pt is more awake today, looking at me, remembered my name but still not eating much on her own.  HTn was an issue last night  10/05/17: Much more awake, sitting in a chair; no cp, sob, states left hip is sore  10/06/17: Feels much better and awake and alert ; doesnt exactly remember why she had surgery or events of the past few days but now aware she is in the hospital. very conversant  denies any cp, sob, n/v/f/c; left hip is slightly sore    ROS: AS PER HPI OTHERWISE ALL OTHER SYSTEMS REVIEWED AND ARE NEGATIVE    PHYSICAL EXAM:  Vital Signs Last 24 Hrs  T(C): 36.9 (06 Oct 2017 05:58), Max: 36.9 (06 Oct 2017 05:58)  T(F): 98.4 (06 Oct 2017 05:58), Max: 98.4 (06 Oct 2017 05:58)  HR: 68 (06 Oct 2017 08:00) (63 - 91)  BP: 130/36 (06 Oct 2017 08:00) (102/84 - 154/39)  BP(mean): 59 (06 Oct 2017 08:00) (0 - 91)  RR: 12 (06 Oct 2017 08:00) (10 - 24)  SpO2: 100% (06 Oct 2017 08:00) (90% - 100%)    GEN: MORE AWAKE AND ALERT, mood stable,   HEENT:  NC/AT, EOMI, no oropharyngeal lesions    NECK:   supple    CV:  +S1, +S2, regular, no murmurs or rubs    RESP:   lungs clear to auscultation bilaterally, no wheezing, rales, DECREASED BS BASES,  crackles at bases    GI:  abdomen soft, non-tender, non-distended, normal BS,  no abdominal masses, no palpable masses    RECTAL:  not examined    :  SAEED OUT    MSK:   normal muscle tone, no atrophy, no rigidity, no contractions    EXT:   no clubbing, no cyanosis, no edema, no calf pain, swelling or erythema, LEFT HIP DRESSING C/D/I    VASCULAR:  pulses equal and symmetric in the upper and lower extremities    NEURO:  AAOX3, no focal neurological deficits, follows all commands, able to move extremities spontaneously,     SKIN:  no ulcers, lesions or rashes    LABS:                              8.7    10.5  )-----------( 268      ( 06 Oct 2017 05:39 )             26.2     10-06    146<H>  |  112<H>  |  84<H>  ----------------------------<  138<H>  3.6   |  26  |  3.32<H>    Ca    8.9      06 Oct 2017 05:39                    MEDICATIONS  (STANDING):  ALBUTerol    90 MICROgram(s) HFA Inhaler 1 Puff(s) Inhalation every 4 hours  ALBUTerol/ipratropium for Nebulization 3 milliLiter(s) Nebulizer every 6 hours  atorvastatin 20 milliGRAM(s) Oral at bedtime  citalopram 10 milliGRAM(s) Oral daily  clopidogrel Tablet 75 milliGRAM(s) Oral daily  dextrose 50% Injectable 12.5 Gram(s) IV Push once  dextrose 50% Injectable 25 Gram(s) IV Push once  dextrose 50% Injectable 25 Gram(s) IV Push once  diltiazem    milliGRAM(s) Oral daily  docusate sodium 100 milliGRAM(s) Oral three times a day  heparin  Injectable 5000 Unit(s) SubCutaneous every 8 hours  influenza   Vaccine 0.5 milliLiter(s) IntraMuscular once  insulin glargine Injectable (LANTUS) 48 Unit(s) SubCutaneous at bedtime  insulin lispro (HumaLOG) corrective regimen sliding scale   SubCutaneous three times a day before meals  levothyroxine Injectable 60 MICROGram(s) IV Push daily  pantoprazole    Tablet 40 milliGRAM(s) Oral daily  polyethylene glycol 3350 17 Gram(s) Oral daily  senna 2 Tablet(s) Oral at bedtime    MEDICATIONS  (PRN):  acetaminophen   Tablet 650 milliGRAM(s) Oral every 6 hours PRN For Temp over 38.3 C (100.94 F)  bisacodyl Suppository 10 milliGRAM(s) Rectal daily PRN If no bowel movement by POD#2  dextrose Gel 1 Dose(s) Oral once PRN Blood Glucose LESS THAN 70 milliGRAM(s)/deciliter  diphenhydrAMINE   Capsule 25 milliGRAM(s) Oral at bedtime PRN Insomnia  glucagon  Injectable 1 milliGRAM(s) IntraMuscular once PRN Glucose LESS THAN 70 milligrams/deciliter  hydrALAZINE Injectable 10 milliGRAM(s) IV Push every 8 hours PRN SBP >170  HYDROmorphone  Injectable 0.5 milliGRAM(s) IV Push every 3 hours PRN Severe Pain (7 - 10)  ondansetron Injectable 4 milliGRAM(s) IV Push every 6 hours PRN Nausea and/or Vomiting  oxyCODONE    IR 5 milliGRAM(s) Oral every 4 hours PRN Moderate Pain (4 - 6)  oxyCODONE    IR 10 milliGRAM(s) Oral every 4 hours PRN Severe Pain (7 - 10)  traMADol 50 milliGRAM(s) Oral every 6 hours PRN Mild Pain (1 - 3)                                                                                                                                                                                                                                                                                EKG:     RADIOLOGY STUDIES:
HPI:  76 year old female with PMHx HTN, HLD, CAD, ADVANCED COPD ON HOME O2 (3LITERS), IDDM, CHRONIC DIASTOLIC HEART FAILURE, RECENT ADMISSIONS FOR HYPOXIC RESPIRATORY FAILURE STATES SHE WAS LEAVING [facility] EARLIER TODAY WITH HER BROTHER AND MISSTEPPED RESULTING IN A FALL.  SHE REPORTS HAVING NECK AND LOWER BACK PAIN, ALSO LEFT HIP PAIN.  PT DENIES ANY CHEST PAIN OR SHORTNESS OF BREATH, NO NAUSEA OR VOMITING. JUST RECEIVED DILAUDID AND FEELS SOMEWHAT BETTER. (27 Sep 2017 19:56)      Patient is a 76y old  Female who presents with a chief complaint of Left Hip Fracture (29 Sep 2017 20:21)  s/p IMN ON 17    Consulted by Dr. Brandon Jacinto for VTE prophylaxis, risk stratification, and anticoagulation management.    PAST MEDICAL & SURGICAL HISTORY:  Chronic diastolic congestive heart failure  Hyperlipidemia, unspecified hyperlipidemia type  Essential hypertension  Neuropathy  Diabetes  Chronic obstructive pulmonary disease, unspecified COPD type  History of total knee replacement, unspecified laterality  H/O hernia repair   delivery delivered  S/P appendectomy    Caprini VTE Risk Score: 5 HIGH    IMPROVE Bleeding Risk Score: 6.5 HIGH    Falls Risk:   High ( X )  Mod (  )  Low (  )    EBL:   200ml  CR CL: 16.35  17 PT SEEN AT BEDSIDE IN sd.  pT IS NON VERBAL. EYES OPEN, Moving arms and rt leg.  10--17 pt seen at bedside more alert then before looks in direction of verbal stimuli.    Vital Signs Last 24 Hrs  T(C): 36.2 (30 Sep 2017 08:50), Max: 37.8 (29 Sep 2017 23:39)  T(F): 97.1 (30 Sep 2017 08:50), Max: 100.1 (29 Sep 2017 23:39)  HR: 91 (30 Sep 2017 13:00) (75 - 91)  BP: 149/38 (30 Sep 2017 13:00) (115/33 - 149/38)  BP(mean): 64 (30 Sep 2017 13:00) (53 - 77)  RR: 16 (30 Sep 2017 13:00) (9 - 20)  SpO2: 91% (30 Sep 2017 13:00) (91% - 100%)  FAMILY HISTORY:  Family history of CHF (congestive heart failure) (Mother)    Denies any personal or familial history of clotting or bleeding disorders.    Allergies    Bactrim (Other)  ciprofloxacin (Other (Mild))  clindamycin (Unknown)  ibuprofen (Unknown)  penicillins (Other)  sulfa drugs (Unknown)    Intolerances    REVIEW OF SYSTEMS    (  )Fever	     (  )Constipation	(  )SOB				(  )Headache	(  )Dysuria  (  )Chills	     (  )Melena	(  )Dyspnea present on exertion	                    (  )Dizziness                    (  )Polyuria  (  )Nausea	     (  )Hematochezia	(  )Cough			                    (  )Syncope   	(  )Hematuria  (  )Vomiting    (  )Chest Pain	(  )Wheezing			(  )Weakness  (  )Diarrhea     (  )Palpitations	(  )Anorexia			(  )Myalgia    Unable to obtain review of systems due to: lethargic      PHYSICAL EXAM:    Constitutional: Appears Well    Neurological: non verbal    Skin: Warm    Respiratory and Thorax: normal effort; Breath sounds: normal; No rales/wheezing/rhonchi  	  Cardiovascular: S1, S2, regular, NMBR	    Gastrointestinal: BS + x 4Q, nontender	    Genitourinary:  Bladder nondistended, nontender    Musculoskeletal:   General Right:   no muscle/joint tenderness,   normal tone, no joint swelling,   ROM: full	    General Left:   no muscle/joint tenderness,   normal tone, no joint swelling,   ROM: limited    Hip: Left: Dressing CDI;    Lower extrems:   Right: no calf tenderness              negative gamal's sign               + pedal pulses    Left:   no calf tenderness              negative gamal's sign               + pedal pulses                        7.8    7.9   )-----------( 169      ( 02 Oct 2017 06:10 )             23.8       10-02    148<H>  |  116<H>  |  112<H>  ----------------------------<  233<H>  5.5<H>   |  21<L>  |  5.04<H>    Ca    8.9      02 Oct 2017 12:23                                8.7    15.1  )-----------( 173      ( 30 Sep 2017 05:30 )             25.6       09-30    145  |  110<H>  |  98<H>  ----------------------------<  231<H>  5.5<H>   |  23  |  4.49<H>    Ca    8.7      30 Sep 2017 13:31        PT/INR - ( 29 Sep 2017 20:30 )   PT: 15.0 sec;   INR: 1.38 ratio         PTT - ( 29 Sep 2017 06:08 )  PTT:29.6 sec				    MEDICATIONS  (STANDING):  influenza   Vaccine 0.5 milliLiter(s) IntraMuscular once  pantoprazole    Tablet 40 milliGRAM(s) Oral daily  polyethylene glycol 3350 17 Gram(s) Oral daily  senna 2 Tablet(s) Oral at bedtime  docusate sodium 100 milliGRAM(s) Oral three times a day  citalopram 10 milliGRAM(s) Oral daily  atorvastatin 20 milliGRAM(s) Oral at bedtime  insulin lispro (HumaLOG) corrective regimen sliding scale   SubCutaneous three times a day before meals  dextrose 50% Injectable 12.5 Gram(s) IV Push once  dextrose 50% Injectable 25 Gram(s) IV Push once  dextrose 50% Injectable 25 Gram(s) IV Push once  ALBUTerol    90 MICROgram(s) HFA Inhaler 1 Puff(s) Inhalation every 4 hours  cefTRIAXone   IVPB 1 Gram(s) IV Intermittent every 24 hours  heparin  Injectable 5000 Unit(s) SubCutaneous every 12 hours  ALBUTerol/ipratropium for Nebulization 3 milliLiter(s) Nebulizer every 6 hours  levothyroxine Injectable 60 MICROGram(s) IV Push daily  sodium chloride 0.45%. 1000 milliLiter(s) IV Continuous <Continuous>  insulin glargine Injectable (LANTUS) 34 Unit(s) SubCutaneous at bedtime    DVT Prophylaxis:  LMWH                   (  )  Heparin SQ           ( x )  Coumadin             (  )  Xarelto                  (  )  Eliquis                   (  )  Venodynes           ( x )  Ambulation          (x  )  UFH                       (  )  Contraindicated  (  )
HPI:  76 year old female with PMHx HTN, HLD, CAD, ADVANCED COPD ON HOME O2 (3LITERS), IDDM, CHRONIC DIASTOLIC HEART FAILURE, RECENT ADMISSIONS FOR HYPOXIC RESPIRATORY FAILURE STATES SHE WAS LEAVING [facility] EARLIER TODAY WITH HER BROTHER AND MISSTEPPED RESULTING IN A FALL.  SHE REPORTS HAVING NECK AND LOWER BACK PAIN, ALSO LEFT HIP PAIN.  PT DENIES ANY CHEST PAIN OR SHORTNESS OF BREATH, NO NAUSEA OR VOMITING. JUST RECEIVED DILAUDID AND FEELS SOMEWHAT BETTER. (27 Sep 2017 19:56)      Patient is a 76y old  Female who presents with a chief complaint of Left Hip Fracture (29 Sep 2017 20:21)  s/p IMN ON 17    Consulted by Dr. Brandon Jacinto for VTE prophylaxis, risk stratification, and anticoagulation management.    PAST MEDICAL & SURGICAL HISTORY:  Chronic diastolic congestive heart failure  Hyperlipidemia, unspecified hyperlipidemia type  Essential hypertension  Neuropathy  Diabetes  Chronic obstructive pulmonary disease, unspecified COPD type  History of total knee replacement, unspecified laterality  H/O hernia repair   delivery delivered  S/P appendectomy    Caprini VTE Risk Score: 5 HIGH    IMPROVE Bleeding Risk Score: 6.5 HIGH    Falls Risk:   High ( X )  Mod (  )  Low (  )    EBL:   200ml  CR CL: 16.35  17 PT SEEN AT BEDSIDE IN sd.  pT IS NON VERBAL. EYES OPEN, Moving arms and rt leg.  10-2-17 pt seen at bedside more alert then before looks in direction of verbal stimuli.    10/3/2017: Patient seen at bedside working with physical therapy. Cooperative with limited verbal responses. Per nursing- she is more verbal now and appropriate but still with issues taking PO meds. Cr improving at 4.56. HH improving slowly.  10/4: Patient seen at bedside Minimal response. Only eye contact. Cr improving slowly- increased fluids, COPD stable.  HH improving.1  10-5-17 pt seen on 3E,  present.  Pt more alert today knows her name.  Discussed pt  pt's pt anticoagulation with HEPARIN.  Questions answered will reinforce as needed.  10/7/17: having LLE calf pain, awaiting dopplers today    FAMILY HISTORY:  Family history of CHF (congestive heart failure) (Mother)    Denies any personal or familial history of clotting or bleeding disorders.    Allergies    Bactrim (Other)  ciprofloxacin (Other (Mild))  clindamycin (Unknown)  ibuprofen (Unknown)  penicillins (Other)  sulfa drugs (Unknown)    Intolerances    REVIEW OF SYSTEMS    (  )Fever	     (  )Constipation	(  )SOB				(  )Headache	(  )Dysuria  (  )Chills	     (  )Melena	(  )Dyspnea present on exertion	                    (  )Dizziness                    (  )Polyuria  (  )Nausea	     (  )Hematochezia	(  )Cough			                    (  )Syncope   	(  )Hematuria  (  )Vomiting    (  )Chest Pain	(  )Wheezing			(  )Weakness  (  )Diarrhea     (  )Palpitations	(  )Anorexia			(  )Myalgia    all other ROS NEG      PHYSICAL EXAM:    Constitutional: Appears well    Neurological: oriented x1 person    Skin: Warm    Respiratory and Thorax: normal effort; Breath sounds: normal; No rales/wheezing/rhonchi  	  Cardiovascular: S1, S2, regular, NMBR; MP: RSR 70's, no ectopy	    Gastrointestinal: BS + x 4Q, nontender	    Genitourinary:  Bladder nondistended, nontender    Musculoskeletal:   General Right:   no muscle/joint tenderness,   normal tone, no joint swelling,   ROM: full	    General Left:   no muscle/joint tenderness,   normal tone, no joint swelling,   ROM: limited    Hip: Left: Dressing CDI;    Lower extrems:   Right: no calf tenderness              negative gamal's sign               + pedal pulses    Left:   + calf tenderness              negative gamal's sign               + pedal pulses                             9.1    10.2  )-----------( 313      ( 07 Oct 2017 06:21 )             28.0       10-07    x   |  x   |  x   ----------------------------<  102<H>  x    |  x   |  x     Ca    8.9      07 Oct 2017 06:21                            8.7    10.5  )-----------( 268      ( 06 Oct 2017 05:39 )             26.2       10-06    146<H>  |  112<H>  |  84<H>  ----------------------------<  138<H>  3.6   |  26  |  3.32<H>    Ca    8.9      06 Oct 2017 05:39                                 9.0    11.9  )-----------( 218      ( 04 Oct 2017 05:13 )             27.1       10-04    150<H>  |  116<H>  |  92<H>  ----------------------------<  263<H>  3.8   |  25  |  3.92<H>    Ca    8.6      04 Oct 2017 05:13  MEDICATIONS  (STANDING):  atorvastatin 20 milliGRAM(s) Oral at bedtime  buDESOnide 160 MICROgram(s)/formoterol 4.5 MICROgram(s) Inhaler 2 Puff(s) Inhalation two times a day  citalopram 10 milliGRAM(s) Oral daily  clopidogrel Tablet 75 milliGRAM(s) Oral daily  dextrose 50% Injectable 12.5 Gram(s) IV Push once  dextrose 50% Injectable 25 Gram(s) IV Push once  dextrose 50% Injectable 25 Gram(s) IV Push once  diltiazem    milliGRAM(s) Oral daily  docusate sodium 100 milliGRAM(s) Oral three times a day  heparin  Injectable 5000 Unit(s) SubCutaneous every 8 hours  influenza   Vaccine 0.5 milliLiter(s) IntraMuscular once  insulin glargine Injectable (LANTUS) 48 Unit(s) SubCutaneous at bedtime  insulin lispro (HumaLOG) corrective regimen sliding scale   SubCutaneous three times a day before meals  levothyroxine Injectable 60 MICROGram(s) IV Push daily  pantoprazole    Tablet 40 milliGRAM(s) Oral daily  polyethylene glycol 3350 17 Gram(s) Oral daily  senna 2 Tablet(s) Oral at bedtime  tiotropium 18 MICROgram(s) Capsule 1 Capsule(s) Inhalation daily     Vital Signs Last 24 Hrs  T(C): 36.4 (10-07-17 @ 10:15), Max: 37.1 (10-07-17 @ 05:52)  T(F): 97.5 (10-07-17 @ 10:15), Max: 98.7 (10-07-17 @ 05:52)  HR: 59 (10-07-17 @ 10:15) (57 - 70)  BP: 135/41 (10-07-17 @ 10:15) (135/41 - 151/45)  BP(mean): --  RR: 19 (10-07-17 @ 10:15) (17 - 20)  SpO2: 98% (10-07-17 @ 10:15) (95% - 98%)                     DVT Prophylaxis:  LMWH                   (  )  Heparin SQ           ( x )  Coumadin             (  )  Xarelto                  (  )  Eliquis                   (  )  Venodynes           ( x )  Ambulation          (x  )  UFH                       (  )  Contraindicated  (  )
Medical Coverage for Dr. Phelps 	  76 year old female with PMHx HTN, HLD, CAD, ADVANCED COPD ON HOME O2 (3LITERS), IDDM, CHRONIC DIASTOLIC HEART FAILURE, RECENT ADMISSIONS FOR HYPOXIC RESPIRATORY FAILURE STATES SHE WAS LEAV[facility] [employer] [place] TODAY WITH HER BROTHER AND MISSTEPPED RESULTING IN A FALL.    9/27/17: SHE REPORTS HAVING NECK AND LOWER BACK PAIN, ALSO LEFT HIP PAIN.  PT DENIES ANY CHEST PAIN OR SHORTNESS OF BREATH, NO NAUSEA OR VOMITING. JUST RECEIVED DILAUDID AND FEELS SOMEWHAT BETTER.    9/28/17: She is tired this morning, knows where she is. No cp, sob, but reports feeling very tired. Hip pain is controlled; She was straight cathed this am  9/29/17: Hip pain currently, awaiting for pain meds; did better overnight; received dilaudid without much lethargy per nurse.  Denies any CP, SOB, N/V/F/C  9/30/17: s/p left hip surgery last night -- confused this am but stable, doesnt appear in pain, opening eyes to her name but not answering questions appropriately. Cant remember where she is.  10/02/17: Still confused but more awake this am; made urine overnight; is mendy fluids. unable to understand patient clearly  10/03/17: More awake today but emotionally labile -- crying, unable to obtain clear ros; then falls asleep; Doesnt appear in pain. Does look at you to verbal stimuli.  10/04/17: Pt is more awake today, looking at me, remembered my name but still not eating much on her own.  HTn was an issue last night  10/05/17: Much more awake, sitting in a chair; no cp, sob, states left hip is sore  10/06/17: Feels much better and awake and alert ; doesnt exactly remember why she had surgery or events of the past few days but now aware she is in the hospital. very conversant  denies any cp, sob, n/v/f/c; left hip is slightly sore    10/7/17 complains of some nausea and some anxiety. Appetite poor and BGM's running low. Less confused.  10/8/17 Less anxiety with Xanax. Pulmonary note appreciated. No new events. Continue present Rx fornow. BGM's Improving.       ROS: AS PER HPI OTHERWISE ALL OTHER SYSTEMS REVIEWED AND ARE NEGATIVE    PHYSICAL EXAM:  Vital Signs Last 24 Hrs  T(C): 36.9 (06 Oct 2017 05:58), Max: 36.9 (06 Oct 2017 05:58)  T(F): 98.4 (06 Oct 2017 05:58), Max: 98.4 (06 Oct 2017 05:58)  HR: 68 (06 Oct 2017 08:00) (63 - 91)  BP: 130/36 (06 Oct 2017 08:00) (102/84 - 154/39)  BP(mean): 59 (06 Oct 2017 08:00) (0 - 91)  RR: 12 (06 Oct 2017 08:00) (10 - 24)  SpO2: 100% (06 Oct 2017 08:00) (90% - 100%)    GEN: MORE AWAKE AND ALERT, mood stable,   HEENT:  NC/AT, EOMI, no oropharyngeal lesions    NECK:   supple    CV:  +S1, +S2, regular, no murmurs or rubs    RESP:   lungs clear to auscultation bilaterally, no wheezing, rales, DECREASED BS BASES,  crackles at bases    GI:  abdomen soft, non-tender, non-distended, normal BS,  no abdominal masses, no palpable masses    RECTAL:  not examined    :  SAEED OUT    MSK:   normal muscle tone, no atrophy, no rigidity, no contractions    EXT:   no clubbing, no cyanosis, no edema, no calf pain, swelling or erythema, LEFT HIP DRESSING C/D/I    VASCULAR:  pulses equal and symmetric in the upper and lower extremities    NEURO:  AAOX3, no focal neurological deficits, follows all commands, able to move extremities spontaneously,     SKIN:  no ulcers, lesions or rashes    LABS:                              8.7    10.5  )-----------( 268      ( 06 Oct 2017 05:39 )             26.2     10-06    146<H>  |  112<H>  |  84<H>  ----------------------------<  138<H>  3.6   |  26  |  3.32<H>    Ca    8.9      06 Oct 2017 05:39                    MEDICATIONS  (STANDING):  ALBUTerol    90 MICROgram(s) HFA Inhaler 1 Puff(s) Inhalation every 4 hours  ALBUTerol/ipratropium for Nebulization 3 milliLiter(s) Nebulizer every 6 hours  atorvastatin 20 milliGRAM(s) Oral at bedtime  citalopram 10 milliGRAM(s) Oral daily  clopidogrel Tablet 75 milliGRAM(s) Oral daily  dextrose 50% Injectable 12.5 Gram(s) IV Push once  dextrose 50% Injectable 25 Gram(s) IV Push once  dextrose 50% Injectable 25 Gram(s) IV Push once  diltiazem    milliGRAM(s) Oral daily  docusate sodium 100 milliGRAM(s) Oral three times a day  heparin  Injectable 5000 Unit(s) SubCutaneous every 8 hours  influenza   Vaccine 0.5 milliLiter(s) IntraMuscular once  insulin glargine Injectable (LANTUS) 48 Unit(s) SubCutaneous at bedtime  insulin lispro (HumaLOG) corrective regimen sliding scale   SubCutaneous three times a day before meals  levothyroxine Injectable 60 MICROGram(s) IV Push daily  pantoprazole    Tablet 40 milliGRAM(s) Oral daily  polyethylene glycol 3350 17 Gram(s) Oral daily  senna 2 Tablet(s) Oral at bedtime    MEDICATIONS  (PRN):  acetaminophen   Tablet 650 milliGRAM(s) Oral every 6 hours PRN For Temp over 38.3 C (100.94 F)  bisacodyl Suppository 10 milliGRAM(s) Rectal daily PRN If no bowel movement by POD#2  dextrose Gel 1 Dose(s) Oral once PRN Blood Glucose LESS THAN 70 milliGRAM(s)/deciliter  diphenhydrAMINE   Capsule 25 milliGRAM(s) Oral at bedtime PRN Insomnia  glucagon  Injectable 1 milliGRAM(s) IntraMuscular once PRN Glucose LESS THAN 70 milligrams/deciliter  hydrALAZINE Injectable 10 milliGRAM(s) IV Push every 8 hours PRN SBP >170  HYDROmorphone  Injectable 0.5 milliGRAM(s) IV Push every 3 hours PRN Severe Pain (7 - 10)  ondansetron Injectable 4 milliGRAM(s) IV Push every 6 hours PRN Nausea and/or Vomiting  oxyCODONE    IR 5 milliGRAM(s) Oral every 4 hours PRN Moderate Pain (4 - 6)  oxyCODONE    IR 10 milliGRAM(s) Oral every 4 hours PRN Severe Pain (7 - 10)  traMADol 50 milliGRAM(s) Oral every 6 hours PRN Mild Pain (1 - 3)                                                                                                                                                                                                                                                                              EKG:     RADIOLOGY STUDIES:
NEPHROLOGY INTERVAL HPI/OVERNIGHT EVENTS:  10/4 SY  Awake and with limited verbal response.  Pt tearful but does not complain of pain.    10/3  more awake  making some sentences  case d/w pts family and Dr rosales  Na 151, but creat improving    10/2 SY  Arousable though with decreased response to verbal stimuli.  No apparent distress noted.    10/1  more awake alert, although still lethargic  VVS  got 60 then 100 lasix IV yesterday  was anuric after sharma placement (300 ml residual) on 9/30  UO last night increased to 150 ml night shift, now 20-45 ml/hr  IV bolus 500 ml over an hour given  now on 125 ml/hr  hgb down to 7.5, may be due to dilution + some blood loss in surgical wound  case d/w Dr Rosales and pts family      HPI:  76 year old female with PMHx HTN, HLD, CAD, ADVANCED COPD ON HOME O2 (3LITERS), IDDM, CHRONIC DIASTOLIC HEART FAILURE, RECENT ADMISSIONS FOR HYPOXIC RESPIRATORY FAILURE STATES SHE WAS LEAVING [facility] [employer] [facility] EARLIER TODAY WITH HER BROTHER AND MISSTEPPED RESULTING IN A FALL.  SHE REPORTS HAVING NECK AND LOWER BACK PAIN, ALSO LEFT HIP PAIN.  PT DENIES ANY CHEST PAIN OR SHORTNESS OF BREATH, NO NAUSEA OR VOMITING. JUST RECEIVED DILAUDID AND FEELS SOMEWHAT BETTER. (27 Sep 2017 19:56)  lethargic due to pain meds, last dose last night  family at bedside  for OR either today or in am    MEDICATIONS  (STANDING):  ALBUTerol    90 MICROgram(s) HFA Inhaler 1 Puff(s) Inhalation every 4 hours  ALBUTerol/ipratropium for Nebulization 3 milliLiter(s) Nebulizer every 6 hours  atorvastatin 20 milliGRAM(s) Oral at bedtime  cefTRIAXone   IVPB 1 Gram(s) IV Intermittent every 24 hours  citalopram 10 milliGRAM(s) Oral daily  clopidogrel Tablet 75 milliGRAM(s) Oral daily  dextrose 5%. 1000 milliLiter(s) (100 mL/Hr) IV Continuous <Continuous>  dextrose 50% Injectable 12.5 Gram(s) IV Push once  dextrose 50% Injectable 25 Gram(s) IV Push once  dextrose 50% Injectable 25 Gram(s) IV Push once  diltiazem    milliGRAM(s) Oral daily  docusate sodium 100 milliGRAM(s) Oral three times a day  heparin  Injectable 5000 Unit(s) SubCutaneous every 12 hours  influenza   Vaccine 0.5 milliLiter(s) IntraMuscular once  insulin glargine Injectable (LANTUS) 40 Unit(s) SubCutaneous at bedtime  insulin lispro (HumaLOG) corrective regimen sliding scale   SubCutaneous three times a day before meals  levothyroxine Injectable 60 MICROGram(s) IV Push daily  pantoprazole    Tablet 40 milliGRAM(s) Oral daily  polyethylene glycol 3350 17 Gram(s) Oral daily  senna 2 Tablet(s) Oral at bedtime    MEDICATIONS  (PRN):  acetaminophen   Tablet 650 milliGRAM(s) Oral every 6 hours PRN For Temp over 38.3 C (100.94 F)  bisacodyl Suppository 10 milliGRAM(s) Rectal daily PRN If no bowel movement by POD#2  dextrose Gel 1 Dose(s) Oral once PRN Blood Glucose LESS THAN 70 milliGRAM(s)/deciliter  diphenhydrAMINE   Capsule 25 milliGRAM(s) Oral at bedtime PRN Insomnia  glucagon  Injectable 1 milliGRAM(s) IntraMuscular once PRN Glucose LESS THAN 70 milligrams/deciliter  hydrALAZINE Injectable 10 milliGRAM(s) IV Push every 8 hours PRN SBP >170  HYDROmorphone  Injectable 0.5 milliGRAM(s) IV Push every 3 hours PRN Severe Pain (7 - 10)  ondansetron Injectable 4 milliGRAM(s) IV Push every 6 hours PRN Nausea and/or Vomiting  oxyCODONE    IR 5 milliGRAM(s) Oral every 4 hours PRN Moderate Pain (4 - 6)  oxyCODONE    IR 10 milliGRAM(s) Oral every 4 hours PRN Severe Pain (7 - 10)  traMADol 50 milliGRAM(s) Oral every 6 hours PRN Mild Pain (1 - 3)          Vital Signs Last 24 Hrs  T(C): 36.1 (04 Oct 2017 08:57), Max: 36.8 (03 Oct 2017 23:31)  T(F): 97 (04 Oct 2017 08:57), Max: 98.3 (03 Oct 2017 23:31)  HR: 96 (04 Oct 2017 06:00) (85 - 107)  BP: 153/46 (04 Oct 2017 06:00) (152/46 - 198/52)  BP(mean): 0 (04 Oct 2017 06:00) (0 - 116)  RR: 18 (04 Oct 2017 06:00) (12 - 22)  SpO2: 97% (04 Oct 2017 06:00) (89% - 100%)  Daily     Daily     10-03 @ 07:01  -  10-04 @ 07:00  --------------------------------------------------------  IN: 1150 mL / OUT: 1480 mL / NET: -330 mL        PHYSICAL EXAM:  Awake,   GENERAL: No acute distress  CHEST/LUNG: poor insp effort.  HEART: S1S2 RRR  ABDOMEN: soft  EXTREMITIES: trace edema  SKIN:     LABS:                        9.0    11.9  )-----------( 218      ( 04 Oct 2017 05:13 )             27.1     10-04    150<H>  |  116<H>  |  92<H>  ----------------------------<  263<H>  3.8   |  25  |  3.92<H>    Ca    8.6      04 Oct 2017 05:13                  RADIOLOGY & ADDITIONAL TESTS:
NEPHROLOGY INTERVAL HPI/OVERNIGHT EVENTS:  10/9 SY  Alert and responsive.  Continues with poor appetite and po intake.  No acute events overnight.    HPI:  76 year old female with PMHx HTN, HLD, CAD, ADVANCED COPD ON HOME O2 (3LITERS), IDDM, CHRONIC DIASTOLIC HEART FAILURE, RECENT ADMISSIONS FOR HYPOXIC RESPIRATORY FAILURE STATES SHE WAS LEAVING [place] EARLIER TODAY WITH HER BROTHER AND MISSTEPPED RESULTING IN A FALL.  SHE REPORTS HAVING NECK AND LOWER BACK PAIN, ALSO LEFT HIP PAIN.  PT DENIES ANY CHEST PAIN OR SHORTNESS OF BREATH, NO NAUSEA OR VOMITING. JUST RECEIVED DILAUDID AND FEELS SOMEWHAT BETTER. (27 Sep 2017 19:56)  lethargic due to pain meds, last dose last night  family at bedside  for OR either today or in am      MEDICATIONS  (STANDING):  atorvastatin 20 milliGRAM(s) Oral at bedtime  buDESOnide 160 MICROgram(s)/formoterol 4.5 MICROgram(s) Inhaler 2 Puff(s) Inhalation two times a day  citalopram 20 milliGRAM(s) Oral daily  clopidogrel Tablet 75 milliGRAM(s) Oral daily  dextrose 50% Injectable 12.5 Gram(s) IV Push once  dextrose 50% Injectable 25 Gram(s) IV Push once  dextrose 50% Injectable 25 Gram(s) IV Push once  diltiazem    milliGRAM(s) Oral daily  docusate sodium 100 milliGRAM(s) Oral three times a day  heparin  Injectable 5000 Unit(s) SubCutaneous every 8 hours  influenza   Vaccine 0.5 milliLiter(s) IntraMuscular once  insulin glargine Injectable (LANTUS) 40 Unit(s) SubCutaneous at bedtime  insulin lispro (HumaLOG) corrective regimen sliding scale   SubCutaneous three times a day before meals  levothyroxine 125 MICROGram(s) Oral daily  pantoprazole    Tablet 40 milliGRAM(s) Oral daily  polyethylene glycol 3350 17 Gram(s) Oral daily  senna 2 Tablet(s) Oral at bedtime  sodium chloride 0.45%. 1000 milliLiter(s) (50 mL/Hr) IV Continuous <Continuous>  tiotropium 18 MICROgram(s) Capsule 1 Capsule(s) Inhalation daily    MEDICATIONS  (PRN):  acetaminophen   Tablet 650 milliGRAM(s) Oral every 6 hours PRN For Temp over 38.3 C (100.94 F)  ALPRAZolam 0.25 milliGRAM(s) Oral every 8 hours PRN anxiety  bisacodyl Suppository 10 milliGRAM(s) Rectal daily PRN If no bowel movement by POD#2  dextrose Gel 1 Dose(s) Oral once PRN Blood Glucose LESS THAN 70 milliGRAM(s)/deciliter  diphenhydrAMINE   Capsule 25 milliGRAM(s) Oral at bedtime PRN Insomnia  glucagon  Injectable 1 milliGRAM(s) IntraMuscular once PRN Glucose LESS THAN 70 milligrams/deciliter  hydrALAZINE Injectable 10 milliGRAM(s) IV Push every 8 hours PRN SBP >170  HYDROmorphone  Injectable 0.5 milliGRAM(s) IV Push every 3 hours PRN Severe Pain (7 - 10)  ondansetron Injectable 4 milliGRAM(s) IV Push every 6 hours PRN Nausea and/or Vomiting          Vital Signs Last 24 Hrs  T(C): 36.5 (09 Oct 2017 10:33), Max: 36.7 (09 Oct 2017 05:11)  T(F): 97.7 (09 Oct 2017 10:33), Max: 98.1 (09 Oct 2017 05:11)  HR: 64 (09 Oct 2017 10:33) (56 - 75)  BP: 165/38 (09 Oct 2017 10:33) (124/49 - 165/38)  BP(mean): --  RR: 18 (09 Oct 2017 10:33) (16 - 18)  SpO2: 97% (09 Oct 2017 10:33) (96% - 100%)  Daily     Daily Weight in k (09 Oct 2017 06:54)    10-08 @ 07:01  -  10- @ 07:00  --------------------------------------------------------  IN: 1360 mL / OUT: 0 mL / NET: 1360 mL        PHYSICAL EXAM:  Alert and appropriate  GENERAL: No distress  CHEST/LUNG: fair air entry  HEART: S1S2 RRR  ABDOMEN: soft  EXTREMITIES: no edema  SKIN:     LABS:                        8.6    8.9   )-----------( 295      ( 08 Oct 2017 05:55 )             26.1     10-08    142  |  111<H>  |  82<H>  ----------------------------<  106<H>  3.7   |  24  |  3.41<H>    Ca    8.3<L>      08 Oct 2017 05:55                  RADIOLOGY & ADDITIONAL TESTS:
NEPHROLOGY INTERVAL HPI/OVERNIGHT EVENTS:  continues with poor po intake.  no sob  pain controlled        MEDICATIONS  (STANDING):  atorvastatin 20 milliGRAM(s) Oral at bedtime  buDESOnide 160 MICROgram(s)/formoterol 4.5 MICROgram(s) Inhaler 2 Puff(s) Inhalation two times a day  citalopram 20 milliGRAM(s) Oral daily  clopidogrel Tablet 75 milliGRAM(s) Oral daily  dextrose 50% Injectable 12.5 Gram(s) IV Push once  dextrose 50% Injectable 25 Gram(s) IV Push once  dextrose 50% Injectable 25 Gram(s) IV Push once  diltiazem    milliGRAM(s) Oral daily  docusate sodium 100 milliGRAM(s) Oral three times a day  heparin  Injectable 5000 Unit(s) SubCutaneous every 8 hours  influenza   Vaccine 0.5 milliLiter(s) IntraMuscular once  insulin glargine Injectable (LANTUS) 40 Unit(s) SubCutaneous at bedtime  insulin lispro (HumaLOG) corrective regimen sliding scale   SubCutaneous three times a day before meals  levothyroxine 125 MICROGram(s) Oral daily  pantoprazole    Tablet 40 milliGRAM(s) Oral daily  polyethylene glycol 3350 17 Gram(s) Oral daily  senna 2 Tablet(s) Oral at bedtime  sodium chloride 0.45%. 1000 milliLiter(s) (50 mL/Hr) IV Continuous <Continuous>  tiotropium 18 MICROgram(s) Capsule 1 Capsule(s) Inhalation daily    MEDICATIONS  (PRN):  acetaminophen   Tablet 650 milliGRAM(s) Oral every 6 hours PRN For Temp over 38.3 C (100.94 F)  ALPRAZolam 0.25 milliGRAM(s) Oral every 8 hours PRN anxiety  bisacodyl Suppository 10 milliGRAM(s) Rectal daily PRN If no bowel movement by POD#2  dextrose Gel 1 Dose(s) Oral once PRN Blood Glucose LESS THAN 70 milliGRAM(s)/deciliter  diphenhydrAMINE   Capsule 25 milliGRAM(s) Oral at bedtime PRN Insomnia  glucagon  Injectable 1 milliGRAM(s) IntraMuscular once PRN Glucose LESS THAN 70 milligrams/deciliter  hydrALAZINE Injectable 10 milliGRAM(s) IV Push every 8 hours PRN SBP >170  ondansetron Injectable 4 milliGRAM(s) IV Push every 6 hours PRN Nausea and/or Vomiting  traMADol 50 milliGRAM(s) Oral every 8 hours PRN Moderate Pain (4 - 6)      Allergies    Bactrim (Other)  ciprofloxacin (Other (Mild))  clindamycin (Unknown)  ibuprofen (Unknown)  penicillins (Other)  sulfa drugs (Unknown)    Intolerances        I&O's Detail    Vital Signs Last 24 Hrs  T(C): 36.2 (11 Oct 2017 11:25), Max: 36.8 (11 Oct 2017 00:10)  T(F): 97.2 (11 Oct 2017 11:25), Max: 98.3 (11 Oct 2017 00:10)  HR: 61 (11 Oct 2017 11:25) (61 - 78)  BP: 150/41 (11 Oct 2017 11:25) (150/38 - 173/65)  BP(mean): --  RR: 18 (11 Oct 2017 11:25) (18 - 18)  SpO2: 94% (11 Oct 2017 11:25) (94% - 98%)  Daily     Daily     PHYSICAL EXAM:  General: alert. awake   HEENT: MMM  CV: s1s2 rrr  LUNGS: B/L CTA  EXT: no edema, scd in place    LABS:                        8.6    12.4  )-----------( 361      ( 11 Oct 2017 09:52 )             25.6     10-11    144  |  112<H>  |  53<H>  ----------------------------<  73  4.1   |  24  |  2.54<H>    Ca    9.1      11 Oct 2017 09:52
OLI ESCALERA  MRN: [id number]    S: patient remained confused with no new reported respiratory symptoms of sob last night and not wearing the 02, no acute wheezing       PAST MEDICAL & SURGICAL HISTORY:  Chronic diastolic congestive heart failure  Hyperlipidemia, unspecified hyperlipidemia type  Essential hypertension  Neuropathy  Diabetes  Chronic obstructive pulmonary disease, unspecified COPD type  History of total knee replacement, unspecified laterality  H/O hernia repair   delivery delivered  S/P appendectomy      Vital Signs Last 24 Hrs  T(C): 36.1 (04 Oct 2017 08:57), Max: 36.8 (03 Oct 2017 23:31)  T(F): 97 (04 Oct 2017 08:57), Max: 98.3 (03 Oct 2017 23:31)  HR: 96 (04 Oct 2017 06:00) (85 - 107)  BP: 153/46 (04 Oct 2017 06:00) (152/46 - 198/52)  BP(mean): 0 (04 Oct 2017 06:00) (0 - 116)  RR: 18 (04 Oct 2017 06:00) (12 - 22)  SpO2: 97% (04 Oct 2017 06:00) (89% - 100%)      PHYSICAL EXAM:      GENERAL: no distress and confused with no orientation     NEURO: awake and do not follow commands and moving the extremities     NECK: no JVD    CHEST: no wheezing with the few rales over the bases how ever poor inspiratory effort     CARDIAC: s1 and s2 with gallops     EXT: no edema      LABS:                                                9.0    11.9  )-----------( 218      ( 04 Oct 2017 05:13 )             27.1   10-04    150<H>  |  116<H>  |  92<H>  ----------------------------<  263<H>  3.8   |  25  |  3.92<H>    Ca    8.6      04 Oct 2017 05:13            < from: Xray Chest 1 View AP/PA. (10.02.17 @ 11:23) >  XAM:  CHEST SINGLE VIEW FRONTAL                            PROCEDURE DATE:  10/02/2017          INTERPRETATION:  Clinical information: Shortness of breath. CHF follow-up.    Portable AP chest radiograph from 1057 hours:    COMPARISON:      FINDINGS:  The cardiac, hilar and mediastinal contours are not well   evaluated due to technique and the patient's rotated position. There is   mild pulmonary vascular congestion, unchanged. No pneumothorax.    IMPRESSION:    No change in pulmonary vascular congestion.                            MEDICATIONS  (STANDING):  ALBUTerol    90 MICROgram(s) HFA Inhaler 1 Puff(s) Inhalation every 4 hours  ALBUTerol/ipratropium for Nebulization 3 milliLiter(s) Nebulizer every 6 hours  atorvastatin 20 milliGRAM(s) Oral at bedtime  cefTRIAXone   IVPB 1 Gram(s) IV Intermittent every 24 hours  citalopram 10 milliGRAM(s) Oral daily  clopidogrel Tablet 75 milliGRAM(s) Oral daily  dextrose 5%. 1000 milliLiter(s) (75 mL/Hr) IV Continuous <Continuous>  dextrose 50% Injectable 12.5 Gram(s) IV Push once  dextrose 50% Injectable 25 Gram(s) IV Push once  dextrose 50% Injectable 25 Gram(s) IV Push once  diltiazem    milliGRAM(s) Oral daily  docusate sodium 100 milliGRAM(s) Oral three times a day  heparin  Injectable 5000 Unit(s) SubCutaneous every 12 hours  influenza   Vaccine 0.5 milliLiter(s) IntraMuscular once  insulin glargine Injectable (LANTUS) 34 Unit(s) SubCutaneous at bedtime  insulin lispro (HumaLOG) corrective regimen sliding scale   SubCutaneous three times a day before meals  levothyroxine Injectable 60 MICROGram(s) IV Push daily  pantoprazole    Tablet 40 milliGRAM(s) Oral daily  polyethylene glycol 3350 17 Gram(s) Oral daily  senna 2 Tablet(s) Oral at bedtime      A/P: 1. Hip fracture - P.O.D. #5 Pain management, P.T., DVT prophylaxis per ortho. Incentive spirometry and cautious use of large dose of narcotics with renal dysfunction     2. COPD. Nebulized bronchodilators; O2 supplement. No evidence of bronchospasm or evidence of exacerbation     3. Hx of CHF.no acute decompensation now     4. acute on chronic CKD with the fluid management as per the renal .     5 hypernatremia with poor po intake of water     PLAN     continue to monitor respiratory status closely . so far clinically patient is not in gross failure and fluid management as per the renal and continue with the nebs for the copd and use of supplemental 02 as needed for the hypoxia and cautious use of sedation with the renal dysfunction. mobilization and if patient is cooperative use of incentive spirometry . monitor wbc closely and including fever spikes
OLI ESCALERA  MRN: [id number]    S: patient remained confused how answering to simple questions and comfortable with out 02 not in distress       PAST MEDICAL & SURGICAL HISTORY:  Chronic diastolic congestive heart failure  Hyperlipidemia, unspecified hyperlipidemia type  Essential hypertension  Neuropathy  Diabetes  Chronic obstructive pulmonary disease, unspecified COPD type  History of total knee replacement, unspecified laterality  H/O hernia repair   delivery delivered  S/P appendectomy      Vital Signs Last 24 Hrs  T(C): 36.1 (05 Oct 2017 04:00), Max: 36.4 (04 Oct 2017 21:08)  T(F): 97 (05 Oct 2017 04:00), Max: 97.6 (04 Oct 2017 21:08)  HR: 87 (05 Oct 2017 07:53) (87 - 100)  BP: 150/44 (05 Oct 2017 07:00) (137/50 - 183/50)  BP(mean): 0 (05 Oct 2017 07:00) (0 - 86)  RR: 25 (05 Oct 2017 07:00) (12 - 25)  SpO2: 98% (05 Oct 2017 07:00) (91% - 99%)    PHYSICAL EXAM:      GENERAL: no distress and confused with no orientation out of bed to  chair     NEURO: awake and do not follow commands and moving the extremities     NECK: no JVD    CHEST: no wheezing with the few rales over the bases how ever poor inspiratory effort     CARDIAC: s1 and s2 with gallops     EXT: no edema      LABS:                                   9.1    14.7  )-----------( 233      ( 05 Oct 2017 05:44 )             27.5   10-05    144  |  111<H>  |  82<H>  ----------------------------<  370<H>  3.7   |  23  |  3.24<H>    Ca    8.7      05 Oct 2017 05:44            < from: Xray Chest 1 View AP/PA. (10.02.17 @ 11:23) >  XAM:  CHEST SINGLE VIEW FRONTAL                            PROCEDURE DATE:  10/02/2017          INTERPRETATION:  Clinical information: Shortness of breath. CHF follow-up.    Portable AP chest radiograph from 1057 hours:    COMPARISON:      FINDINGS:  The cardiac, hilar and mediastinal contours are not well   evaluated due to technique and the patient's rotated position. There is   mild pulmonary vascular congestion, unchanged. No pneumothorax.    IMPRESSION:    No change in pulmonary vascular congestion.                            MEDICATIONS  (STANDING):  ALBUTerol    90 MICROgram(s) HFA Inhaler 1 Puff(s) Inhalation every 4 hours  ALBUTerol/ipratropium for Nebulization 3 milliLiter(s) Nebulizer every 6 hours  atorvastatin 20 milliGRAM(s) Oral at bedtime  citalopram 10 milliGRAM(s) Oral daily  clopidogrel Tablet 75 milliGRAM(s) Oral daily  dextrose 5%. 1000 milliLiter(s) (100 mL/Hr) IV Continuous <Continuous>  dextrose 50% Injectable 12.5 Gram(s) IV Push once  dextrose 50% Injectable 25 Gram(s) IV Push once  dextrose 50% Injectable 25 Gram(s) IV Push once  diltiazem    milliGRAM(s) Oral daily  docusate sodium 100 milliGRAM(s) Oral three times a day  heparin  Injectable 5000 Unit(s) SubCutaneous every 8 hours  influenza   Vaccine 0.5 milliLiter(s) IntraMuscular once  insulin glargine Injectable (LANTUS) 48 Unit(s) SubCutaneous at bedtime  insulin lispro (HumaLOG) corrective regimen sliding scale   SubCutaneous three times a day before meals  levothyroxine Injectable 60 MICROGram(s) IV Push daily  pantoprazole    Tablet 40 milliGRAM(s) Oral daily  polyethylene glycol 3350 17 Gram(s) Oral daily  senna 2 Tablet(s) Oral at bedtime  Home Medications:  Advair Diskus 250 mcg-50 mcg inhalation powder: 1 puff(s) inhaled 2 times a day (30 Aug 2017 08:23)  aspirin 325 mg oral tablet: 1 tab(s) orally once a day (30 Aug 2017 10:31)  atorvastatin 20 mg oral tablet: 1 tab(s) orally once a day (in the evening) (30 Aug 2017 08:23)  bumetanide 1 mg oral tablet: 1 tab(s) orally once a day (30 Aug 2017 08:23)  citalopram 10 mg oral tablet: 1 tab(s) orally once a day (in the morning) (30 Aug 2017 08:23)  folic acid 1 mg oral tablet: 1 tab(s) orally once a day (in the morning) (30 Aug 2017 08:23)  HumaLOG 100 units/mL subcutaneous solution: 10 unit(s) subcutaneous 3 times a day (30 Aug 2017 08:23)  levothyroxine 112 mcg (0.112 mg) oral tablet: 1 tab(s) orally once a day (in the morning) (30 Aug 2017 08:23)  Lyrica 150 mg oral capsule: 1 cap(s) orally once a day (at bedtime) (30 Aug 2017 08:23)  methenamine hippurate 1 g oral tablet: 1 tab(s) orally once a day (at bedtime) (30 Aug 2017 08:23)  montelukast 10 mg oral tablet: 1 tab(s) orally once a day (at bedtime) (30 Aug 2017 08:23)  multivitamin:    (30 Aug 2017 08:)  omeprazole 20 mg oral delayed release capsule: 1 cap(s) orally once a day (in the morning) (30 Aug 2017 08:)  Spiriva 18 mcg inhalation capsule: 1 cap(s) inhaled once a day (in the evening) (30 Aug 2017 08:)  Toujeo SoloStar 300 units/mL subcutaneous solution: 45 unit(s) subcutaneous once a day (at bedtime) (30 Aug 2017 08:)        A/P: 1. Hip fracture - P.O.D. #6 Pain management, P.T., DVT prophylaxis per ortho currently on sq heparin  Incentive spirometry and cautious use of large dose of narcotics with renal dysfunction and restart of anticoagulation when patient can have po intake     2. COPD. Nebulized bronchodilators; O2 supplement. No evidence of bronchospasm or evidence of exacerbation     3. Hx of CHF.no acute decompensation now     4. acute on chronic CKD with the fluid management as per the renal .     5 hypernatremia with poor po intake of water improved with iv dextrose      6 mild leucocytosis observe with out any antibiotics and patient completed Rocephin     PLAN     continue to monitor respiratory status closely . so far clinically patient is not in gross failure and fluid management as per the renal and continue with the nebs for the copd and use of supplemental 02 as needed for the hypoxia and cautious use of sedation with the renal dysfunction. mobilization and if patient is cooperative use of incentive spirometry . monitor wbc closely and including fever spikes and agree with discontinued rocephin, in event of development of cough and wheezing would recommend repeat cxr
OLI ESCALERA  MRN: [id number]    S: patient still getting fluids with no acute sob or cough and wheezing and labs today are pending       PAST MEDICAL & SURGICAL HISTORY:  Chronic diastolic congestive heart failure  Hyperlipidemia, unspecified hyperlipidemia type  Essential hypertension  Neuropathy  Diabetes  Chronic obstructive pulmonary disease, unspecified COPD type  History of total knee replacement, unspecified laterality  H/O hernia repair   delivery delivered  S/P appendectomy      Vital Signs Last 24 Hrs  T(C): 36.8 (11 Oct 2017 06:00), Max: 36.8 (11 Oct 2017 00:10)  T(F): 98.3 (11 Oct 2017 06:00), Max: 98.3 (11 Oct 2017 00:10)  HR: 71 (11 Oct 2017 07:11) (70 - 78)  BP: 150/38 (11 Oct 2017 07:11) (150/38 - 173/65)  BP(mean): --  RR: 18 (11 Oct 2017 07:11) (18 - 18)  SpO2: 96% (11 Oct 2017 07:11) (95% - 98%)    PHYSICAL EXAM:      GENERAL: no distress, oriented to the person      NEURO: awake and do not follow commands and moving the extremities     NECK: no JVD    CHEST: No  wheezing with the few rales over the bases left more than right and mildly decreased breath sounds     CARDIAC: s1 and s2 with gallops     EXT: no edema      LABS:                                              8.2    10.7  )-----------( 299      ( 10 Oct 2017 05:45 )             25.1   10-10    140  |  110<H>  |  67<H>  ----------------------------<  181<H>  4.0   |  21<L>  |  2.83<H>    Ca    8.8      10 Oct 2017 05:45                                      < from: Xray Chest 1 View AP/PA. (10.09.17 @ 10:52) >  XAM:  CHEST SINGLE VIEW FRONTAL                            PROCEDURE DATE:  10/09/2017          INTERPRETATION:  Single view chest    History COPD, CHF    Comparison 7 days ago    There is a reduced inspiratory result. There are mildly increased central   vascular markings. There is no lobar consolidation or layering effusion.   The heart is normal in size.    IMPRESSION:    Mild central edema    < end of copied text >    A/P: 1. Hip fracture - status post surgery     2. COPD. Nebulized bronchodilators; O2 supplement. No evidence of bronchospasm or evidence of exacerbation continue with the  advair and spiriva for her baseline copd     3. Hx of CHF .with few rales in the bases persistent and follow up cxr show mild congestion and would recommend to discontinue the fluids further and encourage po intake     4. acute on chronic CKD with the fluid management as per the renal and cr is improving with hydration and would recommend to discontinue the fluids     5  anxiety started on celexa and xanax prn          PLAN     continue with the 02 supplementation  advair and spiriva her baseline medication for the copd . even through has rales in the lung patient clinically appears not in failure . would continue to monitor for the fluid over load . encourage po intake . monitor sat more closely
Patient is a 76y old  Female who presents with a chief complaint of S/P TRIP AND FALL WITH HIP PAIN.    HPI:  76 year old female with pmh of HTN, hyperlipidemia, CAD, MI in past, chronic diastolic heart failure, s/p ambulatory pulmonary pressure monitor implantation after the recent admission for hypoxic respiratory failure which was thought to be a combination of the diastolic heart failure and COPD exacerbation, on home O2 presented after a fall when she missed a step at "[employer].  She was diagnosed with L hip fracture. Pt underwent L hip repair and post op complicated by ANI.    10/9- Pt seen and examined by me today. She denies any symptoms.       PAST MEDICAL & SURGICAL HISTORY:  Chronic diastolic congestive heart failure  Hyperlipidemia, unspecified hyperlipidemia type  Essential hypertension  Neuropathy  Diabetes  Chronic obstructive pulmonary disease, unspecified COPD type  History of total knee replacement, unspecified laterality  H/O hernia repair   delivery delivered  S/P appendectomy      MEDICATIONS  (STANDING):  docusate sodium 100 milliGRAM(s) Oral three times a day  diltiazem    milliGRAM(s) Oral daily  citalopram 10 milliGRAM(s) Oral daily  atorvastatin 20 milliGRAM(s) Oral at bedtime  pantoprazole    Tablet 40 milliGRAM(s) Oral before breakfast  levothyroxine 112 MICROGram(s) Oral daily  folic acid 1 milliGRAM(s) Oral daily  insulin glargine Injectable (LANTUS) 22 Unit(s) SubCutaneous at bedtime  insulin lispro (HumaLOG) corrective regimen sliding scale   SubCutaneous three times a day before meals  dextrose 5%. 1000 milliLiter(s) (50 mL/Hr) IV Continuous <Continuous>  dextrose 50% Injectable 12.5 Gram(s) IV Push once  dextrose 50% Injectable 25 Gram(s) IV Push once  dextrose 50% Injectable 25 Gram(s) IV Push once  sodium chloride 0.9%. 1000 milliLiter(s) (50 mL/Hr) IV Continuous <Continuous>  influenza   Vaccine 0.5 milliLiter(s) IntraMuscular once    MEDICATIONS  (PRN):  acetaminophen   Tablet 650 milliGRAM(s) Oral every 6 hours PRN For Temp greater than 38 C (100.4 F)  acetaminophen   Tablet. 650 milliGRAM(s) Oral every 6 hours PRN headache  oxyCODONE    IR 10 milliGRAM(s) Oral every 4 hours PRN Moderate Pain  oxyCODONE    IR 5 milliGRAM(s) Oral every 4 hours PRN Mild Pain  HYDROmorphone  Injectable 0.5 milliGRAM(s) IV Push every 4 hours PRN Severe Pain (7 - 10)  diphenhydrAMINE   Capsule 25 milliGRAM(s) Oral at bedtime PRN Insomnia  diphenhydrAMINE   Capsule 25 milliGRAM(s) Oral every 4 hours PRN Rash and/or Itching  benzocaine 15 mG/menthol 3.6 mG Lozenge 1 Lozenge Oral every 4 hours PRN Sore Throat  dextrose Gel 1 Dose(s) Oral once PRN Blood Glucose LESS THAN 70 milliGRAM(s)/deciliter  glucagon  Injectable 1 milliGRAM(s) IntraMuscular once PRN Glucose LESS THAN 70 milligrams/deciliter      FAMILY HISTORY:  Family history of CHF (congestive heart failure) (Mother)      SOCIAL HISTORY:  non smoker, no alcohol use in recent past.    REVIEW OF SYSTEMS:  CONSTITUTIONAL:  No night sweats.  No fatigue, malaise, lethargy.  No fever or chills.  HEENT:  Eyes:  No visual changes.  No eye pain.      ENT:  No runny nose.  No epistaxis.  No sinus pain.  No sore throat.  No odynophagia.  No ear pain.  No congestion.  RESPIRATORY:  No cough.  No wheeze.  No hemoptysis.  No shortness of breath.  CARDIOVASCULAR:  No chest pains.  No palpitations. No shortness of breath, No orthopnea or PND.  GASTROINTESTINAL:  No abdominal pain.  No nausea or vomiting.  No diarrhea or constipation.  No hematemesis.  No hematochezia.  No melena.  GENITOURINARY:  No urgency.  No frequency.  No dysuria.  No hematuria.  No obstructive symptoms.  No discharge.  No pain.  No significant abnormal bleeding.  MUSCULOSKELETAL:  c/o L hip pain  NEUROLOGICAL:  No tingling or numbness or weakness.  PSYCHIATRIC:  No confusion  SKIN:  No rashes.  No lesions.  No wounds.  ENDOCRINE:  No unexplained weight loss.  No polydipsia.  No polyuria.  No polyphagia.  HEMATOLOGIC:  No anemia.  No purpura.  No petechiae.  No prolonged or excessive bleeding.   ALLERGIC AND IMMUNOLOGIC:  No pruritus.  No swelling.         Vital Signs Last 24 Hrs  T(C): 37.4 (28 Sep 2017 05:13), Max: 37.4 (28 Sep 2017 05:13)  T(F): 99.4 (28 Sep 2017 05:13), Max: 99.4 (28 Sep 2017 05:13)  HR: 84 (28 Sep 2017 05:13) (51 - 84)  BP: 142/38 (28 Sep 2017 05:13) (126/50 - 142/38)  BP(mean): --  RR: 13 (27 Sep 2017 20:16) (13 - 20)  SpO2: 92% (28 Sep 2017 05:13) (92% - 96%)    PHYSICAL EXAM-    Constitutional: ill looking elderly female    Head: Head is normocephalic and atraumatic.      Neck: The patient's neck is supple without enlargement, has no palpable thyromegaly nor thyroid nodules and has no jugular venous distention. No audible carotid bruits. There are strong carotid pulses bilaterally. No JVD.     Cardiovascular: Regular rate and rhythm without S3, S4. No murmurs or rubs are appreciated.      Respiratory: fine crackles b/l in lower lung fields.  Abdomen: Soft, nontender, nondistended with positive bowel sounds.      Extremity: No tenderness. No  pitting edema No skin discoloration No clubbing No cyanosis.     Neurologic: The patient is alert and oriented.      Skin: No rash, no obvious lesions noted.      Psychiatric: The patient appears to be emotionally stable.      INTERPRETATION OF TELEMETRY: sinus rythm.     ECG: sinus rythm, normal axis, no ST T changes.     I&O's Detail    27 Sep 2017 07:01  -  28 Sep 2017 07:00  --------------------------------------------------------  IN:  Total IN: 0 mL    OUT:    Voided: 650 mL  Total OUT: 650 mL    Total NET: -650 mL          LABS:                        8.9    8.9   )-----------( 185      ( 28 Sep 2017 04:30 )             27.1     09-    140  |  104  |  71<H>  ----------------------------<  159<H>  5.3   |  32<H>  |  2.59<H>    Ca    8.6      28 Sep 2017 04:30    TPro  7.0  /  Alb  3.4  /  TBili  0.4  /  DBili  x   /  AST  14<L>  /  ALT  15  /  AlkPhos  109  -        PT/INR - ( 28 Sep 2017 04:30 )   PT: 12.8 sec;   INR: 1.18 ratio         PTT - ( 28 Sep 2017 04:30 )  PTT:28.6 sec  Urinalysis Basic - ( 28 Sep 2017 06:00 )    Color: Yellow / Appearance: Clear / S.015 / pH: x  Gluc: x / Ketone: Negative  / Bili: Negative / Urobili: Negative mg/dL   Blood: x / Protein: 30 mg/dL / Nitrite: Negative   Leuk Esterase: Small / RBC: x / WBC x   Sq Epi: x / Non Sq Epi: x / Bacteria: x      I&O's Summary    27 Sep 2017 07:01  -  28 Sep 2017 07:00  --------------------------------------------------------  IN: 0 mL / OUT: 650 mL / NET: -650 mL      BNP  RADIOLOGY & ADDITIONAL STUDIES:
Subjective:    Pt. seen and examined.  Appears aphasic      MEDICATIONS  (STANDING):  influenza   Vaccine 0.5 milliLiter(s) IntraMuscular once  pantoprazole    Tablet 40 milliGRAM(s) Oral daily  polyethylene glycol 3350 17 Gram(s) Oral daily  senna 2 Tablet(s) Oral at bedtime  docusate sodium 100 milliGRAM(s) Oral three times a day  citalopram 10 milliGRAM(s) Oral daily  atorvastatin 20 milliGRAM(s) Oral at bedtime  insulin lispro (HumaLOG) corrective regimen sliding scale   SubCutaneous three times a day before meals  dextrose 50% Injectable 12.5 Gram(s) IV Push once  dextrose 50% Injectable 25 Gram(s) IV Push once  dextrose 50% Injectable 25 Gram(s) IV Push once  ALBUTerol    90 MICROgram(s) HFA Inhaler 1 Puff(s) Inhalation every 4 hours  cefTRIAXone   IVPB 1 Gram(s) IV Intermittent every 24 hours  heparin  Injectable 5000 Unit(s) SubCutaneous every 12 hours  ALBUTerol/ipratropium for Nebulization 3 milliLiter(s) Nebulizer every 6 hours  insulin glargine Injectable (LANTUS) 28 Unit(s) SubCutaneous at bedtime  sodium chloride 0.9%. 1000 milliLiter(s) (125 mL/Hr) IV Continuous <Continuous>  levothyroxine Injectable 60 MICROGram(s) IV Push daily  furosemide   Injectable 60 milliGRAM(s) IV Push once    MEDICATIONS  (PRN):  acetaminophen   Tablet 650 milliGRAM(s) Oral every 6 hours PRN For Temp over 38.3 C (100.94 F)  oxyCODONE    IR 5 milliGRAM(s) Oral every 4 hours PRN Moderate Pain (4 - 6)  oxyCODONE    IR 10 milliGRAM(s) Oral every 4 hours PRN Severe Pain (7 - 10)  traMADol 50 milliGRAM(s) Oral every 6 hours PRN Mild Pain (1 - 3)  ondansetron Injectable 4 milliGRAM(s) IV Push every 6 hours PRN Nausea and/or Vomiting  diphenhydrAMINE   Capsule 25 milliGRAM(s) Oral at bedtime PRN Insomnia  bisacodyl Suppository 10 milliGRAM(s) Rectal daily PRN If no bowel movement by POD#2  dextrose Gel 1 Dose(s) Oral once PRN Blood Glucose LESS THAN 70 milliGRAM(s)/deciliter  glucagon  Injectable 1 milliGRAM(s) IntraMuscular once PRN Glucose LESS THAN 70 milligrams/deciliter  HYDROmorphone  Injectable 0.5 milliGRAM(s) IV Push every 3 hours PRN Severe Pain (7 - 10)      Allergies    Bactrim (Other)  ciprofloxacin (Other (Mild))  clindamycin (Unknown)  ibuprofen (Unknown)  penicillins (Other)  sulfa drugs (Unknown)    Intolerances        Vital Signs Last 24 Hrs  T(C): 36.9 (01 Oct 2017 08:30), Max: 36.9 (30 Sep 2017 19:02)  T(F): 98.5 (01 Oct 2017 08:30), Max: 98.5 (01 Oct 2017 08:30)  HR: 91 (01 Oct 2017 08:00) (83 - 106)  BP: 117/30 (01 Oct 2017 08:00) (116/37 - 154/40)  BP(mean): 53 (01 Oct 2017 08:00) (51 - 66)  RR: 11 (01 Oct 2017 08:00) (8 - 17)  SpO2: 97% (01 Oct 2017 08:00) (90% - 100%)    PHYSICAL EXAMINATION:    GENERAL APPEARANCE:  Pt. is not currently dyspneic, in no distress. Pt. is alert, oriented, and pleasant.  HEENT:  Pupils are normal and react normally. No icterus. Mucous membranes well colored.  NECK:  Supple. No lymphadenopathy. Jugular venous pressure not elevated. Carotids equal.   HEART:   The cardiac impulse has a normal quality. There are no murmurs, rubs or gallops noted  CHEST:  Chest is clear to auscultation. Normal respiratory effort.  ABDOMEN:  Soft and nontender.   EXTREMITIES:  There is no edema.   SKIN:  No rash or significant lesions are noted.    LABS:                        7.5    11.5  )-----------( 175      ( 01 Oct 2017 05:33 )             23.1     10-01    145  |  110<H>  |  105<H>  ----------------------------<  262<H>  5.5<H>   |  27  |  5.68<H>    Ca    8.4<L>      01 Oct 2017 05:33      PT/INR - ( 29 Sep 2017 20:30 )   PT: 15.0 sec;   INR: 1.38 ratio                   RADIOLOGY & ADDITIONAL TESTS:
76 year old female with PMHx HTN, HLD, CAD, ADVANCED COPD ON HOME O2 (3LITERS), IDDM, CHRONIC DIASTOLIC HEART FAILURE, RECENT ADMISSIONS FOR HYPOXIC RESPIRATORY FAILURE STATES SHE WAS LEAVING [employer] [facility] EARLIER TODAY WITH HER BROTHER AND MISSTEPPED RESULTING IN A FALL.    9/27/17: SHE REPORTS HAVING NECK AND LOWER BACK PAIN, ALSO LEFT HIP PAIN.  PT DENIES ANY CHEST PAIN OR SHORTNESS OF BREATH, NO NAUSEA OR VOMITING. JUST RECEIVED DILAUDID AND FEELS SOMEWHAT BETTER.    9/28/17: She is tired this morning, knows where she is. No cp, sob, but reports feeling very tired. Hip pain is controlled; She was straight cathed this am  9/29/17: Hip pain currently, awaiting for pain meds; did better overnight; received dilaudid without much lethargy per nurse.  Denies any CP, SOB, N/V/F/C  9/30/17: s/p left hip surgery last night -- confused this am but stable, doesnt appear in pain, opening eyes to her name but not answering questions appropriately. Cant remember where she is.  10/02/17: Still confused but more awake this am; made urine overnight; is mendy fluids. unable to understand patient clearly  10/03/17: More awake today but emotionally labile -- crying, unable to obtain clear ros; then falls asleep; Doesnt appear in pain. Does look at you to verbal stimuli.  10/04/17: Pt is more awake today, looking at me, remembered my name but still not eating much on her own.  HTn was an issue last night  10/05/17: Much more awake, sitting in a chair; no cp, sob, states left hip is sore  10/06/17: Feels much better and awake and alert ; doesnt exactly remember why she had surgery or events of the past few days but now aware she is in the hospital. very conversant  denies any cp, sob, n/v/f/c; left hip is slightly sore    10/7/17 complains of some nausea and some anxiety. Appetite poor and BGM's running low. Less confused.  10/8/17 Less anxiety with Xanax. Pulmonary note appreciated. No new events. Continue present Rx fornow. BGM's Improving.   10/09/17: anxious about moving because afraid she may fall again, denies any cp, sob, f/c; some nausea, not eating much; left hip pain is controlled  10/10/17: Feels nauseated, no appetite. no cp, sob, f/c  10/11/17: nausea is better; no cp, sob, vomiting; did get up and walk a few steps yest. motivated to do more today; NO anxiety currently  10/12/17: Nausea better - didnt do much physical therapy yest; no cp, sob, n/v/f/c; hip pain is well controlled    ROS: AS PER HPI OTHERWISE ALL OTHER SYSTEMS REVIEWED AND ARE NEGATIVE    PHYSICAL EXAM:  Vital Signs Last 24 Hrs  T(C): 36.7 (12 Oct 2017 05:48), Max: 36.7 (11 Oct 2017 17:31)  T(F): 98.1 (12 Oct 2017 05:48), Max: 98.1 (11 Oct 2017 17:31)  HR: 66 (12 Oct 2017 08:21) (61 - 76)  BP: 159/39 (12 Oct 2017 05:48) (150/41 - 179/51)  BP(mean): --  RR: 18 (12 Oct 2017 05:48) (18 - 18)  SpO2: 99% (12 Oct 2017 08:21) (94% - 100%)    GEN: MORE AWAKE AND ALERT, mood stable,   HEENT:  NC/AT, EOMI, no oropharyngeal lesions    NECK:   supple    CV:  +S1, +S2, regular, no murmurs or rubs    RESP:   lungs clear to auscultation bilaterally, no wheezing, rales, DECREASED BS BASES,  crackles at LEFT base    GI:  abdomen soft, non-tender, non-distended, normal BS,  no abdominal masses, no palpable masses    RECTAL:  not examined    :  SAEED OUT    MSK:   normal muscle tone, no atrophy, no rigidity, no contractions    EXT:   no clubbing, no cyanosis, no edema, no calf pain, swelling or erythema, LEFT HIP DRESSING C/D/I    VASCULAR:  pulses equal and symmetric in the upper and lower extremities    NEURO:  AAOX3, no focal neurological deficits, follows all commands, able to move extremities spontaneously,     SKIN:  no ulcers, lesions or rashes    LABS:                              8.8    11.2  )-----------( 345      ( 12 Oct 2017 05:45 )             28.0     10-12    142  |  112<H>  |  47<H>  ----------------------------<  96  4.3   |  22  |  2.53<H>    Ca    9.0      12 Oct 2017 05:45        MEDICATIONS  (STANDING):  atorvastatin 20 milliGRAM(s) Oral at bedtime  buDESOnide 160 MICROgram(s)/formoterol 4.5 MICROgram(s) Inhaler 2 Puff(s) Inhalation two times a day  citalopram 20 milliGRAM(s) Oral daily  clopidogrel Tablet 75 milliGRAM(s) Oral daily  dextrose 50% Injectable 12.5 Gram(s) IV Push once  dextrose 50% Injectable 25 Gram(s) IV Push once  dextrose 50% Injectable 25 Gram(s) IV Push once  diltiazem    milliGRAM(s) Oral daily  diltiazem    milliGRAM(s) Oral once  docusate sodium 100 milliGRAM(s) Oral three times a day  heparin  Injectable 5000 Unit(s) SubCutaneous every 8 hours  influenza   Vaccine 0.5 milliLiter(s) IntraMuscular once  insulin glargine Injectable (LANTUS) 40 Unit(s) SubCutaneous at bedtime  insulin lispro (HumaLOG) corrective regimen sliding scale   SubCutaneous three times a day before meals  levothyroxine 125 MICROGram(s) Oral daily  pantoprazole    Tablet 40 milliGRAM(s) Oral daily  polyethylene glycol 3350 17 Gram(s) Oral daily  senna 2 Tablet(s) Oral at bedtime  sodium chloride 0.45%. 1000 milliLiter(s) (50 mL/Hr) IV Continuous <Continuous>  tiotropium 18 MICROgram(s) Capsule 1 Capsule(s) Inhalation daily    MEDICATIONS  (PRN):  acetaminophen   Tablet 650 milliGRAM(s) Oral every 6 hours PRN For Temp over 38.3 C (100.94 F)  ALPRAZolam 0.25 milliGRAM(s) Oral every 8 hours PRN anxiety  bisacodyl Suppository 10 milliGRAM(s) Rectal daily PRN If no bowel movement by POD#2  dextrose Gel 1 Dose(s) Oral once PRN Blood Glucose LESS THAN 70 milliGRAM(s)/deciliter  diphenhydrAMINE   Capsule 25 milliGRAM(s) Oral at bedtime PRN Insomnia  glucagon  Injectable 1 milliGRAM(s) IntraMuscular once PRN Glucose LESS THAN 70 milligrams/deciliter  hydrALAZINE Injectable 10 milliGRAM(s) IV Push every 8 hours PRN SBP >170  ondansetron Injectable 4 milliGRAM(s) IV Push every 6 hours PRN Nausea and/or Vomiting  traMADol 50 milliGRAM(s) Oral every 8 hours PRN Moderate Pain (4 - 6)                                                                                                                                                                                                                                                                                                                                                                                                                                                                                       EKG:     RADIOLOGY STUDIES:
76 year old female with PMHx HTN, HLD, CAD, ADVANCED COPD ON HOME O2 (3LITERS), IDDM, CHRONIC DIASTOLIC HEART FAILURE, RECENT ADMISSIONS FOR HYPOXIC RESPIRATORY FAILURE STATES SHE WAS LEAVING [employer] [facility] EARLIER TODAY WITH HER BROTHER AND MISSTEPPED RESULTING IN A FALL.    9/27/17: SHE REPORTS HAVING NECK AND LOWER BACK PAIN, ALSO LEFT HIP PAIN.  PT DENIES ANY CHEST PAIN OR SHORTNESS OF BREATH, NO NAUSEA OR VOMITING. JUST RECEIVED DILAUDID AND FEELS SOMEWHAT BETTER.    9/28/17: She is tired this morning, knows where she is. No cp, sob, but reports feeling very tired. Hip pain is controlled; She was straight cathed this am  9/29/17: Hip pain currently, awaiting for pain meds; did better overnight; received dilaudid without much lethargy per nurse.  Denies any CP, SOB, N/V/F/C  9/30/17: s/p left hip surgery last night -- confused this am but stable, doesnt appear in pain, opening eyes to her name but not answering questions appropriately. Cant remember where she is.  10/02/17: Still confused but more awake this am; made urine overnight; is mendy fluids. unable to understand patient clearly  10/03/17: More awake today but emotionally labile -- crying, unable to obtain clear ros; then falls asleep; Doesnt appear in pain. Does look at you to verbal stimuli.  10/04/17: Pt is more awake today, looking at me, remembered my name but still not eating much on her own.  HTn was an issue last night  10/05/17: Much more awake, sitting in a chair; no cp, sob, states left hip is sore  10/06/17: Feels much better and awake and alert ; doesnt exactly remember why she had surgery or events of the past few days but now aware she is in the hospital. very conversant  denies any cp, sob, n/v/f/c; left hip is slightly sore    10/7/17 complains of some nausea and some anxiety. Appetite poor and BGM's running low. Less confused.  10/8/17 Less anxiety with Xanax. Pulmonary note appreciated. No new events. Continue present Rx fornow. BGM's Improving.   10/09/17: anxious about moving because afraid she may fall again, denies any cp, sob, f/c; some nausea, not eating much; left hip pain is controlled  10/10/17: Feels nauseated, no appetite. no cp, sob, f/c  10/11/17: nausea is better; no cp, sob, vomiting; did get up and walk a few steps yest. motivated to do more today; NO anxiety currently    ROS: AS PER HPI OTHERWISE ALL OTHER SYSTEMS REVIEWED AND ARE NEGATIVE    PHYSICAL EXAM:  Vital Signs Last 24 Hrs  T(C): 36.8 (11 Oct 2017 06:00), Max: 36.8 (11 Oct 2017 00:10)  T(F): 98.3 (11 Oct 2017 06:00), Max: 98.3 (11 Oct 2017 00:10)  HR: 71 (11 Oct 2017 07:11) (70 - 78)  BP: 150/38 (11 Oct 2017 07:11) (150/38 - 173/65)  BP(mean): --  RR: 18 (11 Oct 2017 07:11) (18 - 18)  SpO2: 96% (11 Oct 2017 07:11) (95% - 98%)    GEN: MORE AWAKE AND ALERT, mood stable,   HEENT:  NC/AT, EOMI, no oropharyngeal lesions    NECK:   supple    CV:  +S1, +S2, regular, no murmurs or rubs    RESP:   lungs clear to auscultation bilaterally, no wheezing, rales, DECREASED BS BASES,  crackles at bases    GI:  abdomen soft, non-tender, non-distended, normal BS,  no abdominal masses, no palpable masses    RECTAL:  not examined    :  SAEED OUT    MSK:   normal muscle tone, no atrophy, no rigidity, no contractions    EXT:   no clubbing, no cyanosis, no edema, no calf pain, swelling or erythema, LEFT HIP DRESSING C/D/I    VASCULAR:  pulses equal and symmetric in the upper and lower extremities    NEURO:  AAOX3, no focal neurological deficits, follows all commands, able to move extremities spontaneously,     SKIN:  no ulcers, lesions or rashes    LABS:                              8.2    10.7  )-----------( 299      ( 10 Oct 2017 05:45 )             25.1     10-10    140  |  110<H>  |  67<H>  ----------------------------<  181<H>  4.0   |  21<L>  |  2.83<H>    Ca    8.8      10 Oct 2017 05:45      MEDICATIONS  (STANDING):  atorvastatin 20 milliGRAM(s) Oral at bedtime  buDESOnide 160 MICROgram(s)/formoterol 4.5 MICROgram(s) Inhaler 2 Puff(s) Inhalation two times a day  citalopram 20 milliGRAM(s) Oral daily  clopidogrel Tablet 75 milliGRAM(s) Oral daily  dextrose 50% Injectable 12.5 Gram(s) IV Push once  dextrose 50% Injectable 25 Gram(s) IV Push once  dextrose 50% Injectable 25 Gram(s) IV Push once  diltiazem    milliGRAM(s) Oral daily  docusate sodium 100 milliGRAM(s) Oral three times a day  heparin  Injectable 5000 Unit(s) SubCutaneous every 8 hours  influenza   Vaccine 0.5 milliLiter(s) IntraMuscular once  insulin glargine Injectable (LANTUS) 40 Unit(s) SubCutaneous at bedtime  insulin lispro (HumaLOG) corrective regimen sliding scale   SubCutaneous three times a day before meals  levothyroxine 125 MICROGram(s) Oral daily  pantoprazole    Tablet 40 milliGRAM(s) Oral daily  polyethylene glycol 3350 17 Gram(s) Oral daily  senna 2 Tablet(s) Oral at bedtime  sodium chloride 0.45%. 1000 milliLiter(s) (50 mL/Hr) IV Continuous <Continuous>  tiotropium 18 MICROgram(s) Capsule 1 Capsule(s) Inhalation daily    MEDICATIONS  (PRN):  acetaminophen   Tablet 650 milliGRAM(s) Oral every 6 hours PRN For Temp over 38.3 C (100.94 F)  ALPRAZolam 0.25 milliGRAM(s) Oral every 8 hours PRN anxiety  bisacodyl Suppository 10 milliGRAM(s) Rectal daily PRN If no bowel movement by POD#2  dextrose Gel 1 Dose(s) Oral once PRN Blood Glucose LESS THAN 70 milliGRAM(s)/deciliter  diphenhydrAMINE   Capsule 25 milliGRAM(s) Oral at bedtime PRN Insomnia  glucagon  Injectable 1 milliGRAM(s) IntraMuscular once PRN Glucose LESS THAN 70 milligrams/deciliter  hydrALAZINE Injectable 10 milliGRAM(s) IV Push every 8 hours PRN SBP >170  ondansetron Injectable 4 milliGRAM(s) IV Push every 6 hours PRN Nausea and/or Vomiting  traMADol 50 milliGRAM(s) Oral every 8 hours PRN Moderate Pain (4 - 6)                                                                                                                                                                                                                                                                                                                                                                                                                                         EKG:     RADIOLOGY STUDIES:
76 year old female with PMHx HTN, HLD, CAD, ADVANCED COPD ON HOME O2 (3LITERS), IDDM, CHRONIC DIASTOLIC HEART FAILURE, RECENT ADMISSIONS FOR HYPOXIC RESPIRATORY FAILURE STATES SHE WAS LEAVING [employer] EARLIER TODAY WITH HER BROTHER AND MISSTEPPED RESULTING IN A FALL.    9/27/17: SHE REPORTS HAVING NECK AND LOWER BACK PAIN, ALSO LEFT HIP PAIN.  PT DENIES ANY CHEST PAIN OR SHORTNESS OF BREATH, NO NAUSEA OR VOMITING. JUST RECEIVED DILAUDID AND FEELS SOMEWHAT BETTER.    9/28/17: She is tired this morning, knows where she is. No cp, sob, but reports feeling very tired. Hip pain is controlled; She was straight cathed this am  9/29/17: Hip pain currently, awaiting for pain meds; did better overnight; received dilaudid without much lethargy per nurse.  Denies any CP, SOB, N/V/F/C  9/30/17: s/p left hip surgery last night -- confused this am but stable, doesnt appear in pain, opening eyes to her name but not answering questions appropriately. Cant remember where she is.  10/02/17: Still confused but more awake this am; made urine overnight; is mendy fluids. unable to understand patient clearly  10/03/17: More awake today but emotionally labile -- crying, unable to obtain clear ros; then falls asleep; Doesnt appear in pain. Does look at you to verbal stimuli.  10/04/17: Pt is more awake today, looking at me, remembered my name but still not eating much on her own.  HTn was an issue last night  10/05/17: Much more awake, sitting in a chair; no cp, sob, states left hip is sore  10/06/17: Feels much better and awake and alert ; doesnt exactly remember why she had surgery or events of the past few days but now aware she is in the hospital. very conversant  denies any cp, sob, n/v/f/c; left hip is slightly sore    10/7/17 complains of some nausea and some anxiety. Appetite poor and BGM's running low. Less confused.  10/8/17 Less anxiety with Xanax. Pulmonary note appreciated. No new events. Continue present Rx fornow. BGM's Improving.   10/09/17: anxious about moving because afraid she may fall again, denies any cp, sob, f/c; some nausea, not eating much; left hip pain is controlled      ROS: AS PER HPI OTHERWISE ALL OTHER SYSTEMS REVIEWED AND ARE NEGATIVE    PHYSICAL EXAM:  Vital Signs Last 24 Hrs  T(C): 36.5 (09 Oct 2017 10:33), Max: 36.7 (09 Oct 2017 05:11)  T(F): 97.7 (09 Oct 2017 10:33), Max: 98.1 (09 Oct 2017 05:11)  HR: 64 (09 Oct 2017 10:33) (56 - 75)  BP: 165/38 (09 Oct 2017 10:33) (124/49 - 165/38)  BP(mean): --  RR: 18 (09 Oct 2017 10:33) (16 - 18)  SpO2: 97% (09 Oct 2017 10:33) (96% - 100%)    GEN: MORE AWAKE AND ALERT, mood stable,   HEENT:  NC/AT, EOMI, no oropharyngeal lesions    NECK:   supple    CV:  +S1, +S2, regular, no murmurs or rubs    RESP:   lungs clear to auscultation bilaterally, no wheezing, rales, DECREASED BS BASES,  crackles at bases    GI:  abdomen soft, non-tender, non-distended, normal BS,  no abdominal masses, no palpable masses    RECTAL:  not examined    :  SAEED OUT    MSK:   normal muscle tone, no atrophy, no rigidity, no contractions    EXT:   no clubbing, no cyanosis, no edema, no calf pain, swelling or erythema, LEFT HIP DRESSING C/D/I    VASCULAR:  pulses equal and symmetric in the upper and lower extremities    NEURO:  AAOX3, no focal neurological deficits, follows all commands, able to move extremities spontaneously,     SKIN:  no ulcers, lesions or rashes    LABS:                              8.6    8.9   )-----------( 295      ( 08 Oct 2017 05:55 )             26.1     10-08    142  |  111<H>  |  82<H>  ----------------------------<  106<H>  3.7   |  24  |  3.41<H>    Ca    8.3<L>      08 Oct 2017 05:55                                          MEDICATIONS  (STANDING):  atorvastatin 20 milliGRAM(s) Oral at bedtime  buDESOnide 160 MICROgram(s)/formoterol 4.5 MICROgram(s) Inhaler 2 Puff(s) Inhalation two times a day  citalopram 20 milliGRAM(s) Oral daily  clopidogrel Tablet 75 milliGRAM(s) Oral daily  dextrose 50% Injectable 12.5 Gram(s) IV Push once  dextrose 50% Injectable 25 Gram(s) IV Push once  dextrose 50% Injectable 25 Gram(s) IV Push once  diltiazem    milliGRAM(s) Oral daily  docusate sodium 100 milliGRAM(s) Oral three times a day  heparin  Injectable 5000 Unit(s) SubCutaneous every 8 hours  influenza   Vaccine 0.5 milliLiter(s) IntraMuscular once  insulin glargine Injectable (LANTUS) 40 Unit(s) SubCutaneous at bedtime  insulin lispro (HumaLOG) corrective regimen sliding scale   SubCutaneous three times a day before meals  levothyroxine 125 MICROGram(s) Oral daily  pantoprazole    Tablet 40 milliGRAM(s) Oral daily  polyethylene glycol 3350 17 Gram(s) Oral daily  senna 2 Tablet(s) Oral at bedtime  sodium chloride 0.45%. 1000 milliLiter(s) (50 mL/Hr) IV Continuous <Continuous>  tiotropium 18 MICROgram(s) Capsule 1 Capsule(s) Inhalation daily    MEDICATIONS  (PRN):  acetaminophen   Tablet 650 milliGRAM(s) Oral every 6 hours PRN For Temp over 38.3 C (100.94 F)  ALPRAZolam 0.25 milliGRAM(s) Oral every 8 hours PRN anxiety  bisacodyl Suppository 10 milliGRAM(s) Rectal daily PRN If no bowel movement by POD#2  dextrose Gel 1 Dose(s) Oral once PRN Blood Glucose LESS THAN 70 milliGRAM(s)/deciliter  diphenhydrAMINE   Capsule 25 milliGRAM(s) Oral at bedtime PRN Insomnia  glucagon  Injectable 1 milliGRAM(s) IntraMuscular once PRN Glucose LESS THAN 70 milligrams/deciliter  hydrALAZINE Injectable 10 milliGRAM(s) IV Push every 8 hours PRN SBP >170  HYDROmorphone  Injectable 0.5 milliGRAM(s) IV Push every 3 hours PRN Severe Pain (7 - 10)  ondansetron Injectable 4 milliGRAM(s) IV Push every 6 hours PRN Nausea and/or Vomiting        MEDICATIONS  (STANDING):  ALBUTerol    90 MICROgram(s) HFA Inhaler 1 Puff(s) Inhalation every 4 hours  ALBUTerol/ipratropium for Nebulization 3 milliLiter(s) Nebulizer every 6 hours  atorvastatin 20 milliGRAM(s) Oral at bedtime  citalopram 10 milliGRAM(s) Oral daily  clopidogrel Tablet 75 milliGRAM(s) Oral daily  dextrose 50% Injectable 12.5 Gram(s) IV Push once  dextrose 50% Injectable 25 Gram(s) IV Push once  dextrose 50% Injectable 25 Gram(s) IV Push once  diltiazem    milliGRAM(s) Oral daily  docusate sodium 100 milliGRAM(s) Oral three times a day  heparin  Injectable 5000 Unit(s) SubCutaneous every 8 hours  influenza   Vaccine 0.5 milliLiter(s) IntraMuscular once  insulin glargine Injectable (LANTUS) 48 Unit(s) SubCutaneous at bedtime  insulin lispro (HumaLOG) corrective regimen sliding scale   SubCutaneous three times a day before meals  levothyroxine Injectable 60 MICROGram(s) IV Push daily  pantoprazole    Tablet 40 milliGRAM(s) Oral daily  polyethylene glycol 3350 17 Gram(s) Oral daily  senna 2 Tablet(s) Oral at bedtime    MEDICATIONS  (PRN):  acetaminophen   Tablet 650 milliGRAM(s) Oral every 6 hours PRN For Temp over 38.3 C (100.94 F)  bisacodyl Suppository 10 milliGRAM(s) Rectal daily PRN If no bowel movement by POD#2  dextrose Gel 1 Dose(s) Oral once PRN Blood Glucose LESS THAN 70 milliGRAM(s)/deciliter  diphenhydrAMINE   Capsule 25 milliGRAM(s) Oral at bedtime PRN Insomnia  glucagon  Injectable 1 milliGRAM(s) IntraMuscular once PRN Glucose LESS THAN 70 milligrams/deciliter  hydrALAZINE Injectable 10 milliGRAM(s) IV Push every 8 hours PRN SBP >170  HYDROmorphone  Injectable 0.5 milliGRAM(s) IV Push every 3 hours PRN Severe Pain (7 - 10)  ondansetron Injectable 4 milliGRAM(s) IV Push every 6 hours PRN Nausea and/or Vomiting  oxyCODONE    IR 5 milliGRAM(s) Oral every 4 hours PRN Moderate Pain (4 - 6)  oxyCODONE    IR 10 milliGRAM(s) Oral every 4 hours PRN Severe Pain (7 - 10)  traMADol 50 milliGRAM(s) Oral every 6 hours PRN Mild Pain (1 - 3)                                                                                                                                                                                                                                                                              EKG:     RADIOLOGY STUDIES:
Chief Complaint/Reason for Admission: S/P TRIP AND FALL WITH HIP PAIN	  History of Present Illness: 	  76 year old female with PMHx HTN, HLD, CAD, ADVANCED COPD ON HOME O2 (3LITERS), IDDM, CHRONIC DIASTOLIC HEART FAILURE, RECENT ADMISSIONS FOR HYPOXIC RESPIRATORY FAILURE STATES SHE WAS LEAVING [employer] EARLIER TODAY WITH HER BROTHER AND MISSTEPPED RESULTING IN A FALL.    9/27/17: SHE REPORTS HAVING NECK AND LOWER BACK PAIN, ALSO LEFT HIP PAIN.  PT DENIES ANY CHEST PAIN OR SHORTNESS OF BREATH, NO NAUSEA OR VOMITING. JUST RECEIVED DILAUDID AND FEELS SOMEWHAT BETTER.    9/28/17: She is tired this morning, knows where she is. No cp, sob, but reports feeling very tired. Hip pain is controlled; She was straight cathed this am  9/29/17: Hip pain currently, awaiting for pain meds; did better overnight; received dilaudid without much lethargy per nurse.  Denies any CP, SOB, N/V/F/C  9/30/17: s/p left hip surgery last night -- confused this am but stable, doesnt appear in pain, opening eyes to her name but not answering questions appropriately. Cant remember where she is.  10/02/17: Still confused but more awake this am; made urine overnight; is mendy fluids. unable to understand patient clearly  10/03/17: More awake today but emotionally labile -- crying, unable to obtain clear ros; then falls asleep; Doesnt appear in pain. Does look at you to verbal stimuli.      ROS: CONFUSED UNABLE TO OBTAIN CLEAR ROS    PHYSICAL EXAM:  Vital Signs Last 24 Hrs  T(C): 36.7 (03 Oct 2017 08:00), Max: 37 (02 Oct 2017 16:49)  T(F): 98 (03 Oct 2017 08:00), Max: 98.6 (02 Oct 2017 16:49)  HR: 84 (03 Oct 2017 08:03) (81 - 98)  BP: 132/47 (03 Oct 2017 08:00) (98/41 - 169/50)  BP(mean): 66 (03 Oct 2017 08:00) (51 - 81)  RR: 14 (03 Oct 2017 08:00) (12 - 23)  SpO2: 99% (03 Oct 2017 08:00) (86% - 100%)    GEN: MORE AWAKE AND ALERT, mood stable, CONFUSED THIS AM  HEENT:  NC/AT, EOMI, no oropharyngeal lesions, erythema, exudates, oral thrush    NECK:   supple    CV:  +S1, +S2, regular, no murmurs or rubs    RESP:   lungs clear to auscultation bilaterally, no wheezing, rales, DECREASED BS BASES, scattered crackles at bases    GI:  abdomen soft, non-tender, non-distended, normal BS,  no abdominal masses, no palpable masses    RECTAL:  not examined    :  not examined    MSK:   normal muscle tone, no atrophy, no rigidity, no contractions    EXT:   no clubbing, no cyanosis, no edema, no calf pain, swelling or erythema, LEFT HIP DRESSING C/D/I    VASCULAR:  pulses equal and symmetric in the upper and lower extremities    NEURO:  AAOX3, no focal neurological deficits, follows all commands, able to move extremities spontaneously,     SKIN:  no ulcers, lesions or rashes    LABS:                              8.3    7.1   )-----------( 191      ( 03 Oct 2017 05:59 )             25.1     10-03    151<H>  |  116<H>  |  107<H>  ----------------------------<  218<H>  4.3   |  25  |  4.56<H>    Ca    8.6      03 Oct 2017 05:59        MEDICATIONS  (STANDING):  ALBUTerol    90 MICROgram(s) HFA Inhaler 1 Puff(s) Inhalation every 4 hours  ALBUTerol/ipratropium for Nebulization 3 milliLiter(s) Nebulizer every 6 hours  atorvastatin 20 milliGRAM(s) Oral at bedtime  cefTRIAXone   IVPB 1 Gram(s) IV Intermittent every 24 hours  citalopram 10 milliGRAM(s) Oral daily  clopidogrel Tablet 75 milliGRAM(s) Oral daily  dextrose 50% Injectable 12.5 Gram(s) IV Push once  dextrose 50% Injectable 25 Gram(s) IV Push once  dextrose 50% Injectable 25 Gram(s) IV Push once  docusate sodium 100 milliGRAM(s) Oral three times a day  heparin  Injectable 5000 Unit(s) SubCutaneous every 12 hours  influenza   Vaccine 0.5 milliLiter(s) IntraMuscular once  insulin glargine Injectable (LANTUS) 34 Unit(s) SubCutaneous at bedtime  insulin lispro (HumaLOG) corrective regimen sliding scale   SubCutaneous three times a day before meals  levothyroxine Injectable 60 MICROGram(s) IV Push daily  pantoprazole    Tablet 40 milliGRAM(s) Oral daily  polyethylene glycol 3350 17 Gram(s) Oral daily  senna 2 Tablet(s) Oral at bedtime  sodium chloride 0.45%. 1000 milliLiter(s) (50 mL/Hr) IV Continuous <Continuous>    MEDICATIONS  (PRN):  acetaminophen   Tablet 650 milliGRAM(s) Oral every 6 hours PRN For Temp over 38.3 C (100.94 F)  bisacodyl Suppository 10 milliGRAM(s) Rectal daily PRN If no bowel movement by POD#2  dextrose Gel 1 Dose(s) Oral once PRN Blood Glucose LESS THAN 70 milliGRAM(s)/deciliter  diphenhydrAMINE   Capsule 25 milliGRAM(s) Oral at bedtime PRN Insomnia  glucagon  Injectable 1 milliGRAM(s) IntraMuscular once PRN Glucose LESS THAN 70 milligrams/deciliter  HYDROmorphone  Injectable 0.5 milliGRAM(s) IV Push every 3 hours PRN Severe Pain (7 - 10)  ondansetron Injectable 4 milliGRAM(s) IV Push every 6 hours PRN Nausea and/or Vomiting  oxyCODONE    IR 5 milliGRAM(s) Oral every 4 hours PRN Moderate Pain (4 - 6)  oxyCODONE    IR 10 milliGRAM(s) Oral every 4 hours PRN Severe Pain (7 - 10)  traMADol 50 milliGRAM(s) Oral every 6 hours PRN Mild Pain (1 - 3)                                                                                                                                                  EKG:     RADIOLOGY STUDIES:
Chief Complaint/Reason for Admission: S/P TRIP AND FALL WITH HIP PAIN	  History of Present Illness: 	  76 year old female with PMHx HTN, HLD, CAD, ADVANCED COPD ON HOME O2 (3LITERS), IDDM, CHRONIC DIASTOLIC HEART FAILURE, RECENT ADMISSIONS FOR HYPOXIC RESPIRATORY FAILURE STATES SHE WAS LEAVING [employer] EARLIER TODAY WITH HER BROTHER AND MISSTEPPED RESULTING IN A FALL.    17: SHE REPORTS HAVING NECK AND LOWER BACK PAIN, ALSO LEFT HIP PAIN.  PT DENIES ANY CHEST PAIN OR SHORTNESS OF BREATH, NO NAUSEA OR VOMITING. JUST RECEIVED DILAUDID AND FEELS SOMEWHAT BETTER.    17: She is tired this morning, knows where she is. No cp, sob, but reports feeling very tired. Hip pain is controlled; She was straight cathed this am    ROS: AS PER HPI OTHERWISE ALL OTHER SYSTEMS REVIEWED AND ARE NEGATIVE    PHYSICAL EXAM:  Vital Signs Last 24 Hrs  T(C): 37.4 (28 Sep 2017 05:13), Max: 37.4 (28 Sep 2017 05:13)  T(F): 99.4 (28 Sep 2017 05:13), Max: 99.4 (28 Sep 2017 05:13)  HR: 84 (28 Sep 2017 05:13) (51 - 84)  BP: 142/38 (28 Sep 2017 05:13) (126/50 - 142/38)  BP(mean): --  RR: 13 (27 Sep 2017 20:16) (13 - 20)  SpO2: 92% (28 Sep 2017 05:13) (92% - 96%)    GEN: Arousable but tired, slightly confused at times mood stable  HEENT:  NC/AT, EOMI, no oropharyngeal lesions, erythema, exudates, oral thrush    NECK:   supple    CV:  +S1, +S2, regular, no murmurs or rubs    RESP:   lungs clear to auscultation bilaterally, no wheezing, rales, rhonchi, good air entry bilaterally, decreased bs [place]    GI:  abdomen soft, non-tender, non-distended, normal BS,  no abdominal masses, no palpable masses    RECTAL:  not examined    :  not examined    MSK:   normal muscle tone, no atrophy, no rigidity, no contractions    EXT:   no clubbing, no cyanosis, no edema, no calf pain, swelling or erythema, EXTERNALLY ROTATED AND SHORTENED LLE, PULSES INTACT    VASCULAR:  pulses equal and symmetric in the upper and lower extremities    NEURO:  AAOX3, no focal neurological deficits, follows all commands, able to move extremities spontaneously, unable to move lle without pain due to fracture    SKIN:  no ulcers, lesions or rashes                              8.9    8.9   )-----------( 185      ( 28 Sep 2017 04:30 )             27.1         140  |  104  |  71<H>  ----------------------------<  159<H>  5.3   |  32<H>  |  2.59<H>    Ca    8.6      28 Sep 2017 04:30    TPro  7.0  /  Alb  3.4  /  TBili  0.4  /  DBili  x   /  AST  14<L>  /  ALT  15  /  AlkPhos  109          LIVER FUNCTIONS - ( 27 Sep 2017 16:46 )  Alb: 3.4 g/dL / Pro: 7.0 gm/dL / ALK PHOS: 109 U/L / ALT: 15 U/L / AST: 14 U/L / GGT: x           PT/INR - ( 28 Sep 2017 04:30 )   PT: 12.8 sec;   INR: 1.18 ratio         PTT - ( 28 Sep 2017 04:30 )  PTT:28.6 sec  Urinalysis Basic - ( 28 Sep 2017 06:00 )    Color: Yellow / Appearance: Clear / S.015 / pH: x  Gluc: x / Ketone: Negative  / Bili: Negative / Urobili: Negative mg/dL   Blood: x / Protein: 30 mg/dL / Nitrite: Negative   Leuk Esterase: Small / RBC: x / WBC x   Sq Epi: x / Non Sq Epi: x / Bacteria: x          Blood, Urine: Negative ( @ 06:00)    MEDICATIONS  (STANDING):  docusate sodium 100 milliGRAM(s) Oral three times a day  diltiazem    milliGRAM(s) Oral daily  citalopram 10 milliGRAM(s) Oral daily  atorvastatin 20 milliGRAM(s) Oral at bedtime  pantoprazole    Tablet 40 milliGRAM(s) Oral before breakfast  levothyroxine 112 MICROGram(s) Oral daily  folic acid 1 milliGRAM(s) Oral daily  insulin glargine Injectable (LANTUS) 22 Unit(s) SubCutaneous at bedtime  insulin lispro (HumaLOG) corrective regimen sliding scale   SubCutaneous three times a day before meals  dextrose 5%. 1000 milliLiter(s) (50 mL/Hr) IV Continuous <Continuous>  dextrose 50% Injectable 12.5 Gram(s) IV Push once  dextrose 50% Injectable 25 Gram(s) IV Push once  dextrose 50% Injectable 25 Gram(s) IV Push once  sodium chloride 0.9%. 1000 milliLiter(s) (50 mL/Hr) IV Continuous <Continuous>  influenza   Vaccine 0.5 milliLiter(s) IntraMuscular once    MEDICATIONS  (PRN):  acetaminophen   Tablet 650 milliGRAM(s) Oral every 6 hours PRN For Temp greater than 38 C (100.4 F)  acetaminophen   Tablet. 650 milliGRAM(s) Oral every 6 hours PRN headache  oxyCODONE    IR 10 milliGRAM(s) Oral every 4 hours PRN Moderate Pain  oxyCODONE    IR 5 milliGRAM(s) Oral every 4 hours PRN Mild Pain  HYDROmorphone  Injectable 0.5 milliGRAM(s) IV Push every 4 hours PRN Severe Pain (7 - 10)  diphenhydrAMINE   Capsule 25 milliGRAM(s) Oral at bedtime PRN Insomnia  diphenhydrAMINE   Capsule 25 milliGRAM(s) Oral every 4 hours PRN Rash and/or Itching  benzocaine 15 mG/menthol 3.6 mG Lozenge 1 Lozenge Oral every 4 hours PRN Sore Throat  dextrose Gel 1 Dose(s) Oral once PRN Blood Glucose LESS THAN 70 milliGRAM(s)/deciliter  glucagon  Injectable 1 milliGRAM(s) IntraMuscular once PRN Glucose LESS THAN 70 milligrams/deciliter
Chief Complaint/Reason for Admission: S/P TRIP AND FALL WITH HIP PAIN	  History of Present Illness: 	  76 year old female with PMHx HTN, HLD, CAD, ADVANCED COPD ON HOME O2 (3LITERS), IDDM, CHRONIC DIASTOLIC HEART FAILURE, RECENT ADMISSIONS FOR HYPOXIC RESPIRATORY FAILURE STATES SHE WAS LEAVING [employer] EARLIER TODAY WITH HER BROTHER AND MISSTEPPED RESULTING IN A FALL.    9/27/17: SHE REPORTS HAVING NECK AND LOWER BACK PAIN, ALSO LEFT HIP PAIN.  PT DENIES ANY CHEST PAIN OR SHORTNESS OF BREATH, NO NAUSEA OR VOMITING. JUST RECEIVED DILAUDID AND FEELS SOMEWHAT BETTER.    9/28/17: She is tired this morning, knows where she is. No cp, sob, but reports feeling very tired. Hip pain is controlled; She was straight cathed this am  9/29/17: Hip pain currently, awaiting for pain meds; did better overnight; received dilaudid without much lethargy per nurse.  Denies any CP, SOB, N/V/F/C  9/30/17: s/p left hip surgery last night -- confused this am but stable, doesnt appear in pain, opening eyes to her name but not answering questions appropritately. Cant remember where she is.    ROS: AS PER HPI OTHERWISE ALL OTHER SYSTEMS REVIEWED AND ARE NEGATIVE    PHYSICAL EXAM:  Vital Signs Last 24 Hrs  T(C): 36.1 (30 Sep 2017 06:52), Max: 37.8 (29 Sep 2017 23:39)  T(F): 97 (30 Sep 2017 06:52), Max: 100.1 (29 Sep 2017 23:39)  HR: 89 (30 Sep 2017 06:01) (72 - 89)  BP: 135/35 (30 Sep 2017 06:01) (115/33 - 144/39)  BP(mean): 57 (30 Sep 2017 06:01) (57 - 77)  RR: 19 (30 Sep 2017 06:01) (11 - 20)  SpO2: 92% (30 Sep 2017 06:01) (92% - 100%)    GEN: MORE AWAKE AND ALERT, mood stable, CONFUSED THIS AM  HEENT:  NC/AT, EOMI, no oropharyngeal lesions, erythema, exudates, oral thrush    NECK:   supple    CV:  +S1, +S2, regular, no murmurs or rubs    RESP:   lungs clear to auscultation bilaterally, no wheezing, rales, DECREASED BS BASES, scattered crackles at bases    GI:  abdomen soft, non-tender, non-distended, normal BS,  no abdominal masses, no palpable masses    RECTAL:  not examined    :  not examined    MSK:   normal muscle tone, no atrophy, no rigidity, no contractions    EXT:   no clubbing, no cyanosis, no edema, no calf pain, swelling or erythema, LEFT HIP DRESSING C/D/I    VASCULAR:  pulses equal and symmetric in the upper and lower extremities    NEURO:  AAOX3, no focal neurological deficits, follows all commands, able to move extremities spontaneously,     SKIN:  no ulcers, lesions or rashes    LABS:                            7.5    11.5  )-----------( 175      ( 01 Oct 2017 05:33 )             23.1     10-01    145  |  110<H>  |  105<H>  ----------------------------<  262<H>  5.5<H>   |  27  |  5.68<H>    Ca    8.4<L>      01 Oct 2017 05:33            PT/INR - ( 29 Sep 2017 20:30 )   PT: 15.0 sec;   INR: 1.38 ratio         MEDICATIONS  (STANDING):  influenza   Vaccine 0.5 milliLiter(s) IntraMuscular once  pantoprazole    Tablet 40 milliGRAM(s) Oral daily  polyethylene glycol 3350 17 Gram(s) Oral daily  senna 2 Tablet(s) Oral at bedtime  docusate sodium 100 milliGRAM(s) Oral three times a day  citalopram 10 milliGRAM(s) Oral daily  atorvastatin 20 milliGRAM(s) Oral at bedtime  levothyroxine 112 MICROGram(s) Oral daily  insulin lispro (HumaLOG) corrective regimen sliding scale   SubCutaneous three times a day before meals  dextrose 50% Injectable 12.5 Gram(s) IV Push once  dextrose 50% Injectable 25 Gram(s) IV Push once  dextrose 50% Injectable 25 Gram(s) IV Push once  ALBUTerol    90 MICROgram(s) HFA Inhaler 1 Puff(s) Inhalation every 4 hours  cefTRIAXone   IVPB 1 Gram(s) IV Intermittent every 24 hours  heparin  Injectable 5000 Unit(s) SubCutaneous every 12 hours  sodium chloride 0.9%. 1000 milliLiter(s) (75 mL/Hr) IV Continuous <Continuous>  ALBUTerol/ipratropium for Nebulization 3 milliLiter(s) Nebulizer every 6 hours  sodium chloride 0.9%. 1000 milliLiter(s) (30 mL/Hr) IV Continuous <Continuous>  insulin glargine Injectable (LANTUS) 28 Unit(s) SubCutaneous at bedtime    MEDICATIONS  (PRN):  acetaminophen   Tablet 650 milliGRAM(s) Oral every 6 hours PRN For Temp over 38.3 C (100.94 F)  oxyCODONE    IR 5 milliGRAM(s) Oral every 4 hours PRN Moderate Pain (4 - 6)  oxyCODONE    IR 10 milliGRAM(s) Oral every 4 hours PRN Severe Pain (7 - 10)  traMADol 50 milliGRAM(s) Oral every 6 hours PRN Mild Pain (1 - 3)  ondansetron Injectable 4 milliGRAM(s) IV Push every 6 hours PRN Nausea and/or Vomiting  diphenhydrAMINE   Capsule 25 milliGRAM(s) Oral at bedtime PRN Insomnia  bisacodyl Suppository 10 milliGRAM(s) Rectal daily PRN If no bowel movement by POD#2  dextrose Gel 1 Dose(s) Oral once PRN Blood Glucose LESS THAN 70 milliGRAM(s)/deciliter  glucagon  Injectable 1 milliGRAM(s) IntraMuscular once PRN Glucose LESS THAN 70 milligrams/deciliter  HYDROmorphone  Injectable 0.5 milliGRAM(s) IV Push every 3 hours PRN Severe Pain (7 - 10)
Chief Complaint/Reason for Admission: S/P TRIP AND FALL WITH HIP PAIN	  History of Present Illness: 	  76 year old female with PMHx HTN, HLD, CAD, ADVANCED COPD ON HOME O2 (3LITERS), IDDM, CHRONIC DIASTOLIC HEART FAILURE, RECENT ADMISSIONS FOR HYPOXIC RESPIRATORY FAILURE STATES SHE WAS LEAVING [place] EARLIER TODAY WITH HER BROTHER AND MISSTEPPED RESULTING IN A FALL.    9/27/17: SHE REPORTS HAVING NECK AND LOWER BACK PAIN, ALSO LEFT HIP PAIN.  PT DENIES ANY CHEST PAIN OR SHORTNESS OF BREATH, NO NAUSEA OR VOMITING. JUST RECEIVED DILAUDID AND FEELS SOMEWHAT BETTER.    9/28/17: She is tired this morning, knows where she is. No cp, sob, but reports feeling very tired. Hip pain is controlled; She was straight cathed this am  9/29/17: Hip pain currently, awaiting for pain meds; did better overnight; received dilaudid without much lethargy per nurse.  Denies any CP, SOB, N/V/F/C  9/30/17: s/p left hip surgery last night -- confused this am but stable, doesnt appear in pain, opening eyes to her name but not answering questions appropritately. Cant remember where she is.    ROS: AS PER HPI OTHERWISE ALL OTHER SYSTEMS REVIEWED AND ARE NEGATIVE    PHYSICAL EXAM:  Vital Signs Last 24 Hrs  T(C): 36.1 (30 Sep 2017 06:52), Max: 37.8 (29 Sep 2017 23:39)  T(F): 97 (30 Sep 2017 06:52), Max: 100.1 (29 Sep 2017 23:39)  HR: 89 (30 Sep 2017 06:01) (72 - 89)  BP: 135/35 (30 Sep 2017 06:01) (115/33 - 144/39)  BP(mean): 57 (30 Sep 2017 06:01) (57 - 77)  RR: 19 (30 Sep 2017 06:01) (11 - 20)  SpO2: 92% (30 Sep 2017 06:01) (92% - 100%)    GEN: MORE AWAKE AND ALERT, mood stable, CONFUSED THIS AM  HEENT:  NC/AT, EOMI, no oropharyngeal lesions, erythema, exudates, oral thrush    NECK:   supple    CV:  +S1, +S2, regular, no murmurs or rubs    RESP:   lungs clear to auscultation bilaterally, no wheezing, rales, DECREASED BS BASES    GI:  abdomen soft, non-tender, non-distended, normal BS,  no abdominal masses, no palpable masses    RECTAL:  not examined    :  not examined    MSK:   normal muscle tone, no atrophy, no rigidity, no contractions    EXT:   no clubbing, no cyanosis, no edema, no calf pain, swelling or erythema, LEFT HIP DRESSING C/D/I    VASCULAR:  pulses equal and symmetric in the upper and lower extremities    NEURO:  AAOX3, no focal neurological deficits, follows all commands, able to move extremities spontaneously,     SKIN:  no ulcers, lesions or rashes                                8.7    15.1  )-----------( 173      ( 30 Sep 2017 05:30 )             25.6     09-30    144  |  109<H>  |  90<H>  ----------------------------<  204<H>  5.4<H>   |  25  |  3.73<H>    Ca    8.6      30 Sep 2017 05:30            PT/INR - ( 29 Sep 2017 20:30 )   PT: 15.0 sec;   INR: 1.38 ratio         PTT - ( 29 Sep 2017 06:08 )  PTT:29.6 sec    MEDICATIONS  (STANDING):  influenza   Vaccine 0.5 milliLiter(s) IntraMuscular once  pantoprazole    Tablet 40 milliGRAM(s) Oral daily  polyethylene glycol 3350 17 Gram(s) Oral daily  senna 2 Tablet(s) Oral at bedtime  docusate sodium 100 milliGRAM(s) Oral three times a day  diltiazem    milliGRAM(s) Oral daily  citalopram 10 milliGRAM(s) Oral daily  atorvastatin 20 milliGRAM(s) Oral at bedtime  levothyroxine 112 MICROGram(s) Oral daily  insulin glargine Injectable (LANTUS) 22 Unit(s) SubCutaneous at bedtime  insulin lispro (HumaLOG) corrective regimen sliding scale   SubCutaneous three times a day before meals  dextrose 50% Injectable 12.5 Gram(s) IV Push once  dextrose 50% Injectable 25 Gram(s) IV Push once  dextrose 50% Injectable 25 Gram(s) IV Push once  buDESOnide 160 MICROgram(s)/formoterol 4.5 MICROgram(s) Inhaler 2 Puff(s) Inhalation two times a day  tiotropium 18 MICROgram(s) Capsule 1 Capsule(s) Inhalation daily  ALBUTerol    90 MICROgram(s) HFA Inhaler 1 Puff(s) Inhalation every 4 hours  cefTRIAXone   IVPB 1 Gram(s) IV Intermittent every 24 hours  heparin  Injectable 5000 Unit(s) SubCutaneous every 12 hours  sodium chloride 0.9%. 1000 milliLiter(s) (75 mL/Hr) IV Continuous <Continuous>    MEDICATIONS  (PRN):  acetaminophen   Tablet 650 milliGRAM(s) Oral every 6 hours PRN For Temp over 38.3 C (100.94 F)  oxyCODONE    IR 5 milliGRAM(s) Oral every 4 hours PRN Moderate Pain (4 - 6)  oxyCODONE    IR 10 milliGRAM(s) Oral every 4 hours PRN Severe Pain (7 - 10)  traMADol 50 milliGRAM(s) Oral every 6 hours PRN Mild Pain (1 - 3)  ondansetron Injectable 4 milliGRAM(s) IV Push every 6 hours PRN Nausea and/or Vomiting  diphenhydrAMINE   Capsule 25 milliGRAM(s) Oral at bedtime PRN Insomnia  dextrose Gel 1 Dose(s) Oral once PRN Blood Glucose LESS THAN 70 milliGRAM(s)/deciliter  glucagon  Injectable 1 milliGRAM(s) IntraMuscular once PRN Glucose LESS THAN 70 milligrams/deciliter  HYDROmorphone  Injectable 0.5 milliGRAM(s) IV Push every 3 hours PRN Severe Pain (7 - 10)
Chief Complaint:    HPI:  76 year old female with PMHx HTN, HLD, CAD, ADVANCED COPD ON HOME O2 (3LITERS), IDDM, CHRONIC DIASTOLIC HEART FAILURE, RECENT ADMISSIONS FOR HYPOXIC RESPIRATORY FAILURE STATES SHE WAS LEAVING [facility] EARLIER TODAY WITH HER BROTHER AND MISSTEPPED RESULTING IN A FALL.  SHE REPORTS HAVING NECK AND LOWER BACK PAIN, ALSO LEFT HIP PAIN.  PT DENIES ANY CHEST PAIN OR SHORTNESS OF BREATH, NO NAUSEA OR VOMITING. JUST RECEIVED DILAUDID AND FEELS SOMEWHAT BETTER. (27 Sep 2017 19:56)    : History reviewed; as above. Pulmonary consult requested for pre-op pulmonary eval. Patient followed by Dr. Forte in the office. Patient is presently breathing comfortably, luing flat in bed. . Family at bedside. She is sleeping but is arousable; denies shortness of breath.    patient slightly lethargic and wakes up most likely secondary to sedation no sob and fevers are tending down , has mild cough and productive sputum. no wheezing         PAST MEDICAL & SURGICAL HISTORY:  Chronic diastolic congestive heart failure  Hyperlipidemia, unspecified hyperlipidemia type  Essential hypertension  Neuropathy  Diabetes  Chronic obstructive pulmonary disease, unspecified COPD type  History of total knee replacement, unspecified laterality  H/O hernia repair   delivery delivered  S/P appendectomy      Vital Signs Last 24 Hrs  T(C): 36.9 (29 Sep 2017 07:45), Max: 39.4 (28 Sep 2017 11:30)  T(F): 98.4 (29 Sep 2017 07:45), Max: 102.9 (28 Sep 2017 11:30)  HR: 77 (29 Sep 2017 04:46) (70 - 86)  BP: 147/36 (29 Sep 2017 04:46) (128/56 - 147/36)  BP(mean): --  RR: 18 (29 Sep 2017 04:46) (8 - 18)  SpO2: 94% (29 Sep 2017 04:46) (92% - 98%)  I&O's Summary    27 Sep 2017 07:01  -  28 Sep 2017 07:00  --------------------------------------------------------  IN: 0 mL / OUT: 650 mL / NET: -650 mL        PHYSICAL EXAM:    Constitutional: slightly lethargic   Neck: Soft and supple, No LAD, No JVD  Respiratory: Breath sounds are clear bilaterally, with few occasional rales over the bases   Cardiovascular: S1 and S2, regular rate and rhythm, no Murmurs, gallops or rubs  Gastrointestinal: Bowel Sounds present, soft, nontender, nondistended, no guarding, no rebound  Extremities: No peripheral edema and rotated left extremities   Neurological: mightly lethargic   Skin: No rashes    MEDICATIONS  (STANDING):  docusate sodium 100 milliGRAM(s) Oral three times a day  diltiazem    milliGRAM(s) Oral daily  citalopram 10 milliGRAM(s) Oral daily  atorvastatin 20 milliGRAM(s) Oral at bedtime  pantoprazole    Tablet 40 milliGRAM(s) Oral before breakfast  levothyroxine 112 MICROGram(s) Oral daily  folic acid 1 milliGRAM(s) Oral daily  insulin glargine Injectable (LANTUS) 22 Unit(s) SubCutaneous at bedtime  insulin lispro (HumaLOG) corrective regimen sliding scale   SubCutaneous three times a day before meals  dextrose 5%. 1000 milliLiter(s) (50 mL/Hr) IV Continuous <Continuous>  dextrose 50% Injectable 12.5 Gram(s) IV Push once  dextrose 50% Injectable 25 Gram(s) IV Push once  dextrose 50% Injectable 25 Gram(s) IV Push once  influenza   Vaccine 0.5 milliLiter(s) IntraMuscular once  buDESOnide 160 MICROgram(s)/formoterol 4.5 MICROgram(s) Inhaler 2 Puff(s) Inhalation two times a day  tiotropium 18 MICROgram(s) Capsule 1 Capsule(s) Inhalation daily  ALBUTerol    90 MICROgram(s) HFA Inhaler 1 Puff(s) Inhalation every 4 hours  cefTRIAXone   IVPB      cefTRIAXone   IVPB 1 Gram(s) IV Intermittent every 24 hours    LABS                           9.1    11.8  )-----------( 177      ( 29 Sep 2017 06:08 )             28.0   09-29    144  |  106  |  70<H>  ----------------------------<  143<H>  4.5   |  30  |  2.34<H>    Ca    9.0      29 Sep 2017 06:08    TPro  7.0  /  Alb  3.4  /  TBili  0.4  /  DBili  x   /  AST  14<L>  /  ALT  15  /  AlkPhos  109          PROCEDURE DATE:  2017        INTERPRETATION:  Clinical information: Fall. Left hip pain.    AP view of the chest  AP view of the pelvis; 2 views of the left hip  AP and lateral views of the left femur  AP and lateral views of the left knee    COMPARISON: Chest x-ray 2017. Pelvis x-ray 2017.    FINDINGS:  Chest: Cardiac, hilar and mediastinal contours are not well   evaluated due to AP technique. There are prominent and indistinct   bronchovascular markings bilaterally compatible with mild pulmonary   vascular congestion. There is no pneumothorax or pleural effusion. No   fractures are identified.    Pelvis/left hip/left femur/left knee:  There is a comminuted   intertrochanteric fracture of the proximal femur with mild medial   displacement of the distal fracture fragments. The distal femur is   intact. The patient is status post left total replacement.    IMPRESSION:    Chest: Mild pulmonary vascular congestion  Pelvis/left hip/left femur/left knee: Comminuted intertrochanteric   fracture.                ROSARIO JUÁREZ   This document has been electronically signed. Sep 28 2017  8:50AM          Assessment/Plan: 1. Left hip fracture following mechanical fall.      2. COPD/emphysema.seems to stable waiting for the hip repair     3. Hx of lung cancer - S/P lobectomy.    4. Chronic diastolic heart failure.     5. D.M.      6 fevers trending down with cough rule out respiratory infection     PLAN     continue with the nebs and symbicort and spiriva and 02 supplementation and continuation of rocephin for now   send rvp panel and base line cxr did not show any lung infiltrates. patient was kept NPO fractures repair . follow up cultures . If fever continued to persist would consider ct scan of the chest .  monitor for the wheezing and hypoxia in the post op phase .
HPI:  76 year old female with PMHx HTN, HLD, CAD, ADVANCED COPD ON HOME O2 (3LITERS), IDDM, CHRONIC DIASTOLIC HEART FAILURE, RECENT ADMISSIONS FOR HYPOXIC RESPIRATORY FAILURE STATES SHE WAS LEAVING [facility] EARLIER TODAY WITH HER BROTHER AND MISSTEPPED RESULTING IN A FALL.  SHE REPORTS HAVING NECK AND LOWER BACK PAIN, ALSO LEFT HIP PAIN.  PT DENIES ANY CHEST PAIN OR SHORTNESS OF BREATH, NO NAUSEA OR VOMITING. JUST RECEIVED DILAUDID AND FEELS SOMEWHAT BETTER. (27 Sep 2017 19:56)      Patient is a 76y old  Female who presents with a chief complaint of Left Hip Fracture (29 Sep 2017 20:21)  s/p IMN ON 17    Consulted by Dr. Brandon Jacinto for VTE prophylaxis, risk stratification, and anticoagulation management.    PAST MEDICAL & SURGICAL HISTORY:  Chronic diastolic congestive heart failure  Hyperlipidemia, unspecified hyperlipidemia type  Essential hypertension  Neuropathy  Diabetes  Chronic obstructive pulmonary disease, unspecified COPD type  History of total knee replacement, unspecified laterality  H/O hernia repair   delivery delivered  S/P appendectomy    Caprini VTE Risk Score: 5 HIGH    IMPROVE Bleeding Risk Score: 6.5 HIGH    Falls Risk:   High ( X )  Mod (  )  Low (  )    EBL:   200ml  CR CL: 16.35  17 PT SEEN AT BEDSIDE IN sd.  pT IS NON VERBAL. EYES OPEN, Moving arms and rt leg.  10-2-17 pt seen at bedside more alert then before looks in direction of verbal stimuli.    10/3/2017: Patient seen at bedside working with physical therapy. Copperative with limited verbal responses. Per nursing- she is more verbal now and appropriate but still with issues taking PO meds. Cr improving at 4.56. HH improving slowly.  10/4: Patient seen at bedside Minimal response. Only eye contact. Cr improving slowly- increased fluids, COPD stable.  HH improving.1    FAMILY HISTORY:  Family history of CHF (congestive heart failure) (Mother)    Denies any personal or familial history of clotting or bleeding disorders.    Allergies    Bactrim (Other)  ciprofloxacin (Other (Mild))  clindamycin (Unknown)  ibuprofen (Unknown)  penicillins (Other)  sulfa drugs (Unknown)    Intolerances    REVIEW OF SYSTEMS    (  )Fever	     (  )Constipation	(  )SOB				(  )Headache	(  )Dysuria  (  )Chills	     (  )Melena	(  )Dyspnea present on exertion	                    (  )Dizziness                    (  )Polyuria  (  )Nausea	     (  )Hematochezia	(  )Cough			                    (  )Syncope   	(  )Hematuria  (  )Vomiting    (  )Chest Pain	(  )Wheezing			(  )Weakness  (  )Diarrhea     (  )Palpitations	(  )Anorexia			(  )Myalgia    Unable to obtain review of systems due to: limited verbal responses      PHYSICAL EXAM:    Constitutional: Appears pale    Neurological: non verbal    Skin: Warm    Respiratory and Thorax: normal effort; Breath sounds: normal; No rales/wheezing/rhonchi  	  Cardiovascular: S1, S2, regular, NMBR; MP: RSR 70's, no ectopy	    Gastrointestinal: BS + x 4Q, nontender	    Genitourinary:  Bladder nondistended, nontender    Musculoskeletal:   General Right:   no muscle/joint tenderness,   normal tone, no joint swelling,   ROM: full	    General Left:   no muscle/joint tenderness,   normal tone, no joint swelling,   ROM: limited    Hip: Left: Dressing CDI;    Lower extrems:   Right: no calf tenderness              negative gamal's sign               + pedal pulses    Left:   no calf tenderness              negative gamal's sign               + pedal pulses                              9.0    11.9  )-----------( 218      ( 04 Oct 2017 05:13 )             27.1       10-    150<H>  |  116<H>  |  92<H>  ----------------------------<  263<H>  3.8   |  25  |  3.92<H>    Ca    8.6      04 Oct 2017 05:13                           8.3    7.1   )-----------( 191      ( 03 Oct 2017 05:59 )             25.1       10-    151<H>  |  116<H>  |  107<H>  ----------------------------<  218<H>  4.3   |  25  |  4.56<H>    Ca    8.6      03 Oct 2017 05:59                               7.8    7.9   )-----------( 169      ( 02 Oct 2017 06:10 )             23.8       10-    148<H>  |  116<H>  |  112<H>  ----------------------------<  233<H>  5.5<H>   |  21<L>  |  5.04<H>    Ca    8.9      02 Oct 2017 12:23      MEDICATIONS  (STANDING):  influenza   Vaccine 0.5 milliLiter(s) IntraMuscular once  pantoprazole    Tablet 40 milliGRAM(s) Oral daily  polyethylene glycol 3350 17 Gram(s) Oral daily  senna 2 Tablet(s) Oral at bedtime  docusate sodium 100 milliGRAM(s) Oral three times a day  citalopram 10 milliGRAM(s) Oral daily  atorvastatin 20 milliGRAM(s) Oral at bedtime  insulin lispro (HumaLOG) corrective regimen sliding scale   SubCutaneous three times a day before meals  dextrose 50% Injectable 12.5 Gram(s) IV Push once  dextrose 50% Injectable 25 Gram(s) IV Push once  dextrose 50% Injectable 25 Gram(s) IV Push once  ALBUTerol    90 MICROgram(s) HFA Inhaler 1 Puff(s) Inhalation every 4 hours  cefTRIAXone   IVPB 1 Gram(s) IV Intermittent every 24 hours  heparin  Injectable 5000 Unit(s) SubCutaneous every 12 hours  ALBUTerol/ipratropium for Nebulization 3 milliLiter(s) Nebulizer every 6 hours  levothyroxine Injectable 60 MICROGram(s) IV Push daily  sodium chloride 0.45%. 1000 milliLiter(s) IV Continuous <Continuous>  insulin glargine Injectable (LANTUS) 34 Unit(s) SubCutaneous at bedtime    DVT Prophylaxis:  LMWH                   (  )  Heparin SQ           ( x )  Coumadin             (  )  Xarelto                  (  )  Eliquis                   (  )  Venodynes           ( x )  Ambulation          (x  )  UFH                       (  )  Contraindicated  (  )
HPI:  76 year old female with PMHx HTN, HLD, CAD, ADVANCED COPD ON HOME O2 (3LITERS), IDDM, CHRONIC DIASTOLIC HEART FAILURE, RECENT ADMISSIONS FOR HYPOXIC RESPIRATORY FAILURE STATES SHE WAS LEAVING [facility] EARLIER TODAY WITH HER BROTHER AND MISSTEPPED RESULTING IN A FALL.  SHE REPORTS HAVING NECK AND LOWER BACK PAIN, ALSO LEFT HIP PAIN.  PT DENIES ANY CHEST PAIN OR SHORTNESS OF BREATH, NO NAUSEA OR VOMITING. JUST RECEIVED DILAUDID AND FEELS SOMEWHAT BETTER. (27 Sep 2017 19:56)      Patient is a 76y old  Female who presents with a chief complaint of Left Hip Fracture (29 Sep 2017 20:21)  s/p IMN ON 17    Consulted by Dr. Brandon Jacinto for VTE prophylaxis, risk stratification, and anticoagulation management.    PAST MEDICAL & SURGICAL HISTORY:  Chronic diastolic congestive heart failure  Hyperlipidemia, unspecified hyperlipidemia type  Essential hypertension  Neuropathy  Diabetes  Chronic obstructive pulmonary disease, unspecified COPD type  History of total knee replacement, unspecified laterality  H/O hernia repair   delivery delivered  S/P appendectomy    Caprini VTE Risk Score: 5 HIGH    IMPROVE Bleeding Risk Score: 6.5 HIGH    Falls Risk:   High ( X )  Mod (  )  Low (  )    EBL:   200ml  CR CL: 16.35  17 PT SEEN AT BEDSIDE IN sd.  pT IS NON VERBAL. EYES OPEN, Moving arms and rt leg.  10-2-17 pt seen at bedside more alert then before looks in direction of verbal stimuli.    10/3/2017: Patient seen at bedside working with physical therapy. Copperative with limited verbal responses. Per nursing- she is more verbal now and appropriate but still with issues taking PO meds. Cr improving at 4.56. HH improving slowly.    FAMILY HISTORY:  Family history of CHF (congestive heart failure) (Mother)    Denies any personal or familial history of clotting or bleeding disorders.    Allergies    Bactrim (Other)  ciprofloxacin (Other (Mild))  clindamycin (Unknown)  ibuprofen (Unknown)  penicillins (Other)  sulfa drugs (Unknown)    Intolerances    REVIEW OF SYSTEMS    (  )Fever	     (  )Constipation	(  )SOB				(  )Headache	(  )Dysuria  (  )Chills	     (  )Melena	(  )Dyspnea present on exertion	                    (  )Dizziness                    (  )Polyuria  (  )Nausea	     (  )Hematochezia	(  )Cough			                    (  )Syncope   	(  )Hematuria  (  )Vomiting    (  )Chest Pain	(  )Wheezing			(  )Weakness  (  )Diarrhea     (  )Palpitations	(  )Anorexia			(  )Myalgia    Unable to obtain review of systems due to: limited verbal responses      PHYSICAL EXAM:    Constitutional: Appears pale    Neurological: non verbal    Skin: Warm    Respiratory and Thorax: normal effort; Breath sounds: normal; No rales/wheezing/rhonchi  	  Cardiovascular: S1, S2, regular, NMBR; MP: RSR 70's, no ectopy	    Gastrointestinal: BS + x 4Q, nontender	    Genitourinary:  Bladder nondistended, nontender    Musculoskeletal:   General Right:   no muscle/joint tenderness,   normal tone, no joint swelling,   ROM: full	    General Left:   no muscle/joint tenderness,   normal tone, no joint swelling,   ROM: limited    Hip: Left: Dressing CDI;    Lower extrems:   Right: no calf tenderness              negative gamal's sign               + pedal pulses    Left:   no calf tenderness              negative gamal's sign               + pedal pulses                           8.3    7.1   )-----------( 191      ( 03 Oct 2017 05:59 )             25.1       10-03    151<H>  |  116<H>  |  107<H>  ----------------------------<  218<H>  4.3   |  25  |  4.56<H>    Ca    8.6      03 Oct 2017 05:59                               7.8    7.9   )-----------( 169      ( 02 Oct 2017 06:10 )             23.8       10-02    148<H>  |  116<H>  |  112<H>  ----------------------------<  233<H>  5.5<H>   |  21<L>  |  5.04<H>    Ca    8.9      02 Oct 2017 12:23      MEDICATIONS  (STANDING):  influenza   Vaccine 0.5 milliLiter(s) IntraMuscular once  pantoprazole    Tablet 40 milliGRAM(s) Oral daily  polyethylene glycol 3350 17 Gram(s) Oral daily  senna 2 Tablet(s) Oral at bedtime  docusate sodium 100 milliGRAM(s) Oral three times a day  citalopram 10 milliGRAM(s) Oral daily  atorvastatin 20 milliGRAM(s) Oral at bedtime  insulin lispro (HumaLOG) corrective regimen sliding scale   SubCutaneous three times a day before meals  dextrose 50% Injectable 12.5 Gram(s) IV Push once  dextrose 50% Injectable 25 Gram(s) IV Push once  dextrose 50% Injectable 25 Gram(s) IV Push once  ALBUTerol    90 MICROgram(s) HFA Inhaler 1 Puff(s) Inhalation every 4 hours  cefTRIAXone   IVPB 1 Gram(s) IV Intermittent every 24 hours  heparin  Injectable 5000 Unit(s) SubCutaneous every 12 hours  ALBUTerol/ipratropium for Nebulization 3 milliLiter(s) Nebulizer every 6 hours  levothyroxine Injectable 60 MICROGram(s) IV Push daily  sodium chloride 0.45%. 1000 milliLiter(s) IV Continuous <Continuous>  insulin glargine Injectable (LANTUS) 34 Unit(s) SubCutaneous at bedtime    DVT Prophylaxis:  LMWH                   (  )  Heparin SQ           ( x )  Coumadin             (  )  Xarelto                  (  )  Eliquis                   (  )  Venodynes           ( x )  Ambulation          (x  )  UFH                       (  )  Contraindicated  (  )
HPI:  76 year old female with PMHx HTN, HLD, CAD, ADVANCED COPD ON HOME O2 (3LITERS), IDDM, CHRONIC DIASTOLIC HEART FAILURE, RECENT ADMISSIONS FOR HYPOXIC RESPIRATORY FAILURE STATES SHE WAS LEAVING [facility] EAR[facility]ER TODAY WITH HER BROTHER AND MISSTEPPED RESULTING IN A FALL.  SHE REPORTS HAVING NECK AND LOWER BACK PAIN, ALSO LEFT HIP PAIN.  PT DENIES ANY CHEST PAIN OR SHORTNESS OF BREATH, NO NAUSEA OR VOMITING. JUST RECEIVED DILAUDID AND FEELS SOMEWHAT BETTER. (27 Sep 2017 19:56)      Patient is a 76y old  Female who presents with a chief complaint of Left Hip Fracture (29 Sep 2017 20:21)  s/p IMN ON 17    Consulted by Dr. Brandon Jacinto for VTE prophylaxis, risk stratification, and anticoagulation management.    PAST MEDICAL & SURGICAL HISTORY:  Chronic diastolic congestive heart failure  Hyperlipidemia, unspecified hyperlipidemia type  Essential hypertension  Neuropathy  Diabetes  Chronic obstructive pulmonary disease, unspecified COPD type  History of total knee replacement, unspecified laterality  H/O hernia repair   delivery delivered  S/P appendectomy    Caprini VTE Risk Score: 5 HIGH    IMPROVE Bleeding Risk Score: 6.5 HIGH    Falls Risk:   High ( X )  Mod (  )  Low (  )    EBL:   200ml  CR CL: 16.35  17 PT SEEN AT BEDSIDE IN sd.  pT IS NON VERBAL. EYES OPEN, Moving arms and rt leg.  10-2-17 pt seen at bedside more alert then before looks in direction of verbal stimuli.    10/3/2017: Patient seen at bedside working with physical therapy. Cooperative with limited verbal responses. Per nursing- she is more verbal now and appropriate but still with issues taking PO meds. Cr improving at 4.56. HH improving slowly.  10/4: Patient seen at bedside Minimal response. Only eye contact. Cr improving slowly- increased fluids, COPD stable.  HH improving.1  10-5-17 pt seen on 3E,  present.  Pt more alert today knows her name.  Discussed pt  pt's pt anticoagulation with HEPARIN.  Questions answered will reinforce as needed.  10/7/17: having LLE calf pain, awaiting dopplers today  10-9-17 PT seen at bedside more alert today.   present.  Reinforced use of heparin for prophylaxis DVT prevention. Pt may go to rehab today.    FAMILY HISTORY:  Family history of CHF (congestive heart failure) (Mother)    Denies any personal or familial history of clotting or bleeding disorders.    Allergies    Bactrim (Other)  ciprofloxacin (Other (Mild))  clindamycin (Unknown)  ibuprofen (Unknown)  penicillins (Other)  sulfa drugs (Unknown)    Intolerances    REVIEW OF SYSTEMS    (  )Fever	     (  )Constipation	(  )SOB				(  )Headache	(  )Dysuria  (  )Chills	     (  )Melena	(  )Dyspnea present on exertion	                    (  )Dizziness                    (  )Polyuria  (  )Nausea	     (  )Hematochezia	(  )Cough			                    (  )Syncope   	(  )Hematuria  (  )Vomiting    (  )Chest Pain	(  )Wheezing			(  )Weakness  (  )Diarrhea     (  )Palpitations	(  )Anorexia			(  )Myalgia    all other ROS NEG      PHYSICAL EXAM:    Constitutional: Appears well    Neurological: oriented x1 person    Skin: Warm    Respiratory and Thorax: normal effort; Breath sounds: normal; No rales/wheezing/rhonchi  	  Cardiovascular: S1, S2, regular, NMBR; MP: RSR 70's, no ectopy	    Gastrointestinal: BS + x 4Q, nontender	    Genitourinary:  Bladder nondistended, nontender    Musculoskeletal:   General Right:   no muscle/joint tenderness,   normal tone, no joint swelling,   ROM: full	    General Left:   no muscle/joint tenderness,   normal tone, no joint swelling,   ROM: limited    Hip: Left: Dressing CDI;    Lower extrems:   Right: no calf tenderness              negative gamal's sign               + pedal pulses    Left:   + calf tenderness              negative gamal's sign               + pedal pulses                        8.6    8.9   )-----------( 295      ( 08 Oct 2017 05:55 )             26.1       10-    142  |  111<H>  |  82<H>  ----------------------------<  106<H>  3.7   |  24  |  3.41<H>    Ca    8.3<L>      08 Oct 2017 05:55                                   9.1    10.2  )-----------( 313      ( 07 Oct 2017 06:21 )             28.0       10-07    x   |  x   |  x   ----------------------------<  102<H>  x    |  x   |  x     Ca    8.9      07 Oct 2017 06:21                            8.7    10.5  )-----------( 268      ( 06 Oct 2017 05:39 )             26.2       10-    146<H>  |  112<H>  |  84<H>  ----------------------------<  138<H>  3.6   |  26  |  3.32<H>    Ca    8.9      06 Oct 2017 05:39                                 9.0    11.9  )-----------( 218      ( 04 Oct 2017 05:13 )             27.1       10-    150<H>  |  116<H>  |  92<H>  ----------------------------<  263<H>  3.8   |  25  |  3.92<H>    MEDICATIONS  (STANDING):  atorvastatin 20 milliGRAM(s) Oral at bedtime  buDESOnide 160 MICROgram(s)/formoterol 4.5 MICROgram(s) Inhaler 2 Puff(s) Inhalation two times a day  citalopram 20 milliGRAM(s) Oral daily  clopidogrel Tablet 75 milliGRAM(s) Oral daily  dextrose 50% Injectable 12.5 Gram(s) IV Push once  dextrose 50% Injectable 25 Gram(s) IV Push once  dextrose 50% Injectable 25 Gram(s) IV Push once  diltiazem    milliGRAM(s) Oral daily  docusate sodium 100 milliGRAM(s) Oral three times a day  heparin  Injectable 5000 Unit(s) SubCutaneous every 8 hours  influenza   Vaccine 0.5 milliLiter(s) IntraMuscular once  insulin glargine Injectable (LANTUS) 40 Unit(s) SubCutaneous at bedtime  insulin lispro (HumaLOG) corrective regimen sliding scale   SubCutaneous three times a day before meals  levothyroxine 125 MICROGram(s) Oral daily  pantoprazole    Tablet 40 milliGRAM(s) Oral daily  polyethylene glycol 3350 17 Gram(s) Oral daily  senna 2 Tablet(s) Oral at bedtime  sodium chloride 0.45%. 1000 milliLiter(s) IV Continuous <Continuous>  tiotropium 18 MICROgram(s) Capsule 1 Capsule(s) Inhalation daily     Vital Signs Last 24 Hrs  T(C): 36.5 (10-09-17 @ 10:33), Max: 36.7 (10-09-17 @ 05:11)  T(F): 97.7 (10-09-17 @ 10:33), Max: 98.1 (10-09-17 @ 05:11)  HR: 64 (10-09-17 @ 10:33) (56 - 75)  BP: 165/38 (10-09-17 @ 10:33) (124/49 - 165/38)  BP(mean): --  RR: 18 (10-09-17 @ 10:33) (16 - 18)  SpO2: 97% (10-09-17 @ 10:33) (96% - 100%)               DVT Prophylaxis:  LMWH                   (  )  Heparin SQ           ( x )  Coumadin             (  )  Xarelto                  (  )  Eliquis                   (  )  Venodynes           ( x )  Ambulation          (x  )  UFH                       (  )  Contraindicated  (  )
HPI:  76 year old female with PMHx HTN, HLD, CAD, ADVANCED COPD ON HOME O2 (3LITERS), IDDM, CHRONIC DIASTOLIC HEART FAILURE, RECENT ADMISSIONS FOR HYPOXIC RESPIRATORY FAILURE STATES SHE WAS LEAVING [facility] EARLIER TODAY WITH HER BROTHER AND MISSTEPPED RESULTING IN A FALL.  SHE REPORTS HAVING NECK AND LOWER BACK PAIN, ALSO LEFT HIP PAIN.  PT DENIES ANY CHEST PAIN OR SHORTNESS OF BREATH, NO NAUSEA OR VOMITING. JUST RECEIVED DILAUDID AND FEELS SOMEWHAT BETTER. (27 Sep 2017 19:56)      Patient is a 76y old  Female who presents with a chief complaint of Left Hip Fracture (29 Sep 2017 20:21)  s/p IMN ON 17    Consulted by Dr. Brandon Jacinto for VTE prophylaxis, risk stratification, and anticoagulation management.    PAST MEDICAL & SURGICAL HISTORY:  Chronic diastolic congestive heart failure  Hyperlipidemia, unspecified hyperlipidemia type  Essential hypertension  Neuropathy  Diabetes  Chronic obstructive pulmonary disease, unspecified COPD type  History of total knee replacement, unspecified laterality  H/O hernia repair   delivery delivered  S/P appendectomy    Caprini VTE Risk Score: 5 HIGH    IMPROVE Bleeding Risk Score: 6.5 HIGH    Falls Risk:   High ( X )  Mod (  )  Low (  )    EBL:   200ml  CR CL: 16.35  17 PT SEEN AT BEDSIDE IN sd.  pT IS NON VERBAL. EYES OPEN, Moving arms and rt leg.  10-2-17 pt seen at bedside more alert then before looks in direction of verbal stimuli.    10/3/2017: Patient seen at bedside working with physical therapy. Cooperative with limited verbal responses. Per nursing- she is more verbal now and appropriate but still with issues taking PO meds. Cr improving at 4.56. HH improving slowly.  10/4: Patient seen at bedside Minimal response. Only eye contact. Cr improving slowly- increased fluids, COPD stable.  HH improving.1  10-5-17 pt seen on 3E,  present.  Pt more alert today knows her name.  Discussed pt  pt's pt anticoagulation with HEPARIN.  Questions answered will reinforce as needed.    FAMILY HISTORY:  Family history of CHF (congestive heart failure) (Mother)    Denies any personal or familial history of clotting or bleeding disorders.    Allergies    Bactrim (Other)  ciprofloxacin (Other (Mild))  clindamycin (Unknown)  ibuprofen (Unknown)  penicillins (Other)  sulfa drugs (Unknown)    Intolerances    REVIEW OF SYSTEMS    (  )Fever	     (  )Constipation	(  )SOB				(  )Headache	(  )Dysuria  (  )Chills	     (  )Melena	(  )Dyspnea present on exertion	                    (  )Dizziness                    (  )Polyuria  (  )Nausea	     (  )Hematochezia	(  )Cough			                    (  )Syncope   	(  )Hematuria  (  )Vomiting    (  )Chest Pain	(  )Wheezing			(  )Weakness  (  )Diarrhea     (  )Palpitations	(  )Anorexia			(  )Myalgia    all other ROS NEG      PHYSICAL EXAM:    Constitutional: Appears well    Neurological: oriented x1 person    Skin: Warm    Respiratory and Thorax: normal effort; Breath sounds: normal; No rales/wheezing/rhonchi  	  Cardiovascular: S1, S2, regular, NMBR; MP: RSR 70's, no ectopy	    Gastrointestinal: BS + x 4Q, nontender	    Genitourinary:  Bladder nondistended, nontender    Musculoskeletal:   General Right:   no muscle/joint tenderness,   normal tone, no joint swelling,   ROM: full	    General Left:   no muscle/joint tenderness,   normal tone, no joint swelling,   ROM: limited    Hip: Left: Dressing CDI;    Lower extrems:   Right: no calf tenderness              negative gamal's sign               + pedal pulses    Left:   no calf tenderness              negative gamal's sign               + pedal pulses                           8.7    10.5  )-----------( 268      ( 06 Oct 2017 05:39 )             26.2       10-06    146<H>  |  112<H>  |  84<H>  ----------------------------<  138<H>  3.6   |  26  |  3.32<H>    Ca    8.9      06 Oct 2017 05:39                                 9.0    11.9  )-----------( 218      ( 04 Oct 2017 05:13 )             27.1       10-04    150<H>  |  116<H>  |  92<H>  ----------------------------<  263<H>  3.8   |  25  |  3.92<H>    Ca    8.6      04 Oct 2017 05:13    MEDICATIONS  (STANDING):  atorvastatin 20 milliGRAM(s) Oral at bedtime  buDESOnide 160 MICROgram(s)/formoterol 4.5 MICROgram(s) Inhaler 2 Puff(s) Inhalation two times a day  citalopram 10 milliGRAM(s) Oral daily  clopidogrel Tablet 75 milliGRAM(s) Oral daily  dextrose 50% Injectable 12.5 Gram(s) IV Push once  dextrose 50% Injectable 25 Gram(s) IV Push once  dextrose 50% Injectable 25 Gram(s) IV Push once  diltiazem    milliGRAM(s) Oral daily  docusate sodium 100 milliGRAM(s) Oral three times a day  heparin  Injectable 5000 Unit(s) SubCutaneous every 8 hours  influenza   Vaccine 0.5 milliLiter(s) IntraMuscular once  insulin glargine Injectable (LANTUS) 48 Unit(s) SubCutaneous at bedtime  insulin lispro (HumaLOG) corrective regimen sliding scale   SubCutaneous three times a day before meals  levothyroxine Injectable 60 MICROGram(s) IV Push daily  pantoprazole    Tablet 40 milliGRAM(s) Oral daily  polyethylene glycol 3350 17 Gram(s) Oral daily  senna 2 Tablet(s) Oral at bedtime  tiotropium 18 MICROgram(s) Capsule 1 Capsule(s) Inhalation daily        Vital Signs Last 24 Hrs  T(C): 36.3 (10-06-17 @ 10:12), Max: 36.9 (10-06-17 @ 05:58)  T(F): 97.4 (10-06-17 @ 10:12), Max: 98.4 (10-06-17 @ 05:58)  HR: 65 (10-06-17 @ 10:12) (63 - 86)  BP: 121/39 (10-06-17 @ 10:12) (102/84 - 154/39)  BP(mean): 59 (10-06-17 @ 08:00) (59 - 91)  RR: 16 (10-06-17 @ 10:12) (10 - )  SpO2: 96% (10-06-17 @ 10:12) (90% - 100%)                       DVT Prophylaxis:  LMWH                   (  )  Heparin SQ           ( x )  Coumadin             (  )  Xarelto                  (  )  Eliquis                   (  )  Venodynes           ( x )  Ambulation          (x  )  UFH                       (  )  Contraindicated  (  )
HPI:  76 year old female with PMHx HTN, HLD, CAD, ADVANCED COPD ON HOME O2 (3LITERS), IDDM, CHRONIC DIASTOLIC HEART FAILURE, RECENT ADMISSIONS FOR HYPOXIC RESPIRATORY FAILURE STATES SHE WAS LEAVING [facility] EARLIER TODAY WITH HER BROTHER AND MISSTEPPED RESULTING IN A FALL.  SHE REPORTS HAVING NECK AND LOWER BACK PAIN, ALSO LEFT HIP PAIN.  PT DENIES ANY CHEST PAIN OR SHORTNESS OF BREATH, NO NAUSEA OR VOMITING. JUST RECEIVED DILAUDID AND FEELS SOMEWHAT BETTER. (27 Sep 2017 19:56)      Patient is a 76y old  Female who presents with a chief complaint of Left Hip Fracture (29 Sep 2017 20:21)  s/p IMN ON 17    Consulted by Dr. Brandon Jacinto for VTE prophylaxis, risk stratification, and anticoagulation management.    PAST MEDICAL & SURGICAL HISTORY:  Chronic diastolic congestive heart failure  Hyperlipidemia, unspecified hyperlipidemia type  Essential hypertension  Neuropathy  Diabetes  Chronic obstructive pulmonary disease, unspecified COPD type  History of total knee replacement, unspecified laterality  H/O hernia repair   delivery delivered  S/P appendectomy    Caprini VTE Risk Score: 5 HIGH    IMPROVE Bleeding Risk Score: 6.5 HIGH    Falls Risk:   High ( X )  Mod (  )  Low (  )    EBL:   200ml  CR CL: 16.35  17 PT SEEN AT BEDSIDE IN sd.  pT IS NON VERBAL. EYES OPEN, Moving arms and rt leg.  10-2-17 pt seen at bedside more alert then before looks in direction of verbal stimuli.    10/3/2017: Patient seen at bedside working with physical therapy. Cooperative with limited verbal responses. Per nursing- she is more verbal now and appropriate but still with issues taking PO meds. Cr improving at 4.56. HH improving slowly.  10/4: Patient seen at bedside Minimal response. Only eye contact. Cr improving slowly- increased fluids, COPD stable.  HH improving.1  10-5-17 pt seen on 3E,  present.  Pt more alert today knows her name.  Discussed pt  pt's pt anticoagulation with HEPARIN.  Questions answered will reinforce as needed.  10/7/17: having LLE calf pain, awaiting dopplers today  10-9-17 PT seen at bedside more alert today.   present.  Reinforced use of heparin for prophylaxis DVT prevention. Pt may go to rehab today.  10-10-17 pt seen at bedside upset about having  nausea. not eating well due to that.   FAMILY HISTORY:  Family history of CHF (congestive heart failure) (Mother)    Denies any personal or familial history of clotting or bleeding disorders.    Allergies    Bactrim (Other)  ciprofloxacin (Other (Mild))  clindamycin (Unknown)  ibuprofen (Unknown)  penicillins (Other)  sulfa drugs (Unknown)    Intolerances    REVIEW OF SYSTEMS    (  )Fever	     (  )Constipation	(  )SOB				(  )Headache	(  )Dysuria  (  )Chills	     (  )Melena	(  )Dyspnea present on exertion	                    (  )Dizziness                    (  )Polyuria  (  )Nausea	     (  )Hematochezia	(  )Cough			                    (  )Syncope   	(  )Hematuria  (  )Vomiting    (  )Chest Pain	(  )Wheezing			(  )Weakness  (  )Diarrhea     (  )Palpitations	(  )Anorexia			(  )Myalgia    all other ROS NEG      PHYSICAL EXAM:    Constitutional: Appears well    Neurological: oriented x1 person    Skin: Warm    Respiratory and Thorax: normal effort; Breath sounds: normal; No rales/wheezing/rhonchi  	  Cardiovascular: S1, S2, regular, NMBR; MP: RSR 70's, no ectopy	    Gastrointestinal: BS + x 4Q, nontender	    Genitourinary:  Bladder nondistended, nontender    Musculoskeletal:   General Right:   no muscle/joint tenderness,   normal tone, no joint swelling,   ROM: full	    General Left:   no muscle/joint tenderness,   normal tone, no joint swelling,   ROM: limited    Hip: Left: Dressing CDI;    Lower extrems:   Right: no calf tenderness              negative gamal's sign               + pedal pulses    Left:   + calf tenderness              negative gamal's sign               + pedal pulses                        8.2    10.7  )-----------( 299      ( 10 Oct 2017 05:45 )             25.1       10-10    140  |  110<H>  |  67<H>  ----------------------------<  181<H>  4.0   |  21<L>  |  2.83<H>    Ca    8.8      10 Oct 2017 05:45                              8.6    8.9   )-----------( 295      ( 08 Oct 2017 05:55 )             26.1       10-08    142  |  111<H>  |  82<H>  ----------------------------<  106<H>  3.7   |  24  |  3.41<H>    Ca    8.3<L>      08 Oct 2017 05:55      MEDICATIONS  (STANDING):  atorvastatin 20 milliGRAM(s) Oral at bedtime  buDESOnide 160 MICROgram(s)/formoterol 4.5 MICROgram(s) Inhaler 2 Puff(s) Inhalation two times a day  citalopram 20 milliGRAM(s) Oral daily  clopidogrel Tablet 75 milliGRAM(s) Oral daily  dextrose 50% Injectable 12.5 Gram(s) IV Push once  dextrose 50% Injectable 25 Gram(s) IV Push once  dextrose 50% Injectable 25 Gram(s) IV Push once  diltiazem    milliGRAM(s) Oral daily  docusate sodium 100 milliGRAM(s) Oral three times a day  heparin  Injectable 5000 Unit(s) SubCutaneous every 8 hours  influenza   Vaccine 0.5 milliLiter(s) IntraMuscular once  insulin glargine Injectable (LANTUS) 40 Unit(s) SubCutaneous at bedtime  insulin lispro (HumaLOG) corrective regimen sliding scale   SubCutaneous three times a day before meals  levothyroxine 125 MICROGram(s) Oral daily  pantoprazole    Tablet 40 milliGRAM(s) Oral daily  polyethylene glycol 3350 17 Gram(s) Oral daily  senna 2 Tablet(s) Oral at bedtime  sodium chloride 0.45%. 1000 milliLiter(s) IV Continuous <Continuous>  tiotropium 18 MICROgram(s) Capsule 1 Capsule(s) Inhalation daily     VVital Signs Last 24 Hrs  T(C): 36.4 (10-10-17 @ 10:47), Max: 36.7 (10-09-17 @ 16:54)  T(F): 97.6 (10-10-17 @ 10:47), Max: 98.1 (10-09-17 @ 16:54)  HR: 70 (10-10-17 @ 10:47) (70 - 73)  BP: 164/33 (10-10-17 @ 10:47) (164/33 - 195/52)  BP(mean): 86 (10-10-17 @ 05:14) (86 - 86)  RR: 18 (10-10-17 @ 10:47) (18 - 18)  SpO2: 97% (10-10-17 @ 10:47) (97% - 98%)               DVT Prophylaxis:  LMWH                   (  )  Heparin SQ           ( x )  Coumadin             (  )  Xarelto                  (  )  Eliquis                   (  )  Venodynes           ( x )  Ambulation          (x  )  UFH                       (  )  Contraindicated  (  )
HPI:  76 year old female with PMHx HTN, HLD, CAD, ADVANCED COPD ON HOME O2 (3LITERS), IDDM, CHRONIC DIASTOLIC HEART FAILURE, RECENT ADMISSIONS FOR HYPOXIC RESPIRATORY FAILURE STATES SHE WAS LEAVING [facility] EARLIER TODAY WITH HER BROTHER AND MISSTEPPED RESULTING IN A FALL.  SHE REPORTS HAVING NECK AND LOWER BACK PAIN, ALSO LEFT HIP PAIN.  PT DENIES ANY CHEST PAIN OR SHORTNESS OF BREATH, NO NAUSEA OR VOMITING. JUST RECEIVED DILAUDID AND FEELS SOMEWHAT BETTER. (27 Sep 2017 19:56)      Patient is a 76y old  Female who presents with a chief complaint of Left Hip Fracture (29 Sep 2017 20:21)  s/p IMN ON 17    Consulted by Dr. Brandon Jacinto for VTE prophylaxis, risk stratification, and anticoagulation management.    PAST MEDICAL & SURGICAL HISTORY:  Chronic diastolic congestive heart failure  Hyperlipidemia, unspecified hyperlipidemia type  Essential hypertension  Neuropathy  Diabetes  Chronic obstructive pulmonary disease, unspecified COPD type  History of total knee replacement, unspecified laterality  H/O hernia repair   delivery delivered  S/P appendectomy    Caprini VTE Risk Score: 5 HIGH    IMPROVE Bleeding Risk Score: 6.5 HIGH    Falls Risk:   High ( X )  Mod (  )  Low (  )    EBL:   200ml  CR CL: 16.35  17 PT SEEN AT BEDSIDE IN sd.  pT IS NON VERBAL. EYES OPEN, Moving arms and rt leg.  10-2-17 pt seen at bedside more alert then before looks in direction of verbal stimuli.    10/3/2017: Patient seen at bedside working with physical therapy. Cooperative with limited verbal responses. Per nursing- she is more verbal now and appropriate but still with issues taking PO meds. Cr improving at 4.56. HH improving slowly.  10/4: Patient seen at bedside Minimal response. Only eye contact. Cr improving slowly- increased fluids, COPD stable.  HH improving.1  10-5-17 pt seen on 3E,  present.  Pt more alert today knows her name.  Discussed pt  pt's pt anticoagulation with HEPARIN.  Questions answered will reinforce as needed.  10/7/17: having LLE calf pain, awaiting dopplers today  10-9-17 PT seen at bedside more alert today.   present.  Reinforced use of heparin for prophylaxis DVT prevention. Pt may go to rehab today.  10-10-17 pt seen at bedside upset about having  nausea. not eating well due to that.   10/11/17: Patient seen at bedside this morning- still with nausea. Able to drink fluids, but reports she gets nauseous with solid foods. With increased verbal response and alertness. Appropriate responses.    FAMILY HISTORY:  Family history of CHF (congestive heart failure) (Mother)    Denies any personal or familial history of clotting or bleeding disorders.    Allergies    Bactrim (Other)  ciprofloxacin (Other (Mild))  clindamycin (Unknown)  ibuprofen (Unknown)  penicillins (Other)  sulfa drugs (Unknown)    Intolerances    REVIEW OF SYSTEMS    (  )Fever	     (  )Constipation	(  )SOB				(  )Headache	(  )Dysuria  (  )Chills	     (  )Melena	(  )Dyspnea present on exertion	                    (  )Dizziness                    (  )Polyuria  (  )Nausea	     (  )Hematochezia	(  )Cough			                    (  )Syncope   	(  )Hematuria  (  )Vomiting    (  )Chest Pain	(  )Wheezing			(  )Weakness  (  )Diarrhea     (  )Palpitations	(  )Anorexia			(  )Myalgia    all other ROS NEG      PHYSICAL EXAM:    Constitutional: Appears well    Neurological: oriented x2  person/place    Skin: Warm    Respiratory and Thorax: normal effort; Breath sounds: normal; No rales/wheezing/rhonchi  	  Cardiovascular: S1, S2, regular, NMBR;     Gastrointestinal: BS + x 4Q, nontender	    Genitourinary:  Bladder nondistended, nontender    Musculoskeletal:   General Right:   no muscle/joint tenderness,   normal tone, no joint swelling,   ROM: full	    General Left:   no muscle/joint tenderness,   normal tone, no joint swelling,   ROM: limited    Hip: Left: Dressing CDI;    Lower extrems:   Right: no calf tenderness              negative gamal's sign               + pedal pulses    Left:   + calf tenderness              negative gamal's sign               + pedal pulses                          8.6    12.4  )-----------( 361      ( 11 Oct 2017 09:52 )             25.6       10-    144  |  112<H>  |  53<H>  ----------------------------<  73  4.1   |  24  |  2.54<H>    Ca    9.1      11 Oct 2017 09:52             MEDICATIONS  (STANDING):  atorvastatin 20 milliGRAM(s) Oral at bedtime  buDESOnide 160 MICROgram(s)/formoterol 4.5 MICROgram(s) Inhaler 2 Puff(s) Inhalation two times a day  citalopram 20 milliGRAM(s) Oral daily  clopidogrel Tablet 75 milliGRAM(s) Oral daily  dextrose 50% Injectable 12.5 Gram(s) IV Push once  dextrose 50% Injectable 25 Gram(s) IV Push once  dextrose 50% Injectable 25 Gram(s) IV Push once  diltiazem    milliGRAM(s) Oral daily  docusate sodium 100 milliGRAM(s) Oral three times a day  heparin  Injectable 5000 Unit(s) SubCutaneous every 8 hours  influenza   Vaccine 0.5 milliLiter(s) IntraMuscular once  insulin glargine Injectable (LANTUS) 40 Unit(s) SubCutaneous at bedtime  insulin lispro (HumaLOG) corrective regimen sliding scale   SubCutaneous three times a day before meals  levothyroxine 125 MICROGram(s) Oral daily  pantoprazole    Tablet 40 milliGRAM(s) Oral daily  polyethylene glycol 3350 17 Gram(s) Oral daily  senna 2 Tablet(s) Oral at bedtime  sodium chloride 0.45%. 1000 milliLiter(s) IV Continuous <Continuous>  tiotropium 18 MICROgram(s) Capsule 1 Capsule(s) Inhalation daily    Vital Signs Last 24 Hrs  T(C): 36.8 (10-11-17 @ 06:00), Max: 36.8 (10-11-17 @ 00:10)  T(F): 98.3 (10-11-17 @ 06:00), Max: 98.3 (10-11-17 @ 00:10)  HR: 70 (10-11-17 @ 10:30) (70 - 78)  BP: 150/38 (10-11-17 @ 07:11) (150/38 - 173/65)  BP(mean): --  RR: 18 (10-11-17 @ 07:11) (18 - 18)  SpO2: 96% (10-11-17 @ 07:11) (95% - 98%)         DVT Prophylaxis:  LMWH                   (  )  Heparin SQ           ( x )  Coumadin             (  )  Xarelto                  (  )  Eliquis                   (  )  Venodynes           ( x )  Ambulation          (x  )  UFH                       (  )  Contraindicated  (  )
HPI:  76 year old female with PMHx HTN, HLD, CAD, ADVANCED COPD ON HOME O2 (3LITERS), IDDM, CHRONIC DIASTOLIC HEART FAILURE, RECENT ADMISSIONS FOR HYPOXIC RESPIRATORY FAILURE STATES SHE WAS LEAVING [facility] EARLIER TODAY WITH HER BROTHER AND MISSTEPPED RESULTING IN A FALL.  SHE REPORTS HAVING NECK AND LOWER BACK PAIN, ALSO LEFT HIP PAIN.  PT DENIES ANY CHEST PAIN OR SHORTNESS OF BREATH, NO NAUSEA OR VOMITING. JUST RECEIVED DILAUDID AND FEELS SOMEWHAT BETTER. (27 Sep 2017 19:56)      Patient is a 76y old  Female who presents with a chief complaint of Left Hip Fracture (29 Sep 2017 20:21)  s/p IMN ON 17    Consulted by Dr. Brandon Jacinto for VTE prophylaxis, risk stratification, and anticoagulation management.    PAST MEDICAL & SURGICAL HISTORY:  Chronic diastolic congestive heart failure  Hyperlipidemia, unspecified hyperlipidemia type  Essential hypertension  Neuropathy  Diabetes  Chronic obstructive pulmonary disease, unspecified COPD type  History of total knee replacement, unspecified laterality  H/O hernia repair   delivery delivered  S/P appendectomy    Caprini VTE Risk Score: 5 HIGH    IMPROVE Bleeding Risk Score: 6.5 HIGH    Falls Risk:   High ( X )  Mod (  )  Low (  )    EBL:   200ml  CR CL: 16.35  17 PT SEEN AT BEDSIDE IN sd.  pT IS NON VERBAL. EYES OPEN, Moving arms and rt leg.  10-2-17 pt seen at bedside more alert then before looks in direction of verbal stimuli.    10/3/2017: Patient seen at bedside working with physical therapy. Cooperative with limited verbal responses. Per nursing- she is more verbal now and appropriate but still with issues taking PO meds. Cr improving at 4.56. HH improving slowly.  10/4: Patient seen at bedside Minimal response. Only eye contact. Cr improving slowly- increased fluids, COPD stable.  HH improving.1  10-5-17 pt seen on 3E,  present.  Pt more alert today knows her name.  Discussed pt  pt's pt anticoagulation with HEPARIN.  Questions answered will reinforce as needed.  10/7/17: having LLE calf pain, awaiting dopplers today  10-9-17 PT seen at bedside more alert today.   present.  Reinforced use of heparin for prophylaxis DVT prevention. Pt may go to rehab today.  10-10-17 pt seen at bedside upset about having  nausea. not eating well due to that.   10/11/17: Patient seen at bedside this morning- still with nausea. Able to drink fluids, but reports she gets nauseous with solid foods. With increased verbal response and alertness. Appropriate responses.  10/12/17: Patient with less nausea and eating small amounts food. Denies pain.    FAMILY HISTORY:  Family history of CHF (congestive heart failure) (Mother)    Denies any personal or familial history of clotting or bleeding disorders.    Allergies    Bactrim (Other)  ciprofloxacin (Other (Mild))  clindamycin (Unknown)  ibuprofen (Unknown)  penicillins (Other)  sulfa drugs (Unknown)    Intolerances    REVIEW OF SYSTEMS    (  )Fever	     (  )Constipation	(  )SOB				(  )Headache	(  )Dysuria  (  )Chills	     (  )Melena	(  )Dyspnea present on exertion	                    (  )Dizziness                    (  )Polyuria  (  )Nausea	     (  )Hematochezia	(  )Cough			                    (  )Syncope   	(  )Hematuria  (  )Vomiting    (  )Chest Pain	(  )Wheezing			(  )Weakness  (  )Diarrhea     (  )Palpitations	(  )Anorexia			(  )Myalgia    all other ROS NEG      PHYSICAL EXAM:    Constitutional: Appears well    Neurological: oriented x2  person/place    Skin: Warm    Respiratory and Thorax: normal effort; Breath sounds: normal; No rales/wheezing/rhonchi  	  Cardiovascular: S1, S2, regular, NMBR;     Gastrointestinal: BS + x 4Q, nontender	    Genitourinary:  Bladder nondistended, nontender    Musculoskeletal:   General Right:   no muscle/joint tenderness,   normal tone, no joint swelling,   ROM: full	    General Left:   no muscle/joint tenderness,   normal tone, no joint swelling,   ROM: limited    Hip: Left: Dressing CDI;    Lower extrems:   Right: no calf tenderness              negative gamal's sign               + pedal pulses    Left:   + calf tenderness              negative gamal's sign               + pedal pulses                           8.8    11.2  )-----------( 345      ( 12 Oct 2017 05:45 )             28.0       10-12    142  |  112<H>  |  47<H>  ----------------------------<  96  4.3   |  22  |  2.53<H>    Ca    9.0      12 Oct 2017 05:45        MEDICATIONS  (STANDING):  atorvastatin 20 milliGRAM(s) Oral at bedtime  buDESOnide 160 MICROgram(s)/formoterol 4.5 MICROgram(s) Inhaler 2 Puff(s) Inhalation two times a day  citalopram 20 milliGRAM(s) Oral daily  clopidogrel Tablet 75 milliGRAM(s) Oral daily  dextrose 50% Injectable 12.5 Gram(s) IV Push once  dextrose 50% Injectable 25 Gram(s) IV Push once  dextrose 50% Injectable 25 Gram(s) IV Push once  diltiazem    milliGRAM(s) Oral daily  docusate sodium 100 milliGRAM(s) Oral three times a day  heparin  Injectable 5000 Unit(s) SubCutaneous every 8 hours  influenza   Vaccine 0.5 milliLiter(s) IntraMuscular once  insulin glargine Injectable (LANTUS) 40 Unit(s) SubCutaneous at bedtime  insulin lispro (HumaLOG) corrective regimen sliding scale   SubCutaneous three times a day before meals  levothyroxine 125 MICROGram(s) Oral daily  pantoprazole    Tablet 40 milliGRAM(s) Oral daily  polyethylene glycol 3350 17 Gram(s) Oral daily  senna 2 Tablet(s) Oral at bedtime  sodium chloride 0.45%. 1000 milliLiter(s) IV Continuous <Continuous>  tiotropium 18 MICROgram(s) Capsule 1 Capsule(s) Inhalation daily    Vital Signs Last 24 Hrs  T(C): 36.7 (10-12-17 @ 05:48), Max: 36.7 (10-11-17 @ 17:31)  T(F): 98.1 (10-12-17 @ 05:48), Max: 98.1 (10-11-17 @ 17:31)  HR: 66 (10-12-17 @ 08:21) (61 - 76)  BP: 159/39 (10-12-17 @ 05:48) (150/41 - 179/51)  BP(mean): --  RR: 18 (10-12-17 @ 05:48) (18 - 18)  SpO2: 99% (10-12-17 @ 08:21) (94% - 100%)    DVT Prophylaxis:  LMWH                   (  )  Heparin SQ           ( x )  Coumadin             (  )  Xarelto                  (  )  Eliquis                   (  )  Venodynes           ( x )  Ambulation          (x  )  UFH                       (  )  Contraindicated  (  )
HPI:  76 year old female with PMHx HTN, HLD, CAD, ADVANCED COPD ON HOME O2 (3LITERS), IDDM, CHRONIC DIASTOLIC HEART FAILURE, RECENT ADMISSIONS FOR HYPOXIC RESPIRATORY FAILURE STATES SHE WAS LEAVING [facility] EARLIER TODAY WITH HER BROTHER AND MISSTEPPED RESULTING IN A FALL.  SHE REPORTS HAVING NECK AND LOWER BACK PAIN, ALSO LEFT HIP PAIN.  PT DENIES ANY CHEST PAIN OR SHORTNESS OF BREATH, NO NAUSEA OR VOMITING. JUST RECEIVED DILAUDID AND FEELS SOMEWHAT BETTER. (27 Sep 2017 19:56)      Patient is a 76y old  Female who presents with a chief complaint of Left Hip Fracture (29 Sep 2017 20:21)  s/p IMN ON 17    Consulted by Dr. Branodn Jacinto for VTE prophylaxis, risk stratification, and anticoagulation management.    PAST MEDICAL & SURGICAL HISTORY:  Chronic diastolic congestive heart failure  Hyperlipidemia, unspecified hyperlipidemia type  Essential hypertension  Neuropathy  Diabetes  Chronic obstructive pulmonary disease, unspecified COPD type  History of total knee replacement, unspecified laterality  H/O hernia repair   delivery delivered  S/P appendectomy    Caprini VTE Risk Score: 5 HIGH    IMPROVE Bleeding Risk Score: 6.5 HIGH    Falls Risk:   High ( X )  Mod (  )  Low (  )    EBL:   200ml  CR CL: 16.35  17 PT SEEN AT BEDSIDE IN sd.  pT IS NON VERBAL. EYES OPEN, Moving arms and rt leg.  10-2-17 pt seen at bedside more alert then before looks in direction of verbal stimuli.    10/3/2017: Patient seen at bedside working with physical therapy. Copperative with limited verbal responses. Per nursing- she is more verbal now and appropriate but still with issues taking PO meds. Cr improving at 4.56. HH improving slowly.  10/4: Patient seen at bedside Minimal response. Only eye contact. Cr improving slowly- increased fluids, COPD stable.  HH improving.1  10/5/17: seen OOB chair, crying, confused    FAMILY HISTORY:  Family history of CHF (congestive heart failure) (Mother)    Denies any personal or familial history of clotting or bleeding disorders.    Allergies    Bactrim (Other)  ciprofloxacin (Other (Mild))  clindamycin (Unknown)  ibuprofen (Unknown)  penicillins (Other)  sulfa drugs (Unknown)    Intolerances    REVIEW OF SYSTEMS    (  )Fever	     (  )Constipation	(  )SOB				(  )Headache	(  )Dysuria  (  )Chills	     (  )Melena	(  )Dyspnea present on exertion	                    (  )Dizziness                    (  )Polyuria  (  )Nausea	     (  )Hematochezia	(  )Cough			                    (  )Syncope   	(  )Hematuria  (  )Vomiting    (  )Chest Pain	(  )Wheezing			(  )Weakness  (  )Diarrhea     (  )Palpitations	(  )Anorexia			(  )Myalgia    Unable to obtain review of systems due to: limited verbal responses      PHYSICAL EXAM:    Constitutional: Appears pale    Neurological: verbal but confused    Skin: Warm    Respiratory and Thorax: normal effort; Breath sounds: normal; No rales/wheezing/rhonchi  	  Cardiovascular: S1, S2, regular, NMBR; MP: RSR 70's, no ectopy	    Gastrointestinal: BS + x 4Q, nontender	    Genitourinary:  Bladder nondistended, nontender    Musculoskeletal:   General Right:   no muscle/joint tenderness,   normal tone, no joint swelling,   ROM: full	    General Left:   + muscle/joint tenderness,   normal tone, no joint swelling,   ROM: limited    Hip: Left: Dressing CDI;    Lower extrems:   Right: no calf tenderness              negative gamal's sign               + pedal pulses    Left:   no calf tenderness              negative gamal's sign               + pedal pulses                              9.0    11.9  )-----------( 218      ( 04 Oct 2017 05:13 )             27.1       10-04    150<H>  |  116<H>  |  92<H>  ----------------------------<  263<H>  3.8   |  25  |  3.92<H>    Ca    8.6      04 Oct 2017 05:13                           8.3    7.1   )-----------( 191      ( 03 Oct 2017 05:59 )             25.1           MEDICATIONS  (STANDING):  influenza   Vaccine 0.5 milliLiter(s) IntraMuscular once  pantoprazole    Tablet 40 milliGRAM(s) Oral daily  polyethylene glycol 3350 17 Gram(s) Oral daily  senna 2 Tablet(s) Oral at bedtime  docusate sodium 100 milliGRAM(s) Oral three times a day  citalopram 10 milliGRAM(s) Oral daily  atorvastatin 20 milliGRAM(s) Oral at bedtime  insulin lispro (HumaLOG) corrective regimen sliding scale   SubCutaneous three times a day before meals  dextrose 50% Injectable 12.5 Gram(s) IV Push once  dextrose 50% Injectable 25 Gram(s) IV Push once  dextrose 50% Injectable 25 Gram(s) IV Push once  ALBUTerol    90 MICROgram(s) HFA Inhaler 1 Puff(s) Inhalation every 4 hours  cefTRIAXone   IVPB 1 Gram(s) IV Intermittent every 24 hours  heparin  Injectable 5000 Unit(s) SubCutaneous every 12 hours  ALBUTerol/ipratropium for Nebulization 3 milliLiter(s) Nebulizer every 6 hours  levothyroxine Injectable 60 MICROGram(s) IV Push daily  sodium chloride 0.45%. 1000 milliLiter(s) IV Continuous <Continuous>  insulin glargine Injectable (LANTUS) 34 Unit(s) SubCutaneous at bedtime    DVT Prophylaxis:  LMWH                   (  )  Heparin SQ           ( x )  Coumadin             (  )  Xarelto                  (  )  Eliquis                   (  )  Venodynes           ( x )  Ambulation          (x  )  UFH                       (  )  Contraindicated  (  )
Medical Coverage for Dr. Phelps 	  76 year old female with PMHx HTN, HLD, CAD, ADVANCED COPD ON HOME O2 (3LITERS), IDDM, CHRONIC DIASTOLIC HEART FAILURE, RECENT ADMISSIONS FOR HYPOXIC RESPIRATORY FAILURE STATES SHE WAS LEAVING [facility] EARLIER TODAY WITH HER BROTHER AND MISSTEPPED RESULTING IN A FALL.    9/27/17: SHE REPORTS HAVING NECK AND LOWER BACK PAIN, ALSO LEFT HIP PAIN.  PT DENIES ANY CHEST PAIN OR SHORTNESS OF BREATH, NO NAUSEA OR VOMITING. JUST RECEIVED DILAUDID AND FEELS SOMEWHAT BETTER.    9/28/17: She is tired this morning, knows where she is. No cp, sob, but reports feeling very tired. Hip pain is controlled; She was straight cathed this am  9/29/17: Hip pain currently, awaiting for pain meds; did better overnight; received dilaudid without much lethargy per nurse.  Denies any CP, SOB, N/V/F/C  9/30/17: s/p left hip surgery last night -- confused this am but stable, doesnt appear in pain, opening eyes to her name but not answering questions appropriately. Cant remember where she is.  10/02/17: Still confused but more awake this am; made urine overnight; is mendy fluids. unable to understand patient clearly  10/03/17: More awake today but emotionally labile -- crying, unable to obtain clear ros; then falls asleep; Doesnt appear in pain. Does look at you to verbal stimuli.  10/04/17: Pt is more awake today, looking at me, remembered my name but still not eating much on her own.  HTn was an issue last night  10/05/17: Much more awake, sitting in a chair; no cp, sob, states left hip is sore  10/06/17: Feels much better and awake and alert ; doesnt exactly remember why she had surgery or events of the past few days but now aware she is in the hospital. very conversant  denies any cp, sob, n/v/f/c; left hip is slightly sore    10/7/17 complains of some nausea and some anxiety. Appetite poor and BGM's running low. Less confused.    ROS: AS PER HPI OTHERWISE ALL OTHER SYSTEMS REVIEWED AND ARE NEGATIVE    PHYSICAL EXAM:  Vital Signs Last 24 Hrs  T(C): 36.9 (06 Oct 2017 05:58), Max: 36.9 (06 Oct 2017 05:58)  T(F): 98.4 (06 Oct 2017 05:58), Max: 98.4 (06 Oct 2017 05:58)  HR: 68 (06 Oct 2017 08:00) (63 - 91)  BP: 130/36 (06 Oct 2017 08:00) (102/84 - 154/39)  BP(mean): 59 (06 Oct 2017 08:00) (0 - 91)  RR: 12 (06 Oct 2017 08:00) (10 - 24)  SpO2: 100% (06 Oct 2017 08:00) (90% - 100%)    GEN: MORE AWAKE AND ALERT, mood stable,   HEENT:  NC/AT, EOMI, no oropharyngeal lesions    NECK:   supple    CV:  +S1, +S2, regular, no murmurs or rubs    RESP:   lungs clear to auscultation bilaterally, no wheezing, rales, DECREASED BS BASES,  crackles at bases    GI:  abdomen soft, non-tender, non-distended, normal BS,  no abdominal masses, no palpable masses    RECTAL:  not examined    :  SAEED OUT    MSK:   normal muscle tone, no atrophy, no rigidity, no contractions    EXT:   no clubbing, no cyanosis, no edema, no calf pain, swelling or erythema, LEFT HIP DRESSING C/D/I    VASCULAR:  pulses equal and symmetric in the upper and lower extremities    NEURO:  AAOX3, no focal neurological deficits, follows all commands, able to move extremities spontaneously,     SKIN:  no ulcers, lesions or rashes    LABS:                              8.7    10.5  )-----------( 268      ( 06 Oct 2017 05:39 )             26.2     10-06    146<H>  |  112<H>  |  84<H>  ----------------------------<  138<H>  3.6   |  26  |  3.32<H>    Ca    8.9      06 Oct 2017 05:39                    MEDICATIONS  (STANDING):  ALBUTerol    90 MICROgram(s) HFA Inhaler 1 Puff(s) Inhalation every 4 hours  ALBUTerol/ipratropium for Nebulization 3 milliLiter(s) Nebulizer every 6 hours  atorvastatin 20 milliGRAM(s) Oral at bedtime  citalopram 10 milliGRAM(s) Oral daily  clopidogrel Tablet 75 milliGRAM(s) Oral daily  dextrose 50% Injectable 12.5 Gram(s) IV Push once  dextrose 50% Injectable 25 Gram(s) IV Push once  dextrose 50% Injectable 25 Gram(s) IV Push once  diltiazem    milliGRAM(s) Oral daily  docusate sodium 100 milliGRAM(s) Oral three times a day  heparin  Injectable 5000 Unit(s) SubCutaneous every 8 hours  influenza   Vaccine 0.5 milliLiter(s) IntraMuscular once  insulin glargine Injectable (LANTUS) 48 Unit(s) SubCutaneous at bedtime  insulin lispro (HumaLOG) corrective regimen sliding scale   SubCutaneous three times a day before meals  levothyroxine Injectable 60 MICROGram(s) IV Push daily  pantoprazole    Tablet 40 milliGRAM(s) Oral daily  polyethylene glycol 3350 17 Gram(s) Oral daily  senna 2 Tablet(s) Oral at bedtime    MEDICATIONS  (PRN):  acetaminophen   Tablet 650 milliGRAM(s) Oral every 6 hours PRN For Temp over 38.3 C (100.94 F)  bisacodyl Suppository 10 milliGRAM(s) Rectal daily PRN If no bowel movement by POD#2  dextrose Gel 1 Dose(s) Oral once PRN Blood Glucose LESS THAN 70 milliGRAM(s)/deciliter  diphenhydrAMINE   Capsule 25 milliGRAM(s) Oral at bedtime PRN Insomnia  glucagon  Injectable 1 milliGRAM(s) IntraMuscular once PRN Glucose LESS THAN 70 milligrams/deciliter  hydrALAZINE Injectable 10 milliGRAM(s) IV Push every 8 hours PRN SBP >170  HYDROmorphone  Injectable 0.5 milliGRAM(s) IV Push every 3 hours PRN Severe Pain (7 - 10)  ondansetron Injectable 4 milliGRAM(s) IV Push every 6 hours PRN Nausea and/or Vomiting  oxyCODONE    IR 5 milliGRAM(s) Oral every 4 hours PRN Moderate Pain (4 - 6)  oxyCODONE    IR 10 milliGRAM(s) Oral every 4 hours PRN Severe Pain (7 - 10)  traMADol 50 milliGRAM(s) Oral every 6 hours PRN Mild Pain (1 - 3)                                                                                                                                                                                                                                                                              EKG:     RADIOLOGY STUDIES:
NEPHROLOGY CONSULT  HPI:  76 year old female with PMHx HTN, HLD, CAD, ADVANCED COPD ON HOME O2 (3LITERS), IDDM, CHRONIC DIASTOLIC HEART FAILURE, RECENT ADMISSIONS FOR HYPOXIC RESPIRATORY FAILURE STATES SHE WAS LEAVING [facility] EARLIER TODAY WITH HER BROTHER AND MISSTEPPED RESULTING IN A FALL.  SHE REPORTS HAVING NECK AND LOWER BACK PAIN, ALSO LEFT HIP PAIN.  PT DENIES ANY CHEST PAIN OR SHORTNESS OF BREATH, NO NAUSEA OR VOMITING. JUST RECEIVED DILAUDID AND FEELS SOMEWHAT BETTER. (27 Sep 2017 19:56)  lethargic due to pain meds, last dose last night  family at bedside  for OR either today or in am      sleeping medicated  for OR later today  daughter in law at bedside discussed the care      discussed w daughter in law  creat climbing  pt w altered mental stated due to pain meds anesthesia  has h/o intolerance to narcotics as per daughter  no po intake  on ivf 50 ml/hr, increased to 75 ml/hr  will get cxr due to h/o chf  Sánchez in place, purulent, urine c/s neg due to abx received prior to cxs      PAST MEDICAL & SURGICAL HISTORY:  Chronic diastolic congestive heart failure  Hyperlipidemia, unspecified hyperlipidemia type  Essential hypertension  Neuropathy  Diabetes  Chronic obstructive pulmonary disease, unspecified COPD type  History of total knee replacement, unspecified laterality  H/O hernia repair   delivery delivered  S/P appendectomy      FAMILY HISTORY:  Family history of CHF (congestive heart failure) (Mother)      MEDICATIONS  (STANDING):  influenza   Vaccine 0.5 milliLiter(s) IntraMuscular once  pantoprazole    Tablet 40 milliGRAM(s) Oral daily  polyethylene glycol 3350 17 Gram(s) Oral daily  senna 2 Tablet(s) Oral at bedtime  docusate sodium 100 milliGRAM(s) Oral three times a day  diltiazem    milliGRAM(s) Oral daily  citalopram 10 milliGRAM(s) Oral daily  atorvastatin 20 milliGRAM(s) Oral at bedtime  levothyroxine 112 MICROGram(s) Oral daily  insulin glargine Injectable (LANTUS) 22 Unit(s) SubCutaneous at bedtime  insulin lispro (HumaLOG) corrective regimen sliding scale   SubCutaneous three times a day before meals  dextrose 50% Injectable 12.5 Gram(s) IV Push once  dextrose 50% Injectable 25 Gram(s) IV Push once  dextrose 50% Injectable 25 Gram(s) IV Push once  ALBUTerol    90 MICROgram(s) HFA Inhaler 1 Puff(s) Inhalation every 4 hours  cefTRIAXone   IVPB 1 Gram(s) IV Intermittent every 24 hours  heparin  Injectable 5000 Unit(s) SubCutaneous every 12 hours  sodium chloride 0.9%. 1000 milliLiter(s) (75 mL/Hr) IV Continuous <Continuous>  ALBUTerol/ipratropium for Nebulization 3 milliLiter(s) Nebulizer every 6 hours    MEDICATIONS  (PRN):  acetaminophen   Tablet 650 milliGRAM(s) Oral every 6 hours PRN For Temp over 38.3 C (100.94 F)  oxyCODONE    IR 5 milliGRAM(s) Oral every 4 hours PRN Moderate Pain (4 - 6)  oxyCODONE    IR 10 milliGRAM(s) Oral every 4 hours PRN Severe Pain (7 - 10)  traMADol 50 milliGRAM(s) Oral every 6 hours PRN Mild Pain (1 - 3)  ondansetron Injectable 4 milliGRAM(s) IV Push every 6 hours PRN Nausea and/or Vomiting  diphenhydrAMINE   Capsule 25 milliGRAM(s) Oral at bedtime PRN Insomnia  dextrose Gel 1 Dose(s) Oral once PRN Blood Glucose LESS THAN 70 milliGRAM(s)/deciliter  glucagon  Injectable 1 milliGRAM(s) IntraMuscular once PRN Glucose LESS THAN 70 milligrams/deciliter  HYDROmorphone  Injectable 0.5 milliGRAM(s) IV Push every 3 hours PRN Severe Pain (7 - 10)      Allergies    Bactrim (Other)  ciprofloxacin (Other (Mild))  clindamycin (Unknown)  ibuprofen (Unknown)  penicillins (Other)  sulfa drugs (Unknown)    Intolerances        REVIEW OF SYSTEMS:  Sleeping arousable    ICU Vital Signs Last 24 Hrs  T(C): 36.1 (30 Sep 2017 06:52), Max: 37.8 (29 Sep 2017 23:39)  T(F): 97 (30 Sep 2017 06:52), Max: 100.1 (29 Sep 2017 23:39)  HR: 89 (30 Sep 2017 06:01) (75 - 89)  BP: 135/35 (30 Sep 2017 06:01) (115/33 - 144/39)  BP(mean): 57 (30 Sep 2017 06:01) (57 - 77)  ABP: 115/24 (29 Sep 2017 22:35) (103/42 - 133/41)  ABP(mean): 21 (29 Sep 2017 22:35) (21 - 21)  RR: 19 (30 Sep 2017 06:01) (11 - 20)  SpO2: 92% (30 Sep 2017 06:01) (92% - 100%)        PHYSICAL EXAM:    General:  lethargic     Neuro:  lethargic    HEENT:  No JVD, no masses, Eyes anicteric, No carotid bruits.No lymphadenopathy,    Cardiovascular:  Regular rate and rhythm, with normal S1 and S2. No murmurs, rubs,  or gallops. No JVD.     Lungs:  clear. no rales, no wheezing, .    Abdomen:  Normoactive bowel sounds. Soft, flat, non-tender, and non-distended.  No hepatosplenomegaly, positive bowel sounds    Skin:  Warm, dry, well-perfused. No rashes or other lesions.     Extremities:  warm no edema    Sánchez purulent Urine      Labs                          8.7    15.1  )-----------( 173      ( 30 Sep 2017 05:30 )             25.6         144  |  109<H>  |  90<H>  ----------------------------<  204<H>  5.4<H>   |  25  |  3.73<H>    Ca    8.6      30 Sep 2017 05:30
NEPHROLOGY CONSULT  HPI:  76 year old female with PMHx HTN, HLD, CAD, ADVANCED COPD ON HOME O2 (3LITERS), IDDM, CHRONIC DIASTOLIC HEART FAILURE, RECENT ADMISSIONS FOR HYPOXIC RESPIRATORY FAILURE STATES SHE WAS LEAVING [facility] EARLIER TODAY WITH HER BROTHER AND MISSTEPPED RESULTING IN A FALL.  SHE REPORTS HAVING NECK AND LOWER BACK PAIN, ALSO LEFT HIP PAIN.  PT DENIES ANY CHEST PAIN OR SHORTNESS OF BREATH, NO NAUSEA OR VOMITING. JUST RECEIVED DILAUDID AND FEELS SOMEWHAT BETTER. (27 Sep 2017 19:56)  lethargic due to pain meds, last dose last night  family at bedside  for OR either today or in am      sleeping medicated  for OR later today  daughter in law at bedside discussed the care      discussed w daughter in law  creat climbing  pt w altered mental stated due to pain meds anesthesia  has h/o intolerance to narcotics as per daughter  no po intake  on ivf 50 ml/hr, increased to 75 ml/hr  will get cxr due to h/o chf  Sharma in place, purulent, urine c/s neg due to abx received prior to cxs    10/1  more awake alert, although still lethargic  VVS  got 60 then 100 lasix IV yesterday  was anuric after sharma placement (300 ml residual) on   UO last night increased to 150 ml night shift, now 20-45 ml/hr  IV bolus 500 ml over an hour given  now on 125 ml/hr  hgb down to 7.5, may be due to dilution + some blood loss in wound  case d/w Dr Phelps and pts family      PAST MEDICAL & SURGICAL HISTORY:  Chronic diastolic congestive heart failure  Hyperlipidemia, unspecified hyperlipidemia type  Essential hypertension  Neuropathy  Diabetes  Chronic obstructive pulmonary disease, unspecified COPD type  History of total knee replacement, unspecified laterality  H/O hernia repair   delivery delivered  S/P appendectomy      FAMILY HISTORY:  Family history of CHF (congestive heart failure) (Mother)    MEDICATIONS  (STANDING):  influenza   Vaccine 0.5 milliLiter(s) IntraMuscular once  pantoprazole    Tablet 40 milliGRAM(s) Oral daily  polyethylene glycol 3350 17 Gram(s) Oral daily  senna 2 Tablet(s) Oral at bedtime  docusate sodium 100 milliGRAM(s) Oral three times a day  citalopram 10 milliGRAM(s) Oral daily  atorvastatin 20 milliGRAM(s) Oral at bedtime  insulin lispro (HumaLOG) corrective regimen sliding scale   SubCutaneous three times a day before meals  dextrose 50% Injectable 12.5 Gram(s) IV Push once  dextrose 50% Injectable 25 Gram(s) IV Push once  dextrose 50% Injectable 25 Gram(s) IV Push once  ALBUTerol    90 MICROgram(s) HFA Inhaler 1 Puff(s) Inhalation every 4 hours  cefTRIAXone   IVPB 1 Gram(s) IV Intermittent every 24 hours  heparin  Injectable 5000 Unit(s) SubCutaneous every 12 hours  ALBUTerol/ipratropium for Nebulization 3 milliLiter(s) Nebulizer every 6 hours  insulin glargine Injectable (LANTUS) 28 Unit(s) SubCutaneous at bedtime  sodium chloride 0.9%. 1000 milliLiter(s) (125 mL/Hr) IV Continuous <Continuous>  levothyroxine Injectable 60 MICROGram(s) IV Push daily    MEDICATIONS  (PRN):  acetaminophen   Tablet 650 milliGRAM(s) Oral every 6 hours PRN For Temp over 38.3 C (100.94 F)  oxyCODONE    IR 5 milliGRAM(s) Oral every 4 hours PRN Moderate Pain (4 - 6)  oxyCODONE    IR 10 milliGRAM(s) Oral every 4 hours PRN Severe Pain (7 - 10)  traMADol 50 milliGRAM(s) Oral every 6 hours PRN Mild Pain (1 - 3)  ondansetron Injectable 4 milliGRAM(s) IV Push every 6 hours PRN Nausea and/or Vomiting  diphenhydrAMINE   Capsule 25 milliGRAM(s) Oral at bedtime PRN Insomnia  bisacodyl Suppository 10 milliGRAM(s) Rectal daily PRN If no bowel movement by POD#2  dextrose Gel 1 Dose(s) Oral once PRN Blood Glucose LESS THAN 70 milliGRAM(s)/deciliter  glucagon  Injectable 1 milliGRAM(s) IntraMuscular once PRN Glucose LESS THAN 70 milligrams/deciliter  HYDROmorphone  Injectable 0.5 milliGRAM(s) IV Push every 3 hours PRN Severe Pain (7 - 10)    ICU Vital Signs Last 24 Hrs  T(C): 36.9 (01 Oct 2017 08:30), Max: 36.9 (30 Sep 2017 19:02)  T(F): 98.5 (01 Oct 2017 08:30), Max: 98.5 (01 Oct 2017 08:30)  HR: 91 (01 Oct 2017 08:00) (82 - 106)  BP: 117/30 (01 Oct 2017 08:00) (116/37 - 154/40)  BP(mean): 53 (01 Oct 2017 08:00) (51 - 66)  ABP: --  ABP(mean): --  RR: 11 (01 Oct 2017 08:00) (8 - 17)  SpO2: 97% (01 Oct 2017 08:00) (90% - 100%)      Allergies    Bactrim (Other)  ciprofloxacin (Other (Mild))  clindamycin (Unknown)  ibuprofen (Unknown)  penicillins (Other)  sulfa drugs (Unknown)    Intolerances        REVIEW OF SYSTEMS:  awake but too lethargic      PHYSICAL EXAM:    General:  lethargic     Neuro:  lethargic    HEENT:  No JVD, no masses, Eyes anicteric, No carotid bruits.No lymphadenopathy,    Cardiovascular:  Regular rate and rhythm, with normal S1 and S2. No murmurs, rubs,  or gallops. No JVD.     Lungs:  clear. no rales, no wheezing, .    Abdomen:  Normoactive bowel sounds. Soft, flat, non-tender, and non-distended.  No hepatosplenomegaly, positive bowel sounds    Skin:  Warm, dry, well-perfused. No rashes or other lesions.     Extremities:  warm no edema    Sharma purulent Urine                            7.5    11.5  )-----------( 175      ( 01 Oct 2017 05:33 )             23.1       10-    145  |  110<H>  |  105<H>  ----------------------------<  262<H>  5.5<H>   |  27  |  5.68<H>    Ca    8.4<L>      01 Oct 2017 05:33
NEPHROLOGY CONSULT  HPI:  76 year old female with PMHx HTN, HLD, CAD, ADVANCED COPD ON HOME O2 (3LITERS), IDDM, CHRONIC DIASTOLIC HEART FAILURE, RECENT ADMISSIONS FOR HYPOXIC RESPIRATORY FAILURE STATES SHE WAS LEAVING [facility] EARLIER TODAY WITH HER BROTHER AND MISSTEPPED RESULTING IN A FALL.  SHE REPORTS HAVING NECK AND LOWER BACK PAIN, ALSO LEFT HIP PAIN.  PT DENIES ANY CHEST PAIN OR SHORTNESS OF BREATH, NO NAUSEA OR VOMITING. JUST RECEIVED DILAUDID AND FEELS SOMEWHAT BETTER. (27 Sep 2017 19:56)  lethargic due to pain meds, last dose last night  family at bedside  for OR either today or in am      sleeping medicated  for OR later today  daughter in law at bedside discussed the care      PAST MEDICAL & SURGICAL HISTORY:  Chronic diastolic congestive heart failure  Hyperlipidemia, unspecified hyperlipidemia type  Essential hypertension  Neuropathy  Diabetes  Chronic obstructive pulmonary disease, unspecified COPD type  History of total knee replacement, unspecified laterality  H/O hernia repair   delivery delivered  S/P appendectomy      FAMILY HISTORY:  Family history of CHF (congestive heart failure) (Mother)      MEDICATIONS  (STANDING):  docusate sodium 100 milliGRAM(s) Oral three times a day  diltiazem    milliGRAM(s) Oral daily  citalopram 10 milliGRAM(s) Oral daily  atorvastatin 20 milliGRAM(s) Oral at bedtime  pantoprazole    Tablet 40 milliGRAM(s) Oral before breakfast  levothyroxine 112 MICROGram(s) Oral daily  folic acid 1 milliGRAM(s) Oral daily  insulin glargine Injectable (LANTUS) 22 Unit(s) SubCutaneous at bedtime  insulin lispro (HumaLOG) corrective regimen sliding scale   SubCutaneous three times a day before meals  dextrose 5%. 1000 milliLiter(s) (50 mL/Hr) IV Continuous <Continuous>  dextrose 50% Injectable 12.5 Gram(s) IV Push once  dextrose 50% Injectable 25 Gram(s) IV Push once  dextrose 50% Injectable 25 Gram(s) IV Push once  influenza   Vaccine 0.5 milliLiter(s) IntraMuscular once  buDESOnide 160 MICROgram(s)/formoterol 4.5 MICROgram(s) Inhaler 2 Puff(s) Inhalation two times a day  tiotropium 18 MICROgram(s) Capsule 1 Capsule(s) Inhalation daily  ALBUTerol    90 MICROgram(s) HFA Inhaler 1 Puff(s) Inhalation every 4 hours  cefTRIAXone   IVPB      cefTRIAXone   IVPB 1 Gram(s) IV Intermittent every 24 hours    MEDICATIONS  (PRN):  acetaminophen   Tablet. 650 milliGRAM(s) Oral every 6 hours PRN headache  HYDROmorphone  Injectable 0.5 milliGRAM(s) IV Push every 4 hours PRN Severe Pain (7 - 10)  bisacodyl Suppository 10 milliGRAM(s) Rectal daily PRN If no bowel movement by POD#2  diphenhydrAMINE   Capsule 25 milliGRAM(s) Oral at bedtime PRN Insomnia  diphenhydrAMINE   Capsule 25 milliGRAM(s) Oral every 4 hours PRN Rash and/or Itching  benzocaine 15 mG/menthol 3.6 mG Lozenge 1 Lozenge Oral every 4 hours PRN Sore Throat  dextrose Gel 1 Dose(s) Oral once PRN Blood Glucose LESS THAN 70 milliGRAM(s)/deciliter  glucagon  Injectable 1 milliGRAM(s) IntraMuscular once PRN Glucose LESS THAN 70 milligrams/deciliter  ALBUTerol    0.083% 2.5 milliGRAM(s) Nebulizer every 6 hours PRN Shortness of Breath and/or Wheezing        Allergies    Bactrim (Other)  ciprofloxacin (Other (Mild))  clindamycin (Unknown)  ibuprofen (Unknown)  penicillins (Other)  sulfa drugs (Unknown)    Intolerances        REVIEW OF SYSTEMS:  Sleeping arousable    Vital Signs Last 24 Hrs  T(C): 37.1 (29 Sep 2017 10:00), Max: 38.6 (28 Sep 2017 15:10)  T(F): 98.8 (29 Sep 2017 10:00), Max: 101.4 (28 Sep 2017 15:10)  HR: 72 (29 Sep 2017 10:58) (70 - 77)  BP: 136/40 (29 Sep 2017 10:00) (128/56 - 147/36)  BP(mean): --  RR: 18 (29 Sep 2017 10:00) (8 - 18)  SpO2: 94% (29 Sep 2017 10:00) (94% - 98%)        PHYSICAL EXAM:    General:  lethargic got Dilaudid earlier    Neuro:  lethargic    HEENT:  No JVD, no masses, Eyes anicteric, No carotid bruits.No lymphadenopathy,    Cardiovascular:  Regular rate and rhythm, with normal S1 and S2. No murmurs, rubs,  or gallops. No JVD.     Lungs:  clear. no rales, no wheezing, .    Abdomen:  Normoactive bowel sounds. Soft, flat, non-tender, and non-distended.  No hepatosplenomegaly, positive bowel sounds    Skin:  Warm, dry, well-perfused. No rashes or other lesions.     Extremities:  warm no edema    LABS:                                   9.1    11.8  )-----------( 177      ( 29 Sep 2017 06:08 )             28.0         144  |  106  |  70<H>  ----------------------------<  143<H>  4.5   |  30  |  2.34<H>    Ca    9.0      29 Sep 2017 06:08    TPro  7.0  /  Alb  3.4  /  TBili  0.4  /  DBili  x   /  AST  14<L>  /  ALT  15  /  AlkPhos  109  
NEPHROLOGY INTERVAL HPI/OVERNIGHT EVENTS:    10/10  awake  feels better  more appropriate, but still confused and slow in mentation   at the bedside  d/w Dr Phelps, will change pain med to tramadol, monitor for seizure, tramadol may lower the threshold    10/9 SY  Alert and responsive.  Continues with poor appetite and po intake.  No acute events overnight.    HPI:  76 year old female with PMHx HTN, HLD, CAD, ADVANCED COPD ON HOME O2 (3LITERS), IDDM, CHRONIC DIASTOLIC HEART FAILURE, RECENT ADMISSIONS FOR HYPOXIC RESPIRATORY FAILURE STATES SHE WAS LEAVING [facility] EARLIER TODAY WITH HER BROTHER AND MISSTEPPED RESULTING IN A FALL.  SHE REPORTS HAVING NECK AND LOWER BACK PAIN, ALSO LEFT HIP PAIN.  PT DENIES ANY CHEST PAIN OR SHORTNESS OF BREATH, NO NAUSEA OR VOMITING. JUST RECEIVED DILAUDID AND FEELS SOMEWHAT BETTER. (27 Sep 2017 19:56)  lethargic due to pain meds, last dose last night  family at bedside  for OR either today or in am    MEDICATIONS  (STANDING):  atorvastatin 20 milliGRAM(s) Oral at bedtime  buDESOnide 160 MICROgram(s)/formoterol 4.5 MICROgram(s) Inhaler 2 Puff(s) Inhalation two times a day  citalopram 20 milliGRAM(s) Oral daily  clopidogrel Tablet 75 milliGRAM(s) Oral daily  dextrose 50% Injectable 12.5 Gram(s) IV Push once  dextrose 50% Injectable 25 Gram(s) IV Push once  dextrose 50% Injectable 25 Gram(s) IV Push once  diltiazem    milliGRAM(s) Oral daily  docusate sodium 100 milliGRAM(s) Oral three times a day  heparin  Injectable 5000 Unit(s) SubCutaneous every 8 hours  influenza   Vaccine 0.5 milliLiter(s) IntraMuscular once  insulin glargine Injectable (LANTUS) 40 Unit(s) SubCutaneous at bedtime  insulin lispro (HumaLOG) corrective regimen sliding scale   SubCutaneous three times a day before meals  levothyroxine 125 MICROGram(s) Oral daily  pantoprazole    Tablet 40 milliGRAM(s) Oral daily  polyethylene glycol 3350 17 Gram(s) Oral daily  senna 2 Tablet(s) Oral at bedtime  sodium chloride 0.45%. 1000 milliLiter(s) (50 mL/Hr) IV Continuous <Continuous>  tiotropium 18 MICROgram(s) Capsule 1 Capsule(s) Inhalation daily    MEDICATIONS  (PRN):  acetaminophen   Tablet 650 milliGRAM(s) Oral every 6 hours PRN For Temp over 38.3 C (100.94 F)  ALPRAZolam 0.25 milliGRAM(s) Oral every 8 hours PRN anxiety  bisacodyl Suppository 10 milliGRAM(s) Rectal daily PRN If no bowel movement by POD#2  dextrose Gel 1 Dose(s) Oral once PRN Blood Glucose LESS THAN 70 milliGRAM(s)/deciliter  diphenhydrAMINE   Capsule 25 milliGRAM(s) Oral at bedtime PRN Insomnia  glucagon  Injectable 1 milliGRAM(s) IntraMuscular once PRN Glucose LESS THAN 70 milligrams/deciliter  hydrALAZINE Injectable 10 milliGRAM(s) IV Push every 8 hours PRN SBP >170  ondansetron Injectable 4 milliGRAM(s) IV Push every 6 hours PRN Nausea and/or Vomiting  traMADol 50 milliGRAM(s) Oral every 8 hours PRN Moderate Pain (4 - 6)          Vital Signs Last 24 Hrs  T(C): 36.7 (10 Oct 2017 05:14), Max: 36.7 (09 Oct 2017 16:54)  T(F): 98.1 (10 Oct 2017 05:14), Max: 98.1 (09 Oct 2017 16:54)  HR: 70 (10 Oct 2017 06:04) (70 - 73)  BP: 167/31 (10 Oct 2017 06:04) (167/31 - 195/52)  BP(mean): 86 (10 Oct 2017 05:14) (86 - 86)  RR: 18 (09 Oct 2017 16:54) (18 - 18)  SpO2: 97% (10 Oct 2017 05:14) (97% - 98%)          PHYSICAL EXAM:  awake, confused, slow mentation  GENERAL: No distress  CHEST/LUNG: fair air entry  HEART: S1S2 RRR  ABDOMEN: soft  EXTREMITIES: no edema  SKIN:     LABS:                        8.2    10.7  )-----------( 299      ( 10 Oct 2017 05:45 )             25.1       10-10    140  |  110<H>  |  67<H>  ----------------------------<  181<H>  4.0   |  21<L>  |  2.83<H>    Ca    8.8      10 Oct 2017 05:45
NEPHROLOGY INTERVAL HPI/OVERNIGHT EVENTS:  10/2 SY  Arousable though with decreased response to verbal stimuli.  No apparent distress noted.    10/1  more awake alert, although still lethargic  VVS  got 60 then 100 lasix IV yesterday  was anuric after sharma placement (300 ml residual) on   UO last night increased to 150 ml night shift, now 20-45 ml/hr  IV bolus 500 ml over an hour given  now on 125 ml/hr  hgb down to 7.5, may be due to dilution + some blood loss in wound  case d/w Dr Phelps and pts family      HPI:  76 year old female with PMHx HTN, HLD, CAD, ADVANCED COPD ON HOME O2 (3LITERS), IDDM, CHRONIC DIASTOLIC HEART FAILURE, RECENT ADMISSIONS FOR HYPOXIC RESPIRATORY FAILURE STATES SHE WAS LEAVING [facility] [employer] EARLIER TODAY WITH HER BROTHER AND MISSTEPPED RESULTING IN A FALL.  SHE REPORTS HAVING NECK AND LOWER BACK PAIN, ALSO LEFT HIP PAIN.  PT DENIES ANY CHEST PAIN OR SHORTNESS OF BREATH, NO NAUSEA OR VOMITING. JUST RECEIVED DILAUDID AND FEELS SOMEWHAT BETTER. (27 Sep 2017 19:56)  lethargic due to pain meds, last dose last night  family at bedside  for OR either today or in am        MEDICATIONS  (STANDING):  influenza   Vaccine 0.5 milliLiter(s) IntraMuscular once  pantoprazole    Tablet 40 milliGRAM(s) Oral daily  polyethylene glycol 3350 17 Gram(s) Oral daily  senna 2 Tablet(s) Oral at bedtime  docusate sodium 100 milliGRAM(s) Oral three times a day  citalopram 10 milliGRAM(s) Oral daily  atorvastatin 20 milliGRAM(s) Oral at bedtime  insulin lispro (HumaLOG) corrective regimen sliding scale   SubCutaneous three times a day before meals  dextrose 50% Injectable 12.5 Gram(s) IV Push once  dextrose 50% Injectable 25 Gram(s) IV Push once  dextrose 50% Injectable 25 Gram(s) IV Push once  ALBUTerol    90 MICROgram(s) HFA Inhaler 1 Puff(s) Inhalation every 4 hours  cefTRIAXone   IVPB 1 Gram(s) IV Intermittent every 24 hours  heparin  Injectable 5000 Unit(s) SubCutaneous every 12 hours  ALBUTerol/ipratropium for Nebulization 3 milliLiter(s) Nebulizer every 6 hours  levothyroxine Injectable 60 MICROGram(s) IV Push daily  sodium chloride 0.45%. 1000 milliLiter(s) (50 mL/Hr) IV Continuous <Continuous>  insulin glargine Injectable (LANTUS) 34 Unit(s) SubCutaneous at bedtime    MEDICATIONS  (PRN):  acetaminophen   Tablet 650 milliGRAM(s) Oral every 6 hours PRN For Temp over 38.3 C (100.94 F)  oxyCODONE    IR 5 milliGRAM(s) Oral every 4 hours PRN Moderate Pain (4 - 6)  oxyCODONE    IR 10 milliGRAM(s) Oral every 4 hours PRN Severe Pain (7 - 10)  traMADol 50 milliGRAM(s) Oral every 6 hours PRN Mild Pain (1 - 3)  ondansetron Injectable 4 milliGRAM(s) IV Push every 6 hours PRN Nausea and/or Vomiting  diphenhydrAMINE   Capsule 25 milliGRAM(s) Oral at bedtime PRN Insomnia  bisacodyl Suppository 10 milliGRAM(s) Rectal daily PRN If no bowel movement by POD#2  dextrose Gel 1 Dose(s) Oral once PRN Blood Glucose LESS THAN 70 milliGRAM(s)/deciliter  glucagon  Injectable 1 milliGRAM(s) IntraMuscular once PRN Glucose LESS THAN 70 milligrams/deciliter  HYDROmorphone  Injectable 0.5 milliGRAM(s) IV Push every 3 hours PRN Severe Pain (7 - 10)          Vital Signs Last 24 Hrs  T(C): 36.6 (02 Oct 2017 14:21), Max: 37 (01 Oct 2017 22:15)  T(F): 97.9 (02 Oct 2017 14:21), Max: 98.6 (01 Oct 2017 22:15)  HR: 93 (02 Oct 2017 14:01) (80 - 94)  BP: 156/40 (02 Oct 2017 13:00) (123/37 - 161/47)  BP(mean): 70 (02 Oct 2017 13:00) (56 - 77)  RR: 13 (02 Oct 2017 13:00) (8 - 18)  SpO2: 95% (02 Oct 2017 13:00) (94% - 100%)  Daily     Daily Weight in k.1 (02 Oct 2017 06:30)    10-01 @ 07:01  -  10-02 @ 07:00  --------------------------------------------------------  IN: 1475 mL / OUT: 925 mL / NET: 550 mL    10-02 @ 07:01  -  10-02 @ 14:36  --------------------------------------------------------  IN: 300 mL / OUT: 550 mL / NET: -250 mL        PHYSICAL EXAM:  Arousable.  GENERAL: No acute distress  CHEST/LUNG: fair air entry  HEART: S1S2 RRR  ABDOMEN: soft  EXTREMITIES: no edema  SKIN:     LABS:                        7.8    7.9   )-----------( 169      ( 02 Oct 2017 06:10 )             23.8     10-02    148<H>  |  116<H>  |  112<H>  ----------------------------<  233<H>  5.5<H>   |  21<L>  |  5.04<H>    Ca    8.9      02 Oct 2017 12:23                  RADIOLOGY & ADDITIONAL TESTS:
NEPHROLOGY INTERVAL HPI/OVERNIGHT EVENTS:  10/5 SY  Sitting up today.  Appearing much brighter.  No distress.  Eating breakfast slowly.    104 SY  Awake and with limited verbal response.  Pt tearful but does not complain of pain.    10/3  more awake  making some sentences  case d/w pts family and Dr rosales  Na 151, but creat improving    10/2 SY  Arousable though with decreased response to verbal stimuli.  No apparent distress noted.    10/1  more awake alert, although still lethargic  VVS  got 60 then 100 lasix IV yesterday  was anuric after sharma placement (300 ml residual) on   UO last night increased to 150 ml night shift, now 20-45 ml/hr  IV bolus 500 ml over an hour given  now on 125 ml/hr  hgb down to 7.5, may be due to dilution + some blood loss in surgical wound  case d/w Dr Rosales and pts family      HPI:  76 year old female with PMHx HTN, HLD, CAD, ADVANCED COPD ON HOME O2 (3LITERS), IDDM, CHRONIC DIASTOLIC HEART FAILURE, RECENT ADMISSIONS FOR HYPOXIC RESPIRATORY FAILURE STATES SHE WAS LEAVING [facility] EARLIER TODAY WITH HER BROTHER AND MISSTEPPED RESULTING IN A FALL.  SHE REPORTS HAVING NECK AND LOWER BACK PAIN, ALSO LEFT HIP PAIN.  PT DENIES ANY CHEST PAIN OR SHORTNESS OF BREATH, NO NAUSEA OR VOMITING. JUST RECEIVED DILAUDID AND FEELS SOMEWHAT BETTER. (27 Sep 2017 19:56)  lethargic due to pain meds, last dose last night  family at bedside  for OR either today or in am        MEDICATIONS  (STANDING):  ALBUTerol    90 MICROgram(s) HFA Inhaler 1 Puff(s) Inhalation every 4 hours  ALBUTerol/ipratropium for Nebulization 3 milliLiter(s) Nebulizer every 6 hours  atorvastatin 20 milliGRAM(s) Oral at bedtime  citalopram 10 milliGRAM(s) Oral daily  clopidogrel Tablet 75 milliGRAM(s) Oral daily  dextrose 5%. 1000 milliLiter(s) (100 mL/Hr) IV Continuous <Continuous>  dextrose 50% Injectable 12.5 Gram(s) IV Push once  dextrose 50% Injectable 25 Gram(s) IV Push once  dextrose 50% Injectable 25 Gram(s) IV Push once  diltiazem    milliGRAM(s) Oral daily  docusate sodium 100 milliGRAM(s) Oral three times a day  heparin  Injectable 5000 Unit(s) SubCutaneous every 8 hours  influenza   Vaccine 0.5 milliLiter(s) IntraMuscular once  insulin glargine Injectable (LANTUS) 48 Unit(s) SubCutaneous at bedtime  insulin lispro (HumaLOG) corrective regimen sliding scale   SubCutaneous three times a day before meals  levothyroxine Injectable 60 MICROGram(s) IV Push daily  pantoprazole    Tablet 40 milliGRAM(s) Oral daily  polyethylene glycol 3350 17 Gram(s) Oral daily  senna 2 Tablet(s) Oral at bedtime    MEDICATIONS  (PRN):  acetaminophen   Tablet 650 milliGRAM(s) Oral every 6 hours PRN For Temp over 38.3 C (100.94 F)  bisacodyl Suppository 10 milliGRAM(s) Rectal daily PRN If no bowel movement by POD#2  dextrose Gel 1 Dose(s) Oral once PRN Blood Glucose LESS THAN 70 milliGRAM(s)/deciliter  diphenhydrAMINE   Capsule 25 milliGRAM(s) Oral at bedtime PRN Insomnia  glucagon  Injectable 1 milliGRAM(s) IntraMuscular once PRN Glucose LESS THAN 70 milligrams/deciliter  hydrALAZINE Injectable 10 milliGRAM(s) IV Push every 8 hours PRN SBP >170  HYDROmorphone  Injectable 0.5 milliGRAM(s) IV Push every 3 hours PRN Severe Pain (7 - 10)  ondansetron Injectable 4 milliGRAM(s) IV Push every 6 hours PRN Nausea and/or Vomiting  oxyCODONE    IR 5 milliGRAM(s) Oral every 4 hours PRN Moderate Pain (4 - 6)  oxyCODONE    IR 10 milliGRAM(s) Oral every 4 hours PRN Severe Pain (7 - 10)  traMADol 50 milliGRAM(s) Oral every 6 hours PRN Mild Pain (1 - 3)          Vital Signs Last 24 Hrs  T(C): 36.1 (05 Oct 2017 04:00), Max: 36.4 (04 Oct 2017 21:08)  T(F): 97 (05 Oct 2017 04:00), Max: 97.6 (04 Oct 2017 21:08)  HR: 87 (05 Oct 2017 07:53) (87 - 100)  BP: 150/44 (05 Oct 2017 07:00) (137/50 - 183/50)  BP(mean): 0 (05 Oct 2017 07:00) (0 - 86)  RR: 25 (05 Oct 2017 07:00) (12 - 25)  SpO2: 98% (05 Oct 2017 07:00) (91% - 99%)  Daily     Daily Weight in k.9 (05 Oct 2017 05:41)    10-04 @ 07:01  -  10-05 @ 07:00  --------------------------------------------------------  IN: 2200 mL / OUT: 1175 mL / NET: 1025 mL        PHYSICAL EXAM:  Alert and responsive  GENERAL: No distress  CHEST/LUNG: Fair air entry, fairly clear  HEART: S1S2 RRR  ABDOMEN: soft  EXTREMITIES: no edema  SKIN:     LABS:                        9.1    14.7  )-----------( 233      ( 05 Oct 2017 05:44 )             27.5     10-05    144  |  111<H>  |  82<H>  ----------------------------<  370<H>  3.7   |  23  |  3.24<H>    Ca    8.7      05 Oct 2017 05:44                  RADIOLOGY & ADDITIONAL TESTS:
NEPHROLOGY INTERVAL HPI/OVERNIGHT EVENTS:  as per , more awake and alert today  limited in her intake  no diarrhea      MEDICATIONS  (STANDING):  atorvastatin 20 milliGRAM(s) Oral at bedtime  buDESOnide 160 MICROgram(s)/formoterol 4.5 MICROgram(s) Inhaler 2 Puff(s) Inhalation two times a day  citalopram 10 milliGRAM(s) Oral daily  clopidogrel Tablet 75 milliGRAM(s) Oral daily  dextrose 50% Injectable 12.5 Gram(s) IV Push once  dextrose 50% Injectable 25 Gram(s) IV Push once  dextrose 50% Injectable 25 Gram(s) IV Push once  diltiazem    milliGRAM(s) Oral daily  docusate sodium 100 milliGRAM(s) Oral three times a day  heparin  Injectable 5000 Unit(s) SubCutaneous every 8 hours  influenza   Vaccine 0.5 milliLiter(s) IntraMuscular once  insulin glargine Injectable (LANTUS) 48 Unit(s) SubCutaneous at bedtime  insulin lispro (HumaLOG) corrective regimen sliding scale   SubCutaneous three times a day before meals  levothyroxine Injectable 60 MICROGram(s) IV Push daily  pantoprazole    Tablet 40 milliGRAM(s) Oral daily  polyethylene glycol 3350 17 Gram(s) Oral daily  senna 2 Tablet(s) Oral at bedtime  tiotropium 18 MICROgram(s) Capsule 1 Capsule(s) Inhalation daily    MEDICATIONS  (PRN):  acetaminophen   Tablet 650 milliGRAM(s) Oral every 6 hours PRN For Temp over 38.3 C (100.94 F)  bisacodyl Suppository 10 milliGRAM(s) Rectal daily PRN If no bowel movement by POD#2  dextrose Gel 1 Dose(s) Oral once PRN Blood Glucose LESS THAN 70 milliGRAM(s)/deciliter  diphenhydrAMINE   Capsule 25 milliGRAM(s) Oral at bedtime PRN Insomnia  glucagon  Injectable 1 milliGRAM(s) IntraMuscular once PRN Glucose LESS THAN 70 milligrams/deciliter  hydrALAZINE Injectable 10 milliGRAM(s) IV Push every 8 hours PRN SBP >170  HYDROmorphone  Injectable 0.5 milliGRAM(s) IV Push every 3 hours PRN Severe Pain (7 - 10)  ondansetron Injectable 4 milliGRAM(s) IV Push every 6 hours PRN Nausea and/or Vomiting  oxyCODONE    IR 5 milliGRAM(s) Oral every 4 hours PRN Moderate Pain (4 - 6)  oxyCODONE    IR 10 milliGRAM(s) Oral every 4 hours PRN Severe Pain (7 - 10)  traMADol 50 milliGRAM(s) Oral every 6 hours PRN Mild Pain (1 - 3)      Allergies    Bactrim (Other)  ciprofloxacin (Other (Mild))  clindamycin (Unknown)  ibuprofen (Unknown)  penicillins (Other)  sulfa drugs (Unknown)    Intolerances        I&O's Detail    05 Oct 2017 07:01  -  06 Oct 2017 07:00  --------------------------------------------------------  IN:    dextrose 5%.: 100 mL  Total IN: 100 mL    OUT:    Indwelling Catheter - Urethral: 100 mL  Total OUT: 100 mL    Total NET: 0 mL          Vital Signs Last 24 Hrs  T(C): 36.3 (06 Oct 2017 10:12), Max: 36.9 (06 Oct 2017 05:58)  T(F): 97.4 (06 Oct 2017 10:12), Max: 98.4 (06 Oct 2017 05:58)  HR: 65 (06 Oct 2017 10:12) (63 - 86)  BP: 121/39 (06 Oct 2017 10:12) (102/84 - 154/39)  BP(mean): 59 (06 Oct 2017 08:00) (59 - 91)  RR: 16 (06 Oct 2017 10:12) (10 - 17)  SpO2: 96% (06 Oct 2017 10:12) (90% - 100%)  Daily     Daily Weight in k.3 (06 Oct 2017 05:58)    PHYSICAL EXAM:  General: alert. awake Ox3  HEENT: MMM  CV: s1s2 rrr  LUNGS: B/L CTA  EXT: no edema    LABS:                        8.7    10.5  )-----------( 268      ( 06 Oct 2017 05:39 )             26.2     10-06    146<H>  |  112<H>  |  84<H>  ----------------------------<  138<H>  3.6   |  26  |  3.32<H>    Ca    8.9      06 Oct 2017 05:39
NEPHROLOGY INTERVAL HPI/OVERNIGHT EVENTS:  as per daughter, poor po intake.  + pain  and with severe anxiety.  Usually has celexa on board to [facility] as outpt.        MEDICATIONS  (STANDING):  atorvastatin 20 milliGRAM(s) Oral at bedtime  buDESOnide 160 MICROgram(s)/formoterol 4.5 MICROgram(s) Inhaler 2 Puff(s) Inhalation two times a day  citalopram 10 milliGRAM(s) Oral daily  clopidogrel Tablet 75 milliGRAM(s) Oral daily  dextrose 50% Injectable 12.5 Gram(s) IV Push once  dextrose 50% Injectable 25 Gram(s) IV Push once  dextrose 50% Injectable 25 Gram(s) IV Push once  diltiazem    milliGRAM(s) Oral daily  docusate sodium 100 milliGRAM(s) Oral three times a day  heparin  Injectable 5000 Unit(s) SubCutaneous every 8 hours  influenza   Vaccine 0.5 milliLiter(s) IntraMuscular once  insulin glargine Injectable (LANTUS) 48 Unit(s) SubCutaneous at bedtime  insulin lispro (HumaLOG) corrective regimen sliding scale   SubCutaneous three times a day before meals  levothyroxine Injectable 60 MICROGram(s) IV Push daily  pantoprazole    Tablet 40 milliGRAM(s) Oral daily  polyethylene glycol 3350 17 Gram(s) Oral daily  senna 2 Tablet(s) Oral at bedtime  tiotropium 18 MICROgram(s) Capsule 1 Capsule(s) Inhalation daily    MEDICATIONS  (PRN):  acetaminophen   Tablet 650 milliGRAM(s) Oral every 6 hours PRN For Temp over 38.3 C (100.94 F)  bisacodyl Suppository 10 milliGRAM(s) Rectal daily PRN If no bowel movement by POD#2  dextrose Gel 1 Dose(s) Oral once PRN Blood Glucose LESS THAN 70 milliGRAM(s)/deciliter  diphenhydrAMINE   Capsule 25 milliGRAM(s) Oral at bedtime PRN Insomnia  glucagon  Injectable 1 milliGRAM(s) IntraMuscular once PRN Glucose LESS THAN 70 milligrams/deciliter  hydrALAZINE Injectable 10 milliGRAM(s) IV Push every 8 hours PRN SBP >170  HYDROmorphone  Injectable 0.5 milliGRAM(s) IV Push every 3 hours PRN Severe Pain (7 - 10)  ondansetron Injectable 4 milliGRAM(s) IV Push every 6 hours PRN Nausea and/or Vomiting      Allergies    Bactrim (Other)  ciprofloxacin (Other (Mild))  clindamycin (Unknown)  ibuprofen (Unknown)  penicillins (Other)  sulfa drugs (Unknown)    Intolerances        I&O's Detail          Vital Signs Last 24 Hrs  T(C): 36.4 (07 Oct 2017 10:15), Max: 37.1 (07 Oct 2017 05:52)  T(F): 97.5 (07 Oct 2017 10:15), Max: 98.7 (07 Oct 2017 05:52)  HR: 59 (07 Oct 2017 10:15) (57 - 70)  BP: 135/41 (07 Oct 2017 10:15) (135/41 - 151/45)  BP(mean): --  RR: 19 (07 Oct 2017 10:15) (17 - 20)  SpO2: 98% (07 Oct 2017 10:15) (95% - 98%)  Daily     Daily Weight in k.9 (07 Oct 2017 08:37)    PHYSICAL EXAM:  General: alert. awake Ox3  HEENT: MMM  CV: s1s2 rrr  LUNGS: B/L CTA  EXT: no edema    LABS:                        9.1    10.2  )-----------( 313      ( 07 Oct 2017 06:21 )             28.0     10-07    x   |  x   |  x   ----------------------------<  102<H>  x    |  x   |  x     Ca    8.9      07 Oct 2017 06:21
NEPHROLOGY INTERVAL HPI/OVERNIGHT EVENTS:  resting, daughter at bedside.  remains with poor appetite.  better with xanax    MEDICATIONS  (STANDING):  atorvastatin 20 milliGRAM(s) Oral at bedtime  buDESOnide 160 MICROgram(s)/formoterol 4.5 MICROgram(s) Inhaler 2 Puff(s) Inhalation two times a day  citalopram 20 milliGRAM(s) Oral daily  clopidogrel Tablet 75 milliGRAM(s) Oral daily  dextrose 50% Injectable 12.5 Gram(s) IV Push once  dextrose 50% Injectable 25 Gram(s) IV Push once  dextrose 50% Injectable 25 Gram(s) IV Push once  diltiazem    milliGRAM(s) Oral daily  docusate sodium 100 milliGRAM(s) Oral three times a day  heparin  Injectable 5000 Unit(s) SubCutaneous every 8 hours  influenza   Vaccine 0.5 milliLiter(s) IntraMuscular once  insulin glargine Injectable (LANTUS) 40 Unit(s) SubCutaneous at bedtime  insulin lispro (HumaLOG) corrective regimen sliding scale   SubCutaneous three times a day before meals  levothyroxine Injectable 60 MICROGram(s) IV Push daily  pantoprazole    Tablet 40 milliGRAM(s) Oral daily  polyethylene glycol 3350 17 Gram(s) Oral daily  senna 2 Tablet(s) Oral at bedtime  sodium chloride 0.45%. 1000 milliLiter(s) (50 mL/Hr) IV Continuous <Continuous>  tiotropium 18 MICROgram(s) Capsule 1 Capsule(s) Inhalation daily    MEDICATIONS  (PRN):  acetaminophen   Tablet 650 milliGRAM(s) Oral every 6 hours PRN For Temp over 38.3 C (100.94 F)  ALPRAZolam 0.25 milliGRAM(s) Oral every 8 hours PRN anxiety  bisacodyl Suppository 10 milliGRAM(s) Rectal daily PRN If no bowel movement by POD#2  dextrose Gel 1 Dose(s) Oral once PRN Blood Glucose LESS THAN 70 milliGRAM(s)/deciliter  diphenhydrAMINE   Capsule 25 milliGRAM(s) Oral at bedtime PRN Insomnia  glucagon  Injectable 1 milliGRAM(s) IntraMuscular once PRN Glucose LESS THAN 70 milligrams/deciliter  hydrALAZINE Injectable 10 milliGRAM(s) IV Push every 8 hours PRN SBP >170  HYDROmorphone  Injectable 0.5 milliGRAM(s) IV Push every 3 hours PRN Severe Pain (7 - 10)  ondansetron Injectable 4 milliGRAM(s) IV Push every 6 hours PRN Nausea and/or Vomiting      Allergies    Bactrim (Other)  ciprofloxacin (Other (Mild))  clindamycin (Unknown)  ibuprofen (Unknown)  penicillins (Other)  sulfa drugs (Unknown)    Intolerances        I&O's Detail    07 Oct 2017 07:01  -  08 Oct 2017 07:00  --------------------------------------------------------  IN:    sodium chloride 0.45%.: 155 mL  Total IN: 155 mL    OUT:  Total OUT: 0 mL    Total NET: 155 mL      08 Oct 2017 07:01  -  08 Oct 2017 13:34  --------------------------------------------------------  IN:    sodium chloride 0.45%.: 1000 mL  Total IN: 1000 mL    OUT:  Total OUT: 0 mL    Total NET: 1000 mL          Vital Signs Last 24 Hrs  T(C): 36.4 (08 Oct 2017 11:19), Max: 36.7 (07 Oct 2017 16:59)  T(F): 97.6 (08 Oct 2017 11:19), Max: 98.1 (08 Oct 2017 05:45)  HR: 60 (08 Oct 2017 11:19) (58 - 78)  BP: 127/48 (08 Oct 2017 11:19) (127/48 - 170/40)  BP(mean): --  RR: 18 (08 Oct 2017 11:19) (18 - 18)  SpO2: 100% (08 Oct 2017 11:19) (98% - 100%)  Daily     Daily     PHYSICAL EXAM:  General: alert. awake Ox3  HEENT: MMM  CV: s1s2 rrr  LUNGS: B/L CTA  EXT: no edema    LABS:                        8.6    8.9   )-----------( 295      ( 08 Oct 2017 05:55 )             26.1     10-08    142  |  111<H>  |  82<H>  ----------------------------<  106<H>  3.7   |  24  |  3.41<H>    Ca    8.3<L>      08 Oct 2017 05:55
OLI ESCALERA  MRN: [id number]    S: patient still has nausea with poor po intake and appears more confused no acute wheeze or sob or leg edema still on fluids with improving cr       PAST MEDICAL & SURGICAL HISTORY:  Chronic diastolic congestive heart failure  Hyperlipidemia, unspecified hyperlipidemia type  Essential hypertension  Neuropathy  Diabetes  Chronic obstructive pulmonary disease, unspecified COPD type  History of total knee replacement, unspecified laterality  H/O hernia repair   delivery delivered  S/P appendectomy      Vital Signs Last 24 Hrs  T(C): 36.4 (10 Oct 2017 10:47), Max: 36.7 (09 Oct 2017 16:54)  T(F): 97.6 (10 Oct 2017 10:47), Max: 98.1 (09 Oct 2017 16:54)  HR: 70 (10 Oct 2017 10:47) (70 - 73)  BP: 164/33 (10 Oct 2017 10:47) (164/33 - 195/52)  BP(mean): 86 (10 Oct 2017 05:14) (86 - 86)  RR: 18 (10 Oct 2017 10:47) (18 - 18)  SpO2: 97% (10 Oct 2017 10:47) (97% - 98%)      PHYSICAL EXAM:      GENERAL: no distress, oriented to the person      NEURO: awake and do not follow commands and moving the extremities     NECK: no JVD    CHEST: no wheezing with the few rales over the bases left more than right     CARDIAC: s1 and s2 with gallops     EXT: no edema      LABS:                                              8.2    10.7  )-----------( 299      ( 10 Oct 2017 05:45 )             25.1   10-10    140  |  110<H>  |  67<H>  ----------------------------<  181<H>  4.0   |  21<L>  |  2.83<H>    Ca    8.8      10 Oct 2017 05:45                                      < from: Xray Chest 1 View AP/PA. (10.09.17 @ 10:52) >  XAM:  CHEST SINGLE VIEW FRONTAL                            PROCEDURE DATE:  10/09/2017          INTERPRETATION:  Single view chest    History COPD, CHF    Comparison 7 days ago    There is a reduced inspiratory result. There are mildly increased central   vascular markings. There is no lobar consolidation or layering effusion.   The heart is normal in size.    IMPRESSION:    Mild central edema    < end of copied text >    A/P: 1. Hip fracture - status post surgery     2. COPD. Nebulized bronchodilators; O2 supplement. No evidence of bronchospasm or evidence of exacerbation restart on advair and spiriva for her baseline copd     3. Hx of CHF .with few rales in the bases persistent and follow up cxr show mild congestion and pat remained asymptomatic     4. acute on chronic CKD with the fluid management as per the renal and cr is improving with hydration     5 hypernatremia improving     6 anxiety started on celexa and xanax prn          PLAN     continue with the 02 supplementation  advair and spiriva her baseline medication for the copd . even through has rales in the lung patient clinically appears not in failure . would continue to monitor for the fluid over load . encourage po intake . monitor sat more closely
OLI ESCALERA  MRN: [id number]    S: patient seen sitting in chair with no events reported . No sob or cough or wheezing seen by the renal and started on fluids       PAST MEDICAL & SURGICAL HISTORY:  Chronic diastolic congestive heart failure  Hyperlipidemia, unspecified hyperlipidemia type  Essential hypertension  Neuropathy  Diabetes  Chronic obstructive pulmonary disease, unspecified COPD type  History of total knee replacement, unspecified laterality  H/O hernia repair   delivery delivered  S/P appendectomy      Vital Signs Last 24 Hrs  T(C): 36.7 (08 Oct 2017 05:45), Max: 36.7 (07 Oct 2017 16:59)  T(F): 98.1 (08 Oct 2017 05:45), Max: 98.1 (08 Oct 2017 05:45)  HR: 62 (08 Oct 2017 07:31) (58 - 78)  BP: 170/40 (08 Oct 2017 05:45) (135/41 - 170/40)  BP(mean): --  RR: 18 (08 Oct 2017 05:45) (18 - 19)  SpO2: 98% (08 Oct 2017 05:45) (98% - 99%)        PHYSICAL EXAM:      GENERAL: no distress, oriented to the person      NEURO: awake and do not follow commands and moving the extremities     NECK: no JVD    CHEST: no wheezing with the few rales over the bases    CARDIAC: s1 and s2 with gallops     EXT: no edema      LABS:                                                8.6    8.9   )-----------( 295      ( 08 Oct 2017 05:55 )             26.1   10-08    142  |  111<H>  |  82<H>  ----------------------------<  106<H>  3.7   |  24  |  3.41<H>    Ca    8.3<L>      08 Oct 2017 05:55                < from: Xray Chest 1 View AP/PA. (10.02.17 @ 11:23) >  XAM:  CHEST SINGLE VIEW FRONTAL                                                  MEDICATIONS  (STANDING):  atorvastatin 20 milliGRAM(s) Oral at bedtime  buDESOnide 160 MICROgram(s)/formoterol 4.5 MICROgram(s) Inhaler 2 Puff(s) Inhalation two times a day  citalopram 10 milliGRAM(s) Oral daily  clopidogrel Tablet 75 milliGRAM(s) Oral daily  dextrose 50% Injectable 12.5 Gram(s) IV Push once  dextrose 50% Injectable 25 Gram(s) IV Push once  dextrose 50% Injectable 25 Gram(s) IV Push once  diltiazem    milliGRAM(s) Oral daily  docusate sodium 100 milliGRAM(s) Oral three times a day  heparin  Injectable 5000 Unit(s) SubCutaneous every 8 hours  influenza   Vaccine 0.5 milliLiter(s) IntraMuscular once  insulin glargine Injectable (LANTUS) 48 Unit(s) SubCutaneous at bedtime  insulin lispro (HumaLOG) corrective regimen sliding scale   SubCutaneous three times a day before meals  levothyroxine Injectable 60 MICROGram(s) IV Push daily  pantoprazole    Tablet 40 milliGRAM(s) Oral daily  polyethylene glycol 3350 17 Gram(s) Oral daily  senna 2 Tablet(s) Oral at bedtime  tiotropium 18 MICROgram(s) Capsule 1 Capsule(s) Inhalation daily        A/P: 1. Hip fracture - status post surgery     2. COPD. Nebulized bronchodilators; O2 supplement. No evidence of bronchospasm or evidence of exacerbation restart on advair and spiriva for her baseline copd     3. Hx of CHF.no acute decompensation now     4. acute on chronic CKD with the fluid management as per the renal .     5 hypernatremia improving     6 anxiety started on celexa and xanax prn          PLAN     continue with the 02 supplementation  advair and spiriva her baseline medication for the copd . monitor for fever spikes . follow up renal function and electrolytes . patient condition was discussed with the daughter at bed side
OLI ESCALERA  MRN: [id number]    S: patient remained confused with no new reported respiratory symptoms of sob and only using 02 intermittently . no wheezing or fever        PAST MEDICAL & SURGICAL HISTORY:  Chronic diastolic congestive heart failure  Hyperlipidemia, unspecified hyperlipidemia type  Essential hypertension  Neuropathy  Diabetes  Chronic obstructive pulmonary disease, unspecified COPD type  History of total knee replacement, unspecified laterality  H/O hernia repair   delivery delivered  S/P appendectomy      Vital Signs Last 24 Hrs  T(C): 36.1 (03 Oct 2017 14:00), Max: 36.7 (03 Oct 2017 08:00)  T(F): 97 (03 Oct 2017 14:00), Max: 98 (03 Oct 2017 08:00)  HR: 102 (03 Oct 2017 15:22) (75 - 102)  BP: 185/51 (03 Oct 2017 15:22) (98/41 - 190/51)  BP(mean): 84 (03 Oct 2017 15:22) (52 - 85)  RR: 12 (03 Oct 2017 15:22) (12 - 23)  SpO2: 94% (03 Oct 2017 15:22) (86% - 100%)      PHYSICAL EXAM:      GENERAL: no distress and comfortable on nasal canula     NEURO: awake and donot follow commands and moving the extremities     NECK: no JVD    CHEST: no wheezing with the few rales over the bases     CARDIAC: RR    EXT: no edema      LABS:                                     8.3    7.1   )-----------( 191      ( 03 Oct 2017 05:59 )             25.1   10-03    151<H>  |  116<H>  |  107<H>  ----------------------------<  218<H>  4.3   |  25  |  4.56<H>    Ca    8.6      03 Oct 2017 05:59        < from: Xray Chest 1 View AP/PA. (10.02.17 @ 11:23) >  XAM:  CHEST SINGLE VIEW FRONTAL                            PROCEDURE DATE:  10/02/2017          INTERPRETATION:  Clinical information: Shortness of breath. CHF follow-up.    Portable AP chest radiograph from 1057 hours:    COMPARISON:      FINDINGS:  The cardiac, hilar and mediastinal contours are not well   evaluated due to technique and the patient's rotated position. There is   mild pulmonary vascular congestion, unchanged. No pneumothorax.    IMPRESSION:    No change in pulmonary vascular congestion.                    EXAM:  CT BRAIN                            PROCEDURE DATE:  10/01/2017          INTERPRETATION:    Exam Date: 10/1/2017 11:00 AM    History:Aphasia    Multiple axial sections were obtained from skull base to the vertex   without intravenous contrast.    Comparison: 2017    Findings:    There is no intracranial hemorrhage, mass effect or midline shift. No   extra-axial collection is identified. There is no large acute territorial   infarct.    There is moderate prominence of the ventricles and subarachnoid spaces,   compatible with age related involutional changes. Prominent   periventricular lucency is present, compatible with microvascular   ischemic change.    The visualized skull and mastoid are unremarkable.    The paranasal sinuses and orbits are within normal limits.    Impression:    Age related involutional and microvascular ischemic changes, without   evidence of intracranial hemorrhage, mass effect or midline shift.      ISMAEL TREJO M.D., ATTENDING RADIOLOGIST            MEDICATIONS  (STANDING):  ALBUTerol    90 MICROgram(s) HFA Inhaler 1 Puff(s) Inhalation every 4 hours  ALBUTerol/ipratropium for Nebulization 3 milliLiter(s) Nebulizer every 6 hours  atorvastatin 20 milliGRAM(s) Oral at bedtime  cefTRIAXone   IVPB 1 Gram(s) IV Intermittent every 24 hours  citalopram 10 milliGRAM(s) Oral daily  clopidogrel Tablet 75 milliGRAM(s) Oral daily  dextrose 5%. 1000 milliLiter(s) (75 mL/Hr) IV Continuous <Continuous>  dextrose 50% Injectable 12.5 Gram(s) IV Push once  dextrose 50% Injectable 25 Gram(s) IV Push once  dextrose 50% Injectable 25 Gram(s) IV Push once  diltiazem    milliGRAM(s) Oral daily  docusate sodium 100 milliGRAM(s) Oral three times a day  heparin  Injectable 5000 Unit(s) SubCutaneous every 12 hours  influenza   Vaccine 0.5 milliLiter(s) IntraMuscular once  insulin glargine Injectable (LANTUS) 34 Unit(s) SubCutaneous at bedtime  insulin lispro (HumaLOG) corrective regimen sliding scale   SubCutaneous three times a day before meals  levothyroxine Injectable 60 MICROGram(s) IV Push daily  pantoprazole    Tablet 40 milliGRAM(s) Oral daily  polyethylene glycol 3350 17 Gram(s) Oral daily  senna 2 Tablet(s) Oral at bedtime      A/P: 1. Hip fracture - P.O.D. #4  Pain management, P.T., DVT prophylaxis per ortho. Incentive spirometry and cautious use of large dose of narcotics with renal dysfunction     2. COPD. Nebulized bronchodilators; O2 supplement. No evidence of bronchospasm.     3. Hx of CHF. getting prn lasix     4. acute on chronic CKD with the fluid management as per the renal .     5 hypernatremia with poor po intake of water     PLAN     continue to monitor respiratory status closely . so far clinically patient is not in gross failure and fluid management as per the renal and continue with the nebs for the copd and use of suplemental 02 as needed for the hypoxia and cautious use of sedation with the renal dysfunction. mobilization and if patient is cooperative use of incentive spirometry
OLI ESCALERA  MRN: [id number]    S: : P.O.D. #1. Patient lethargic but arousable. No respiratory distress. Lying flat in bed.     PAST MEDICAL & SURGICAL HISTORY:  Chronic diastolic congestive heart failure  Hyperlipidemia, unspecified hyperlipidemia type  Essential hypertension  Neuropathy  Diabetes  Chronic obstructive pulmonary disease, unspecified COPD type  History of total knee replacement, unspecified laterality  H/O hernia repair   delivery delivered  S/P appendectomy      O: T(C): 36.1 (17 @ 06:52), Max: 37.8 (17 @ 23:39)  HR: 89 (17 @ 06:01) (72 - 89)  BP: 135/35 (17 @ 06:01) (115/33 - 144/39)  RR: 19 (17 @ 06:01) (11 - 20)  SpO2: 92% (17 @ 06:01) (92% - 100%)  Wt(kg): --    PHYSICAL EXAM:      GENERAL: breathing comfortably    NEURO: lethargic; arousable - opens eyes to voice.     NECK:  no JVD    CHEST: clear anteriorly; decreased at bases     CARDIAC: RR    EXT: no edema; S/P hip repair      LABS:                        8.7    15.1  )-----------( 173      ( 30 Sep 2017 05:30 )             25.6           144  |  109<H>  |  90<H>  ----------------------------<  204<H>  5.4<H>   |  25  |  3.73<H>    Ca    8.6      30 Sep 2017 05:30        MEDICATIONS  (STANDING):  influenza   Vaccine 0.5 milliLiter(s) IntraMuscular once  pantoprazole    Tablet 40 milliGRAM(s) Oral daily  polyethylene glycol 3350 17 Gram(s) Oral daily  senna 2 Tablet(s) Oral at bedtime  docusate sodium 100 milliGRAM(s) Oral three times a day  diltiazem    milliGRAM(s) Oral daily  citalopram 10 milliGRAM(s) Oral daily  atorvastatin 20 milliGRAM(s) Oral at bedtime  levothyroxine 112 MICROGram(s) Oral daily  insulin glargine Injectable (LANTUS) 22 Unit(s) SubCutaneous at bedtime  insulin lispro (HumaLOG) corrective regimen sliding scale   SubCutaneous three times a day before meals  dextrose 50% Injectable 12.5 Gram(s) IV Push once  dextrose 50% Injectable 25 Gram(s) IV Push once  dextrose 50% Injectable 25 Gram(s) IV Push once  buDESOnide 160 MICROgram(s)/formoterol 4.5 MICROgram(s) Inhaler 2 Puff(s) Inhalation two times a day  tiotropium 18 MICROgram(s) Capsule 1 Capsule(s) Inhalation daily  ALBUTerol    90 MICROgram(s) HFA Inhaler 1 Puff(s) Inhalation every 4 hours  cefTRIAXone   IVPB 1 Gram(s) IV Intermittent every 24 hours  heparin  Injectable 5000 Unit(s) SubCutaneous every 12 hours  sodium chloride 0.9%. 1000 milliLiter(s) (75 mL/Hr) IV Continuous <Continuous>    MEDICATIONS  (PRN):  acetaminophen   Tablet 650 milliGRAM(s) Oral every 6 hours PRN For Temp over 38.3 C (100.94 F)  oxyCODONE    IR 5 milliGRAM(s) Oral every 4 hours PRN Moderate Pain (4 - 6)  oxyCODONE    IR 10 milliGRAM(s) Oral every 4 hours PRN Severe Pain (7 - 10)  traMADol 50 milliGRAM(s) Oral every 6 hours PRN Mild Pain (1 - 3)  ondansetron Injectable 4 milliGRAM(s) IV Push every 6 hours PRN Nausea and/or Vomiting  diphenhydrAMINE   Capsule 25 milliGRAM(s) Oral at bedtime PRN Insomnia  dextrose Gel 1 Dose(s) Oral once PRN Blood Glucose LESS THAN 70 milliGRAM(s)/deciliter  glucagon  Injectable 1 milliGRAM(s) IntraMuscular once PRN Glucose LESS THAN 70 milligrams/deciliter  HYDROmorphone  Injectable 0.5 milliGRAM(s) IV Push every 3 hours PRN Severe Pain (7 - 10)        A/P: 1. Hip fracture - P.O.D. #1. Pain management, P.T., DVT prophylaxis per ortho. Incentive spirometry.     2. COPD. Nebulized bronchodilators; O2 supplement.     3. Hx of CHF. Per cardiology. Follow up CXR in am.     4. ARF. IV fluids per Dr. Phelps / nephrology.    D/W Dr. Phelps.     Reviewed patient's condition with daughter at bedside.     Will follow.
OLI ESCALERA  MRN: [id number]    S: patient is getting more oriented with no sob or cough or wheezing and WBC improving        PAST MEDICAL & SURGICAL HISTORY:  Chronic diastolic congestive heart failure  Hyperlipidemia, unspecified hyperlipidemia type  Essential hypertension  Neuropathy  Diabetes  Chronic obstructive pulmonary disease, unspecified COPD type  History of total knee replacement, unspecified laterality  H/O hernia repair   delivery delivered  S/P appendectomy      Vital Signs Last 24 Hrs  T(C): 36.3 (06 Oct 2017 10:12), Max: 36.9 (06 Oct 2017 05:58)  T(F): 97.4 (06 Oct 2017 10:12), Max: 98.4 (06 Oct 2017 05:58)  HR: 65 (06 Oct 2017 10:12) (63 - 91)  BP: 121/39 (06 Oct 2017 10:12) (102/84 - 154/39)  BP(mean): 59 (06 Oct 2017 08:00) (59 - 91)  RR: 16 (06 Oct 2017 10:12) (10 - 17)  SpO2: 96% (06 Oct 2017 10:12) (90% - 100%)  PHYSICAL EXAM:      GENERAL: no distress more oriented     NEURO: awake and do not follow commands and moving the extremities     NECK: no JVD    CHEST: no wheezing with the few rales over the bases how ever poor inspiratory effort     CARDIAC: s1 and s2 with gallops     EXT: no edema      LABS:                                                8.7    10.5  )-----------( 268      ( 06 Oct 2017 05:39 )             26.2   10-06    146<H>  |  112<H>  |  84<H>  ----------------------------<  138<H>  3.6   |  26  |  3.32<H>    Ca    8.9      06 Oct 2017 05:39              < from: Xray Chest 1 View AP/PA. (10.02.17 @ 11:23) >  XAM:  CHEST SINGLE VIEW FRONTAL                            PROCEDURE DATE:  10/02/2017          INTERPRETATION:  Clinical information: Shortness of breath. CHF follow-up.    Portable AP chest radiograph from 1057 hours:    COMPARISON:      FINDINGS:  The cardiac, hilar and mediastinal contours are not well   evaluated due to technique and the patient's rotated position. There is   mild pulmonary vascular congestion, unchanged. No pneumothorax.    IMPRESSION:    No change in pulmonary vascular congestion.                            MEDICATIONS  (STANDING):  atorvastatin 20 milliGRAM(s) Oral at bedtime  buDESOnide 160 MICROgram(s)/formoterol 4.5 MICROgram(s) Inhaler 2 Puff(s) Inhalation two times a day  citalopram 10 milliGRAM(s) Oral daily  clopidogrel Tablet 75 milliGRAM(s) Oral daily  dextrose 50% Injectable 12.5 Gram(s) IV Push once  dextrose 50% Injectable 25 Gram(s) IV Push once  dextrose 50% Injectable 25 Gram(s) IV Push once  diltiazem    milliGRAM(s) Oral daily  docusate sodium 100 milliGRAM(s) Oral three times a day  heparin  Injectable 5000 Unit(s) SubCutaneous every 8 hours  influenza   Vaccine 0.5 milliLiter(s) IntraMuscular once  insulin glargine Injectable (LANTUS) 48 Unit(s) SubCutaneous at bedtime  insulin lispro (HumaLOG) corrective regimen sliding scale   SubCutaneous three times a day before meals  levothyroxine Injectable 60 MICROGram(s) IV Push daily  pantoprazole    Tablet 40 milliGRAM(s) Oral daily  polyethylene glycol 3350 17 Gram(s) Oral daily  senna 2 Tablet(s) Oral at bedtime  tiotropium 18 MICROgram(s) Capsule 1 Capsule(s) Inhalation daily        A/P: 1. Hip fracture - P.O.D. #6 Pain management, P.T., DVT prophylaxis per ortho currently on sq heparin  Incentive spirometry a    2. COPD. Nebulized bronchodilators; O2 supplement. No evidence of bronchospasm or evidence of exacerbation restart on advair and spiriva for her baseline copd     3. Hx of CHF.no acute decompensation now     4. acute on chronic CKD with the fluid management as per the renal .     5 hypernatremia with poor po intake of water improved with iv dextrose      6 mild leucocytosis observe with out any antibiotics and patient completed Rocephin     PLAN     continue with the 02 supplementation . restart on advair and spiriva her baseline medication for the copd . monitor for fever spikes . follow up renal function and electrolytes
OLI ESCALERA  MRN: [id number]    S: patient complaining of some nausea and denies any sob or cough and still not fully oriented        PAST MEDICAL & SURGICAL HISTORY:  Chronic diastolic congestive heart failure  Hyperlipidemia, unspecified hyperlipidemia type  Essential hypertension  Neuropathy  Diabetes  Chronic obstructive pulmonary disease, unspecified COPD type  History of total knee replacement, unspecified laterality  H/O hernia repair   delivery delivered  S/P appendectomy      Vital Signs Last 24 Hrs  T(C): 36.7 (09 Oct 2017 05:11), Max: 36.7 (09 Oct 2017 05:11)  T(F): 98.1 (09 Oct 2017 05:11), Max: 98.1 (09 Oct 2017 05:11)  HR: 70 (09 Oct 2017 08:15) (56 - 75)  BP: 161/41 (09 Oct 2017 05:11) (124/49 - 161/41)  BP(mean): --  RR: 16 (09 Oct 2017 05:11) (16 - 18)  SpO2: 100% (09 Oct 2017 05:11) (96% - 100%)      PHYSICAL EXAM:      GENERAL: no distress, oriented to the person      NEURO: awake and do not follow commands and moving the extremities     NECK: no JVD    CHEST: no wheezing with the few rales over the bases left more than right     CARDIAC: s1 and s2 with gallops     EXT: no edema      LABS:                                              8.6    8.9   )-----------( 295      ( 08 Oct 2017 05:55 )             26.1   10-    142  |  111<H>  |  82<H>  ----------------------------<  106<H>  3.7   |  24  |  3.41<H>    Ca    8.3<L>      08 Oct 2017 05:55                                  MEDICATIONS  (STANDING):  atorvastatin 20 milliGRAM(s) Oral at bedtime  buDESOnide 160 MICROgram(s)/formoterol 4.5 MICROgram(s) Inhaler 2 Puff(s) Inhalation two times a day  citalopram 10 milliGRAM(s) Oral daily  clopidogrel Tablet 75 milliGRAM(s) Oral daily  dextrose 50% Injectable 12.5 Gram(s) IV Push once  dextrose 50% Injectable 25 Gram(s) IV Push once  dextrose 50% Injectable 25 Gram(s) IV Push once  diltiazem    milliGRAM(s) Oral daily  docusate sodium 100 milliGRAM(s) Oral three times a day  heparin  Injectable 5000 Unit(s) SubCutaneous every 8 hours  influenza   Vaccine 0.5 milliLiter(s) IntraMuscular once  insulin glargine Injectable (LANTUS) 48 Unit(s) SubCutaneous at bedtime  insulin lispro (HumaLOG) corrective regimen sliding scale   SubCutaneous three times a day before meals  levothyroxine Injectable 60 MICROGram(s) IV Push daily  pantoprazole    Tablet 40 milliGRAM(s) Oral daily  polyethylene glycol 3350 17 Gram(s) Oral daily  senna 2 Tablet(s) Oral at bedtime  tiotropium 18 MICROgram(s) Capsule 1 Capsule(s) Inhalation daily        A/P: 1. Hip fracture - status post surgery     2. COPD. Nebulized bronchodilators; O2 supplement. No evidence of bronchospasm or evidence of exacerbation restart on advair and spiriva for her baseline copd     3. Hx of CHF.with few rales in the bases persistent would obtain repeat cxr     4. acute on chronic CKD with the fluid management as per the renal .     5 hypernatremia improving     6 anxiety started on celexa and xanax prn          PLAN     continue with the 02 supplementation  advair and spiriva her baseline medication for the copd . monitor for fever spikes . follow up renal function and electrolytes .would repeat cxr today as the patient still has persistent rales in the bases
OLI ESCALERA  MRN: [id number]    S:  : P.O.D. #1. Patient lethargic but arousable. No respiratory distress. Lying flat in bed.    10/1: Events noted. Head CT done today reveals no acute pathology. Remains lethargic. Opens eyes to voice. No respiratory distress. Reportedly slightly more awake this afternoon in spite of the fact that she was given pain meds.      PAST MEDICAL & SURGICAL HISTORY:  Chronic diastolic congestive heart failure  Hyperlipidemia, unspecified hyperlipidemia type  Essential hypertension  Neuropathy  Diabetes  Chronic obstructive pulmonary disease, unspecified COPD type  History of total knee replacement, unspecified laterality  H/O hernia repair   delivery delivered  S/P appendectomy      O: T(C): 36.9 (10-01-17 @ 08:30), Max: 36.9 (17 @ 19:02)  HR: 88 (10-01-17 @ 13:00) (83 - 106)  BP: 136/39 (10-01-17 @ 13:00) (116/37 - 154/40)  RR: 12 (10-01-17 @ 13:00) (8 - 18)  SpO2: 98% (10-01-17 @ 13:00) (90% - 100%)  Wt(kg): --    PHYSICAL EXAM:      GENERAL: no distress    NEURO: lethargic; responding to voice     NECK: no JVD    CHEST: clear anteriorly; poorly cooperative with exam    CARDIAC: RR    EXT: no edema      LABS:                        7.5    11.5  )-----------( 175      ( 01 Oct 2017 05:33 )             23.1       10-01    145  |  110<H>  |  105<H>  ----------------------------<  262<H>  5.5<H>   |  27  |  5.68<H>    Ca    8.4<L>      01 Oct 2017 05:33    RADIOLOGY:      EXAM:  CHEST SINGLE VIEW FRONTAL                            PROCEDURE DATE:  10/01/2017          INTERPRETATION:  History: Abnormal chest sounds.    Single view of the chest was performed.    Comparison is made to 2017.    Findings:    There is mild pulmonary vascular congestion. The heart size appears   mildly enlarged. Postsurgical ovoid opacity overlying the heart is again   noted. Atherosclerotic disease is noted.    Impression:    Mild pulmonary vascular congestion and cardiac megaly, grossly unchanged.      ROSARIO AVILA         EXAM:  CT BRAIN                            PROCEDURE DATE:  10/01/2017          INTERPRETATION:    Exam Date: 10/1/2017 11:00 AM    History:Aphasia    Multiple axial sections were obtained from skull base to the vertex   without intravenous contrast.    Comparison: 2017    Findings:    There is no intracranial hemorrhage, mass effect or midline shift. No   extra-axial collection is identified. There is no large acute territorial   infarct.    There is moderate prominence of the ventricles and subarachnoid spaces,   compatible with age related involutional changes. Prominent   periventricular lucency is present, compatible with microvascular   ischemic change.    The visualized skull and mastoid are unremarkable.    The paranasal sinuses and orbits are within normal limits.    Impression:    Age related involutional and microvascular ischemic changes, without   evidence of intracranial hemorrhage, mass effect or midline shift.      ISMAEL TREJO M.D., ATTENDING RADIOLOGIST            MEDICATIONS  (STANDING):  influenza   Vaccine 0.5 milliLiter(s) IntraMuscular once  pantoprazole    Tablet 40 milliGRAM(s) Oral daily  polyethylene glycol 3350 17 Gram(s) Oral daily  senna 2 Tablet(s) Oral at bedtime  docusate sodium 100 milliGRAM(s) Oral three times a day  citalopram 10 milliGRAM(s) Oral daily  atorvastatin 20 milliGRAM(s) Oral at bedtime  insulin lispro (HumaLOG) corrective regimen sliding scale   SubCutaneous three times a day before meals  dextrose 50% Injectable 12.5 Gram(s) IV Push once  dextrose 50% Injectable 25 Gram(s) IV Push once  dextrose 50% Injectable 25 Gram(s) IV Push once  ALBUTerol    90 MICROgram(s) HFA Inhaler 1 Puff(s) Inhalation every 4 hours  cefTRIAXone   IVPB 1 Gram(s) IV Intermittent every 24 hours  heparin  Injectable 5000 Unit(s) SubCutaneous every 12 hours  ALBUTerol/ipratropium for Nebulization 3 milliLiter(s) Nebulizer every 6 hours  insulin glargine Injectable (LANTUS) 28 Unit(s) SubCutaneous at bedtime  sodium chloride 0.9%. 1000 milliLiter(s) (125 mL/Hr) IV Continuous <Continuous>  levothyroxine Injectable 60 MICROGram(s) IV Push daily  furosemide   Injectable 60 milliGRAM(s) IV Push once    MEDICATIONS  (PRN):  acetaminophen   Tablet 650 milliGRAM(s) Oral every 6 hours PRN For Temp over 38.3 C (100.94 F)  oxyCODONE    IR 5 milliGRAM(s) Oral every 4 hours PRN Moderate Pain (4 - 6)  oxyCODONE    IR 10 milliGRAM(s) Oral every 4 hours PRN Severe Pain (7 - 10)  traMADol 50 milliGRAM(s) Oral every 6 hours PRN Mild Pain (1 - 3)  ondansetron Injectable 4 milliGRAM(s) IV Push every 6 hours PRN Nausea and/or Vomiting  diphenhydrAMINE   Capsule 25 milliGRAM(s) Oral at bedtime PRN Insomnia  bisacodyl Suppository 10 milliGRAM(s) Rectal daily PRN If no bowel movement by POD#2  dextrose Gel 1 Dose(s) Oral once PRN Blood Glucose LESS THAN 70 milliGRAM(s)/deciliter  glucagon  Injectable 1 milliGRAM(s) IntraMuscular once PRN Glucose LESS THAN 70 milligrams/deciliter  HYDROmorphone  Injectable 0.5 milliGRAM(s) IV Push every 3 hours PRN Severe Pain (7 - 10)        A/P: 1. Hip fracture - P.O.D. #2. Pain management, P.T., DVT prophylaxis per ortho. Incentive spirometry.     2. COPD. Nebulized bronchodilators; O2 supplement. No evidence of bronchospasm.     3. Hx of CHF. Per cardiology. CXR results noted. NO clinical evidence of CHF.      4. ARF. Continue IV fluids per Dr. Phelps / nephrology.     5. Lethargy. Likely multifactorial; volume depletion, narcotics, elevated BUN. No acute pathology on  head CT.     Reviewed patient's condition with daughter at bedside.     Dr. Forte to follow up in am. .
OLI ESCALERA  MRN: [id number]    S:  Patient is more alert awake and partially oriented today with no reported increased sob on 3 lit nasal canula . No cough or wheezing or documented fever and received one unit of PRBC yesterday. has good amount of urine with acute on chronic ckd         PAST MEDICAL & SURGICAL HISTORY:  Chronic diastolic congestive heart failure  Hyperlipidemia, unspecified hyperlipidemia type  Essential hypertension  Neuropathy  Diabetes  Chronic obstructive pulmonary disease, unspecified COPD type  History of total knee replacement, unspecified laterality  H/O hernia repair   delivery delivered  S/P appendectomy      ICU Vital Signs Last 24 Hrs  T(C): 36.4 (02 Oct 2017 08:34), Max: 37 (01 Oct 2017 22:15)  T(F): 97.5 (02 Oct 2017 08:34), Max: 98.6 (01 Oct 2017 22:15)  HR: 83 (02 Oct 2017 07:58) (80 - 90)  BP: 141/41 (02 Oct 2017 04:00) (123/37 - 159/45)  BP(mean): 67 (02 Oct 2017 04:00) (56 - 75)  ABP: --  ABP(mean): --  RR: 10 (02 Oct 2017 05:00) (8 - 18)  SpO2: 100% (02 Oct 2017 05:00) (94% - 100%)      PHYSICAL EXAM:      GENERAL: no distress and comfortable on nasal canula     NEURO: lethargic; responding to voice     NECK: no JVD    CHEST: clear anteriorly; poorly cooperative with exam and could not sit up     CARDIAC: RR    EXT: no edema      LABS:                                    7.8    7.9   )-----------( 169      ( 02 Oct 2017 06:10 )             23.8   10-02    149<H>  |  116<H>  |  112<H>  ----------------------------<  221<H>  5.0   |  24  |  5.14<H>    Ca    8.8      02 Oct 2017 06:10      EXAM:  CHEST SINGLE VIEW FRONTAL                            PROCEDURE DATE:  10/01/2017          INTERPRETATION:  History: Abnormal chest sounds.    Single view of the chest was performed.    Comparison is made to 2017.    Findings:    There is mild pulmonary vascular congestion. The heart size appears   mildly enlarged. Postsurgical ovoid opacity overlying the heart is again   noted. Atherosclerotic disease is noted.    Impression:    Mild pulmonary vascular congestion and cardiac megaly, grossly unchanged.      ROSARIO AVILA         EXAM:  CT BRAIN                            PROCEDURE DATE:  10/01/2017          INTERPRETATION:    Exam Date: 10/1/2017 11:00 AM    History:Aphasia    Multiple axial sections were obtained from skull base to the vertex   without intravenous contrast.    Comparison: 2017    Findings:    There is no intracranial hemorrhage, mass effect or midline shift. No   extra-axial collection is identified. There is no large acute territorial   infarct.    There is moderate prominence of the ventricles and subarachnoid spaces,   compatible with age related involutional changes. Prominent   periventricular lucency is present, compatible with microvascular   ischemic change.    The visualized skull and mastoid are unremarkable.    The paranasal sinuses and orbits are within normal limits.    Impression:    Age related involutional and microvascular ischemic changes, without   evidence of intracranial hemorrhage, mass effect or midline shift.      ISMAEL TREJO M.D., ATTENDING RADIOLOGIST            MEDICATIONS  (STANDING):  influenza   Vaccine 0.5 milliLiter(s) IntraMuscular once  pantoprazole    Tablet 40 milliGRAM(s) Oral daily  polyethylene glycol 3350 17 Gram(s) Oral daily  senna 2 Tablet(s) Oral at bedtime  docusate sodium 100 milliGRAM(s) Oral three times a day  citalopram 10 milliGRAM(s) Oral daily  atorvastatin 20 milliGRAM(s) Oral at bedtime  insulin lispro (HumaLOG) corrective regimen sliding scale   SubCutaneous three times a day before meals  dextrose 50% Injectable 12.5 Gram(s) IV Push once  dextrose 50% Injectable 25 Gram(s) IV Push once  dextrose 50% Injectable 25 Gram(s) IV Push once  ALBUTerol    90 MICROgram(s) HFA Inhaler 1 Puff(s) Inhalation every 4 hours  cefTRIAXone   IVPB 1 Gram(s) IV Intermittent every 24 hours  heparin  Injectable 5000 Unit(s) SubCutaneous every 12 hours  ALBUTerol/ipratropium for Nebulization 3 milliLiter(s) Nebulizer every 6 hours  levothyroxine Injectable 60 MICROGram(s) IV Push daily  sodium chloride 0.45%. 1000 milliLiter(s) (50 mL/Hr) IV Continuous <Continuous>  insulin glargine Injectable (LANTUS) 34 Unit(s) SubCutaneous at bedtime        A/P: 1. Hip fracture - P.O.D. #3  Pain management, P.T., DVT prophylaxis per ortho. Incentive spirometry and cautious use of large dose of narcotics with renal dysfunction     2. COPD. Nebulized bronchodilators; O2 supplement. No evidence of bronchospasm.     3. Hx of CHF. getting prn lasix     4. acute on chronic CKD with the fluid management as per the renal .     5 will repeat another cxr for the follow up and clinically unlikely she is in gross failure .    6 monitor hb closely and transfuse if needed
POC   Pt seen and examined. Pain controlled. Tolerated Procedure well    Vital Signs Last 24 Hrs  T(C): 37.5 (29 Sep 2017 18:55), Max: 38 (28 Sep 2017 21:35)  T(F): 99.5 (29 Sep 2017 18:55), Max: 100.4 (28 Sep 2017 21:35)  HR: 84 (29 Sep 2017 19:45) (70 - 85)  BP: 119/52 (29 Sep 2017 19:30) (118/37 - 147/36)  RR: 20 (29 Sep 2017 19:45) (14 - 20)  SpO2: 97% (29 Sep 2017 19:45) (92% - 100%)    Gen: NAD  LLE:  Dressing clean D&I  +sensation L2-S1  +dorsiflexion/plantarflexion of ankle/hallux  +dorsalis pedis pulse  Soft compartments, - calf tenderness
Patient is a 76y old  Female who presents with a chief complaint of S/P TRIP AND FALL WITH HIP PAIN.    HPI:  76 year old female with pmh of HTN, hyperlipidemia, CAD, MI in past, chronic diastolic heart failure, s/p ambulatory pulmonary pressure monitor implantation after the recent admission for hypoxic respiratory failure which was thought to be a combination of the diastolic heart failure and COPD exacerbation, on home O2 presented after a fall when she missed a step at [employer].  She was diagnosed with L hip fracture.  From the heart failure standpoint lately she had been doing very well.  One week ago her isosorbide was discontinued as pt was feeling dizzy and since one week her dizziness resolved.  She had sore throat for about a week.   Her pulmonary artery pressure monitor pressures were noted to be optimal.      10/-Pt seen and exmained by me today am. She was tearful about the pain but was not able to express more in words.   at bedside.      PAST MEDICAL & SURGICAL HISTORY:  Chronic diastolic congestive heart failure  Hyperlipidemia, unspecified hyperlipidemia type  Essential hypertension  Neuropathy  Diabetes  Chronic obstructive pulmonary disease, unspecified COPD type  History of total knee replacement, unspecified laterality  H/O hernia repair   delivery delivered  S/P appendectomy      MEDICATIONS  (STANDING):  docusate sodium 100 milliGRAM(s) Oral three times a day  diltiazem    milliGRAM(s) Oral daily  citalopram 10 milliGRAM(s) Oral daily  atorvastatin 20 milliGRAM(s) Oral at bedtime  pantoprazole    Tablet 40 milliGRAM(s) Oral before breakfast  levothyroxine 112 MICROGram(s) Oral daily  folic acid 1 milliGRAM(s) Oral daily  insulin glargine Injectable (LANTUS) 22 Unit(s) SubCutaneous at bedtime  insulin lispro (HumaLOG) corrective regimen sliding scale   SubCutaneous three times a day before meals  dextrose 5%. 1000 milliLiter(s) (50 mL/Hr) IV Continuous <Continuous>  dextrose 50% Injectable 12.5 Gram(s) IV Push once  dextrose 50% Injectable 25 Gram(s) IV Push once  dextrose 50% Injectable 25 Gram(s) IV Push once  sodium chloride 0.9%. 1000 milliLiter(s) (50 mL/Hr) IV Continuous <Continuous>  influenza   Vaccine 0.5 milliLiter(s) IntraMuscular once    MEDICATIONS  (PRN):  acetaminophen   Tablet 650 milliGRAM(s) Oral every 6 hours PRN For Temp greater than 38 C (100.4 F)  acetaminophen   Tablet. 650 milliGRAM(s) Oral every 6 hours PRN headache  oxyCODONE    IR 10 milliGRAM(s) Oral every 4 hours PRN Moderate Pain  oxyCODONE    IR 5 milliGRAM(s) Oral every 4 hours PRN Mild Pain  HYDROmorphone  Injectable 0.5 milliGRAM(s) IV Push every 4 hours PRN Severe Pain (7 - 10)  diphenhydrAMINE   Capsule 25 milliGRAM(s) Oral at bedtime PRN Insomnia  diphenhydrAMINE   Capsule 25 milliGRAM(s) Oral every 4 hours PRN Rash and/or Itching  benzocaine 15 mG/menthol 3.6 mG Lozenge 1 Lozenge Oral every 4 hours PRN Sore Throat  dextrose Gel 1 Dose(s) Oral once PRN Blood Glucose LESS THAN 70 milliGRAM(s)/deciliter  glucagon  Injectable 1 milliGRAM(s) IntraMuscular once PRN Glucose LESS THAN 70 milligrams/deciliter      FAMILY HISTORY:  Family history of CHF (congestive heart failure) (Mother)      SOCIAL HISTORY:  non smoker, no alcohol use in recent past.    REVIEW OF SYSTEMS:  CONSTITUTIONAL:  No night sweats.  No fatigue, malaise, lethargy.  No fever or chills.  HEENT:  Eyes:  No visual changes.  No eye pain.      ENT:  No runny nose.  No epistaxis.  No sinus pain.  No sore throat.  No odynophagia.  No ear pain.  No congestion.  RESPIRATORY:  No cough.  No wheeze.  No hemoptysis.  No shortness of breath.  CARDIOVASCULAR:  No chest pains.  No palpitations. No shortness of breath, No orthopnea or PND.  GASTROINTESTINAL:  No abdominal pain.  No nausea or vomiting.  No diarrhea or constipation.  No hematemesis.  No hematochezia.  No melena.  GENITOURINARY:  No urgency.  No frequency.  No dysuria.  No hematuria.  No obstructive symptoms.  No discharge.  No pain.  No significant abnormal bleeding.  MUSCULOSKELETAL:  c/o L hip pain  NEUROLOGICAL:  No tingling or numbness or weakness.  PSYCHIATRIC:  No confusion  SKIN:  No rashes.  No lesions.  No wounds.  ENDOCRINE:  No unexplained weight loss.  No polydipsia.  No polyuria.  No polyphagia.  HEMATOLOGIC:  No anemia.  No purpura.  No petechiae.  No prolonged or excessive bleeding.   ALLERGIC AND IMMUNOLOGIC:  No pruritus.  No swelling.         Vital Signs Last 24 Hrs  T(C): 37.4 (28 Sep 2017 05:13), Max: 37.4 (28 Sep 2017 05:13)  T(F): 99.4 (28 Sep 2017 05:13), Max: 99.4 (28 Sep 2017 05:13)  HR: 84 (28 Sep 2017 05:13) (51 - 84)  BP: 142/38 (28 Sep 2017 05:13) (126/50 - 142/38)  BP(mean): --  RR: 13 (27 Sep 2017 20:16) (13 - 20)  SpO2: 92% (28 Sep 2017 05:13) (92% - 96%)    PHYSICAL EXAM-    Constitutional: ill looking elderly female    Head: Head is normocephalic and atraumatic.      Neck: The patient's neck is supple without enlargement, has no palpable thyromegaly nor thyroid nodules and has no jugular venous distention. No audible carotid bruits. There are strong carotid pulses bilaterally. No JVD.     Cardiovascular: Regular rate and rhythm without S3, S4. No murmurs or rubs are appreciated.      Respiratory: Breath sounds are normal. No rales. No wheezing.    Abdomen: Soft, nontender, nondistended with positive bowel sounds.      Extremity: No tenderness. No  pitting edema No skin discoloration No clubbing No cyanosis.     Neurologic: The patient is alert and oriented.      Skin: No rash, no obvious lesions noted.      Psychiatric: The patient appears to be emotionally stable.      INTERPRETATION OF TELEMETRY: sinus rythm.     ECG: sinus rythm, normal axis, no ST T changes.     I&O's Detail    27 Sep 2017 07:01  -  28 Sep 2017 07:00  --------------------------------------------------------  IN:  Total IN: 0 mL    OUT:    Voided: 650 mL  Total OUT: 650 mL    Total NET: -650 mL          LABS:                        8.9    8.9   )-----------( 185      ( 28 Sep 2017 04:30 )             27.1     09-28    140  |  104  |  71<H>  ----------------------------<  159<H>  5.3   |  32<H>  |  2.59<H>    Ca    8.6      28 Sep 2017 04:30    TPro  7.0  /  Alb  3.4  /  TBili  0.4  /  DBili  x   /  AST  14<L>  /  ALT  15  /  AlkPhos  109          PT/INR - ( 28 Sep 2017 04:30 )   PT: 12.8 sec;   INR: 1.18 ratio         PTT - ( 28 Sep 2017 04:30 )  PTT:28.6 sec  Urinalysis Basic - ( 28 Sep 2017 06:00 )    Color: Yellow / Appearance: Clear / S.015 / pH: x  Gluc: x / Ketone: Negative  / Bili: Negative / Urobili: Negative mg/dL   Blood: x / Protein: 30 mg/dL / Nitrite: Negative   Leuk Esterase: Small / RBC: x / WBC x   Sq Epi: x / Non Sq Epi: x / Bacteria: x      I&O's Summary    27 Sep 2017 07:01  -  28 Sep 2017 07:00  --------------------------------------------------------  IN: 0 mL / OUT: 650 mL / NET: -650 mL      BNP  RADIOLOGY & ADDITIONAL STUDIES:
Patient is a 76y old  Female who presents with a chief complaint of S/P TRIP AND FALL WITH HIP PAIN.    HPI:  76 year old female with pmh of HTN, hyperlipidemia, CAD, MI in past, chronic diastolic heart failure, s/p ambulatory pulmonary pressure monitor implantation after the recent admission for hypoxic respiratory failure which was thought to be a combination of the diastolic heart failure and COPD exacerbation, on home O2 presented after a fall when she missed a step at [employer].  She was diagnosed with L hip fracture.  From the heart failure standpoint lately she had been doing very well.  One week ago her isosorbide was discontinued as pt was feeling dizzy and since one week her dizziness resolved.  She had sore throat for about a week.   Her pulmonary artery pressure monitor pressures were noted to be optimal.      10/-Pt seen and exmained by me today am. She was tearful about the pain but was not able to express more in words.   at bedside.    10/3- Pt seen and examined today. Much more alert and interactive than yesterday.      PAST MEDICAL & SURGICAL HISTORY:  Chronic diastolic congestive heart failure  Hyperlipidemia, unspecified hyperlipidemia type  Essential hypertension  Neuropathy  Diabetes  Chronic obstructive pulmonary disease, unspecified COPD type  History of total knee replacement, unspecified laterality  H/O hernia repair   delivery delivered  S/P appendectomy      MEDICATIONS  (STANDING):  docusate sodium 100 milliGRAM(s) Oral three times a day  diltiazem    milliGRAM(s) Oral daily  citalopram 10 milliGRAM(s) Oral daily  atorvastatin 20 milliGRAM(s) Oral at bedtime  pantoprazole    Tablet 40 milliGRAM(s) Oral before breakfast  levothyroxine 112 MICROGram(s) Oral daily  folic acid 1 milliGRAM(s) Oral daily  insulin glargine Injectable (LANTUS) 22 Unit(s) SubCutaneous at bedtime  insulin lispro (HumaLOG) corrective regimen sliding scale   SubCutaneous three times a day before meals  dextrose 5%. 1000 milliLiter(s) (50 mL/Hr) IV Continuous <Continuous>  dextrose 50% Injectable 12.5 Gram(s) IV Push once  dextrose 50% Injectable 25 Gram(s) IV Push once  dextrose 50% Injectable 25 Gram(s) IV Push once  sodium chloride 0.9%. 1000 milliLiter(s) (50 mL/Hr) IV Continuous <Continuous>  influenza   Vaccine 0.5 milliLiter(s) IntraMuscular once    MEDICATIONS  (PRN):  acetaminophen   Tablet 650 milliGRAM(s) Oral every 6 hours PRN For Temp greater than 38 C (100.4 F)  acetaminophen   Tablet. 650 milliGRAM(s) Oral every 6 hours PRN headache  oxyCODONE    IR 10 milliGRAM(s) Oral every 4 hours PRN Moderate Pain  oxyCODONE    IR 5 milliGRAM(s) Oral every 4 hours PRN Mild Pain  HYDROmorphone  Injectable 0.5 milliGRAM(s) IV Push every 4 hours PRN Severe Pain (7 - 10)  diphenhydrAMINE   Capsule 25 milliGRAM(s) Oral at bedtime PRN Insomnia  diphenhydrAMINE   Capsule 25 milliGRAM(s) Oral every 4 hours PRN Rash and/or Itching  benzocaine 15 mG/menthol 3.6 mG Lozenge 1 Lozenge Oral every 4 hours PRN Sore Throat  dextrose Gel 1 Dose(s) Oral once PRN Blood Glucose LESS THAN 70 milliGRAM(s)/deciliter  glucagon  Injectable 1 milliGRAM(s) IntraMuscular once PRN Glucose LESS THAN 70 milligrams/deciliter      FAMILY HISTORY:  Family history of CHF (congestive heart failure) (Mother)      SOCIAL HISTORY:  non smoker, no alcohol use in recent past.    REVIEW OF SYSTEMS:  CONSTITUTIONAL:  No night sweats.  No fatigue, malaise, lethargy.  No fever or chills.  HEENT:  Eyes:  No visual changes.  No eye pain.      ENT:  No runny nose.  No epistaxis.  No sinus pain.  No sore throat.  No odynophagia.  No ear pain.  No congestion.  RESPIRATORY:  No cough.  No wheeze.  No hemoptysis.  No shortness of breath.  CARDIOVASCULAR:  No chest pains.  No palpitations. No shortness of breath, No orthopnea or PND.  GASTROINTESTINAL:  No abdominal pain.  No nausea or vomiting.  No diarrhea or constipation.  No hematemesis.  No hematochezia.  No melena.  GENITOURINARY:  No urgency.  No frequency.  No dysuria.  No hematuria.  No obstructive symptoms.  No discharge.  No pain.  No significant abnormal bleeding.  MUSCULOSKELETAL:  c/o L hip pain  NEUROLOGICAL:  No tingling or numbness or weakness.  PSYCHIATRIC:  No confusion  SKIN:  No rashes.  No lesions.  No wounds.  ENDOCRINE:  No unexplained weight loss.  No polydipsia.  No polyuria.  No polyphagia.  HEMATOLOGIC:  No anemia.  No purpura.  No petechiae.  No prolonged or excessive bleeding.   ALLERGIC AND IMMUNOLOGIC:  No pruritus.  No swelling.         Vital Signs Last 24 Hrs  T(C): 37.4 (28 Sep 2017 05:13), Max: 37.4 (28 Sep 2017 05:13)  T(F): 99.4 (28 Sep 2017 05:13), Max: 99.4 (28 Sep 2017 05:13)  HR: 84 (28 Sep 2017 05:13) (51 - 84)  BP: 142/38 (28 Sep 2017 05:13) (126/50 - 142/38)  BP(mean): --  RR: 13 (27 Sep 2017 20:16) (13 - 20)  SpO2: 92% (28 Sep 2017 05:13) (92% - 96%)    PHYSICAL EXAM-    Constitutional: ill looking elderly female    Head: Head is normocephalic and atraumatic.      Neck: The patient's neck is supple without enlargement, has no palpable thyromegaly nor thyroid nodules and has no jugular venous distention. No audible carotid bruits. There are strong carotid pulses bilaterally. No JVD.     Cardiovascular: Regular rate and rhythm without S3, S4. No murmurs or rubs are appreciated.      Respiratory: Breath sounds are normal. No rales. No wheezing.    Abdomen: Soft, nontender, nondistended with positive bowel sounds.      Extremity: No tenderness. No  pitting edema No skin discoloration No clubbing No cyanosis.     Neurologic: The patient is alert and oriented.      Skin: No rash, no obvious lesions noted.      Psychiatric: The patient appears to be emotionally stable.      INTERPRETATION OF TELEMETRY: sinus rythm.     ECG: sinus rythm, normal axis, no ST T changes.     I&O's Detail    27 Sep 2017 07:01  -  28 Sep 2017 07:00  --------------------------------------------------------  IN:  Total IN: 0 mL    OUT:    Voided: 650 mL  Total OUT: 650 mL    Total NET: -650 mL          LABS:                        8.9    8.9   )-----------( 185      ( 28 Sep 2017 04:30 )             27.1         140  |  104  |  71<H>  ----------------------------<  159<H>  5.3   |  32<H>  |  2.59<H>    Ca    8.6      28 Sep 2017 04:30    TPro  7.0  /  Alb  3.4  /  TBili  0.4  /  DBili  x   /  AST  14<L>  /  ALT  15  /  AlkPhos  109          PT/INR - ( 28 Sep 2017 04:30 )   PT: 12.8 sec;   INR: 1.18 ratio         PTT - ( 28 Sep 2017 04:30 )  PTT:28.6 sec  Urinalysis Basic - ( 28 Sep 2017 06:00 )    Color: Yellow / Appearance: Clear / S.015 / pH: x  Gluc: x / Ketone: Negative  / Bili: Negative / Urobili: Negative mg/dL   Blood: x / Protein: 30 mg/dL / Nitrite: Negative   Leuk Esterase: Small / RBC: x / WBC x   Sq Epi: x / Non Sq Epi: x / Bacteria: x      I&O's Summary    27 Sep 2017 07:01  -  28 Sep 2017 07:00  --------------------------------------------------------  IN: 0 mL / OUT: 650 mL / NET: -650 mL      BNP  RADIOLOGY & ADDITIONAL STUDIES:
Patient is a 76y old  Female who presents with a chief complaint of S/P TRIP AND FALL WITH HIP PAIN.    HPI:  76 year old female with pmh of HTN, hyperlipidemia, CAD, MI in past, chronic diastolic heart failure, s/p ambulatory pulmonary pressure monitor implantation after the recent admission for hypoxic respiratory failure which was thought to be a combination of the diastolic heart failure and COPD exacerbation, on home O2 presented after a fall when she missed a step at [employer].  She was diagnosed with L hip fracture. Pt underwent L hip repair and post op complicated by ANI.    10/9- Pt seen and examined by me today. She denies any symptoms.   10/10- pt seen and examined by me today.  She says that she wants to go home and daughter at bedside c/o that her mother is anxious.      PAST MEDICAL & SURGICAL HISTORY:  Chronic diastolic congestive heart failure  Hyperlipidemia, unspecified hyperlipidemia type  Essential hypertension  Neuropathy  Diabetes  Chronic obstructive pulmonary disease, unspecified COPD type  History of total knee replacement, unspecified laterality  H/O hernia repair   delivery delivered  S/P appendectomy      MEDICATIONS  (STANDING):  docusate sodium 100 milliGRAM(s) Oral three times a day  diltiazem    milliGRAM(s) Oral daily  citalopram 10 milliGRAM(s) Oral daily  atorvastatin 20 milliGRAM(s) Oral at bedtime  pantoprazole    Tablet 40 milliGRAM(s) Oral before breakfast  levothyroxine 112 MICROGram(s) Oral daily  folic acid 1 milliGRAM(s) Oral daily  insulin glargine Injectable (LANTUS) 22 Unit(s) SubCutaneous at bedtime  insulin lispro (HumaLOG) corrective regimen sliding scale   SubCutaneous three times a day before meals  dextrose 5%. 1000 milliLiter(s) (50 mL/Hr) IV Continuous <Continuous>  dextrose 50% Injectable 12.5 Gram(s) IV Push once  dextrose 50% Injectable 25 Gram(s) IV Push once  dextrose 50% Injectable 25 Gram(s) IV Push once  sodium chloride 0.9%. 1000 milliLiter(s) (50 mL/Hr) IV Continuous <Continuous>  influenza   Vaccine 0.5 milliLiter(s) IntraMuscular once    MEDICATIONS  (PRN):  acetaminophen   Tablet 650 milliGRAM(s) Oral every 6 hours PRN For Temp greater than 38 C (100.4 F)  acetaminophen   Tablet. 650 milliGRAM(s) Oral every 6 hours PRN headache  oxyCODONE    IR 10 milliGRAM(s) Oral every 4 hours PRN Moderate Pain  oxyCODONE    IR 5 milliGRAM(s) Oral every 4 hours PRN Mild Pain  HYDROmorphone  Injectable 0.5 milliGRAM(s) IV Push every 4 hours PRN Severe Pain (7 - 10)  diphenhydrAMINE   Capsule 25 milliGRAM(s) Oral at bedtime PRN Insomnia  diphenhydrAMINE   Capsule 25 milliGRAM(s) Oral every 4 hours PRN Rash and/or Itching  benzocaine 15 mG/menthol 3.6 mG Lozenge 1 Lozenge Oral every 4 hours PRN Sore Throat  dextrose Gel 1 Dose(s) Oral once PRN Blood Glucose LESS THAN 70 milliGRAM(s)/deciliter  glucagon  Injectable 1 milliGRAM(s) IntraMuscular once PRN Glucose LESS THAN 70 milligrams/deciliter      FAMILY HISTORY:  Family history of CHF (congestive heart failure) (Mother)      SOCIAL HISTORY:  non smoker, no alcohol use in recent past.    REVIEW OF SYSTEMS:  CONSTITUTIONAL:  No night sweats.  No fatigue, malaise, lethargy.  No fever or chills.  HEENT:  Eyes:  No visual changes.  No eye pain.      ENT:  No runny nose.  No epistaxis.  No sinus pain.  No sore throat.  No odynophagia.  No ear pain.  No congestion.  RESPIRATORY:  No cough.  No wheeze.  No hemoptysis.  No shortness of breath.  CARDIOVASCULAR:  No chest pains.  No palpitations. No shortness of breath, No orthopnea or PND.  GASTROINTESTINAL:  No abdominal pain.  No nausea or vomiting.  No diarrhea or constipation.  No hematemesis.  No hematochezia.  No melena.  GENITOURINARY:  No urgency.  No frequency.  No dysuria.  No hematuria.  No obstructive symptoms.  No discharge.  No pain.  No significant abnormal bleeding.  MUSCULOSKELETAL:  c/o L hip pain  NEUROLOGICAL:  No tingling or numbness or weakness.  PSYCHIATRIC:  No confusion  SKIN:  No rashes.  No lesions.  No wounds.  ENDOCRINE:  No unexplained weight loss.  No polydipsia.  No polyuria.  No polyphagia.  HEMATOLOGIC:  No anemia.  No purpura.  No petechiae.  No prolonged or excessive bleeding.   ALLERGIC AND IMMUNOLOGIC:  No pruritus.  No swelling.         Vital Signs Last 24 Hrs  T(C): 37.4 (28 Sep 2017 05:13), Max: 37.4 (28 Sep 2017 05:13)  T(F): 99.4 (28 Sep 2017 05:13), Max: 99.4 (28 Sep 2017 05:13)  HR: 84 (28 Sep 2017 05:13) (51 - 84)  BP: 142/38 (28 Sep 2017 05:13) (126/50 - 142/38)  BP(mean): --  RR: 13 (27 Sep 2017 20:16) (13 - 20)  SpO2: 92% (28 Sep 2017 05:13) (92% - 96%)    PHYSICAL EXAM-    Constitutional: ill looking elderly female    Head: Head is normocephalic and atraumatic.      Neck: The patient's neck is supple without enlargement, has no palpable thyromegaly nor thyroid nodules and has no jugular venous distention. No audible carotid bruits. There are strong carotid pulses bilaterally. No JVD.     Cardiovascular: Regular rate and rhythm without S3, S4. No murmurs or rubs are appreciated.      Respiratory: fine crackles b/l in lower lung fields.  Abdomen: Soft, nontender, nondistended with positive bowel sounds.      Extremity: No tenderness. No  pitting edema No skin discoloration No clubbing No cyanosis.     Neurologic: The patient is alert and oriented.      Skin: No rash, no obvious lesions noted.      Psychiatric: The patient appears to be emotionally stable.      INTERPRETATION OF TELEMETRY: sinus rythm.     ECG: sinus rythm, normal axis, no ST T changes.     I&O's Detail    27 Sep 2017 07:01  -  28 Sep 2017 07:00  --------------------------------------------------------  IN:  Total IN: 0 mL    OUT:    Voided: 650 mL  Total OUT: 650 mL    Total NET: -650 mL          LABS:                        8.9    8.9   )-----------( 185      ( 28 Sep 2017 04:30 )             27.1     09-28    140  |  104  |  71<H>  ----------------------------<  159<H>  5.3   |  32<H>  |  2.59<H>    Ca    8.6      28 Sep 2017 04:30    TPro  7.0  /  Alb  3.4  /  TBili  0.4  /  DBili  x   /  AST  14<L>  /  ALT  15  /  AlkPhos  109          PT/INR - ( 28 Sep 2017 04:30 )   PT: 12.8 sec;   INR: 1.18 ratio         PTT - ( 28 Sep 2017 04:30 )  PTT:28.6 sec  Urinalysis Basic - ( 28 Sep 2017 06:00 )    Color: Yellow / Appearance: Clear / S.015 / pH: x  Gluc: x / Ketone: Negative  / Bili: Negative / Urobili: Negative mg/dL   Blood: x / Protein: 30 mg/dL / Nitrite: Negative   Leuk Esterase: Small / RBC: x / WBC x   Sq Epi: x / Non Sq Epi: x / Bacteria: x      I&O's Summary    27 Sep 2017 07:01  -  28 Sep 2017 07:00  --------------------------------------------------------  IN: 0 mL / OUT: 650 mL / NET: -650 mL      BNP  RADIOLOGY & ADDITIONAL STUDIES:
Patient is a 76y old  Female who presents with a chief complaint of S/P TRIP AND FALL WITH HIP PAIN.    HPI:  76 year old female with pmh of HTN, hyperlipidemia, CAD, MI in past, chronic diastolic heart failure, s/p ambulatory pulmonary pressure monitor implantation after the recent admission for hypoxic respiratory failure which was thought to be a combination of the diastolic heart failure and COPD exacerbation, on home O2 presented after a fall when she missed a step at [employer].  She was diagnosed with L hip fracture.  From the heart failure standpoint lately she had been doing very well.  One week ago her isosorbide was discontinued as pt was feeling dizzy and since one week her dizziness resolved.  She had sore throat for about a week.   Her pulmonary artery pressure monitor pressures were noted to be optimal.  Pt this am is sleeping but arousable.  NO CP or SOB.       - Pt seen and examined by me today. She denies any symptoms.  Arousable, speaking slowly.  PAST MEDICAL & SURGICAL HISTORY:  Chronic diastolic congestive heart failure  Hyperlipidemia, unspecified hyperlipidemia type  Essential hypertension  Neuropathy  Diabetes  Chronic obstructive pulmonary disease, unspecified COPD type  History of total knee replacement, unspecified laterality  H/O hernia repair   delivery delivered  S/P appendectomy      MEDICATIONS  (STANDING):  docusate sodium 100 milliGRAM(s) Oral three times a day  diltiazem    milliGRAM(s) Oral daily  citalopram 10 milliGRAM(s) Oral daily  atorvastatin 20 milliGRAM(s) Oral at bedtime  pantoprazole    Tablet 40 milliGRAM(s) Oral before breakfast  levothyroxine 112 MICROGram(s) Oral daily  folic acid 1 milliGRAM(s) Oral daily  insulin glargine Injectable (LANTUS) 22 Unit(s) SubCutaneous at bedtime  insulin lispro (HumaLOG) corrective regimen sliding scale   SubCutaneous three times a day before meals  dextrose 5%. 1000 milliLiter(s) (50 mL/Hr) IV Continuous <Continuous>  dextrose 50% Injectable 12.5 Gram(s) IV Push once  dextrose 50% Injectable 25 Gram(s) IV Push once  dextrose 50% Injectable 25 Gram(s) IV Push once  sodium chloride 0.9%. 1000 milliLiter(s) (50 mL/Hr) IV Continuous <Continuous>  influenza   Vaccine 0.5 milliLiter(s) IntraMuscular once    MEDICATIONS  (PRN):  acetaminophen   Tablet 650 milliGRAM(s) Oral every 6 hours PRN For Temp greater than 38 C (100.4 F)  acetaminophen   Tablet. 650 milliGRAM(s) Oral every 6 hours PRN headache  oxyCODONE    IR 10 milliGRAM(s) Oral every 4 hours PRN Moderate Pain  oxyCODONE    IR 5 milliGRAM(s) Oral every 4 hours PRN Mild Pain  HYDROmorphone  Injectable 0.5 milliGRAM(s) IV Push every 4 hours PRN Severe Pain (7 - 10)  diphenhydrAMINE   Capsule 25 milliGRAM(s) Oral at bedtime PRN Insomnia  diphenhydrAMINE   Capsule 25 milliGRAM(s) Oral every 4 hours PRN Rash and/or Itching  benzocaine 15 mG/menthol 3.6 mG Lozenge 1 Lozenge Oral every 4 hours PRN Sore Throat  dextrose Gel 1 Dose(s) Oral once PRN Blood Glucose LESS THAN 70 milliGRAM(s)/deciliter  glucagon  Injectable 1 milliGRAM(s) IntraMuscular once PRN Glucose LESS THAN 70 milligrams/deciliter      FAMILY HISTORY:  Family history of CHF (congestive heart failure) (Mother)      SOCIAL HISTORY:  non smoker, no alcohol use in recent past.    REVIEW OF SYSTEMS:  CONSTITUTIONAL:  No night sweats.  No fatigue, malaise, lethargy.  No fever or chills.  HEENT:  Eyes:  No visual changes.  No eye pain.      ENT:  No runny nose.  No epistaxis.  No sinus pain.  No sore throat.  No odynophagia.  No ear pain.  No congestion.  RESPIRATORY:  No cough.  No wheeze.  No hemoptysis.  No shortness of breath.  CARDIOVASCULAR:  No chest pains.  No palpitations. No shortness of breath, No orthopnea or PND.  GASTROINTESTINAL:  No abdominal pain.  No nausea or vomiting.  No diarrhea or constipation.  No hematemesis.  No hematochezia.  No melena.  GENITOURINARY:  No urgency.  No frequency.  No dysuria.  No hematuria.  No obstructive symptoms.  No discharge.  No pain.  No significant abnormal bleeding.  MUSCULOSKELETAL:  No musculoskeletal pain.  No joint swelling.  No arthritis.  NEUROLOGICAL:  No tingling or numbness or weakness.  PSYCHIATRIC:  No confusion  SKIN:  No rashes.  No lesions.  No wounds.  ENDOCRINE:  No unexplained weight loss.  No polydipsia.  No polyuria.  No polyphagia.  HEMATOLOGIC:  No anemia.  No purpura.  No petechiae.  No prolonged or excessive bleeding.   ALLERGIC AND IMMUNOLOGIC:  No pruritus.  No swelling.         Vital Signs Last 24 Hrs  T(C): 37.4 (28 Sep 2017 05:13), Max: 37.4 (28 Sep 2017 05:13)  T(F): 99.4 (28 Sep 2017 05:13), Max: 99.4 (28 Sep 2017 05:13)  HR: 84 (28 Sep 2017 05:13) (51 - 84)  BP: 142/38 (28 Sep 2017 05:13) (126/50 - 142/38)  BP(mean): --  RR: 13 (27 Sep 2017 20:16) (13 - 20)  SpO2: 92% (28 Sep 2017 05:13) (92% - 96%)    PHYSICAL EXAM-    Constitutional: The patient appears to be normal, well developed, well nourished and alert and oriented to time, place and person. The patient does not appear acutely ill.     Head: Head is normocephalic and atraumatic.      Neck: The patient's neck is supple without enlargement, has no palpable thyromegaly nor thyroid nodules and has no jugular venous distention. No audible carotid bruits. There are strong carotid pulses bilaterally. No JVD.     Cardiovascular: Regular rate and rhythm without S3, S4. No murmurs or rubs are appreciated.      Respiratory: Breath sounds are normal. No rales. No wheezing.    Abdomen: Soft, nontender, nondistended with positive bowel sounds.      Extremity: No tenderness. No  pitting edema No skin discoloration No clubbing No cyanosis.     Neurologic: The patient is alert and oriented.      Skin: No rash, no obvious lesions noted.      Psychiatric: The patient appears to be emotionally stable.      INTERPRETATION OF TELEMETRY: sinus rythm.     ECG: sinus rythm, normal axis, no ST T changes.     I&O's Detail    27 Sep 2017 07:01  -  28 Sep 2017 07:00  --------------------------------------------------------  IN:  Total IN: 0 mL    OUT:    Voided: 650 mL  Total OUT: 650 mL    Total NET: -650 mL          LABS:                        8.9    8.9   )-----------( 185      ( 28 Sep 2017 04:30 )             27.1     09-    140  |  104  |  71<H>  ----------------------------<  159<H>  5.3   |  32<H>  |  2.59<H>    Ca    8.6      28 Sep 2017 04:30    TPro  7.0  /  Alb  3.4  /  TBili  0.4  /  DBili  x   /  AST  14<L>  /  ALT  15  /  AlkPhos  109          PT/INR - ( 28 Sep 2017 04:30 )   PT: 12.8 sec;   INR: 1.18 ratio         PTT - ( 28 Sep 2017 04:30 )  PTT:28.6 sec  Urinalysis Basic - ( 28 Sep 2017 06:00 )    Color: Yellow / Appearance: Clear / S.015 / pH: x  Gluc: x / Ketone: Negative  / Bili: Negative / Urobili: Negative mg/dL   Blood: x / Protein: 30 mg/dL / Nitrite: Negative   Leuk Esterase: Small / RBC: x / WBC x   Sq Epi: x / Non Sq Epi: x / Bacteria: x      I&O's Summary    27 Sep 2017 07:01  -  28 Sep 2017 07:00  --------------------------------------------------------  IN: 0 mL / OUT: 650 mL / NET: -650 mL      BNP  RADIOLOGY & ADDITIONAL STUDIES:
Patient is a 76y old  Female who presents with a chief complaint of S/P TRIP AND FALL WITH HIP PAIN.    HPI:  76 year old female with pmh of HTN, hyperlipidemia, CAD, MI in past, chronic diastolic heart failure, s/p ambulatory pulmonary pressure monitor implantation after the recent admission for hypoxic respiratory failure which was thought to be a combination of the diastolic heart failure and COPD exacerbation, on home O2 presented after a fall when she missed a step at [facility].  She was diagnosed with L hip fracture.  From the heart failure standpoint lately she had been doing very well.  One week ago her isosorbide was discontinued as pt was feeling dizzy and since one week her dizziness resolved.  She had sore throat for about a week.   Her pulmonary artery pressure monitor pressures were noted to be optimal.      10/2-Pt seen and exmained by me today am. She was tearful about the pain but was not able to express more in words.   at bedside.    10/3- Pt seen and examined today. Much more alert and interactive than yesterday.    10/4- I had a long discussion with pt. Pt was speaking in sentences after prompting multiple times.  When she did not understand my question she said"What did you say".  She agreed to take her meds po.      PAST MEDICAL & SURGICAL HISTORY:  Chronic diastolic congestive heart failure  Hyperlipidemia, unspecified hyperlipidemia type  Essential hypertension  Neuropathy  Diabetes  Chronic obstructive pulmonary disease, unspecified COPD type  History of total knee replacement, unspecified laterality  H/O hernia repair   delivery delivered  S/P appendectomy      MEDICATIONS  (STANDING):  docusate sodium 100 milliGRAM(s) Oral three times a day  diltiazem    milliGRAM(s) Oral daily  citalopram 10 milliGRAM(s) Oral daily  atorvastatin 20 milliGRAM(s) Oral at bedtime  pantoprazole    Tablet 40 milliGRAM(s) Oral before breakfast  levothyroxine 112 MICROGram(s) Oral daily  folic acid 1 milliGRAM(s) Oral daily  insulin glargine Injectable (LANTUS) 22 Unit(s) SubCutaneous at bedtime  insulin lispro (HumaLOG) corrective regimen sliding scale   SubCutaneous three times a day before meals  dextrose 5%. 1000 milliLiter(s) (50 mL/Hr) IV Continuous <Continuous>  dextrose 50% Injectable 12.5 Gram(s) IV Push once  dextrose 50% Injectable 25 Gram(s) IV Push once  dextrose 50% Injectable 25 Gram(s) IV Push once  sodium chloride 0.9%. 1000 milliLiter(s) (50 mL/Hr) IV Continuous <Continuous>  influenza   Vaccine 0.5 milliLiter(s) IntraMuscular once    MEDICATIONS  (PRN):  acetaminophen   Tablet 650 milliGRAM(s) Oral every 6 hours PRN For Temp greater than 38 C (100.4 F)  acetaminophen   Tablet. 650 milliGRAM(s) Oral every 6 hours PRN headache  oxyCODONE    IR 10 milliGRAM(s) Oral every 4 hours PRN Moderate Pain  oxyCODONE    IR 5 milliGRAM(s) Oral every 4 hours PRN Mild Pain  HYDROmorphone  Injectable 0.5 milliGRAM(s) IV Push every 4 hours PRN Severe Pain (7 - 10)  diphenhydrAMINE   Capsule 25 milliGRAM(s) Oral at bedtime PRN Insomnia  diphenhydrAMINE   Capsule 25 milliGRAM(s) Oral every 4 hours PRN Rash and/or Itching  benzocaine 15 mG/menthol 3.6 mG Lozenge 1 Lozenge Oral every 4 hours PRN Sore Throat  dextrose Gel 1 Dose(s) Oral once PRN Blood Glucose LESS THAN 70 milliGRAM(s)/deciliter  glucagon  Injectable 1 milliGRAM(s) IntraMuscular once PRN Glucose LESS THAN 70 milligrams/deciliter      FAMILY HISTORY:  Family history of CHF (congestive heart failure) (Mother)      SOCIAL HISTORY:  non smoker, no alcohol use in recent past.    REVIEW OF SYSTEMS:  CONSTITUTIONAL:  No night sweats.  No fatigue, malaise, lethargy.  No fever or chills.  HEENT:  Eyes:  No visual changes.  No eye pain.      ENT:  No runny nose.  No epistaxis.  No sinus pain.  No sore throat.  No odynophagia.  No ear pain.  No congestion.  RESPIRATORY:  No cough.  No wheeze.  No hemoptysis.  No shortness of breath.  CARDIOVASCULAR:  No chest pains.  No palpitations. No shortness of breath, No orthopnea or PND.  GASTROINTESTINAL:  No abdominal pain.  No nausea or vomiting.  No diarrhea or constipation.  No hematemesis.  No hematochezia.  No melena.  GENITOURINARY:  No urgency.  No frequency.  No dysuria.  No hematuria.  No obstructive symptoms.  No discharge.  No pain.  No significant abnormal bleeding.  MUSCULOSKELETAL:  c/o L hip pain  NEUROLOGICAL:  No tingling or numbness or weakness.  PSYCHIATRIC:  No confusion  SKIN:  No rashes.  No lesions.  No wounds.  ENDOCRINE:  No unexplained weight loss.  No polydipsia.  No polyuria.  No polyphagia.  HEMATOLOGIC:  No anemia.  No purpura.  No petechiae.  No prolonged or excessive bleeding.   ALLERGIC AND IMMUNOLOGIC:  No pruritus.  No swelling.         Vital Signs Last 24 Hrs  T(C): 37.4 (28 Sep 2017 05:13), Max: 37.4 (28 Sep 2017 05:13)  T(F): 99.4 (28 Sep 2017 05:13), Max: 99.4 (28 Sep 2017 05:13)  HR: 84 (28 Sep 2017 05:13) (51 - 84)  BP: 142/38 (28 Sep 2017 05:13) (126/50 - 142/38)  BP(mean): --  RR: 13 (27 Sep 2017 20:16) (13 - 20)  SpO2: 92% (28 Sep 2017 05:13) (92% - 96%)    PHYSICAL EXAM-    Constitutional: ill looking elderly female    Head: Head is normocephalic and atraumatic.      Neck: The patient's neck is supple without enlargement, has no palpable thyromegaly nor thyroid nodules and has no jugular venous distention. No audible carotid bruits. There are strong carotid pulses bilaterally. No JVD.     Cardiovascular: Regular rate and rhythm without S3, S4. No murmurs or rubs are appreciated.      Respiratory: Breath sounds are normal. No rales. No wheezing.    Abdomen: Soft, nontender, nondistended with positive bowel sounds.      Extremity: No tenderness. No  pitting edema No skin discoloration No clubbing No cyanosis.     Neurologic: The patient is alert and oriented.      Skin: No rash, no obvious lesions noted.      Psychiatric: The patient appears to be emotionally stable.      INTERPRETATION OF TELEMETRY: sinus rythm.     ECG: sinus rythm, normal axis, no ST T changes.     I&O's Detail    27 Sep 2017 07:01  -  28 Sep 2017 07:00  --------------------------------------------------------  IN:  Total IN: 0 mL    OUT:    Voided: 650 mL  Total OUT: 650 mL    Total NET: -650 mL          LABS:                        8.9    8.9   )-----------( 185      ( 28 Sep 2017 04:30 )             27.1         140  |  104  |  71<H>  ----------------------------<  159<H>  5.3   |  32<H>  |  2.59<H>    Ca    8.6      28 Sep 2017 04:30    TPro  7.0  /  Alb  3.4  /  TBili  0.4  /  DBili  x   /  AST  14<L>  /  ALT  15  /  AlkPhos  109          PT/INR - ( 28 Sep 2017 04:30 )   PT: 12.8 sec;   INR: 1.18 ratio         PTT - ( 28 Sep 2017 04:30 )  PTT:28.6 sec  Urinalysis Basic - ( 28 Sep 2017 06:00 )    Color: Yellow / Appearance: Clear / S.015 / pH: x  Gluc: x / Ketone: Negative  / Bili: Negative / Urobili: Negative mg/dL   Blood: x / Protein: 30 mg/dL / Nitrite: Negative   Leuk Esterase: Small / RBC: x / WBC x   Sq Epi: x / Non Sq Epi: x / Bacteria: x      I&O's Summary    27 Sep 2017 07:01  -  28 Sep 2017 07:00  --------------------------------------------------------  IN: 0 mL / OUT: 650 mL / NET: -650 mL      BNP  RADIOLOGY & ADDITIONAL STUDIES:
Patient is a 76y old  Female who presents with a chief complaint of S/P TRIP AND FALL WITH HIP PAIN.    HPI:  76 year old female with pmh of HTN, hyperlipidemia, CAD, MI in past, chronic diastolic heart failure, s/p ambulatory pulmonary pressure monitor implantation after the recent admission for hypoxic respiratory failure which was thought to be a combination of the diastolic heart failure and COPD exacerbation, on home O2 presented after a fall when she missed a step at [employer].  She was diagnosed with L hip fracture. Pt underwent L hip repair and post op complicated by ANI.    10/9- Pt seen and examined by me today. She denies any symptoms.   10/10- pt seen and examined by me today.  She says that she wants to go home and daughter at bedside c/o that her mother is anxious.  10/11- Pt seen and examined by me today.  She denies any SOB and much more cheerful today than yesterday.    PAST MEDICAL & SURGICAL HISTORY:  Chronic diastolic congestive heart failure  Hyperlipidemia, unspecified hyperlipidemia type  Essential hypertension  Neuropathy  Diabetes  Chronic obstructive pulmonary disease, unspecified COPD type  History of total knee replacement, unspecified laterality  H/O hernia repair   delivery delivered  S/P appendectomy      MEDICATIONS  (STANDING):  docusate sodium 100 milliGRAM(s) Oral three times a day  diltiazem    milliGRAM(s) Oral daily  citalopram 10 milliGRAM(s) Oral daily  atorvastatin 20 milliGRAM(s) Oral at bedtime  pantoprazole    Tablet 40 milliGRAM(s) Oral before breakfast  levothyroxine 112 MICROGram(s) Oral daily  folic acid 1 milliGRAM(s) Oral daily  insulin glargine Injectable (LANTUS) 22 Unit(s) SubCutaneous at bedtime  insulin lispro (HumaLOG) corrective regimen sliding scale   SubCutaneous three times a day before meals  dextrose 5%. 1000 milliLiter(s) (50 mL/Hr) IV Continuous <Continuous>  dextrose 50% Injectable 12.5 Gram(s) IV Push once  dextrose 50% Injectable 25 Gram(s) IV Push once  dextrose 50% Injectable 25 Gram(s) IV Push once  sodium chloride 0.9%. 1000 milliLiter(s) (50 mL/Hr) IV Continuous <Continuous>  influenza   Vaccine 0.5 milliLiter(s) IntraMuscular once    MEDICATIONS  (PRN):  acetaminophen   Tablet 650 milliGRAM(s) Oral every 6 hours PRN For Temp greater than 38 C (100.4 F)  acetaminophen   Tablet. 650 milliGRAM(s) Oral every 6 hours PRN headache  oxyCODONE    IR 10 milliGRAM(s) Oral every 4 hours PRN Moderate Pain  oxyCODONE    IR 5 milliGRAM(s) Oral every 4 hours PRN Mild Pain  HYDROmorphone  Injectable 0.5 milliGRAM(s) IV Push every 4 hours PRN Severe Pain (7 - 10)  diphenhydrAMINE   Capsule 25 milliGRAM(s) Oral at bedtime PRN Insomnia  diphenhydrAMINE   Capsule 25 milliGRAM(s) Oral every 4 hours PRN Rash and/or Itching  benzocaine 15 mG/menthol 3.6 mG Lozenge 1 Lozenge Oral every 4 hours PRN Sore Throat  dextrose Gel 1 Dose(s) Oral once PRN Blood Glucose LESS THAN 70 milliGRAM(s)/deciliter  glucagon  Injectable 1 milliGRAM(s) IntraMuscular once PRN Glucose LESS THAN 70 milligrams/deciliter      FAMILY HISTORY:  Family history of CHF (congestive heart failure) (Mother)      SOCIAL HISTORY:  non smoker, no alcohol use in recent past.    REVIEW OF SYSTEMS:  CONSTITUTIONAL:  No night sweats.  No fatigue, malaise, lethargy.  No fever or chills.  HEENT:  Eyes:  No visual changes.  No eye pain.      ENT:  No runny nose.  No epistaxis.  No sinus pain.  No sore throat.  No odynophagia.  No ear pain.  No congestion.  RESPIRATORY:  No cough.  No wheeze.  No hemoptysis.  No shortness of breath.  CARDIOVASCULAR:  No chest pains.  No palpitations. No shortness of breath, No orthopnea or PND.  GASTROINTESTINAL:  No abdominal pain.  No nausea or vomiting.  No diarrhea or constipation.  No hematemesis.  No hematochezia.  No melena.  GENITOURINARY:  No urgency.  No frequency.  No dysuria.  No hematuria.  No obstructive symptoms.  No discharge.  No pain.  No significant abnormal bleeding.  MUSCULOSKELETAL:  c/o L hip pain  NEUROLOGICAL:  No tingling or numbness or weakness.  PSYCHIATRIC:  No confusion  SKIN:  No rashes.  No lesions.  No wounds.  ENDOCRINE:  No unexplained weight loss.  No polydipsia.  No polyuria.  No polyphagia.  HEMATOLOGIC:  No anemia.  No purpura.  No petechiae.  No prolonged or excessive bleeding.   ALLERGIC AND IMMUNOLOGIC:  No pruritus.  No swelling.         Vital Signs Last 24 Hrs  T(C): 37.4 (28 Sep 2017 05:13), Max: 37.4 (28 Sep 2017 05:13)  T(F): 99.4 (28 Sep 2017 05:13), Max: 99.4 (28 Sep 2017 05:13)  HR: 84 (28 Sep 2017 05:13) (51 - 84)  BP: 142/38 (28 Sep 2017 05:13) (126/50 - 142/38)  BP(mean): --  RR: 13 (27 Sep 2017 20:16) (13 - 20)  SpO2: 92% (28 Sep 2017 05:13) (92% - 96%)    PHYSICAL EXAM-    Constitutional: ill looking elderly female    Head: Head is normocephalic and atraumatic.      Neck: The patient's neck is supple without enlargement, has no palpable thyromegaly nor thyroid nodules and has no jugular venous distention. No audible carotid bruits. There are strong carotid pulses bilaterally. No JVD.     Cardiovascular: Regular rate and rhythm without S3, S4. No murmurs or rubs are appreciated.      Respiratory: fine crackles b/l in lower lung fields.  Abdomen: Soft, nontender, nondistended with positive bowel sounds.      Extremity: No tenderness. No  pitting edema No skin discoloration No clubbing No cyanosis.     Neurologic: The patient is alert and oriented.      Skin: No rash, no obvious lesions noted.      Psychiatric: The patient appears to be emotionally stable.      INTERPRETATION OF TELEMETRY: sinus rythm.     ECG: sinus rythm, normal axis, no ST T changes.     I&O's Detail    27 Sep 2017 07:01  -  28 Sep 2017 07:00  --------------------------------------------------------  IN:  Total IN: 0 mL    OUT:    Voided: 650 mL  Total OUT: 650 mL    Total NET: -650 mL          LABS:                        8.9    8.9   )-----------( 185      ( 28 Sep 2017 04:30 )             27.1     09-28    140  |  104  |  71<H>  ----------------------------<  159<H>  5.3   |  32<H>  |  2.59<H>    Ca    8.6      28 Sep 2017 04:30    TPro  7.0  /  Alb  3.4  /  TBili  0.4  /  DBili  x   /  AST  14<L>  /  ALT  15  /  AlkPhos  109  09-27        PT/INR - ( 28 Sep 2017 04:30 )   PT: 12.8 sec;   INR: 1.18 ratio         PTT - ( 28 Sep 2017 04:30 )  PTT:28.6 sec  Urinalysis Basic - ( 28 Sep 2017 06:00 )    Color: Yellow / Appearance: Clear / S.015 / pH: x  Gluc: x / Ketone: Negative  / Bili: Negative / Urobili: Negative mg/dL   Blood: x / Protein: 30 mg/dL / Nitrite: Negative   Leuk Esterase: Small / RBC: x / WBC x   Sq Epi: x / Non Sq Epi: x / Bacteria: x      I&O's Summary    27 Sep 2017 07:01  -  28 Sep 2017 07:00  --------------------------------------------------------  IN: 0 mL / OUT: 650 mL / NET: -650 mL      BNP  RADIOLOGY & ADDITIONAL STUDIES:
Patient is a 76y old  Female who presents with a chief complaint of S/P TRIP AND FALL WITH HIP PAIN.    HPI:  76 year old female with pmh of HTN, hyperlipidemia, CAD, MI in past, chronic diastolic heart failure, s/p ambulatory pulmonary pressure monitor implantation after the recent admission for hypoxic respiratory failure which was thought to be a combination of the diastolic heart failure and COPD exacerbation, on home O2 presented after a fall when she missed a step at [employer].  She was diagnosed with L hip fracture. Pt underwent L hip repair and post op complicated by ANI.    Pt today denies any symptoms now.  She is more and  more alert day by day.  Denies any symptoms.       PAST MEDICAL & SURGICAL HISTORY:  Chronic diastolic congestive heart failure  Hyperlipidemia, unspecified hyperlipidemia type  Essential hypertension  Neuropathy  Diabetes  Chronic obstructive pulmonary disease, unspecified COPD type  History of total knee replacement, unspecified laterality  H/O hernia repair   delivery delivered  S/P appendectomy      MEDICATIONS  (STANDING):  docusate sodium 100 milliGRAM(s) Oral three times a day  diltiazem    milliGRAM(s) Oral daily  citalopram 10 milliGRAM(s) Oral daily  atorvastatin 20 milliGRAM(s) Oral at bedtime  pantoprazole    Tablet 40 milliGRAM(s) Oral before breakfast  levothyroxine 112 MICROGram(s) Oral daily  folic acid 1 milliGRAM(s) Oral daily  insulin glargine Injectable (LANTUS) 22 Unit(s) SubCutaneous at bedtime  insulin lispro (HumaLOG) corrective regimen sliding scale   SubCutaneous three times a day before meals  dextrose 5%. 1000 milliLiter(s) (50 mL/Hr) IV Continuous <Continuous>  dextrose 50% Injectable 12.5 Gram(s) IV Push once  dextrose 50% Injectable 25 Gram(s) IV Push once  dextrose 50% Injectable 25 Gram(s) IV Push once  sodium chloride 0.9%. 1000 milliLiter(s) (50 mL/Hr) IV Continuous <Continuous>  influenza   Vaccine 0.5 milliLiter(s) IntraMuscular once    MEDICATIONS  (PRN):  acetaminophen   Tablet 650 milliGRAM(s) Oral every 6 hours PRN For Temp greater than 38 C (100.4 F)  acetaminophen   Tablet. 650 milliGRAM(s) Oral every 6 hours PRN headache  oxyCODONE    IR 10 milliGRAM(s) Oral every 4 hours PRN Moderate Pain  oxyCODONE    IR 5 milliGRAM(s) Oral every 4 hours PRN Mild Pain  HYDROmorphone  Injectable 0.5 milliGRAM(s) IV Push every 4 hours PRN Severe Pain (7 - 10)  diphenhydrAMINE   Capsule 25 milliGRAM(s) Oral at bedtime PRN Insomnia  diphenhydrAMINE   Capsule 25 milliGRAM(s) Oral every 4 hours PRN Rash and/or Itching  benzocaine 15 mG/menthol 3.6 mG Lozenge 1 Lozenge Oral every 4 hours PRN Sore Throat  dextrose Gel 1 Dose(s) Oral once PRN Blood Glucose LESS THAN 70 milliGRAM(s)/deciliter  glucagon  Injectable 1 milliGRAM(s) IntraMuscular once PRN Glucose LESS THAN 70 milligrams/deciliter      FAMILY HISTORY:  Family history of CHF (congestive heart failure) (Mother)      SOCIAL HISTORY:  non smoker, no alcohol use in recent past.    REVIEW OF SYSTEMS:  CONSTITUTIONAL:  No night sweats.  No fatigue, malaise, lethargy.  No fever or chills.  HEENT:  Eyes:  No visual changes.  No eye pain.      ENT:  No runny nose.  No epistaxis.  No sinus pain.  No sore throat.  No odynophagia.  No ear pain.  No congestion.  RESPIRATORY:  No cough.  No wheeze.  No hemoptysis.  No shortness of breath.  CARDIOVASCULAR:  No chest pains.  No palpitations. No shortness of breath, No orthopnea or PND.  GASTROINTESTINAL:  No abdominal pain.  No nausea or vomiting.  No diarrhea or constipation.  No hematemesis.  No hematochezia.  No melena.  GENITOURINARY:  No urgency.  No frequency.  No dysuria.  No hematuria.  No obstructive symptoms.  No discharge.  No pain.  No significant abnormal bleeding.  MUSCULOSKELETAL:  c/o L hip pain  NEUROLOGICAL:  No tingling or numbness or weakness.  PSYCHIATRIC:  No confusion  SKIN:  No rashes.  No lesions.  No wounds.  ENDOCRINE:  No unexplained weight loss.  No polydipsia.  No polyuria.  No polyphagia.  HEMATOLOGIC:  No anemia.  No purpura.  No petechiae.  No prolonged or excessive bleeding.   ALLERGIC AND IMMUNOLOGIC:  No pruritus.  No swelling.         Vital Signs Last 24 Hrs  T(C): 37.4 (28 Sep 2017 05:13), Max: 37.4 (28 Sep 2017 05:13)  T(F): 99.4 (28 Sep 2017 05:13), Max: 99.4 (28 Sep 2017 05:13)  HR: 84 (28 Sep 2017 05:13) (51 - 84)  BP: 142/38 (28 Sep 2017 05:13) (126/50 - 142/38)  BP(mean): --  RR: 13 (27 Sep 2017 20:16) (13 - 20)  SpO2: 92% (28 Sep 2017 05:13) (92% - 96%)    PHYSICAL EXAM-    Constitutional: ill looking elderly female    Head: Head is normocephalic and atraumatic.      Neck: The patient's neck is supple without enlargement, has no palpable thyromegaly nor thyroid nodules and has no jugular venous distention. No audible carotid bruits. There are strong carotid pulses bilaterally. No JVD.     Cardiovascular: Regular rate and rhythm without S3, S4. No murmurs or rubs are appreciated.      Respiratory: Breath sounds are normal. No rales. No wheezing.    Abdomen: Soft, nontender, nondistended with positive bowel sounds.      Extremity: No tenderness. No  pitting edema No skin discoloration No clubbing No cyanosis.     Neurologic: The patient is alert and oriented.      Skin: No rash, no obvious lesions noted.      Psychiatric: The patient appears to be emotionally stable.      INTERPRETATION OF TELEMETRY: sinus rythm.     ECG: sinus rythm, normal axis, no ST T changes.     I&O's Detail    27 Sep 2017 07:01  -  28 Sep 2017 07:00  --------------------------------------------------------  IN:  Total IN: 0 mL    OUT:    Voided: 650 mL  Total OUT: 650 mL    Total NET: -650 mL          LABS:                        8.9    8.9   )-----------( 185      ( 28 Sep 2017 04:30 )             27.1     -    140  |  104  |  71<H>  ----------------------------<  159<H>  5.3   |  32<H>  |  2.59<H>    Ca    8.6      28 Sep 2017 04:30    TPro  7.0  /  Alb  3.4  /  TBili  0.4  /  DBili  x   /  AST  14<L>  /  ALT  15  /  AlkPhos  109  -27        PT/INR - ( 28 Sep 2017 04:30 )   PT: 12.8 sec;   INR: 1.18 ratio         PTT - ( 28 Sep 2017 04:30 )  PTT:28.6 sec  Urinalysis Basic - ( 28 Sep 2017 06:00 )    Color: Yellow / Appearance: Clear / S.015 / pH: x  Gluc: x / Ketone: Negative  / Bili: Negative / Urobili: Negative mg/dL   Blood: x / Protein: 30 mg/dL / Nitrite: Negative   Leuk Esterase: Small / RBC: x / WBC x   Sq Epi: x / Non Sq Epi: x / Bacteria: x      I&O's Summary    27 Sep 2017 07:01  -  28 Sep 2017 07:00  --------------------------------------------------------  IN: 0 mL / OUT: 650 mL / NET: -650 mL      BNP  RADIOLOGY & ADDITIONAL STUDIES:
Patient is a 76y old  Female who presents with a chief complaint of S/P TRIP AND FALL WITH HIP PAIN.    HPI:  76 year old female with pmh of HTN, hyperlipidemia, CAD, MI in past, chronic diastolic heart failure, s/p ambulatory pulmonary pressure monitor implantation after the recent admission for hypoxic respiratory failure which was thought to be a combination of the diastolic heart failure and COPD exacerbation, on home O2 presented after a fall when she missed a step at [employer].  She was diagnosed with L hip fracture.  From the heart failure standpoint lately she had been doing very well.  One week ago her isosorbide was discontinued as pt was feeling dizzy and since one week her dizziness resolved.  She had sore throat for about a week.   Her pulmonary artery pressure monitor pressures were noted to be optimal.      10/2-Pt seen and exmained by me today am. She was tearful about the pain but was not able to express more in words.   at bedside.    10/3- Pt seen and examined today. Much more alert and interactive than yesterday.    10/4- I had a long discussion with pt. Pt was speaking in sentences after prompting multiple times.  When she did not understand my question she said"What did you say".  She agreed to take her meds po.  10/5- Pt seen and examined by me today. more alert and conversant than yesterday.Spit out cardizem in am.       PAST MEDICAL & SURGICAL HISTORY:  Chronic diastolic congestive heart failure  Hyperlipidemia, unspecified hyperlipidemia type  Essential hypertension  Neuropathy  Diabetes  Chronic obstructive pulmonary disease, unspecified COPD type  History of total knee replacement, unspecified laterality  H/O hernia repair   delivery delivered  S/P appendectomy      MEDICATIONS  (STANDING):  docusate sodium 100 milliGRAM(s) Oral three times a day  diltiazem    milliGRAM(s) Oral daily  citalopram 10 milliGRAM(s) Oral daily  atorvastatin 20 milliGRAM(s) Oral at bedtime  pantoprazole    Tablet 40 milliGRAM(s) Oral before breakfast  levothyroxine 112 MICROGram(s) Oral daily  folic acid 1 milliGRAM(s) Oral daily  insulin glargine Injectable (LANTUS) 22 Unit(s) SubCutaneous at bedtime  insulin lispro (HumaLOG) corrective regimen sliding scale   SubCutaneous three times a day before meals  dextrose 5%. 1000 milliLiter(s) (50 mL/Hr) IV Continuous <Continuous>  dextrose 50% Injectable 12.5 Gram(s) IV Push once  dextrose 50% Injectable 25 Gram(s) IV Push once  dextrose 50% Injectable 25 Gram(s) IV Push once  sodium chloride 0.9%. 1000 milliLiter(s) (50 mL/Hr) IV Continuous <Continuous>  influenza   Vaccine 0.5 milliLiter(s) IntraMuscular once    MEDICATIONS  (PRN):  acetaminophen   Tablet 650 milliGRAM(s) Oral every 6 hours PRN For Temp greater than 38 C (100.4 F)  acetaminophen   Tablet. 650 milliGRAM(s) Oral every 6 hours PRN headache  oxyCODONE    IR 10 milliGRAM(s) Oral every 4 hours PRN Moderate Pain  oxyCODONE    IR 5 milliGRAM(s) Oral every 4 hours PRN Mild Pain  HYDROmorphone  Injectable 0.5 milliGRAM(s) IV Push every 4 hours PRN Severe Pain (7 - 10)  diphenhydrAMINE   Capsule 25 milliGRAM(s) Oral at bedtime PRN Insomnia  diphenhydrAMINE   Capsule 25 milliGRAM(s) Oral every 4 hours PRN Rash and/or Itching  benzocaine 15 mG/menthol 3.6 mG Lozenge 1 Lozenge Oral every 4 hours PRN Sore Throat  dextrose Gel 1 Dose(s) Oral once PRN Blood Glucose LESS THAN 70 milliGRAM(s)/deciliter  glucagon  Injectable 1 milliGRAM(s) IntraMuscular once PRN Glucose LESS THAN 70 milligrams/deciliter      FAMILY HISTORY:  Family history of CHF (congestive heart failure) (Mother)      SOCIAL HISTORY:  non smoker, no alcohol use in recent past.    REVIEW OF SYSTEMS:  CONSTITUTIONAL:  No night sweats.  No fatigue, malaise, lethargy.  No fever or chills.  HEENT:  Eyes:  No visual changes.  No eye pain.      ENT:  No runny nose.  No epistaxis.  No sinus pain.  No sore throat.  No odynophagia.  No ear pain.  No congestion.  RESPIRATORY:  No cough.  No wheeze.  No hemoptysis.  No shortness of breath.  CARDIOVASCULAR:  No chest pains.  No palpitations. No shortness of breath, No orthopnea or PND.  GASTROINTESTINAL:  No abdominal pain.  No nausea or vomiting.  No diarrhea or constipation.  No hematemesis.  No hematochezia.  No melena.  GENITOURINARY:  No urgency.  No frequency.  No dysuria.  No hematuria.  No obstructive symptoms.  No discharge.  No pain.  No significant abnormal bleeding.  MUSCULOSKELETAL:  c/o L hip pain  NEUROLOGICAL:  No tingling or numbness or weakness.  PSYCHIATRIC:  No confusion  SKIN:  No rashes.  No lesions.  No wounds.  ENDOCRINE:  No unexplained weight loss.  No polydipsia.  No polyuria.  No polyphagia.  HEMATOLOGIC:  No anemia.  No purpura.  No petechiae.  No prolonged or excessive bleeding.   ALLERGIC AND IMMUNOLOGIC:  No pruritus.  No swelling.         Vital Signs Last 24 Hrs  T(C): 37.4 (28 Sep 2017 05:13), Max: 37.4 (28 Sep 2017 05:13)  T(F): 99.4 (28 Sep 2017 05:13), Max: 99.4 (28 Sep 2017 05:13)  HR: 84 (28 Sep 2017 05:13) (51 - 84)  BP: 142/38 (28 Sep 2017 05:13) (126/50 - 142/38)  BP(mean): --  RR: 13 (27 Sep 2017 20:16) (13 - 20)  SpO2: 92% (28 Sep 2017 05:13) (92% - 96%)    PHYSICAL EXAM-    Constitutional: ill looking elderly female    Head: Head is normocephalic and atraumatic.      Neck: The patient's neck is supple without enlargement, has no palpable thyromegaly nor thyroid nodules and has no jugular venous distention. No audible carotid bruits. There are strong carotid pulses bilaterally. No JVD.     Cardiovascular: Regular rate and rhythm without S3, S4. No murmurs or rubs are appreciated.      Respiratory: Breath sounds are normal. No rales. No wheezing.    Abdomen: Soft, nontender, nondistended with positive bowel sounds.      Extremity: No tenderness. No  pitting edema No skin discoloration No clubbing No cyanosis.     Neurologic: The patient is alert and oriented.      Skin: No rash, no obvious lesions noted.      Psychiatric: The patient appears to be emotionally stable.      INTERPRETATION OF TELEMETRY: sinus rythm.     ECG: sinus rythm, normal axis, no ST T changes.     I&O's Detail    27 Sep 2017 07:01  -  28 Sep 2017 07:00  --------------------------------------------------------  IN:  Total IN: 0 mL    OUT:    Voided: 650 mL  Total OUT: 650 mL    Total NET: -650 mL          LABS:                        8.9    8.9   )-----------( 185      ( 28 Sep 2017 04:30 )             27.1     09-    140  |  104  |  71<H>  ----------------------------<  159<H>  5.3   |  32<H>  |  2.59<H>    Ca    8.6      28 Sep 2017 04:30    TPro  7.0  /  Alb  3.4  /  TBili  0.4  /  DBili  x   /  AST  14<L>  /  ALT  15  /  AlkPhos  109  -27        PT/INR - ( 28 Sep 2017 04:30 )   PT: 12.8 sec;   INR: 1.18 ratio         PTT - ( 28 Sep 2017 04:30 )  PTT:28.6 sec  Urinalysis Basic - ( 28 Sep 2017 06:00 )    Color: Yellow / Appearance: Clear / S.015 / pH: x  Gluc: x / Ketone: Negative  / Bili: Negative / Urobili: Negative mg/dL   Blood: x / Protein: 30 mg/dL / Nitrite: Negative   Leuk Esterase: Small / RBC: x / WBC x   Sq Epi: x / Non Sq Epi: x / Bacteria: x      I&O's Summary    27 Sep 2017 07:01  -  28 Sep 2017 07:00  --------------------------------------------------------  IN: 0 mL / OUT: 650 mL / NET: -650 mL      BNP  RADIOLOGY & ADDITIONAL STUDIES:
Patient is a 76y old  Female who presents with a chief complaint of S/P TRIP AND FALL WITH HIP PAIN.    HPI:  76 year old female with pmh of HTN, hyperlipidemia, CAD, MI in past, chronic diastolic heart failure, s/p ambulatory pulmonary pressure monitor implantation after the recent admission for hypoxic respiratory failure which was thought to be a combination of the diastolic heart failure and COPD exacerbation, on home O2 presented after a fall when she missed a step at [employer].  She was diagnosed with L hip fracture.  From the heart failure standpoint lately she had been doing very well.  One week ago her isosorbide was discontinued as pt was feeling dizzy and since one week her dizziness resolved.  She had sore throat for about a week.   Her pulmonary artery pressure monitor pressures were noted to be optimal.  Pt this am is sleeping but arousable.  NO CP or SOB.     PAST MEDICAL & SURGICAL HISTORY:  Chronic diastolic congestive heart failure  Hyperlipidemia, unspecified hyperlipidemia type  Essential hypertension  Neuropathy  Diabetes  Chronic obstructive pulmonary disease, unspecified COPD type  History of total knee replacement, unspecified laterality  H/O hernia repair   delivery delivered  S/P appendectomy      MEDICATIONS  (STANDING):  docusate sodium 100 milliGRAM(s) Oral three times a day  diltiazem    milliGRAM(s) Oral daily  citalopram 10 milliGRAM(s) Oral daily  atorvastatin 20 milliGRAM(s) Oral at bedtime  pantoprazole    Tablet 40 milliGRAM(s) Oral before breakfast  levothyroxine 112 MICROGram(s) Oral daily  folic acid 1 milliGRAM(s) Oral daily  insulin glargine Injectable (LANTUS) 22 Unit(s) SubCutaneous at bedtime  insulin lispro (HumaLOG) corrective regimen sliding scale   SubCutaneous three times a day before meals  dextrose 5%. 1000 milliLiter(s) (50 mL/Hr) IV Continuous <Continuous>  dextrose 50% Injectable 12.5 Gram(s) IV Push once  dextrose 50% Injectable 25 Gram(s) IV Push once  dextrose 50% Injectable 25 Gram(s) IV Push once  sodium chloride 0.9%. 1000 milliLiter(s) (50 mL/Hr) IV Continuous <Continuous>  influenza   Vaccine 0.5 milliLiter(s) IntraMuscular once    MEDICATIONS  (PRN):  acetaminophen   Tablet 650 milliGRAM(s) Oral every 6 hours PRN For Temp greater than 38 C (100.4 F)  acetaminophen   Tablet. 650 milliGRAM(s) Oral every 6 hours PRN headache  oxyCODONE    IR 10 milliGRAM(s) Oral every 4 hours PRN Moderate Pain  oxyCODONE    IR 5 milliGRAM(s) Oral every 4 hours PRN Mild Pain  HYDROmorphone  Injectable 0.5 milliGRAM(s) IV Push every 4 hours PRN Severe Pain (7 - 10)  diphenhydrAMINE   Capsule 25 milliGRAM(s) Oral at bedtime PRN Insomnia  diphenhydrAMINE   Capsule 25 milliGRAM(s) Oral every 4 hours PRN Rash and/or Itching  benzocaine 15 mG/menthol 3.6 mG Lozenge 1 Lozenge Oral every 4 hours PRN Sore Throat  dextrose Gel 1 Dose(s) Oral once PRN Blood Glucose LESS THAN 70 milliGRAM(s)/deciliter  glucagon  Injectable 1 milliGRAM(s) IntraMuscular once PRN Glucose LESS THAN 70 milligrams/deciliter      FAMILY HISTORY:  Family history of CHF (congestive heart failure) (Mother)      SOCIAL HISTORY:  non smoker, no alcohol use in recent past.    REVIEW OF SYSTEMS:  CONSTITUTIONAL:  No night sweats.  No fatigue, malaise, lethargy.  No fever or chills.  HEENT:  Eyes:  No visual changes.  No eye pain.      ENT:  No runny nose.  No epistaxis.  No sinus pain.  No sore throat.  No odynophagia.  No ear pain.  No congestion.  RESPIRATORY:  No cough.  No wheeze.  No hemoptysis.  No shortness of breath.  CARDIOVASCULAR:  No chest pains.  No palpitations. No shortness of breath, No orthopnea or PND.  GASTROINTESTINAL:  No abdominal pain.  No nausea or vomiting.  No diarrhea or constipation.  No hematemesis.  No hematochezia.  No melena.  GENITOURINARY:  No urgency.  No frequency.  No dysuria.  No hematuria.  No obstructive symptoms.  No discharge.  No pain.  No significant abnormal bleeding.  MUSCULOSKELETAL:  No musculoskeletal pain.  No joint swelling.  No arthritis.  NEUROLOGICAL:  No tingling or numbness or weakness.  PSYCHIATRIC:  No confusion  SKIN:  No rashes.  No lesions.  No wounds.  ENDOCRINE:  No unexplained weight loss.  No polydipsia.  No polyuria.  No polyphagia.  HEMATOLOGIC:  No anemia.  No purpura.  No petechiae.  No prolonged or excessive bleeding.   ALLERGIC AND IMMUNOLOGIC:  No pruritus.  No swelling.         Vital Signs Last 24 Hrs  T(C): 37.4 (28 Sep 2017 05:13), Max: 37.4 (28 Sep 2017 05:13)  T(F): 99.4 (28 Sep 2017 05:13), Max: 99.4 (28 Sep 2017 05:13)  HR: 84 (28 Sep 2017 05:13) (51 - 84)  BP: 142/38 (28 Sep 2017 05:13) (126/50 - 142/38)  BP(mean): --  RR: 13 (27 Sep 2017 20:16) (13 - 20)  SpO2: 92% (28 Sep 2017 05:13) (92% - 96%)    PHYSICAL EXAM-    Constitutional: The patient appears to be normal, well developed, well nourished and alert and oriented to time, place and person. The patient does not appear acutely ill.     Head: Head is normocephalic and atraumatic.      Neck: The patient's neck is supple without enlargement, has no palpable thyromegaly nor thyroid nodules and has no jugular venous distention. No audible carotid bruits. There are strong carotid pulses bilaterally. No JVD.     Cardiovascular: Regular rate and rhythm without S3, S4. No murmurs or rubs are appreciated.      Respiratory: Breath sounds are normal. No rales. No wheezing.    Abdomen: Soft, nontender, nondistended with positive bowel sounds.      Extremity: No tenderness. No  pitting edema No skin discoloration No clubbing No cyanosis.     Neurologic: The patient is alert and oriented.      Skin: No rash, no obvious lesions noted.      Psychiatric: The patient appears to be emotionally stable.      INTERPRETATION OF TELEMETRY: sinus rythm.     ECG: sinus rythm, normal axis, no ST T changes.     I&O's Detail    27 Sep 2017 07:01  -  28 Sep 2017 07:00  --------------------------------------------------------  IN:  Total IN: 0 mL    OUT:    Voided: 650 mL  Total OUT: 650 mL    Total NET: -650 mL          LABS:                        8.9    8.9   )-----------( 185      ( 28 Sep 2017 04:30 )             27.1         140  |  104  |  71<H>  ----------------------------<  159<H>  5.3   |  32<H>  |  2.59<H>    Ca    8.6      28 Sep 2017 04:30    TPro  7.0  /  Alb  3.4  /  TBili  0.4  /  DBili  x   /  AST  14<L>  /  ALT  15  /  AlkPhos  109          PT/INR - ( 28 Sep 2017 04:30 )   PT: 12.8 sec;   INR: 1.18 ratio         PTT - ( 28 Sep 2017 04:30 )  PTT:28.6 sec  Urinalysis Basic - ( 28 Sep 2017 06:00 )    Color: Yellow / Appearance: Clear / S.015 / pH: x  Gluc: x / Ketone: Negative  / Bili: Negative / Urobili: Negative mg/dL   Blood: x / Protein: 30 mg/dL / Nitrite: Negative   Leuk Esterase: Small / RBC: x / WBC x   Sq Epi: x / Non Sq Epi: x / Bacteria: x      I&O's Summary    27 Sep 2017 07:01  -  28 Sep 2017 07:00  --------------------------------------------------------  IN: 0 mL / OUT: 650 mL / NET: -650 mL      BNP  RADIOLOGY & ADDITIONAL STUDIES:
Patient seen and examined at bedside, no acute events overnight, pain controlled    Vital Signs Last 24 Hrs  T(C): 37 (01 Oct 2017 22:15), Max: 37 (01 Oct 2017 22:15)  T(F): 98.6 (01 Oct 2017 22:15), Max: 98.6 (01 Oct 2017 22:15)  HR: 87 (02 Oct 2017 05:00) (80 - 91)  BP: 141/41 (02 Oct 2017 04:00) (117/30 - 159/45)  BP(mean): 67 (02 Oct 2017 04:00) (53 - 75)  RR: 10 (02 Oct 2017 05:00) (8 - 18)  SpO2: 100% (02 Oct 2017 05:00) (94% - 100%)                          7.5    11.5  )-----------( 175      ( 01 Oct 2017 05:33 )             23.1       Physical Exam:  General: Not in acute distress, resting comfortably  Left Lower Extremity: Dressing is clean, dry, and intact. Sensation intact to light touch in distribution of L2-S1. Extensor Hallucis Longus, Flexor Hallucis Longus, Tibialis Anterior, and Gastrocnemius/Soleus complex intact. Dorsalis Pedis and Posterior tibial artery 2+. All compartments are soft and compressible. No tenderness to palpation of the calves bilaterally.       76 year old female status post Left Hip Intramedullary Nailing POD# 3    -Analgesia  -DVT Prophylaxis  -Weight bearing as tolerated  -Physical Therapy  -Follow Labs  -Care per Step-down team
Pt S&E. Pain controlled. No acute events overnight    Vital Signs Last 24 Hrs  T(C): 37.1 (29 Sep 2017 04:46), Max: 39.4 (28 Sep 2017 11:30)  T(F): 98.8 (29 Sep 2017 04:46), Max: 102.9 (28 Sep 2017 11:30)  HR: 77 (29 Sep 2017 04:46) (70 - 86)  BP: 147/36 (29 Sep 2017 04:46) (128/56 - 147/36)  BP(mean): --  RR: 18 (29 Sep 2017 04:46) (8 - 18)  SpO2: 94% (29 Sep 2017 04:46) (92% - 98%)    Gen: NAD  LLE:  SILT L2-S1  +EHL/FHL/TA/Gastroc  DP+  Soft compartments, - calf ttp
Pt S&E. Pain controlled. No acute events overnight    Vital Signs Last 24 Hrs  T(C): 37.4 (09-28-17 @ 05:13), Max: 37.4 (09-28-17 @ 05:13)  T(F): 99.4 (09-28-17 @ 05:13), Max: 99.4 (09-28-17 @ 05:13)  HR: 84 (09-28-17 @ 05:13) (51 - 84)  BP: 142/38 (09-28-17 @ 05:13) (126/50 - 142/38)  BP(mean): --  RR: 13 (09-27-17 @ 20:16) (13 - 20)  SpO2: 92% (09-28-17 @ 05:13) (92% - 96%)    Gen: NAD  LLE:  SILT L2-S1  +EHL/FHL/TA/Gastroc  DP+  Soft compartments, - calf ttp
Pt seen and examined. Pain controlled. No acute events overnight  Vital Signs Last 24 Hrs    Vital Signs Last 24 Hrs  T(C): 36.7 (01 Oct 2017 06:36), Max: 36.9 (30 Sep 2017 19:02)  T(F): 98.1 (01 Oct 2017 06:36), Max: 98.4 (30 Sep 2017 19:02)  HR: 84 (01 Oct 2017 04:00) (82 - 106)  BP: 154/40 (01 Oct 2017 04:00) (116/37 - 154/40)  BP(mean): 66 (01 Oct 2017 04:00) (51 - 66)  RR: 11 (01 Oct 2017 04:00) (8 - 17)  SpO2: 97% (01 Oct 2017 04:00) (91% - 100%)      Gen: NAD  LLE:  Dressing clean D&I  +sensation L2-S1  +dorsiflexion/plantarflexion of ankle/hallux  +dorsalis pedis pulse  Soft compartments, - calf tenderness
Pt seen and examined. Pain controlled. No acute events overnight  Vital Signs Last 24 Hrs  T(C): 36.1 (30 Sep 2017 06:52), Max: 37.8 (29 Sep 2017 23:39)  T(F): 97 (30 Sep 2017 06:52), Max: 100.1 (29 Sep 2017 23:39)  HR: 89 (30 Sep 2017 06:01) (72 - 89)  BP: 135/35 (30 Sep 2017 06:01) (115/33 - 144/39)  BP(mean): 57 (30 Sep 2017 06:01) (57 - 77)  RR: 19 (30 Sep 2017 06:01) (11 - 20)  SpO2: 92% (30 Sep 2017 06:01) (92% - 100%)    Gen: NAD  LLE:  Dressing clean D&I  +sensation L2-S1  +dorsiflexion/plantarflexion of ankle/hallux  +dorsalis pedis pulse  Soft compartments, - calf tenderness
Pt seen and examined. Pain controlled. No acute events overnight, in cardiac stepdown, patient appears confused this morning.    Vital Signs Last 24 Hrs  T(C): 36.7 (04 Oct 2017 05:31), Max: 36.8 (03 Oct 2017 23:31)  T(F): 98.1 (04 Oct 2017 05:31), Max: 98.3 (03 Oct 2017 23:31)  HR: 96 (04 Oct 2017 06:00) (85 - 107)  BP: 153/46 (04 Oct 2017 06:00) (152/46 - 198/52)  BP(mean): 0 (04 Oct 2017 06:00) (0 - 116)  RR: 18 (04 Oct 2017 06:00) (12 - 22)  SpO2: 97% (04 Oct 2017 06:00) (89% - 100%)      Gen: NAD  LLE:  Dressing clean D&I  +sensation L2-S1  +dorsiflexion/plantarflexion of ankle/hallux  +dorsalis pedis pulse  Soft compartments, - calf tenderness
NEPHROLOGY INTERVAL HPI/OVERNIGHT EVENTS:    10/3  more awake  making some sentences  case d/w pts family and Dr rosales  Na 151, but creat improving    10/2 SY  Arousable though with decreased response to verbal stimuli.  No apparent distress noted.    10/1  more awake alert, although still lethargic  VVS  got 60 then 100 lasix IV yesterday  was anuric after sharma placement (300 ml residual) on 9/30  UO last night increased to 150 ml night shift, now 20-45 ml/hr  IV bolus 500 ml over an hour given  now on 125 ml/hr  hgb down to 7.5, may be due to dilution + some blood loss in surgical wound  case d/w Dr Rosales and pts family      HPI:  76 year old female with PMHx HTN, HLD, CAD, ADVANCED COPD ON HOME O2 (3LITERS), IDDM, CHRONIC DIASTOLIC HEART FAILURE, RECENT ADMISSIONS FOR HYPOXIC RESPIRATORY FAILURE STATES SHE WAS LEAVING [facility] EARLIER TODAY WITH HER BROTHER AND MISSTEPPED RESULTING IN A FALL.  SHE REPORTS HAVING NECK AND LOWER BACK PAIN, ALSO LEFT HIP PAIN.  PT DENIES ANY CHEST PAIN OR SHORTNESS OF BREATH, NO NAUSEA OR VOMITING. JUST RECEIVED DILAUDID AND FEELS SOMEWHAT BETTER. (27 Sep 2017 19:56)  lethargic due to pain meds, last dose last night  family at bedside  for OR either today or in am        MEDICATIONS  (STANDING):  ALBUTerol    90 MICROgram(s) HFA Inhaler 1 Puff(s) Inhalation every 4 hours  ALBUTerol/ipratropium for Nebulization 3 milliLiter(s) Nebulizer every 6 hours  atorvastatin 20 milliGRAM(s) Oral at bedtime  cefTRIAXone   IVPB 1 Gram(s) IV Intermittent every 24 hours  citalopram 10 milliGRAM(s) Oral daily  clopidogrel Tablet 75 milliGRAM(s) Oral daily  dextrose 5%. 1000 milliLiter(s) (75 mL/Hr) IV Continuous <Continuous>  dextrose 50% Injectable 12.5 Gram(s) IV Push once  dextrose 50% Injectable 25 Gram(s) IV Push once  dextrose 50% Injectable 25 Gram(s) IV Push once  docusate sodium 100 milliGRAM(s) Oral three times a day  heparin  Injectable 5000 Unit(s) SubCutaneous every 12 hours  influenza   Vaccine 0.5 milliLiter(s) IntraMuscular once  insulin glargine Injectable (LANTUS) 34 Unit(s) SubCutaneous at bedtime  insulin lispro (HumaLOG) corrective regimen sliding scale   SubCutaneous three times a day before meals  levothyroxine Injectable 60 MICROGram(s) IV Push daily  pantoprazole    Tablet 40 milliGRAM(s) Oral daily  polyethylene glycol 3350 17 Gram(s) Oral daily  senna 2 Tablet(s) Oral at bedtime    MEDICATIONS  (PRN):  acetaminophen   Tablet 650 milliGRAM(s) Oral every 6 hours PRN For Temp over 38.3 C (100.94 F)  bisacodyl Suppository 10 milliGRAM(s) Rectal daily PRN If no bowel movement by POD#2  dextrose Gel 1 Dose(s) Oral once PRN Blood Glucose LESS THAN 70 milliGRAM(s)/deciliter  diphenhydrAMINE   Capsule 25 milliGRAM(s) Oral at bedtime PRN Insomnia  glucagon  Injectable 1 milliGRAM(s) IntraMuscular once PRN Glucose LESS THAN 70 milligrams/deciliter  HYDROmorphone  Injectable 0.5 milliGRAM(s) IV Push every 3 hours PRN Severe Pain (7 - 10)  ondansetron Injectable 4 milliGRAM(s) IV Push every 6 hours PRN Nausea and/or Vomiting  oxyCODONE    IR 5 milliGRAM(s) Oral every 4 hours PRN Moderate Pain (4 - 6)  oxyCODONE    IR 10 milliGRAM(s) Oral every 4 hours PRN Severe Pain (7 - 10)  traMADol 50 milliGRAM(s) Oral every 6 hours PRN Mild Pain (1 - 3)          ICU Vital Signs Last 24 Hrs  T(C): 36.7 (03 Oct 2017 08:00), Max: 37 (02 Oct 2017 16:49)  T(F): 98 (03 Oct 2017 08:00), Max: 98.6 (02 Oct 2017 16:49)  HR: 85 (03 Oct 2017 13:29) (83 - 98)  BP: 132/47 (03 Oct 2017 08:00) (98/41 - 169/50)  BP(mean): 66 (03 Oct 2017 08:00) (51 - 81)  ABP: --  ABP(mean): --  RR: 14 (03 Oct 2017 08:00) (13 - 23)  SpO2: 99% (03 Oct 2017 08:00) (86% - 100%)    I&O's Detail    02 Oct 2017 07:01  -  03 Oct 2017 07:00  --------------------------------------------------------  IN:    IV PiggyBack: 50 mL    sodium chloride 0.45%: 1200 mL  Total IN: 1250 mL    OUT:    Indwelling Catheter - Urethral: 1835 mL  Total OUT: 1835 mL    Total NET: -585 mL      03 Oct 2017 07:01  -  03 Oct 2017 14:09  --------------------------------------------------------  IN:    dextrose 5%.: 75 mL    sodium chloride 0.45%: 200 mL  Total IN: 275 mL    OUT:  Total OUT: 0 mL    Total NET: 275 mL          PHYSICAL EXAM:  Arousable. more awake today, attemps to communicate  GENERAL: No acute distress  CHEST/LUNG: fair air entry  HEART: S1S2 RRR  ABDOMEN: soft  EXTREMITIES: no edema  SKIN:     LABS:                                   8.3    7.1   )-----------( 191      ( 03 Oct 2017 05:59 )             25.1     	    10-03    151<H>  |  116<H>  |  107<H>  ----------------------------<  218<H>  4.3   |  25  |  4.56<H>    Ca    8.6      03 Oct 2017 05:59                  RADIOLOGY & ADDITIONAL TESTS:
NEPHROLOGY INTERVAL HPI/OVERNIGHT EVENTS:  continues with poor po intake.  no sob  pain controlled    10/12  The patient is seen at the bedside.  Her daughter and her  are with her.    They are very happy about her recovery.    The patient's mental status has improved greatly she recognizes me and asks appropriate questions.    She feels well her appetite is coming back.    She is looking forward to be discharged.          MEDICATIONS  (STANDING):  atorvastatin 20 milliGRAM(s) Oral at bedtime  buDESOnide 160 MICROgram(s)/formoterol 4.5 MICROgram(s) Inhaler 2 Puff(s) Inhalation two times a day  citalopram 20 milliGRAM(s) Oral daily  clopidogrel Tablet 75 milliGRAM(s) Oral daily  dextrose 50% Injectable 12.5 Gram(s) IV Push once  dextrose 50% Injectable 25 Gram(s) IV Push once  dextrose 50% Injectable 25 Gram(s) IV Push once  diltiazem    milliGRAM(s) Oral daily  docusate sodium 100 milliGRAM(s) Oral three times a day  heparin  Injectable 5000 Unit(s) SubCutaneous every 8 hours  influenza   Vaccine 0.5 milliLiter(s) IntraMuscular once  insulin glargine Injectable (LANTUS) 40 Unit(s) SubCutaneous at bedtime  insulin lispro (HumaLOG) corrective regimen sliding scale   SubCutaneous three times a day before meals  levothyroxine 125 MICROGram(s) Oral daily  pantoprazole    Tablet 40 milliGRAM(s) Oral daily  polyethylene glycol 3350 17 Gram(s) Oral daily  senna 2 Tablet(s) Oral at bedtime  sodium chloride 0.45%. 1000 milliLiter(s) (50 mL/Hr) IV Continuous <Continuous>  tiotropium 18 MICROgram(s) Capsule 1 Capsule(s) Inhalation daily    MEDICATIONS  (PRN):  acetaminophen   Tablet 650 milliGRAM(s) Oral every 6 hours PRN For Temp over 38.3 C (100.94 F)  ALPRAZolam 0.25 milliGRAM(s) Oral every 8 hours PRN anxiety  bisacodyl Suppository 10 milliGRAM(s) Rectal daily PRN If no bowel movement by POD#2  dextrose Gel 1 Dose(s) Oral once PRN Blood Glucose LESS THAN 70 milliGRAM(s)/deciliter  diphenhydrAMINE   Capsule 25 milliGRAM(s) Oral at bedtime PRN Insomnia  glucagon  Injectable 1 milliGRAM(s) IntraMuscular once PRN Glucose LESS THAN 70 milligrams/deciliter  hydrALAZINE Injectable 10 milliGRAM(s) IV Push every 8 hours PRN SBP >170  ondansetron Injectable 4 milliGRAM(s) IV Push every 6 hours PRN Nausea and/or Vomiting  traMADol 50 milliGRAM(s) Oral every 8 hours PRN Moderate Pain (4 - 6)      Allergies    Bactrim (Other)  ciprofloxacin (Other (Mild))  clindamycin (Unknown)  ibuprofen (Unknown)  penicillins (Other)  sulfa drugs (Unknown)    Intolerances      Vital Signs Last 24 Hrs  T(C): 36.5 (12 Oct 2017 11:14), Max: 36.7 (11 Oct 2017 17:31)  T(F): 97.7 (12 Oct 2017 11:14), Max: 98.1 (11 Oct 2017 17:31)  HR: 59 (12 Oct 2017 11:14) (59 - 76)  BP: 132/37 (12 Oct 2017 11:14) (132/37 - 179/51)  BP(mean): --  RR: 18 (12 Oct 2017 11:14) (18 - 18)  SpO2: 98% (12 Oct 2017 11:14) (94% - 100%)    PHYSICAL EXAM:  General: alert. awake   HEENT: MMM  CV: s1s2 rrr  LUNGS: B/L CTA  EXT: no edema, scd in place                          8.8    11.2  )-----------( 345      ( 12 Oct 2017 05:45 )             28.0     10-12    142  |  112<H>  |  47<H>  ----------------------------<  96  4.3   |  22  |  2.53<H>    Ca    9.0      12 Oct 2017 05:45
Chief Complaint/Reason for Admission: S/P TRIP AND FALL WITH HIP PAIN	  History of Present Illness: 	  76 year old female with PMHx HTN, HLD, CAD, ADVANCED COPD ON HOME O2 (3LITERS), IDDM, CHRONIC DIASTOLIC HEART FAILURE, RECENT ADMISSIONS FOR HYPOXIC RESPIRATORY FAILURE STATES SHE WAS LEAVING [employer] EARLIER TODAY WITH HER BROTHER AND MISSTEPPED RESULTING IN A FALL.    17: SHE REPORTS HAVING NECK AND LOWER BACK PAIN, ALSO LEFT HIP PAIN.  PT DENIES ANY CHEST PAIN OR SHORTNESS OF BREATH, NO NAUSEA OR VOMITING. JUST RECEIVED DILAUDID AND FEELS SOMEWHAT BETTER.    17: She is tired this morning, knows where she is. No cp, sob, but reports feeling very tired. Hip pain is controlled; She was straight cathed this am  17: Hip pain currently, awaiting for pain meds; did better overnight; received dilaudid without much lethargy per nurse.  Denies any CP, SOB, N/V/F/C    ROS: AS PER HPI OTHERWISE ALL OTHER SYSTEMS REVIEWED AND ARE NEGATIVE    PHYSICAL EXAM:  Vital Signs Last 24 Hrs  T(C): 36.9 (29 Sep 2017 07:45), Max: 39.4 (28 Sep 2017 11:30)  T(F): 98.4 (29 Sep 2017 07:45), Max: 102.9 (28 Sep 2017 11:30)  HR: 77 (29 Sep 2017 04:46) (70 - 86)  BP: 147/36 (29 Sep 2017 04:46) (128/56 - 147/36)  BP(mean): --  RR: 18 (29 Sep 2017 04:46) (8 - 18)  SpO2: 94% (29 Sep 2017 04:46) (92% - 98%)    GEN: MORE AWAKE AND ALERT, mood stable  HEENT:  NC/AT, EOMI, no oropharyngeal lesions, erythema, exudates, oral thrush    NECK:   supple    CV:  +S1, +S2, regular, no murmurs or rubs    RESP:   lungs clear to auscultation bilaterally, no wheezing, rales, SCATTERED rhonchi, good air entry bilaterally, decreased bs [place]    GI:  abdomen soft, non-tender, non-distended, normal BS,  no abdominal masses, no palpable masses    RECTAL:  not examined    :  not examined    MSK:   normal muscle tone, no atrophy, no rigidity, no contractions    EXT:   no clubbing, no cyanosis, no edema, no calf pain, swelling or erythema, EXTERNALLY ROTATED AND SHORTENED LLE, PULSES INTACT    VASCULAR:  pulses equal and symmetric in the upper and lower extremities    NEURO:  AAOX3, no focal neurological deficits, follows all commands, able to move extremities spontaneously, unable to move lle without pain due to fracture    SKIN:  no ulcers, lesions or rashes                              8.9    8.9   )-----------( 185      ( 28 Sep 2017 04:30 )             27.1         140  |  104  |  71<H>  ----------------------------<  159<H>  5.3   |  32<H>  |  2.59<H>    Ca    8.6      28 Sep 2017 04:30    TPro  7.0  /  Alb  3.4  /  TBili  0.4  /  DBili  x   /  AST  14<L>  /  ALT  15  /  AlkPhos  109          LIVER FUNCTIONS - ( 27 Sep 2017 16:46 )  Alb: 3.4 g/dL / Pro: 7.0 gm/dL / ALK PHOS: 109 U/L / ALT: 15 U/L / AST: 14 U/L / GGT: x           PT/INR - ( 28 Sep 2017 04:30 )   PT: 12.8 sec;   INR: 1.18 ratio         PTT - ( 28 Sep 2017 04:30 )  PTT:28.6 sec  Urinalysis Basic - ( 28 Sep 2017 06:00 )    Color: Yellow / Appearance: Clear / S.015 / pH: x  Gluc: x / Ketone: Negative  / Bili: Negative / Urobili: Negative mg/dL   Blood: x / Protein: 30 mg/dL / Nitrite: Negative   Leuk Esterase: Small / RBC: x / WBC x   Sq Epi: x / Non Sq Epi: x / Bacteria: x          Blood, Urine: Negative ( @ 06:00)    MEDICATIONS  (STANDING):  docusate sodium 100 milliGRAM(s) Oral three times a day  diltiazem    milliGRAM(s) Oral daily  citalopram 10 milliGRAM(s) Oral daily  atorvastatin 20 milliGRAM(s) Oral at bedtime  pantoprazole    Tablet 40 milliGRAM(s) Oral before breakfast  levothyroxine 112 MICROGram(s) Oral daily  folic acid 1 milliGRAM(s) Oral daily  insulin glargine Injectable (LANTUS) 22 Unit(s) SubCutaneous at bedtime  insulin lispro (HumaLOG) corrective regimen sliding scale   SubCutaneous three times a day before meals  dextrose 5%. 1000 milliLiter(s) (50 mL/Hr) IV Continuous <Continuous>  dextrose 50% Injectable 12.5 Gram(s) IV Push once  dextrose 50% Injectable 25 Gram(s) IV Push once  dextrose 50% Injectable 25 Gram(s) IV Push once  sodium chloride 0.9%. 1000 milliLiter(s) (50 mL/Hr) IV Continuous <Continuous>  influenza   Vaccine 0.5 milliLiter(s) IntraMuscular once    MEDICATIONS  (PRN):  acetaminophen   Tablet 650 milliGRAM(s) Oral every 6 hours PRN For Temp greater than 38 C (100.4 F)  acetaminophen   Tablet. 650 milliGRAM(s) Oral every 6 hours PRN headache  oxyCODONE    IR 10 milliGRAM(s) Oral every 4 hours PRN Moderate Pain  oxyCODONE    IR 5 milliGRAM(s) Oral every 4 hours PRN Mild Pain  HYDROmorphone  Injectable 0.5 milliGRAM(s) IV Push every 4 hours PRN Severe Pain (7 - 10)  diphenhydrAMINE   Capsule 25 milliGRAM(s) Oral at bedtime PRN Insomnia  diphenhydrAMINE   Capsule 25 milliGRAM(s) Oral every 4 hours PRN Rash and/or Itching  benzocaine 15 mG/menthol 3.6 mG Lozenge 1 Lozenge Oral every 4 hours PRN Sore Throat  dextrose Gel 1 Dose(s) Oral once PRN Blood Glucose LESS THAN 70 milliGRAM(s)/deciliter  glucagon  Injectable 1 milliGRAM(s) IntraMuscular once PRN Glucose LESS THAN 70 milligrams/deciliter
76 year old female with PMHx HTN, HLD, CAD, ADVANCED COPD ON HOME O2 (3LITERS), IDDM, CHRONIC DIASTOLIC HEART FAILURE, RECENT ADMISSIONS FOR HYPOXIC RESPIRATORY FAILURE STATES SHE WAS LEAVING [facility] EARLIER TODAY WITH HER BROTHER AND MISSTEPPED RESULTING IN A FALL.    9/27/17: SHE REPORTS HAVING NECK AND LOWER BACK PAIN, ALSO LEFT HIP PAIN.  PT DENIES ANY CHEST PAIN OR SHORTNESS OF BREATH, NO NAUSEA OR VOMITING. JUST RECEIVED DILAUDID AND FEELS SOMEWHAT BETTER.    9/28/17: She is tired this morning, knows where she is. No cp, sob, but reports feeling very tired. Hip pain is controlled; She was straight cathed this am  9/29/17: Hip pain currently, awaiting for pain meds; did better overnight; received dilaudid without much lethargy per nurse.  Denies any CP, SOB, N/V/F/C  9/30/17: s/p left hip surgery last night -- confused this am but stable, doesnt appear in pain, opening eyes to her name but not answering questions appropriately. Cant remember where she is.  10/02/17: Still confused but more awake this am; made urine overnight; is mendy fluids. unable to understand patient clearly  10/03/17: More awake today but emotionally labile -- crying, unable to obtain clear ros; then falls asleep; Doesnt appear in pain. Does look at you to verbal stimuli.  10/04/17: Pt is more awake today, looking at me, remembered my name but still not eating much on her own.  HTn was an issue last night  10/05/17: Much more awake, sitting in a chair; no cp, sob, states left hip is sore  10/06/17: Feels much better and awake and alert ; doesnt exactly remember why she had surgery or events of the past few days but now aware she is in the hospital. very conversant  denies any cp, sob, n/v/f/c; left hip is slightly sore    10/7/17 complains of some nausea and some anxiety. Appetite poor and BGM's running low. Less confused.  10/8/17 Less anxiety with Xanax. Pulmonary note appreciated. No new events. Continue present Rx fornow. BGM's Improving.   10/09/17: anxious about moving because afraid she may fall again, denies any cp, sob, f/c; some nausea, not eating much; left hip pain is controlled  10/10/17: Feels nauseated, no appetite. no cp, sob, f/c      ROS: AS PER HPI OTHERWISE ALL OTHER SYSTEMS REVIEWED AND ARE NEGATIVE    PHYSICAL EXAM:  Vital Signs Last 24 Hrs  T(C): 36.7 (10 Oct 2017 05:14), Max: 36.7 (09 Oct 2017 16:54)  T(F): 98.1 (10 Oct 2017 05:14), Max: 98.1 (09 Oct 2017 16:54)  HR: 70 (10 Oct 2017 06:04) (64 - 73)  BP: 167/31 (10 Oct 2017 06:04) (165/38 - 195/52)  BP(mean): 86 (10 Oct 2017 05:14) (86 - 86)  RR: 18 (09 Oct 2017 16:54) (18 - 18)  SpO2: 97% (10 Oct 2017 05:14) (97% - 98%)    GEN: MORE AWAKE AND ALERT, mood stable,   HEENT:  NC/AT, EOMI, no oropharyngeal lesions    NECK:   supple    CV:  +S1, +S2, regular, no murmurs or rubs    RESP:   lungs clear to auscultation bilaterally, no wheezing, rales, DECREASED BS BASES,  crackles at bases    GI:  abdomen soft, non-tender, non-distended, normal BS,  no abdominal masses, no palpable masses    RECTAL:  not examined    :  SAEED OUT    MSK:   normal muscle tone, no atrophy, no rigidity, no contractions    EXT:   no clubbing, no cyanosis, no edema, no calf pain, swelling or erythema, LEFT HIP DRESSING C/D/I    VASCULAR:  pulses equal and symmetric in the upper and lower extremities    NEURO:  AAOX3, no focal neurological deficits, follows all commands, able to move extremities spontaneously,     SKIN:  no ulcers, lesions or rashes    LABS:                              8.2    10.7  )-----------( 299      ( 10 Oct 2017 05:45 )             25.1     10-10    140  |  110<H>  |  67<H>  ----------------------------<  181<H>  4.0   |  21<L>  |  2.83<H>    Ca    8.8      10 Oct 2017 05:45      MEDICATIONS  (STANDING):  atorvastatin 20 milliGRAM(s) Oral at bedtime  buDESOnide 160 MICROgram(s)/formoterol 4.5 MICROgram(s) Inhaler 2 Puff(s) Inhalation two times a day  citalopram 20 milliGRAM(s) Oral daily  clopidogrel Tablet 75 milliGRAM(s) Oral daily  dextrose 50% Injectable 12.5 Gram(s) IV Push once  dextrose 50% Injectable 25 Gram(s) IV Push once  dextrose 50% Injectable 25 Gram(s) IV Push once  diltiazem    milliGRAM(s) Oral daily  docusate sodium 100 milliGRAM(s) Oral three times a day  heparin  Injectable 5000 Unit(s) SubCutaneous every 8 hours  influenza   Vaccine 0.5 milliLiter(s) IntraMuscular once  insulin glargine Injectable (LANTUS) 40 Unit(s) SubCutaneous at bedtime  insulin lispro (HumaLOG) corrective regimen sliding scale   SubCutaneous three times a day before meals  levothyroxine 125 MICROGram(s) Oral daily  pantoprazole    Tablet 40 milliGRAM(s) Oral daily  polyethylene glycol 3350 17 Gram(s) Oral daily  senna 2 Tablet(s) Oral at bedtime  sodium chloride 0.45%. 1000 milliLiter(s) (50 mL/Hr) IV Continuous <Continuous>  tiotropium 18 MICROgram(s) Capsule 1 Capsule(s) Inhalation daily    MEDICATIONS  (PRN):  acetaminophen   Tablet 650 milliGRAM(s) Oral every 6 hours PRN For Temp over 38.3 C (100.94 F)  ALPRAZolam 0.25 milliGRAM(s) Oral every 8 hours PRN anxiety  bisacodyl Suppository 10 milliGRAM(s) Rectal daily PRN If no bowel movement by POD#2  dextrose Gel 1 Dose(s) Oral once PRN Blood Glucose LESS THAN 70 milliGRAM(s)/deciliter  diphenhydrAMINE   Capsule 25 milliGRAM(s) Oral at bedtime PRN Insomnia  glucagon  Injectable 1 milliGRAM(s) IntraMuscular once PRN Glucose LESS THAN 70 milligrams/deciliter  hydrALAZINE Injectable 10 milliGRAM(s) IV Push every 8 hours PRN SBP >170  HYDROmorphone  Injectable 0.5 milliGRAM(s) IV Push every 3 hours PRN Severe Pain (7 - 10)  ondansetron Injectable 4 milliGRAM(s) IV Push every 6 hours PRN Nausea and/or Vomiting                                                                                                                                                                                                                                                                                                                                                                                   EKG:     RADIOLOGY STUDIES:
Chief Complaint/Reason for Admission: S/P TRIP AND FALL WITH HIP PAIN	  History of Present Illness: 	  76 year old female with PMHx HTN, HLD, CAD, ADVANCED COPD ON HOME O2 (3LITERS), IDDM, CHRONIC DIASTOLIC HEART FAILURE, RECENT ADMISSIONS FOR HYPOXIC RESPIRATORY FAILURE STATES SHE WAS LEAVING [place] EARLIER TODAY WITH HER BROTHER AND MISSTEPPED RESULTING IN A FALL.    9/27/17: SHE REPORTS HAVING NECK AND LOWER BACK PAIN, ALSO LEFT HIP PAIN.  PT DENIES ANY CHEST PAIN OR SHORTNESS OF BREATH, NO NAUSEA OR VOMITING. JUST RECEIVED DILAUDID AND FEELS SOMEWHAT BETTER.    9/28/17: She is tired this morning, knows where she is. No cp, sob, but reports feeling very tired. Hip pain is controlled; She was straight cathed this am  9/29/17: Hip pain currently, awaiting for pain meds; did better overnight; received dilaudid without much lethargy per nurse.  Denies any CP, SOB, N/V/F/C  9/30/17: s/p left hip surgery last night -- confused this am but stable, doesnt appear in pain, opening eyes to her name but not answering questions appropriately. Cant remember where she is.  10/02/17: Still confused but more awake this am; made urine overnight; is mendy fluids. unable to understand patient clearly  10/03/17: More awake today but emotionally labile -- crying, unable to obtain clear ros; then falls asleep; Doesnt appear in pain. Does look at you to verbal stimuli.  10/04/17: Pt is more awake today, looking at me, remembered my name but still not eating much on her own.  HTn was an issue last night  10/05/17: Much more awake, sitting in a chair; no cp, sob, states left hip is sore    ROS: CONFUSED UNABLE TO OBTAIN CLEAR ROS    PHYSICAL EXAM:  Vital Signs Last 24 Hrs  T(C): 36.1 (05 Oct 2017 04:00), Max: 36.4 (04 Oct 2017 21:08)  T(F): 97 (05 Oct 2017 04:00), Max: 97.6 (04 Oct 2017 21:08)  HR: 87 (05 Oct 2017 07:53) (87 - 100)  BP: 150/44 (05 Oct 2017 07:00) (137/50 - 183/50)  BP(mean): 0 (05 Oct 2017 07:00) (0 - 86)  RR: 25 (05 Oct 2017 07:00) (12 - 25)  SpO2: 98% (05 Oct 2017 07:00) (91% - 99%)    GEN: MORE AWAKE AND ALERT, mood stable,   HEENT:  NC/AT, EOMI, no oropharyngeal lesions    NECK:   supple    CV:  +S1, +S2, regular, no murmurs or rubs    RESP:   lungs clear to auscultation bilaterally, no wheezing, rales, DECREASED BS BASES,  crackles at bases    GI:  abdomen soft, non-tender, non-distended, normal BS,  no abdominal masses, no palpable masses    RECTAL:  not examined    :  POS SAEED    MSK:   normal muscle tone, no atrophy, no rigidity, no contractions    EXT:   no clubbing, no cyanosis, no edema, no calf pain, swelling or erythema, LEFT HIP DRESSING C/D/I    VASCULAR:  pulses equal and symmetric in the upper and lower extremities    NEURO:  AAOX3, no focal neurological deficits, follows all commands, able to move extremities spontaneously,     SKIN:  no ulcers, lesions or rashes    LABS:                              9.1    14.7  )-----------( 233      ( 05 Oct 2017 05:44 )             27.5     10-05    144  |  111<H>  |  82<H>  ----------------------------<  370<H>  3.7   |  23  |  3.24<H>    Ca    8.7      05 Oct 2017 05:44      MEDICATIONS  (STANDING):  ALBUTerol    90 MICROgram(s) HFA Inhaler 1 Puff(s) Inhalation every 4 hours  ALBUTerol/ipratropium for Nebulization 3 milliLiter(s) Nebulizer every 6 hours  atorvastatin 20 milliGRAM(s) Oral at bedtime  citalopram 10 milliGRAM(s) Oral daily  clopidogrel Tablet 75 milliGRAM(s) Oral daily  dextrose 5%. 1000 milliLiter(s) (100 mL/Hr) IV Continuous <Continuous>  dextrose 50% Injectable 12.5 Gram(s) IV Push once  dextrose 50% Injectable 25 Gram(s) IV Push once  dextrose 50% Injectable 25 Gram(s) IV Push once  diltiazem    milliGRAM(s) Oral daily  docusate sodium 100 milliGRAM(s) Oral three times a day  heparin  Injectable 5000 Unit(s) SubCutaneous every 8 hours  influenza   Vaccine 0.5 milliLiter(s) IntraMuscular once  insulin glargine Injectable (LANTUS) 48 Unit(s) SubCutaneous at bedtime  insulin lispro (HumaLOG) corrective regimen sliding scale   SubCutaneous three times a day before meals  levothyroxine Injectable 60 MICROGram(s) IV Push daily  pantoprazole    Tablet 40 milliGRAM(s) Oral daily  polyethylene glycol 3350 17 Gram(s) Oral daily  senna 2 Tablet(s) Oral at bedtime    MEDICATIONS  (PRN):  acetaminophen   Tablet 650 milliGRAM(s) Oral every 6 hours PRN For Temp over 38.3 C (100.94 F)  bisacodyl Suppository 10 milliGRAM(s) Rectal daily PRN If no bowel movement by POD#2  dextrose Gel 1 Dose(s) Oral once PRN Blood Glucose LESS THAN 70 milliGRAM(s)/deciliter  diphenhydrAMINE   Capsule 25 milliGRAM(s) Oral at bedtime PRN Insomnia  glucagon  Injectable 1 milliGRAM(s) IntraMuscular once PRN Glucose LESS THAN 70 milligrams/deciliter  hydrALAZINE Injectable 10 milliGRAM(s) IV Push every 8 hours PRN SBP >170  HYDROmorphone  Injectable 0.5 milliGRAM(s) IV Push every 3 hours PRN Severe Pain (7 - 10)  ondansetron Injectable 4 milliGRAM(s) IV Push every 6 hours PRN Nausea and/or Vomiting  oxyCODONE    IR 5 milliGRAM(s) Oral every 4 hours PRN Moderate Pain (4 - 6)  oxyCODONE    IR 10 milliGRAM(s) Oral every 4 hours PRN Severe Pain (7 - 10)  traMADol 50 milliGRAM(s) Oral every 6 hours PRN Mild Pain (1 - 3)                                                                                                                                                                                                                                            EKG:     RADIOLOGY STUDIES:
Chief Complaint/Reason for Admission: S/P TRIP AND FALL WITH HIP PAIN	  History of Present Illness: 	  76 year old female with PMHx HTN, HLD, CAD, ADVANCED COPD ON HOME O2 (3LITERS), IDDM, CHRONIC DIASTOLIC HEART FAILURE, RECENT ADMISSIONS FOR HYPOXIC RESPIRATORY FAILURE STATES SHE WAS LEAVING [employer] EARLIER TODAY WITH HER BROTHER AND MISSTEPPED RESULTING IN A FALL.    9/27/17: SHE REPORTS HAVING NECK AND LOWER BACK PAIN, ALSO LEFT HIP PAIN.  PT DENIES ANY CHEST PAIN OR SHORTNESS OF BREATH, NO NAUSEA OR VOMITING. JUST RECEIVED DILAUDID AND FEELS SOMEWHAT BETTER.    9/28/17: She is tired this morning, knows where she is. No cp, sob, but reports feeling very tired. Hip pain is controlled; She was straight cathed this am  9/29/17: Hip pain currently, awaiting for pain meds; did better overnight; received dilaudid without much lethargy per nurse.  Denies any CP, SOB, N/V/F/C  9/30/17: s/p left hip surgery last night -- confused this am but stable, doesnt appear in pain, opening eyes to her name but not answering questions appropriately. Cant remember where she is.  10/02/17: Still confused but more awake this am; made urine overnight; is mendy fluids. unable to understand patient clearly  10/03/17: More awake today but emotionally labile -- crying, unable to obtain clear ros; then falls asleep; Doesnt appear in pain. Does look at you to verbal stimuli.  10/04/17: Pt is more awake today, looking at me, remembered my name but still not eating much on her own.  HTn was an issue last night    ROS: CONFUSED UNABLE TO OBTAIN CLEAR ROS    PHYSICAL EXAM:  Vital Signs Last 24 Hrs  T(C): 36.7 (04 Oct 2017 05:31), Max: 36.8 (03 Oct 2017 23:31)  T(F): 98.1 (04 Oct 2017 05:31), Max: 98.3 (03 Oct 2017 23:31)  HR: 96 (04 Oct 2017 06:00) (85 - 107)  BP: 153/46 (04 Oct 2017 06:00) (152/46 - 198/52)  BP(mean): 0 (04 Oct 2017 06:00) (0 - 116)  RR: 18 (04 Oct 2017 06:00) (12 - 22)  SpO2: 97% (04 Oct 2017 06:00) (89% - 100%)    GEN: MORE AWAKE AND ALERT, mood stable, CONFUSED BUT MORE AWAKE  HEENT:  NC/AT, EOMI, no oropharyngeal lesions    NECK:   supple    CV:  +S1, +S2, regular, no murmurs or rubs    RESP:   lungs clear to auscultation bilaterally, no wheezing, rales, DECREASED BS BASES,  crackles at bases    GI:  abdomen soft, non-tender, non-distended, normal BS,  no abdominal masses, no palpable masses    RECTAL:  not examined    :  not examined    MSK:   normal muscle tone, no atrophy, no rigidity, no contractions    EXT:   no clubbing, no cyanosis, no edema, no calf pain, swelling or erythema, LEFT HIP DRESSING C/D/I    VASCULAR:  pulses equal and symmetric in the upper and lower extremities    NEURO:  AAOX3, no focal neurological deficits, follows all commands, able to move extremities spontaneously,     SKIN:  no ulcers, lesions or rashes    LABS:                              9.0    11.9  )-----------( 218      ( 04 Oct 2017 05:13 )             27.1     10-04    150<H>  |  116<H>  |  92<H>  ----------------------------<  263<H>  3.8   |  25  |  3.92<H>    Ca    8.6      04 Oct 2017 05:13          MEDICATIONS  (STANDING):  ALBUTerol    90 MICROgram(s) HFA Inhaler 1 Puff(s) Inhalation every 4 hours  ALBUTerol/ipratropium for Nebulization 3 milliLiter(s) Nebulizer every 6 hours  atorvastatin 20 milliGRAM(s) Oral at bedtime  cefTRIAXone   IVPB 1 Gram(s) IV Intermittent every 24 hours  citalopram 10 milliGRAM(s) Oral daily  clopidogrel Tablet 75 milliGRAM(s) Oral daily  dextrose 5%. 1000 milliLiter(s) (100 mL/Hr) IV Continuous <Continuous>  dextrose 50% Injectable 12.5 Gram(s) IV Push once  dextrose 50% Injectable 25 Gram(s) IV Push once  dextrose 50% Injectable 25 Gram(s) IV Push once  diltiazem    milliGRAM(s) Oral daily  docusate sodium 100 milliGRAM(s) Oral three times a day  heparin  Injectable 5000 Unit(s) SubCutaneous every 12 hours  influenza   Vaccine 0.5 milliLiter(s) IntraMuscular once  insulin glargine Injectable (LANTUS) 40 Unit(s) SubCutaneous at bedtime  insulin lispro (HumaLOG) corrective regimen sliding scale   SubCutaneous three times a day before meals  levothyroxine Injectable 60 MICROGram(s) IV Push daily  pantoprazole    Tablet 40 milliGRAM(s) Oral daily  polyethylene glycol 3350 17 Gram(s) Oral daily  senna 2 Tablet(s) Oral at bedtime    MEDICATIONS  (PRN):  acetaminophen   Tablet 650 milliGRAM(s) Oral every 6 hours PRN For Temp over 38.3 C (100.94 F)  bisacodyl Suppository 10 milliGRAM(s) Rectal daily PRN If no bowel movement by POD#2  dextrose Gel 1 Dose(s) Oral once PRN Blood Glucose LESS THAN 70 milliGRAM(s)/deciliter  diphenhydrAMINE   Capsule 25 milliGRAM(s) Oral at bedtime PRN Insomnia  glucagon  Injectable 1 milliGRAM(s) IntraMuscular once PRN Glucose LESS THAN 70 milligrams/deciliter  hydrALAZINE Injectable 10 milliGRAM(s) IV Push every 8 hours PRN SBP >170  HYDROmorphone  Injectable 0.5 milliGRAM(s) IV Push every 3 hours PRN Severe Pain (7 - 10)  ondansetron Injectable 4 milliGRAM(s) IV Push every 6 hours PRN Nausea and/or Vomiting  oxyCODONE    IR 5 milliGRAM(s) Oral every 4 hours PRN Moderate Pain (4 - 6)  oxyCODONE    IR 10 milliGRAM(s) Oral every 4 hours PRN Severe Pain (7 - 10)  traMADol 50 milliGRAM(s) Oral every 6 hours PRN Mild Pain (1 - 3)                                                                                                                                                                                                EKG:     RADIOLOGY STUDIES:

## 2017-10-12 NOTE — PROGRESS NOTE ADULT - PROBLEM SELECTOR PLAN 3
NOW RESOLVED  MONITOR AS STILL IN RENAL FAILURE
- c/w statin
BETTER NOW, CONT TO MONITOR  CHECK BMP AT NOON TODAY  NOT ON ACEINH OR ARB DUE TO HYPERKALEMIA
NOW RESOLVED  MONITOR AS STILL IN RENAL FAILURE
NOW RESOLVED  MONITOR AS STILL IN RENAL FAILURE
CONT STATIN
FROM ANI, ?HEMOLYZED  WILL REPEAT BMP AT NOON AGAIN  RENAL TO SEE.  NOT ON ACEINH OR ARB DUE TO HYPERKALEMIA
FROM ANI, SLIGHTLY ELEVATED THIS AM AT 5.5  MAY NEED TO RECHECK ELECTROLYTES LATER TODAY  NOT ON ACEINH OR ARB DUE TO HYPERKALEMIA
NOW RESOLVED  MONITOR AS STILL IN RENAL FAILURE
NOW RESOLVED  SECONDARY TO RENAL FAILURE
NOW RESOLVED  SECONDARY TO RENAL FAILURE
CONT STATIN

## 2017-10-12 NOTE — PROGRESS NOTE ADULT - PROBLEM SELECTOR PLAN 5
CONT STATIN ONCE ASTRID PO
CONT CARTIA
CONT STATIN
CONT STATIN
CONT STATIN ONCE ASTRID PO
CONT CARTIA

## 2017-10-12 NOTE — PROGRESS NOTE ADULT - ASSESSMENT
76 year old female with PMHx HTN, HLD, CAD, ADVANCED COPD ON HOME O2 (3LITERS), IDDM, CHRONIC DIASTOLIC HEART FAILURE, RECENT ADMISSIONS FOR HYPOXIC RESPIRATORY FAILURE STATES SHE WAS LEAVING [facility] EARLIER TODAY WITH HER BROTHER AND MISSTEPPED RESULTING IN A FALL.  SHE REPORTS HAVING NECK AND LOWER BACK PAIN, ALSO LEFT HIP PAIN.  PT DENIES ANY CHEST PAIN OR SHORTNESS OF BREATH, NO NAUSEA OR VOMITING. JUST RECEIVED DILAUDID AND FEELS SOMEWHAT BETTER. (27 Sep 2017 19:56)  lethargic due to pain meds, last dose last night  family at bedside  for OR in am    addendum:  spoke to pts nurse sol  5 pm  pt is more awake and alert  will d/c oxycodone 10 mg  cont oxy 5 mg and hydromorphone 0.5 mg    9/29  for OR later vvs  low grade temp  renal function stable    9/30  discussed w daughter in law  creatinine climbing  pt w altered mental state due to pain meds anesthesia  has h/o intolerance to narcotics as per daughter  no po intake  on ivf 50 ml/hr, increased to 75 ml/hr  will get cxr due to h/o chf  Sharma in place, purulent, urine c/s neg due to abx received prior to cxs  may need iv diuretic prn if uo does not  after adequate hydration    10/1  more awake alert, although still lethargic  VVS  got 60 then 100 lasix IV yesterday  was anuric after sharma placement (300 ml residual) on 9/30  UO last night increased to 150 ml night shift, now 20-45 ml/hr  IV bolus 500 ml over an hour given  now on 125 ml/hr  hgb down to 7.5, may be due to dilution + some blood loss in wound  case d/w Dr Phelps and pts family  check cxr to r/o CHF    10/2 SY  --ANI/CKD   responding to IVF with increased urine output.  Continue current 1/2 NS and follow.  No signs of fluid overload.  --Left Hip fx --post repair.  Rehab  --Fluid status stable.  Electrolytes acceptable    10/3  hypernatremia due to Saline and diuretics  will switch to d5w 75 ml/hr x 12 hrs then dc   AM labs, will determine fluids  d/w Dr Forte    10/4 SY  --ANI/CKD    Renal fx improving with increased urine output.   Pt with no po intake.  Continue maintenance fluid.  --Hypernatremia--Continue and increase D5W.  --Left Hip fx  Post repair  For rehab.    10/5 SY  --ANI/CKD   Renal fx improving.  Urine output adequate.  Hypernatremia improved.   Will d/c D5W since pt now with increased po intake with concern for hyperglycemia and at risk for CHF.  --Left Hip fx repair.   PT and rehab.  --Resp status stable for now    10/6 MK  - ANI/CKD slowly improving with variability due to intake  - Hypermatremia: will continue to encourage po fluid intake  - left hip fracture: s/p repair    10/7 MK  - ANI/CKD with worsening creatinine and hypernatremia: restart 1/2 ns at low rate and monitor response  - Anxiety: will increase celexa and xanax x1 now  aline lion at bedside     10/8 MK  - ANI/CKD, improving with ivf, will plant to monitor for s/sx of CHF.  continue current rate and composition  - Anorexia: monitor on increased celexa  - Anxiety : better with xanax prn  - s/p left hip s/p repair; pain control    10/9 SY  --ANI/CKD   Responding to IVF.   Continue 1/2 NS  --Electrolytes stable and acceptable  --Post Left Hip fx repair.   Pain control acceptable.  PT.  --Continue to monitor po intake.    10/10  awake  feels better but not eating, still in pain  more appropriate, but still confused and slow in mentation   at the bedside  d/w Dr Phelps, will change pain med to tramadol, monitor for seizure, tramadol may lower the threshold  cont IVF at 50 ml/hr since not taking much po    10/11 MK  - ANI/CKD, improving with ivf, continue with 1/2 ns  - s/p left hip fx repair: pain controlled    10/12  The patient is doing very well.  Her family is very optimistic about her recovery.  Her mental status has improved  Her appetite is good  Her creatinine levels and her sodium levels are within baseline.  From a renal standpoint no contraindications to the patient's discharge.

## 2017-10-12 NOTE — PROGRESS NOTE ADULT - PROBLEM SELECTOR PROBLEM 10
Anemia due to chronic kidney disease

## 2017-10-12 NOTE — PROGRESS NOTE ADULT - PROBLEM SELECTOR PROBLEM 3
Hyperkalemia
CAD (coronary artery disease)
Hyperkalemia
Hyperlipidemia, unspecified hyperlipidemia type
Hyperlipidemia, unspecified hyperlipidemia type

## 2017-10-12 NOTE — PROGRESS NOTE ADULT - PROBLEM SELECTOR PROBLEM 5
Hyperlipidemia, unspecified hyperlipidemia type
Essential hypertension
Hyperlipidemia, unspecified hyperlipidemia type
Essential hypertension

## 2017-10-12 NOTE — PROGRESS NOTE ADULT - NSHPATTENDINGPLANDISCUSS_GEN_ALL_CORE
PT, RN, ORTHO, FAMILY IN DETAILS LAST NIGHT
PT, RN
PT, RN DR ADAMS AND DR GONZALEZ
PT, RN, DR PALMA
PT, RN, DR PALMA, FAMILY AT BEDSIDE UPDATED, ALL QUESTIONS ANSWERED
PT, RN, DR HARPER
PT, RN, DR PALMA, DR DAVIS
PT, RN, DTR OLI
PT, RN, PT'S 
PT, RN, PT'S  AND DAUGHTER FARIDA, DR MEANS AND DR ADAMS
PT, RN, PT'S , DR PALMA
PT, RN, PT'S DAUGHTER OLI
PT, RN, PT'S [id number]

## 2017-10-12 NOTE — PROGRESS NOTE ADULT - PROBLEM SELECTOR PROBLEM 7
Essential hypertension
Chronic obstructive pulmonary disease, unspecified COPD type
Essential hypertension
Chronic obstructive pulmonary disease, unspecified COPD type

## 2017-10-12 NOTE — PROGRESS NOTE ADULT - PROBLEM SELECTOR PROBLEM 8
Chronic kidney disease
Diabetes
Chronic kidney disease
Diabetes

## 2017-10-12 NOTE — PROGRESS NOTE ADULT - PROBLEM SELECTOR PLAN 1
POD#11  TOLERATED PROCEDURE WELL BUT POST OP COMPLICATED BY ANI  POST OP CONFUSION SECONDARY TO ACUTE METABOLIC ENCEPHALOPATHY NOW RESOLVED AS ACUTE RENAL FUNCTION RESOLVING.  LEUKOCYTOSIS - SLIGHT BUT NO SIGNS OF ACUTE INFECTION  PHYSICAL THERAPY  DC DILAUDID AS MAY BE MAKING HER NAUSEATED  TRIAL OF TRAMADOL  DOING BETTER  NEEDS TO PARTICIPATE IN PT

## 2017-10-12 NOTE — PROGRESS NOTE ADULT - PROBLEM SELECTOR PLAN 10
ACUTE ON CHRONIC ANEMIA.   TRANSFUSED   DOUBT ACTIVE BLEEDING  H/H STABLE  LIKELY DILUTIONAL  WILL DEFER TO RENAL
ACUTE ON CHRONIC ANEMIA.   TRANSFUSED PRIRO ON THIS ADMISSON  DOUBT ACTIVE BLEEDING  H/H STABLE  DILUTIONAL COMPONENET
ACUTE ON CHRONIC ANEMIA.   TRANSFUSED PRIOR ON THIS ADMISSION  H/H STABLE
ACUTE ON CHRONIC ANEMIA.   TRANSFUSED PRIOR ON THIS ADMISSION  H/H STABLE
ACUTE ON CHRONIC ANEMIA.   TRANSFUSED YEST  DOUBT ACTIVE BLEEDING  LIKELY DILUTIONAL  WILL DEFER TO RENAL
ACUTE ON CHRONIC ANEMIA.   TRANSFUSED PRIOR ON THIS ADMISSION  H/H STABLE
ACUTE ON CHRONIC ANEMIA.   TRANSFUSED YEST  DOUBT ACTIVE BLEEDING  H/H BETTER TODAY  LIKELY DILUTIONAL  WILL DEFER TO RENAL
ACUTE ON CHRONIC ANEMIA.   WILL HOLD OFF ON TRANSFUSING -- DEPENDING ON RENAL STATUS -- IF NEEDS HD THEN TRANSFUSE WITH DIALYSIS.  WILL DEFER TO RENAL
HX OF CHRONIC ANEMIA.  H/H BETTER THIS AM BUT MONITOR AND CALL GIVE PRBCS ON CALL TO OR OR POST OP IF NEEDED

## 2017-10-12 NOTE — PROGRESS NOTE ADULT - PROBLEM SELECTOR PROBLEM 6
CAD (coronary artery disease)
Diabetes
Diabetes

## 2017-10-12 NOTE — PROGRESS NOTE ADULT - PROBLEM SELECTOR PLAN 9
HX OF CHRONIC ANEMIA.  H/H BETTER THIS AM BUT MONITOR AND CALL GIVE PRBCS ON CALL TO OR OR POST OP IF NEEDED
CONT O2 SUPPLEMENTATION  CONT ADVAIR  NEBS PRN  PULM CLEARANCE FOR SURGERY
CONT O2 SUPPLEMENTATION  CONT ADVAIR  NEBS PRN  PULM CLEARANCE FOR SURGERY
CONT O2 SUPPLEMENTATION  CONT ADVAIR  NEBS PRN  NO BRONCHOSPASM
CONT O2 SUPPLEMENTATION  CONT ADVAIR  NEBS PRN  PULM CLEARANCE FOR SURGERY
CONT O2 SUPPLEMENTATION  CONT ADVAIR  NEBS PRN  NO BRONCHOSPASM
CONT O2 SUPPLEMENTATION  CONT ADVAIR  NEBS PRN  PULM CLEARANCE FOR SURGERY
HX OF CHRONIC ANEMIA.  H/H A BIT LOWER THIS AM, BUT MONITOR AND CALL GIVE PRBCS ON CALL TO OR OR POST OP IF NEEDED

## 2017-10-12 NOTE — PROGRESS NOTE ADULT - PROBLEM SELECTOR PROBLEM 2
ANI (acute kidney injury)
Chronic diastolic congestive heart failure
Chronic diastolic congestive heart failure
ANI (acute kidney injury)
ANI (acute kidney injury)

## 2017-10-12 NOTE — PROGRESS NOTE ADULT - PROBLEM SELECTOR PLAN 8
CR USUALLY A BIT LOWER - BASELINE CLOSE TO 2  S/P HYDRATION, BUMEX HELD  BELIEVE SHE WAS OVERDIURESED AS AN OUTPT  CONT TO HOLD DIURETIC  CR BETTER TODAY  OFF ACE INH OR ARB DUE TO HYPERKALEMIA
INCREASE LANTUS FURTHER TO 40 UNITS AS BGMS ARE ELEVATED  CHECK BGMS QAC AND QHS  WILL ADJUST AS NEEDED
INCREASE LANTUS FURTHER TO 48 UNITS AS BGMS ARE ELEVATED - ELEVATED FROM D5W, DC;D NOW  CHECK BGMS QAC AND QHS  WILL ADJUST AS NEEDED
Appetite poor.   BGMS BETTER
Appetite poor. BGM"S running in the 50's.   Will reduce dose of Insulin and continue to monitor BGM's
INCREASE LANTUS FURTHER TO 48 UNITS AS BGMS ARE ELEVATED - ELEVATED   WILL ADJUST AS NEEDED  BGMS MUCH BETTER
INCREASE LANTUS TO 30 UNITS AS BGMS ARE ELEVATED  CHECK BGMS QAC AND QHS  WILL ADJUST AS NEEDED
Appetite BETTER  BGMS BETTER
Appetite BETTER  BGMS STABLE, HIGH AT TIMES, THEN LOW AS WELL; HX OF LABILE BGMS
Appetite poor. BGM"S running in the 50's.   Will reduce dose of Insulin and continue to monitor BGM's
Appetite poor. BGM"S running in the 50's.   Will reduce dose of Insulin and continue to monitor BGM's  BGMS BETTER
CONT LANTUS AT 22UNITS DAILY  CHECK BGMS QAC AND QHS  WILL ADJUST AS NEEDED
CR USUALLY A BIT LOWER - BASELINE CLOSE TO 2  WILL GET RENAL EVAL -- KNOWN TO DR PALMA ON OUTSIDE  POTASSIUM LEVELS STABLE  OFF ACE INH OR ARB DUE TO HYPERKALEMIA  HOLD BUMEX, GENTLY HYDRATE
INCREASE LANTUS FURTHER TO 40 UNITS AS BGMS ARE ELEVATED  CHECK BGMS QAC AND QHS  WILL ADJUST AS NEEDED
INCREASE LANTUS TO 28 UNITS AS BGMS ARE ELEVATED  CHECK BGMS QAC AND QHS  WILL ADJUST AS NEEDED

## 2017-10-12 NOTE — PROGRESS NOTE ADULT - PROBLEM SELECTOR PLAN 7
ON 3 LITERS  CONT ADVAIR  NEBS PRN  PULM CLEARANCE FOR SURGERY
ISSUES WITH HTN OVERNIGHT   PT NOT ABLE TO TAKE PO CARDIZEM  USE HYDRALAZINE IV UNTIL ASTRID PO
ISSUES WITH HTN OVERNIGHT   PT NOT ABLE TO TAKE PO CARDIZEM  USE HYDRALAZINE IV UNTIL ASTRID PO
MUCH BETTER CONTROLLED AS TAKING HER MEDS PO NOW  INCREASED AT TIMES BUT LARGE ANXIETY COMPONENT
CONT CARTIA
MUCH BETTER CONTROLLED AS TAKING HER MEDS PO NOW
MUCH BETTER CONTROLLED AS TAKING HER MEDS PO NOW
CONT CARTIA
MUCH BETTER CONTROLLED AS TAKING HER MEDS PO NOW
MUCH BETTER CONTROLLED AS TAKING HER MEDS PO NOW  INCREASED AT TIMES BUT LARGE ANXIETY COMPONENT
ON 3 LITERS  CONT ADVAIR  NEBS PRN  PULM CLEARANCE FOR SURGERY

## 2017-10-12 NOTE — PROGRESS NOTE ADULT - PROBLEM SELECTOR PROBLEM 4
Chronic diastolic congestive heart failure
Essential hypertension
CAD (coronary artery disease)
Chronic diastolic congestive heart failure
CAD (coronary artery disease)

## 2017-10-12 NOTE — PROGRESS NOTE ADULT - PROBLEM SELECTOR PLAN 2
ACUTE ON CHRONIC KIDNEY DISEASE - ATN  NOW IMPROVING  ON IVF AND LASIX, STARTED MAKING MORE URINE LAST NIGHT  CONT TO MONITOR I/OS CLOSELY  HYPERCHLOREMIC AND HYPERNATREMIC -- FROM NORMAL SALINE, IVF CHANGED TO D5W AS PER RENAL - SODIUM STILL INCREASING -- WILL D/W RENAL RE: INCREASING D5W RATE      MONITOR FOR FLUID OVERLOAD  POTASSIUM LEVEL STABLE, NOT ACIDOTIC AND NOT FLUID OVERLOADED CURRENTLY
ACUTE ON CHRONIC KIDNEY DISEASE - ATN  NOW IMPROVING  ON IVF AND LASIX, STARTED MAKING MORE URINE LAST NIGHT  CONT TO MONITOR I/OS CLOSELY  HYPERNATREMIA RESOLVED, DC D5W WITH ELEVATED BGMS.   MONITOR VOLUME STATUS OFF IVF?
ACUTE ON CHRONIC KIDNEY DISEASE - ATN  IMPROVED TODAY, BASELINE CR ABOUT 2.2  MONITOR OFF IVF?    HYPERNATREMIA - FREE WATER DEPLETED; NOW RESOLVED; WILL NEED TO ENCOURAGE FREE WATER ONCE OFF IVF
- some urine output today
ACUTE ON CHRONIC KIDNEY DISEASE - ATN  IMPROVED BUT BUMPED UP AGAIN THIS AM A BIT  MONITOR UOP --?RETAINING  OFF IVF FOR NOW    HYPERNATREMIA - FREE WATER DEPLETED; WILL ORDER WATER PO AS PT NOW WILLING TO EAT AND DRINK.
ACUTE ON CHRONIC KIDNEY DISEASE - ATN  IMPROVED BUT BUMPED UP AGAIN THIS AM A BIT  MONITOR UOP --?RETAINING  OFF IVF FOR NOW    HYPERNATREMIA - FREE WATER DEPLETED; WILL ORDER WATER PO AS PT NOW WILLING TO EAT AND DRINK.
ACUTE ON CHRONIC KIDNEY DISEASE - ATN  ON IVF AND LASUX, STARTED MAKING MORE URINE LAS TNIOGHT  CONT TO MONITOR I/OS CLOSELY  HYPERCHLOREMIC AND HYPERNATREMIC -- FROM NORMAL SALINE, IVF CHANGED TO 1/2NS AS PER RENAL  RECHECK BMP AT NOON    MONITOR FOR FLUID OVERLOAD  POTASSIUM LEVEL STABLE, NOT ACIDOTIC AND NOT FLUID OVERLOADED CURRENTLY
ACUTE ON CHRONIC KIDNEY DISEASE  MONITOR UOP  INCREASE FLUIDS TO 75CC/HOUR  PLACE SAEED IF UNABLE TO MONITOR UOP  WAS HAVING URINARY RETENTION  NOT ON DIURETICS
ACUTE ON CHRONIC KIDNEY DISEASE - APPEARS VOLUME CONTRACTED ON EXAM TO ME DESPITE CONGESTION ON CXR.   D/W OLIMELDA -- TRIAL OF FLUID CHALLENGE - 500CC BOLUS NOW THEN 125CC/HR.  WILL GIVE LASIX IF NO URINE OUTPUT BY 2 PM  MONITOR UOP CLOSELY  MONITOR FOR FLUID OVERLOAD  POTASSIUM LEVEL STABLE, NOT ACIDOTIC AND NOT FLUID OVERLOADED CURRENTLY  IF NO IMPROVEMENT MAY NEED HEMODIALYSIS
ACUTE ON CHRONIC KIDNEY DISEASE - ATN  IMPROVED BUT BUMPED UP AGAIN THIS AM A BIT  MONITOR UOP --?RETAINING  OFF IVF FOR NOW    HYPERNATREMIA - FREE WATER DEPLETED; WILL ORDER WATER PO AS PT NOW WILLING TO EAT AND DRINK.
ACUTE ON CHRONIC KIDNEY DISEASE - ATN  IMPROVED BUT SEEMS TO BE SETTILING AROUND 3.2-3.4  MONITOR UOP --?RETAINING  OFF IVF FOR NOW    HYPERNATREMIA - FREE WATER DEPLETED; WILL ORDER WATER PO AS PT NOW WILLING TO EAT AND DRINK.; RESOLVED BUT ON IVF
ACUTE ON CHRONIC KIDNEY DISEASE - ATN  IMPROVED TODAY, BASELINE CR ABOUT 2.2  MONITOR OFF IVF?  STILL ON IVF -- CHECK ELECTROLYTES TODAY    HYPERNATREMIA - FREE WATER DEPLETED; NOW RESOLVED; WILL NEED TO ENCOURAGE FREE WATER ONCE OFF IVF
ACUTE ON CHRONIC KIDNEY DISEASE - ATN  IMPROVED TODAY, BASELINE CR ABOUT 2.2  MONITOR OFF IVF? CONTINUED IVF AS PER RENAL  STILL ON IVF -- CHECK ELECTROLYTES TODAY    HYPERNATREMIA - FREE WATER DEPLETED; NOW RESOLVED; WILL NEED TO ENCOURAGE FREE WATER ONCE OFF IVF
ACUTE ON CHRONIC KIDNEY DISEASE - ATN  NOW IMPROVING  ON IVF AND LASIX, STARTED MAKING MORE URINE LAST NIGHT  CONT TO MONITOR I/OS CLOSELY  HYPERCHLOREMIC AND HYPERNATREMIC -- FROM NORMAL SALINE, IVF CHANGED TO 1/2NS AS PER RENAL - SODIUM STILL INCREASING -- WILL D/W RENAL      MONITOR FOR FLUID OVERLOAD  POTASSIUM LEVEL STABLE, NOT ACIDOTIC AND NOT FLUID OVERLOADED CURRENTLY
CURRENTLY COMPENSATED  MAY ACTUALLY BE A BIT DRY  HOLD BUMEX FOR NOW, WILL GIVE NORMAL SALINE AT 50CC/HR X 8 HOURS AS PER CARDIO RECOMMENDATIONS  MONITOR   CARDIO EVAL NOTED  EKG STABLE  CHECK CXR
CURRENTLY COMPENSATED  S/P GENTLE HYDRATION, STABLE THIS AM  HOLD BUMEX FOR NOW  MONITOR   CARDIO EVAL NOTED  EKG STABLE  CLEARED BY CARDIO FOR PROCEDURE

## 2017-10-16 DIAGNOSIS — Z79.4 LONG TERM (CURRENT) USE OF INSULIN: ICD-10-CM

## 2017-10-16 DIAGNOSIS — S72.142A DISPLACED INTERTROCHANTERIC FRACTURE OF LEFT FEMUR, INITIAL ENCOUNTER FOR CLOSED FRACTURE: ICD-10-CM

## 2017-10-16 DIAGNOSIS — Z88.2 ALLERGY STATUS TO SULFONAMIDES: ICD-10-CM

## 2017-10-16 DIAGNOSIS — Z88.1 ALLERGY STATUS TO OTHER ANTIBIOTIC AGENTS STATUS: ICD-10-CM

## 2017-10-16 DIAGNOSIS — I25.2 OLD MYOCARDIAL INFARCTION: ICD-10-CM
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
## 2017-10-16 DIAGNOSIS — J44.9 CHRONIC OBSTRUCTIVE PULMONARY DISEASE, UNSPECIFIED: ICD-10-CM

## 2017-10-16 DIAGNOSIS — I50.32 CHRONIC DIASTOLIC (CONGESTIVE) HEART FAILURE: ICD-10-CM

## 2017-10-16 DIAGNOSIS — D62 ACUTE POSTHEMORRHAGIC ANEMIA: ICD-10-CM

## 2017-10-16 DIAGNOSIS — E11.40 TYPE 2 DIABETES MELLITUS WITH DIABETIC NEUROPATHY, UNSPECIFIED: ICD-10-CM

## 2017-10-16 DIAGNOSIS — N17.0 ACUTE KIDNEY FAILURE WITH TUBULAR NECROSIS: ICD-10-CM

## 2017-10-16 DIAGNOSIS — N18.9 CHRONIC KIDNEY DISEASE, UNSPECIFIED: ICD-10-CM

## 2017-10-16 DIAGNOSIS — I13.0 HYPERTENSIVE HEART AND CHRONIC KIDNEY DISEASE WITH HEART FAILURE AND STAGE 1 THROUGH STAGE 4 CHRONIC KIDNEY DISEASE, OR UNSPECIFIED CHRONIC KIDNEY DISEASE: ICD-10-CM

## 2017-10-16 DIAGNOSIS — E87.1 HYPO-OSMOLALITY AND HYPONATREMIA: ICD-10-CM
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
## 2017-10-16 DIAGNOSIS — Z88.0 ALLERGY STATUS TO PENICILLIN: ICD-10-CM

## 2017-10-16 DIAGNOSIS — E78.5 HYPERLIPIDEMIA, UNSPECIFIED: ICD-10-CM

## 2017-10-16 DIAGNOSIS — Z79.82 LONG TERM (CURRENT) USE OF ASPIRIN: ICD-10-CM

## 2017-10-16 DIAGNOSIS — Z87.891 PERSONAL HISTORY OF NICOTINE DEPENDENCE: ICD-10-CM

## 2017-10-17 DIAGNOSIS — G93.41 METABOLIC ENCEPHALOPATHY: ICD-10-CM

## 2017-10-17 DIAGNOSIS — E43 UNSPECIFIED SEVERE PROTEIN-CALORIE MALNUTRITION: ICD-10-CM

## 2018-01-12 NOTE — PROGRESS NOTE ADULT - PROBLEM SELECTOR PROBLEM 3
Essential hypertension
Pain not relieved by Medications/Bleeding that does not stop/Numbness, color, or temperature change to extremity/Fever greater than 101/Numbness, tingling/Swelling that continues/Persistent Nausea and Vomiting

## 2018-01-31 ENCOUNTER — OUTPATIENT (OUTPATIENT)
Dept: OUTPATIENT SERVICES | Facility: HOSPITAL | Age: 78
LOS: 1 days | Discharge: HOME | End: 2018-01-31
Payer: MEDICARE

## 2018-01-31 VITALS
TEMPERATURE: 98 F | RESPIRATION RATE: 16 BRPM | HEIGHT: 62 IN | DIASTOLIC BLOOD PRESSURE: 58 MMHG | SYSTOLIC BLOOD PRESSURE: 150 MMHG | WEIGHT: 159.39 LBS | HEART RATE: 65 BPM | OXYGEN SATURATION: 100 %

## 2018-01-31 DIAGNOSIS — Z96.7 PRESENCE OF OTHER BONE AND TENDON IMPLANTS: Chronic | ICD-10-CM

## 2018-01-31 DIAGNOSIS — Z98.890 OTHER SPECIFIED POSTPROCEDURAL STATES: Chronic | ICD-10-CM

## 2018-01-31 DIAGNOSIS — E11.9 TYPE 2 DIABETES MELLITUS WITHOUT COMPLICATIONS: ICD-10-CM

## 2018-01-31 DIAGNOSIS — Z99.81 DEPENDENCE ON SUPPLEMENTAL OXYGEN: ICD-10-CM

## 2018-01-31 DIAGNOSIS — I13.0 HYPERTENSIVE HEART AND CHRONIC KIDNEY DISEASE WITH HEART FAILURE AND STAGE 1 THROUGH STAGE 4 CHRONIC KIDNEY DISEASE, OR UNSPECIFIED CHRONIC KIDNEY DISEASE: ICD-10-CM

## 2018-01-31 DIAGNOSIS — I50.32 CHRONIC DIASTOLIC (CONGESTIVE) HEART FAILURE: ICD-10-CM

## 2018-01-31 DIAGNOSIS — Z01.818 ENCOUNTER FOR OTHER PREPROCEDURAL EXAMINATION: ICD-10-CM

## 2018-01-31 DIAGNOSIS — J44.9 CHRONIC OBSTRUCTIVE PULMONARY DISEASE, UNSPECIFIED: ICD-10-CM

## 2018-01-31 DIAGNOSIS — S72.142K DISPLACED INTERTROCHANTERIC FRACTURE OF LEFT FEMUR, SUBSEQUENT ENCOUNTER FOR CLOSED FRACTURE WITH NONUNION: ICD-10-CM

## 2018-01-31 DIAGNOSIS — K21.9 GASTRO-ESOPHAGEAL REFLUX DISEASE WITHOUT ESOPHAGITIS: ICD-10-CM

## 2018-01-31 DIAGNOSIS — I10 ESSENTIAL (PRIMARY) HYPERTENSION: ICD-10-CM

## 2018-01-31 DIAGNOSIS — E03.9 HYPOTHYROIDISM, UNSPECIFIED: ICD-10-CM

## 2018-01-31 DIAGNOSIS — I25.2 OLD MYOCARDIAL INFARCTION: ICD-10-CM

## 2018-01-31 DIAGNOSIS — Z90.2 ACQUIRED ABSENCE OF LUNG [PART OF]: Chronic | ICD-10-CM

## 2018-01-31 DIAGNOSIS — M25.552 PAIN IN LEFT HIP: ICD-10-CM

## 2018-01-31 DIAGNOSIS — D62 ACUTE POSTHEMORRHAGIC ANEMIA: ICD-10-CM

## 2018-01-31 DIAGNOSIS — Z96.659 PRESENCE OF UNSPECIFIED ARTIFICIAL KNEE JOINT: Chronic | ICD-10-CM

## 2018-01-31 DIAGNOSIS — Z90.49 ACQUIRED ABSENCE OF OTHER SPECIFIED PARTS OF DIGESTIVE TRACT: Chronic | ICD-10-CM

## 2018-01-31 DIAGNOSIS — N39.0 URINARY TRACT INFECTION, SITE NOT SPECIFIED: ICD-10-CM

## 2018-01-31 DIAGNOSIS — N17.9 ACUTE KIDNEY FAILURE, UNSPECIFIED: ICD-10-CM

## 2018-01-31 DIAGNOSIS — Z98.49 CATARACT EXTRACTION STATUS, UNSPECIFIED EYE: Chronic | ICD-10-CM

## 2018-01-31 LAB
ALLERGY+IMMUNOLOGY DIAG STUDY NOTE: SIGNIFICANT CHANGE UP
ANION GAP SERPL CALC-SCNC: 6 MMOL/L — SIGNIFICANT CHANGE UP (ref 5–17)
ANISOCYTOSIS BLD QL: SLIGHT — SIGNIFICANT CHANGE UP
APPEARANCE UR: CLEAR — SIGNIFICANT CHANGE UP
BACTERIA # UR AUTO: (no result)
BASOPHILS # BLD AUTO: 0.1 K/UL — SIGNIFICANT CHANGE UP (ref 0–0.2)
BASOPHILS NFR BLD AUTO: 0.8 % — SIGNIFICANT CHANGE UP (ref 0–2)
BILIRUB UR-MCNC: NEGATIVE — SIGNIFICANT CHANGE UP
BUN SERPL-MCNC: 45 MG/DL — HIGH (ref 7–23)
CALCIUM SERPL-MCNC: 9.3 MG/DL — SIGNIFICANT CHANGE UP (ref 8.5–10.1)
CHLORIDE SERPL-SCNC: 107 MMOL/L — SIGNIFICANT CHANGE UP (ref 96–108)
CO2 SERPL-SCNC: 27 MMOL/L — SIGNIFICANT CHANGE UP (ref 22–31)
COLOR SPEC: YELLOW — SIGNIFICANT CHANGE UP
CREAT SERPL-MCNC: 2.09 MG/DL — HIGH (ref 0.5–1.3)
DIFF PNL FLD: (no result)
EOSINOPHIL # BLD AUTO: 0.3 K/UL — SIGNIFICANT CHANGE UP (ref 0–0.5)
EOSINOPHIL NFR BLD AUTO: 3.7 % — SIGNIFICANT CHANGE UP (ref 0–6)
EPI CELLS # UR: SIGNIFICANT CHANGE UP
GLUCOSE SERPL-MCNC: 182 MG/DL — HIGH (ref 70–99)
GLUCOSE UR QL: NEGATIVE MG/DL — SIGNIFICANT CHANGE UP
HCT VFR BLD CALC: 27.9 % — LOW (ref 34.5–45)
HGB BLD-MCNC: 9 G/DL — LOW (ref 11.5–15.5)
HYPOCHROMIA BLD QL: SLIGHT — SIGNIFICANT CHANGE UP
KETONES UR-MCNC: NEGATIVE — SIGNIFICANT CHANGE UP
LEUKOCYTE ESTERASE UR-ACNC: (no result)
LYMPHOCYTES # BLD AUTO: 1.3 K/UL — SIGNIFICANT CHANGE UP (ref 1–3.3)
LYMPHOCYTES # BLD AUTO: 15.8 % — SIGNIFICANT CHANGE UP (ref 13–44)
MANUAL DIF COMMENT BLD-IMP: SIGNIFICANT CHANGE UP
MCHC RBC-ENTMCNC: 28.9 PG — SIGNIFICANT CHANGE UP (ref 27–34)
MCHC RBC-ENTMCNC: 32.2 GM/DL — SIGNIFICANT CHANGE UP (ref 32–36)
MCV RBC AUTO: 89.8 FL — SIGNIFICANT CHANGE UP (ref 80–100)
MONOCYTES # BLD AUTO: 0.6 K/UL — SIGNIFICANT CHANGE UP (ref 0–0.9)
MONOCYTES NFR BLD AUTO: 7.1 % — SIGNIFICANT CHANGE UP (ref 2–14)
MRSA PCR RESULT.: SIGNIFICANT CHANGE UP
NEUTROPHILS # BLD AUTO: 5.8 K/UL — SIGNIFICANT CHANGE UP (ref 1.8–7.4)
NEUTROPHILS NFR BLD AUTO: 72.5 % — SIGNIFICANT CHANGE UP (ref 43–77)
NITRITE UR-MCNC: NEGATIVE — SIGNIFICANT CHANGE UP
OVALOCYTES BLD QL SMEAR: SLIGHT — SIGNIFICANT CHANGE UP
PH UR: 7 — SIGNIFICANT CHANGE UP (ref 5–8)
PLAT MORPH BLD: NORMAL — SIGNIFICANT CHANGE UP
PLATELET # BLD AUTO: 312 K/UL — SIGNIFICANT CHANGE UP (ref 150–400)
POIKILOCYTOSIS BLD QL AUTO: SLIGHT — SIGNIFICANT CHANGE UP
POTASSIUM SERPL-MCNC: 4.9 MMOL/L — SIGNIFICANT CHANGE UP (ref 3.5–5.3)
POTASSIUM SERPL-SCNC: 4.9 MMOL/L — SIGNIFICANT CHANGE UP (ref 3.5–5.3)
PROT UR-MCNC: 100 MG/DL
RBC # BLD: 3.1 M/UL — LOW (ref 3.8–5.2)
RBC # FLD: 14.1 % — SIGNIFICANT CHANGE UP (ref 10.3–14.5)
RBC BLD AUTO: (no result)
RBC CASTS # UR COMP ASSIST: SIGNIFICANT CHANGE UP /HPF (ref 0–4)
S AUREUS DNA NOSE QL NAA+PROBE: SIGNIFICANT CHANGE UP
SODIUM SERPL-SCNC: 140 MMOL/L — SIGNIFICANT CHANGE UP (ref 135–145)
SP GR SPEC: 1 — LOW (ref 1.01–1.02)
UROBILINOGEN FLD QL: NEGATIVE MG/DL — SIGNIFICANT CHANGE UP
WBC # BLD: 7.9 K/UL — SIGNIFICANT CHANGE UP (ref 3.8–10.5)
WBC # FLD AUTO: 7.9 K/UL — SIGNIFICANT CHANGE UP (ref 3.8–10.5)
WBC UR QL: (no result)

## 2018-01-31 PROCEDURE — 71046 X-RAY EXAM CHEST 2 VIEWS: CPT | Mod: 26

## 2018-01-31 RX ORDER — METHENAMINE MANDELATE 1 G
1 TABLET ORAL
Qty: 0 | Refills: 0 | COMMUNITY

## 2018-01-31 NOTE — H&P PST ADULT - PROBLEM SELECTOR PLAN 1
Left THR and removal of IM nail in femur  EZ wash instructions and mupirocin instructions given Left THR and removal of IM nail in femur  EZ wash instructions and mupirocin instructions given  caprini score 11

## 2018-01-31 NOTE — H&P PST ADULT - ANESTHESIA, PREVIOUS REACTION, PROFILE
confusion; delayed awakening for 36 hours/delayed awakening delayed awakening/confusion for 4 months after surgery August 2017; delayed awakening August 2017 for 36 hours at [facility]  after  ORIF left femur according to pt granddaughter pt has been on oxygen since surgery in August 2017 confusion for 4 months after surgery August 2017; delayed awakening August 2017 for 36 hours at [facility]  after  ORIF left femur according to pt granddaughter pt has been on oxygen since surgery in August 2017; according to grand daughter dr darnell aware will try to do block during surgery to avoid general anesthesia/delayed awakening

## 2018-01-31 NOTE — H&P PST ADULT - PMH
Chronic diastolic congestive heart failure  last exacerbation 7/2017  Chronic obstructive pulmonary disease, unspecified COPD type    Chronic UTI    Diabetes    Essential hypertension    Hyperlipidemia, unspecified hyperlipidemia type    MI, old  x3  Neuropathy Chronic diastolic congestive heart failure  last exacerbation 7/2017  Chronic obstructive pulmonary disease, unspecified COPD type    Chronic UTI    CKD (chronic kidney disease)    Diabetes    Essential hypertension    Hyperlipidemia, unspecified hyperlipidemia type    MI, old  x3  Neuropathy Anxiety    Chronic diastolic congestive heart failure  last exacerbation 7/2017  Chronic obstructive pulmonary disease, unspecified COPD type    Chronic UTI    CKD (chronic kidney disease)    Diabetes    Essential hypertension    GERD (gastroesophageal reflux disease)    [place] (hard of hearing)  hearing aid  Hyperlipidemia, unspecified hyperlipidemia type    Hypothyroid    MI, old  x3  Neuropathy

## 2018-01-31 NOTE — H&P PST ADULT - HISTORY OF PRESENT ILLNESS
77 year old female PMH of CHF last exacerbation August 2018 ( pt has mems implant), COPD ( never intubated), HTN, HLD, MI x3 ( on plavix and aspirin), T2DM on insulin, CKD , chronic UTI on Hiprex, lobectomy of lung for precancerous cells  ; pt fell in August 2017 s/p ORIF still c/o left hip pain unable to stand and walk long distances; walks with walker ; according to pt brian is not in the right place  presents for Left THR and removal of IM nail in femur  Pt on oxygen 2L 24 hours/day 100 % with oxygen 91% on RA ; started oxygen  August 2017 77 year old female PMH of CHF last exacerbation August 2018 ( pt has mems implant), COPD ( never intubated), HTN, HLD, MI x3 ( on plavix and aspirin), T2DM on insulin, CKD , chronic UTI on Hiprex, lobectomy of lung for precancerous cells  ; pt fell in August 2017 s/p ORIF still c/o left hip pain unable to stand and walk long distances; walks with walker ; according to pt left femur brian is not in the right place  presents for Left THR and removal of IM nail in femur  Pt on oxygen 2L 24 hours/day 100 % with oxygen 91% on RA ; started oxygen  August 2017

## 2018-01-31 NOTE — H&P PST ADULT - PSH
delivery delivered    H/O hernia repair  umbilical and incisional  History of cataract surgery  bilateral IOL  History of cholecystectomy    History of ear surgery    History of total knee replacement, unspecified laterality  bilateral right 2008 left   S/P appendectomy    S/P lobectomy of lung  left lung pre-cancerous  S/P ORIF (open reduction internal fixation) fracture  left hip 2017

## 2018-02-01 PROBLEM — I50.32 CHRONIC DIASTOLIC (CONGESTIVE) HEART FAILURE: Chronic | Status: ACTIVE | Noted: 2017-08-29

## 2018-02-07 RX ORDER — ACETAMINOPHEN 500 MG
650 TABLET ORAL ONCE
Qty: 0 | Refills: 0 | Status: COMPLETED | OUTPATIENT
Start: 2018-02-08 | End: 2018-02-08

## 2018-02-07 RX ORDER — PANTOPRAZOLE SODIUM 20 MG/1
40 TABLET, DELAYED RELEASE ORAL ONCE
Qty: 0 | Refills: 0 | Status: COMPLETED | OUTPATIENT
Start: 2018-02-08 | End: 2018-02-08

## 2018-02-08 ENCOUNTER — INPATIENT (INPATIENT)
Facility: HOSPITAL | Age: 78
LOS: 2 days | Discharge: NURSING, REHAB OR LONG-TERM CARE | End: 2018-02-11
Attending: ORTHOPAEDIC SURGERY | Admitting: ORTHOPAEDIC SURGERY
Payer: MEDICARE

## 2018-02-08 ENCOUNTER — RESULT REVIEW (OUTPATIENT)
Age: 78
End: 2018-02-08

## 2018-02-08 VITALS
RESPIRATION RATE: 16 BRPM | HEART RATE: 81 BPM | OXYGEN SATURATION: 100 % | TEMPERATURE: 98 F | WEIGHT: 158.07 LBS | SYSTOLIC BLOOD PRESSURE: 189 MMHG | HEIGHT: 61 IN | DIASTOLIC BLOOD PRESSURE: 59 MMHG

## 2018-02-08 DIAGNOSIS — Z98.49 CATARACT EXTRACTION STATUS, UNSPECIFIED EYE: Chronic | ICD-10-CM

## 2018-02-08 DIAGNOSIS — Z90.2 ACQUIRED ABSENCE OF LUNG [PART OF]: Chronic | ICD-10-CM

## 2018-02-08 DIAGNOSIS — Z98.890 OTHER SPECIFIED POSTPROCEDURAL STATES: Chronic | ICD-10-CM

## 2018-02-08 DIAGNOSIS — Z96.659 PRESENCE OF UNSPECIFIED ARTIFICIAL KNEE JOINT: Chronic | ICD-10-CM

## 2018-02-08 DIAGNOSIS — Z90.49 ACQUIRED ABSENCE OF OTHER SPECIFIED PARTS OF DIGESTIVE TRACT: Chronic | ICD-10-CM

## 2018-02-08 DIAGNOSIS — Z96.7 PRESENCE OF OTHER BONE AND TENDON IMPLANTS: Chronic | ICD-10-CM

## 2018-02-08 LAB
ANION GAP SERPL CALC-SCNC: 11 MMOL/L — SIGNIFICANT CHANGE UP (ref 5–17)
BUN SERPL-MCNC: 50 MG/DL — HIGH (ref 7–23)
CALCIUM SERPL-MCNC: 7.9 MG/DL — LOW (ref 8.5–10.1)
CHLORIDE SERPL-SCNC: 111 MMOL/L — HIGH (ref 96–108)
CO2 SERPL-SCNC: 21 MMOL/L — LOW (ref 22–31)
CREAT SERPL-MCNC: 2.25 MG/DL — HIGH (ref 0.5–1.3)
GLUCOSE BLDC GLUCOMTR-MCNC: 201 MG/DL — HIGH (ref 70–99)
GLUCOSE BLDC GLUCOMTR-MCNC: 203 MG/DL — HIGH (ref 70–99)
GLUCOSE BLDC GLUCOMTR-MCNC: 208 MG/DL — HIGH (ref 70–99)
GLUCOSE SERPL-MCNC: 225 MG/DL — HIGH (ref 70–99)
HCT VFR BLD CALC: 35.8 % — SIGNIFICANT CHANGE UP (ref 34.5–45)
HGB BLD-MCNC: 11.8 G/DL — SIGNIFICANT CHANGE UP (ref 11.5–15.5)
INR BLD: 1.11 RATIO — SIGNIFICANT CHANGE UP (ref 0.88–1.16)
INR BLD: 1.15 RATIO — SIGNIFICANT CHANGE UP (ref 0.88–1.16)
MCHC RBC-ENTMCNC: 29.7 PG — SIGNIFICANT CHANGE UP (ref 27–34)
MCHC RBC-ENTMCNC: 32.9 GM/DL — SIGNIFICANT CHANGE UP (ref 32–36)
MCV RBC AUTO: 90.4 FL — SIGNIFICANT CHANGE UP (ref 80–100)
PLATELET # BLD AUTO: 223 K/UL — SIGNIFICANT CHANGE UP (ref 150–400)
POTASSIUM SERPL-MCNC: 5 MMOL/L — SIGNIFICANT CHANGE UP (ref 3.5–5.3)
POTASSIUM SERPL-SCNC: 5 MMOL/L — SIGNIFICANT CHANGE UP (ref 3.5–5.3)
PROTHROM AB SERPL-ACNC: 12 SEC — SIGNIFICANT CHANGE UP (ref 9.8–12.7)
PROTHROM AB SERPL-ACNC: 12.5 SEC — SIGNIFICANT CHANGE UP (ref 9.8–12.7)
RBC # BLD: 3.96 M/UL — SIGNIFICANT CHANGE UP (ref 3.8–5.2)
RBC # FLD: 13.7 % — SIGNIFICANT CHANGE UP (ref 10.3–14.5)
SODIUM SERPL-SCNC: 143 MMOL/L — SIGNIFICANT CHANGE UP (ref 135–145)
WBC # BLD: 13.2 K/UL — HIGH (ref 3.8–10.5)
WBC # FLD AUTO: 13.2 K/UL — HIGH (ref 3.8–10.5)

## 2018-02-08 PROCEDURE — 73501 X-RAY EXAM HIP UNI 1 VIEW: CPT | Mod: 26

## 2018-02-08 PROCEDURE — 88311 DECALCIFY TISSUE: CPT | Mod: 26

## 2018-02-08 PROCEDURE — 88305 TISSUE EXAM BY PATHOLOGIST: CPT | Mod: 26

## 2018-02-08 RX ORDER — OXYCODONE HYDROCHLORIDE 5 MG/1
10 TABLET ORAL EVERY 12 HOURS
Qty: 0 | Refills: 0 | Status: DISCONTINUED | OUTPATIENT
Start: 2018-02-08 | End: 2018-02-09

## 2018-02-08 RX ORDER — LEVOTHYROXINE SODIUM 125 MCG
112 TABLET ORAL DAILY
Qty: 0 | Refills: 0 | Status: DISCONTINUED | OUTPATIENT
Start: 2018-02-08 | End: 2018-02-11

## 2018-02-08 RX ORDER — HYDROMORPHONE HYDROCHLORIDE 2 MG/ML
0.5 INJECTION INTRAMUSCULAR; INTRAVENOUS; SUBCUTANEOUS
Qty: 0 | Refills: 0 | Status: DISCONTINUED | OUTPATIENT
Start: 2018-02-08 | End: 2018-02-09

## 2018-02-08 RX ORDER — SENNA PLUS 8.6 MG/1
2 TABLET ORAL AT BEDTIME
Qty: 0 | Refills: 0 | Status: DISCONTINUED | OUTPATIENT
Start: 2018-02-08 | End: 2018-02-11

## 2018-02-08 RX ORDER — ACETAMINOPHEN 500 MG
1000 TABLET ORAL ONCE
Qty: 0 | Refills: 0 | Status: COMPLETED | OUTPATIENT
Start: 2018-02-08 | End: 2018-02-08

## 2018-02-08 RX ORDER — OXYCODONE HYDROCHLORIDE 5 MG/1
5 TABLET ORAL EVERY 4 HOURS
Qty: 0 | Refills: 0 | Status: DISCONTINUED | OUTPATIENT
Start: 2018-02-08 | End: 2018-02-08

## 2018-02-08 RX ORDER — POLYETHYLENE GLYCOL 3350 17 G/17G
17 POWDER, FOR SOLUTION ORAL DAILY
Qty: 0 | Refills: 0 | Status: DISCONTINUED | OUTPATIENT
Start: 2018-02-08 | End: 2018-02-11

## 2018-02-08 RX ORDER — ALPRAZOLAM 0.25 MG
0.5 TABLET ORAL AT BEDTIME
Qty: 0 | Refills: 0 | Status: DISCONTINUED | OUTPATIENT
Start: 2018-02-08 | End: 2018-02-11

## 2018-02-08 RX ORDER — MIRTAZAPINE 45 MG/1
7.5 TABLET, ORALLY DISINTEGRATING ORAL
Qty: 0 | Refills: 0 | Status: DISCONTINUED | OUTPATIENT
Start: 2018-02-08 | End: 2018-02-11

## 2018-02-08 RX ORDER — ACETAMINOPHEN 500 MG
650 TABLET ORAL EVERY 6 HOURS
Qty: 0 | Refills: 0 | Status: DISCONTINUED | OUTPATIENT
Start: 2018-02-08 | End: 2018-02-11

## 2018-02-08 RX ORDER — BUMETANIDE 0.25 MG/ML
1 INJECTION INTRAMUSCULAR; INTRAVENOUS DAILY
Qty: 0 | Refills: 0 | Status: DISCONTINUED | OUTPATIENT
Start: 2018-02-08 | End: 2018-02-09

## 2018-02-08 RX ORDER — PANTOPRAZOLE SODIUM 20 MG/1
40 TABLET, DELAYED RELEASE ORAL DAILY
Qty: 0 | Refills: 0 | Status: DISCONTINUED | OUTPATIENT
Start: 2018-02-08 | End: 2018-02-11

## 2018-02-08 RX ORDER — DEXTROSE 50 % IN WATER 50 %
25 SYRINGE (ML) INTRAVENOUS ONCE
Qty: 0 | Refills: 0 | Status: DISCONTINUED | OUTPATIENT
Start: 2018-02-08 | End: 2018-02-11

## 2018-02-08 RX ORDER — CLOPIDOGREL BISULFATE 75 MG/1
75 TABLET, FILM COATED ORAL DAILY
Qty: 0 | Refills: 0 | Status: DISCONTINUED | OUTPATIENT
Start: 2018-02-08 | End: 2018-02-11

## 2018-02-08 RX ORDER — FENTANYL CITRATE 50 UG/ML
50 INJECTION INTRAVENOUS
Qty: 0 | Refills: 0 | Status: DISCONTINUED | OUTPATIENT
Start: 2018-02-08 | End: 2018-02-08

## 2018-02-08 RX ORDER — PROCHLORPERAZINE MALEATE 5 MG
10 TABLET ORAL EVERY 8 HOURS
Qty: 0 | Refills: 0 | Status: DISCONTINUED | OUTPATIENT
Start: 2018-02-08 | End: 2018-02-11

## 2018-02-08 RX ORDER — ONDANSETRON 8 MG/1
4 TABLET, FILM COATED ORAL EVERY 6 HOURS
Qty: 0 | Refills: 0 | Status: DISCONTINUED | OUTPATIENT
Start: 2018-02-08 | End: 2018-02-11

## 2018-02-08 RX ORDER — DEXTROSE 50 % IN WATER 50 %
12.5 SYRINGE (ML) INTRAVENOUS ONCE
Qty: 0 | Refills: 0 | Status: DISCONTINUED | OUTPATIENT
Start: 2018-02-08 | End: 2018-02-11

## 2018-02-08 RX ORDER — SODIUM CHLORIDE 9 MG/ML
1000 INJECTION INTRAMUSCULAR; INTRAVENOUS; SUBCUTANEOUS
Qty: 0 | Refills: 0 | Status: DISCONTINUED | OUTPATIENT
Start: 2018-02-08 | End: 2018-02-08

## 2018-02-08 RX ORDER — FOLIC ACID 0.8 MG
1 TABLET ORAL DAILY
Qty: 0 | Refills: 0 | Status: DISCONTINUED | OUTPATIENT
Start: 2018-02-08 | End: 2018-02-11

## 2018-02-08 RX ORDER — OXYCODONE HYDROCHLORIDE 5 MG/1
10 TABLET ORAL EVERY 6 HOURS
Qty: 0 | Refills: 0 | Status: DISCONTINUED | OUTPATIENT
Start: 2018-02-08 | End: 2018-02-09

## 2018-02-08 RX ORDER — SODIUM CHLORIDE 9 MG/ML
1000 INJECTION, SOLUTION INTRAVENOUS
Qty: 0 | Refills: 0 | Status: DISCONTINUED | OUTPATIENT
Start: 2018-02-08 | End: 2018-02-11

## 2018-02-08 RX ORDER — INSULIN LISPRO 100/ML
VIAL (ML) SUBCUTANEOUS
Qty: 0 | Refills: 0 | Status: DISCONTINUED | OUTPATIENT
Start: 2018-02-08 | End: 2018-02-11

## 2018-02-08 RX ORDER — PANTOPRAZOLE SODIUM 20 MG/1
40 TABLET, DELAYED RELEASE ORAL
Qty: 0 | Refills: 0 | Status: DISCONTINUED | OUTPATIENT
Start: 2018-02-08 | End: 2018-02-08

## 2018-02-08 RX ORDER — INSULIN GLARGINE 100 [IU]/ML
10 INJECTION, SOLUTION SUBCUTANEOUS ONCE
Qty: 0 | Refills: 0 | Status: COMPLETED | OUTPATIENT
Start: 2018-02-08 | End: 2018-02-08

## 2018-02-08 RX ORDER — DIPHENHYDRAMINE HCL 50 MG
25 CAPSULE ORAL AT BEDTIME
Qty: 0 | Refills: 0 | Status: DISCONTINUED | OUTPATIENT
Start: 2018-02-08 | End: 2018-02-11

## 2018-02-08 RX ORDER — HYDROMORPHONE HYDROCHLORIDE 2 MG/ML
0.5 INJECTION INTRAMUSCULAR; INTRAVENOUS; SUBCUTANEOUS
Qty: 0 | Refills: 0 | Status: DISCONTINUED | OUTPATIENT
Start: 2018-02-08 | End: 2018-02-11

## 2018-02-08 RX ORDER — ATORVASTATIN CALCIUM 80 MG/1
20 TABLET, FILM COATED ORAL AT BEDTIME
Qty: 0 | Refills: 0 | Status: DISCONTINUED | OUTPATIENT
Start: 2018-02-08 | End: 2018-02-11

## 2018-02-08 RX ORDER — OXYCODONE HYDROCHLORIDE 5 MG/1
5 TABLET ORAL EVERY 6 HOURS
Qty: 0 | Refills: 0 | Status: DISCONTINUED | OUTPATIENT
Start: 2018-02-08 | End: 2018-02-11

## 2018-02-08 RX ORDER — TIOTROPIUM BROMIDE 18 UG/1
1 CAPSULE ORAL; RESPIRATORY (INHALATION) AT BEDTIME
Qty: 0 | Refills: 0 | Status: DISCONTINUED | OUTPATIENT
Start: 2018-02-08 | End: 2018-02-11

## 2018-02-08 RX ORDER — DOCUSATE SODIUM 100 MG
100 CAPSULE ORAL THREE TIMES A DAY
Qty: 0 | Refills: 0 | Status: DISCONTINUED | OUTPATIENT
Start: 2018-02-08 | End: 2018-02-11

## 2018-02-08 RX ORDER — DILTIAZEM HCL 120 MG
180 CAPSULE, EXT RELEASE 24 HR ORAL DAILY
Qty: 0 | Refills: 0 | Status: DISCONTINUED | OUTPATIENT
Start: 2018-02-08 | End: 2018-02-11

## 2018-02-08 RX ORDER — VANCOMYCIN HCL 1 G
1000 VIAL (EA) INTRAVENOUS ONCE
Qty: 0 | Refills: 0 | Status: COMPLETED | OUTPATIENT
Start: 2018-02-09 | End: 2018-02-09

## 2018-02-08 RX ORDER — GLUCAGON INJECTION, SOLUTION 0.5 MG/.1ML
1 INJECTION, SOLUTION SUBCUTANEOUS ONCE
Qty: 0 | Refills: 0 | Status: DISCONTINUED | OUTPATIENT
Start: 2018-02-08 | End: 2018-02-11

## 2018-02-08 RX ORDER — DEXTROSE 50 % IN WATER 50 %
1 SYRINGE (ML) INTRAVENOUS ONCE
Qty: 0 | Refills: 0 | Status: DISCONTINUED | OUTPATIENT
Start: 2018-02-08 | End: 2018-02-11

## 2018-02-08 RX ADMIN — Medication 100 MILLIGRAM(S): at 21:07

## 2018-02-08 RX ADMIN — HYDROMORPHONE HYDROCHLORIDE 0.5 MILLIGRAM(S): 2 INJECTION INTRAMUSCULAR; INTRAVENOUS; SUBCUTANEOUS at 17:59

## 2018-02-08 RX ADMIN — MIRTAZAPINE 7.5 MILLIGRAM(S): 45 TABLET, ORALLY DISINTEGRATING ORAL at 22:24

## 2018-02-08 RX ADMIN — HYDROMORPHONE HYDROCHLORIDE 0.5 MILLIGRAM(S): 2 INJECTION INTRAMUSCULAR; INTRAVENOUS; SUBCUTANEOUS at 20:23

## 2018-02-08 RX ADMIN — FENTANYL CITRATE 50 MICROGRAM(S): 50 INJECTION INTRAVENOUS at 17:04

## 2018-02-08 RX ADMIN — SODIUM CHLORIDE 75 MILLILITER(S): 9 INJECTION INTRAMUSCULAR; INTRAVENOUS; SUBCUTANEOUS at 17:38

## 2018-02-08 RX ADMIN — Medication 2: at 17:44

## 2018-02-08 RX ADMIN — INSULIN GLARGINE 10 UNIT(S): 100 INJECTION, SOLUTION SUBCUTANEOUS at 22:26

## 2018-02-08 RX ADMIN — HYDROMORPHONE HYDROCHLORIDE 0.5 MILLIGRAM(S): 2 INJECTION INTRAMUSCULAR; INTRAVENOUS; SUBCUTANEOUS at 18:23

## 2018-02-08 RX ADMIN — FENTANYL CITRATE 50 MICROGRAM(S): 50 INJECTION INTRAVENOUS at 17:17

## 2018-02-08 RX ADMIN — Medication 650 MILLIGRAM(S): at 10:34

## 2018-02-08 RX ADMIN — Medication 400 MILLIGRAM(S): at 23:43

## 2018-02-08 RX ADMIN — TIOTROPIUM BROMIDE 1 CAPSULE(S): 18 CAPSULE ORAL; RESPIRATORY (INHALATION) at 23:39

## 2018-02-08 RX ADMIN — ATORVASTATIN CALCIUM 20 MILLIGRAM(S): 80 TABLET, FILM COATED ORAL at 21:07

## 2018-02-08 RX ADMIN — HYDROMORPHONE HYDROCHLORIDE 0.5 MILLIGRAM(S): 2 INJECTION INTRAMUSCULAR; INTRAVENOUS; SUBCUTANEOUS at 18:50

## 2018-02-08 RX ADMIN — FENTANYL CITRATE 50 MICROGRAM(S): 50 INJECTION INTRAVENOUS at 17:34

## 2018-02-08 RX ADMIN — PANTOPRAZOLE SODIUM 40 MILLIGRAM(S): 20 TABLET, DELAYED RELEASE ORAL at 10:34

## 2018-02-08 NOTE — PHYSICAL THERAPY INITIAL EVALUATION ADULT - PERTINENT HX OF CURRENT PROBLEM, REHAB EVAL
77 year old female PMH of CHF last exacerbation August 2018 ( pt has mems implant), COPD ( never intubated), HTN, HLD, MI x3 ( on plavix and aspirin), T2DM on insulin, CKD , chronic UTI on Hiprex, lobectomy of lung for precancerous cells  ; pt fell in August 2017 s/p ORIF still c/o left hip pain unable to stand and walk long distances; walks with walker ; s/p Mechanical breakdown of internal fixation device of left femur presents for Left THR and removal of IM nail in femur

## 2018-02-08 NOTE — BRIEF OPERATIVE NOTE - PROCEDURE
<<-----Click on this checkbox to enter Procedure Total hip arthroplasty  02/08/2018  conversion from IM Nail  Active  [email or web address]  Removal of hardware from left femur  02/08/2018    Active  [email or web address]

## 2018-02-08 NOTE — BRIEF OPERATIVE NOTE - POST-OP DX
Mechanical breakdown of internal fixation device of left femur, initial encounter  02/08/2018  failed IM Nail  Active  Jaime Cavazos

## 2018-02-08 NOTE — PHYSICAL THERAPY INITIAL EVALUATION ADULT - PATIENT/FAMILY/SIGNIFICANT OTHER GOALS STATEMENT, PT EVAL
The pt and pt's dtr expressed that they hope to go home with home PT and nursing services once medically stable for discharge. The pt was reportedly attending [facility] for several months from September to December of 2017 and does not want to return to [facility].

## 2018-02-08 NOTE — PATIENT PROFILE ADULT. - PMH
Anxiety    Chronic diastolic congestive heart failure  last exacerbation 7/2017  Chronic obstructive pulmonary disease, unspecified COPD type    Chronic UTI    CKD (chronic kidney disease)    Diabetes    Essential hypertension    GERD (gastroesophageal reflux disease)    [place] (hard of hearing)  hearing aid  Hyperlipidemia, unspecified hyperlipidemia type    Hypothyroid    MI, old  x3  Neuropathy

## 2018-02-08 NOTE — PHYSICAL THERAPY INITIAL EVALUATION ADULT - GENERAL OBSERVATIONS, REHAB EVAL
The pt was received on a stretcher in the PACU, the pt's dtr Anabelle was present bedside. The pt was visibly uncomfortable, with intermittent c/o fatigue and intermittent emotionally labile moments, i.e. crying, etc. The pt agreed to therex review and posterior hip precautions review in bed but requested to defer OOB assessment until tomorrow and until pain was better managed.

## 2018-02-08 NOTE — PROGRESS NOTE ADULT - ASSESSMENT
77F s/p Left hip revision with ROM     P:  pain control   DVT ppx   post op abx  therapy   WBAT LLE   total hip precaitions   abduction pillow  incentive spirometer  venodynes

## 2018-02-08 NOTE — PATIENT PROFILE ADULT. - ANESTHESIA, PREVIOUS REACTION, PROFILE
delayed awakening/confusion for 4 months after surgery August 2017; delayed awakening August 2017 for 36 hours at [facility]  after  ORIF left femur according to pt granddaughter pt has been on oxygen since surgery in August 2017; according to grand daughter dr darnell aware will try to do block during surgery to avoid general anesthesia

## 2018-02-08 NOTE — PHYSICAL THERAPY INITIAL EVALUATION ADULT - MODALITIES TREATMENT COMMENTS
The pt was evaluated bedside, therex and posterior hip precautions reviewed, limited evaluation due to persistent c/o pain. Abduction pillow secured. All questions answered/ pt comforted about movement and potential of movement through PT.

## 2018-02-08 NOTE — BRIEF OPERATIVE NOTE - PRE-OP DX
Mechanical breakdown of internal fixation device of left femur, initial encounter  02/08/2018  failed IMN  Active  Jaime Cavazos

## 2018-02-09 ENCOUNTER — TRANSCRIPTION ENCOUNTER (OUTPATIENT)
Age: 78
End: 2018-02-09

## 2018-02-09 DIAGNOSIS — I50.32 CHRONIC DIASTOLIC (CONGESTIVE) HEART FAILURE: ICD-10-CM

## 2018-02-09 DIAGNOSIS — R50.82 POSTPROCEDURAL FEVER: ICD-10-CM

## 2018-02-09 DIAGNOSIS — D50.0 IRON DEFICIENCY ANEMIA SECONDARY TO BLOOD LOSS (CHRONIC): ICD-10-CM

## 2018-02-09 DIAGNOSIS — Z98.890 OTHER SPECIFIED POSTPROCEDURAL STATES: ICD-10-CM

## 2018-02-09 DIAGNOSIS — J44.9 CHRONIC OBSTRUCTIVE PULMONARY DISEASE, UNSPECIFIED: ICD-10-CM

## 2018-02-09 DIAGNOSIS — I10 ESSENTIAL (PRIMARY) HYPERTENSION: ICD-10-CM

## 2018-02-09 DIAGNOSIS — F41.9 ANXIETY DISORDER, UNSPECIFIED: ICD-10-CM

## 2018-02-09 DIAGNOSIS — N17.9 ACUTE KIDNEY FAILURE, UNSPECIFIED: ICD-10-CM

## 2018-02-09 DIAGNOSIS — D72.829 ELEVATED WHITE BLOOD CELL COUNT, UNSPECIFIED: ICD-10-CM

## 2018-02-09 LAB
ANION GAP SERPL CALC-SCNC: 7 MMOL/L — SIGNIFICANT CHANGE UP (ref 5–17)
BUN SERPL-MCNC: 56 MG/DL — HIGH (ref 7–23)
CALCIUM SERPL-MCNC: 7.8 MG/DL — LOW (ref 8.5–10.1)
CHLORIDE SERPL-SCNC: 107 MMOL/L — SIGNIFICANT CHANGE UP (ref 96–108)
CO2 SERPL-SCNC: 24 MMOL/L — SIGNIFICANT CHANGE UP (ref 22–31)
CREAT SERPL-MCNC: 2.66 MG/DL — HIGH (ref 0.5–1.3)
GLUCOSE BLDC GLUCOMTR-MCNC: 181 MG/DL — HIGH (ref 70–99)
GLUCOSE BLDC GLUCOMTR-MCNC: 193 MG/DL — HIGH (ref 70–99)
GLUCOSE BLDC GLUCOMTR-MCNC: 224 MG/DL — HIGH (ref 70–99)
GLUCOSE BLDC GLUCOMTR-MCNC: 231 MG/DL — HIGH (ref 70–99)
GLUCOSE SERPL-MCNC: 220 MG/DL — HIGH (ref 70–99)
HBA1C BLD-MCNC: 5.8 % — HIGH (ref 4–5.6)
HCT VFR BLD CALC: 29.7 % — LOW (ref 34.5–45)
HGB BLD-MCNC: 10 G/DL — LOW (ref 11.5–15.5)
INR BLD: 1.25 RATIO — HIGH (ref 0.88–1.16)
MCHC RBC-ENTMCNC: 30 PG — SIGNIFICANT CHANGE UP (ref 27–34)
MCHC RBC-ENTMCNC: 33.6 GM/DL — SIGNIFICANT CHANGE UP (ref 32–36)
MCV RBC AUTO: 89.1 FL — SIGNIFICANT CHANGE UP (ref 80–100)
PLATELET # BLD AUTO: 181 K/UL — SIGNIFICANT CHANGE UP (ref 150–400)
POTASSIUM SERPL-MCNC: 4.9 MMOL/L — SIGNIFICANT CHANGE UP (ref 3.5–5.3)
POTASSIUM SERPL-SCNC: 4.9 MMOL/L — SIGNIFICANT CHANGE UP (ref 3.5–5.3)
PROTHROM AB SERPL-ACNC: 13.6 SEC — HIGH (ref 9.8–12.7)
RBC # BLD: 3.34 M/UL — LOW (ref 3.8–5.2)
RBC # FLD: 13.6 % — SIGNIFICANT CHANGE UP (ref 10.3–14.5)
SODIUM SERPL-SCNC: 138 MMOL/L — SIGNIFICANT CHANGE UP (ref 135–145)
WBC # BLD: 15.9 K/UL — HIGH (ref 3.8–10.5)
WBC # FLD AUTO: 15.9 K/UL — HIGH (ref 3.8–10.5)

## 2018-02-09 PROCEDURE — 99223 1ST HOSP IP/OBS HIGH 75: CPT

## 2018-02-09 RX ORDER — HEPARIN SODIUM 5000 [USP'U]/ML
5000 INJECTION INTRAVENOUS; SUBCUTANEOUS EVERY 8 HOURS
Qty: 0 | Refills: 0 | Status: DISCONTINUED | OUTPATIENT
Start: 2018-02-09 | End: 2018-02-11

## 2018-02-09 RX ORDER — PANTOPRAZOLE SODIUM 20 MG/1
1 TABLET, DELAYED RELEASE ORAL
Qty: 14 | Refills: 0 | OUTPATIENT
Start: 2018-02-09 | End: 2018-02-22

## 2018-02-09 RX ORDER — WARFARIN SODIUM 2.5 MG/1
5 TABLET ORAL DAILY
Qty: 0 | Refills: 0 | Status: DISCONTINUED | OUTPATIENT
Start: 2018-02-09 | End: 2018-02-10

## 2018-02-09 RX ORDER — DOCUSATE SODIUM 100 MG
1 CAPSULE ORAL
Qty: 14 | Refills: 0 | OUTPATIENT
Start: 2018-02-09 | End: 2018-02-15

## 2018-02-09 RX ORDER — INSULIN GLARGINE 100 [IU]/ML
20 INJECTION, SOLUTION SUBCUTANEOUS AT BEDTIME
Qty: 0 | Refills: 0 | Status: DISCONTINUED | OUTPATIENT
Start: 2018-02-09 | End: 2018-02-11

## 2018-02-09 RX ORDER — ACETAMINOPHEN 500 MG
2 TABLET ORAL
Qty: 0 | Refills: 0 | COMMUNITY
Start: 2018-02-09

## 2018-02-09 RX ORDER — ALPRAZOLAM 0.25 MG
0.25 TABLET ORAL EVERY 8 HOURS
Qty: 0 | Refills: 0 | Status: DISCONTINUED | OUTPATIENT
Start: 2018-02-09 | End: 2018-02-11

## 2018-02-09 RX ORDER — OXYCODONE HYDROCHLORIDE 5 MG/1
1 TABLET ORAL
Qty: 0 | Refills: 0 | COMMUNITY
Start: 2018-02-09

## 2018-02-09 RX ADMIN — CLOPIDOGREL BISULFATE 75 MILLIGRAM(S): 75 TABLET, FILM COATED ORAL at 11:01

## 2018-02-09 RX ADMIN — OXYCODONE HYDROCHLORIDE 10 MILLIGRAM(S): 5 TABLET ORAL at 05:40

## 2018-02-09 RX ADMIN — Medication 2: at 11:35

## 2018-02-09 RX ADMIN — Medication 100 MILLIGRAM(S): at 05:40

## 2018-02-09 RX ADMIN — Medication 180 MILLIGRAM(S): at 05:40

## 2018-02-09 RX ADMIN — HYDROMORPHONE HYDROCHLORIDE 0.5 MILLIGRAM(S): 2 INJECTION INTRAMUSCULAR; INTRAVENOUS; SUBCUTANEOUS at 19:00

## 2018-02-09 RX ADMIN — WARFARIN SODIUM 5 MILLIGRAM(S): 2.5 TABLET ORAL at 22:34

## 2018-02-09 RX ADMIN — HEPARIN SODIUM 5000 UNIT(S): 5000 INJECTION INTRAVENOUS; SUBCUTANEOUS at 14:50

## 2018-02-09 RX ADMIN — ATORVASTATIN CALCIUM 20 MILLIGRAM(S): 80 TABLET, FILM COATED ORAL at 22:33

## 2018-02-09 RX ADMIN — INSULIN GLARGINE 20 UNIT(S): 100 INJECTION, SOLUTION SUBCUTANEOUS at 22:34

## 2018-02-09 RX ADMIN — Medication 650 MILLIGRAM(S): at 17:03

## 2018-02-09 RX ADMIN — Medication 1 MILLIGRAM(S): at 11:00

## 2018-02-09 RX ADMIN — SODIUM CHLORIDE 75 MILLILITER(S): 9 INJECTION, SOLUTION INTRAVENOUS at 04:42

## 2018-02-09 RX ADMIN — HYDROMORPHONE HYDROCHLORIDE 0.5 MILLIGRAM(S): 2 INJECTION INTRAMUSCULAR; INTRAVENOUS; SUBCUTANEOUS at 10:30

## 2018-02-09 RX ADMIN — Medication 0.25 MILLIGRAM(S): at 09:34

## 2018-02-09 RX ADMIN — Medication 650 MILLIGRAM(S): at 11:00

## 2018-02-09 RX ADMIN — Medication 100 MILLIGRAM(S): at 14:50

## 2018-02-09 RX ADMIN — BUMETANIDE 1 MILLIGRAM(S): 0.25 INJECTION INTRAMUSCULAR; INTRAVENOUS at 05:40

## 2018-02-09 RX ADMIN — Medication 650 MILLIGRAM(S): at 19:00

## 2018-02-09 RX ADMIN — Medication 0.5 MILLIGRAM(S): at 03:30

## 2018-02-09 RX ADMIN — POLYETHYLENE GLYCOL 3350 17 GRAM(S): 17 POWDER, FOR SOLUTION ORAL at 11:01

## 2018-02-09 RX ADMIN — Medication 100 MILLIGRAM(S): at 22:33

## 2018-02-09 RX ADMIN — PANTOPRAZOLE SODIUM 40 MILLIGRAM(S): 20 TABLET, DELAYED RELEASE ORAL at 11:00

## 2018-02-09 RX ADMIN — HYDROMORPHONE HYDROCHLORIDE 0.5 MILLIGRAM(S): 2 INJECTION INTRAMUSCULAR; INTRAVENOUS; SUBCUTANEOUS at 01:30

## 2018-02-09 RX ADMIN — Medication 650 MILLIGRAM(S): at 05:40

## 2018-02-09 RX ADMIN — MIRTAZAPINE 7.5 MILLIGRAM(S): 45 TABLET, ORALLY DISINTEGRATING ORAL at 17:46

## 2018-02-09 RX ADMIN — Medication 1 TABLET(S): at 11:01

## 2018-02-09 RX ADMIN — Medication 1: at 17:04

## 2018-02-09 RX ADMIN — Medication 2: at 08:30

## 2018-02-09 RX ADMIN — Medication 112 MICROGRAM(S): at 05:40

## 2018-02-09 RX ADMIN — HYDROMORPHONE HYDROCHLORIDE 0.5 MILLIGRAM(S): 2 INJECTION INTRAMUSCULAR; INTRAVENOUS; SUBCUTANEOUS at 04:36

## 2018-02-09 RX ADMIN — Medication 650 MILLIGRAM(S): at 22:45

## 2018-02-09 RX ADMIN — HYDROMORPHONE HYDROCHLORIDE 0.5 MILLIGRAM(S): 2 INJECTION INTRAMUSCULAR; INTRAVENOUS; SUBCUTANEOUS at 07:34

## 2018-02-09 RX ADMIN — HEPARIN SODIUM 5000 UNIT(S): 5000 INJECTION INTRAVENOUS; SUBCUTANEOUS at 22:33

## 2018-02-09 RX ADMIN — HYDROMORPHONE HYDROCHLORIDE 0.5 MILLIGRAM(S): 2 INJECTION INTRAMUSCULAR; INTRAVENOUS; SUBCUTANEOUS at 17:45

## 2018-02-09 RX ADMIN — HYDROMORPHONE HYDROCHLORIDE 0.5 MILLIGRAM(S): 2 INJECTION INTRAMUSCULAR; INTRAVENOUS; SUBCUTANEOUS at 05:00

## 2018-02-09 RX ADMIN — Medication 650 MILLIGRAM(S): at 22:34

## 2018-02-09 RX ADMIN — Medication 250 MILLIGRAM(S): at 01:30

## 2018-02-09 NOTE — DISCHARGE NOTE ADULT - CARE PLAN
Principal Discharge DX:	S/P ORIF (open reduction internal fixation) fracture  Goal:	less pain and improved function  Assessment and plan of treatment:	Discharge Instructions Total Hip Arthroplasty    1. Diet: Resume previous diet  2. Activity: WBAT. Rolling walker. Posterior Hip Dislocation Precautions. Abduction Pillow while in bed and Chair. Daily Physical Therapy.  3. Call with: fever over 101, wound redness, drainage or open area, calf pain/calf swelling.  4. Wound Care: Remove old and Place new Aquacel bandage to hip wound every 7days.   5. RN to Remove Staples Post Op Day #14 (2/22/18) so long as wound is healed, no drainage or open area.   6. OK to Shower with Aquacel. Must be an Aquacel. Avoid direct water beating on bandage.   7. DVT PE Prophylaxis: Managed by Anticoag Team. See Anticoagulation Instructions. See Med Rec.  8.  Continue Protonix daily while on Anticoagulant. An eRx has been sent to your pharmacy.  9. Labs: Check H&H weekly while on Anticoagulation. Check INR while on Coumadin.  10.  Follow Up: Dr. Jacinto in 1 month. Call to schedule.   11. Pain Medication: eRX sent to your pharmacy for  if you go home.  Secondary Diagnosis:	Pain of left hip joint

## 2018-02-09 NOTE — DISCHARGE NOTE ADULT - CARE PROVIDER_API CALL
Brandon Jacinto (MD), Orthopaedic Surgery  [address] [place] [address]  Phone: [phone]  Fax: [phone]

## 2018-02-09 NOTE — CONSULT NOTE ADULT - SUBJECTIVE AND OBJECTIVE BOX
Patient is a 77y old  Female who presents with a chief complaint of hip pain.    HPI:77yof with pmh of HTN, HLD, CODP on home O2, CAD, s/p MI, chronic HFpEF and CKD presented for elective hip surgery and post op had renal insufficiency.  Her diuretics were on hold for now.  Cardiology consulted for heart failure management.  Pt is alert now and denies any CP or SOB.       PAST MEDICAL & SURGICAL HISTORY:  [place] (hard of hearing): hearing aid  Anxiety  Hypothyroid  GERD (gastroesophageal reflux disease)  CKD (chronic kidney disease)  MI, old: x3  Chronic UTI  Chronic diastolic congestive heart failure: last exacerbation 2017  Hyperlipidemia, unspecified hyperlipidemia type  Essential hypertension  Neuropathy  Diabetes  Chronic obstructive pulmonary disease, unspecified COPD type  History of cataract surgery: bilateral IOL  S/P lobectomy of lung: left lung pre-cancerous  History of ear surgery  History of cholecystectomy  S/P ORIF (open reduction internal fixation) fracture: left hip 2017  History of total knee replacement, unspecified laterality: bilateral right  left   H/O hernia repair: umbilical and incisional   delivery delivered  S/P appendectomy      MEDICATIONS  (STANDING):  acetaminophen   Tablet. 650 milliGRAM(s) Oral every 6 hours  atorvastatin 20 milliGRAM(s) Oral at bedtime  calcium carbonate  625 mG + Vitamin D (OsCal 250 + D) 1 Tablet(s) Oral daily  clopidogrel Tablet 75 milliGRAM(s) Oral daily  dextrose 5%. 1000 milliLiter(s) (50 mL/Hr) IV Continuous <Continuous>  dextrose 50% Injectable 12.5 Gram(s) IV Push once  dextrose 50% Injectable 25 Gram(s) IV Push once  dextrose 50% Injectable 25 Gram(s) IV Push once  diltiazem    milliGRAM(s) Oral daily  docusate sodium 100 milliGRAM(s) Oral three times a day  folic acid 1 milliGRAM(s) Oral daily  heparin  Injectable 5000 Unit(s) SubCutaneous every 8 hours  insulin glargine Injectable (LANTUS) 20 Unit(s) SubCutaneous at bedtime  insulin lispro (HumaLOG) corrective regimen sliding scale   SubCutaneous three times a day before meals  lactated ringers. 1000 milliLiter(s) (75 mL/Hr) IV Continuous <Continuous>  levothyroxine 112 MICROGram(s) Oral daily  mirtazapine Soltab 7.5 milliGRAM(s) Oral after dinner  multivitamin 1 Tablet(s) Oral daily  pantoprazole    Tablet 40 milliGRAM(s) Oral daily  polyethylene glycol 3350 17 Gram(s) Oral daily  tiotropium 18 MICROgram(s) Capsule 1 Capsule(s) Inhalation at bedtime  warfarin 5 milliGRAM(s) Oral daily    MEDICATIONS  (PRN):  acetaminophen   Tablet 650 milliGRAM(s) Oral every 6 hours PRN For Temp over 38.3 C (100.94 F)  ALPRAZolam 0.5 milliGRAM(s) Oral at bedtime PRN anxiety  ALPRAZolam 0.25 milliGRAM(s) Oral every 8 hours PRN anxiety  aluminum hydroxide/magnesium hydroxide/simethicone Suspension 30 milliLiter(s) Oral four times a day PRN Indigestion  dextrose Gel 1 Dose(s) Oral once PRN Blood Glucose LESS THAN 70 milliGRAM(s)/deciliter  diphenhydrAMINE   Capsule 25 milliGRAM(s) Oral at bedtime PRN Insomnia  glucagon  Injectable 1 milliGRAM(s) IntraMuscular once PRN Glucose LESS THAN 70 milligrams/deciliter  HYDROmorphone  Injectable 0.5 milliGRAM(s) SubCutaneous every 3 hours PRN Severe Pain (7 - 10)  ondansetron Injectable 4 milliGRAM(s) IV Push every 6 hours PRN Nausea and/or Vomiting  oxyCODONE    IR 5 milliGRAM(s) Oral every 6 hours PRN Mild Pain (1 - 3)  prochlorperazine   Injectable 10 milliGRAM(s) IV Push every 8 hours PRN Nausea/vomiting  senna 2 Tablet(s) Oral at bedtime PRN Constipation      FAMILY HISTORY:  Family history of CHF (congestive heart failure) (Mother)      SOCIAL HISTORY:  ex smoker, no alcohol use     REVIEW OF SYSTEMS:  CONSTITUTIONAL:  No night sweats.  No fatigue, malaise, lethargy.  No fever or chills.  HEENT:  Eyes:  No visual changes.  No eye pain.      ENT:  No runny nose.  No epistaxis.  No sinus pain.  No sore throat.  No odynophagia.  No ear pain.  No congestion.  RESPIRATORY:  No cough.  No wheeze.  No hemoptysis.  No shortness of breath at rest.  CARDIOVASCULAR:  No chest pains.  No palpitations. No shortness of breath at rest, No orthopnea or PND.  GASTROINTESTINAL:  No abdominal pain.  No nausea or vomiting.  No diarrhea or constipation.  No hematemesis.  No hematochezia.  No melena.  GENITOURINARY:  No urgency.  No frequency.  No dysuria.  No hematuria.  No obstructive symptoms.  No discharge.  No pain.  No significant abnormal bleeding.  MUSCULOSKELETAL:  No musculoskeletal pain.  No joint swelling.  No arthritis.  NEUROLOGICAL:  No tingling or numbness or weakness.  PSYCHIATRIC:  No confusion  SKIN:  No rashes.  No lesions.  No wounds.  ENDOCRINE:  No unexplained weight loss.  No polydipsia.  No polyuria.  No polyphagia.  HEMATOLOGIC:  No anemia.  No purpura.  No petechiae.  No prolonged or excessive bleeding.   ALLERGIC AND IMMUNOLOGIC:  No pruritus.  No swelling.         Vital Signs Last 24 Hrs  T(C): 36.9 (2018 10:49), Max: 37.1 (2018 23:30)  T(F): 98.4 (2018 10:49), Max: 98.8 (2018 05:33)  HR: 103 (2018 10:49) (81 - 103)  BP: 145/47 (2018 10:49) (108/41 - 152/69)  BP(mean): --  RR: 18 (2018 10:49) (13 - 21)  SpO2: 100% (2018 10:49) (95% - 100%)    PHYSICAL EXAM-    Constitutional: The patient appears to be normal, well developed, well nourished and alert and oriented to time, place and person. The patient does not appear acutely ill.     Head: Head is normocephalic and atraumatic.      Neck: The patient's neck is supple without enlargement, has no palpable thyromegaly nor thyroid nodules and has no jugular venous distention. No audible carotid bruits. There are strong carotid pulses bilaterally. No JVD.     Cardiovascular: Regular rate and rhythm without S3, S4. No murmurs or rubs are appreciated.      Respiratory: Breath sounds are normal. No rales. No wheezing.    Abdomen: Soft, nontender, nondistended with positive bowel sounds.      Extremity: No tenderness. No  pitting edema No skin discoloration No clubbing No cyanosis.     Neurologic: The patient is alert and oriented.      Skin: No rash, no obvious lesions noted.      Psychiatric: The patient appears to be emotionally stable.      INTERPRETATION OF TELEMETRY: not on     ECG:not in chart    I&O's Detail    2018 07:01  -  2018 07:00  --------------------------------------------------------  IN:    Oral Fluid: 257 mL    Other: 2000 mL    Other: 964 mL    sodium chloride 0.9%: 206 mL  Total IN: 3427 mL    OUT:    Accordian: 60 mL    Drain: 200 mL    Voided: 200 mL  Total OUT: 460 mL    Total NET: 2967 mL      2018 07:  -  2018 16:12  --------------------------------------------------------  IN:    Oral Fluid: 240 mL  Total IN: 240 mL    OUT:    Voided: 1 mL  Total OUT: 1 mL    Total NET: 239 mL          LABS:                        10.0   15.9  )-----------( 181      ( 2018 06:18 )             29.7     02-09    138  |  107  |  56<H>  ----------------------------<  220<H>  4.9   |  24  |  2.66<H>    Ca    7.8<L>      2018 06:18          PT/INR - ( 2018 14:55 )   PT: 13.6 sec;   INR: 1.25 ratio             I&O's Summary    2018 07:  -  2018 07:00  --------------------------------------------------------  IN: 3427 mL / OUT: 460 mL / NET: 2967 mL    2018 07:  -  2018 16:12  --------------------------------------------------------  IN: 240 mL / OUT: 1 mL / NET: 239 mL      BNP  RADIOLOGY & ADDITIONAL STUDIES:
HPI:    Patient is a 77y old  Female who presents with a chief complaint of hip pain due to misaligned brian (2018 10:37)  PT S/P LEFT THR ON 18    Consulted by Dr. Brandon Jacinto for VTE prophylaxis, risk stratification, and anticoagulation management.    PAST MEDICAL & SURGICAL HISTORY:  [place] (hard of hearing): hearing aid  Anxiety  Hypothyroid  GERD (gastroesophageal reflux disease)  CKD (chronic kidney disease)  MI, old: x3  Chronic UTI  Chronic diastolic congestive heart failure: last exacerbation 2017  Hyperlipidemia, unspecified hyperlipidemia type  Essential hypertension  Neuropathy  Diabetes  Chronic obstructive pulmonary disease, unspecified COPD type  History of cataract surgery: bilateral IOL  S/P lobectomy of lung: left lung pre-cancerous  History of ear surgery  History of cholecystectomy  S/P ORIF (open reduction internal fixation) fracture: left hip 2017  History of total knee replacement, unspecified laterality: bilateral right  left   H/O hernia repair: umbilical and incisional   delivery delivered  S/P appendectomy    [place] VTE Risk Score: 10    IMPROVE Bleeding Risk Score:  4.5  ebl: 800ml  cr cl:19.9  18 pt seen at bedside oob with physical therapist.  Discussed her anticoagulation with coumadin overlapping with heparin.  literature provided. Will need more reinforcement.     Falls Risk:   High ( x )  Mod (  )  Low (  )      Vital Signs Last 24 Hrs  T(C): 36.9 (2018 10:49), Max: 37.1 (2018 23:30)  T(F): 98.4 (2018 10:49), Max: 98.8 (2018 05:33)  HR: 103 (2018 10:49) (81 - 103)  BP: 145/47 (2018 10:49) (108/41 - 152/69)  BP(mean): --  RR: 18 (2018 10:49) (13 - 21)  SpO2: 100% (2018 10:49) (95% - 100%)  FAMILY HISTORY:  Family history of CHF (congestive heart failure) (Mother)    Denies any personal or familial history of clotting or bleeding disorders.    Allergies    Bactrim (Other)  ciprofloxacin (Other (Mild))  clindamycin (Unknown)  ibuprofen (Unknown)  penicillins (Other)  sulfa drugs (Unknown)    Intolerances        REVIEW OF SYSTEMS    (  )Fever	     (  )Constipation	(  )SOB				(  )Headache	(  )Dysuria  (  )Chills	     (  )Melena	(  )Dyspnea present on exertion	                    (  )Dizziness                    (  )Polyuria  (  )Nausea	     (  )Hematochezia	(  )Cough			                    (  )Syncope   	(  )Hematuria  (  )Vomiting    (  )Chest Pain	(  )Wheezing			(  )Weakness  (  )Diarrhea     (  )Palpitations	(  )Anorexia			(  )Myalgia    All other review of systems negative: Yes      PHYSICAL EXAM:    Constitutional: Appears Well    Neurological: A& O x 3    Skin: Warm    Respiratory and Thorax: normal effort; Breath sounds: normal; No rales/wheezing/rhonchi  	  Cardiovascular: S1, S2, regular, NMBR	    Gastrointestinal: BS + x 4Q, nontender	    Genitourinary:  Bladder nondistended, nontender    Musculoskeletal:   General Right:   no muscle/joint tenderness,   normal tone, no joint swelling,   ROM: limited/full	    General Left:   no muscle/joint tenderness,   normal tone, no joint swelling,   ROM: limited/full    Hip:  Left: Dressing CDI    Lower extrems:   Right: no calf tenderness              negative gamal's sign               + pedal pulses    Left:   no calf tenderness              negative gamal's sign               + pedal pulses                          10.0   15.9  )-----------( 181      ( 2018 06:18 )             29.7       02-    138  |  107  |  56<H>  ----------------------------<  220<H>  4.9   |  24  |  2.66<H>    Ca    7.8<L>      2018 06:18        PT/INR - ( 2018 21:44 )   PT: 12.5 sec;   INR: 1.15 ratio         				    MEDICATIONS  (STANDING):  acetaminophen   Tablet. 650 milliGRAM(s) Oral every 6 hours  atorvastatin 20 milliGRAM(s) Oral at bedtime  calcium carbonate  625 mG + Vitamin D (OsCal 250 + D) 1 Tablet(s) Oral daily  clopidogrel Tablet 75 milliGRAM(s) Oral daily  dextrose 5%. 1000 milliLiter(s) IV Continuous <Continuous>  dextrose 50% Injectable 12.5 Gram(s) IV Push once  dextrose 50% Injectable 25 Gram(s) IV Push once  dextrose 50% Injectable 25 Gram(s) IV Push once  diltiazem    milliGRAM(s) Oral daily  docusate sodium 100 milliGRAM(s) Oral three times a day  folic acid 1 milliGRAM(s) Oral daily  heparin  Injectable 5000 Unit(s) SubCutaneous every 8 hours  insulin glargine Injectable (LANTUS) 20 Unit(s) SubCutaneous at bedtime  insulin lispro (HumaLOG) corrective regimen sliding scale   SubCutaneous three times a day before meals  lactated ringers. 1000 milliLiter(s) IV Continuous <Continuous>  levothyroxine 112 MICROGram(s) Oral daily  mirtazapine Soltab 7.5 milliGRAM(s) Oral after dinner  multivitamin 1 Tablet(s) Oral daily  pantoprazole    Tablet 40 milliGRAM(s) Oral daily  polyethylene glycol 3350 17 Gram(s) Oral daily  tiotropium 18 MICROgram(s) Capsule 1 Capsule(s) Inhalation at bedtime  warfarin 5 milliGRAM(s) Oral daily          DVT Prophylaxis:  LMWH                   (  )  Heparin SQ           (x  )  Coumadin             ( x )  Xarelto                  (  )  Eliquis                   (  )  Venodynes           ( x )  Ambulation          ( x )  UFH                       (  )  Contraindicated  (  )
HPI:  76 y/o female with a complicated pmhx: chf, copd (on home o2), oa, anxiety, htn, diabetes s/p fall and IM nailing of left hip months prior now s/p left THR.  Medicine consult requested for post op medical management.    18: Patient is confused this am, was in pain all night now better controlled; dozing off while talking; denies cp or sob      PAST MEDICAL & SURGICAL HISTORY:  [place] (hard of hearing): hearing aid  Anxiety  Hypothyroid  GERD (gastroesophageal reflux disease)  CKD (chronic kidney disease)  MI, old: x3  Chronic UTI  Chronic diastolic congestive heart failure: last exacerbation 2017  Hyperlipidemia, unspecified hyperlipidemia type  Essential hypertension  Neuropathy  Diabetes  Chronic obstructive pulmonary disease, unspecified COPD type  History of cataract surgery: bilateral IOL  S/P lobectomy of lung: left lung pre-cancerous  History of ear surgery  History of cholecystectomy  S/P ORIF (open reduction internal fixation) fracture: left hip 2017  History of total knee replacement, unspecified laterality: bilateral right  left   H/O hernia repair: umbilical and incisional   delivery delivered  S/P appendectomy      FAMILY HISTORY:   Family history of CHF (congestive heart failure) (Mother)      SOCIAL HISTORY:  Former smoker, no alcohol, no drugs    REVIEW OF SYSTEMS:   All 10 systems reviewed in detailed and found to be negative with the exception of what has already been described above    MEDICATIONS  (STANDING):  acetaminophen   Tablet. 650 milliGRAM(s) Oral every 6 hours  atorvastatin 20 milliGRAM(s) Oral at bedtime  buMETAnide 1 milliGRAM(s) Oral daily  calcium carbonate  625 mG + Vitamin D (OsCal 250 + D) 1 Tablet(s) Oral daily  clopidogrel Tablet 75 milliGRAM(s) Oral daily  dextrose 5%. 1000 milliLiter(s) (50 mL/Hr) IV Continuous <Continuous>  dextrose 50% Injectable 12.5 Gram(s) IV Push once  dextrose 50% Injectable 25 Gram(s) IV Push once  dextrose 50% Injectable 25 Gram(s) IV Push once  diltiazem    milliGRAM(s) Oral daily  docusate sodium 100 milliGRAM(s) Oral three times a day  folic acid 1 milliGRAM(s) Oral daily  insulin lispro (HumaLOG) corrective regimen sliding scale   SubCutaneous three times a day before meals  lactated ringers. 1000 milliLiter(s) (75 mL/Hr) IV Continuous <Continuous>  levothyroxine 112 MICROGram(s) Oral daily  mirtazapine Soltab 7.5 milliGRAM(s) Oral after dinner  multivitamin 1 Tablet(s) Oral daily  oxyCODONE  ER Tablet 10 milliGRAM(s) Oral every 12 hours  pantoprazole    Tablet 40 milliGRAM(s) Oral daily  polyethylene glycol 3350 17 Gram(s) Oral daily  tiotropium 18 MICROgram(s) Capsule 1 Capsule(s) Inhalation at bedtime    MEDICATIONS  (PRN):  acetaminophen   Tablet 650 milliGRAM(s) Oral every 6 hours PRN For Temp over 38.3 C (100.94 F)  ALPRAZolam 0.5 milliGRAM(s) Oral at bedtime PRN anxiety  aluminum hydroxide/magnesium hydroxide/simethicone Suspension 30 milliLiter(s) Oral four times a day PRN Indigestion  dextrose Gel 1 Dose(s) Oral once PRN Blood Glucose LESS THAN 70 milliGRAM(s)/deciliter  diphenhydrAMINE   Capsule 25 milliGRAM(s) Oral at bedtime PRN Insomnia  glucagon  Injectable 1 milliGRAM(s) IntraMuscular once PRN Glucose LESS THAN 70 milligrams/deciliter  HYDROmorphone  Injectable 0.5 milliGRAM(s) IV Push every 10 minutes PRN Severe Pain (7 - 10)  HYDROmorphone  Injectable 0.5 milliGRAM(s) SubCutaneous every 3 hours PRN Severe Pain (7 - 10)  ondansetron Injectable 4 milliGRAM(s) IV Push every 6 hours PRN Nausea and/or Vomiting  oxyCODONE    IR 5 milliGRAM(s) Oral every 6 hours PRN Mild Pain (1 - 3)  oxyCODONE    IR 10 milliGRAM(s) Oral every 6 hours PRN Moderate Pain (4 - 6)  prochlorperazine   Injectable 10 milliGRAM(s) IV Push every 8 hours PRN Nausea/vomiting  senna 2 Tablet(s) Oral at bedtime PRN Constipation      Allergies    Bactrim (Other)  ciprofloxacin (Other (Mild))  clindamycin (Unknown)  ibuprofen (Unknown)  penicillins (Other)  sulfa drugs (Unknown)    Intolerances          PHYSICAL EXAM:    Vital Signs Last 24 Hrs  T(C): 37.1 (2018 05:33), Max: 37.1 (2018 23:30)  T(F): 98.8 (2018 05:33), Max: 98.8 (2018 05:33)  HR: 102 (2018 05:33) (73 - 102)  BP: 152/69 (2018 05:33) (108/41 - 191/69)  BP(mean): --  RR: 17 (2018 05:33) (13 - 21)  SpO2: 95% (2018 05:33) (95% - 100%)    GEN: CONFUSED, mood stable  HEENT:   NC/AT, EOMI, no oropharyngeal lesions, erythema, exudates, oral thrush    NECK:   supple,    CV:  +S1, +S2, regular, no murmurs or rubs POS CAMI    RESP:   lungs clear to auscultation bilaterally, no wheezing, rales, rhonchi, good air entry bilaterally DECREASED BS ANGELICA    GI:  abdomen soft, non-tender, non-distended, normal BS,  no abdominal masses, no palpable masses    RECTAL:  not examined    :  not examined    MSK:   normal muscle tone, no atrophy, no rigidity, no contractions    EXT:   no clubbing, no cyanosis, LEFT HIP EDEMA, DRESSING C/D/I no calf pain, swelling or erythema    VASCULAR:  pulses equal and symmetric in the upper and lower extremities    NEURO:  EASILY AROUSABLE BUT CONFUSED no focal neurological deficits, follows all commands, able to move extremities spontaneously    SKIN:  no ulcers, lesions or rashes    LABS/IMAGING:                              10.0   15.9  )-----------( 181      ( 2018 06:18 )             29.7     -    138  |  107  |  56<H>  ----------------------------<  220<H>  4.9   |  24  |  2.66<H>    Ca    7.8<L>      2018 06:18            PT/INR - ( 2018 21:44 )   PT: 12.5 sec;   INR: 1.15 ratio                                               DVT PROPHYLAXIS:
NEPHROLOGY CONSULT  HPI:  HPI:  76 y/o female with a complicated pmhx: chf, copd (on home o2), oa, anxiety, htn, diabetes s/p fall and IM nailing of left hip months prior now s/p left THR.  Renal consult requested for ckd 3   creat stable     PAST MEDICAL & SURGICAL HISTORY:  [place] (hard of hearing): hearing aid  Anxiety  Hypothyroid  GERD (gastroesophageal reflux disease)  CKD (chronic kidney disease)  MI, old: x3  Chronic UTI  Chronic diastolic congestive heart failure: last exacerbation 2017  Hyperlipidemia, unspecified hyperlipidemia type  Essential hypertension  Neuropathy  Diabetes  Chronic obstructive pulmonary disease, unspecified COPD type  History of cataract surgery: bilateral IOL  S/P lobectomy of lung: left lung pre-cancerous  History of ear surgery  History of cholecystectomy  S/P ORIF (open reduction internal fixation) fracture: left hip 2017  History of total knee replacement, unspecified laterality: bilateral right  left   H/O hernia repair: umbilical and incisional   delivery delivered  S/P appendectomy      FAMILY HISTORY:  Family history of CHF (congestive heart failure) (Mother)      MEDICATIONS  (STANDING):  acetaminophen   Tablet. 650 milliGRAM(s) Oral every 6 hours  atorvastatin 20 milliGRAM(s) Oral at bedtime  calcium carbonate  625 mG + Vitamin D (OsCal 250 + D) 1 Tablet(s) Oral daily  clopidogrel Tablet 75 milliGRAM(s) Oral daily  dextrose 5%. 1000 milliLiter(s) (50 mL/Hr) IV Continuous <Continuous>  dextrose 50% Injectable 12.5 Gram(s) IV Push once  dextrose 50% Injectable 25 Gram(s) IV Push once  dextrose 50% Injectable 25 Gram(s) IV Push once  diltiazem    milliGRAM(s) Oral daily  docusate sodium 100 milliGRAM(s) Oral three times a day  folic acid 1 milliGRAM(s) Oral daily  heparin  Injectable 5000 Unit(s) SubCutaneous every 8 hours  insulin glargine Injectable (LANTUS) 20 Unit(s) SubCutaneous at bedtime  insulin lispro (HumaLOG) corrective regimen sliding scale   SubCutaneous three times a day before meals  lactated ringers. 1000 milliLiter(s) (75 mL/Hr) IV Continuous <Continuous>  levothyroxine 112 MICROGram(s) Oral daily  mirtazapine Soltab 7.5 milliGRAM(s) Oral after dinner  multivitamin 1 Tablet(s) Oral daily  pantoprazole    Tablet 40 milliGRAM(s) Oral daily  polyethylene glycol 3350 17 Gram(s) Oral daily  tiotropium 18 MICROgram(s) Capsule 1 Capsule(s) Inhalation at bedtime  warfarin 5 milliGRAM(s) Oral daily    MEDICATIONS  (PRN):  acetaminophen   Tablet 650 milliGRAM(s) Oral every 6 hours PRN For Temp over 38.3 C (100.94 F)  ALPRAZolam 0.5 milliGRAM(s) Oral at bedtime PRN anxiety  ALPRAZolam 0.25 milliGRAM(s) Oral every 8 hours PRN anxiety  aluminum hydroxide/magnesium hydroxide/simethicone Suspension 30 milliLiter(s) Oral four times a day PRN Indigestion  dextrose Gel 1 Dose(s) Oral once PRN Blood Glucose LESS THAN 70 milliGRAM(s)/deciliter  diphenhydrAMINE   Capsule 25 milliGRAM(s) Oral at bedtime PRN Insomnia  glucagon  Injectable 1 milliGRAM(s) IntraMuscular once PRN Glucose LESS THAN 70 milligrams/deciliter  HYDROmorphone  Injectable 0.5 milliGRAM(s) SubCutaneous every 3 hours PRN Severe Pain (7 - 10)  ondansetron Injectable 4 milliGRAM(s) IV Push every 6 hours PRN Nausea and/or Vomiting  oxyCODONE    IR 5 milliGRAM(s) Oral every 6 hours PRN Mild Pain (1 - 3)  prochlorperazine   Injectable 10 milliGRAM(s) IV Push every 8 hours PRN Nausea/vomiting  senna 2 Tablet(s) Oral at bedtime PRN Constipation      Allergies    Bactrim (Other)  ciprofloxacin (Other (Mild))  clindamycin (Unknown)  ibuprofen (Unknown)  penicillins (Other)  sulfa drugs (Unknown)    Intolerances        Vital Signs Last 24 Hrs  T(C): 36.9 (2018 10:49), Max: 37.1 (2018 23:30)  T(F): 98.4 (2018 10:49), Max: 98.8 (2018 05:33)  HR: 103 (2018 10:49) (81 - 103)  BP: 145/47 (2018 10:49) (108/41 - 152/69)  BP(mean): --  RR: 18 (2018 10:49) (13 - 21)  SpO2: 100% (2018 10:49) (95% - 100%)      REVIEW OF SYSTEMS:    weak, sleepy   mild pain    Vital Signs Last 24 Hrs  T(C): 36.9 (2018 10:49), Max: 37.1 (2018 23:30)  T(F): 98.4 (2018 10:49), Max: 98.8 (2018 05:33)  HR: 103 (2018 10:49) (81 - 103)  BP: 145/47 (2018 10:49) (108/41 - 152/69)  BP(mean): --  RR: 18 (2018 10:49) (13 - 21)  SpO2: 100% (2018 10:49) (95% - 100%)  Daily     Daily Weight in k (2018 10:37)  I&O's Summary    I&O's Detail    2018 07:01  -  2018 07:00  --------------------------------------------------------  IN:    Oral Fluid: 257 mL    Other: 2000 mL    Other: 964 mL    sodium chloride 0.9%: 206 mL  Total IN: 3427 mL    OUT:    Accordian: 60 mL    Drain: 200 mL    Voided: 200 mL  Total OUT: 460 mL    Total NET: 2967 mL      2018 07:01  -  2018 16:58  --------------------------------------------------------  IN:    Oral Fluid: 240 mL  Total IN: 240 mL    OUT:    Voided: 1 mL  Total OUT: 1 mL    Total NET: 239 mL            PHYSICAL EXAM:    General:  groggy No acute distress.    Neuro:  mildly groggy    Appropriate affect.     HEENT:  No JVD, no masses, Eyes anicteric, No carotid bruits. No lymphadenopathy,    Cardiovascular:  Regular rate and rhythm, with normal S1 and S2. No murmurs, rubs,  or gallops. No JVD.     Lungs:  clear. no rales, no wheezing, .    Abdomen:  Normoactive bowel sounds. Soft, flat, non-tender, and non-distended.  No hepatosplenomegaly, positive bowel sounds    Skin:  Warm, dry, well-perfused. No rashes or other lesions.     Extremities:  2+ pulses in upper and lower extremities. No lower extremity pain or  edema; legs are symmetric in appearance.    LABS:                          10.0   15.9  )-----------( 181      ( 2018 06:18 )             29.7     02-    138  |  107  |  56<H>  ----------------------------<  220<H>  4.9   |  24  |  2.66<H>    Ca    7.8<L>      2018 06:18

## 2018-02-09 NOTE — CONSULT NOTE ADULT - PROBLEM SELECTOR RECOMMENDATION 9
POD # 1  PAIN CONTROL  INCENTIVE SPIROMETRY  PHYSICAL THERAPY  DVT PROPHY - AS PER ANTICOAG SERVICES ACUTE4 ON CHRONIC ANEMIA  WAS TRANSFUSED PRIOR TO OR  H/H STABLE

## 2018-02-09 NOTE — PROGRESS NOTE ADULT - ASSESSMENT
A/P: 77F s/p L RADHA/CODY POD 1  Analgesia  DVT ppx  WBAT LLE  PT  FU Labs  Hemovac removed this morning  DC planning

## 2018-02-09 NOTE — DISCHARGE NOTE ADULT - HOSPITAL COURSE
H&P:  Pt is a 77y Female  PAST MEDICAL & SURGICAL HISTORY:  [place] (hard of hearing): hearing aid  Anxiety  Hypothyroid  GERD (gastroesophageal reflux disease)  CKD (chronic kidney disease)  MI, old: x3  Chronic UTI  Chronic diastolic congestive heart failure: last exacerbation 2017  Hyperlipidemia, unspecified hyperlipidemia type  Essential hypertension  Neuropathy  Diabetes  Chronic obstructive pulmonary disease, unspecified COPD type  History of cataract surgery: bilateral IOL  S/P lobectomy of lung: left lung pre-cancerous  History of ear surgery  History of cholecystectomy  S/P ORIF (open reduction internal fixation) fracture: left hip 2017  History of total knee replacement, unspecified laterality: bilateral right  left   H/O hernia repair: umbilical and incisional   delivery delivered  S/P appendectomy       Now s/p LEFT Hip removal of IM Nail and conversion to Total Hip Arthroplasty. Pt is afebrile with stable vital signs. Pain is controlled. Alert and Oriented. Exam reveals intact EHL FHL TA GS, +DP. Dressing is clean and dry with a New Aquacel bandage on.  Vital Signs Last 24 Hrs  T(C): 37.1 (18 @ 05:33), Max: 37.1 (18 @ 23:30)  T(F): 98.8 (18 @ 05:33), Max: 98.8 (18 @ 05:33)  HR: 97 (18 @ 09:00) (73 - 102)  BP: 143/65 (18 @ 09:00) (108/41 - 191/69)  BP(mean): --  RR: 18 (18 @ 09:00) (13 - 21)  SpO2: 98% (18 @ 09:00) (95% - 100%)                        10.0   15.9  )-----------( 181      ( 2018 06:18 )             29.7     PT/INR - ( 2018 21:44 )   PT: 12.5 sec;   INR: 1.15 ratio             Hospital Course:  Patient presented to [facility] medically cleared for elective Hip Replacement Surgery, having failed outpatient conservative management. Prophylactic antibiotics were started before the procedure and continued for 24 hours. They were admitted after surgery to the orthopedic floor.   There were no complications during the hospital stay. All home medications were continued. Pt received 1U PRBC preop for chronic anemia and 2u units intraop for Acute Blood loss Anemia.)    Routine consults were obtained from the Anticoagulation Team for DVT/PE prophylaxis, from Physical Therapy for twice daily PT, and from the Hospitalist for Medical Co-management. Patient was placed back on her Plavix and on anticoagulation.  Pertinent home medications were continued.  Daily labs were followed.      POD 0 pt was stable overnight.  POD1 the hemovac drain was removed. PT was continued twice daily, and a new Aquacel dressing was applied prior to discharge. The plan is for for Rehab for ongoing PT.  The orthopedic Attending is aware and agrees.

## 2018-02-09 NOTE — DISCHARGE NOTE ADULT - INSTRUCTIONS
no alcohol with pain medication  encourage fluids daily Monitor for signs of infection such as new onset pain, redness, swelling, smell or drainage at the incisional site or a temperature > 101 degrees F. Turn & reposition within the bed & chair to promote skin health & prevent any skin breakdown.  Never get out of bed alone at rehab- always call for assistance first for safety

## 2018-02-09 NOTE — DISCHARGE NOTE ADULT - MEDICATION SUMMARY - MEDICATIONS TO TAKE
I will START or STAY ON the medications listed below when I get home from the hospital:    aspirin 81 mg oral tablet  -- 1 tab(s) by mouth once a day  -- Indication: For home med    oxyCODONE 5 mg oral tablet  -- 1 tab(s) by mouth every 6 hours, As needed, modereate pain  -- Indication: For moderate pain    acetaminophen 325 mg oral tablet  -- 2 tab(s) by mouth every 6 hours mild pain or fever  -- Indication: For mild pain    dilTIAZem 180 mg/24 hours oral capsule, extended release  -- 1 cap(s) by mouth once a day  -- Indication: For home med    Remeron  -- 7.5 mg daily evening  -- Indication: For home med    HumaLOG 100 units/mL injectable solution  -- sliding scale      18 units at dinner last night  -- Indication: For home med    Toujeo SoloStar 300 units/mL subcutaneous solution  -- 45 units at bedtime    took 20 units last night  -- Indication: For home med    atorvastatin 20 mg oral tablet  -- 1 tab(s) by mouth once a day (at bedtime)  -- Indication: For home med    Plavix 75 mg oral tablet  -- 1 tab(s) by mouth once a day-hold  -- Indication: For home med    Spiriva 18 mcg inhalation capsule  -- 1 cap(s) inhaled once a day (in the evening)  -- Indication: For home med    Advair Diskus 250 mcg-50 mcg inhalation powder  -- 1 puff(s) inhaled 2 times a day  -- Indication: For home med    Bumex 1 mg oral tablet  -- 1 tab(s) by mouth once a day  -- Indication: For home med    Colace 100 mg oral capsule  -- 1 cap(s) by mouth 2 times a day MDD:2, while on narcotics  -- Medication should be taken with plenty of water.    -- Indication: For stool softener    pantoprazole 40 mg oral delayed release tablet  -- 1 tab(s) by mouth once a day, stomach protection  -- It is very important that you take or use this exactly as directed.  Do not skip doses or discontinue unless directed by your doctor.  Obtain medical advice before taking any non-prescription drugs as some may affect the action of this medication.  Swallow whole.  Do not crush.    -- Indication: For new Rx for home med,  fill if supply has run out    pantoprazole 40 mg oral delayed release tablet  -- 1 tab(s) by mouth once a day  -- Indication: For home med    levothyroxine 112 mcg (0.112 mg) oral tablet  -- 1 tab(s) by mouth once a day  -- Indication: For home med    Hiprex 1 g oral tablet  -- 1 tab evening  -- Indication: For home med    multivitamin  --     -- Indication: For home med    Caltrate 600 + D oral tablet  -- 1 tab(s) by mouth 2 times a day  -- Indication: For home med    folic acid 1 mg oral tablet  -- 1 tab(s) by mouth once a day (in the morning)  -- Indication: For home med I will START or STAY ON the medications listed below when I get home from the hospital:    aspirin 81 mg oral tablet  -- 1 tab(s) by mouth once a day  -- Indication: For home med    oxyCODONE-acetaminophen 5 mg-325 mg oral tablet  -- 1 tab(s) by mouth every 6 hours, As Needed -for severe pain MDD:6  -- Caution federal law prohibits the transfer of this drug to any person other  than the person for whom it was prescribed.  May cause drowsiness.  Alcohol may intensify this effect.  Use care when operating dangerous machinery.  This prescription cannot be refilled.  This product contains acetaminophen.  Do not use  with any other product containing acetaminophen to prevent possible liver damage.  Using more of this medication than prescribed may cause serious breathing problems.    -- Indication: For modeate pain    dilTIAZem 180 mg/24 hours oral capsule, extended release  -- 1 cap(s) by mouth once a day  -- Indication: For home med    Remeron  -- 7.5 mg daily evening  -- Indication: For home med    HumaLOG 100 units/mL injectable solution  -- sliding scale      18 units at dinner last night  -- Indication: For home med    Toujeo SoloStar 300 units/mL subcutaneous solution  -- 45 units at bedtime    took 20 units last night  -- Indication: For home med    atorvastatin 20 mg oral tablet  -- 1 tab(s) by mouth once a day (at bedtime)  -- Indication: For home med    Plavix 75 mg oral tablet  -- 1 tab(s) by mouth once a day-hold  -- Indication: For home med    Spiriva 18 mcg inhalation capsule  -- 1 cap(s) inhaled once a day (in the evening)  -- Indication: For home med    Advair Diskus 250 mcg-50 mcg inhalation powder  -- 1 puff(s) inhaled 2 times a day  -- Indication: For home med    Bumex 1 mg oral tablet  -- 1 tab(s) by mouth once a day  -- Indication: For home med    Colace 100 mg oral capsule  -- 1 cap(s) by mouth 2 times a day MDD:2, while on narcotics  -- Medication should be taken with plenty of water.    -- Indication: For stool softener    pantoprazole 40 mg oral delayed release tablet  -- 1 tab(s) by mouth once a day, stomach protection  -- It is very important that you take or use this exactly as directed.  Do not skip doses or discontinue unless directed by your doctor.  Obtain medical advice before taking any non-prescription drugs as some may affect the action of this medication.  Swallow whole.  Do not crush.    -- Indication: For new Rx for home med,  fill if supply has run out    pantoprazole 40 mg oral delayed release tablet  -- 1 tab(s) by mouth once a day  -- Indication: For home med    levothyroxine 112 mcg (0.112 mg) oral tablet  -- 1 tab(s) by mouth once a day  -- Indication: For home med    Hiprex 1 g oral tablet  -- 1 tab evening  -- Indication: For home med    multivitamin  --     -- Indication: For home med    Caltrate 600 + D oral tablet  -- 1 tab(s) by mouth 2 times a day  -- Indication: For home med    folic acid 1 mg oral tablet  -- 1 tab(s) by mouth once a day (in the morning)  -- Indication: For home med I will START or STAY ON the medications listed below when I get home from the hospital:    aspirin 81 mg oral tablet  -- 1 tab(s) by mouth once a day  -- Indication: For home med    oxyCODONE-acetaminophen 5 mg-325 mg oral tablet  -- 1 tab(s) by mouth every 6 hours, As Needed -for severe pain MDD:6  -- Caution federal law prohibits the transfer of this drug to any person other  than the person for whom it was prescribed.  May cause drowsiness.  Alcohol may intensify this effect.  Use care when operating dangerous machinery.  This prescription cannot be refilled.  This product contains acetaminophen.  Do not use  with any other product containing acetaminophen to prevent possible liver damage.  Using more of this medication than prescribed may cause serious breathing problems.    -- Indication: For modeate pain    dilTIAZem 180 mg/24 hours oral capsule, extended release  -- 1 cap(s) by mouth once a day  -- Indication: For home med    Coumadin 2.5 mg oral tablet  -- 1 tab(s) by mouth once a day (at bedtime) MDD:2.5mg take as directed by anticoagulstion services  -- Do not take this drug if you are pregnant.  It is very important that you take or use this exactly as directed.  Do not skip doses or discontinue unless directed by your doctor.  Obtain medical advice before taking any non-prescription drugs as some may affect the action of this medication.    -- Indication: For Dvt prophylaxis    Remeron  -- 7.5 mg daily evening  -- Indication: For home med    HumaLOG 100 units/mL injectable solution  -- sliding scale      18 units at dinner last night  -- Indication: For home med    Toujeo SoloStar 300 units/mL subcutaneous solution  -- 45 units at bedtime    took 20 units last night  -- Indication: For home med    atorvastatin 20 mg oral tablet  -- 1 tab(s) by mouth once a day (at bedtime)  -- Indication: For home med    Plavix 75 mg oral tablet  -- 1 tab(s) by mouth once a day-hold  -- Indication: For home med    Spiriva 18 mcg inhalation capsule  -- 1 cap(s) inhaled once a day (in the evening)  -- Indication: For home med    Advair Diskus 250 mcg-50 mcg inhalation powder  -- 1 puff(s) inhaled 2 times a day  -- Indication: For home med    Bumex 1 mg oral tablet  -- 1 tab(s) by mouth once a day  -- Indication: For home med    Colace 100 mg oral capsule  -- 1 cap(s) by mouth 2 times a day MDD:2, while on narcotics  -- Medication should be taken with plenty of water.    -- Indication: For stool softener    pantoprazole 40 mg oral delayed release tablet  -- 1 tab(s) by mouth once a day, stomach protection  -- It is very important that you take or use this exactly as directed.  Do not skip doses or discontinue unless directed by your doctor.  Obtain medical advice before taking any non-prescription drugs as some may affect the action of this medication.  Swallow whole.  Do not crush.    -- Indication: For new Rx for home med,  fill if supply has run out    pantoprazole 40 mg oral delayed release tablet  -- 1 tab(s) by mouth once a day  -- Indication: For home med    levothyroxine 112 mcg (0.112 mg) oral tablet  -- 1 tab(s) by mouth once a day  -- Indication: For home med    Hiprex 1 g oral tablet  -- 1 tab evening  -- Indication: For home med    multivitamin  --     -- Indication: For home med    Caltrate 600 + D oral tablet  -- 1 tab(s) by mouth 2 times a day  -- Indication: For home med    folic acid 1 mg oral tablet  -- 1 tab(s) by mouth once a day (in the morning)  -- Indication: For home med

## 2018-02-09 NOTE — CONSULT NOTE ADULT - ASSESSMENT
Chronic diastolic heart failure- appears euvolemic on physical exam.  Pts family to bring in her outpt bedside pulmonary artery pressure monitor device.  It is ok to hold diuretics for now.   Strict I/O and daily wt checks. Low sodium diet.   close monitoring of the renal function and electrolytes.  Goal potassium of 4 and magnesium of 2.     HTN-Can slowly introduce outpt meds if her SBP >150mm Hg.    Hyperlipidemia- continue statin.    CAD- continue aspirin, statin.    Other medical issues- Management per primary team.   Thank you for allowing me to participate in the care of this patient. Please feel free to contact me with any questions.
76 Y/O FEMALE WITH THE ABOVE MED HX S/P RIGHT THR
78 y/o female with a complicated pmhx: chf, copd (on home o2), oa, anxiety, htn, diabetes s/p fall and IM nailing of left hip months prior now s/p left THR.  Renal consult requested for ckd 3   creat stable   will cont hydration  po hydration when awake  Creat is at baseline
this is a 77 year old female s/p left thr on 18. Pt has high thrombosis risk and she requires anticoagulation prophylaxis.    discussed placing her on coumadin lover lapping with heparin.  She is agreeable but will need more reinforcement and education.  Plan:  Coumadin 5 mg PO daily startin-9-18  x 4 weeks total adjust dose per INR  Heparin 5,000 units SQ Q8hour  Daily PT/INR  Daily CBC/BMP  Enc ambulation  Venodynes  thanks for consult will f/u

## 2018-02-09 NOTE — DISCHARGE NOTE ADULT - FUNCTIONAL SCREEN CURRENT LEVEL: BATHING, MLM
[facility]  [address]  [place] [address]               Laura Harris   2017 10:41 AM   ED    Description:  Male : 1939   Department:  [facility]           Your Care was Coordinated By:     Provider Role From To    Ruy Noe MD Attending Provider 17 [id number] --    JAMES Kowalski Physician Assistant 17 [id number] --      Reason for Visit     Heel Pain           Diagnoses this Visit        Comments    Plantar fasciitis of right foot    -  Primary     Pain           ED Disposition     ED Disposition Condition Comment    Discharge             To Do List           Follow-up Information     Follow up with Riley Villalobos MD. Go in 1 week.    Specialty:  Family Medicine    Contact information:     [facility]  [place] [address]  [phone]          Schedule an appointment as soon as possible for a visit with Kel Rush DPM.    Specialty:  Podiatry    Contact information:    [address] [place] [address]  [phone]         These Medications        Disp Refills Start End    tramadol (ULTRAM) 50 mg tablet 10 tablet 0 2017    Take 1 tablet (50 mg total) by mouth every 6 (six) hours as needed for Pain. - Oral    Pharmacy: [facility] [address] [place] [address] [address] AT [facility] [address] Ph #: [phone]         [facility]     [facility] Nurse Care Line - 24/ Assistance  Unless otherwise directed by your provider, please contact [facility], our nurse care line that is available for / assistance.     Registered nurses in the [facility] provide: appointment scheduling, clinical advisement, health education, and other advisory services.  Call: [phone] (toll free)               Medications           Message regarding Medications     Verify the changes and/or additions to your medication regime listed below are  "the same as discussed with your clinician today.  If any of these changes or additions are incorrect, please notify your healthcare provider.        START taking these NEW medications        Refills    tramadol (ULTRAM) 50 mg tablet 0    Sig: Take 1 tablet (50 mg total) by mouth every 6 (six) hours as needed for Pain.    Class: Print    Route: Oral      STOP taking these medications     aspirin (BABY ASPIRIN) 81 MG chewable tablet Take 81 mg by mouth. 1 Tablet, Chewable Oral Every day           Verify that the below list of medications is an accurate representation of the medications you are currently taking.  If none reported, the list may be blank. If incorrect, please contact your healthcare provider. Carry this list with you in case of emergency.           Current Medications     celecoxib (CELEBREX) 100 MG capsule Take 100 mg by mouth 2 (two) times daily.    tramadol-acetaminophen 37.5-325 mg (ULTRACET) 37.5-325 mg Tab Take 1 tablet by mouth every 4 (four) hours as needed for Pain.    diltiaZEM (CARTIA XT) 300 MG 24 hr capsule Take 1 capsule (300 mg total) by mouth once daily.    losartan-hydrochlorothiazide 100-25 mg (HYZAAR) 100-25 mg per tablet Take 1 tablet by mouth once daily.    tramadol (ULTRAM) 50 mg tablet Take 1 tablet (50 mg total) by mouth every 6 (six) hours as needed for Pain.           Clinical Reference Information           Your Vitals Were     BP Pulse Temp Resp Height Weight    169/77 (BP Location: Right arm, Patient Position: Sitting) 71 98.3 °F (36.8 °C) (Oral) 16 5' 8" (1.727 m) 77.1 kg (170 lb)    SpO2 BMI             98% 25.85 kg/m2         Allergies as of 5/20/2017        Reactions    Ace Inhibitors     Other reaction(s): Angioedema    Hydrocodone Diarrhea      Immunizations Administered on Date of Encounter - 5/20/2017     None      ED Micro, Lab, POCT     None      ED Imaging Orders     Start Ordered       Status Ordering Provider    05/20/17 1119 05/20/17 1119  X-Ray Foot Complete " Right  1 time imaging      Final result       Discharge References/Attachments     PLANTAR FASCIITIS, TREATING (ENGLISH)    PLANTAR FASCIITIS, UNDERSTANDING (ENGLISH)      Smoking Cessation     If you would like to quit smoking:   You may be eligible for free services if you are a [place] resident and started smoking cigarettes before September 1, 1988.  Call the Smoking Cessation Trust (S[employer]) toll free at (959) 681-2393 or (229) 371-3138.   Call 1-800-QUIT-NOW if you do not meet the above criteria.   Contact us via email: [email or web address]   View our website for more information: [email or web address]         [facility] complies with applicable Federal civil rights laws and does not discriminate on the basis of race, color, national origin, age, disability, or sex.        Language Assistance Services     ATTENTION: Language assistance services are available, free of charge. Please call [phone].      ATENCIÓN: Si habla español, tiene a saab disposición servicios gratuitos de asistencia lingüística. Llame al [phone].     CHÚ Ý: N?u b?n nói Ti?ng Vi?t, có các d?ch v? h? tr? ngôn ng? mi?n phí dành cho b?n. G?i s? [phone].         (2) assistive person

## 2018-02-10 LAB
ANION GAP SERPL CALC-SCNC: 7 MMOL/L — SIGNIFICANT CHANGE UP (ref 5–17)
BUN SERPL-MCNC: 60 MG/DL — HIGH (ref 7–23)
CALCIUM SERPL-MCNC: 8.3 MG/DL — LOW (ref 8.5–10.1)
CHLORIDE SERPL-SCNC: 109 MMOL/L — HIGH (ref 96–108)
CO2 SERPL-SCNC: 24 MMOL/L — SIGNIFICANT CHANGE UP (ref 22–31)
CREAT SERPL-MCNC: 2.57 MG/DL — HIGH (ref 0.5–1.3)
GLUCOSE BLDC GLUCOMTR-MCNC: 154 MG/DL — HIGH (ref 70–99)
GLUCOSE BLDC GLUCOMTR-MCNC: 194 MG/DL — HIGH (ref 70–99)
GLUCOSE BLDC GLUCOMTR-MCNC: 195 MG/DL — HIGH (ref 70–99)
GLUCOSE BLDC GLUCOMTR-MCNC: 201 MG/DL — HIGH (ref 70–99)
GLUCOSE SERPL-MCNC: 174 MG/DL — HIGH (ref 70–99)
HCT VFR BLD CALC: 27.4 % — LOW (ref 34.5–45)
HGB BLD-MCNC: 8.9 G/DL — LOW (ref 11.5–15.5)
INR BLD: 1.48 RATIO — HIGH (ref 0.88–1.16)
MCHC RBC-ENTMCNC: 30.2 PG — SIGNIFICANT CHANGE UP (ref 27–34)
MCHC RBC-ENTMCNC: 32.6 GM/DL — SIGNIFICANT CHANGE UP (ref 32–36)
MCV RBC AUTO: 92.5 FL — SIGNIFICANT CHANGE UP (ref 80–100)
PLATELET # BLD AUTO: 182 K/UL — SIGNIFICANT CHANGE UP (ref 150–400)
POTASSIUM SERPL-MCNC: 4.7 MMOL/L — SIGNIFICANT CHANGE UP (ref 3.5–5.3)
POTASSIUM SERPL-SCNC: 4.7 MMOL/L — SIGNIFICANT CHANGE UP (ref 3.5–5.3)
PROTHROM AB SERPL-ACNC: 16.1 SEC — HIGH (ref 9.8–12.7)
RBC # BLD: 2.96 M/UL — LOW (ref 3.8–5.2)
RBC # FLD: 14.1 % — SIGNIFICANT CHANGE UP (ref 10.3–14.5)
SODIUM SERPL-SCNC: 140 MMOL/L — SIGNIFICANT CHANGE UP (ref 135–145)
WBC # BLD: 11.8 K/UL — HIGH (ref 3.8–10.5)
WBC # FLD AUTO: 11.8 K/UL — HIGH (ref 3.8–10.5)

## 2018-02-10 PROCEDURE — 99233 SBSQ HOSP IP/OBS HIGH 50: CPT

## 2018-02-10 RX ORDER — WARFARIN SODIUM 2.5 MG/1
3 TABLET ORAL DAILY
Qty: 0 | Refills: 0 | Status: DISCONTINUED | OUTPATIENT
Start: 2018-02-10 | End: 2018-02-11

## 2018-02-10 RX ADMIN — Medication 650 MILLIGRAM(S): at 12:37

## 2018-02-10 RX ADMIN — Medication 650 MILLIGRAM(S): at 19:00

## 2018-02-10 RX ADMIN — CLOPIDOGREL BISULFATE 75 MILLIGRAM(S): 75 TABLET, FILM COATED ORAL at 11:35

## 2018-02-10 RX ADMIN — OXYCODONE HYDROCHLORIDE 5 MILLIGRAM(S): 5 TABLET ORAL at 10:00

## 2018-02-10 RX ADMIN — SODIUM CHLORIDE 75 MILLILITER(S): 9 INJECTION, SOLUTION INTRAVENOUS at 05:28

## 2018-02-10 RX ADMIN — Medication 180 MILLIGRAM(S): at 05:16

## 2018-02-10 RX ADMIN — HYDROMORPHONE HYDROCHLORIDE 0.5 MILLIGRAM(S): 2 INJECTION INTRAMUSCULAR; INTRAVENOUS; SUBCUTANEOUS at 02:43

## 2018-02-10 RX ADMIN — MIRTAZAPINE 7.5 MILLIGRAM(S): 45 TABLET, ORALLY DISINTEGRATING ORAL at 18:08

## 2018-02-10 RX ADMIN — Medication 1 MILLIGRAM(S): at 11:35

## 2018-02-10 RX ADMIN — Medication 1 TABLET(S): at 11:35

## 2018-02-10 RX ADMIN — Medication 100 MILLIGRAM(S): at 22:25

## 2018-02-10 RX ADMIN — OXYCODONE HYDROCHLORIDE 5 MILLIGRAM(S): 5 TABLET ORAL at 09:08

## 2018-02-10 RX ADMIN — WARFARIN SODIUM 3 MILLIGRAM(S): 2.5 TABLET ORAL at 22:25

## 2018-02-10 RX ADMIN — Medication 100 MILLIGRAM(S): at 05:16

## 2018-02-10 RX ADMIN — Medication 1 TABLET(S): at 11:41

## 2018-02-10 RX ADMIN — HEPARIN SODIUM 5000 UNIT(S): 5000 INJECTION INTRAVENOUS; SUBCUTANEOUS at 22:25

## 2018-02-10 RX ADMIN — Medication 1: at 09:07

## 2018-02-10 RX ADMIN — Medication 650 MILLIGRAM(S): at 18:08

## 2018-02-10 RX ADMIN — HEPARIN SODIUM 5000 UNIT(S): 5000 INJECTION INTRAVENOUS; SUBCUTANEOUS at 15:48

## 2018-02-10 RX ADMIN — Medication 650 MILLIGRAM(S): at 05:17

## 2018-02-10 RX ADMIN — OXYCODONE HYDROCHLORIDE 5 MILLIGRAM(S): 5 TABLET ORAL at 16:05

## 2018-02-10 RX ADMIN — Medication 2: at 12:18

## 2018-02-10 RX ADMIN — OXYCODONE HYDROCHLORIDE 5 MILLIGRAM(S): 5 TABLET ORAL at 17:00

## 2018-02-10 RX ADMIN — HEPARIN SODIUM 5000 UNIT(S): 5000 INJECTION INTRAVENOUS; SUBCUTANEOUS at 05:16

## 2018-02-10 RX ADMIN — Medication 112 MICROGRAM(S): at 05:16

## 2018-02-10 RX ADMIN — Medication 650 MILLIGRAM(S): at 22:34

## 2018-02-10 RX ADMIN — PANTOPRAZOLE SODIUM 40 MILLIGRAM(S): 20 TABLET, DELAYED RELEASE ORAL at 11:35

## 2018-02-10 RX ADMIN — Medication 100 MILLIGRAM(S): at 15:47

## 2018-02-10 RX ADMIN — Medication 1: at 18:06

## 2018-02-10 RX ADMIN — Medication 650 MILLIGRAM(S): at 22:26

## 2018-02-10 RX ADMIN — TIOTROPIUM BROMIDE 1 CAPSULE(S): 18 CAPSULE ORAL; RESPIRATORY (INHALATION) at 08:35

## 2018-02-10 RX ADMIN — INSULIN GLARGINE 20 UNIT(S): 100 INJECTION, SOLUTION SUBCUTANEOUS at 22:25

## 2018-02-10 RX ADMIN — Medication 650 MILLIGRAM(S): at 11:40

## 2018-02-10 RX ADMIN — ATORVASTATIN CALCIUM 20 MILLIGRAM(S): 80 TABLET, FILM COATED ORAL at 22:25

## 2018-02-10 RX ADMIN — HYDROMORPHONE HYDROCHLORIDE 0.5 MILLIGRAM(S): 2 INJECTION INTRAMUSCULAR; INTRAVENOUS; SUBCUTANEOUS at 02:13

## 2018-02-10 NOTE — PROGRESS NOTE ADULT - ASSESSMENT
this is a 77 year old female s/p left thr on 18. Pt has high thrombosis risk and she requires anticoagulation prophylaxis.    discussed placing her on coumadin lover lapping with heparin.  She is agreeable but will need more reinforcement and education.  Plan:  decrease Coumadin to 3 mg PO daily startin-9-18  x 4 weeks total adjust dose per INR  Heparin 5,000 units SQ Q8hour  Daily PT/INR  Daily CBC/BMP  Enc ambulation  Venodynes   will f/u

## 2018-02-10 NOTE — PROGRESS NOTE ADULT - ASSESSMENT
A/P: 77F s/p removal of hardware/ L Total hip arthroplasty POD 2  Analgesia  DVT ppx  WBAT, posterior hip dislocation precautions  PT  DC planning - likely home 2/11

## 2018-02-10 NOTE — PROGRESS NOTE ADULT - ASSESSMENT
78 y/o female with a complicated pmhx: chf, copd (on home o2), oa, anxiety, htn, diabetes s/p fall and IM nailing of left hip months prior now s/p left THR.  Renal consult requested for ckd 3   creat stable   will cont hydration  po hydration when awake  Creat is at baseline    2/10 SY  --CKD   Fairly stable  Continue to monitor on IVF.   Meds reviewed.  --Ortho Post Left hip fx repair.   Pain control acceptable.

## 2018-02-11 VITALS — DIASTOLIC BLOOD PRESSURE: 40 MMHG | SYSTOLIC BLOOD PRESSURE: 126 MMHG

## 2018-02-11 LAB
ANION GAP SERPL CALC-SCNC: 4 MMOL/L — LOW (ref 5–17)
BUN SERPL-MCNC: 53 MG/DL — HIGH (ref 7–23)
CALCIUM SERPL-MCNC: 8.5 MG/DL — SIGNIFICANT CHANGE UP (ref 8.5–10.1)
CHLORIDE SERPL-SCNC: 110 MMOL/L — HIGH (ref 96–108)
CO2 SERPL-SCNC: 28 MMOL/L — SIGNIFICANT CHANGE UP (ref 22–31)
CREAT SERPL-MCNC: 2.26 MG/DL — HIGH (ref 0.5–1.3)
GLUCOSE BLDC GLUCOMTR-MCNC: 117 MG/DL — HIGH (ref 70–99)
GLUCOSE BLDC GLUCOMTR-MCNC: 142 MG/DL — HIGH (ref 70–99)
GLUCOSE SERPL-MCNC: 127 MG/DL — HIGH (ref 70–99)
HCT VFR BLD CALC: 24.6 % — LOW (ref 34.5–45)
HGB BLD-MCNC: 8.3 G/DL — LOW (ref 11.5–15.5)
INR BLD: 2.53 RATIO — HIGH (ref 0.88–1.16)
MCHC RBC-ENTMCNC: 30.2 PG — SIGNIFICANT CHANGE UP (ref 27–34)
MCHC RBC-ENTMCNC: 33.7 GM/DL — SIGNIFICANT CHANGE UP (ref 32–36)
MCV RBC AUTO: 89.4 FL — SIGNIFICANT CHANGE UP (ref 80–100)
PLATELET # BLD AUTO: 179 K/UL — SIGNIFICANT CHANGE UP (ref 150–400)
POTASSIUM SERPL-MCNC: 4.6 MMOL/L — SIGNIFICANT CHANGE UP (ref 3.5–5.3)
POTASSIUM SERPL-SCNC: 4.6 MMOL/L — SIGNIFICANT CHANGE UP (ref 3.5–5.3)
PROTHROM AB SERPL-ACNC: 27.9 SEC — HIGH (ref 9.8–12.7)
RBC # BLD: 2.75 M/UL — LOW (ref 3.8–5.2)
RBC # FLD: 13.6 % — SIGNIFICANT CHANGE UP (ref 10.3–14.5)
SODIUM SERPL-SCNC: 142 MMOL/L — SIGNIFICANT CHANGE UP (ref 135–145)
WBC # BLD: 12.2 K/UL — HIGH (ref 3.8–10.5)
WBC # FLD AUTO: 12.2 K/UL — HIGH (ref 3.8–10.5)

## 2018-02-11 PROCEDURE — 99233 SBSQ HOSP IP/OBS HIGH 50: CPT

## 2018-02-11 RX ORDER — WARFARIN SODIUM 2.5 MG/1
2.5 TABLET ORAL DAILY
Qty: 0 | Refills: 0 | Status: DISCONTINUED | OUTPATIENT
Start: 2018-02-12 | End: 2018-02-11

## 2018-02-11 RX ORDER — WARFARIN SODIUM 2.5 MG/1
1 TABLET ORAL
Qty: 30 | Refills: 0 | OUTPATIENT
Start: 2018-02-11 | End: 2018-03-12

## 2018-02-11 RX ORDER — MIRTAZAPINE 45 MG/1
15 TABLET, ORALLY DISINTEGRATING ORAL AT BEDTIME
Qty: 0 | Refills: 0 | Status: DISCONTINUED | OUTPATIENT
Start: 2018-02-11 | End: 2018-02-11

## 2018-02-11 RX ADMIN — OXYCODONE HYDROCHLORIDE 5 MILLIGRAM(S): 5 TABLET ORAL at 13:41

## 2018-02-11 RX ADMIN — Medication 1 MILLIGRAM(S): at 11:08

## 2018-02-11 RX ADMIN — Medication 100 MILLIGRAM(S): at 05:39

## 2018-02-11 RX ADMIN — Medication 650 MILLIGRAM(S): at 05:40

## 2018-02-11 RX ADMIN — Medication 180 MILLIGRAM(S): at 05:40

## 2018-02-11 RX ADMIN — TIOTROPIUM BROMIDE 1 CAPSULE(S): 18 CAPSULE ORAL; RESPIRATORY (INHALATION) at 08:11

## 2018-02-11 RX ADMIN — Medication 112 MICROGRAM(S): at 05:40

## 2018-02-11 RX ADMIN — HEPARIN SODIUM 5000 UNIT(S): 5000 INJECTION INTRAVENOUS; SUBCUTANEOUS at 05:40

## 2018-02-11 RX ADMIN — Medication 650 MILLIGRAM(S): at 11:08

## 2018-02-11 RX ADMIN — Medication 1 TABLET(S): at 11:08

## 2018-02-11 RX ADMIN — OXYCODONE HYDROCHLORIDE 5 MILLIGRAM(S): 5 TABLET ORAL at 04:19

## 2018-02-11 RX ADMIN — OXYCODONE HYDROCHLORIDE 5 MILLIGRAM(S): 5 TABLET ORAL at 14:14

## 2018-02-11 RX ADMIN — HYDROMORPHONE HYDROCHLORIDE 0.5 MILLIGRAM(S): 2 INJECTION INTRAMUSCULAR; INTRAVENOUS; SUBCUTANEOUS at 14:14

## 2018-02-11 RX ADMIN — PANTOPRAZOLE SODIUM 40 MILLIGRAM(S): 20 TABLET, DELAYED RELEASE ORAL at 11:08

## 2018-02-11 RX ADMIN — CLOPIDOGREL BISULFATE 75 MILLIGRAM(S): 75 TABLET, FILM COATED ORAL at 11:08

## 2018-02-11 RX ADMIN — Medication 100 MILLIGRAM(S): at 11:07

## 2018-02-11 RX ADMIN — Medication 650 MILLIGRAM(S): at 12:00

## 2018-02-11 RX ADMIN — Medication 650 MILLIGRAM(S): at 05:39

## 2018-02-11 NOTE — PROGRESS NOTE ADULT - ASSESSMENT
Pt is a 76 y/o female with h/o chronic diastolic chf, copd with chronic hypoxic respiratory failure (on home o2), osteoarthritis, anxiety, HTN, type 2 diabetes s/p fall and IM nailing of left hip months prior now s/p left THR.  Medicine consult requested for post op medical management.    * Lt Hip Replacement- continue pain control, PT, monitor post-op labs, DVT proph, encourage use of incentive spirometry.  * Stage 3-4 CKD- slowly improving on gentle IVF, monitor, further management per renal.   * Anxiety-supportive care, xanax prn, increase Remeron monitor  * HTN- bp labile, monitor on Cardizem titrate further as needed  * Acute Blood Loss Anemia-surgical loss, monitor, po iron   * Type 2 Diabetes-continue lantus with ISS  * Proph- stop heparin since INR therapeutic continue coumadin    * Disp-OOB, PT, d/c planning  * Comm- d/w pt and daughter in details, all questions answered, RN

## 2018-02-11 NOTE — PROGRESS NOTE ADULT - ASSESSMENT
this is a 77 year old female s/p left thr on 2-8-18. Pt has high thrombosis risk and she requires anticoagulation prophylaxis.    discussed placing her on coumadin lover lapping with heparin.  She is agreeable but will need more reinforcement and education.  Plan:  hold  Coumadin today resume tomorrow at 2mg  x 4 weeks total adjust dose per INR  dc Heparin  Daily PT/INR  Daily CBC/BMP  Enc ambulation  Venodynes  pt for transfer to rehab today at [facility].   will f/u sfter rehab this is a 77 year old female s/p left thr on 2-8-18. Pt has high thrombosis risk and she requires anticoagulation prophylaxis.    discussed placing her on coumadin lover lapping with heparin.  She is agreeable but will need more reinforcement and education.  Plan:  hold  Coumadin today resume tomorrow at 2.5 mg  x 4 weeks total adjust dose per INR  dc Heparin  Daily PT/INR  Daily CBC/BMP  Enc ambulation  Venodynes  pt for transfer to rehab today at [facility].   will f/u sfter rehab

## 2018-02-11 NOTE — PROGRESS NOTE ADULT - RS GEN PE MLT RESP DETAILS PC
respirations non-labored/breath sounds equal/clear to auscultation bilaterally
breath sounds equal/clear to auscultation bilaterally/respirations non-labored

## 2018-02-11 NOTE — PROGRESS NOTE ADULT - ASSESSMENT
77F s/p left hip removal of hardware and total hip replacement POD 3  Dressing changed  Analgesia  DVT ppx  Incentive spirometry  WBAT  P/T  Discharge planning

## 2018-02-11 NOTE — PROGRESS NOTE ADULT - PROVIDER SPECIALTY LIST ADULT
Anticoag Management
Anticoag Management
Nephrology
Orthopedics
Internal Medicine
Internal Medicine

## 2018-02-11 NOTE — PROGRESS NOTE ADULT - SUBJECTIVE AND OBJECTIVE BOX
HPI:    Patient is a 77y old  Female who presents with a chief complaint of hip pain due to misaligned brian (2018 10:37)  PT S/P LEFT THR ON 18    Consulted by Dr. Brandon Jacinto for VTE prophylaxis, risk stratification, and anticoagulation management.    PAST MEDICAL & SURGICAL HISTORY:  [place] hearing): hearing aid  Anxiety  Hypothyroid  GERD (gastroesophageal reflux disease)  CKD (chronic kidney disease)  MI, old: x3  Chronic UTI  Chronic diastolic congestive heart failure: last exacerbation 2017  Hyperlipidemia, unspecified hyperlipidemia type  Essential hypertension  Neuropathy  Diabetes  Chronic obstructive pulmonary disease, unspecified COPD type  History of cataract surgery: bilateral IOL  S/P lobectomy of lung: left lung pre-cancerous  History of ear surgery  History of cholecystectomy  S/P ORIF (open reduction internal fixation) fracture: left hip 2017  History of total knee replacement, unspecified laterality: bilateral right  left   H/O hernia repair: umbilical and incisional   delivery delivered  S/P appendectomy    [place] VTE Risk Score: 10    IMPROVE Bleeding Risk Score:  4.5  ebl: 800ml  cr cl:19.9  18 pt seen at bedside oob with physical therapist.  Discussed her anticoagulation with coumadin overlapping with heparin.  literature provided. Will need more reinforcement.   2-10-18 pt seen at bedside non verbal.  Falls Risk:   High ( x )  Mod (  )  Low (  )      Vital Signs Last 24 Hrs  T(C): 36.9 (2018 10:49), Max: 37.1 (2018 23:30)  T(F): 98.4 (2018 10:49), Max: 98.8 (2018 05:33)  HR: 103 (2018 10:49) (81 - 103)  BP: 145/47 (2018 10:49) (108/41 - 152/69)  BP(mean): --  RR: 18 (2018 10:49) (13 - 21)  SpO2: 100% (2018 10:49) (95% - 100%)  FAMILY HISTORY:  Family history of CHF (congestive heart failure) (Mother)    Denies any personal or familial history of clotting or bleeding disorders.    Allergies    Bactrim (Other)  ciprofloxacin (Other (Mild))  clindamycin (Unknown)  ibuprofen (Unknown)  penicillins (Other)  sulfa drugs (Unknown)    Intolerances        REVIEW OF SYSTEMS    (  )Fever	     (  )Constipation	(  )SOB				(  )Headache	(  )Dysuria  (  )Chills	     (  )Melena	(  )Dyspnea present on exertion	                    (  )Dizziness                    (  )Polyuria  (  )Nausea	     (  )Hematochezia	(  )Cough			                    (  )Syncope   	(  )Hematuria  (  )Vomiting    (  )Chest Pain	(  )Wheezing			(  )Weakness  (  )Diarrhea     (  )Palpitations	(  )Anorexia			(  )Myalgia    unable to obtain pt non verbal      PHYSICAL EXAM:    Constitutional: Appears Well    Neurological: non verbal  Skin: Warm    Respiratory and Thorax: normal effort; Breath sounds: normal; No rales/wheezing/rhonchi  	  Cardiovascular: S1, S2, regular, NMBR	    Gastrointestinal: BS + x 4Q, nontender	    Genitourinary:  Bladder nondistended, nontender    Musculoskeletal:   General Right:   no muscle/joint tenderness,   normal tone, no joint swelling,   ROM: limited/full	    General Left:   no muscle/joint tenderness,   normal tone, no joint swelling,   ROM: limited/full    Hip:  Left: Dressing CDI    Lower extrems:   Right: no calf tenderness              negative gamal's sign               + pedal pulses    Left:   no calf tenderness              negative gamal's sign               + pedal pulses                             8.9    11.8  )-----------( 182      ( 10 Feb 2018 06:00 )             27.4       02-10    140  |  109<H>  |  60<H>  ----------------------------<  174<H>  4.7   |  24  |  2.57<H>    Ca    8.3<L>      10 Feb 2018 06:00                          10.0   15.9  )-----------( 181      ( 2018 06:18 )             29.7       02-09    138  |  107  |  56<H>  ----------------------------<  220<H>  4.9   |  24  |  2.66<H>    Ca    7.8<L>      2018 06:18      PT/INR - ( 10 Feb 2018 06:00 )   PT: 16.1 sec;   INR: 1.48 ratio    PT/INR - ( 2018 21:44 )   PT: 12.5 sec;   INR: 1.15 ratio       MEDICATIONS  (STANDING):  acetaminophen   Tablet. 650 milliGRAM(s) Oral every 6 hours  atorvastatin 20 milliGRAM(s) Oral at bedtime  calcium carbonate  625 mG + Vitamin D (OsCal 250 + D) 1 Tablet(s) Oral daily  clopidogrel Tablet 75 milliGRAM(s) Oral daily  dextrose 5%. 1000 milliLiter(s) IV Continuous <Continuous>  dextrose 50% Injectable 12.5 Gram(s) IV Push once  dextrose 50% Injectable 25 Gram(s) IV Push once  dextrose 50% Injectable 25 Gram(s) IV Push once  diltiazem    milliGRAM(s) Oral daily  docusate sodium 100 milliGRAM(s) Oral three times a day  folic acid 1 milliGRAM(s) Oral daily  heparin  Injectable 5000 Unit(s) SubCutaneous every 8 hours  insulin glargine Injectable (LANTUS) 20 Unit(s) SubCutaneous at bedtime  insulin lispro (HumaLOG) corrective regimen sliding scale   SubCutaneous three times a day before meals  lactated ringers. 1000 milliLiter(s) IV Continuous <Continuous>  levothyroxine 112 MICROGram(s) Oral daily  mirtazapine Soltab 7.5 milliGRAM(s) Oral after dinner  multivitamin 1 Tablet(s) Oral daily  pantoprazole    Tablet 40 milliGRAM(s) Oral daily  polyethylene glycol 3350 17 Gram(s) Oral daily  tiotropium 18 MICROgram(s) Capsule 1 Capsule(s) Inhalation at bedtime  warfarin 3 milliGRAM(s) Oral daily            DVT Prophylaxis:  LMWH                   (  )  Heparin SQ           (x  )  Coumadin             ( x )  Xarelto                  (  )  Eliquis                   (  )  Venodynes           ( x )  Ambulation          ( x )  UFH                       (  )  Contraindicated  (  )
HPI:    Patient is a 77y old  Female who presents with a chief complaint of hip pain due to misaligned brian (2018 10:37)  PT S/P LEFT THR ON 18    Consulted by Dr. Brandon Jacinto for VTE prophylaxis, risk stratification, and anticoagulation management.    PAST MEDICAL & SURGICAL HISTORY:  [place] (hard of hearing): hearing aid  Anxiety  Hypothyroid  GERD (gastroesophageal reflux disease)  CKD (chronic kidney disease)  MI, old: x3  Chronic UTI  Chronic diastolic congestive heart failure: last exacerbation 2017  Hyperlipidemia, unspecified hyperlipidemia type  Essential hypertension  Neuropathy  Diabetes  Chronic obstructive pulmonary disease, unspecified COPD type  History of cataract surgery: bilateral IOL  S/P lobectomy of lung: left lung pre-cancerous  History of ear surgery  History of cholecystectomy  S/P ORIF (open reduction internal fixation) fracture: left hip 2017  History of total knee replacement, unspecified laterality: bilateral right  left   H/O hernia repair: umbilical and incisional   delivery delivered  S/P appendectomy    [place] VTE Risk Score: 10    IMPROVE Bleeding Risk Score:  4.5  ebl: 800ml  cr cl:19.9  2--18 pt seen at bedside oob with physical therapist.  Discussed her anticoagulation with coumadin overlapping with heparin.  literature provided. Will need more reinforcement.   2-10-18 pt seen at bedside non verbal.  2--18 pt seen at bedside more alert today daughter at bedside. Discussed her anticoagulation with coumadin stopped heparin INR 2.53 today.  Made aware we will hold toady's dose and resume tomorrow at 2mg   Pt for transfer to rehab today.   Falls Risk:   High ( x )  Mod (  )  Low (  )      Vital Signs Last 24 Hrs  T(C): 37.2 (18 @ 05:27), Max: 37.2 (18 @ 05:27)  T(F): 99 (18 @ 05:27), Max: 99 (18 @ 05:27)  HR: 81 (18 @ 05:27) (75 - 97)  BP: 169/42 (18 @ 05:27) (138/55 - 169/42)  BP(mean): --  RR: 16 (18 @ 05:27) (14 - 18)  SpO2: 99% (18 @ 05:27) (96% - 100%)  FAMILY HISTORY:  Family history of CHF (congestive heart failure) (Mother)    Denies any personal or familial history of clotting or bleeding disorders.    Allergies    Bactrim (Other)  ciprofloxacin (Other (Mild))  clindamycin (Unknown)  ibuprofen (Unknown)  penicillins (Other)  sulfa drugs (Unknown)    Intolerances        REVIEW OF SYSTEMS    (  )Fever	     (  )Constipation	(  )SOB				(  )Headache	(  )Dysuria  (  )Chills	     (  )Melena	(  )Dyspnea present on exertion	                    (  )Dizziness                    (  )Polyuria  (  )Nausea	     (  )Hematochezia	(  )Cough			                    (  )Syncope   	(  )Hematuria  (  )Vomiting    (  )Chest Pain	(  )Wheezing			(  )Weakness  (  )Diarrhea     (  )Palpitations	(  )Anorexia			(  )Myalgia    unable to obtain pt non verbal      PHYSICAL EXAM:    Constitutional: Appears Well    Neurological: non verbal  Skin: Warm    Respiratory and Thorax: normal effort; Breath sounds: normal; No rales/wheezing/rhonchi  	  Cardiovascular: S1, S2, regular, NMBR	    Gastrointestinal: BS + x 4Q, nontender	    Genitourinary:  Bladder nondistended, nontender    Musculoskeletal:   General Right:   no muscle/joint tenderness,   normal tone, no joint swelling,   ROM: full	    General Left:   no muscle/joint tenderness,   normal tone, no joint swelling,   ROM: limited    Hip:  Left: Dressing CDI    Lower extrems:   Right: no calf tenderness              negative gamal's sign               + pedal pulses    Left:   no calf tenderness              negative gamal's sign               + pedal pulses                           8.3    12.2  )-----------( 179      ( 2018 05:52 )             24.6       02-11    142  |  110<H>  |  53<H>  ----------------------------<  127<H>  4.6   |  28  |  2.26<H>    Ca    8.5      2018 05:52                            8.9    11.8  )-----------( 182      ( 10 Feb 2018 06:00 )             27.4       02-10    140  |  109<H>  |  60<H>  ----------------------------<  174<H>  4.7   |  24  |  2.57<H>    Ca    8.3<L>      10 Feb 2018 06:00                          10.0   15.9  )-----------( 181      ( 2018 06:18 )             29.7           138  |  107  |  56<H>  ----------------------------<  220<H>  4.9   |  24  |  2.66<H>    Ca    7.8<L>      2018 06:18    PT/INR - ( 2018 05:52 )   PT: 27.9 sec;   INR: 2.53 ratio    PT/INR - ( 10 Feb 2018 06:00 )   PT: 16.1 sec;   INR: 1.48 ratio    PT/INR - ( 2018 21:44 )   PT: 12.5 sec;   INR: 1.15 ratio       MEDICATIONS  (STANDING):  acetaminophen   Tablet. 650 milliGRAM(s) Oral every 6 hours  atorvastatin 20 milliGRAM(s) Oral at bedtime  calcium carbonate  625 mG + Vitamin D (OsCal 250 + D) 1 Tablet(s) Oral daily  clopidogrel Tablet 75 milliGRAM(s) Oral daily  dextrose 5%. 1000 milliLiter(s) IV Continuous <Continuous>  dextrose 50% Injectable 12.5 Gram(s) IV Push once  dextrose 50% Injectable 25 Gram(s) IV Push once  dextrose 50% Injectable 25 Gram(s) IV Push once  diltiazem    milliGRAM(s) Oral daily  docusate sodium 100 milliGRAM(s) Oral three times a day  folic acid 1 milliGRAM(s) Oral daily  insulin glargine Injectable (LANTUS) 20 Unit(s) SubCutaneous at bedtime  insulin lispro (HumaLOG) corrective regimen sliding scale   SubCutaneous three times a day before meals  lactated ringers. 1000 milliLiter(s) IV Continuous <Continuous>  levothyroxine 112 MICROGram(s) Oral daily  mirtazapine 15 milliGRAM(s) Oral at bedtime  multivitamin 1 Tablet(s) Oral daily  pantoprazole    Tablet 40 milliGRAM(s) Oral daily  polyethylene glycol 3350 17 Gram(s) Oral daily  tiotropium 18 MICROgram(s) Capsule 1 Capsule(s) Inhalation at bedtime        DVT Prophylaxis:  LMWH                   (  )  Heparin SQ           (  )  Coumadin             ( x )  Xarelto                  (  )  Eliquis                   (  )  Venodynes           ( x )  Ambulation          ( x )  UFH                       (  )  Contraindicated  (  )
NEPHROLOGY INTERVAL HPI/OVERNIGHT EVENTS:  2/10 SY  Resting fairly comfortable  Reports poor appetite.  No acute distress.    HPI:  78 y/o female with a complicated pmhx: chf, copd (on home o2), oa, anxiety, htn, diabetes s/p fall and IM nailing of left hip months prior now s/p left THR.  Renal consult requested for ckd 3   creat stable         MEDICATIONS  (STANDING):  acetaminophen   Tablet. 650 milliGRAM(s) Oral every 6 hours  atorvastatin 20 milliGRAM(s) Oral at bedtime  calcium carbonate  625 mG + Vitamin D (OsCal 250 + D) 1 Tablet(s) Oral daily  clopidogrel Tablet 75 milliGRAM(s) Oral daily  dextrose 5%. 1000 milliLiter(s) (50 mL/Hr) IV Continuous <Continuous>  dextrose 50% Injectable 12.5 Gram(s) IV Push once  dextrose 50% Injectable 25 Gram(s) IV Push once  dextrose 50% Injectable 25 Gram(s) IV Push once  diltiazem    milliGRAM(s) Oral daily  docusate sodium 100 milliGRAM(s) Oral three times a day  folic acid 1 milliGRAM(s) Oral daily  heparin  Injectable 5000 Unit(s) SubCutaneous every 8 hours  insulin glargine Injectable (LANTUS) 20 Unit(s) SubCutaneous at bedtime  insulin lispro (HumaLOG) corrective regimen sliding scale   SubCutaneous three times a day before meals  lactated ringers. 1000 milliLiter(s) (75 mL/Hr) IV Continuous <Continuous>  levothyroxine 112 MICROGram(s) Oral daily  mirtazapine Soltab 7.5 milliGRAM(s) Oral after dinner  multivitamin 1 Tablet(s) Oral daily  pantoprazole    Tablet 40 milliGRAM(s) Oral daily  polyethylene glycol 3350 17 Gram(s) Oral daily  tiotropium 18 MICROgram(s) Capsule 1 Capsule(s) Inhalation at bedtime  warfarin 3 milliGRAM(s) Oral daily    MEDICATIONS  (PRN):  acetaminophen   Tablet 650 milliGRAM(s) Oral every 6 hours PRN For Temp over 38.3 C (100.94 F)  ALPRAZolam 0.5 milliGRAM(s) Oral at bedtime PRN anxiety  ALPRAZolam 0.25 milliGRAM(s) Oral every 8 hours PRN anxiety  aluminum hydroxide/magnesium hydroxide/simethicone Suspension 30 milliLiter(s) Oral four times a day PRN Indigestion  bisacodyl Suppository 10 milliGRAM(s) Rectal daily PRN If no bowel movement by POD#2  dextrose Gel 1 Dose(s) Oral once PRN Blood Glucose LESS THAN 70 milliGRAM(s)/deciliter  diphenhydrAMINE   Capsule 25 milliGRAM(s) Oral at bedtime PRN Insomnia  glucagon  Injectable 1 milliGRAM(s) IntraMuscular once PRN Glucose LESS THAN 70 milligrams/deciliter  HYDROmorphone  Injectable 0.5 milliGRAM(s) SubCutaneous every 3 hours PRN Severe Pain (7 - 10)  ondansetron Injectable 4 milliGRAM(s) IV Push every 6 hours PRN Nausea and/or Vomiting  oxyCODONE    IR 5 milliGRAM(s) Oral every 6 hours PRN Mild Pain (1 - 3)  prochlorperazine   Injectable 10 milliGRAM(s) IV Push every 8 hours PRN Nausea/vomiting  senna 2 Tablet(s) Oral at bedtime PRN Constipation          Vital Signs Last 24 Hrs  T(C): 36.8 (10 Feb 2018 11:05), Max: 37.6 (2018 22:32)  T(F): 98.3 (10 Feb 2018 11:05), Max: 99.7 (2018 22:32)  HR: 97 (10 Feb 2018 11:05) (93 - 112)  BP: 138/55 (10 Feb 2018 11:05) (125/69 - 151/69)  BP(mean): --  RR: 16 (10 Feb 2018 11:05) (16 - 18)  SpO2: 96% (10 Feb 2018 11:05) (95% - 100%)  Daily     Daily Weight in k.8 (10 Feb 2018 05:22)     @ 07:01  -  -10 @ 07:00  --------------------------------------------------------  IN: 1330 mL / OUT: 1 mL / NET: 1329 mL        PHYSICAL EXAM:  Alert and appropriate  GENERAL: No acute distress  CHEST/LUNG: fair air entry  HEART: S1S2 RRR  ABDOMEN: soft  EXTREMITIES: no edema  SKIN:     LABS:                        8.9    11.8  )-----------( 182      ( 10 Feb 2018 06:00 )             27.4     02-10    140  |  109<H>  |  60<H>  ----------------------------<  174<H>  4.7   |  24  |  2.57<H>    Ca    8.3<L>      10 Feb 2018 06:00      PT/INR - ( 10 Feb 2018 06:00 )   PT: 16.1 sec;   INR: 1.48 ratio                     RADIOLOGY & ADDITIONAL TESTS:
Pt S/E at bedside, no acute events overnight, in pain this morning    Vital Signs Last 24 Hrs  T(C): 37.1 (09 Feb 2018 05:33), Max: 37.1 (08 Feb 2018 23:30)  T(F): 98.8 (09 Feb 2018 05:33), Max: 98.8 (09 Feb 2018 05:33)  HR: 102 (09 Feb 2018 05:33) (73 - 102)  BP: 152/69 (09 Feb 2018 05:33) (108/41 - 191/69)  BP(mean): --  RR: 17 (09 Feb 2018 05:33) (13 - 21)  SpO2: 95% (09 Feb 2018 05:33) (95% - 100%)    Gen: NAD, AAOx3    Left Lower Extremity:  Dressing clean dry intact  +EHL/FHL/TA/GS  SILT L3-S1  +DP/PT Pulses  Compartments soft  No calf TTP B/L
Pt S/E at bedside, no acute events overnight, pain controlled    AVSS  Gen: NAD    Left Lower Extremity:  Dressing clean dry intact  +EHL/FHL/TA/GS  SILT L3-S1  +DP/PT Pulses  Compartments soft  No calf TTP B/L
Pt seen and examined. Pain controlled. No acute events overnight  All vital signs stable (as per nursing flowsheet)  Gen: NAD  LLE:  Dressing clean D&I  +sensation L2-S1  +dorsiflexion/plantarflexion of ankle/hallux  +dorsalis pedis pulse  Soft compartments, - calf tenderness
pt seen at bedside , c/o pain left hip. pain medication with min improvement, denies N/T    PE left hip  dressing c/d/i   abduction pillow intact   compartments soft non tender   FROM ankle and toes  + EHL FHL GS TA   SILT   cap refill brisk   DP 1+
Pt is tearful, crying states I want to go home.  Refusing to answer any other questions.      Vital Signs Last 24 Hrs  T(C): 37.1 (10 Feb 2018 04:41), Max: 37.6 (2018 22:32)  T(F): 98.8 (10 Feb 2018 04:41), Max: 99.7 (2018 22:32)  HR: 100 (10 Feb 2018 08:36) (93 - 112)  BP: 148/50 (10 Feb 2018 04:41) (125/69 - 151/69)  BP(mean): --  RR: 16 (10 Feb 2018 04:41) (16 - 18)  SpO2: 95% (10 Feb 2018 04:41) (95% - 100%)    Daily     Daily Weight in k.8 (10 Feb 2018 05:22)    I&O's Detail    2018 07:01  -  10 Feb 2018 07:00  --------------------------------------------------------  IN:    lactated ringers.: 990 mL    Oral Fluid: 340 mL  Total IN: 1330 mL    OUT:    Voided: 1 mL  Total OUT: 1 mL    Total NET: 1329 mL          CAPILLARY BLOOD GLUCOSE      POCT Blood Glucose.: 195 mg/dL (10 Feb 2018 08:39)  POCT Blood Glucose.: 193 mg/dL (2018 22:28)  POCT Blood Glucose.: 181 mg/dL (2018 17:00)  POCT Blood Glucose.: 224 mg/dL (2018 11:29)                                      8.9    11.8  )-----------( 182      ( 10 Feb 2018 06:00 )             27.4       02-10    140  |  109<H>  |  60<H>  ----------------------------<  174<H>  4.7   |  24  |  2.57<H>    Ca    8.3<L>      10 Feb 2018 06:00        PT/INR - ( 10 Feb 2018 06:00 )   PT: 16.1 sec;   INR: 1.48 ratio                         MEDICATIONS  (STANDING):  acetaminophen   Tablet. 650 milliGRAM(s) Oral every 6 hours  atorvastatin 20 milliGRAM(s) Oral at bedtime  calcium carbonate  625 mG + Vitamin D (OsCal 250 + D) 1 Tablet(s) Oral daily  clopidogrel Tablet 75 milliGRAM(s) Oral daily  dextrose 5%. 1000 milliLiter(s) (50 mL/Hr) IV Continuous <Continuous>  dextrose 50% Injectable 12.5 Gram(s) IV Push once  dextrose 50% Injectable 25 Gram(s) IV Push once  dextrose 50% Injectable 25 Gram(s) IV Push once  diltiazem    milliGRAM(s) Oral daily  docusate sodium 100 milliGRAM(s) Oral three times a day  folic acid 1 milliGRAM(s) Oral daily  heparin  Injectable 5000 Unit(s) SubCutaneous every 8 hours  insulin glargine Injectable (LANTUS) 20 Unit(s) SubCutaneous at bedtime  insulin lispro (HumaLOG) corrective regimen sliding scale   SubCutaneous three times a day before meals  lactated ringers. 1000 milliLiter(s) (75 mL/Hr) IV Continuous <Continuous>  levothyroxine 112 MICROGram(s) Oral daily  mirtazapine Soltab 7.5 milliGRAM(s) Oral after dinner  multivitamin 1 Tablet(s) Oral daily  pantoprazole    Tablet 40 milliGRAM(s) Oral daily  polyethylene glycol 3350 17 Gram(s) Oral daily  tiotropium 18 MICROgram(s) Capsule 1 Capsule(s) Inhalation at bedtime  warfarin 3 milliGRAM(s) Oral daily    MEDICATIONS  (PRN):  acetaminophen   Tablet 650 milliGRAM(s) Oral every 6 hours PRN For Temp over 38.3 C (100.94 F)  ALPRAZolam 0.5 milliGRAM(s) Oral at bedtime PRN anxiety  ALPRAZolam 0.25 milliGRAM(s) Oral every 8 hours PRN anxiety  aluminum hydroxide/magnesium hydroxide/simethicone Suspension 30 milliLiter(s) Oral four times a day PRN Indigestion  bisacodyl Suppository 10 milliGRAM(s) Rectal daily PRN If no bowel movement by POD#2  dextrose Gel 1 Dose(s) Oral once PRN Blood Glucose LESS THAN 70 milliGRAM(s)/deciliter  diphenhydrAMINE   Capsule 25 milliGRAM(s) Oral at bedtime PRN Insomnia  glucagon  Injectable 1 milliGRAM(s) IntraMuscular once PRN Glucose LESS THAN 70 milligrams/deciliter  HYDROmorphone  Injectable 0.5 milliGRAM(s) SubCutaneous every 3 hours PRN Severe Pain (7 - 10)  ondansetron Injectable 4 milliGRAM(s) IV Push every 6 hours PRN Nausea and/or Vomiting  oxyCODONE    IR 5 milliGRAM(s) Oral every 6 hours PRN Mild Pain (1 - 3)  prochlorperazine   Injectable 10 milliGRAM(s) IV Push every 8 hours PRN Nausea/vomiting  senna 2 Tablet(s) Oral at bedtime PRN Constipation
Pt slept last night per daughter, c/o Lt hip pain.  No CP or SOB.    Vital Signs Last 24 Hrs  T(C): 37.2 (2018 05:27), Max: 37.2 (2018 05:27)  T(F): 99 (2018 05:27), Max: 99 (2018 05:27)  HR: 81 (2018 05:27) (75 - 100)  BP: 169/42 (2018 05:27) (138/55 - 169/42)  BP(mean): --  RR: 16 (2018 05:27) (14 - 18)  SpO2: 99% (:) (96% - 100%)    Daily     Daily Weight in k (2018 07:25)    I&O's Detail      CAPILLARY BLOOD GLUCOSE      POCT Blood Glucose.: 142 mg/dL (2018 07:53)  POCT Blood Glucose.: 194 mg/dL (10 Feb 2018 22:24)  POCT Blood Glucose.: 154 mg/dL (10 Feb 2018 16:58)  POCT Blood Glucose.: 201 mg/dL (10 Feb 2018 11:56)  POCT Blood Glucose.: 195 mg/dL (10 Feb 2018 08:39)                                      8.3    12.2  )-----------( 179      ( 2018 05:52 )             24.6       02-11    142  |  110<H>  |  53<H>  ----------------------------<  127<H>  4.6   |  28  |  2.26<H>    Ca    8.5      2018 05:52        PT/INR - ( 2018 05:52 )   PT: 27.9 sec;   INR: 2.53 ratio                         MEDICATIONS  (STANDING):  acetaminophen   Tablet. 650 milliGRAM(s) Oral every 6 hours  atorvastatin 20 milliGRAM(s) Oral at bedtime  calcium carbonate  625 mG + Vitamin D (OsCal 250 + D) 1 Tablet(s) Oral daily  clopidogrel Tablet 75 milliGRAM(s) Oral daily  dextrose 5%. 1000 milliLiter(s) (50 mL/Hr) IV Continuous <Continuous>  dextrose 50% Injectable 12.5 Gram(s) IV Push once  dextrose 50% Injectable 25 Gram(s) IV Push once  dextrose 50% Injectable 25 Gram(s) IV Push once  diltiazem    milliGRAM(s) Oral daily  docusate sodium 100 milliGRAM(s) Oral three times a day  folic acid 1 milliGRAM(s) Oral daily  insulin glargine Injectable (LANTUS) 20 Unit(s) SubCutaneous at bedtime  insulin lispro (HumaLOG) corrective regimen sliding scale   SubCutaneous three times a day before meals  lactated ringers. 1000 milliLiter(s) (75 mL/Hr) IV Continuous <Continuous>  levothyroxine 112 MICROGram(s) Oral daily  mirtazapine 15 milliGRAM(s) Oral at bedtime  multivitamin 1 Tablet(s) Oral daily  pantoprazole    Tablet 40 milliGRAM(s) Oral daily  polyethylene glycol 3350 17 Gram(s) Oral daily  tiotropium 18 MICROgram(s) Capsule 1 Capsule(s) Inhalation at bedtime  warfarin 3 milliGRAM(s) Oral daily    MEDICATIONS  (PRN):  acetaminophen   Tablet 650 milliGRAM(s) Oral every 6 hours PRN For Temp over 38.3 C (100.94 F)  ALPRAZolam 0.5 milliGRAM(s) Oral at bedtime PRN anxiety  ALPRAZolam 0.25 milliGRAM(s) Oral every 8 hours PRN anxiety  aluminum hydroxide/magnesium hydroxide/simethicone Suspension 30 milliLiter(s) Oral four times a day PRN Indigestion  bisacodyl Suppository 10 milliGRAM(s) Rectal daily PRN If no bowel movement by POD#2  dextrose Gel 1 Dose(s) Oral once PRN Blood Glucose LESS THAN 70 milliGRAM(s)/deciliter  diphenhydrAMINE   Capsule 25 milliGRAM(s) Oral at bedtime PRN Insomnia  glucagon  Injectable 1 milliGRAM(s) IntraMuscular once PRN Glucose LESS THAN 70 milligrams/deciliter  HYDROmorphone  Injectable 0.5 milliGRAM(s) SubCutaneous every 3 hours PRN Severe Pain (7 - 10)  ondansetron Injectable 4 milliGRAM(s) IV Push every 6 hours PRN Nausea and/or Vomiting  oxyCODONE    IR 5 milliGRAM(s) Oral every 6 hours PRN Mild Pain (1 - 3)  prochlorperazine   Injectable 10 milliGRAM(s) IV Push every 8 hours PRN Nausea/vomiting  senna 2 Tablet(s) Oral at bedtime PRN Constipation

## 2018-02-14 DIAGNOSIS — I50.32 CHRONIC DIASTOLIC (CONGESTIVE) HEART FAILURE: ICD-10-CM

## 2018-02-14 DIAGNOSIS — D62 ACUTE POSTHEMORRHAGIC ANEMIA: ICD-10-CM

## 2018-02-14 DIAGNOSIS — Z88.8 ALLERGY STATUS TO OTHER DRUGS, MEDICAMENTS AND BIOLOGICAL SUBSTANCES: ICD-10-CM

## 2018-02-14 DIAGNOSIS — I13.0 HYPERTENSIVE HEART AND CHRONIC KIDNEY DISEASE WITH HEART FAILURE AND STAGE 1 THROUGH STAGE 4 CHRONIC KIDNEY DISEASE, OR UNSPECIFIED CHRONIC KIDNEY DISEASE: ICD-10-CM

## 2018-02-14 DIAGNOSIS — Z79.4 LONG TERM (CURRENT) USE OF INSULIN: ICD-10-CM

## 2018-02-14 DIAGNOSIS — Z79.82 LONG TERM (CURRENT) USE OF ASPIRIN: ICD-10-CM

## 2018-02-14 DIAGNOSIS — I25.10 ATHEROSCLEROTIC HEART DISEASE OF NATIVE CORONARY ARTERY WITHOUT ANGINA PECTORIS: ICD-10-CM
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
## 2018-02-14 DIAGNOSIS — N17.9 ACUTE KIDNEY FAILURE, UNSPECIFIED: ICD-10-CM

## 2018-02-14 DIAGNOSIS — F41.9 ANXIETY DISORDER, UNSPECIFIED: ICD-10-CM

## 2018-02-14 DIAGNOSIS — S72.142K DISPLACED INTERTROCHANTERIC FRACTURE OF LEFT FEMUR, SUBSEQUENT ENCOUNTER FOR CLOSED FRACTURE WITH NONUNION: ICD-10-CM

## 2018-02-14 DIAGNOSIS — E03.9 HYPOTHYROIDISM, UNSPECIFIED: ICD-10-CM

## 2018-02-14 DIAGNOSIS — Z79.899 OTHER LONG TERM (CURRENT) DRUG THERAPY: ICD-10-CM

## 2018-02-14 DIAGNOSIS — N18.3 CHRONIC KIDNEY DISEASE, STAGE 3 (MODERATE): ICD-10-CM

## 2018-02-14 DIAGNOSIS — I25.2 OLD MYOCARDIAL INFARCTION: ICD-10-CM

## 2018-02-14 DIAGNOSIS — Z99.81 DEPENDENCE ON SUPPLEMENTAL OXYGEN: ICD-10-CM

## 2018-02-14 DIAGNOSIS — E11.9 TYPE 2 DIABETES MELLITUS WITHOUT COMPLICATIONS: ICD-10-CM

## 2018-02-14 LAB — SURGICAL PATHOLOGY FINAL REPORT - CH: SIGNIFICANT CHANGE UP

## 2018-02-26 ENCOUNTER — INPATIENT (INPATIENT)
Facility: HOSPITAL | Age: 78
LOS: 13 days | Discharge: HOME HEALTH | End: 2018-03-12
Attending: INTERNAL MEDICINE | Admitting: INTERNAL MEDICINE
Payer: MEDICARE

## 2018-02-26 VITALS
RESPIRATION RATE: 17 BRPM | WEIGHT: 139.99 LBS | SYSTOLIC BLOOD PRESSURE: 133 MMHG | OXYGEN SATURATION: 100 % | TEMPERATURE: 98 F | HEART RATE: 84 BPM | DIASTOLIC BLOOD PRESSURE: 57 MMHG

## 2018-02-26 DIAGNOSIS — Z98.890 OTHER SPECIFIED POSTPROCEDURAL STATES: Chronic | ICD-10-CM

## 2018-02-26 DIAGNOSIS — Z96.659 PRESENCE OF UNSPECIFIED ARTIFICIAL KNEE JOINT: Chronic | ICD-10-CM

## 2018-02-26 DIAGNOSIS — Z98.49 CATARACT EXTRACTION STATUS, UNSPECIFIED EYE: Chronic | ICD-10-CM

## 2018-02-26 DIAGNOSIS — Z90.49 ACQUIRED ABSENCE OF OTHER SPECIFIED PARTS OF DIGESTIVE TRACT: Chronic | ICD-10-CM

## 2018-02-26 DIAGNOSIS — Z90.2 ACQUIRED ABSENCE OF LUNG [PART OF]: Chronic | ICD-10-CM

## 2018-02-26 DIAGNOSIS — Z96.7 PRESENCE OF OTHER BONE AND TENDON IMPLANTS: Chronic | ICD-10-CM

## 2018-02-26 LAB
ALBUMIN SERPL ELPH-MCNC: 2.4 G/DL — LOW (ref 3.3–5)
ALP SERPL-CCNC: 141 U/L — HIGH (ref 40–120)
ALT FLD-CCNC: 15 U/L — SIGNIFICANT CHANGE UP (ref 12–78)
ANION GAP SERPL CALC-SCNC: 8 MMOL/L — SIGNIFICANT CHANGE UP (ref 5–17)
APPEARANCE UR: (no result)
APTT BLD: 37.8 SEC — HIGH (ref 27.5–37.4)
AST SERPL-CCNC: 19 U/L — SIGNIFICANT CHANGE UP (ref 15–37)
BACTERIA # UR AUTO: (no result)
BASOPHILS # BLD AUTO: 0.1 K/UL — SIGNIFICANT CHANGE UP (ref 0–0.2)
BASOPHILS NFR BLD AUTO: 0.8 % — SIGNIFICANT CHANGE UP (ref 0–2)
BILIRUB SERPL-MCNC: 0.3 MG/DL — SIGNIFICANT CHANGE UP (ref 0.2–1.2)
BILIRUB UR-MCNC: NEGATIVE — SIGNIFICANT CHANGE UP
BUN SERPL-MCNC: 82 MG/DL — HIGH (ref 7–23)
CALCIUM SERPL-MCNC: 8.3 MG/DL — LOW (ref 8.5–10.1)
CHLORIDE SERPL-SCNC: 107 MMOL/L — SIGNIFICANT CHANGE UP (ref 96–108)
CO2 SERPL-SCNC: 25 MMOL/L — SIGNIFICANT CHANGE UP (ref 22–31)
COLOR SPEC: YELLOW — SIGNIFICANT CHANGE UP
CREAT SERPL-MCNC: 2.15 MG/DL — HIGH (ref 0.5–1.3)
DIFF PNL FLD: (no result)
EOSINOPHIL # BLD AUTO: 0 K/UL — SIGNIFICANT CHANGE UP (ref 0–0.5)
EOSINOPHIL NFR BLD AUTO: 0.4 % — SIGNIFICANT CHANGE UP (ref 0–6)
EPI CELLS # UR: SIGNIFICANT CHANGE UP
GLUCOSE SERPL-MCNC: 173 MG/DL — HIGH (ref 70–99)
GLUCOSE UR QL: NEGATIVE MG/DL — SIGNIFICANT CHANGE UP
HCT VFR BLD CALC: 17.6 % — CRITICAL LOW (ref 34.5–45)
HGB BLD-MCNC: 5.7 G/DL — CRITICAL LOW (ref 11.5–15.5)
INR BLD: 3.38 RATIO — HIGH (ref 0.88–1.16)
KETONES UR-MCNC: NEGATIVE — SIGNIFICANT CHANGE UP
LEUKOCYTE ESTERASE UR-ACNC: (no result)
LYMPHOCYTES # BLD AUTO: 1.1 K/UL — SIGNIFICANT CHANGE UP (ref 1–3.3)
LYMPHOCYTES # BLD AUTO: 9.8 % — LOW (ref 13–44)
MCHC RBC-ENTMCNC: 29.3 PG — SIGNIFICANT CHANGE UP (ref 27–34)
MCHC RBC-ENTMCNC: 32.6 GM/DL — SIGNIFICANT CHANGE UP (ref 32–36)
MCV RBC AUTO: 89.7 FL — SIGNIFICANT CHANGE UP (ref 80–100)
MONOCYTES # BLD AUTO: 0.6 K/UL — SIGNIFICANT CHANGE UP (ref 0–0.9)
MONOCYTES NFR BLD AUTO: 5.4 % — SIGNIFICANT CHANGE UP (ref 2–14)
NEUTROPHILS # BLD AUTO: 9.2 K/UL — HIGH (ref 1.8–7.4)
NEUTROPHILS NFR BLD AUTO: 83.7 % — HIGH (ref 43–77)
NITRITE UR-MCNC: POSITIVE
PH UR: 5 — SIGNIFICANT CHANGE UP (ref 5–8)
PLATELET # BLD AUTO: 539 K/UL — HIGH (ref 150–400)
POTASSIUM SERPL-MCNC: 5.1 MMOL/L — SIGNIFICANT CHANGE UP (ref 3.5–5.3)
POTASSIUM SERPL-SCNC: 5.1 MMOL/L — SIGNIFICANT CHANGE UP (ref 3.5–5.3)
PROT SERPL-MCNC: 5.9 GM/DL — LOW (ref 6–8.3)
PROT UR-MCNC: 15 MG/DL
PROTHROM AB SERPL-ACNC: 37.4 SEC — HIGH (ref 9.8–12.7)
RBC # BLD: 1.96 M/UL — LOW (ref 3.8–5.2)
RBC # FLD: 16.5 % — HIGH (ref 10.3–14.5)
RBC CASTS # UR COMP ASSIST: (no result) /HPF (ref 0–4)
SODIUM SERPL-SCNC: 140 MMOL/L — SIGNIFICANT CHANGE UP (ref 135–145)
SP GR SPEC: 1.01 — SIGNIFICANT CHANGE UP (ref 1.01–1.02)
UROBILINOGEN FLD QL: NEGATIVE MG/DL — SIGNIFICANT CHANGE UP
WBC # BLD: 11 K/UL — HIGH (ref 3.8–10.5)
WBC # FLD AUTO: 11 K/UL — HIGH (ref 3.8–10.5)
WBC UR QL: (no result)

## 2018-02-26 PROCEDURE — 93010 ELECTROCARDIOGRAM REPORT: CPT

## 2018-02-26 PROCEDURE — 99291 CRITICAL CARE FIRST HOUR: CPT

## 2018-02-26 RX ORDER — FUROSEMIDE 40 MG
40 TABLET ORAL ONCE
Qty: 0 | Refills: 0 | Status: COMPLETED | OUTPATIENT
Start: 2018-02-26 | End: 2018-02-27

## 2018-02-26 RX ORDER — PANTOPRAZOLE SODIUM 20 MG/1
8 TABLET, DELAYED RELEASE ORAL
Qty: 80 | Refills: 0 | Status: DISCONTINUED | OUTPATIENT
Start: 2018-02-26 | End: 2018-02-27

## 2018-02-26 RX ORDER — PANTOPRAZOLE SODIUM 20 MG/1
40 TABLET, DELAYED RELEASE ORAL ONCE
Qty: 0 | Refills: 0 | Status: COMPLETED | OUTPATIENT
Start: 2018-02-26 | End: 2018-02-26

## 2018-02-26 NOTE — ED PROVIDER NOTE - OBJECTIVE STATEMENT
78 y/o  PMHx of CHF, hx of hip surgery on Feb 8th by Dr. Jacinto, prior to surgery received a unit of blood due to hemoglobin which was 9, within the ER got 3 more units - the day of surgery. Pt was here then went to rehab on Coumadin for hip replacement prophylaxis for DVT. At [facility] was extremely fatigue, blood work was- 6.8, Saturday blood re-tom- 7.1. Today daughter received at a phone call stating hemoglobin was 5.1, rectal exam with +Guaiac.  No hx of GI bleed. Per patient states she feels tired, SOB, nausea, subjective fever. Denies CP. GI  PMD

## 2018-02-26 NOTE — ED PROVIDER NOTE - CARDIAC, MLM
+normal radial pulse, normal rate, regular rhythm.  Heart sounds S1, S2.  No murmurs, rubs or gallops.

## 2018-02-26 NOTE — ED PROVIDER NOTE - PROGRESS NOTE DETAILS
Dr. Michelle:  Rectal exam: tone normal, med. brown stool, guiac +.  Lot 113, QC passed.  Paging Dr. Phelps for adm.

## 2018-02-26 NOTE — ED PROVIDER NOTE - CARE PLAN
Principal Discharge DX:	Anemia  Secondary Diagnosis:	GI bleed  Secondary Diagnosis:	UTI (urinary tract infection)

## 2018-02-26 NOTE — ED PROVIDER NOTE - CRITICAL CARE PROVIDED
direct patient care (not related to procedure)/interpretation of diagnostic studies/consult w/ pt's family directly relating to pts condition/consultation with other physicians/additional history taking/documentation

## 2018-02-26 NOTE — ED PROVIDER NOTE - MEDICAL DECISION MAKING DETAILS
elderly woman 3+ weeks s/p L hip surgery on anticoag for DVT prophylaxis, BIBA from [facility] generalized weak and low H&H plan labs, rectal exam, transfuse and admit.

## 2018-02-26 NOTE — ED PROVIDER NOTE - CRITICAL CARE INDICATION, MLM
patient was critically ill... Patient was critically ill with a high probability of imminent or life threatening deterioration.  + severe anemia requiring PRBC transfusion.

## 2018-02-26 NOTE — ED PROVIDER NOTE - PMH
Anxiety    Chronic diastolic congestive heart failure  last exacerbation 7/2017  Chronic obstructive pulmonary disease, unspecified COPD type    Chronic UTI    CKD (chronic kidney disease)    Diabetes    Essential hypertension    GERD (gastroesophageal reflux disease)    [place] (hard of hearing)  hearing aid  Hyperlipidemia, unspecified hyperlipidemia type    Hypothyroid    MI, old  x3  Neuropathy

## 2018-02-26 NOTE — ED PROVIDER NOTE - MUSCULOSKELETAL, MLM
LLE poor ROM Left hip s/p surgery, distal LLE neurovascularly intact. Spine appears normal, range of motion is not limited, no muscle or joint tenderness

## 2018-02-26 NOTE — ED ADULT NURSE NOTE - OBJECTIVE STATEMENT
Pt sent from [facility] for low hemoglobin, reportedly 6-7 over weekend.  Pt pale, dyspneic.  18g PIV placed in LAC.  Cardiac and VS monitoring initiated.  Straight cath for urine.  Recent L hip replacement, incisions intact.

## 2018-02-26 NOTE — ED PROVIDER NOTE - CONSTITUTIONAL, MLM
normal... elderly female, no resp distress Well appearing, well nourished, awake, alert, oriented to person, place, time/situation and in no apparent distress.

## 2018-02-26 NOTE — ED PROVIDER NOTE - SKIN, MLM
Left hip surgical wound clean dry intact semi strips in place, no swelling. +skin is pale, no tactile warmth.

## 2018-02-27 DIAGNOSIS — D64.9 ANEMIA, UNSPECIFIED: ICD-10-CM

## 2018-02-27 DIAGNOSIS — Z79.01 LONG TERM (CURRENT) USE OF ANTICOAGULANTS: ICD-10-CM

## 2018-02-27 DIAGNOSIS — E11.9 TYPE 2 DIABETES MELLITUS WITHOUT COMPLICATIONS: ICD-10-CM

## 2018-02-27 DIAGNOSIS — E03.9 HYPOTHYROIDISM, UNSPECIFIED: ICD-10-CM

## 2018-02-27 DIAGNOSIS — N39.0 URINARY TRACT INFECTION, SITE NOT SPECIFIED: ICD-10-CM

## 2018-02-27 DIAGNOSIS — K92.2 GASTROINTESTINAL HEMORRHAGE, UNSPECIFIED: ICD-10-CM

## 2018-02-27 DIAGNOSIS — N18.9 CHRONIC KIDNEY DISEASE, UNSPECIFIED: ICD-10-CM

## 2018-02-27 DIAGNOSIS — J44.9 CHRONIC OBSTRUCTIVE PULMONARY DISEASE, UNSPECIFIED: ICD-10-CM

## 2018-02-27 DIAGNOSIS — I50.32 CHRONIC DIASTOLIC (CONGESTIVE) HEART FAILURE: ICD-10-CM

## 2018-02-27 LAB
ANION GAP SERPL CALC-SCNC: 9 MMOL/L — SIGNIFICANT CHANGE UP (ref 5–17)
BUN SERPL-MCNC: 81 MG/DL — HIGH (ref 7–23)
CALCIUM SERPL-MCNC: 7.8 MG/DL — LOW (ref 8.5–10.1)
CHLORIDE SERPL-SCNC: 111 MMOL/L — HIGH (ref 96–108)
CO2 SERPL-SCNC: 23 MMOL/L — SIGNIFICANT CHANGE UP (ref 22–31)
CREAT SERPL-MCNC: 2.05 MG/DL — HIGH (ref 0.5–1.3)
GLUCOSE BLDC GLUCOMTR-MCNC: 177 MG/DL — HIGH (ref 70–99)
GLUCOSE BLDC GLUCOMTR-MCNC: 188 MG/DL — HIGH (ref 70–99)
GLUCOSE BLDC GLUCOMTR-MCNC: 210 MG/DL — HIGH (ref 70–99)
GLUCOSE BLDC GLUCOMTR-MCNC: 268 MG/DL — HIGH (ref 70–99)
GLUCOSE SERPL-MCNC: 182 MG/DL — HIGH (ref 70–99)
HCT VFR BLD CALC: 23.5 % — LOW (ref 34.5–45)
HGB BLD-MCNC: 7.8 G/DL — LOW (ref 11.5–15.5)
INR BLD: 2.93 RATIO — HIGH (ref 0.88–1.16)
MCHC RBC-ENTMCNC: 29.2 PG — SIGNIFICANT CHANGE UP (ref 27–34)
MCHC RBC-ENTMCNC: 33.3 GM/DL — SIGNIFICANT CHANGE UP (ref 32–36)
MCV RBC AUTO: 87.7 FL — SIGNIFICANT CHANGE UP (ref 80–100)
PLATELET # BLD AUTO: 499 K/UL — HIGH (ref 150–400)
POTASSIUM SERPL-MCNC: 4.6 MMOL/L — SIGNIFICANT CHANGE UP (ref 3.5–5.3)
POTASSIUM SERPL-SCNC: 4.6 MMOL/L — SIGNIFICANT CHANGE UP (ref 3.5–5.3)
PROTHROM AB SERPL-ACNC: 32.3 SEC — HIGH (ref 9.8–12.7)
RBC # BLD: 2.68 M/UL — LOW (ref 3.8–5.2)
RBC # FLD: 15.4 % — HIGH (ref 10.3–14.5)
SODIUM SERPL-SCNC: 143 MMOL/L — SIGNIFICANT CHANGE UP (ref 135–145)
WBC # BLD: 10.1 K/UL — SIGNIFICANT CHANGE UP (ref 3.8–10.5)
WBC # FLD AUTO: 10.1 K/UL — SIGNIFICANT CHANGE UP (ref 3.8–10.5)

## 2018-02-27 PROCEDURE — 71045 X-RAY EXAM CHEST 1 VIEW: CPT | Mod: 26

## 2018-02-27 RX ORDER — SODIUM CHLORIDE 9 MG/ML
1000 INJECTION, SOLUTION INTRAVENOUS
Qty: 0 | Refills: 0 | Status: DISCONTINUED | OUTPATIENT
Start: 2018-02-27 | End: 2018-03-12

## 2018-02-27 RX ORDER — INSULIN GLARGINE 100 [IU]/ML
15 INJECTION, SOLUTION SUBCUTANEOUS AT BEDTIME
Qty: 0 | Refills: 0 | Status: DISCONTINUED | OUTPATIENT
Start: 2018-02-27 | End: 2018-03-12

## 2018-02-27 RX ORDER — DOCUSATE SODIUM 100 MG
100 CAPSULE ORAL
Qty: 0 | Refills: 0 | Status: DISCONTINUED | OUTPATIENT
Start: 2018-02-27 | End: 2018-03-12

## 2018-02-27 RX ORDER — FOLIC ACID 0.8 MG
1 TABLET ORAL DAILY
Qty: 0 | Refills: 0 | Status: DISCONTINUED | OUTPATIENT
Start: 2018-02-27 | End: 2018-03-12

## 2018-02-27 RX ORDER — MIRTAZAPINE 45 MG/1
7.5 TABLET, ORALLY DISINTEGRATING ORAL AT BEDTIME
Qty: 0 | Refills: 0 | Status: DISCONTINUED | OUTPATIENT
Start: 2018-02-27 | End: 2018-02-27

## 2018-02-27 RX ORDER — MIRTAZAPINE 45 MG/1
7.5 TABLET, ORALLY DISINTEGRATING ORAL AT BEDTIME
Qty: 0 | Refills: 0 | Status: DISCONTINUED | OUTPATIENT
Start: 2018-02-27 | End: 2018-03-12

## 2018-02-27 RX ORDER — DEXTROSE 50 % IN WATER 50 %
1 SYRINGE (ML) INTRAVENOUS ONCE
Qty: 0 | Refills: 0 | Status: DISCONTINUED | OUTPATIENT
Start: 2018-02-27 | End: 2018-03-12

## 2018-02-27 RX ORDER — INSULIN LISPRO 100/ML
VIAL (ML) SUBCUTANEOUS
Qty: 0 | Refills: 0 | Status: DISCONTINUED | OUTPATIENT
Start: 2018-02-27 | End: 2018-03-12

## 2018-02-27 RX ORDER — ATORVASTATIN CALCIUM 80 MG/1
20 TABLET, FILM COATED ORAL AT BEDTIME
Qty: 0 | Refills: 0 | Status: DISCONTINUED | OUTPATIENT
Start: 2018-02-27 | End: 2018-03-12

## 2018-02-27 RX ORDER — SODIUM CHLORIDE 9 MG/ML
1000 INJECTION INTRAMUSCULAR; INTRAVENOUS; SUBCUTANEOUS
Qty: 0 | Refills: 0 | Status: DISCONTINUED | OUTPATIENT
Start: 2018-02-27 | End: 2018-03-01

## 2018-02-27 RX ORDER — GLUCAGON INJECTION, SOLUTION 0.5 MG/.1ML
1 INJECTION, SOLUTION SUBCUTANEOUS ONCE
Qty: 0 | Refills: 0 | Status: DISCONTINUED | OUTPATIENT
Start: 2018-02-27 | End: 2018-03-12

## 2018-02-27 RX ORDER — DILTIAZEM HCL 120 MG
180 CAPSULE, EXT RELEASE 24 HR ORAL DAILY
Qty: 0 | Refills: 0 | Status: DISCONTINUED | OUTPATIENT
Start: 2018-02-27 | End: 2018-03-03

## 2018-02-27 RX ORDER — TIOTROPIUM BROMIDE 18 UG/1
1 CAPSULE ORAL; RESPIRATORY (INHALATION) AT BEDTIME
Qty: 0 | Refills: 0 | Status: DISCONTINUED | OUTPATIENT
Start: 2018-02-27 | End: 2018-03-12

## 2018-02-27 RX ORDER — OXYCODONE AND ACETAMINOPHEN 5; 325 MG/1; MG/1
1 TABLET ORAL EVERY 6 HOURS
Qty: 0 | Refills: 0 | Status: DISCONTINUED | OUTPATIENT
Start: 2018-02-27 | End: 2018-03-04

## 2018-02-27 RX ORDER — LEVOTHYROXINE SODIUM 125 MCG
112 TABLET ORAL DAILY
Qty: 0 | Refills: 0 | Status: DISCONTINUED | OUTPATIENT
Start: 2018-02-27 | End: 2018-03-12

## 2018-02-27 RX ORDER — PANTOPRAZOLE SODIUM 20 MG/1
40 TABLET, DELAYED RELEASE ORAL EVERY 12 HOURS
Qty: 0 | Refills: 0 | Status: DISCONTINUED | OUTPATIENT
Start: 2018-02-27 | End: 2018-03-12

## 2018-02-27 RX ORDER — DEXTROSE 50 % IN WATER 50 %
12.5 SYRINGE (ML) INTRAVENOUS ONCE
Qty: 0 | Refills: 0 | Status: DISCONTINUED | OUTPATIENT
Start: 2018-02-27 | End: 2018-03-12

## 2018-02-27 RX ORDER — ONDANSETRON 8 MG/1
4 TABLET, FILM COATED ORAL EVERY 6 HOURS
Qty: 0 | Refills: 0 | Status: DISCONTINUED | OUTPATIENT
Start: 2018-02-27 | End: 2018-03-12

## 2018-02-27 RX ADMIN — SODIUM CHLORIDE 75 MILLILITER(S): 9 INJECTION INTRAMUSCULAR; INTRAVENOUS; SUBCUTANEOUS at 09:29

## 2018-02-27 RX ADMIN — Medication 3: at 11:26

## 2018-02-27 RX ADMIN — Medication 180 MILLIGRAM(S): at 09:29

## 2018-02-27 RX ADMIN — Medication 40 MILLIGRAM(S): at 01:38

## 2018-02-27 RX ADMIN — OXYCODONE AND ACETAMINOPHEN 1 TABLET(S): 5; 325 TABLET ORAL at 11:17

## 2018-02-27 RX ADMIN — PANTOPRAZOLE SODIUM 10 MG/HR: 20 TABLET, DELAYED RELEASE ORAL at 01:15

## 2018-02-27 RX ADMIN — OXYCODONE AND ACETAMINOPHEN 1 TABLET(S): 5; 325 TABLET ORAL at 09:29

## 2018-02-27 RX ADMIN — PANTOPRAZOLE SODIUM 40 MILLIGRAM(S): 20 TABLET, DELAYED RELEASE ORAL at 17:51

## 2018-02-27 RX ADMIN — PANTOPRAZOLE SODIUM 40 MILLIGRAM(S): 20 TABLET, DELAYED RELEASE ORAL at 01:15

## 2018-02-27 RX ADMIN — OXYCODONE AND ACETAMINOPHEN 1 TABLET(S): 5; 325 TABLET ORAL at 21:45

## 2018-02-27 RX ADMIN — Medication 1: at 17:58

## 2018-02-27 RX ADMIN — Medication 112 MICROGRAM(S): at 09:29

## 2018-02-27 RX ADMIN — Medication 1 MILLIGRAM(S): at 11:27

## 2018-02-27 RX ADMIN — ATORVASTATIN CALCIUM 20 MILLIGRAM(S): 80 TABLET, FILM COATED ORAL at 21:37

## 2018-02-27 RX ADMIN — Medication 100 MILLIGRAM(S): at 17:51

## 2018-02-27 RX ADMIN — INSULIN GLARGINE 15 UNIT(S): 100 INJECTION, SOLUTION SUBCUTANEOUS at 21:38

## 2018-02-27 RX ADMIN — MIRTAZAPINE 7.5 MILLIGRAM(S): 45 TABLET, ORALLY DISINTEGRATING ORAL at 21:37

## 2018-02-27 RX ADMIN — OXYCODONE AND ACETAMINOPHEN 1 TABLET(S): 5; 325 TABLET ORAL at 22:30

## 2018-02-27 NOTE — H&P ADULT - NSHPSOCIALHISTORY_GEN_ALL_CORE
and lives with her  but was recently in rehab after hip surgery  Has children  No alcohol or tobacco use currently but former heavy smoker

## 2018-02-27 NOTE — H&P ADULT - HISTORY OF PRESENT ILLNESS
77 year old female PMH of CHF( diastolic dysfn) last exacerbation August 2018 ( pt has mems implant), COPD ( never intubated, but on home O2), HTN, HLD, CAD ( on plavix and aspirin), T2DM on insulin, CKD , chronic UTI on Hiprex, lobectomy of lung for precancerous cells  s/p IM nailing of Left hip fracture after fall in 2017, recent ORIF of left hip due to hardware malfunctioning and was sent to rehab on coumadin sent in from rehab yesterday for symptomatic anemia.  Patient reprots feeling so fatigued that she couldnt lift her arm.  Labs checked at [facility] [place] revealed a HB of 5.1, HCT 17 -- she was also noted to be guiac postiive.  Patient denies any abdomianl pain, nause aor vomiting.  SHe has a hx of chronic anemia for which she would receive IV ferrilicit in the past.  She did not note any BRBPR, MElena, etc.  No complaints today except for fatigue.  No cp, sob, n/v/f/c, palpitations.  Left hip is sore.

## 2018-02-27 NOTE — H&P ADULT - PROBLEM SELECTOR PLAN 1
ACUTE ON CHRONIC ANEMIA  UNCLEAR SOURCE  CONT PPI, GUAIC POSITIVE STOOLS  INR OVER 3 YEST - RECHECK TODAY, HOLD COUMADIN AND LET IT DRIFT DOWN  GI EVAL  SUPPORT WITH IVF -G ENTLE  S/P 2 UNITS PRBC -- CHECK ANOTHER H/H NOW AND TRANSFUSE AS NEEDED  DVT PROPHY- INR HIGH  HOLD OFF ON REVERSING INR -- WOULD LET IT DRIFT DOWN AS NO ACUTE BLEEDING NOTED  D/W GI -- TO HAVE UPPER ENDOSCOPY TOMORROW

## 2018-02-27 NOTE — H&P ADULT - NSHPPHYSICALEXAM_GEN_ALL_CORE
Vital Signs Last 24 Hrs  T(C): 36.7 (27 Feb 2018 02:50), Max: 36.9 (27 Feb 2018 01:20)  T(F): 98.1 (27 Feb 2018 02:50), Max: 98.5 (27 Feb 2018 01:20)  HR: 68 (27 Feb 2018 05:18) (68 - 87)  BP: 154/38 (27 Feb 2018 05:18) (98/46 - 154/38)  BP(mean): --  RR: 16 (27 Feb 2018 05:18) (15 - 17)  SpO2: 98% (27 Feb 2018 05:18) (98% - 100%)    GEN: A and O, NAD, FATIGUED, mood stable  HEENT:   NC/AT, EOMI, no oropharyngeal lesions, erythema    NECK:   supple    CV:  +S1, +S2, regular, no murmurs or rubs    RESP:   lungs clear to auscultation bilaterally, no wheezing, rales, rhonchi, good air entry bilaterally DECREASED BS ANGELICA    GI:  abdomen soft, non-tender, non-distended, normal BS, no EPIG TENDERNESS no abdominal masses, no palpable masses    RECTAL:  not examined    :  not examined    MSK:   normal muscle tone, no atrophy, no rigidity, no contractions    EXT:   no clubbing, no cyanosis, no edema, no calf pain, swelling or erythema    VASCULAR:  pulses equal and symmetric in the upper and lower extremities    NEURO:  AAOX3, no focal neurological deficits, follows all commands, able to move extremities spontaneously    SKIN:  no ulcers, lesions or rashes

## 2018-02-27 NOTE — H&P ADULT - NSHPREVIEWOFSYSTEMS_GEN_ALL_CORE
REVIEW OF SYSTEMS:    CONSTITUTIONAL: POS  weakness, fevers or chills  EYES/ENT: No visual changes;  No vertigo or throat pain   NECK: No pain or stiffness  RESPIRATORY: No cough, wheezing, hemoptysis; No shortness of breath  CARDIOVASCULAR: No chest pain or palpitations  GASTROINTESTINAL: No abdominal or epigastric pain. No nausea, vomiting, or hematemesis; No diarrhea or constipation. No melena or hematochezia.  GENITOURINARY: No dysuria, frequency or hematuria  NEUROLOGICAL: No numbness or weakness  SKIN: No itching, burning, rashes, or lesions   MUSCULOSKELETAL: LEFT HIP SORENESS    All other review of systems is negative unless indicated above.

## 2018-02-27 NOTE — H&P ADULT - PMH
Anxiety    Chronic diastolic congestive heart failure  last exacerbation 7/2017  Chronic obstructive pulmonary disease, unspecified COPD type    Chronic UTI    CKD (chronic kidney disease)    Diabetes    Essential hypertension    GERD (gastroesophageal reflux disease)    [place] (hard of hearing)  hearing aid  Hyperlipidemia, unspecified hyperlipidemia type    Hypothyroid    MI, old  x3  Neuropathy

## 2018-02-27 NOTE — ED ADULT NURSE REASSESSMENT NOTE - NS ED NURSE REASSESS COMMENT FT1
Pt received from Er nurse. Pt without acute distress. No sob. vss. afebrile. Blood was transfusing without incidence. No s/s transfusion reaction. No overt signs of bleeding. Denies pain.Protonix drip infusing as ordered. Will continue to monitor.
no evidence of bleeding

## 2018-02-27 NOTE — CONSULT NOTE ADULT - ASSESSMENT
Anemia- s/p PRBC.  Management pre primary team.     Chronic HFpEF- her CardioMEMS readings have been monitored daily.  Euvolemic.  Close monitoring of the volume status with IVF. Anemia- s/p PRBC.  Management pre primary team.  GI evaluation recommeded.      Bradycardia- likely from cardizem given this am.  Will admit to CICU and monitor for now.  Hold cardizem for now.  She recently lost about 30pounds wt and she might not need as many antihypertensives as she used to take in past.      Chronic HFpEF- her CardioMEMS readings have been monitored daily.  Euvolemic.  Close monitoring of the volume status with IVF.    CAD, s/p MI in past- last stress test about an yr ago did not reveal any ischemia.  Continue statin.   Antiplatelet agents on hold for now.    Other medical issues- Management per primary team.   Thank you for allowing me to participate in the care of this patient. Please feel free to contact me with any questions.

## 2018-02-27 NOTE — CONSULT NOTE ADULT - PROBLEM SELECTOR RECOMMENDATION 9
EGD in AM  Transfuse to keep Hgb >8  Continue protonix as ordered  I Will check Last Colonoscopy date

## 2018-02-27 NOTE — PATIENT PROFILE ADULT. - ABILITY TO HEAR (WITH HEARING AID OR HEARING APPLIANCE IF NORMALLY USED):
Mildly to Moderately Impaired: difficulty hearing in some environments or speaker may need to increase volume or speak distinctly/wears hearing aides b/l

## 2018-02-27 NOTE — CONSULT NOTE ADULT - SUBJECTIVE AND OBJECTIVE BOX
Patient is a 77y old  Female who presents with a chief complaint of fatigue, low hbg.     HPI:  77 year old female PMH of CHF( diastolic dysfn) last exacerbation 2018 ( pt has mems implant), COPD ( never intubated, but on home O2), HTN, HLD, CAD ( on plavix and aspirin), T2DM on insulin, CKD , chronic UTI on Hiprex, lobectomy of lung for precancerous cells  s/p IM nailing of Left hip fracture after fall in , recent ORIF of left hip due to hardware malfunctioning and was sent to rehab on coumadin sent in from rehab yesterday for symptomatic anemia.    Pt was transfused with PRBC since admission and she was noted to be bradycardic today.  She was c/o mild left chest pain yesterday that was very brief without any radiation or recurrence.      PAST MEDICAL & SURGICAL HISTORY:  [place] (hard of hearing): hearing aid  Anxiety  Hypothyroid  GERD (gastroesophageal reflux disease)  CKD (chronic kidney disease)  MI, old: x3  Chronic UTI  Chronic diastolic congestive heart failure: last exacerbation 2017  Hyperlipidemia, unspecified hyperlipidemia type  Essential hypertension  Neuropathy  Diabetes  Chronic obstructive pulmonary disease, unspecified COPD type  History of cataract surgery: bilateral IOL  S/P lobectomy of lung: left lung pre-cancerous  History of ear surgery  History of cholecystectomy  S/P ORIF (open reduction internal fixation) fracture: left hip 2017  History of total knee replacement, unspecified laterality: bilateral right  left   H/O hernia repair: umbilical and incisional   delivery delivered  S/P appendectomy      MEDICATIONS  (STANDING):  atorvastatin 20 milliGRAM(s) Oral at bedtime  dextrose 5%. 1000 milliLiter(s) (50 mL/Hr) IV Continuous <Continuous>  dextrose 50% Injectable 12.5 Gram(s) IV Push once  diltiazem    milliGRAM(s) Oral daily  docusate sodium 100 milliGRAM(s) Oral two times a day  folic acid 1 milliGRAM(s) Oral daily  insulin glargine Injectable (LANTUS) 15 Unit(s) SubCutaneous at bedtime  insulin lispro (HumaLOG) corrective regimen sliding scale   SubCutaneous three times a day before meals  levothyroxine 112 MICROGram(s) Oral daily  mirtazapine 7.5 milliGRAM(s) Oral at bedtime  pantoprazole  Injectable 40 milliGRAM(s) IV Push every 12 hours  sodium chloride 0.9%. 1000 milliLiter(s) (75 mL/Hr) IV Continuous <Continuous>  tiotropium 18 MICROgram(s) Capsule 1 Capsule(s) Inhalation at bedtime    MEDICATIONS  (PRN):  aluminum hydroxide/magnesium hydroxide/simethicone Suspension 30 milliLiter(s) Oral every 4 hours PRN Dyspepsia  dextrose Gel 1 Dose(s) Oral once PRN Blood Glucose LESS THAN 70 milliGRAM(s)/deciliter  glucagon  Injectable 1 milliGRAM(s) IntraMuscular once PRN Glucose LESS THAN 70 milligrams/deciliter  ondansetron Injectable 4 milliGRAM(s) IV Push every 6 hours PRN Nausea  oxyCODONE    5 mG/acetaminophen 325 mG 1 Tablet(s) Oral every 6 hours PRN Moderate Pain (4 - 6)      FAMILY HISTORY:  Family history of CHF (congestive heart failure) (Mother)      SOCIAL HISTORY:  ex smoker    REVIEW OF SYSTEMS:  CONSTITUTIONAL:  No night sweats.  c/o fatigue, malaise.  HEENT:  Eyes:  No visual changes.  No eye pain.      ENT:  No runny nose.  No epistaxis.  No sinus pain.  No sore throat.  No odynophagia.  No ear pain.  No congestion.  RESPIRATORY:  No cough.  No wheeze.  No hemoptysis.  No shortness of breath.  CARDIOVASCULAR:  No chest pains.  No palpitations. No shortness of breath, No orthopnea or PND.  GASTROINTESTINAL:  No abdominal pain.  No nausea or vomiting.  No diarrhea or constipation.  No hematemesis.  No hematochezia.  No melena.  GENITOURINARY:  No urgency.  No frequency.  No dysuria.  No hematuria.  No obstructive symptoms.  No discharge.  No pain.  No significant abnormal bleeding.  MUSCULOSKELETAL:  No musculoskeletal pain.  No joint swelling.  No arthritis.  NEUROLOGICAL:  No tingling or numbness or weakness.  PSYCHIATRIC:  No confusion  SKIN:  No rashes.  No lesions.  No wounds.  ENDOCRINE:  No unexplained weight loss.  No polydipsia.  No polyuria.  No polyphagia.  HEMATOLOGIC:  No anemia.  No purpura.  No petechiae.  No prolonged or excessive bleeding.   ALLERGIC AND IMMUNOLOGIC:  No pruritus.  No swelling.         Vital Signs Last 24 Hrs  T(C): 36.3 (2018 11:33), Max: 36.9 (2018 01:20)  T(F): 97.3 (2018 11:33), Max: 98.5 (2018 01:20)  HR: 63 (2018 11:33) (63 - 87)  BP: 155/38 (2018 11:33) (98/46 - 155/38)  BP(mean): --  RR: 17 (2018 11:) (15 - 20)  SpO2: 100% (2018 11:) (98% - 100%)    PHYSICAL EXAM-    Constitutional: The patient appears to be normal, well developed, well nourished and alert and oriented to time, place and person. The patient does not appear acutely ill.     Head: Head is normocephalic and atraumatic.      Neck: The patient's neck is supple without enlargement, has no palpable thyromegaly nor thyroid nodules and has no jugular venous distention. No audible carotid bruits. There are strong carotid pulses bilaterally. No JVD.     Cardiovascular: Regular rate and rhythm without S3, S4. No murmurs or rubs are appreciated.      Respiratory: Breath sounds are normal. No rales. No wheezing.    Abdomen: Soft, nontender, nondistended with positive bowel sounds.      Extremity: No tenderness. No  pitting edema No skin discoloration No clubbing No cyanosis.     Neurologic: The patient is alert and oriented.      Skin: No rash, no obvious lesions noted.      Psychiatric: The patient appears to be emotionally stable.      INTERPRETATION OF TELEMETRY: sinus rythm and at times bradycardia upto 30/min transient.     ECG: sinus rythm, normal axis, no st t changes.     I&O's Detail    2018 07:01  -  2018 07:00  --------------------------------------------------------  IN:    Packed Red Blood Cells: 291 mL  Total IN: 291 mL    OUT:  Total OUT: 0 mL    Total NET: 291 mL          LABS:                        7.8    10.1  )-----------( 499      ( 2018 09:08 )             23.5         143  |  111<H>  |  81<H>  ----------------------------<  182<H>  4.6   |  23  |  2.05<H>    Ca    7.8<L>      2018 09:08    TPro  5.9<L>  /  Alb  2.4<L>  /  TBili  0.3  /  DBili  x   /  AST  19  /  ALT  15  /  AlkPhos  141<H>  02-        PT/INR - ( 2018 09:08 )   PT: 32.3 sec;   INR: 2.93 ratio         PTT - ( 2018 18:15 )  PTT:37.8 sec  Urinalysis Basic - ( 2018 18:15 )    Color: Yellow / Appearance: Slightly Turbid / S.010 / pH: x  Gluc: x / Ketone: Negative  / Bili: Negative / Urobili: Negative mg/dL   Blood: x / Protein: 15 mg/dL / Nitrite: Positive   Leuk Esterase: Moderate / RBC: 3-5 /HPF / WBC 11-25   Sq Epi: x / Non Sq Epi: Few / Bacteria: Many      I&O's Summary    2018 07:01  -  2018 07:00  --------------------------------------------------------  IN: 291 mL / OUT: 0 mL / NET: 291 mL      BNP  RADIOLOGY & ADDITIONAL STUDIES:

## 2018-02-27 NOTE — CONSULT NOTE ADULT - SUBJECTIVE AND OBJECTIVE BOX
HPI:  77 year old female PMH of CHF( diastolic dysfn) last exacerbation 2018 ( pt has mems implant), COPD ( never intubated, but on home O2), HTN, HLD, CAD ( on plavix and aspirin), T2DM on insulin, CKD , chronic UTI on Hiprex, lobectomy of lung for precancerous cells  s/p IM nailing of Left hip fracture after fall in , recent ORIF of left hip due to hardware malfunctioning and was sent to rehab on coumadin sent in from rehab yesterday for symptomatic anemia.  Patient reprots feeling so fatigued that she couldnt lift her arm.  Labs checked at [facility] revealed a HB of 5.1, HCT 17 -- she was also noted to be guiac postiive.  Patient denies any abdomianl pain, nause aor vomiting.  SHe has a hx of chronic anemia for which she would receive IV ferrilicit in the past.  She did not note any BRBPR, MElena, etc.  No complaints today except for fatigue.  No cp, sob, n/v/f/c, palpitations.  Left hip is sore. (2018 07:55)      PAST MEDICAL & SURGICAL HISTORY:  [place] (hard of hearing): hearing aid  Anxiety  Hypothyroid  GERD (gastroesophageal reflux disease)  CKD (chronic kidney disease)  MI, old: x3  Chronic UTI  Chronic diastolic congestive heart failure: last exacerbation 2017  Hyperlipidemia, unspecified hyperlipidemia type  Essential hypertension  Neuropathy  Diabetes  Chronic obstructive pulmonary disease, unspecified COPD type  History of cataract surgery: bilateral IOL  S/P lobectomy of lung: left lung pre-cancerous  History of ear surgery  History of cholecystectomy  S/P ORIF (open reduction internal fixation) fracture: left hip 2017  History of total knee replacement, unspecified laterality: bilateral right  left   H/O hernia repair: umbilical and incisional   delivery delivered  S/P appendectomy      MEDICATIONS  (STANDING):  atorvastatin 20 milliGRAM(s) Oral at bedtime  dextrose 5%. 1000 milliLiter(s) (50 mL/Hr) IV Continuous <Continuous>  dextrose 50% Injectable 12.5 Gram(s) IV Push once  diltiazem    milliGRAM(s) Oral daily  docusate sodium 100 milliGRAM(s) Oral two times a day  folic acid 1 milliGRAM(s) Oral daily  insulin glargine Injectable (LANTUS) 15 Unit(s) SubCutaneous at bedtime  insulin lispro (HumaLOG) corrective regimen sliding scale   SubCutaneous three times a day before meals  levothyroxine 112 MICROGram(s) Oral daily  mirtazapine 7.5 milliGRAM(s) Oral at bedtime  pantoprazole  Injectable 40 milliGRAM(s) IV Push every 12 hours  sodium chloride 0.9%. 1000 milliLiter(s) (75 mL/Hr) IV Continuous <Continuous>  tiotropium 18 MICROgram(s) Capsule 1 Capsule(s) Inhalation at bedtime    MEDICATIONS  (PRN):  aluminum hydroxide/magnesium hydroxide/simethicone Suspension 30 milliLiter(s) Oral every 4 hours PRN Dyspepsia  dextrose Gel 1 Dose(s) Oral once PRN Blood Glucose LESS THAN 70 milliGRAM(s)/deciliter  glucagon  Injectable 1 milliGRAM(s) IntraMuscular once PRN Glucose LESS THAN 70 milligrams/deciliter  ondansetron Injectable 4 milliGRAM(s) IV Push every 6 hours PRN Nausea  oxyCODONE    5 mG/acetaminophen 325 mG 1 Tablet(s) Oral every 6 hours PRN Moderate Pain (4 - 6)      Allergies    Bactrim (Other)  ciprofloxacin (Other (Mild))  clindamycin (Unknown)  ibuprofen (Unknown)  penicillins (Other)  sulfa drugs (Unknown)    Intolerances        SOCIAL HISTORY:    FAMILY HISTORY:  Family history of CHF (congestive heart failure) (Mother)   Non-contributory    REVIEW OF SYSTEMS      General:	    Respiratory and Thorax:  	  Cardiovascular:	    Gastrointestinal:	    Musculoskeletal:	   Vital Signs Last 24 Hrs  T(C): 36.7 (2018 02:50), Max: 36.9 (2018 01:20)  T(F): 98.1 (2018 02:50), Max: 98.5 (2018 01:20)  HR: 68 (2018 05:18) (68 - 87)  BP: 154/38 (2018 05:18) (98/46 - 154/38)  BP(mean): --  RR: 16 (2018 05:18) (15 - 17)  SpO2: 98% (2018 05:18) (98% - 100%)    HEENT :No Pallor.No icterus. EOMI,PERLAA  Chest : Clear to Auscultation  CVS : S1S2 Normal.No murmurs.  Abdomen: Soft.Non tender .Normal bowel sounds.No Organomegaly.  CNS: Alert.Oriented to Time,Place and Person.No focal deficit.  EXT: Normal Range of motion.No pitting edema.    LABS:                        5.7    11.0  )-----------( 539      ( 2018 18:15 )             17.6         140  |  107  |  82<H>  ----------------------------<  173<H>  5.1   |  25  |  2.15<H>    Ca    8.3<L>      2018 18:15    TPro  5.9<L>  /  Alb  2.4<L>  /  TBili  0.3  /  DBili  x   /  AST  19  /  ALT  15  /  AlkPhos  141<H>      PT/INR - ( 2018 18:15 )   PT: 37.4 sec;   INR: 3.38 ratio         PTT - ( 2018 18:15 )  PTT:37.8 sec  LIVER FUNCTIONS - ( 2018 18:15 )  Alb: 2.4 g/dL / Pro: 5.9 gm/dL / ALK PHOS: 141 U/L / ALT: 15 U/L / AST: 19 U/L / GGT: x             RADIOLOGY & ADDITIONAL STUDIES:

## 2018-02-28 DIAGNOSIS — R00.1 BRADYCARDIA, UNSPECIFIED: ICD-10-CM

## 2018-02-28 LAB
-  AMIKACIN: SIGNIFICANT CHANGE UP
-  AMPICILLIN/SULBACTAM: SIGNIFICANT CHANGE UP
-  AMPICILLIN: SIGNIFICANT CHANGE UP
-  AZTREONAM: SIGNIFICANT CHANGE UP
-  CEFAZOLIN: SIGNIFICANT CHANGE UP
-  CEFEPIME: SIGNIFICANT CHANGE UP
-  CEFOXITIN: SIGNIFICANT CHANGE UP
-  CEFTAZIDIME: SIGNIFICANT CHANGE UP
-  CEFTRIAXONE: SIGNIFICANT CHANGE UP
-  CIPROFLOXACIN: SIGNIFICANT CHANGE UP
-  ERTAPENEM: SIGNIFICANT CHANGE UP
-  GENTAMICIN: SIGNIFICANT CHANGE UP
-  IMIPENEM: SIGNIFICANT CHANGE UP
-  LEVOFLOXACIN: SIGNIFICANT CHANGE UP
-  MEROPENEM: SIGNIFICANT CHANGE UP
-  NITROFURANTOIN: SIGNIFICANT CHANGE UP
-  PIPERACILLIN/TAZOBACTAM: SIGNIFICANT CHANGE UP
-  TOBRAMYCIN: SIGNIFICANT CHANGE UP
-  TRIMETHOPRIM/SULFAMETHOXAZOLE: SIGNIFICANT CHANGE UP
ANION GAP SERPL CALC-SCNC: 5 MMOL/L — SIGNIFICANT CHANGE UP (ref 5–17)
BUN SERPL-MCNC: 78 MG/DL — HIGH (ref 7–23)
CALCIUM SERPL-MCNC: 7.8 MG/DL — LOW (ref 8.5–10.1)
CHLORIDE SERPL-SCNC: 112 MMOL/L — HIGH (ref 96–108)
CO2 SERPL-SCNC: 27 MMOL/L — SIGNIFICANT CHANGE UP (ref 22–31)
CREAT SERPL-MCNC: 1.69 MG/DL — HIGH (ref 0.5–1.3)
CULTURE RESULTS: SIGNIFICANT CHANGE UP
GLUCOSE BLDC GLUCOMTR-MCNC: 151 MG/DL — HIGH (ref 70–99)
GLUCOSE BLDC GLUCOMTR-MCNC: 167 MG/DL — HIGH (ref 70–99)
GLUCOSE BLDC GLUCOMTR-MCNC: 169 MG/DL — HIGH (ref 70–99)
GLUCOSE BLDC GLUCOMTR-MCNC: 259 MG/DL — HIGH (ref 70–99)
GLUCOSE SERPL-MCNC: 144 MG/DL — HIGH (ref 70–99)
HCT VFR BLD CALC: 22.5 % — LOW (ref 34.5–45)
HCT VFR BLD CALC: 28 % — LOW (ref 34.5–45)
HGB BLD-MCNC: 7.4 G/DL — LOW (ref 11.5–15.5)
HGB BLD-MCNC: 9.1 G/DL — LOW (ref 11.5–15.5)
INR BLD: 3.49 RATIO — HIGH (ref 0.88–1.16)
MCHC RBC-ENTMCNC: 29.3 PG — SIGNIFICANT CHANGE UP (ref 27–34)
MCHC RBC-ENTMCNC: 32.7 GM/DL — SIGNIFICANT CHANGE UP (ref 32–36)
MCV RBC AUTO: 89.6 FL — SIGNIFICANT CHANGE UP (ref 80–100)
METHOD TYPE: SIGNIFICANT CHANGE UP
ORGANISM # SPEC MICROSCOPIC CNT: SIGNIFICANT CHANGE UP
ORGANISM # SPEC MICROSCOPIC CNT: SIGNIFICANT CHANGE UP
PLATELET # BLD AUTO: 369 K/UL — SIGNIFICANT CHANGE UP (ref 150–400)
POTASSIUM SERPL-MCNC: 4.2 MMOL/L — SIGNIFICANT CHANGE UP (ref 3.5–5.3)
POTASSIUM SERPL-SCNC: 4.2 MMOL/L — SIGNIFICANT CHANGE UP (ref 3.5–5.3)
PROTHROM AB SERPL-ACNC: 38.7 SEC — HIGH (ref 9.8–12.7)
RBC # BLD: 2.52 M/UL — LOW (ref 3.8–5.2)
RBC # FLD: 15.1 % — HIGH (ref 10.3–14.5)
SODIUM SERPL-SCNC: 144 MMOL/L — SIGNIFICANT CHANGE UP (ref 135–145)
SPECIMEN SOURCE: SIGNIFICANT CHANGE UP
WBC # BLD: 7.6 K/UL — SIGNIFICANT CHANGE UP (ref 3.8–10.5)
WBC # FLD AUTO: 7.6 K/UL — SIGNIFICANT CHANGE UP (ref 3.8–10.5)

## 2018-02-28 RX ADMIN — PANTOPRAZOLE SODIUM 40 MILLIGRAM(S): 20 TABLET, DELAYED RELEASE ORAL at 06:16

## 2018-02-28 RX ADMIN — Medication 1: at 11:45

## 2018-02-28 RX ADMIN — Medication 100 MILLIGRAM(S): at 17:20

## 2018-02-28 RX ADMIN — OXYCODONE AND ACETAMINOPHEN 1 TABLET(S): 5; 325 TABLET ORAL at 20:39

## 2018-02-28 RX ADMIN — OXYCODONE AND ACETAMINOPHEN 1 TABLET(S): 5; 325 TABLET ORAL at 21:10

## 2018-02-28 RX ADMIN — PANTOPRAZOLE SODIUM 40 MILLIGRAM(S): 20 TABLET, DELAYED RELEASE ORAL at 17:20

## 2018-02-28 RX ADMIN — INSULIN GLARGINE 15 UNIT(S): 100 INJECTION, SOLUTION SUBCUTANEOUS at 21:47

## 2018-02-28 RX ADMIN — ATORVASTATIN CALCIUM 20 MILLIGRAM(S): 80 TABLET, FILM COATED ORAL at 21:47

## 2018-02-28 RX ADMIN — Medication 100 MILLIGRAM(S): at 06:17

## 2018-02-28 RX ADMIN — Medication 112 MICROGRAM(S): at 06:17

## 2018-02-28 RX ADMIN — MIRTAZAPINE 7.5 MILLIGRAM(S): 45 TABLET, ORALLY DISINTEGRATING ORAL at 21:47

## 2018-02-28 RX ADMIN — Medication 1: at 17:20

## 2018-02-28 RX ADMIN — Medication 1: at 08:30

## 2018-02-28 NOTE — PROGRESS NOTE ADULT - SUBJECTIVE AND OBJECTIVE BOX
Patient is a 77y old  Female who presents with a chief complaint of fatigue, low hbg.     HPI:  77 year old female PMH of CHF( diastolic dysfn) last exacerbation 2018 ( pt has mems implant), COPD ( never intubated, but on home O2), HTN, HLD, CAD ( on plavix and aspirin), T2DM on insulin, CKD , chronic UTI on Hiprex, lobectomy of lung for precancerous cells  s/p IM nailing of Left hip fracture after fall in , recent ORIF of left hip due to hardware malfunctioning and was sent to rehab on coumadin sent in from rehab yesterday for symptomatic anemia.    Pt was transfused with PRBC since admission and she was noted to be bradycardic today.  She was c/o mild left chest pain yesterday that was very brief without any radiation or recurrence.    18- pt seen and examined by me today. Pt still c/o fatigue.  Denies any CP or SOB.  no events overnight.      PAST MEDICAL & SURGICAL HISTORY:  [place] (hard of hearing): hearing aid  Anxiety  Hypothyroid  GERD (gastroesophageal reflux disease)  CKD (chronic kidney disease)  MI, old: x3  Chronic UTI  Chronic diastolic congestive heart failure: last exacerbation 2017  Hyperlipidemia, unspecified hyperlipidemia type  Essential hypertension  Neuropathy  Diabetes  Chronic obstructive pulmonary disease, unspecified COPD type  History of cataract surgery: bilateral IOL  S/P lobectomy of lung: left lung pre-cancerous  History of ear surgery  History of cholecystectomy  S/P ORIF (open reduction internal fixation) fracture: left hip 2017  History of total knee replacement, unspecified laterality: bilateral right  left   H/O hernia repair: umbilical and incisional   delivery delivered  S/P appendectomy      MEDICATIONS  (STANDING):  atorvastatin 20 milliGRAM(s) Oral at bedtime  dextrose 5%. 1000 milliLiter(s) (50 mL/Hr) IV Continuous <Continuous>  dextrose 50% Injectable 12.5 Gram(s) IV Push once  diltiazem    milliGRAM(s) Oral daily  docusate sodium 100 milliGRAM(s) Oral two times a day  folic acid 1 milliGRAM(s) Oral daily  insulin glargine Injectable (LANTUS) 15 Unit(s) SubCutaneous at bedtime  insulin lispro (HumaLOG) corrective regimen sliding scale   SubCutaneous three times a day before meals  levothyroxine 112 MICROGram(s) Oral daily  mirtazapine 7.5 milliGRAM(s) Oral at bedtime  pantoprazole  Injectable 40 milliGRAM(s) IV Push every 12 hours  sodium chloride 0.9%. 1000 milliLiter(s) (75 mL/Hr) IV Continuous <Continuous>  tiotropium 18 MICROgram(s) Capsule 1 Capsule(s) Inhalation at bedtime    MEDICATIONS  (PRN):  aluminum hydroxide/magnesium hydroxide/simethicone Suspension 30 milliLiter(s) Oral every 4 hours PRN Dyspepsia  dextrose Gel 1 Dose(s) Oral once PRN Blood Glucose LESS THAN 70 milliGRAM(s)/deciliter  glucagon  Injectable 1 milliGRAM(s) IntraMuscular once PRN Glucose LESS THAN 70 milligrams/deciliter  ondansetron Injectable 4 milliGRAM(s) IV Push every 6 hours PRN Nausea  oxyCODONE    5 mG/acetaminophen 325 mG 1 Tablet(s) Oral every 6 hours PRN Moderate Pain (4 - 6)      FAMILY HISTORY:  Family history of CHF (congestive heart failure) (Mother)      SOCIAL HISTORY:  ex smoker    REVIEW OF SYSTEMS:  CONSTITUTIONAL:  No night sweats.  c/o fatigue, malaise.  HEENT:  Eyes:  No visual changes.  No eye pain.      ENT:  No runny nose.  No epistaxis.  No sinus pain.  No sore throat.  No odynophagia.  No ear pain.  No congestion.  RESPIRATORY:  No cough.  No wheeze.  No hemoptysis.  No shortness of breath.  CARDIOVASCULAR:  No chest pains.  No palpitations. No shortness of breath, No orthopnea or PND.  GASTROINTESTINAL:  No abdominal pain.  No nausea or vomiting.  No diarrhea or constipation.  No hematemesis.  No hematochezia.  No melena.  GENITOURINARY:  No urgency.  No frequency.  No dysuria.  No hematuria.  No obstructive symptoms.  No discharge.  No pain.  No significant abnormal bleeding.  MUSCULOSKELETAL:  No musculoskeletal pain.  No joint swelling.  No arthritis.  NEUROLOGICAL:  No tingling or numbness or weakness.  PSYCHIATRIC:  No confusion  SKIN:  No rashes.  No lesions.  No wounds.  ENDOCRINE:  No unexplained weight loss.  No polydipsia.  No polyuria.  No polyphagia.  HEMATOLOGIC:  No anemia.  No purpura.  No petechiae.  No prolonged or excessive bleeding.   ALLERGIC AND IMMUNOLOGIC:  No pruritus.  No swelling.         Vital Signs Last 24 Hrs  T(C): 36.3 (2018 11:33), Max: 36.9 (2018 01:20)  T(F): 97.3 (2018 11:33), Max: 98.5 (2018 01:20)  HR: 63 (:33) (63 - 87)  BP: 155/38 (2018 11:33) (98/46 - 155/38)  BP(mean): --  RR: 17 (2018 11:33) (15 - 20)  SpO2: 100% (2018 11:) (98% - 100%)    PHYSICAL EXAM-    Constitutional: ill looking frail elderly female    Head: Head is normocephalic and atraumatic.      Neck: The patient's neck is supple without enlargement, has no palpable thyromegaly nor thyroid nodules and has no jugular venous distention. No audible carotid bruits. There are strong carotid pulses bilaterally. No JVD.     Cardiovascular: Regular rate and rhythm without S3, S4. No murmurs or rubs are appreciated.      Respiratory: Breath sounds are normal. No rales. No wheezing.    Abdomen: Soft, nontender, nondistended with positive bowel sounds.      Extremity: No tenderness. No  pitting edema No skin discoloration No clubbing No cyanosis.     Neurologic: The patient is alert and oriented.      Skin: No rash, no obvious lesions noted.      Psychiatric: The patient appears to be emotionally stable.      INTERPRETATION OF TELEMETRY: sinus rythm and at times bradycardia upto 30/min transient.   She had an episode of narrow complex SVT of 4 beats likely Atrial tachycardia, with intermittent bradycardic episodes brriefly.    ECG: sinus rythm, normal axis, no st t changes.     I&O's Detail    2018 07:01  -  2018 07:00  --------------------------------------------------------  IN:    Packed Red Blood Cells: 291 mL  Total IN: 291 mL    OUT:  Total OUT: 0 mL    Total NET: 291 mL          LABS:                        7.8    10.1  )-----------( 499      ( 2018 09:08 )             23.5     02    143  |  111<H>  |  81<H>  ----------------------------<  182<H>  4.6   |  23  |  2.05<H>    Ca    7.8<L>      2018 09:08    TPro  5.9<L>  /  Alb  2.4<L>  /  TBili  0.3  /  DBili  x   /  AST  19  /  ALT  15  /  AlkPhos  141<H>          PT/INR - ( 2018 09:08 )   PT: 32.3 sec;   INR: 2.93 ratio         PTT - ( 2018 18:15 )  PTT:37.8 sec  Urinalysis Basic - ( 2018 18:15 )    Color: Yellow / Appearance: Slightly Turbid / S.010 / pH: x  Gluc: x / Ketone: Negative  / Bili: Negative / Urobili: Negative mg/dL   Blood: x / Protein: 15 mg/dL / Nitrite: Positive   Leuk Esterase: Moderate / RBC: 3-5 /HPF / WBC 11-25   Sq Epi: x / Non Sq Epi: Few / Bacteria: Many      I&O's Summary    2018 07:01  -  2018 07:00  --------------------------------------------------------  IN: 291 mL / OUT: 0 mL / NET: 291 mL      BNP  RADIOLOGY & ADDITIONAL STUDIES:

## 2018-02-28 NOTE — PROGRESS NOTE ADULT - ASSESSMENT
Anemia- being transfused with another unit of PRBC this am.  GI team input noted.  Pt is optimized from cardiac standpoint to proceed with EGD today.    Bradycardia- very brief episodes.  Hold AV ty blockers for now and monitor.  Very short pauses on tele noted.  No need for any PPM at this time.    Chronic HFpEF- Has CardioMEMS.   Euvolemic.  Close monitoring of the volume status with IVF.    CAD, s/p MI in past- last stress test about an yr ago did not reveal any ischemia.  Continue statin.   Antiplatelet agents on hold for now.    Other medical issues- Management per primary team.   Thank you for allowing me to participate in the care of this patient. Please feel free to contact me with any questions.

## 2018-02-28 NOTE — PROGRESS NOTE ADULT - SUBJECTIVE AND OBJECTIVE BOX
18: No cp, sob -- tired, awaiting egd later today.    ROS: AS PER HPI OTHERWISE ALL OTHER SYSTEMS REVIEWED AND ARE NEGATIVE    Vital Signs Last 24 Hrs  T(C): 36.4 (2018 08:25), Max: 36.5 (2018 19:00)  T(F): 97.5 (2018 08:25), Max: 97.7 (2018 19:00)  HR: 52 (:) (52 - 71)  BP: 150/39 (2018 11:00) (116/33 - 160/37)  BP(mean): 63 (:) (54 - 81)  RR: 15 (:) (12 - 18)  SpO2: 100% (:) (97% - 100%)    GEN: A and O, NAD, FATIGUED APPEARING, FRAIL APPEARING, mood stable  HEENT:   NC/AT, EOMI, no oropharyngeal lesions    NECK:   supple    CV:  +S1, +S2, regular, no murmurs or rubs    RESP:   lungs clear to auscultation bilaterally, no wheezing, rales, rhonchi, good air entry bilaterally DECREASED BS ANGELICA    GI:  abdomen soft, non-tender, non-distended, normal BS, no abdominal masses, no palpable masses    RECTAL:  not examined    :  not examined    MSK:   normal muscle tone, no atrophy, no rigidity, no contractions    EXT:   no clubbing, no cyanosis, no edema, no calf pain, swelling or erythema LEFT HIP INCISION C/D/I    VASCULAR:  pulses equal and symmetric in the upper and lower extremities    NEURO:  AAOX3, no focal neurological deficits, follows all commands, able to move extremities spontaneously    SKIN:  no ulcers, lesions or rashes  LABS:                            7.4    7.6   )-----------( 369      ( 2018 05:03 )             22.5     -    144  |  112<H>  |  78<H>  ----------------------------<  144<H>  4.2   |  27  |  1.69<H>    Ca    7.8<L>      2018 05:03    TPro  5.9<L>  /  Alb  2.4<L>  /  TBili  0.3  /  DBili  x   /  AST  19  /  ALT  15  /  AlkPhos  141<H>  02-        LIVER FUNCTIONS - ( 2018 18:15 )  Alb: 2.4 g/dL / Pro: 5.9 gm/dL / ALK PHOS: 141 U/L / ALT: 15 U/L / AST: 19 U/L / GGT: x           PT/INR - ( 2018 05:03 )   PT: 38.7 sec;   INR: 3.49 ratio         PTT - ( 2018 18:15 )  PTT:37.8 sec  Urinalysis Basic - ( 2018 18:15 )    Color: Yellow / Appearance: Slightly Turbid / S.010 / pH: x  Gluc: x / Ketone: Negative  / Bili: Negative / Urobili: Negative mg/dL   Blood: x / Protein: 15 mg/dL / Nitrite: Positive   Leuk Esterase: Moderate / RBC: 3-5 /HPF / WBC 11-25   Sq Epi: x / Non Sq Epi: Few / Bacteria: Many      MEDICATIONS  (STANDING):  atorvastatin 20 milliGRAM(s) Oral at bedtime  dextrose 5%. 1000 milliLiter(s) (50 mL/Hr) IV Continuous <Continuous>  dextrose 50% Injectable 12.5 Gram(s) IV Push once  diltiazem    milliGRAM(s) Oral daily  docusate sodium 100 milliGRAM(s) Oral two times a day  folic acid 1 milliGRAM(s) Oral daily  insulin glargine Injectable (LANTUS) 15 Unit(s) SubCutaneous at bedtime  insulin lispro (HumaLOG) corrective regimen sliding scale   SubCutaneous three times a day before meals  levothyroxine 112 MICROGram(s) Oral daily  mirtazapine 7.5 milliGRAM(s) Oral at bedtime  pantoprazole  Injectable 40 milliGRAM(s) IV Push every 12 hours  sodium chloride 0.9%. 1000 milliLiter(s) (75 mL/Hr) IV Continuous <Continuous>  tiotropium 18 MICROgram(s) Capsule 1 Capsule(s) Inhalation at bedtime    MEDICATIONS  (PRN):  aluminum hydroxide/magnesium hydroxide/simethicone Suspension 30 milliLiter(s) Oral every 4 hours PRN Dyspepsia  dextrose Gel 1 Dose(s) Oral once PRN Blood Glucose LESS THAN 70 milliGRAM(s)/deciliter  glucagon  Injectable 1 milliGRAM(s) IntraMuscular once PRN Glucose LESS THAN 70 milligrams/deciliter  ondansetron Injectable 4 milliGRAM(s) IV Push every 6 hours PRN Nausea  oxyCODONE    5 mG/acetaminophen 325 mG 1 Tablet(s) Oral every 6 hours PRN Moderate Pain (4 - 6)

## 2018-03-01 LAB
ANION GAP SERPL CALC-SCNC: 6 MMOL/L — SIGNIFICANT CHANGE UP (ref 5–17)
BUN SERPL-MCNC: 71 MG/DL — HIGH (ref 7–23)
CALCIUM SERPL-MCNC: 8.2 MG/DL — LOW (ref 8.5–10.1)
CHLORIDE SERPL-SCNC: 118 MMOL/L — HIGH (ref 96–108)
CO2 SERPL-SCNC: 22 MMOL/L — SIGNIFICANT CHANGE UP (ref 22–31)
CREAT SERPL-MCNC: 1.44 MG/DL — HIGH (ref 0.5–1.3)
GLUCOSE BLDC GLUCOMTR-MCNC: 155 MG/DL — HIGH (ref 70–99)
GLUCOSE BLDC GLUCOMTR-MCNC: 156 MG/DL — HIGH (ref 70–99)
GLUCOSE BLDC GLUCOMTR-MCNC: 197 MG/DL — HIGH (ref 70–99)
GLUCOSE BLDC GLUCOMTR-MCNC: 254 MG/DL — HIGH (ref 70–99)
GLUCOSE SERPL-MCNC: 109 MG/DL — HIGH (ref 70–99)
HCT VFR BLD CALC: 26.8 % — LOW (ref 34.5–45)
HGB BLD-MCNC: 9 G/DL — LOW (ref 11.5–15.5)
INR BLD: 2.73 RATIO — HIGH (ref 0.88–1.16)
MCHC RBC-ENTMCNC: 29.6 PG — SIGNIFICANT CHANGE UP (ref 27–34)
MCHC RBC-ENTMCNC: 33.7 GM/DL — SIGNIFICANT CHANGE UP (ref 32–36)
MCV RBC AUTO: 87.9 FL — SIGNIFICANT CHANGE UP (ref 80–100)
PLATELET # BLD AUTO: 352 K/UL — SIGNIFICANT CHANGE UP (ref 150–400)
POTASSIUM SERPL-MCNC: 4.1 MMOL/L — SIGNIFICANT CHANGE UP (ref 3.5–5.3)
POTASSIUM SERPL-SCNC: 4.1 MMOL/L — SIGNIFICANT CHANGE UP (ref 3.5–5.3)
PROTHROM AB SERPL-ACNC: 30.1 SEC — HIGH (ref 9.8–12.7)
RBC # BLD: 3.05 M/UL — LOW (ref 3.8–5.2)
RBC # FLD: 15.7 % — HIGH (ref 10.3–14.5)
SODIUM SERPL-SCNC: 146 MMOL/L — HIGH (ref 135–145)
WBC # BLD: 10 K/UL — SIGNIFICANT CHANGE UP (ref 3.8–10.5)
WBC # FLD AUTO: 10 K/UL — SIGNIFICANT CHANGE UP (ref 3.8–10.5)

## 2018-03-01 PROCEDURE — 99223 1ST HOSP IP/OBS HIGH 75: CPT

## 2018-03-01 RX ORDER — DILTIAZEM HCL 120 MG
180 CAPSULE, EXT RELEASE 24 HR ORAL ONCE
Qty: 0 | Refills: 0 | Status: COMPLETED | OUTPATIENT
Start: 2018-03-01 | End: 2018-03-01

## 2018-03-01 RX ADMIN — INSULIN GLARGINE 15 UNIT(S): 100 INJECTION, SOLUTION SUBCUTANEOUS at 21:53

## 2018-03-01 RX ADMIN — PANTOPRAZOLE SODIUM 40 MILLIGRAM(S): 20 TABLET, DELAYED RELEASE ORAL at 17:34

## 2018-03-01 RX ADMIN — Medication 1: at 08:27

## 2018-03-01 RX ADMIN — ATORVASTATIN CALCIUM 20 MILLIGRAM(S): 80 TABLET, FILM COATED ORAL at 21:52

## 2018-03-01 RX ADMIN — OXYCODONE AND ACETAMINOPHEN 1 TABLET(S): 5; 325 TABLET ORAL at 13:00

## 2018-03-01 RX ADMIN — OXYCODONE AND ACETAMINOPHEN 1 TABLET(S): 5; 325 TABLET ORAL at 22:52

## 2018-03-01 RX ADMIN — PANTOPRAZOLE SODIUM 40 MILLIGRAM(S): 20 TABLET, DELAYED RELEASE ORAL at 05:52

## 2018-03-01 RX ADMIN — Medication 112 MICROGRAM(S): at 05:51

## 2018-03-01 RX ADMIN — MIRTAZAPINE 7.5 MILLIGRAM(S): 45 TABLET, ORALLY DISINTEGRATING ORAL at 21:51

## 2018-03-01 RX ADMIN — OXYCODONE AND ACETAMINOPHEN 1 TABLET(S): 5; 325 TABLET ORAL at 21:51

## 2018-03-01 RX ADMIN — OXYCODONE AND ACETAMINOPHEN 1 TABLET(S): 5; 325 TABLET ORAL at 12:02

## 2018-03-01 RX ADMIN — Medication 100 MILLIGRAM(S): at 05:52

## 2018-03-01 RX ADMIN — Medication 100 MILLIGRAM(S): at 17:34

## 2018-03-01 RX ADMIN — Medication 180 MILLIGRAM(S): at 08:46

## 2018-03-01 RX ADMIN — Medication 1: at 17:34

## 2018-03-01 RX ADMIN — Medication 1 MILLIGRAM(S): at 11:27

## 2018-03-01 RX ADMIN — TIOTROPIUM BROMIDE 1 CAPSULE(S): 18 CAPSULE ORAL; RESPIRATORY (INHALATION) at 08:07

## 2018-03-01 RX ADMIN — Medication 3: at 11:27

## 2018-03-01 NOTE — PROGRESS NOTE ADULT - ASSESSMENT
Anemia- pt was noted to have 3 angiodysplastic lesions that were cauterized yesterday.  Hgb noted to be stable.    Bradycardia- very brief episodes at presentation but none in the last 24hrs.  Will resume cardizem today.  Monitor on telemetry.    Chronic HFpEF- Has CardioMEMS.   Euvolemic.  Pts family to bring in the monitor to hospital to record the readings.  Close monitoring of the volume status.  Off IVF and diuretics now.    CAD, s/p MI in past- last stress test about an yr ago did not reveal any ischemia.  Continue statin.   Antiplatelet agents on hold for now.    Other medical issues- Management per primary team.   Thank you for allowing me to participate in the care of this patient. Please feel free to contact me with any questions.

## 2018-03-01 NOTE — PROGRESS NOTE ADULT - SUBJECTIVE AND OBJECTIVE BOX
Patient is a 77y old  Female who presents with a chief complaint of fatigue, low hbg.     HPI:  77 year old female PMH of CHF( diastolic dysfn) last exacerbation 2018 ( pt has mems implant), COPD ( never intubated, but on home O2), HTN, HLD, CAD ( on plavix and aspirin), T2DM on insulin, CKD , chronic UTI on Hiprex, lobectomy of lung for precancerous cells  s/p IM nailing of Left hip fracture after fall in , recent ORIF of left hip due to hardware malfunctioning and was sent to rehab on coumadin sent in from rehab yesterday for symptomatic anemia.    Pt was transfused with PRBC since admission and she was noted to be bradycardic today.  She was c/o mild left chest pain yesterday that was very brief without any radiation or recurrence.    18- pt seen and examined by me today. Pt still c/o fatigue.  Denies any CP or SOB.  no events overnight.  3/1- pt seen and examined this am, Denies any CP or SOB.  No overnight events.       PAST MEDICAL & SURGICAL HISTORY:  [place] (hard of hearing): hearing aid  Anxiety  Hypothyroid  GERD (gastroesophageal reflux disease)  CKD (chronic kidney disease)  MI, old: x3  Chronic UTI  Chronic diastolic congestive heart failure: last exacerbation 2017  Hyperlipidemia, unspecified hyperlipidemia type  Essential hypertension  Neuropathy  Diabetes  Chronic obstructive pulmonary disease, unspecified COPD type  History of cataract surgery: bilateral IOL  S/P lobectomy of lung: left lung pre-cancerous  History of ear surgery  History of cholecystectomy  S/P ORIF (open reduction internal fixation) fracture: left hip 2017  History of total knee replacement, unspecified laterality: bilateral right  left   H/O hernia repair: umbilical and incisional   delivery delivered  S/P appendectomy      MEDICATIONS  (STANDING):  atorvastatin 20 milliGRAM(s) Oral at bedtime  dextrose 5%. 1000 milliLiter(s) (50 mL/Hr) IV Continuous <Continuous>  dextrose 50% Injectable 12.5 Gram(s) IV Push once  diltiazem    milliGRAM(s) Oral daily  docusate sodium 100 milliGRAM(s) Oral two times a day  folic acid 1 milliGRAM(s) Oral daily  insulin glargine Injectable (LANTUS) 15 Unit(s) SubCutaneous at bedtime  insulin lispro (HumaLOG) corrective regimen sliding scale   SubCutaneous three times a day before meals  levothyroxine 112 MICROGram(s) Oral daily  mirtazapine 7.5 milliGRAM(s) Oral at bedtime  pantoprazole  Injectable 40 milliGRAM(s) IV Push every 12 hours  sodium chloride 0.9%. 1000 milliLiter(s) (75 mL/Hr) IV Continuous <Continuous>  tiotropium 18 MICROgram(s) Capsule 1 Capsule(s) Inhalation at bedtime    MEDICATIONS  (PRN):  aluminum hydroxide/magnesium hydroxide/simethicone Suspension 30 milliLiter(s) Oral every 4 hours PRN Dyspepsia  dextrose Gel 1 Dose(s) Oral once PRN Blood Glucose LESS THAN 70 milliGRAM(s)/deciliter  glucagon  Injectable 1 milliGRAM(s) IntraMuscular once PRN Glucose LESS THAN 70 milligrams/deciliter  ondansetron Injectable 4 milliGRAM(s) IV Push every 6 hours PRN Nausea  oxyCODONE    5 mG/acetaminophen 325 mG 1 Tablet(s) Oral every 6 hours PRN Moderate Pain (4 - 6)      FAMILY HISTORY:  Family history of CHF (congestive heart failure) (Mother)      SOCIAL HISTORY:  ex smoker    REVIEW OF SYSTEMS:  CONSTITUTIONAL:  No night sweats.  c/o fatigue, malaise.  HEENT:  Eyes:  No visual changes.  No eye pain.      ENT:  No runny nose.  No epistaxis.  No sinus pain.  No sore throat.  No odynophagia.  No ear pain.  No congestion.  RESPIRATORY:  No cough.  No wheeze.  No hemoptysis.  No shortness of breath.  CARDIOVASCULAR:  No chest pains.  No palpitations. No shortness of breath, No orthopnea or PND.  GASTROINTESTINAL:  No abdominal pain.  No nausea or vomiting.  No diarrhea or constipation.  No hematemesis.  No hematochezia.  No melena.  GENITOURINARY:  No urgency.  No frequency.  No dysuria.  No hematuria.  No obstructive symptoms.  No discharge.  No pain.  No significant abnormal bleeding.  MUSCULOSKELETAL:  No musculoskeletal pain.  No joint swelling.  No arthritis.  NEUROLOGICAL:  No tingling or numbness or weakness.  PSYCHIATRIC:  No confusion  SKIN:  No rashes.  No lesions.  No wounds.  ENDOCRINE:  No unexplained weight loss.  No polydipsia.  No polyuria.  No polyphagia.  HEMATOLOGIC:  No anemia.  No purpura.  No petechiae.  No prolonged or excessive bleeding.   ALLERGIC AND IMMUNOLOGIC:  No pruritus.  No swelling.         Vital Signs Last 24 Hrs  T(C): 36.3 (2018 11:33), Max: 36.9 (2018 01:20)  T(F): 97.3 (2018 11:33), Max: 98.5 (2018 01:20)  HR: 63 (:) (63 - 87)  BP: 155/38 (2018 11:33) (98/46 - 155/38)  BP(mean): --  RR: 17 (2018 11:) (15 - 20)  SpO2: 100% (:) (98% - 100%)    PHYSICAL EXAM-    Constitutional: ill looking frail elderly female    Head: Head is normocephalic and atraumatic.      Neck: The patient's neck is supple without enlargement, has no palpable thyromegaly nor thyroid nodules and has no jugular venous distention. No audible carotid bruits. There are strong carotid pulses bilaterally. No JVD.     Cardiovascular: Regular rate and rhythm without S3, S4. No murmurs or rubs are appreciated.      Respiratory: Breath sounds are normal. No rales. No wheezing.    Abdomen: Soft, nontender, nondistended with positive bowel sounds.      Extremity: No tenderness. No  pitting edema No skin discoloration No clubbing No cyanosis.     Neurologic: The patient is alert and oriented.      Skin: No rash, no obvious lesions noted.      Psychiatric: The patient appears to be emotionally stable.      INTERPRETATION OF TELEMETRY: sinus rythm with on further episodes of bradycardia.   She had an episode of narrow complex SVT of 4 beats likely Atrial tachycardia, with intermittent bradycardic episodes briefly.    ECG: sinus rythm, normal axis, no st t changes.     I&O's Detail    2018 07:01  -  2018 07:00  --------------------------------------------------------  IN:    Packed Red Blood Cells: 291 mL  Total IN: 291 mL    OUT:  Total OUT: 0 mL    Total NET: 291 mL          LABS:                        7.8    10.1  )-----------( 499      ( 2018 09:08 )             23.5         143  |  111<H>  |  81<H>  ----------------------------<  182<H>  4.6   |  23  |  2.05<H>    Ca    7.8<L>      2018 09:08    TPro  5.9<L>  /  Alb  2.4<L>  /  TBili  0.3  /  DBili  x   /  AST  19  /  ALT  15  /  AlkPhos  141<H>          PT/INR - ( 2018 09:08 )   PT: 32.3 sec;   INR: 2.93 ratio         PTT - ( 2018 18:15 )  PTT:37.8 sec  Urinalysis Basic - ( 2018 18:15 )    Color: Yellow / Appearance: Slightly Turbid / S.010 / pH: x  Gluc: x / Ketone: Negative  / Bili: Negative / Urobili: Negative mg/dL   Blood: x / Protein: 15 mg/dL / Nitrite: Positive   Leuk Esterase: Moderate / RBC: 3-5 /HPF / WBC 11-25   Sq Epi: x / Non Sq Epi: Few / Bacteria: Many      I&O's Summary    2018 07:01  -  2018 07:00  --------------------------------------------------------  IN: 291 mL / OUT: 0 mL / NET: 291 mL      BNP  RADIOLOGY & ADDITIONAL STUDIES:

## 2018-03-01 NOTE — CONSULT NOTE ADULT - ASSESSMENT
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

## 2018-03-01 NOTE — PROVIDER CONTACT NOTE (OTHER) - SITUATION
pt given cardizem 180 in the morning per Dr. Griggs was in NSR now HR in 30's at times, afib on monitor

## 2018-03-01 NOTE — CONSULT NOTE ADULT - SUBJECTIVE AND OBJECTIVE BOX
HPI:  77 year old female PMH of CHF( diastolic dysfn) last exacerbation 2018 ( pt has mems implant), COPD ( never intubated, but on home O2), HTN, HLD, CAD ( on plavix and aspirin), T2DM on insulin, CKD , chronic UTI on Hiprex, lobectomy of lung for precancerous cells  s/p IM nailing of Left hip fracture after fall in 2017, recent ORIF of left hip due to hardware malfunctioning and was sent to rehab on coumadin sent in from rehab yesterday for symptomatic anemia.  Patient reports feeling so fatigued that she couldn't lift her arm.  Labs checked at [facility] revealed a HB of 5.1, HCT 17 -- she was also noted to be guiac postiive.  Patient denies any abdomianl pain, nause aor vomiting.  SHe has a hx of chronic anemia for which she would receive IV ferrilicit in the past.  She did not note any BRBPR, MElena, etc.  No complaints today except for fatigue.  No cp, sob, n/v/f/c, palpitations.  Left hip is sore. (2018 07:55)      Patient is a 77y old  Female who presents with a chief complaint of fatigue, low hb (2018 07:55)      Consulted by Dr. Phelps   for VTE prophylaxis, risk stratification, and anticoagulation management.    PAST MEDICAL & SURGICAL HISTORY:  [place] (hard of hearing): hearing aid  Anxiety  Hypothyroid  GERD (gastroesophageal reflux disease)  CKD (chronic kidney disease)  MI, old: x3  Chronic UTI  Chronic diastolic congestive heart failure: last exacerbation 2017  Hyperlipidemia, unspecified hyperlipidemia type  Essential hypertension  Neuropathy  Diabetes  Chronic obstructive pulmonary disease, unspecified COPD type  History of cataract surgery: bilateral IOL  S/P lobectomy of lung: left lung pre-cancerous  History of ear surgery  History of cholecystectomy  S/P ORIF (open reduction internal fixation) fracture: left hip 2017  History of total knee replacement, unspecified laterality: bilateral right  left   H/O hernia repair: umbilical and incisional   delivery delivered  S/P appendectomy          CrCl:    Caprini VTE Risk Score:    IMPROVE VTE Risk Score:    [id number] Score:     IMPROVE Bleeding Risk Score    Falls Risk:   High (  )  Mod (  )  Low (  )      FAMILY HISTORY:  Family history of CHF (congestive heart failure) (Mother)    Denies any personal or familial history of clotting or bleeding disorders.    Allergies    Bactrim (Other)  ciprofloxacin (Other (Mild))  clindamycin (Unknown)  ibuprofen (Unknown)  penicillins (Other)  sulfa drugs (Unknown)    Intolerances        REVIEW OF SYSTEMS    (  )Fever	     (  )Constipation	(  )SOB				(  )Headache	(  )Dysuria  (  )Chills	     (  )Melena	(  )Dyspnea present on exertion	                    (  )Dizziness                    (  )Polyuria  (  )Nausea	     (  )Hematochezia	(  )Cough			                    (  )Syncope   	(  )Hematuria  (  )Vomiting    (  )Chest Pain	(  )Wheezing			(  )Weakness  (  )Diarrhea     (  )Palpitations	(  )Anorexia			(  )Myalgia    All other review of systems negative: Yes    Unable to obtain review of systems due to:      PHYSICAL EXAM:    Constitutional: Appears Well    Neurological: A& O x 3    Skin: Warm    Respiratory and Thorax: normal effort; Breath sounds: normal; No rales/wheezing/rhonchi  	  Cardiovascular: S1, S2, regular, NMBR	    Gastrointestinal: BS + x 4Q, nontender	    Genitourinary:  Bladder nondistended, nontender    Musculoskeletal:   General Right:   no muscle/joint tenderness,   normal tone, no joint swelling,   ROM: limited/full	    General Left:   no muscle/joint tenderness,   normal tone, no joint swelling,   ROM: limited/full    Hip:  Right: Dressing CDI; Drain: hemovac ; Type of drng.: serosang.; Amt. of drng: small            Left: Dressing CDI; Drain: hemovac ; Type of drng.: serosang.; Amt. of drng: small      Knee:  Right: Incision: ; Dressing CDI; Drain: hemovac ; Type of drng.: serosang.; Amt. of drng: small               Left: Incision: ; Dressing CDI; Drain: hemovac ; Type of drng.: serosang.; Amt. of drng: small    Shoulder:  Rt: Drsing CDI; Sling/immob. noted; Cap refill good; Radial pulse +; Sensation intact                     Lt: Drsing CDI; Sling/immob. noted; Cap refill good; Radial pulse +; Sensation intact    Lower extrems:   Right: no calf tenderness              negative gamal's sign               + pedal pulses    Left:   no calf tenderness              negative gamal's sign               + pedal pulses                          9.0    10.0  )-----------( 352      ( 01 Mar 2018 06:11 )             26.8       03-    146<H>  |  118<H>  |  71<H>  ----------------------------<  109<H>  4.1   |  22  |  1.44<H>    Ca    8.2<L>      01 Mar 2018 06:11        PT/INR - ( 01 Mar 2018 07:52 )   PT: 30.1 sec;   INR: 2.73 ratio         				    MEDICATIONS  (STANDING):  atorvastatin 20 milliGRAM(s) Oral at bedtime  dextrose 5%. 1000 milliLiter(s) IV Continuous <Continuous>  dextrose 50% Injectable 12.5 Gram(s) IV Push once  diltiazem    milliGRAM(s) Oral daily  docusate sodium 100 milliGRAM(s) Oral two times a day  folic acid 1 milliGRAM(s) Oral daily  insulin glargine Injectable (LANTUS) 15 Unit(s) SubCutaneous at bedtime  insulin lispro (HumaLOG) corrective regimen sliding scale   SubCutaneous three times a day before meals  levothyroxine 112 MICROGram(s) Oral daily  mirtazapine 7.5 milliGRAM(s) Oral at bedtime  pantoprazole  Injectable 40 milliGRAM(s) IV Push every 12 hours  tiotropium 18 MICROgram(s) Capsule 1 Capsule(s) Inhalation at bedtime          DVT Prophylaxis:  LMWH                   (  )  Heparin SQ           (  )  Coumadin             (  )  Xarelto                  (  )  Eliquis                   (  )  Venodynes           (  )  Ambulation          (  )  UFH                       (  )  Contraindicated  (  ) HPI:  77 year old female PMH of CHF( diastolic dysfn) last exacerbation 2018 ( pt has mems implant), COPD ( never intubated, but on home O2), HTN, HLD, CAD ( on plavix and aspirin), T2DM on insulin, CKD , chronic UTI on Hiprex, lobectomy of lung for precancerous cells  s/p IM nailing of Left hip fracture after fall in , recent THR of left hip due to hardware malfunctioning and was sent to rehab on coumadin, plavix and ASA  sent in from rehab yesterday for symptomatic anemia.  Patient reports feeling so fatigued that she couldn't lift her arm.  Labs checked at [facility] revealed a HB of 5.1, HCT 17 -- she was also noted to be guiac postiive.  Patient denies any abdomianl pain, nause aor vomiting.  SHe has a hx of chronic anemia for which she would receive IV ferrilicit in the past.  She did not note any BRBPR, MElena, etc.  No complaints today except for fatigue.  No cp, sob, n/v/f/c, palpitations.  Left hip is sore. (2018 07:55)  INR 3.48 on admission      Patient is a 77y old  Female who presents with a chief complaint of fatigue, low hb (2018 07:55)      Consulted by Dr. Phelps   for VTE prophylaxis, risk stratification, and anticoagulation management.    PAST MEDICAL & SURGICAL HISTORY:  [place] (hard of hearing): hearing aid  Anxiety  Hypothyroid  GERD (gastroesophageal reflux disease)  CKD (chronic kidney disease)  MI, old: x3  Chronic UTI  Chronic diastolic congestive heart failure: last exacerbation 2017  Hyperlipidemia, unspecified hyperlipidemia type  Essential hypertension  Neuropathy  Diabetes  Chronic obstructive pulmonary disease, unspecified COPD type  History of cataract surgery: bilateral IOL  S/P lobectomy of lung: left lung pre-cancerous  History of ear surgery  History of cholecystectomy  S/P ORIF (open reduction internal fixation) fracture: left hip 2017  History of total knee replacement, unspecified laterality: bilateral right  left   H/O hernia repair: umbilical and incisional   delivery delivered  S/P appendectomy          CrCl: 32.6        IMPROVE VTE Risk Score: #4      IMPROVE Bleeding Risk Score #6.5    Falls Risk:   High (x  )  Mod (  )  Low (  )      FAMILY HISTORY:  Family history of CHF (congestive heart failure) (Mother)    Denies any personal or familial history of clotting or bleeding disorders.    Allergies    Bactrim (Other)  ciprofloxacin (Other (Mild))  clindamycin (Unknown)  ibuprofen (Unknown)  penicillins (Other)  sulfa drugs (Unknown)    Intolerances        REVIEW OF SYSTEMS    (  )Fever	     (  )Constipation	(  )SOB				(  )Headache	(  )Dysuria  (  )Chills	     (  )Melena	(  )Dyspnea present on exertion	                    (  )Dizziness                    (  )Polyuria  (  )Nausea	     (  )Hematochezia	(  )Cough			                    (  )Syncope   	(  )Hematuria  (  )Vomiting    (  )Chest Pain	(  )Wheezing			(  )Weakness  (  )Diarrhea     (  )Palpitations	(  )Anorexia			(  )Myalgia    All other review of systems negative: Yes        PHYSICAL EXAM:    Constitutional: Appears Well    Neurological: A& O x 3    Skin: Warm    Respiratory and Thorax: normal effort; Breath sounds: normal; No rales/wheezing/rhonchi  	  Cardiovascular: S1, S2, regular, NMBR	    Gastrointestinal: BS + x 4Q, nontender	    Genitourinary:  Bladder nondistended, nontender    Musculoskeletal:   General Right:   no muscle/joint tenderness,   normal tone, no joint swelling,   ROM: limited/full	    General Left:   no muscle/joint tenderness,   normal tone, no joint swelling,   ROM: limited/full    Hip:            Left: incision CDI;     Lower extrems:   Right: no calf tenderness              negative gamal's sign               + pedal pulses    Left:   no calf tenderness              negative gamal's sign               + pedal pulses                          9.0    10.0  )-----------( 352      ( 01 Mar 2018 06:11 )             26.8       03-01    146<H>  |  118<H>  |  71<H>  ----------------------------<  109<H>  4.1   |  22  |  1.44<H>    Ca    8.2<L>      01 Mar 2018 06:11        PT/INR - ( 01 Mar 2018 07:52 )   PT: 30.1 sec;   INR: 2.73 ratio         				    MEDICATIONS  (STANDING):  atorvastatin 20 milliGRAM(s) Oral at bedtime  dextrose 5%. 1000 milliLiter(s) IV Continuous <Continuous>  dextrose 50% Injectable 12.5 Gram(s) IV Push once  diltiazem    milliGRAM(s) Oral daily  docusate sodium 100 milliGRAM(s) Oral two times a day  folic acid 1 milliGRAM(s) Oral daily  insulin glargine Injectable (LANTUS) 15 Unit(s) SubCutaneous at bedtime  insulin lispro (HumaLOG) corrective regimen sliding scale   SubCutaneous three times a day before meals  levothyroxine 112 MICROGram(s) Oral daily  mirtazapine 7.5 milliGRAM(s) Oral at bedtime  pantoprazole  Injectable 40 milliGRAM(s) IV Push every 12 hours  tiotropium 18 MICROgram(s) Capsule 1 Capsule(s) Inhalation at bedtime          DVT Prophylaxis:  LMWH                   (  )  Heparin SQ           (  )  Coumadin             (  )  Xarelto                  (  )  Eliquis                   (  )  Venodynes           (  )  Ambulation          (  )  UFH                       (  )  Contraindicated  (  ) HPI:  77 year old female PMH of CHF( diastolic dysfn) last exacerbation 2018 ( pt has mems implant), COPD ( never intubated, but on home O2), HTN, HLD, CAD ( on plavix and aspirin), T2DM on insulin, CKD , chronic UTI on Hiprex, lobectomy of lung for precancerous cells  s/p IM nailing of Left hip fracture after fall in , recent THR of left hip due to hardware malfunctioning and was sent to rehab on coumadin, plavix and ASA  sent in from rehab yesterday for symptomatic anemia.  Patient reports feeling so fatigued that she couldn't lift her arm.  Labs checked at [facility] revealed a HB of 5.1, HCT 17 -- she was also noted to be guiac postiive.  Patient denies any abdomianl pain, nause aor vomiting.  SHe has a hx of chronic anemia for which she would receive IV ferrilicit in the past.  She did not note any BRBPR, MElena, etc.  No complaints today except for fatigue.  No cp, sob, n/v/f/c, palpitations.  Left hip is sore. (2018 07:55)  INR 3.48 on admission      Patient is a 77y old  Female who presents with a chief complaint of fatigue, low hb (2018 07:55)      Consulted by Dr. Phelps   for VTE prophylaxis, risk stratification, and anticoagulation management.    PAST MEDICAL & SURGICAL HISTORY:  [place] (hard of hearing): hearing aid  Anxiety  Hypothyroid  GERD (gastroesophageal reflux disease)  CKD (chronic kidney disease)  MI, old: x3  Chronic UTI  Chronic diastolic congestive heart failure: last exacerbation 2017  Hyperlipidemia, unspecified hyperlipidemia type  Essential hypertension  Neuropathy  Diabetes  Chronic obstructive pulmonary disease, unspecified COPD type  History of cataract surgery: bilateral IOL  S/P lobectomy of lung: left lung pre-cancerous  History of ear surgery  History of cholecystectomy  S/P ORIF (open reduction internal fixation) fracture: left hip 2017  History of total knee replacement, unspecified laterality: bilateral right  left   H/O hernia repair: umbilical and incisional   delivery delivered  S/P appendectomy          CrCl: 32.6        IMPROVE VTE Risk Score: #4      IMPROVE Bleeding Risk Score #6.5    Falls Risk:   High (x  )  Mod (  )  Low (  )      FAMILY HISTORY:  Family history of CHF (congestive heart failure) (Mother)    Denies any personal or familial history of clotting or bleeding disorders.    Allergies    Bactrim (Other)  ciprofloxacin (Other (Mild))  clindamycin (Unknown)  ibuprofen (Unknown)  penicillins (Other)  sulfa drugs (Unknown)    Intolerances        REVIEW OF SYSTEMS    (  )Fever	     (  )Constipation	(  )SOB				(  )Headache	(  )Dysuria  (  )Chills	     (  )Melena	(  )Dyspnea present on exertion	                    (  )Dizziness                    (  )Polyuria  (  )Nausea	     (  )Hematochezia	(  )Cough			                    (  )Syncope   	(  )Hematuria  (  )Vomiting    (  )Chest Pain	(  )Wheezing			(  )Weakness  (  )Diarrhea     (  )Palpitations	(  )Anorexia			(  )Myalgia    All other review of systems negative: Yes        PHYSICAL EXAM:    Constitutional: Appears Well    Neurological: A& O x 3    Skin: Warm    Respiratory and Thorax: normal effort; Breath sounds: diminished B/L  	  Cardiovascular: S1, S2, regular, NMBR	MP SR    Gastrointestinal: BS + x 4Q, nontender	    Genitourinary:  Bladder nondistended, nontender    Musculoskeletal:   General Right:   no muscle/joint tenderness,   normal tone, no joint swelling,   ROM: full	    General Left:   no muscle/joint tenderness,   normal tone, no joint swelling,   ROM: full    Hip:            Left: incision CDI;     Lower extrems:   Right: no calf tenderness              negative gamal's sign               + pedal pulses    Left:   no calf tenderness              negative gamal's sign               + pedal pulses                          9.0    10.0  )-----------( 352      ( 01 Mar 2018 06:11 )             26.8       03-01    146<H>  |  118<H>  |  71<H>  ----------------------------<  109<H>  4.1   |  22  |  1.44<H>    Ca    8.2<L>      01 Mar 2018 06:11        PT/INR - ( 01 Mar 2018 07:52 )   PT: 30.1 sec;   INR: 2.73 ratio         				    MEDICATIONS  (STANDING):  atorvastatin 20 milliGRAM(s) Oral at bedtime  dextrose 5%. 1000 milliLiter(s) IV Continuous <Continuous>  dextrose 50% Injectable 12.5 Gram(s) IV Push once  diltiazem    milliGRAM(s) Oral daily  docusate sodium 100 milliGRAM(s) Oral two times a day  folic acid 1 milliGRAM(s) Oral daily  insulin glargine Injectable (LANTUS) 15 Unit(s) SubCutaneous at bedtime  insulin lispro (HumaLOG) corrective regimen sliding scale   SubCutaneous three times a day before meals  levothyroxine 112 MICROGram(s) Oral daily  mirtazapine 7.5 milliGRAM(s) Oral at bedtime  pantoprazole  Injectable 40 milliGRAM(s) IV Push every 12 hours  tiotropium 18 MICROgram(s) Capsule 1 Capsule(s) Inhalation at bedtime          DVT Prophylaxis:  LMWH                   (  )  Heparin SQ           (  )  Coumadin             (  )  Xarelto                  (  )  Eliquis                   (  )  Venodynes           ( X )  Ambulation          (  )  UFH                       (  )  Contraindicated  (X  )

## 2018-03-01 NOTE — PROGRESS NOTE ADULT - SUBJECTIVE AND OBJECTIVE BOX
2/28/18: No cp, sob -- tired, awaiting egd later today.  3/1/18:   ROS: AS PER HPI OTHERWISE ALL OTHER SYSTEMS REVIEWED AND ARE NEGATIVE    Vital Signs Last 24 Hrs  T(C): 36.4 (28 Feb 2018 08:25), Max: 36.5 (27 Feb 2018 19:00)  T(F): 97.5 (28 Feb 2018 08:25), Max: 97.7 (27 Feb 2018 19:00)  HR: 52 (28 Feb 2018 11:00) (52 - 71)  BP: 150/39 (28 Feb 2018 11:00) (116/33 - 160/37)  BP(mean): 63 (28 Feb 2018 11:00) (54 - 81)  RR: 15 (28 Feb 2018 11:00) (12 - 18)  SpO2: 100% (28 Feb 2018 11:00) (97% - 100%)    GEN: A and O, NAD, FATIGUED APPEARING, FRAIL APPEARING, mood stable  HEENT:   NC/AT, EOMI, no oropharyngeal lesions    NECK:   supple    CV:  +S1, +S2, regular, no murmurs or rubs    RESP:   lungs clear to auscultation bilaterally, no wheezing, rales, rhonchi, good air entry bilaterally DECREASED BS ANGELICA    GI:  abdomen soft, non-tender, non-distended, normal BS, no abdominal masses, no palpable masses    RECTAL:  not examined    :  not examined    MSK:   normal muscle tone, no atrophy, no rigidity, no contractions    EXT:   no clubbing, no cyanosis, no edema, no calf pain, swelling or erythema LEFT HIP INCISION C/D/I    VASCULAR:  pulses equal and symmetric in the upper and lower extremities    NEURO:  AAOX3, no focal neurological deficits, follows all commands, able to move extremities spontaneously    SKIN:  no ulcers, lesions or rashes  LABS:                                 9.0    10.0  )-----------( 352      ( 01 Mar 2018 06:11 )             26.8     03-01    146<H>  |  118<H>  |  71<H>  ----------------------------<  109<H>  4.1   |  22  |  1.44<H>    Ca    8.2<L>      01 Mar 2018 06:11            PT/INR - ( 01 Mar 2018 07:52 )   PT: 30.1 sec;   INR: 2.73 ratio                             MEDICATIONS  (STANDING):  atorvastatin 20 milliGRAM(s) Oral at bedtime  dextrose 5%. 1000 milliLiter(s) (50 mL/Hr) IV Continuous <Continuous>  dextrose 50% Injectable 12.5 Gram(s) IV Push once  diltiazem    milliGRAM(s) Oral daily  docusate sodium 100 milliGRAM(s) Oral two times a day  folic acid 1 milliGRAM(s) Oral daily  insulin glargine Injectable (LANTUS) 15 Unit(s) SubCutaneous at bedtime  insulin lispro (HumaLOG) corrective regimen sliding scale   SubCutaneous three times a day before meals  levothyroxine 112 MICROGram(s) Oral daily  mirtazapine 7.5 milliGRAM(s) Oral at bedtime  pantoprazole  Injectable 40 milliGRAM(s) IV Push every 12 hours  tiotropium 18 MICROgram(s) Capsule 1 Capsule(s) Inhalation at bedtime    MEDICATIONS  (PRN):  aluminum hydroxide/magnesium hydroxide/simethicone Suspension 30 milliLiter(s) Oral every 4 hours PRN Dyspepsia  dextrose Gel 1 Dose(s) Oral once PRN Blood Glucose LESS THAN 70 milliGRAM(s)/deciliter  glucagon  Injectable 1 milliGRAM(s) IntraMuscular once PRN Glucose LESS THAN 70 milligrams/deciliter  ondansetron Injectable 4 milliGRAM(s) IV Push every 6 hours PRN Nausea  oxyCODONE    5 mG/acetaminophen 325 mG 1 Tablet(s) Oral every 6 hours PRN Moderate Pain (4 - 6)

## 2018-03-02 LAB
ANION GAP SERPL CALC-SCNC: 7 MMOL/L — SIGNIFICANT CHANGE UP (ref 5–17)
BLD GP AB SCN SERPL QL: SIGNIFICANT CHANGE UP
BUN SERPL-MCNC: 61 MG/DL — HIGH (ref 7–23)
CALCIUM SERPL-MCNC: 8.2 MG/DL — LOW (ref 8.5–10.1)
CHLORIDE SERPL-SCNC: 116 MMOL/L — HIGH (ref 96–108)
CO2 SERPL-SCNC: 23 MMOL/L — SIGNIFICANT CHANGE UP (ref 22–31)
CREAT SERPL-MCNC: 1.37 MG/DL — HIGH (ref 0.5–1.3)
GLUCOSE BLDC GLUCOMTR-MCNC: 151 MG/DL — HIGH (ref 70–99)
GLUCOSE BLDC GLUCOMTR-MCNC: 154 MG/DL — HIGH (ref 70–99)
GLUCOSE BLDC GLUCOMTR-MCNC: 174 MG/DL — HIGH (ref 70–99)
GLUCOSE BLDC GLUCOMTR-MCNC: 174 MG/DL — HIGH (ref 70–99)
GLUCOSE SERPL-MCNC: 137 MG/DL — HIGH (ref 70–99)
HCT VFR BLD CALC: 23.8 % — LOW (ref 34.5–45)
HGB BLD-MCNC: 7.9 G/DL — LOW (ref 11.5–15.5)
INR BLD: 1.95 RATIO — HIGH (ref 0.88–1.16)
MCHC RBC-ENTMCNC: 29.2 PG — SIGNIFICANT CHANGE UP (ref 27–34)
MCHC RBC-ENTMCNC: 33.1 GM/DL — SIGNIFICANT CHANGE UP (ref 32–36)
MCV RBC AUTO: 88.1 FL — SIGNIFICANT CHANGE UP (ref 80–100)
PLATELET # BLD AUTO: 336 K/UL — SIGNIFICANT CHANGE UP (ref 150–400)
POTASSIUM SERPL-MCNC: 4 MMOL/L — SIGNIFICANT CHANGE UP (ref 3.5–5.3)
POTASSIUM SERPL-SCNC: 4 MMOL/L — SIGNIFICANT CHANGE UP (ref 3.5–5.3)
PROTHROM AB SERPL-ACNC: 21.3 SEC — HIGH (ref 9.8–12.7)
RBC # BLD: 2.7 M/UL — LOW (ref 3.8–5.2)
RBC # FLD: 15.8 % — HIGH (ref 10.3–14.5)
SODIUM SERPL-SCNC: 146 MMOL/L — HIGH (ref 135–145)
TYPE + AB SCN PNL BLD: SIGNIFICANT CHANGE UP
WBC # BLD: 8.7 K/UL — SIGNIFICANT CHANGE UP (ref 3.8–10.5)
WBC # FLD AUTO: 8.7 K/UL — SIGNIFICANT CHANGE UP (ref 3.8–10.5)

## 2018-03-02 RX ADMIN — INSULIN GLARGINE 15 UNIT(S): 100 INJECTION, SOLUTION SUBCUTANEOUS at 22:21

## 2018-03-02 RX ADMIN — Medication 112 MICROGRAM(S): at 06:08

## 2018-03-02 RX ADMIN — Medication 100 MILLIGRAM(S): at 17:23

## 2018-03-02 RX ADMIN — Medication 100 MILLIGRAM(S): at 06:08

## 2018-03-02 RX ADMIN — PANTOPRAZOLE SODIUM 40 MILLIGRAM(S): 20 TABLET, DELAYED RELEASE ORAL at 08:12

## 2018-03-02 RX ADMIN — OXYCODONE AND ACETAMINOPHEN 1 TABLET(S): 5; 325 TABLET ORAL at 17:00

## 2018-03-02 RX ADMIN — PANTOPRAZOLE SODIUM 40 MILLIGRAM(S): 20 TABLET, DELAYED RELEASE ORAL at 17:23

## 2018-03-02 RX ADMIN — OXYCODONE AND ACETAMINOPHEN 1 TABLET(S): 5; 325 TABLET ORAL at 16:20

## 2018-03-02 RX ADMIN — MIRTAZAPINE 7.5 MILLIGRAM(S): 45 TABLET, ORALLY DISINTEGRATING ORAL at 22:18

## 2018-03-02 RX ADMIN — Medication 1: at 12:37

## 2018-03-02 RX ADMIN — Medication 1 MILLIGRAM(S): at 12:36

## 2018-03-02 RX ADMIN — ATORVASTATIN CALCIUM 20 MILLIGRAM(S): 80 TABLET, FILM COATED ORAL at 22:18

## 2018-03-02 RX ADMIN — Medication 1: at 08:16

## 2018-03-02 NOTE — PROGRESS NOTE ADULT - PROBLEM SELECTOR PLAN 4
POST HIP SURGERY  INR STABLE D/W ANTICOAGULATION  SERVICES - WILL DC COUMADIN NOW AS SHE IS ONE MONTH OUT FROM SURGERY  ORTHO EVAL -- HIP APPEARS STABLE
POST HIP SURGERY  INR STABLE D/W ANTICOAGULATION  SERVICES - WILL DC COUMADIN NOW AS SHE IS ONE MONTH OUT FROM SURGERY
POST HIP SURGERY  INR HIGH, MONITOR, WILL REVERSE IF ANY ACUTE BLEEDING

## 2018-03-02 NOTE — PHYSICAL THERAPY INITIAL EVALUATION ADULT - PERTINENT HX OF CURRENT PROBLEM, REHAB EVAL
sent from rehab for symptomatic anemia. pt was noted to have 3 angiodysplastic lesions that were cauterized yesterday. Pt transfused. Episode of Bradycardia- very brief episodes at presentation but none in the last 24hrs per cardiol. H/H 7.9/23.8, INR 1.95 today sent from rehab for symptomatic anemia. pt was noted to have 3 angiodysplastic lesions that were cauterized yesterday. Pt transfused. Episode of Bradycardia- very brief episodes at presentation but none in the last 24hrs per cardiol. H/H 7.9/23.8, INR 1.95 today. Transfusion ordered

## 2018-03-02 NOTE — PROGRESS NOTE ADULT - SUBJECTIVE AND OBJECTIVE BOX
2/28/18: No cp, sob -- tired, awaiting egd later today.  3/1/18:  tired, arousable but lethargic  3/2/18: Feels a bit better today; not much appetite; no bm overnight; denies any cp, sob,    ROS: AS PER HPI OTHERWISE ALL OTHER SYSTEMS REVIEWED AND ARE NEGATIVE    Vital Signs Last 24 Hrs  T(C): 36.2 (02 Mar 2018 05:54), Max: 36.8 (01 Mar 2018 16:22)  T(F): 97.2 (02 Mar 2018 05:54), Max: 98.2 (01 Mar 2018 16:22)  HR: 61 (02 Mar 2018 08:00) (47 - 78)  BP: 161/40 (02 Mar 2018 08:00) (136/37 - 173/56)  BP(mean): 73 (02 Mar 2018 08:00) (62 - 87)  RR: 14 (02 Mar 2018 08:00) (11 - 22)  SpO2: 97% (02 Mar 2018 08:00) (78% - 100%)    GEN: A and O, NAD, FATIGUED APPEARING but slightly more awake this am, FRAIL APPEARING, mood stable  HEENT:   NC/AT, EOMI, no oropharyngeal lesions    NECK:   supple    CV:  +S1, +S2, regular, no murmurs or rubs    RESP:   lungs clear to auscultation bilaterally, no wheezing, rales, rhonchi, good air entry bilaterally DECREASED BS ANGELICA    GI:  abdomen soft, non-tender, non-distended, normal BS, no abdominal masses, no palpable masses    RECTAL:  not examined    :  not examined    MSK:   normal muscle tone, no atrophy, no rigidity, no contractions    EXT:   no clubbing, no cyanosis, no edema, no calf pain, swelling or erythema LEFT HIP INCISION C/D/I    VASCULAR:  pulses equal and symmetric in the upper and lower extremities    NEURO:  AAOX3, no focal neurological deficits, follows all commands, able to move extremities spontaneously    SKIN:  no ulcers, lesions or rashes  LABS:                              7.9    8.7   )-----------( 336      ( 02 Mar 2018 05:33 )             23.8     03-02    146<H>  |  116<H>  |  61<H>  ----------------------------<  137<H>  4.0   |  23  |  1.37<H>    Ca    8.2<L>      02 Mar 2018 05:33            PT/INR - ( 02 Mar 2018 05:33 )   PT: 21.3 sec;   INR: 1.95 ratio       MEDICATIONS  (STANDING):  atorvastatin 20 milliGRAM(s) Oral at bedtime  dextrose 5%. 1000 milliLiter(s) (50 mL/Hr) IV Continuous <Continuous>  dextrose 50% Injectable 12.5 Gram(s) IV Push once  diltiazem    milliGRAM(s) Oral daily  docusate sodium 100 milliGRAM(s) Oral two times a day  folic acid 1 milliGRAM(s) Oral daily  insulin glargine Injectable (LANTUS) 15 Unit(s) SubCutaneous at bedtime  insulin lispro (HumaLOG) corrective regimen sliding scale   SubCutaneous three times a day before meals  levothyroxine 112 MICROGram(s) Oral daily  mirtazapine 7.5 milliGRAM(s) Oral at bedtime  pantoprazole  Injectable 40 milliGRAM(s) IV Push every 12 hours  tiotropium 18 MICROgram(s) Capsule 1 Capsule(s) Inhalation at bedtime    MEDICATIONS  (PRN):  aluminum hydroxide/magnesium hydroxide/simethicone Suspension 30 milliLiter(s) Oral every 4 hours PRN Dyspepsia  dextrose Gel 1 Dose(s) Oral once PRN Blood Glucose LESS THAN 70 milliGRAM(s)/deciliter  glucagon  Injectable 1 milliGRAM(s) IntraMuscular once PRN Glucose LESS THAN 70 milligrams/deciliter  ondansetron Injectable 4 milliGRAM(s) IV Push every 6 hours PRN Nausea  oxyCODONE    5 mG/acetaminophen 325 mG 1 Tablet(s) Oral every 6 hours PRN Moderate Pain (4 - 6)

## 2018-03-02 NOTE — PHYSICAL THERAPY INITIAL EVALUATION ADULT - GAIT DISTANCE, PT EVAL
performed static stand w/ RW w/ CG, performed sidestepping x few steps toward HOB (deferred amb away from BS due to low H/H, pt c/o fatigue)

## 2018-03-02 NOTE — PROGRESS NOTE ADULT - ASSESSMENT
Anemia- pt was noted to have 3 angiodysplastic lesions that were cauterized yesterday.  Hgb drop this am noted again.  No bleeding events reported by staff or pt.  Management pre primary team.     Bradycardia- very brief episodes with good chronotropic competence.  Continue cardizem.  No need for PPM at this time.    Chronic HFpEF- Has CardioMEMS.   Euvolemic.  Pts family to bring in the monitor to hospital to record the readings.  Discussed with her daughter Sara yesterday her clinical course at length.  appears euvolemic.   Close monitoring of the volume status.  Off IVF and diuretics now.      CAD, s/p MI in past- last stress test about an yr ago did not reveal any ischemia.  Continue statin. Antiplatelet agents on hold.      Other medical issues- Management per primary team.   Thank you for allowing me to participate in the care of this patient. Please feel free to contact me with any questions.

## 2018-03-02 NOTE — PROGRESS NOTE ADULT - PROBLEM SELECTOR PLAN 5
PLACE ON ISS, LANTUS DOSE DECREASED  BGM BETTER THIS AM
PLACE ON ISS, LANTUS DOSE DECREASED  BGM BETTER   NOT MUCH APPETITE
PLACE ON ISS, LANTUS DOSE DECREASED  BGM BETTER THIS AM

## 2018-03-02 NOTE — PHYSICAL THERAPY INITIAL EVALUATION ADULT - ACTIVE RANGE OF MOTION EXAMINATION, REHAB EVAL
bilateral upper extremity Active ROM was WFL (within functional limits)/L hip/knee flex ~30 deg, knee ext ~0 deg from TKE pos., DF/PF WFL/Right LE Active ROM was WFL (within functional limits)

## 2018-03-02 NOTE — CDI QUERY NOTE - NSCDIOTHERTXTBX_GEN_ALL_CORE_HH
# 1  Documentation on chart of profound anemia. HGB on admission was 5.7. Positive for guaiac stools. The pt. is on anticoagulation. Pt. received 4 units PRBC. EGD planned.  Please clarify based on the clinical indicators a diagnosis.  A) Acute blood loss anemia  B) Anemia  C) Other ( Please specify a condition)    # 2  Documentation on chart of CKD. Please indicate if you agree with this diagnosis and if yes please document a Stage of the CKD. If you disagree with this diagnosis please document as well.
# 1  Documentation on chart of profound anemia. HGB on admission was 5.7. Positive for guaiac stools. The pt. is on anticoagulation. Pt. received 4 units PRBC. EGD planned.  Please clarify based on the clinical indicators a diagnosis.  A) Acute blood loss anemia  B) Anemia  C) Other ( Please specify a condition)    # 2  Documentation on chart of CKD. Please indicate if you agree with this diagnosis and if yes please document a Stage of the CKD. If you disagree with this diagnosis please document as well.

## 2018-03-02 NOTE — PROGRESS NOTE ADULT - SUBJECTIVE AND OBJECTIVE BOX
Patient is a 77y old  Female who presents with a chief complaint of fatigue, low hbg.     HPI:  77 year old female PMH of CHF( diastolic dysfn) last exacerbation 2018 ( pt has mems implant), COPD ( never intubated, but on home O2), HTN, HLD, CAD ( on plavix and aspirin), T2DM on insulin, CKD , chronic UTI on Hiprex, lobectomy of lung for precancerous cells  s/p IM nailing of Left hip fracture after fall in , recent ORIF of left hip due to hardware malfunctioning and was sent to rehab on coumadin sent in from rehab yesterday for symptomatic anemia.    Pt was transfused with PRBC since admission and she was noted to be bradycardic today.  She was c/o mild left chest pain yesterday that was very brief without any radiation or recurrence.    18- pt seen and examined by me today. Pt still c/o fatigue.  Denies any CP or SOB.  no events overnight.  3/1- pt seen and examined this am, Denies any CP or SOB.  No overnight events.   3/2- Pt seen and examined by me today.  She is sleeping but arousable.  Denies any CP or SOB this am.      PAST MEDICAL & SURGICAL HISTORY:  [place] (hard of hearing): hearing aid  Anxiety  Hypothyroid  GERD (gastroesophageal reflux disease)  CKD (chronic kidney disease)  MI, old: x3  Chronic UTI  Chronic diastolic congestive heart failure: last exacerbation 2017  Hyperlipidemia, unspecified hyperlipidemia type  Essential hypertension  Neuropathy  Diabetes  Chronic obstructive pulmonary disease, unspecified COPD type  History of cataract surgery: bilateral IOL  S/P lobectomy of lung: left lung pre-cancerous  History of ear surgery  History of cholecystectomy  S/P ORIF (open reduction internal fixation) fracture: left hip 2017  History of total knee replacement, unspecified laterality: bilateral right  left   H/O hernia repair: umbilical and incisional   delivery delivered  S/P appendectomy      MEDICATIONS  (STANDING):  atorvastatin 20 milliGRAM(s) Oral at bedtime  dextrose 5%. 1000 milliLiter(s) (50 mL/Hr) IV Continuous <Continuous>  dextrose 50% Injectable 12.5 Gram(s) IV Push once  diltiazem    milliGRAM(s) Oral daily  docusate sodium 100 milliGRAM(s) Oral two times a day  folic acid 1 milliGRAM(s) Oral daily  insulin glargine Injectable (LANTUS) 15 Unit(s) SubCutaneous at bedtime  insulin lispro (HumaLOG) corrective regimen sliding scale   SubCutaneous three times a day before meals  levothyroxine 112 MICROGram(s) Oral daily  mirtazapine 7.5 milliGRAM(s) Oral at bedtime  pantoprazole  Injectable 40 milliGRAM(s) IV Push every 12 hours  sodium chloride 0.9%. 1000 milliLiter(s) (75 mL/Hr) IV Continuous <Continuous>  tiotropium 18 MICROgram(s) Capsule 1 Capsule(s) Inhalation at bedtime    MEDICATIONS  (PRN):  aluminum hydroxide/magnesium hydroxide/simethicone Suspension 30 milliLiter(s) Oral every 4 hours PRN Dyspepsia  dextrose Gel 1 Dose(s) Oral once PRN Blood Glucose LESS THAN 70 milliGRAM(s)/deciliter  glucagon  Injectable 1 milliGRAM(s) IntraMuscular once PRN Glucose LESS THAN 70 milligrams/deciliter  ondansetron Injectable 4 milliGRAM(s) IV Push every 6 hours PRN Nausea  oxyCODONE    5 mG/acetaminophen 325 mG 1 Tablet(s) Oral every 6 hours PRN Moderate Pain (4 - 6)      FAMILY HISTORY:  Family history of CHF (congestive heart failure) (Mother)      SOCIAL HISTORY:  ex smoker    REVIEW OF SYSTEMS:  CONSTITUTIONAL:  No night sweats.  c/o fatigue, malaise.  HEENT:  Eyes:  No visual changes.  No eye pain.      ENT:  No runny nose.  No epistaxis.  No sinus pain.  No sore throat.  No odynophagia.  No ear pain.  No congestion.  RESPIRATORY:  No cough.  No wheeze.  No hemoptysis.  No shortness of breath.  CARDIOVASCULAR:  No chest pains.  No palpitations. No shortness of breath, No orthopnea or PND.  GASTROINTESTINAL:  No abdominal pain.  No nausea or vomiting.  No diarrhea or constipation.  No hematemesis.  No hematochezia.  No melena.  GENITOURINARY:  No urgency.  No frequency.  No dysuria.  No hematuria.  No obstructive symptoms.  No discharge.  No pain.  No significant abnormal bleeding.  MUSCULOSKELETAL:  No musculoskeletal pain.  No joint swelling.  No arthritis.  NEUROLOGICAL:  No tingling or numbness or weakness.  PSYCHIATRIC:  No confusion  SKIN:  No rashes.  No lesions.  No wounds.  ENDOCRINE:  No unexplained weight loss.  No polydipsia.  No polyuria.  No polyphagia.  HEMATOLOGIC:  No anemia.  No purpura.  No petechiae.  No prolonged or excessive bleeding.   ALLERGIC AND IMMUNOLOGIC:  No pruritus.  No swelling.         Vital Signs Last 24 Hrs  T(C): 36.3 (2018 11:33), Max: 36.9 (2018 01:20)  T(F): 97.3 (:33), Max: 98.5 (2018 01:20)  HR: 63 (:) (63 - 87)  BP: 155/38 (2018 11:) (98/46 - 155/38)  BP(mean): --  RR: 17 (:) (15 - 20)  SpO2: 100% (:) (98% - 100%)    PHYSICAL EXAM-    Constitutional: ill looking frail elderly female in no acute distress.    Head: Head is normocephalic and atraumatic.      Neck: The patient's neck is supple without enlargement, has no palpable thyromegaly nor thyroid nodules and has no jugular venous distention. No audible carotid bruits. There are strong carotid pulses bilaterally. No JVD.     Cardiovascular: Regular rate and rhythm without S3, S4. No murmurs or rubs are appreciated.      Respiratory: Breath sounds are normal. No rales. No wheezing.    Abdomen: Soft, nontender, nondistended with positive bowel sounds.      Extremity: No tenderness. No  pitting edema No skin discoloration No clubbing No cyanosis.     Neurologic: The patient is alert and oriented.      Skin: No rash, no obvious lesions noted.      Psychiatric: The patient appears to be emotionally stable.      INTERPRETATION OF TELEMETRY: sinus rythm with one brief fraction of second bradycardia down to 32/min but not persistent.    ECG: sinus rythm, normal axis, no st t changes.     I&O's Detail    2018 07:01  -  2018 07:00  --------------------------------------------------------  IN:    Packed Red Blood Cells: 291 mL  Total IN: 291 mL    OUT:  Total OUT: 0 mL    Total NET: 291 mL          LABS:                        7.8    10.1  )-----------( 499      ( 2018 09:08 )             23.5         143  |  111<H>  |  81<H>  ----------------------------<  182<H>  4.6   |  23  |  2.05<H>    Ca    7.8<L>      2018 09:08    TPro  5.9<L>  /  Alb  2.4<L>  /  TBili  0.3  /  DBili  x   /  AST  19  /  ALT  15  /  AlkPhos  141<H>          PT/INR - ( 2018 09:08 )   PT: 32.3 sec;   INR: 2.93 ratio         PTT - ( 2018 18:15 )  PTT:37.8 sec  Urinalysis Basic - ( 2018 18:15 )    Color: Yellow / Appearance: Slightly Turbid / S.010 / pH: x  Gluc: x / Ketone: Negative  / Bili: Negative / Urobili: Negative mg/dL   Blood: x / Protein: 15 mg/dL / Nitrite: Positive   Leuk Esterase: Moderate / RBC: 3-5 /HPF / WBC 11-25   Sq Epi: x / Non Sq Epi: Few / Bacteria: Many      I&O's Summary    2018 07:01  -  2018 07:00  --------------------------------------------------------  IN: 291 mL / OUT: 0 mL / NET: 291 mL      BNP  RADIOLOGY & ADDITIONAL STUDIES:

## 2018-03-02 NOTE — PROGRESS NOTE ADULT - SUBJECTIVE AND OBJECTIVE BOX
Interval History:    MEDICATIONS  (STANDING):  atorvastatin 20 milliGRAM(s) Oral at bedtime  dextrose 5%. 1000 milliLiter(s) (50 mL/Hr) IV Continuous <Continuous>  dextrose 50% Injectable 12.5 Gram(s) IV Push once  diltiazem    milliGRAM(s) Oral daily  docusate sodium 100 milliGRAM(s) Oral two times a day  folic acid 1 milliGRAM(s) Oral daily  insulin glargine Injectable (LANTUS) 15 Unit(s) SubCutaneous at bedtime  insulin lispro (HumaLOG) corrective regimen sliding scale   SubCutaneous three times a day before meals  levothyroxine 112 MICROGram(s) Oral daily  mirtazapine 7.5 milliGRAM(s) Oral at bedtime  pantoprazole  Injectable 40 milliGRAM(s) IV Push every 12 hours  tiotropium 18 MICROgram(s) Capsule 1 Capsule(s) Inhalation at bedtime    MEDICATIONS  (PRN):  aluminum hydroxide/magnesium hydroxide/simethicone Suspension 30 milliLiter(s) Oral every 4 hours PRN Dyspepsia  dextrose Gel 1 Dose(s) Oral once PRN Blood Glucose LESS THAN 70 milliGRAM(s)/deciliter  glucagon  Injectable 1 milliGRAM(s) IntraMuscular once PRN Glucose LESS THAN 70 milligrams/deciliter  ondansetron Injectable 4 milliGRAM(s) IV Push every 6 hours PRN Nausea  oxyCODONE    5 mG/acetaminophen 325 mG 1 Tablet(s) Oral every 6 hours PRN Moderate Pain (4 - 6)      Daily     Daily Weight in k.5 (02 Mar 2018 05:54)  BMI: 22 (-26 @ 19:04)  Change in Weight:  Vital Signs Last 24 Hrs  T(C): 36.2 (02 Mar 2018 13:54), Max: 36.8 (01 Mar 2018 16:22)  T(F): 97.1 (02 Mar 2018 13:54), Max: 98.2 (01 Mar 2018 16:22)  HR: 63 (02 Mar 2018 13:54) (52 - 78)  BP: 131/41 (02 Mar 2018 13:54) (131/41 - 173/56)  BP(mean): 73 (02 Mar 2018 08:00) (62 - 87)  RR: 15 (02 Mar 2018 13:54) (11 - 22)  SpO2: 96% (02 Mar 2018 13:54) (96% - 100%)  I&O's Detail      PHYSICAL EXAM  General:  Well developed, well nourished, alert and active, no pallor, NAD.  HEENT:    Normal appearance of conjunctiva, ears, nose, lips, oropharynx, and oral mucosa, anicteric.  Neck:  No masses, no asymmetry.  Lymph Nodes:  No lymphadenopathy.   Cardiovascular:  RRR normal S1/S2, no murmur.  Respiratory:  CTA B/L, normal respiratory effort.   Abdominal:   soft, no masses or tenderness, normoactive BS, NT/ND, no HSM.  Extremities:   No clubbing or cyanosis, normal capillary refill, no edema.   Skin:   No rash, jaundice, lesions, eczema.   Musculoskeletal:  No joint swelling, erythema or tenderness.   Other:     Lab Results:                        7.9    8.7   )-----------( 336      ( 02 Mar 2018 05:33 )             23.8     03-02    146<H>  |  116<H>  |  61<H>  ----------------------------<  137<H>  4.0   |  23  |  1.37<H>    Ca    8.2<L>      02 Mar 2018 05:33        PT/INR - ( 02 Mar 2018 05:33 )   PT: 21.3 sec;   INR: 1.95 ratio               Stool Results:          RADIOLOGY RESULTS:    SURGICAL PATHOLOGY:

## 2018-03-02 NOTE — PROGRESS NOTE ADULT - PROBLEM SELECTOR PLAN 3
STABLE   HOLD DIURETIC FOR NOW
STABLE   HOLD DIURETIC FOR NOW
STABLE -- CURRENTLY SEEMS DRY  GENTLE HYDRATION  HOLD DIURETIC FOR NOW

## 2018-03-02 NOTE — PHYSICAL THERAPY INITIAL EVALUATION ADULT - ADDITIONAL COMMENTS
pt adm from [facility] (post op L RADHA). prior to which, pt living w/ spouse on 2nd floor of house, dtr on ground floor. has RW, WC, SAC, commode, shower chair. Family has put in stairlift per pt.

## 2018-03-02 NOTE — PHYSICAL THERAPY INITIAL EVALUATION ADULT - CRITERIA FOR SKILLED THERAPEUTIC INTERVENTIONS
therapy frequency/predicted duration of therapy intervention/functional limitations in following categories/risk reduction/prevention/impairments found/rehab potential/anticipated equipment needs at discharge/anticipated discharge recommendation

## 2018-03-02 NOTE — PROGRESS NOTE ADULT - PROBLEM SELECTOR PLAN 9
ON CARDIZEM, MONITOR  CONT TELE MONITORING
ON CARDIZEM, MONITOR  CONT TELE MONITORING - STILL WITH EPISODES OF BRADYCARDIA, HOLDING CARDIZEM
ON CARDIZEM, MONITOR  CONT TELE MONITORING BETTER NOW

## 2018-03-02 NOTE — CDI QUERY NOTE - NSCDIRESPTXTBX_GEN_ALL_CORE_HH
Documentation on chart of COPD. Patient on home O2 2 liter nasal O2. Please clarify a diagnosis based on the clinical indicators.  A) Chronic Respiratory Failure  B) No clinical indications for Chronic Respiratory Failure  C) Other ( Please specify condition)
Documentation on chart of COPD. Patient on home O2 2 liter nasal O2. Please clarify a diagnosis based on the clinical indicators.  A) Chronic Respiratory Failure  B) No clinical indications for Chronic Respiratory Failure  C) Other ( Please specify condition)

## 2018-03-03 LAB
ANION GAP SERPL CALC-SCNC: 6 MMOL/L — SIGNIFICANT CHANGE UP (ref 5–17)
BUN SERPL-MCNC: 48 MG/DL — HIGH (ref 7–23)
CALCIUM SERPL-MCNC: 8.2 MG/DL — LOW (ref 8.5–10.1)
CHLORIDE SERPL-SCNC: 116 MMOL/L — HIGH (ref 96–108)
CO2 SERPL-SCNC: 25 MMOL/L — SIGNIFICANT CHANGE UP (ref 22–31)
CREAT SERPL-MCNC: 1.33 MG/DL — HIGH (ref 0.5–1.3)
GLUCOSE BLDC GLUCOMTR-MCNC: 125 MG/DL — HIGH (ref 70–99)
GLUCOSE BLDC GLUCOMTR-MCNC: 141 MG/DL — HIGH (ref 70–99)
GLUCOSE BLDC GLUCOMTR-MCNC: 183 MG/DL — HIGH (ref 70–99)
GLUCOSE BLDC GLUCOMTR-MCNC: 195 MG/DL — HIGH (ref 70–99)
GLUCOSE SERPL-MCNC: 118 MG/DL — HIGH (ref 70–99)
HCT VFR BLD CALC: 25.8 % — LOW (ref 34.5–45)
HGB BLD-MCNC: 8.7 G/DL — LOW (ref 11.5–15.5)
INR BLD: 1.48 RATIO — HIGH (ref 0.88–1.16)
MCHC RBC-ENTMCNC: 29.3 PG — SIGNIFICANT CHANGE UP (ref 27–34)
MCHC RBC-ENTMCNC: 33.7 GM/DL — SIGNIFICANT CHANGE UP (ref 32–36)
MCV RBC AUTO: 86.8 FL — SIGNIFICANT CHANGE UP (ref 80–100)
PLATELET # BLD AUTO: 294 K/UL — SIGNIFICANT CHANGE UP (ref 150–400)
POTASSIUM SERPL-MCNC: 3.7 MMOL/L — SIGNIFICANT CHANGE UP (ref 3.5–5.3)
POTASSIUM SERPL-SCNC: 3.7 MMOL/L — SIGNIFICANT CHANGE UP (ref 3.5–5.3)
PROTHROM AB SERPL-ACNC: 16.1 SEC — HIGH (ref 9.8–12.7)
RBC # BLD: 2.98 M/UL — LOW (ref 3.8–5.2)
RBC # FLD: 16 % — HIGH (ref 10.3–14.5)
SODIUM SERPL-SCNC: 147 MMOL/L — HIGH (ref 135–145)
WBC # BLD: 7.1 K/UL — SIGNIFICANT CHANGE UP (ref 3.8–10.5)
WBC # FLD AUTO: 7.1 K/UL — SIGNIFICANT CHANGE UP (ref 3.8–10.5)

## 2018-03-03 PROCEDURE — 73502 X-RAY EXAM HIP UNI 2-3 VIEWS: CPT | Mod: 26

## 2018-03-03 RX ORDER — HEPARIN SODIUM 5000 [USP'U]/ML
5000 INJECTION INTRAVENOUS; SUBCUTANEOUS EVERY 12 HOURS
Qty: 0 | Refills: 0 | Status: DISCONTINUED | OUTPATIENT
Start: 2018-03-03 | End: 2018-03-12

## 2018-03-03 RX ADMIN — OXYCODONE AND ACETAMINOPHEN 1 TABLET(S): 5; 325 TABLET ORAL at 08:39

## 2018-03-03 RX ADMIN — Medication 100 MILLIGRAM(S): at 06:23

## 2018-03-03 RX ADMIN — Medication 1 MILLIGRAM(S): at 08:39

## 2018-03-03 RX ADMIN — ATORVASTATIN CALCIUM 20 MILLIGRAM(S): 80 TABLET, FILM COATED ORAL at 21:54

## 2018-03-03 RX ADMIN — MIRTAZAPINE 7.5 MILLIGRAM(S): 45 TABLET, ORALLY DISINTEGRATING ORAL at 21:57

## 2018-03-03 RX ADMIN — Medication 180 MILLIGRAM(S): at 08:41

## 2018-03-03 RX ADMIN — OXYCODONE AND ACETAMINOPHEN 1 TABLET(S): 5; 325 TABLET ORAL at 21:57

## 2018-03-03 RX ADMIN — HEPARIN SODIUM 5000 UNIT(S): 5000 INJECTION INTRAVENOUS; SUBCUTANEOUS at 17:15

## 2018-03-03 RX ADMIN — OXYCODONE AND ACETAMINOPHEN 1 TABLET(S): 5; 325 TABLET ORAL at 02:09

## 2018-03-03 RX ADMIN — PANTOPRAZOLE SODIUM 40 MILLIGRAM(S): 20 TABLET, DELAYED RELEASE ORAL at 06:23

## 2018-03-03 RX ADMIN — PANTOPRAZOLE SODIUM 40 MILLIGRAM(S): 20 TABLET, DELAYED RELEASE ORAL at 17:15

## 2018-03-03 RX ADMIN — Medication 1: at 17:23

## 2018-03-03 RX ADMIN — INSULIN GLARGINE 15 UNIT(S): 100 INJECTION, SOLUTION SUBCUTANEOUS at 21:56

## 2018-03-03 RX ADMIN — Medication 112 MICROGRAM(S): at 06:23

## 2018-03-03 RX ADMIN — Medication 1: at 13:22

## 2018-03-03 NOTE — PROGRESS NOTE ADULT - ASSESSMENT
Pt is a 76 y/o female with h/o chronic diastolic chf, copd with chronic hypoxic respiratory failure (on home o2), osteoarthritis, anxiety, HTN, type 2 diabetes s/p fall and IM nailing of left hip months prior now s/p left THR who was admitted for profound anemia.     * Profound Anemia-no obvious source of bleeding, ? chronic GI loss, h/h improved after blood transfusion, monitor, endoscopic w/up per GI.  * Bradycardia-due to cardizem, monitor, I will change her dose to short acting cardizem starting tomorrow and monitor.  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  * Type 2 Diabetes-continue lantus with ISS  * Proph- she is high risk for DVT with recent hip surgery, sedentary, since no signs of active or obvious bleeding, I will start proph dose of heparin and monitor for bleeding.  * Disp-OOB, PT, d/c planning  * Comm- d/w pt and daughter in details at bedside, all questions answered, RN

## 2018-03-03 NOTE — CONSULT NOTE ADULT - SUBJECTIVE AND OBJECTIVE BOX
76 yo female presents sp left hip CODY conversion to RADHA on 2/8/18 with Dr Jacinto. Asked to eval patient by family. Was discharged to rehab when she has been tolerating PT, ambulating with assistance and walker without pain. No new falls/trauma. Denies any new issue with left hip. Denies fever/chills. Admitted with severe anemia. No other orthopedic complaints at this time. Denies numbness/tingling.     HEALTH ISSUES - PROBLEM Dx:  Bradycardia: Bradycardia  GI bleed: GI bleed  Hypothyroid: Hypothyroid  CKD (chronic kidney disease): CKD (chronic kidney disease)  Chronic obstructive pulmonary disease, unspecified COPD type: Chronic obstructive pulmonary disease, unspecified COPD type  Diabetes: Diabetes  Anticoagulated on warfarin: Anticoagulated on warfarin  Chronic diastolic congestive heart failure: Chronic diastolic congestive heart failure  UTI (urinary tract infection): UTI (urinary tract infection)  Anemia: Anemia        MEDICATIONS  (STANDING):  atorvastatin 20 milliGRAM(s) Oral at bedtime  dextrose 5%. 1000 milliLiter(s) IV Continuous <Continuous>  dextrose 50% Injectable 12.5 Gram(s) IV Push once  docusate sodium 100 milliGRAM(s) Oral two times a day  folic acid 1 milliGRAM(s) Oral daily  heparin  Injectable 5000 Unit(s) SubCutaneous every 12 hours  insulin glargine Injectable (LANTUS) 15 Unit(s) SubCutaneous at bedtime  insulin lispro (HumaLOG) corrective regimen sliding scale   SubCutaneous three times a day before meals  levothyroxine 112 MICROGram(s) Oral daily  mirtazapine 7.5 milliGRAM(s) Oral at bedtime  pantoprazole  Injectable 40 milliGRAM(s) IV Push every 12 hours  tiotropium 18 MICROgram(s) Capsule 1 Capsule(s) Inhalation at bedtime    Allergies    Bactrim (Other)  ciprofloxacin (Other (Mild))  clindamycin (Unknown)  ibuprofen (Unknown)  penicillins (Other)  sulfa drugs (Unknown)    Intolerances                            8.7    7.1   )-----------( 294      ( 03 Mar 2018 05:47 )             25.8     03 Mar 2018 05:47    147    |  116    |  48     ----------------------------<  118    3.7     |  25     |  1.33     Ca    8.2        03 Mar 2018 05:47      PT/INR - ( 03 Mar 2018 05:47 )   PT: 16.1 sec;   INR: 1.48 ratio           Vital Signs Last 24 Hrs  T(C): 36.1 (03-03-18 @ 12:15), Max: 36.1 (03-03-18 @ 06:51)  T(F): 97 (03-03-18 @ 12:15), Max: 97 (03-03-18 @ 06:51)  HR: 51 (03-03-18 @ 18:00) (49 - 66)  BP: 135/32 (03-03-18 @ 18:00) (59/48 - 173/34)  BP(mean): 57 (03-03-18 @ 18:00) (50 - 77)  RR: 14 (03-03-18 @ 18:00) (10 - 17)  SpO2: 100% (03-03-18 @ 18:00) (96% - 100%)    XR: sp left RADHA in satisfactory position, no acute fx/dislocation    Physical Exam  Gen: NAD  LLE:   skin intact  Well healed posterior skin incision at hip without erythema/drainage, no signs of infection  well healed lateral skin incision at distal thigh without erythema/drainage, no signs of infection  no signs of hematoma/mass at thigh   negative log roll  +ehl/fhl/ta/gs function  L2-S1 silt  Dp/pt pulse intact  No calf ttp  Compartments soft Abdominal Pain, N/V/D

## 2018-03-03 NOTE — PROGRESS NOTE ADULT - ASSESSMENT
1. Anemia- pt was noted to have 3 angiodysplastic lesions that were cauterized yesterday.  Hgb this AM 8.7. No bleeding events reported by staff or pt. Awaiting colonoscopy next week.      2. Bradycardia- very brief episodes with good chronotropic competence. Continue cardizem.  No need for PPM at this time.    3. Chronic HFpEF- Has CardioMEMS.   Euvolemic. Close monitoring of the volume status.  Off IVF and diuretics now.      4. CAD, s/p MI in past- last stress test about an yr ago did not reveal any ischemia.  Continue statin. Antiplatelet agents on hold.    5. Mechanical DVT proph.

## 2018-03-03 NOTE — PROGRESS NOTE ADULT - SUBJECTIVE AND OBJECTIVE BOX
Patient is a 77y old  Female who presents with a chief complaint of fatigue, low hbg.     HPI: 77 year old female PMH of CHF( diastolic dysfn) last exacerbation 2018 ( pt has mems implant), COPD ( never intubated, but on home O2), HTN, HLD, CAD ( on plavix and aspirin), T2DM on insulin, CKD , chronic UTI on Hiprex, lobectomy of lung for precancerous cells  s/p IM nailing of Left hip fracture after fall in , recent ORIF of left hip due to hardware malfunctioning and was sent to rehab on coumadin sent in from rehab yesterday for symptomatic anemia.  Pt was transfused with PRBC since admission and she was noted to be bradycardic today.  She was c/o mild left chest pain yesterday that was very brief without any radiation or recurrence.    3/3- No CP/SOB this AM. Feels cold/tired. Hgb 8.7 today.     PAST MEDICAL & SURGICAL HISTORY:  [place] (hard of hearing): hearing aid  Anxiety  Hypothyroid  GERD (gastroesophageal reflux disease)  CKD (chronic kidney disease)  MI, old: x3  Chronic UTI  Chronic diastolic congestive heart failure: last exacerbation 2017  Hyperlipidemia, unspecified hyperlipidemia type  Essential hypertension  Neuropathy  Diabetes  Chronic obstructive pulmonary disease, unspecified COPD type  History of cataract surgery: bilateral IOL  S/P lobectomy of lung: left lung pre-cancerous  History of ear surgery  History of cholecystectomy  S/P ORIF (open reduction internal fixation) fracture: left hip 2017  History of total knee replacement, unspecified laterality: bilateral right  left   H/O hernia repair: umbilical and incisional   delivery delivered  S/P appendectomy      MEDICATIONS  (STANDING):  atorvastatin 20 milliGRAM(s) Oral at bedtime  dextrose 5%. 1000 milliLiter(s) (50 mL/Hr) IV Continuous <Continuous>  dextrose 50% Injectable 12.5 Gram(s) IV Push once  diltiazem    milliGRAM(s) Oral daily  docusate sodium 100 milliGRAM(s) Oral two times a day  folic acid 1 milliGRAM(s) Oral daily  insulin glargine Injectable (LANTUS) 15 Unit(s) SubCutaneous at bedtime  insulin lispro (HumaLOG) corrective regimen sliding scale   SubCutaneous three times a day before meals  levothyroxine 112 MICROGram(s) Oral daily  mirtazapine 7.5 milliGRAM(s) Oral at bedtime  pantoprazole  Injectable 40 milliGRAM(s) IV Push every 12 hours  sodium chloride 0.9%. 1000 milliLiter(s) (75 mL/Hr) IV Continuous <Continuous>  tiotropium 18 MICROgram(s) Capsule 1 Capsule(s) Inhalation at bedtime    MEDICATIONS  (PRN):  aluminum hydroxide/magnesium hydroxide/simethicone Suspension 30 milliLiter(s) Oral every 4 hours PRN Dyspepsia  dextrose Gel 1 Dose(s) Oral once PRN Blood Glucose LESS THAN 70 milliGRAM(s)/deciliter  glucagon  Injectable 1 milliGRAM(s) IntraMuscular once PRN Glucose LESS THAN 70 milligrams/deciliter  ondansetron Injectable 4 milliGRAM(s) IV Push every 6 hours PRN Nausea  oxyCODONE    5 mG/acetaminophen 325 mG 1 Tablet(s) Oral every 6 hours PRN Moderate Pain (4 - 6)      FAMILY HISTORY:  Family history of CHF (congestive heart failure) (Mother)      SOCIAL HISTORY:  ex smoker    REVIEW OF SYSTEMS:  CONSTITUTIONAL:  No night sweats.  c/o fatigue, malaise.  HEENT:  Eyes:  No visual changes.  No eye pain.      ENT:  No runny nose.  No epistaxis.  No sinus pain.  No sore throat.  No odynophagia.  No ear pain.  No congestion.  RESPIRATORY:  No cough.  No wheeze.  No hemoptysis.  No shortness of breath.  CARDIOVASCULAR:  No chest pains.  No palpitations. No shortness of breath, No orthopnea or PND.  GASTROINTESTINAL:  No abdominal pain.  No nausea or vomiting.  No diarrhea or constipation.  No hematemesis.  No hematochezia.  No melena.  GENITOURINARY:  No urgency.  No frequency.  No dysuria.  No hematuria.  No obstructive symptoms.  No discharge.  No pain.  No significant abnormal bleeding.  MUSCULOSKELETAL:  No musculoskeletal pain.  No joint swelling.  No arthritis.  NEUROLOGICAL:  No tingling or numbness or weakness.  PSYCHIATRIC:  No confusion  SKIN:  No rashes.  No lesions.  No wounds.  ENDOCRINE:  No unexplained weight loss.  No polydipsia.  No polyuria.  No polyphagia.  HEMATOLOGIC:  No anemia.  No purpura.  No petechiae.  No prolonged or excessive bleeding.   ALLERGIC AND IMMUNOLOGIC:  No pruritus.  No swelling.         Vital Signs Last 24 Hrs  T(C): 36.3 (2018 11:33), Max: 36.9 (2018 01:20)  T(F): 97.3 (:33), Max: 98.5 (2018 01:20)  HR: 63 (:) (63 - 87)  BP: 155/38 (:33) (98/46 - 155/38)  BP(mean): --  RR: 17 (:) (15 - 20)  SpO2: 100% (:) (98% - 100%)    PHYSICAL EXAM-    Constitutional: ill looking frail elderly female in no acute distress.    Head: Head is normocephalic and atraumatic.      Neck: The patient's neck is supple without enlargement, has no palpable thyromegaly nor thyroid nodules and has no jugular venous distention. No audible carotid bruits. There are strong carotid pulses bilaterally. No JVD.     Cardiovascular: Regular rate and rhythm without S3, S4. No murmurs or rubs are appreciated.      Respiratory: Breath sounds are normal. No rales. No wheezing.    Abdomen: Soft, nontender, nondistended with positive bowel sounds.      Extremity: No tenderness. No  pitting edema No skin discoloration No clubbing No cyanosis.     Neurologic: The patient is alert and oriented.      Skin: No rash, no obvious lesions noted.      Psychiatric: The patient appears to be emotionally stable.      INTERPRETATION OF TELEMETRY: sinus rythm with one brief fraction of second bradycardia down to 32/min but not persistent.    ECG: sinus rythm, normal axis, no st t changes.     I&O's Detail    2018 07:01  -  2018 07:00  --------------------------------------------------------  IN:    Packed Red Blood Cells: 291 mL  Total IN: 291 mL    OUT:  Total OUT: 0 mL    Total NET: 291 mL    LABS:                        7.8    10.1  )-----------( 499      ( 2018 09:08 )             23.5     02    143  |  111<H>  |  81<H>  ----------------------------<  182<H>  4.6   |  23  |  2.05<H>    Ca    7.8<L>      2018 09:08    TPro  5.9<L>  /  Alb  2.4<L>  /  TBili  0.3  /  DBili  x   /  AST  19  /  ALT  15  /  AlkPhos  141<H>          PT/INR - ( 2018 09:08 )   PT: 32.3 sec;   INR: 2.93 ratio         PTT - ( 2018 18:15 )  PTT:37.8 sec  Urinalysis Basic - ( 2018 18:15 )    Color: Yellow / Appearance: Slightly Turbid / S.010 / pH: x  Gluc: x / Ketone: Negative  / Bili: Negative / Urobili: Negative mg/dL   Blood: x / Protein: 15 mg/dL / Nitrite: Positive   Leuk Esterase: Moderate / RBC: 3-5 /HPF / WBC 11-25   Sq Epi: x / Non Sq Epi: Few / Bacteria: Many      I&O's Summary    2018 07:01  -  2018 07:00  --------------------------------------------------------  IN: 291 mL / OUT: 0 mL / NET: 291 mL      BNP  RADIOLOGY & ADDITIONAL STUDIES:

## 2018-03-03 NOTE — CONSULT NOTE ADULT - ASSESSMENT
A/P:  77y Female sp L hip CODY/RADHA without new orthopedic issue  Pain control  DVT ppx  PT/WBAT/Posterior hip precautions/abduction pillow/OOB  fall precautions  trend HH, replete prn  Medical management  Incentive spirometry  ortho stable for DC when medically cleared  will discuss with dr darnell

## 2018-03-04 LAB
ADD ON TEST-SPECIMEN IN LAB: SIGNIFICANT CHANGE UP
ANION GAP SERPL CALC-SCNC: 6 MMOL/L — SIGNIFICANT CHANGE UP (ref 5–17)
BUN SERPL-MCNC: 39 MG/DL — HIGH (ref 7–23)
CALCIUM SERPL-MCNC: 8.4 MG/DL — LOW (ref 8.5–10.1)
CHLORIDE SERPL-SCNC: 116 MMOL/L — HIGH (ref 96–108)
CO2 SERPL-SCNC: 24 MMOL/L — SIGNIFICANT CHANGE UP (ref 22–31)
CREAT SERPL-MCNC: 1.3 MG/DL — SIGNIFICANT CHANGE UP (ref 0.5–1.3)
GLUCOSE BLDC GLUCOMTR-MCNC: 139 MG/DL — HIGH (ref 70–99)
GLUCOSE BLDC GLUCOMTR-MCNC: 221 MG/DL — HIGH (ref 70–99)
GLUCOSE BLDC GLUCOMTR-MCNC: 285 MG/DL — HIGH (ref 70–99)
GLUCOSE BLDC GLUCOMTR-MCNC: 335 MG/DL — HIGH (ref 70–99)
GLUCOSE SERPL-MCNC: 133 MG/DL — HIGH (ref 70–99)
HCT VFR BLD CALC: 27.1 % — LOW (ref 34.5–45)
HGB BLD-MCNC: 8.7 G/DL — LOW (ref 11.5–15.5)
MAGNESIUM SERPL-MCNC: 1.8 MG/DL — SIGNIFICANT CHANGE UP (ref 1.6–2.6)
MCHC RBC-ENTMCNC: 28.3 PG — SIGNIFICANT CHANGE UP (ref 27–34)
MCHC RBC-ENTMCNC: 32.1 GM/DL — SIGNIFICANT CHANGE UP (ref 32–36)
MCV RBC AUTO: 88.2 FL — SIGNIFICANT CHANGE UP (ref 80–100)
OB PNL STL: POSITIVE
PHOSPHATE SERPL-MCNC: 3.7 MG/DL — SIGNIFICANT CHANGE UP (ref 2.5–4.5)
PLATELET # BLD AUTO: 304 K/UL — SIGNIFICANT CHANGE UP (ref 150–400)
POTASSIUM SERPL-MCNC: 3.8 MMOL/L — SIGNIFICANT CHANGE UP (ref 3.5–5.3)
POTASSIUM SERPL-SCNC: 3.8 MMOL/L — SIGNIFICANT CHANGE UP (ref 3.5–5.3)
RBC # BLD: 3.07 M/UL — LOW (ref 3.8–5.2)
RBC # FLD: 16 % — HIGH (ref 10.3–14.5)
SODIUM SERPL-SCNC: 146 MMOL/L — HIGH (ref 135–145)
WBC # BLD: 6.1 K/UL — SIGNIFICANT CHANGE UP (ref 3.8–10.5)
WBC # FLD AUTO: 6.1 K/UL — SIGNIFICANT CHANGE UP (ref 3.8–10.5)

## 2018-03-04 RX ORDER — HYDRALAZINE HCL 50 MG
25 TABLET ORAL EVERY 8 HOURS
Qty: 0 | Refills: 0 | Status: DISCONTINUED | OUTPATIENT
Start: 2018-03-04 | End: 2018-03-12

## 2018-03-04 RX ADMIN — Medication 25 MILLIGRAM(S): at 21:41

## 2018-03-04 RX ADMIN — PANTOPRAZOLE SODIUM 40 MILLIGRAM(S): 20 TABLET, DELAYED RELEASE ORAL at 17:21

## 2018-03-04 RX ADMIN — HEPARIN SODIUM 5000 UNIT(S): 5000 INJECTION INTRAVENOUS; SUBCUTANEOUS at 17:22

## 2018-03-04 RX ADMIN — Medication 100 MILLIGRAM(S): at 05:15

## 2018-03-04 RX ADMIN — HEPARIN SODIUM 5000 UNIT(S): 5000 INJECTION INTRAVENOUS; SUBCUTANEOUS at 05:15

## 2018-03-04 RX ADMIN — Medication 25 MILLIGRAM(S): at 14:15

## 2018-03-04 RX ADMIN — Medication 4: at 13:42

## 2018-03-04 RX ADMIN — Medication 112 MICROGRAM(S): at 05:14

## 2018-03-04 RX ADMIN — MIRTAZAPINE 7.5 MILLIGRAM(S): 45 TABLET, ORALLY DISINTEGRATING ORAL at 21:41

## 2018-03-04 RX ADMIN — ATORVASTATIN CALCIUM 20 MILLIGRAM(S): 80 TABLET, FILM COATED ORAL at 21:44

## 2018-03-04 RX ADMIN — INSULIN GLARGINE 15 UNIT(S): 100 INJECTION, SOLUTION SUBCUTANEOUS at 21:41

## 2018-03-04 RX ADMIN — Medication 1 MILLIGRAM(S): at 09:36

## 2018-03-04 RX ADMIN — OXYCODONE AND ACETAMINOPHEN 1 TABLET(S): 5; 325 TABLET ORAL at 21:42

## 2018-03-04 RX ADMIN — Medication 2: at 17:21

## 2018-03-04 NOTE — PROGRESS NOTE ADULT - ASSESSMENT
Pt is a 76 y/o female with h/o chronic diastolic chf, copd with chronic hypoxic respiratory failure (on home o2), osteoarthritis, anxiety, HTN, type 2 diabetes s/p fall and IM nailing of left hip months prior now s/p left THR who was admitted for profound anemia.     * Profound Anemia-no obvious source of bleeding, ? chronic GI loss, h/h improved after blood transfusion, monitor, endoscopic w/up per GI.  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  * Disp-OOB, PT, d/c planning  * Comm- d/w pt and daughter in details at bedside, all questions answered, RN

## 2018-03-04 NOTE — PROGRESS NOTE ADULT - SUBJECTIVE AND OBJECTIVE BOX
Pt feels slightly better, no CP or SOB.  HR better, one dose of cardizem held this morning for bradycardia.    Vital Signs Last 24 Hrs  T(C): 36.2 (04 Mar 2018 06:08), Max: 36.2 (03 Mar 2018 19:46)  T(F): 97.1 (04 Mar 2018 06:08), Max: 97.1 (03 Mar 2018 19:46)  HR: 67 (04 Mar 2018 10:00) (50 - 67)  BP: 156/40 (04 Mar 2018 10:00) (131/28 - 172/38)  BP(mean): 70 (04 Mar 2018 10:00) (55 - 74)  RR: 19 (04 Mar 2018 10:00) (10 - 19)  SpO2: 100% (04 Mar 2018 10:00) (96% - 100%)    Daily     Daily Weight in k.2 (04 Mar 2018 06:08)    I&O's Detail      CAPILLARY BLOOD GLUCOSE      POCT Blood Glucose.: 139 mg/dL (04 Mar 2018 08:46)  POCT Blood Glucose.: 125 mg/dL (03 Mar 2018 21:07)  POCT Blood Glucose.: 195 mg/dL (03 Mar 2018 17:22)  POCT Blood Glucose.: 183 mg/dL (03 Mar 2018 13:03)                                      8.7    6.1   )-----------( 304      ( 04 Mar 2018 07:01 )             27.1       03-04    146<H>  |  116<H>  |  39<H>  ----------------------------<  133<H>  3.8   |  24  |  1.30    Ca    8.4<L>      04 Mar 2018 07:01        PT/INR - ( 03 Mar 2018 05:47 )   PT: 16.1 sec;   INR: 1.48 ratio                         MEDICATIONS  (STANDING):  atorvastatin 20 milliGRAM(s) Oral at bedtime  dextrose 5%. 1000 milliLiter(s) (50 mL/Hr) IV Continuous <Continuous>  dextrose 50% Injectable 12.5 Gram(s) IV Push once  diltiazem    Tablet 60 milliGRAM(s) Oral every 8 hours  docusate sodium 100 milliGRAM(s) Oral two times a day  folic acid 1 milliGRAM(s) Oral daily  heparin  Injectable 5000 Unit(s) SubCutaneous every 12 hours  hydrALAZINE 25 milliGRAM(s) Oral every 8 hours  insulin glargine Injectable (LANTUS) 15 Unit(s) SubCutaneous at bedtime  insulin lispro (HumaLOG) corrective regimen sliding scale   SubCutaneous three times a day before meals  levothyroxine 112 MICROGram(s) Oral daily  mirtazapine 7.5 milliGRAM(s) Oral at bedtime  pantoprazole  Injectable 40 milliGRAM(s) IV Push every 12 hours  tiotropium 18 MICROgram(s) Capsule 1 Capsule(s) Inhalation at bedtime    MEDICATIONS  (PRN):  aluminum hydroxide/magnesium hydroxide/simethicone Suspension 30 milliLiter(s) Oral every 4 hours PRN Dyspepsia  dextrose Gel 1 Dose(s) Oral once PRN Blood Glucose LESS THAN 70 milliGRAM(s)/deciliter  glucagon  Injectable 1 milliGRAM(s) IntraMuscular once PRN Glucose LESS THAN 70 milligrams/deciliter  ondansetron Injectable 4 milliGRAM(s) IV Push every 6 hours PRN Nausea  oxyCODONE    5 mG/acetaminophen 325 mG 1 Tablet(s) Oral every 6 hours PRN Moderate Pain (4 - 6)

## 2018-03-04 NOTE — PROGRESS NOTE ADULT - PROBLEM SELECTOR PLAN 1
ACUTE ON CHRONIC ANEMIA FROM ACUTE BLOOD LOSS  secondary to avms - s/p cauterization yest BUT H/H LOWER AGAIN TODAY  CONT TO MONITOR  WILL TRANSFUSE AGAIN TODAY  CONT PPI  STABLE  MONITOR FOR FURTHER BLEEDING OR DROP IN H/H  INR HIGH - CONT TO HOLD COUMADIN AND LET IT DRIFT DOWN  GI EVAL - FOR EGD LATER TODAY  TRANSFUSE 4TH UNIT PRBC TODAY  DVT PROPHY- INR UNDER 2 NOW, BUT HOLD OFF ON SQ HEPARIN DUE TO ACUTE DROPS IN H/H  NO BLEEDING NOTED
Continue present Rx  Transfuse as needed  Possible Colonoscopy early next week if Hgb continues to drop
ACUTE ON CHRONIC ANEMIA  secondary to avms - s/p cauterization yest  CONT PPI  STABLE  MONITOR FOR FURTHER BLEEDING OR DROP IN H/H  INR HIGH - CONT TO HOLD COUMADIN AND LET IT DRIFT DOWN  GI EVAL - FOR EGD LATER TODAY  TRANSFUSE 4TH UNIT PRBC TODAY  DVT PROPHY- INR HIGH  HOLD OFF ON REVERSING INR -- WOULD LET IT DRIFT DOWN AS NO ACUTE BLEEDING NOTED
OK to discharge from GI point of view  Iron infusion as outpt as required  Continue PPI
ACUTE ON CHRONIC ANEMIA  UNCLEAR SOURCE  CONT PPI, GUAIC POSITIVE STOOLS  INR HIGH - CONT TO HOLD COUMADIN AND LET IT DRIFT DOWN  GI EVAL - FOR EGD LATER TODAY  TRANSFUSE 4TH UNIT PRBC TODAY  DVT PROPHY- INR HIGH  HOLD OFF ON REVERSING INR -- WOULD LET IT DRIFT DOWN AS NO ACUTE BLEEDING NOTED

## 2018-03-04 NOTE — PROVIDER CONTACT NOTE (CRITICAL VALUE NOTIFICATION) - RECOMMENDATIONS
pt. had brief episode of NSVT at n11:22  dr. shannon aware mag and phos check. pt. resting in bed no signs of distress vss will continue to monitor

## 2018-03-04 NOTE — PROGRESS NOTE ADULT - SUBJECTIVE AND OBJECTIVE BOX
Interval History:    MEDICATIONS  (STANDING):  atorvastatin 20 milliGRAM(s) Oral at bedtime  dextrose 5%. 1000 milliLiter(s) (50 mL/Hr) IV Continuous <Continuous>  dextrose 50% Injectable 12.5 Gram(s) IV Push once  diltiazem    Tablet 60 milliGRAM(s) Oral every 8 hours  docusate sodium 100 milliGRAM(s) Oral two times a day  folic acid 1 milliGRAM(s) Oral daily  heparin  Injectable 5000 Unit(s) SubCutaneous every 12 hours  hydrALAZINE 25 milliGRAM(s) Oral every 8 hours  insulin glargine Injectable (LANTUS) 15 Unit(s) SubCutaneous at bedtime  insulin lispro (HumaLOG) corrective regimen sliding scale   SubCutaneous three times a day before meals  levothyroxine 112 MICROGram(s) Oral daily  mirtazapine 7.5 milliGRAM(s) Oral at bedtime  pantoprazole  Injectable 40 milliGRAM(s) IV Push every 12 hours  tiotropium 18 MICROgram(s) Capsule 1 Capsule(s) Inhalation at bedtime    MEDICATIONS  (PRN):  aluminum hydroxide/magnesium hydroxide/simethicone Suspension 30 milliLiter(s) Oral every 4 hours PRN Dyspepsia  dextrose Gel 1 Dose(s) Oral once PRN Blood Glucose LESS THAN 70 milliGRAM(s)/deciliter  glucagon  Injectable 1 milliGRAM(s) IntraMuscular once PRN Glucose LESS THAN 70 milligrams/deciliter  ondansetron Injectable 4 milliGRAM(s) IV Push every 6 hours PRN Nausea  oxyCODONE    5 mG/acetaminophen 325 mG 1 Tablet(s) Oral every 6 hours PRN Moderate Pain (4 - 6)      Daily     Daily Weight in k.2 (04 Mar 2018 06:08)  BMI: 22 (-26 @ 19:04)  Change in Weight:  Vital Signs Last 24 Hrs  T(C): 36.2 (04 Mar 2018 06:08), Max: 36.2 (03 Mar 2018 19:46)  T(F): 97.1 (04 Mar 2018 06:08), Max: 97.1 (03 Mar 2018 19:46)  HR: 67 (04 Mar 2018 10:00) (50 - 67)  BP: 156/40 (04 Mar 2018 10:00) (131/28 - 172/38)  BP(mean): 70 (04 Mar 2018 10:00) (55 - 74)  RR: 19 (04 Mar 2018 10:00) (10 - 19)  SpO2: 100% (04 Mar 2018 10:00) (96% - 100%)  I&O's Detail      PHYSICAL EXAM  General:  Well developed, well nourished, alert and active, no pallor, NAD.  HEENT:    Normal appearance of conjunctiva, ears, nose, lips, oropharynx, and oral mucosa, anicteric.  Neck:  No masses, no asymmetry.  Lymph Nodes:  No lymphadenopathy.   Cardiovascular:  RRR normal S1/S2, no murmur.  Respiratory:  CTA B/L, normal respiratory effort.   Abdominal:   soft, no masses or tenderness, normoactive BS, NT/ND, no HSM.  Extremities:   No clubbing or cyanosis, normal capillary refill, no edema.   Skin:   No rash, jaundice, lesions, eczema.   Musculoskeletal:  No joint swelling, erythema or tenderness.   Other:     Lab Results:                        8.7    6.1   )-----------( 304      ( 04 Mar 2018 07:01 )             27.1     03-04    146<H>  |  116<H>  |  39<H>  ----------------------------<  133<H>  3.8   |  24  |  1.30    Ca    8.4<L>      04 Mar 2018 07:01        PT/INR - ( 03 Mar 2018 05:47 )   PT: 16.1 sec;   INR: 1.48 ratio               Stool Results:          RADIOLOGY RESULTS:    SURGICAL PATHOLOGY:

## 2018-03-05 LAB
ANION GAP SERPL CALC-SCNC: 8 MMOL/L — SIGNIFICANT CHANGE UP (ref 5–17)
APPEARANCE UR: (no result)
BACTERIA # UR AUTO: (no result)
BILIRUB UR-MCNC: NEGATIVE — SIGNIFICANT CHANGE UP
BUN SERPL-MCNC: 34 MG/DL — HIGH (ref 7–23)
CALCIUM SERPL-MCNC: 8.2 MG/DL — LOW (ref 8.5–10.1)
CHLORIDE SERPL-SCNC: 115 MMOL/L — HIGH (ref 96–108)
CO2 SERPL-SCNC: 20 MMOL/L — LOW (ref 22–31)
COLOR SPEC: YELLOW — SIGNIFICANT CHANGE UP
COMMENT - URINE: SIGNIFICANT CHANGE UP
CREAT SERPL-MCNC: 1.28 MG/DL — SIGNIFICANT CHANGE UP (ref 0.5–1.3)
DIFF PNL FLD: (no result)
EPI CELLS # UR: (no result)
GLUCOSE BLDC GLUCOMTR-MCNC: 160 MG/DL — HIGH (ref 70–99)
GLUCOSE BLDC GLUCOMTR-MCNC: 161 MG/DL — HIGH (ref 70–99)
GLUCOSE BLDC GLUCOMTR-MCNC: 167 MG/DL — HIGH (ref 70–99)
GLUCOSE BLDC GLUCOMTR-MCNC: 184 MG/DL — HIGH (ref 70–99)
GLUCOSE SERPL-MCNC: 151 MG/DL — HIGH (ref 70–99)
GLUCOSE UR QL: NEGATIVE MG/DL — SIGNIFICANT CHANGE UP
HCT VFR BLD CALC: 27.1 % — LOW (ref 34.5–45)
HGB BLD-MCNC: 9 G/DL — LOW (ref 11.5–15.5)
KETONES UR-MCNC: NEGATIVE — SIGNIFICANT CHANGE UP
LACTATE SERPL-SCNC: 0.9 MMOL/L — SIGNIFICANT CHANGE UP (ref 0.7–2)
LEUKOCYTE ESTERASE UR-ACNC: (no result)
MCHC RBC-ENTMCNC: 29 PG — SIGNIFICANT CHANGE UP (ref 27–34)
MCHC RBC-ENTMCNC: 33.2 GM/DL — SIGNIFICANT CHANGE UP (ref 32–36)
MCV RBC AUTO: 87.2 FL — SIGNIFICANT CHANGE UP (ref 80–100)
NITRITE UR-MCNC: POSITIVE
PH UR: 5 — SIGNIFICANT CHANGE UP (ref 5–8)
PLATELET # BLD AUTO: 240 K/UL — SIGNIFICANT CHANGE UP (ref 150–400)
POTASSIUM SERPL-MCNC: 4 MMOL/L — SIGNIFICANT CHANGE UP (ref 3.5–5.3)
POTASSIUM SERPL-SCNC: 4 MMOL/L — SIGNIFICANT CHANGE UP (ref 3.5–5.3)
PROT UR-MCNC: 100 MG/DL
RBC # BLD: 3.11 M/UL — LOW (ref 3.8–5.2)
RBC # FLD: 15.4 % — HIGH (ref 10.3–14.5)
RBC CASTS # UR COMP ASSIST: (no result) /HPF (ref 0–4)
SODIUM SERPL-SCNC: 143 MMOL/L — SIGNIFICANT CHANGE UP (ref 135–145)
SP GR SPEC: 1.01 — SIGNIFICANT CHANGE UP (ref 1.01–1.02)
UROBILINOGEN FLD QL: NEGATIVE MG/DL — SIGNIFICANT CHANGE UP
WBC # BLD: 7.1 K/UL — SIGNIFICANT CHANGE UP (ref 3.8–10.5)
WBC # FLD AUTO: 7.1 K/UL — SIGNIFICANT CHANGE UP (ref 3.8–10.5)
WBC UR QL: >50

## 2018-03-05 PROCEDURE — 71045 X-RAY EXAM CHEST 1 VIEW: CPT | Mod: 26

## 2018-03-05 RX ORDER — ACETAMINOPHEN 500 MG
650 TABLET ORAL ONCE
Qty: 0 | Refills: 0 | Status: COMPLETED | OUTPATIENT
Start: 2018-03-05 | End: 2018-03-05

## 2018-03-05 RX ORDER — LANOLIN ALCOHOL/MO/W.PET/CERES
3 CREAM (GRAM) TOPICAL AT BEDTIME
Qty: 0 | Refills: 0 | Status: DISCONTINUED | OUTPATIENT
Start: 2018-03-05 | End: 2018-03-12

## 2018-03-05 RX ORDER — VANCOMYCIN HCL 1 G
VIAL (EA) INTRAVENOUS
Qty: 0 | Refills: 0 | Status: DISCONTINUED | OUTPATIENT
Start: 2018-03-05 | End: 2018-03-05

## 2018-03-05 RX ORDER — MEROPENEM 1 G/30ML
500 INJECTION INTRAVENOUS EVERY 12 HOURS
Qty: 0 | Refills: 0 | Status: DISCONTINUED | OUTPATIENT
Start: 2018-03-05 | End: 2018-03-05

## 2018-03-05 RX ORDER — VANCOMYCIN HCL 1 G
VIAL (EA) INTRAVENOUS
Qty: 0 | Refills: 0 | Status: DISCONTINUED | OUTPATIENT
Start: 2018-03-05 | End: 2018-03-06

## 2018-03-05 RX ORDER — VANCOMYCIN HCL 1 G
1000 VIAL (EA) INTRAVENOUS ONCE
Qty: 0 | Refills: 0 | Status: DISCONTINUED | OUTPATIENT
Start: 2018-03-05 | End: 2018-03-05

## 2018-03-05 RX ORDER — VANCOMYCIN HCL 1 G
1000 VIAL (EA) INTRAVENOUS EVERY 24 HOURS
Qty: 0 | Refills: 0 | Status: DISCONTINUED | OUTPATIENT
Start: 2018-03-06 | End: 2018-03-06

## 2018-03-05 RX ORDER — VANCOMYCIN HCL 1 G
1000 VIAL (EA) INTRAVENOUS ONCE
Qty: 0 | Refills: 0 | Status: COMPLETED | OUTPATIENT
Start: 2018-03-05 | End: 2018-03-05

## 2018-03-05 RX ORDER — MEROPENEM 1 G/30ML
INJECTION INTRAVENOUS
Qty: 0 | Refills: 0 | Status: DISCONTINUED | OUTPATIENT
Start: 2018-03-05 | End: 2018-03-05

## 2018-03-05 RX ORDER — VANCOMYCIN HCL 1 G
1000 VIAL (EA) INTRAVENOUS EVERY 12 HOURS
Qty: 0 | Refills: 0 | Status: DISCONTINUED | OUTPATIENT
Start: 2018-03-05 | End: 2018-03-05

## 2018-03-05 RX ORDER — MEROPENEM 1 G/30ML
500 INJECTION INTRAVENOUS ONCE
Qty: 0 | Refills: 0 | Status: COMPLETED | OUTPATIENT
Start: 2018-03-05 | End: 2018-03-05

## 2018-03-05 RX ORDER — CEFEPIME 1 G/1
1000 INJECTION, POWDER, FOR SOLUTION INTRAMUSCULAR; INTRAVENOUS EVERY 12 HOURS
Qty: 0 | Refills: 0 | Status: COMPLETED | OUTPATIENT
Start: 2018-03-05 | End: 2018-03-07

## 2018-03-05 RX ADMIN — Medication 112 MICROGRAM(S): at 06:53

## 2018-03-05 RX ADMIN — MIRTAZAPINE 7.5 MILLIGRAM(S): 45 TABLET, ORALLY DISINTEGRATING ORAL at 21:23

## 2018-03-05 RX ADMIN — Medication 100 MILLIGRAM(S): at 06:00

## 2018-03-05 RX ADMIN — HEPARIN SODIUM 5000 UNIT(S): 5000 INJECTION INTRAVENOUS; SUBCUTANEOUS at 06:01

## 2018-03-05 RX ADMIN — PANTOPRAZOLE SODIUM 40 MILLIGRAM(S): 20 TABLET, DELAYED RELEASE ORAL at 17:24

## 2018-03-05 RX ADMIN — Medication 1: at 17:23

## 2018-03-05 RX ADMIN — ATORVASTATIN CALCIUM 20 MILLIGRAM(S): 80 TABLET, FILM COATED ORAL at 21:23

## 2018-03-05 RX ADMIN — Medication 25 MILLIGRAM(S): at 15:54

## 2018-03-05 RX ADMIN — Medication 25 MILLIGRAM(S): at 21:23

## 2018-03-05 RX ADMIN — Medication 250 MILLIGRAM(S): at 08:11

## 2018-03-05 RX ADMIN — PANTOPRAZOLE SODIUM 40 MILLIGRAM(S): 20 TABLET, DELAYED RELEASE ORAL at 06:01

## 2018-03-05 RX ADMIN — Medication 3 MILLIGRAM(S): at 22:57

## 2018-03-05 RX ADMIN — Medication 650 MILLIGRAM(S): at 06:01

## 2018-03-05 RX ADMIN — Medication 25 MILLIGRAM(S): at 08:10

## 2018-03-05 RX ADMIN — CEFEPIME 100 MILLIGRAM(S): 1 INJECTION, POWDER, FOR SOLUTION INTRAMUSCULAR; INTRAVENOUS at 17:23

## 2018-03-05 RX ADMIN — Medication 100 MILLIGRAM(S): at 17:24

## 2018-03-05 RX ADMIN — HEPARIN SODIUM 5000 UNIT(S): 5000 INJECTION INTRAVENOUS; SUBCUTANEOUS at 17:24

## 2018-03-05 RX ADMIN — MEROPENEM 100 MILLIGRAM(S): 1 INJECTION INTRAVENOUS at 07:14

## 2018-03-05 RX ADMIN — INSULIN GLARGINE 15 UNIT(S): 100 INJECTION, SOLUTION SUBCUTANEOUS at 22:43

## 2018-03-05 RX ADMIN — OXYCODONE AND ACETAMINOPHEN 1 TABLET(S): 5; 325 TABLET ORAL at 00:25

## 2018-03-05 RX ADMIN — Medication 1 MILLIGRAM(S): at 08:10

## 2018-03-05 RX ADMIN — Medication 1: at 08:15

## 2018-03-05 NOTE — PROVIDER CONTACT NOTE (OTHER) - ACTION/TREATMENT ORDERED:
routine consult for left hip pain. patient had hip sx in January. s/w [facility] and Dr. Thacker
MD. responded ordered For BMP, Meropenem 500mg IV L1phvdmd and first dose now. He will see patient soon.
no further orders per dr. gutierrez

## 2018-03-05 NOTE — PROGRESS NOTE ADULT - SUBJECTIVE AND OBJECTIVE BOX
Patient is a 77y old  Female who presents with a chief complaint of fatigue, low hbg.     HPI: 77 year old female PMH of CHF( diastolic dysfn) last exacerbation 2018 ( pt has mems implant), COPD ( never intubated, but on home O2), HTN, HLD, CAD ( on plavix and aspirin), T2DM on insulin, CKD , chronic UTI on Hiprex, lobectomy of lung for precancerous cells  s/p IM nailing of Left hip fracture after fall in , recent ORIF of left hip due to hardware malfunctioning and was sent to rehab on coumadin sent in from rehab yesterday for symptomatic anemia. Pt was transfused with PRBC since admission and she was noted to be bradycardic today.  She was c/o mild left chest pain yesterday that was very brief without any radiation or recurrence.    3/3- No CP/SOB this AM. Feels cold/tired. Hgb 8.7 today.     3/5- HR in 60s. No CP/SOB. Pt upset/crying and wants to go home. No events last pm.     PAST MEDICAL & SURGICAL HISTORY:  [place] (hard of hearing): hearing aid  Anxiety  Hypothyroid  GERD (gastroesophageal reflux disease)  CKD (chronic kidney disease)  MI, old: x3  Chronic UTI  Chronic diastolic congestive heart failure: last exacerbation 2017  Hyperlipidemia, unspecified hyperlipidemia type  Essential hypertension  Neuropathy  Diabetes  Chronic obstructive pulmonary disease, unspecified COPD type  History of cataract surgery: bilateral IOL  S/P lobectomy of lung: left lung pre-cancerous  History of ear surgery  History of cholecystectomy  S/P ORIF (open reduction internal fixation) fracture: left hip 2017  History of total knee replacement, unspecified laterality: bilateral right 2008 left   H/O hernia repair: umbilical and incisional   delivery delivered  S/P appendectomy    MEDICATIONS  (STANDING):  atorvastatin 20 milliGRAM(s) Oral at bedtime  dextrose 5%. 1000 milliLiter(s) (50 mL/Hr) IV Continuous <Continuous>  dextrose 50% Injectable 12.5 Gram(s) IV Push once  diltiazem    milliGRAM(s) Oral daily  docusate sodium 100 milliGRAM(s) Oral two times a day  folic acid 1 milliGRAM(s) Oral daily  insulin glargine Injectable (LANTUS) 15 Unit(s) SubCutaneous at bedtime  insulin lispro (HumaLOG) corrective regimen sliding scale   SubCutaneous three times a day before meals  levothyroxine 112 MICROGram(s) Oral daily  mirtazapine 7.5 milliGRAM(s) Oral at bedtime  pantoprazole  Injectable 40 milliGRAM(s) IV Push every 12 hours  sodium chloride 0.9%. 1000 milliLiter(s) (75 mL/Hr) IV Continuous <Continuous>  tiotropium 18 MICROgram(s) Capsule 1 Capsule(s) Inhalation at bedtime    MEDICATIONS  (PRN):  aluminum hydroxide/magnesium hydroxide/simethicone Suspension 30 milliLiter(s) Oral every 4 hours PRN Dyspepsia  dextrose Gel 1 Dose(s) Oral once PRN Blood Glucose LESS THAN 70 milliGRAM(s)/deciliter  glucagon  Injectable 1 milliGRAM(s) IntraMuscular once PRN Glucose LESS THAN 70 milligrams/deciliter  ondansetron Injectable 4 milliGRAM(s) IV Push every 6 hours PRN Nausea  oxyCODONE    5 mG/acetaminophen 325 mG 1 Tablet(s) Oral every 6 hours PRN Moderate Pain (4 - 6)      FAMILY HISTORY:  Family history of CHF (congestive heart failure) (Mother)      SOCIAL HISTORY:  ex smoker    REVIEW OF SYSTEMS:  CONSTITUTIONAL:  No night sweats.  c/o fatigue, malaise.  HEENT:  Eyes:  No visual changes.  No eye pain.      ENT:  No runny nose.  No epistaxis.  No sinus pain.  No sore throat.  No odynophagia.  No ear pain.  No congestion.  RESPIRATORY:  No cough.  No wheeze.  No hemoptysis.  No shortness of breath.  CARDIOVASCULAR:  No chest pains.  No palpitations. No shortness of breath, No orthopnea or PND.  GASTROINTESTINAL:  No abdominal pain.  No nausea or vomiting.  No diarrhea or constipation.  No hematemesis.  No hematochezia.  No melena.  GENITOURINARY:  No urgency.  No frequency.  No dysuria.  No hematuria.  No obstructive symptoms.  No discharge.  No pain.  No significant abnormal bleeding.  MUSCULOSKELETAL:  No musculoskeletal pain.  No joint swelling.  No arthritis.  NEUROLOGICAL:  No tingling or numbness or weakness.  PSYCHIATRIC:  No confusion  SKIN:  No rashes.  No lesions.  No wounds.    Vital Signs Last 24 Hrs  T(C): 36.3 (2018 11:33), Max: 36.9 (2018 01:20)  T(F): 97.3 (2018 11:33), Max: 98.5 (2018 01:20)  HR: 63 (2018 11:) (63 - 87)  BP: 155/38 (2018 11:33) (98/46 - 155/38)  BP(mean): --  RR: 17 (2018 11:) (15 - 20)  SpO2: 100% (2018 11:) (98% - 100%)    PHYSICAL EXAM-    Constitutional: ill looking frail elderly female in no acute distress.    Head: Head is normocephalic and atraumatic.      Neck: The patient's neck is supple without enlargement, has no palpable thyromegaly nor thyroid nodules and has no jugular venous distention. No audible carotid bruits. There are strong carotid pulses bilaterally. No JVD.     Cardiovascular: Regular rate and rhythm without S3, S4. No murmurs or rubs are appreciated.      Respiratory: Breath sounds are normal. No rales. No wheezing.    Abdomen: Soft, nontender, nondistended with positive bowel sounds.      Extremity: No tenderness. No  pitting edema No skin discoloration No clubbing No cyanosis.     INTERPRETATION OF TELEMETRY: sinus rythm with one brief fraction of second bradycardia down to 32/min but not persistent.    ECG: sinus rythm, normal axis, no st t changes.     I&O's Detail    2018 07:01  -  2018 07:00  --------------------------------------------------------  IN:    Packed Red Blood Cells: 291 mL  Total IN: 291 mL    OUT:  Total OUT: 0 mL    Total NET: 291 mL    LABS:                        7.8    10.1  )-----------( 499      ( 2018 09:08 )             23.5         143  |  111<H>  |  81<H>  ----------------------------<  182<H>  4.6   |  23  |  2.05<H>    Ca    7.8<L>      2018 09:08    TPro  5.9<L>  /  Alb  2.4<L>  /  TBili  0.3  /  DBili  x   /  AST  19  /  ALT  15  /  AlkPhos  141<H>      PT/INR - ( 2018 09:08 )   PT: 32.3 sec;   INR: 2.93 ratio      PTT - ( 2018 18:15 )  PTT:37.8 sec  Urinalysis Basic - ( 2018 18:15 )    Color: Yellow / Appearance: Slightly Turbid / S.010 / pH: x  Gluc: x / Ketone: Negative  / Bili: Negative / Urobili: Negative mg/dL   Blood: x / Protein: 15 mg/dL / Nitrite: Positive   Leuk Esterase: Moderate / RBC: 3-5 /HPF / WBC 11-25   Sq Epi: x / Non Sq Epi: Few / Bacteria: Many    I&O's Summary    2018 07:01  -  2018 07:00  --------------------------------------------------------  IN: 291 mL / OUT: 0 mL / NET: 291 mL      BNP  RADIOLOGY & ADDITIONAL STUDIES: < from: Xray Chest 1 View-PORTABLE IMMEDIATE (18 @ 08:20) >    IMPRESSION:  Some infiltration and/or atelectatic changes in the left lower lobe.      < end of copied text >

## 2018-03-05 NOTE — CHART NOTE - NSCHARTNOTEFT_GEN_A_CORE
Code sepsis called by nurse for fever to 101.2 F. Pt is feeling well overall, denying SOB, burning w/ urination, abdominal pain, etc.    Vital Signs Last 24 Hrs  T(C): 35.9 (03-04-18 @ 23:48)  T(F): 96.7 (03-04-18 @ 23:48), Max: 97.5 (03-04-18 @ 16:37)  HR: 59 (03-05-18 @ 00:00) (54 - 85)  BP: 169/36 (03-05-18 @ 00:00)  BP(mean): 70 (03-05-18 @ 00:00) (54 - 141)  RR: 13 (03-05-18 @ 00:00) (11 - 19)  SpO2: 100% (03-04-18 @ 22:00) (93% - 100%)  Wt(kg): --    ROS as above    Physical Exam  Gen - AOx3  Card - clear S1/S2, RRR  Resp- CTA b/l, no rhonci/rales/crackles  Extremities - no clubbing/cyanosis    A/P: 77F w/ PMH of chronic diastolic chf, copd with chronic hypoxic respiratory failure (on home o2), osteoarthritis, anxiety, HTN, type 2 diabetes admitted for anemia w/ new onset fever. + history of UTIs in past.    f/u stat CXR  f/u UA  f/u Bcx  f/u CBC and lactic acid  ordered 1x vancomycin  Will hold fluids for now due to dCHF    Discussed w/ senior resident

## 2018-03-05 NOTE — PROGRESS NOTE ADULT - SUBJECTIVE AND OBJECTIVE BOX
3/5/18: Code sepsis called early this am for a fever of 102. Pt denies any pain or cough, but she is weepy.  States doesnt know what everyone is doing to her. No cp, sob,n/v      ROS: AS PER HPI OTHERWISE ALL OTHER SYSTEMS REVIEWED AND ARE NEGATIVE    Vital Signs Last 24 Hrs  T(C): 38.4 (05 Mar 2018 06:34), Max: 39.3 (05 Mar 2018 05:55)  T(F): 101.1 (05 Mar 2018 06:34), Max: 102.7 (05 Mar 2018 05:55)  HR: 86 (05 Mar 2018 05:55) (54 - 86)  BP: 178/40 (05 Mar 2018 05:55) (115/36 - 178/40)  BP(mean): 75 (05 Mar 2018 05:55) (54 - 141)  RR: 25 (05 Mar 2018 05:55) (12 - 25)  SpO2: 97% (05 Mar 2018 05:55) (93% - 100%)    GEN: A and O, NAD, mood sLABILE - WEEPY, CRYING  HEENT:   NC/AT  EOMI, no oropharyngeal lesions    NECK:   suppl    CV:  +S1, +S2, regular, no murmurs or rubs    RESP:   lungs clear to auscultation bilaterally, no wheezing, rales, rhonchi, good air entry bilaterally, DECREASED BS ANGELICA    GI:  abdomen soft, non-tender, non-distended, normal BS,  no abdominal masses, no palpable masses    RECTAL:  not examined    :  not examined    MSK:   normal muscle tone, no atrophy, no rigidity, no contractions    EXT:   no clubbing, no cyanosis, no edema, no calf pain, swelling or erythema, LEFT HIP INCISION C/D/I    VASCULAR:  pulses equal and symmetric in the upper and lower extremities    NEURO:  AAOX3, no focal neurological deficits, follows all commands, able to move extremities spontaneously    SKIN:  no ulcers, lesions or rashes                              8.7    6.1   )-----------( 304      ( 04 Mar 2018 07:01 )             27.1     03-05    143  |  115<H>  |  34<H>  ----------------------------<  151<H>  4.0   |  20<L>  |  1.28    Ca    8.2<L>      05 Mar 2018 07:09  Phos  3.7     03-04  Mg     1.8     03-04                  Lactate, Blood: 0.9 mmol/L (03-05 @ 07:09)        MEDICATIONS  (STANDING):  atorvastatin 20 milliGRAM(s) Oral at bedtime  dextrose 5%. 1000 milliLiter(s) (50 mL/Hr) IV Continuous <Continuous>  dextrose 50% Injectable 12.5 Gram(s) IV Push once  diltiazem    Tablet 60 milliGRAM(s) Oral every 8 hours  docusate sodium 100 milliGRAM(s) Oral two times a day  folic acid 1 milliGRAM(s) Oral daily  heparin  Injectable 5000 Unit(s) SubCutaneous every 12 hours  hydrALAZINE 25 milliGRAM(s) Oral every 8 hours  insulin glargine Injectable (LANTUS) 15 Unit(s) SubCutaneous at bedtime  insulin lispro (HumaLOG) corrective regimen sliding scale   SubCutaneous three times a day before meals  levothyroxine 112 MICROGram(s) Oral daily  meropenem  IVPB 500 milliGRAM(s) IV Intermittent every 12 hours  meropenem  IVPB      mirtazapine 7.5 milliGRAM(s) Oral at bedtime  pantoprazole  Injectable 40 milliGRAM(s) IV Push every 12 hours  tiotropium 18 MICROgram(s) Capsule 1 Capsule(s) Inhalation at bedtime  vancomycin  IVPB        MEDICATIONS  (PRN):  aluminum hydroxide/magnesium hydroxide/simethicone Suspension 30 milliLiter(s) Oral every 4 hours PRN Dyspepsia  dextrose Gel 1 Dose(s) Oral once PRN Blood Glucose LESS THAN 70 milliGRAM(s)/deciliter  glucagon  Injectable 1 milliGRAM(s) IntraMuscular once PRN Glucose LESS THAN 70 milligrams/deciliter  ondansetron Injectable 4 milliGRAM(s) IV Push every 6 hours PRN Nausea  oxyCODONE    5 mG/acetaminophen 325 mG 1 Tablet(s) Oral every 6 hours PRN Moderate Pain (4 - 6)                      CXR: ? LEFT LOWER INFILTRATE, SMALL EFFUSION - AWAIT FINAL RESULTS

## 2018-03-05 NOTE — CONSULT NOTE ADULT - CONSULT REASON
Anemia, GIbleed
DVT/PE prophylaxis, risk stratification and Anticoagulation Management
anemia and bradycardia
sp left anamika
fever

## 2018-03-05 NOTE — CONSULT NOTE ADULT - SUBJECTIVE AND OBJECTIVE BOX
Patient is a 77y old  Female who presents with a chief complaint of fatigue, low hb (2018 07:55)    HPI:  77 year old female PMH of CHF( diastolic dysfn),  COPD,  HTN, HLD, CAD, DM, CKD , chronic UTI, lobectomy of lung for precancerous cells  s/p IM nailing of Left hip fracture after fall in 2017, recent ORIF of left hip due to hardware malfunctioning admitted on  for evaluation of abnormal labs; of note the patient stated she feels good since admission, though she required blood transfusions. She had no specific complaints, though is a poor historian and history per medical record.              PMH: as above  PSH: as above  Meds: per reconcilation sheet, noted below  MEDICATIONS  (STANDING):  atorvastatin 20 milliGRAM(s) Oral at bedtime  cefepime  IVPB 1000 milliGRAM(s) IV Intermittent every 12 hours  dextrose 5%. 1000 milliLiter(s) (50 mL/Hr) IV Continuous <Continuous>  dextrose 50% Injectable 12.5 Gram(s) IV Push once  diltiazem    Tablet 60 milliGRAM(s) Oral every 8 hours  docusate sodium 100 milliGRAM(s) Oral two times a day  folic acid 1 milliGRAM(s) Oral daily  heparin  Injectable 5000 Unit(s) SubCutaneous every 12 hours  hydrALAZINE 25 milliGRAM(s) Oral every 8 hours  insulin glargine Injectable (LANTUS) 15 Unit(s) SubCutaneous at bedtime  insulin lispro (HumaLOG) corrective regimen sliding scale   SubCutaneous three times a day before meals  levothyroxine 112 MICROGram(s) Oral daily  mirtazapine 7.5 milliGRAM(s) Oral at bedtime  pantoprazole  Injectable 40 milliGRAM(s) IV Push every 12 hours  tiotropium 18 MICROgram(s) Capsule 1 Capsule(s) Inhalation at bedtime  vancomycin  IVPB        MEDICATIONS  (PRN):  aluminum hydroxide/magnesium hydroxide/simethicone Suspension 30 milliLiter(s) Oral every 4 hours PRN Dyspepsia  dextrose Gel 1 Dose(s) Oral once PRN Blood Glucose LESS THAN 70 milliGRAM(s)/deciliter  glucagon  Injectable 1 milliGRAM(s) IntraMuscular once PRN Glucose LESS THAN 70 milligrams/deciliter  ondansetron Injectable 4 milliGRAM(s) IV Push every 6 hours PRN Nausea  oxyCODONE    5 mG/acetaminophen 325 mG 1 Tablet(s) Oral every 6 hours PRN Moderate Pain (4 - 6)    Allergies    Bactrim (Other)  ciprofloxacin (Other (Mild))  clindamycin (Unknown)  ibuprofen (Unknown)  penicillins (Other)  sulfa drugs (Unknown)    Intolerances      Social: no smoking, no alcohol, no illegal drugs; no recent travel, no exposure to TB  FAMILY HISTORY:  Family history of CHF (congestive heart failure) (Mother)    ROS: unable to obtain secondary to patient medical condition   Vital Signs Last 24 Hrs  T(C): 36.7 (05 Mar 2018 12:00), Max: 39.3 (05 Mar 2018 05:55)  T(F): 98.1 (05 Mar 2018 12:00), Max: 102.7 (05 Mar 2018 05:55)  HR: 53 (05 Mar 2018 10:00) (47 - 86)  BP: 124/41 (05 Mar 2018 10:00) (115/36 - 178/40)  BP(mean): 61 (05 Mar 2018 10:00) (55 - 141)  RR: 15 (05 Mar 2018 10:00) (12 - 25)  SpO2: 100% (05 Mar 2018 10:00) (97% - 100%)  Daily     Daily Weight in k.9 (05 Mar 2018 06:41)  Constitutional: frail looking  HEENT: NC/AT, EOMI, PERRLA  Neck: supple  Respiratory: scattered coarse breath sounds  Cardiovascular: S1S2 regular, no murmurs  Abdomen: soft, not tender, not distended, positive BS  Genitourinary: deferred  Rectal: deferred  Musculoskeletal: no muscle tenderness, left hip with steristrips, nontender, no erythema  Neurological: moving all extremities, no focal deficits  Skin: no rashes                          9.0    7.1   )-----------( 240      ( 05 Mar 2018 10:39 )             27.1     03-05    143  |  115<H>  |  34<H>  ----------------------------<  151<H>  4.0   |  20<L>  |  1.28    Ca    8.2<L>      05 Mar 2018 07:09  Phos  3.7     03-04  Mg     1.8     03-04               Radiology:    Advanced directive addressed: full resuscitation

## 2018-03-05 NOTE — CONSULT NOTE ADULT - ASSESSMENT
77 year old female PMH of CHF( diastolic dysfn),  COPD,  HTN, HLD, CAD, DM, CKD , chronic UTI, lobectomy of lung for precancerous cells  s/p IM nailing of Left hip fracture after fall in 2017, recent ORIF of left hip due to hardware malfunctioning admitted on 2/26 for evaluation of abnormal labs; of note the patient stated she feels good since admission, though she required blood transfusions. She had no specific complaints, though is a poor historian and history per medical record.  1. Patient admitted with profound anemia, given blood transfusions; fever came on suddenly and patient gives no complaints  - panculture in progress, possible microaspiration, versus urinary source of infection? though no specific complaints, wound on left hip looks good  - will empirically start cefepime, discontinue meropenem  - can continue vancomycin as ordered  - check vancomycin trough prior to fourth dose   - serial cbc and monitor temperature   - iv hydration and supportive care   2. other issues:  CHF( diastolic dysfn),  COPD,  HTN, HLD, CAD, DM, CKD , chronic UTI, lobectomy of lung for precancerous cells  - per medicine

## 2018-03-05 NOTE — PROGRESS NOTE ADULT - ASSESSMENT
1. Anemia- pt was noted to have 3 angiodysplastic lesions that were cauterized. Hgb 3/4 was 8.7. No bleeding events reported by staff or pt. No further w/u as per GI.      2. Bradycardia- very brief episodes with good chronotropic competence. HR in 60s. Can send home on Cardizem CD 120mg QD. No need for PPM at this time. No symptoms of dizziness/syncope.    3. Chronic HFpEF- Has CardioMEMS. Euvolemic. Close monitoring of the volume status.  Off IVF and diuretics now.      4. CAD, s/p MI in past- last stress test about an yr ago did not reveal any ischemia. Continue statin. If ok with GI, would like to restart low dose ASA.     5. DVT proph, OOB as tolerated. Cont treatment of anxiety.     6. Outpt f/u in 1-2 weeks.

## 2018-03-06 LAB
ANION GAP SERPL CALC-SCNC: 6 MMOL/L — SIGNIFICANT CHANGE UP (ref 5–17)
BUN SERPL-MCNC: 35 MG/DL — HIGH (ref 7–23)
CALCIUM SERPL-MCNC: 8.1 MG/DL — LOW (ref 8.5–10.1)
CHLORIDE SERPL-SCNC: 115 MMOL/L — HIGH (ref 96–108)
CO2 SERPL-SCNC: 22 MMOL/L — SIGNIFICANT CHANGE UP (ref 22–31)
CREAT SERPL-MCNC: 1.54 MG/DL — HIGH (ref 0.5–1.3)
GLUCOSE BLDC GLUCOMTR-MCNC: 148 MG/DL — HIGH (ref 70–99)
GLUCOSE BLDC GLUCOMTR-MCNC: 177 MG/DL — HIGH (ref 70–99)
GLUCOSE BLDC GLUCOMTR-MCNC: 216 MG/DL — HIGH (ref 70–99)
GLUCOSE BLDC GLUCOMTR-MCNC: 248 MG/DL — HIGH (ref 70–99)
GLUCOSE SERPL-MCNC: 136 MG/DL — HIGH (ref 70–99)
HCT VFR BLD CALC: 26.7 % — LOW (ref 34.5–45)
HGB BLD-MCNC: 8.6 G/DL — LOW (ref 11.5–15.5)
MCHC RBC-ENTMCNC: 28.5 PG — SIGNIFICANT CHANGE UP (ref 27–34)
MCHC RBC-ENTMCNC: 32.2 GM/DL — SIGNIFICANT CHANGE UP (ref 32–36)
MCV RBC AUTO: 88.6 FL — SIGNIFICANT CHANGE UP (ref 80–100)
PLATELET # BLD AUTO: 226 K/UL — SIGNIFICANT CHANGE UP (ref 150–400)
POTASSIUM SERPL-MCNC: 4.1 MMOL/L — SIGNIFICANT CHANGE UP (ref 3.5–5.3)
POTASSIUM SERPL-SCNC: 4.1 MMOL/L — SIGNIFICANT CHANGE UP (ref 3.5–5.3)
RBC # BLD: 3.01 M/UL — LOW (ref 3.8–5.2)
RBC # FLD: 16 % — HIGH (ref 10.3–14.5)
SODIUM SERPL-SCNC: 143 MMOL/L — SIGNIFICANT CHANGE UP (ref 135–145)
WBC # BLD: 5.8 K/UL — SIGNIFICANT CHANGE UP (ref 3.8–10.5)
WBC # FLD AUTO: 5.8 K/UL — SIGNIFICANT CHANGE UP (ref 3.8–10.5)

## 2018-03-06 RX ORDER — INSULIN LISPRO 100/ML
2 VIAL (ML) SUBCUTANEOUS ONCE
Qty: 0 | Refills: 0 | Status: COMPLETED | OUTPATIENT
Start: 2018-03-06 | End: 2018-03-06

## 2018-03-06 RX ORDER — INSULIN LISPRO 100/ML
1 VIAL (ML) SUBCUTANEOUS ONCE
Qty: 0 | Refills: 0 | Status: DISCONTINUED | OUTPATIENT
Start: 2018-03-06 | End: 2018-03-06

## 2018-03-06 RX ADMIN — Medication 3 MILLIGRAM(S): at 22:14

## 2018-03-06 RX ADMIN — HEPARIN SODIUM 5000 UNIT(S): 5000 INJECTION INTRAVENOUS; SUBCUTANEOUS at 05:19

## 2018-03-06 RX ADMIN — MIRTAZAPINE 7.5 MILLIGRAM(S): 45 TABLET, ORALLY DISINTEGRATING ORAL at 22:12

## 2018-03-06 RX ADMIN — Medication 25 MILLIGRAM(S): at 05:19

## 2018-03-06 RX ADMIN — Medication 100 MILLIGRAM(S): at 05:19

## 2018-03-06 RX ADMIN — PANTOPRAZOLE SODIUM 40 MILLIGRAM(S): 20 TABLET, DELAYED RELEASE ORAL at 05:19

## 2018-03-06 RX ADMIN — CEFEPIME 100 MILLIGRAM(S): 1 INJECTION, POWDER, FOR SOLUTION INTRAMUSCULAR; INTRAVENOUS at 05:18

## 2018-03-06 RX ADMIN — Medication 100 MILLIGRAM(S): at 17:58

## 2018-03-06 RX ADMIN — Medication 25 MILLIGRAM(S): at 22:13

## 2018-03-06 RX ADMIN — CEFEPIME 100 MILLIGRAM(S): 1 INJECTION, POWDER, FOR SOLUTION INTRAMUSCULAR; INTRAVENOUS at 18:24

## 2018-03-06 RX ADMIN — Medication 1 MILLIGRAM(S): at 11:18

## 2018-03-06 RX ADMIN — Medication 2 UNIT(S): at 23:54

## 2018-03-06 RX ADMIN — Medication 1: at 17:57

## 2018-03-06 RX ADMIN — Medication 25 MILLIGRAM(S): at 14:50

## 2018-03-06 RX ADMIN — Medication 112 MICROGRAM(S): at 05:19

## 2018-03-06 RX ADMIN — INSULIN GLARGINE 15 UNIT(S): 100 INJECTION, SOLUTION SUBCUTANEOUS at 22:12

## 2018-03-06 RX ADMIN — Medication 2: at 08:17

## 2018-03-06 RX ADMIN — ATORVASTATIN CALCIUM 20 MILLIGRAM(S): 80 TABLET, FILM COATED ORAL at 22:10

## 2018-03-06 RX ADMIN — Medication 250 MILLIGRAM(S): at 06:40

## 2018-03-06 RX ADMIN — PANTOPRAZOLE SODIUM 40 MILLIGRAM(S): 20 TABLET, DELAYED RELEASE ORAL at 18:14

## 2018-03-06 RX ADMIN — TIOTROPIUM BROMIDE 1 CAPSULE(S): 18 CAPSULE ORAL; RESPIRATORY (INHALATION) at 08:23

## 2018-03-06 RX ADMIN — HEPARIN SODIUM 5000 UNIT(S): 5000 INJECTION INTRAVENOUS; SUBCUTANEOUS at 17:59

## 2018-03-06 NOTE — PROGRESS NOTE ADULT - SUBJECTIVE AND OBJECTIVE BOX
Patient is a 77y old  Female who presents with a chief complaint of fatigue, low hb (27 Feb 2018 07:55)    HPI:  77 year old female PMH of CHF( diastolic dysfn),  COPD,  HTN, HLD, CAD, DM, CKD , chronic UTI, lobectomy of lung for precancerous cells  s/p IM nailing of Left hip fracture after fall in 2017, recent ORIF of left hip due to hardware malfunctioning admitted on 2/26 for evaluation of abnormal labs; of note the patient stated she feels good since admission, though she required blood transfusions. She had no specific complaints, though is a poor historian and history per medical record. (initial history for consult)       Date of service: 03-06-18 @ 10:10  Saw patient on 3/6/18; afebrile, has no complaints  Medications and Vitals reviewed on 3/6/18    MEDICATIONS  (STANDING):  atorvastatin 20 milliGRAM(s) Oral at bedtime  cefepime  IVPB 1000 milliGRAM(s) IV Intermittent every 12 hours  dextrose 5%. 1000 milliLiter(s) (50 mL/Hr) IV Continuous <Continuous>  dextrose 50% Injectable 12.5 Gram(s) IV Push once  diltiazem    Tablet 60 milliGRAM(s) Oral every 8 hours  docusate sodium 100 milliGRAM(s) Oral two times a day  folic acid 1 milliGRAM(s) Oral daily  heparin  Injectable 5000 Unit(s) SubCutaneous every 12 hours  hydrALAZINE 25 milliGRAM(s) Oral every 8 hours  insulin glargine Injectable (LANTUS) 15 Unit(s) SubCutaneous at bedtime  insulin lispro (HumaLOG) corrective regimen sliding scale   SubCutaneous three times a day before meals  levothyroxine 112 MICROGram(s) Oral daily  mirtazapine 7.5 milliGRAM(s) Oral at bedtime  pantoprazole  Injectable 40 milliGRAM(s) IV Push every 12 hours  tiotropium 18 MICROgram(s) Capsule 1 Capsule(s) Inhalation at bedtime  vancomycin  IVPB 1000 milliGRAM(s) IV Intermittent every 24 hours  vancomycin  IVPB        MEDICATIONS  (PRN):  aluminum hydroxide/magnesium hydroxide/simethicone Suspension 30 milliLiter(s) Oral every 4 hours PRN Dyspepsia  dextrose Gel 1 Dose(s) Oral once PRN Blood Glucose LESS THAN 70 milliGRAM(s)/deciliter  glucagon  Injectable 1 milliGRAM(s) IntraMuscular once PRN Glucose LESS THAN 70 milligrams/deciliter  melatonin 3 milliGRAM(s) Oral at bedtime PRN Insomnia  ondansetron Injectable 4 milliGRAM(s) IV Push every 6 hours PRN Nausea  oxyCODONE    5 mG/acetaminophen 325 mG 1 Tablet(s) Oral every 6 hours PRN Moderate Pain (4 - 6)      Vital Signs Last 24 Hrs  T(C): 36.9 (06 Mar 2018 05:14), Max: 37.6 (05 Mar 2018 18:53)  T(F): 98.4 (06 Mar 2018 05:14), Max: 99.7 (05 Mar 2018 18:53)  HR: 66 (06 Mar 2018 08:15) (60 - 79)  BP: 133/33 (06 Mar 2018 05:14) (121/45 - 162/44)  BP(mean): 75 (05 Mar 2018 18:00) (75 - 75)  RR: 16 (06 Mar 2018 05:14) (14 - 16)  SpO2: 94% (06 Mar 2018 05:14) (94% - 100%)    Physical Exam:        Constitutional: frail looking  HEENT: NC/AT, EOMI, PERRLA  Neck: supple  Respiratory: scattered coarse breath sounds  Cardiovascular: S1S2 regular, no murmurs  Abdomen: soft, not tender, not distended, positive BS  Genitourinary: deferred  Rectal: deferred  Musculoskeletal: no muscle tenderness, left hip with steristrips, nontender, no erythema  Neurological: moving all extremities, no focal deficits  Skin: no rashes            Labs:                        8.6    5.8   )-----------( 226      ( 06 Mar 2018 05:32 )             26.7     03-06    143  |  115<H>  |  35<H>  ----------------------------<  136<H>  4.1   |  22  |  1.54<H>    Ca    8.1<L>      06 Mar 2018 05:32         All labs reviewed by me on 3/6/18    Cultures:             Radiology:< from: Xray Chest 1 View AP/PA. (03.05.18 @ 07:11) >    EXAM:  XR CHEST AP OR PA 1V                            PROCEDURE DATE:  03/05/2018          INTERPRETATION:  Exam Date: 3/5/2018 7:11 AM    Chest radiograph (one view)         CLINICAL INFORMATION:  code sepsis    TECHNIQUE:  Single frontal view ofthe chest was obtained.    COMPARISON: 1/31/2018    FINDINGS/  IMPRESSION:          No focal consolidation. Tiny left pleural effusion cannot be excluded.    Cardiomegaly.      < end of copied text >      Advanced directive addressed: full resuscitation

## 2018-03-06 NOTE — PROGRESS NOTE ADULT - ASSESSMENT
1. Patient admitted with profound anemia, given blood transfusions; fever came on suddenly and patient gives no complaints  - panculture in progress, possible microaspiration, versus urinary source of infection? though no specific complaints, wound on left hip looks good  - day #2 cefepime and vancomycin  - tolerating antibiotics without rashes or side effects   - will stop vancomycin, with mild increase in creatinine  - expect possibly another 24 hours antibiotics  - serial cbc and monitor temperature   - iv hydration and supportive care   2. other issues:  CHF( diastolic dysfn),  COPD,  HTN, HLD, CAD, DM, CKD , chronic UTI, lobectomy of lung for precancerous cells  - per medicine

## 2018-03-06 NOTE — PROGRESS NOTE ADULT - ASSESSMENT
Anemia- pt was noted to have 3 angiodysplastic lesions that were cauterized during this admission.  GI input appreciated.     Chronic HFpEF- Has CardioMEMS- will check her readings since she had the device now to transmit readings with her.  This has to be done daily.  Faint crackles noted today and would recommend close monitoring of her volume status.  Her creatinine is worse compared to yesterday in the absence of diuretics.   close monitoring of the renal function and electrolytes.  Goal potassium of 4 and magnesium of 2.     CAD, s/p MI in past- last stress test about an yr ago did not reveal any ischemia.  Continue statin. Antiplatelet agents on hold.      Other medical issues- Management per primary team.   Thank you for allowing me to participate in the care of this patient. Please feel free to contact me with any questions.

## 2018-03-06 NOTE — PROGRESS NOTE ADULT - SUBJECTIVE AND OBJECTIVE BOX
Patient is a 77y old  Female who presents with a chief complaint of fatigue, low hbg.     HPI:  77 year old female PMH of CHF( diastolic dysfn) last exacerbation 2018 ( pt has mems implant), COPD ( never intubated, but on home O2), HTN, HLD, CAD ( on plavix and aspirin), T2DM on insulin, CKD , chronic UTI on Hiprex, lobectomy of lung for precancerous cells  s/p IM nailing of Left hip fracture after fall in , recent ORIF of left hip due to hardware malfunctioning and was sent to rehab on coumadin sent in from rehab yesterday for symptomatic anemia.    Pt was transfused with PRBC since admission and she was noted to be bradycardic today.  She was c/o mild left chest pain yesterday that was very brief without any radiation or recurrence.    18- pt seen and examined by me today. Pt still c/o fatigue.  Denies any CP or SOB.  no events overnight.  3/1- pt seen and examined this am, Denies any CP or SOB.  No overnight events.   3/2- Pt seen and examined by me today.  She is sleeping but arousable.  Denies any CP or SOB this am.    3/6- pt seen and examined by me this am. She denies any CP or SOB.  She was tearful yesterday.        PAST MEDICAL & SURGICAL HISTORY:  [place] (hard of hearing): hearing aid  Anxiety  Hypothyroid  GERD (gastroesophageal reflux disease)  CKD (chronic kidney disease)  MI, old: x3  Chronic UTI  Chronic diastolic congestive heart failure: last exacerbation 2017  Hyperlipidemia, unspecified hyperlipidemia type  Essential hypertension  Neuropathy  Diabetes  Chronic obstructive pulmonary disease, unspecified COPD type  History of cataract surgery: bilateral IOL  S/P lobectomy of lung: left lung pre-cancerous  History of ear surgery  History of cholecystectomy  S/P ORIF (open reduction internal fixation) fracture: left hip 2017  History of total knee replacement, unspecified laterality: bilateral right  left   H/O hernia repair: umbilical and incisional   delivery delivered  S/P appendectomy      MEDICATIONS  (STANDING):  atorvastatin 20 milliGRAM(s) Oral at bedtime  dextrose 5%. 1000 milliLiter(s) (50 mL/Hr) IV Continuous <Continuous>  dextrose 50% Injectable 12.5 Gram(s) IV Push once  diltiazem    milliGRAM(s) Oral daily  docusate sodium 100 milliGRAM(s) Oral two times a day  folic acid 1 milliGRAM(s) Oral daily  insulin glargine Injectable (LANTUS) 15 Unit(s) SubCutaneous at bedtime  insulin lispro (HumaLOG) corrective regimen sliding scale   SubCutaneous three times a day before meals  levothyroxine 112 MICROGram(s) Oral daily  mirtazapine 7.5 milliGRAM(s) Oral at bedtime  pantoprazole  Injectable 40 milliGRAM(s) IV Push every 12 hours  sodium chloride 0.9%. 1000 milliLiter(s) (75 mL/Hr) IV Continuous <Continuous>  tiotropium 18 MICROgram(s) Capsule 1 Capsule(s) Inhalation at bedtime    MEDICATIONS  (PRN):  aluminum hydroxide/magnesium hydroxide/simethicone Suspension 30 milliLiter(s) Oral every 4 hours PRN Dyspepsia  dextrose Gel 1 Dose(s) Oral once PRN Blood Glucose LESS THAN 70 milliGRAM(s)/deciliter  glucagon  Injectable 1 milliGRAM(s) IntraMuscular once PRN Glucose LESS THAN 70 milligrams/deciliter  ondansetron Injectable 4 milliGRAM(s) IV Push every 6 hours PRN Nausea  oxyCODONE    5 mG/acetaminophen 325 mG 1 Tablet(s) Oral every 6 hours PRN Moderate Pain (4 - 6)      FAMILY HISTORY:  Family history of CHF (congestive heart failure) (Mother)      SOCIAL HISTORY:  ex smoker    REVIEW OF SYSTEMS:  CONSTITUTIONAL:  No night sweats.  c/o fatigue, malaise.  HEENT:  Eyes:  No visual changes.  No eye pain.      ENT:  No runny nose.  No epistaxis.  No sinus pain.  No sore throat.  No odynophagia.  No ear pain.  No congestion.  RESPIRATORY:  No cough.  No wheeze.  No hemoptysis.  No shortness of breath.  CARDIOVASCULAR:  No chest pains.  No palpitations. No shortness of breath, No orthopnea or PND.  GASTROINTESTINAL:  No abdominal pain.  No nausea or vomiting.  No diarrhea or constipation.  No hematemesis.  No hematochezia.  No melena.  GENITOURINARY:  No urgency.  No frequency.  No dysuria.  No hematuria.  No obstructive symptoms.  No discharge.  No pain.  No significant abnormal bleeding.  MUSCULOSKELETAL:  No musculoskeletal pain.  No joint swelling.  No arthritis.  NEUROLOGICAL:  No tingling or numbness or weakness.  PSYCHIATRIC:  No confusion  SKIN:  No rashes.  No lesions.  No wounds.  ENDOCRINE:  No unexplained weight loss.  No polydipsia.  No polyuria.  No polyphagia.  HEMATOLOGIC:  No anemia.  No purpura.  No petechiae.  No prolonged or excessive bleeding.   ALLERGIC AND IMMUNOLOGIC:  No pruritus.  No swelling.         Vital Signs Last 24 Hrs  T(C): 36.3 (2018 11:33), Max: 36.9 (2018 01:20)  T(F): 97.3 (2018 11:33), Max: 98.5 (2018 01:20)  HR: 63 (:) (63 - 87)  BP: 155/38 (2018 11:33) (98/46 - 155/38)  BP(mean): --  RR: 17 (2018 11:33) (15 - 20)  SpO2: 100% (:) (98% - 100%)    PHYSICAL EXAM-    Constitutional: ill looking frail elderly female in no acute distress.    Head: Head is normocephalic and atraumatic.      Neck: The patient's neck is supple without enlargement, has no palpable thyromegaly nor thyroid nodules and has no jugular venous distention. No audible carotid bruits. There are strong carotid pulses bilaterally. No JVD.     Cardiovascular: Regular rate and rhythm without S3, S4. No murmurs or rubs are appreciated.      Respiratory: faint rales b/l basal.  Abdomen: Soft, nontender, nondistended with positive bowel sounds.      Extremity: No tenderness. No  pitting edema No skin discoloration No clubbing No cyanosis.     Neurologic: The patient is alert and oriented.      Skin: No rash, no obvious lesions noted.      Psychiatric: The patient appears to be emotionally stable.      INTERPRETATION OF TELEMETRY: sinus rythm     ECG: sinus rythm, normal axis, no st t changes.     I&O's Detail    2018 07:01  -  2018 07:00  --------------------------------------------------------  IN:    Packed Red Blood Cells: 291 mL  Total IN: 291 mL    OUT:  Total OUT: 0 mL    Total NET: 291 mL          LABS:                        7.8    10.1  )-----------( 499      ( 2018 09:08 )             23.5         143  |  111<H>  |  81<H>  ----------------------------<  182<H>  4.6   |  23  |  2.05<H>    Ca    7.8<L>      2018 09:08    TPro  5.9<L>  /  Alb  2.4<L>  /  TBili  0.3  /  DBili  x   /  AST  19  /  ALT  15  /  AlkPhos  141<H>          PT/INR - ( 2018 09:08 )   PT: 32.3 sec;   INR: 2.93 ratio         PTT - ( 2018 18:15 )  PTT:37.8 sec  Urinalysis Basic - ( 2018 18:15 )    Color: Yellow / Appearance: Slightly Turbid / S.010 / pH: x  Gluc: x / Ketone: Negative  / Bili: Negative / Urobili: Negative mg/dL   Blood: x / Protein: 15 mg/dL / Nitrite: Positive   Leuk Esterase: Moderate / RBC: 3-5 /HPF / WBC 11-25   Sq Epi: x / Non Sq Epi: Few / Bacteria: Many      I&O's Summary    2018 07:01  -  2018 07:00  --------------------------------------------------------  IN: 291 mL / OUT: 0 mL / NET: 291 mL      BNP  RADIOLOGY & ADDITIONAL STUDIES:

## 2018-03-06 NOTE — PROGRESS NOTE ADULT - SUBJECTIVE AND OBJECTIVE BOX
3/5/18: Code sepsis called early this am for a fever of 102. Pt denies any pain or cough, but she is weepy.  States doesnt know what everyone is doing to her. No cp, sob,n/v  3/6/18: feeling much better today; wants to get up and move today; denies any cp, sob, n/v/f/c; no urinary pain    ROS: AS PER HPI OTHERWISE ALL OTHER SYSTEMS REVIEWED AND ARE NEGATIVE    Vital Signs Last 24 Hrs  T(C): 37.3 (06 Mar 2018 16:34), Max: 37.3 (06 Mar 2018 16:34)  T(F): 99.2 (06 Mar 2018 16:34), Max: 99.2 (06 Mar 2018 16:34)  HR: 66 (06 Mar 2018 16:34) (62 - 67)  BP: 144/37 (06 Mar 2018 16:34) (133/33 - 154/39)  BP(mean): --  RR: 18 (06 Mar 2018 11:09) (16 - 18)  SpO2: 99% (06 Mar 2018 16:34) (94% - 100%)    GEN: A and O, NAD, mood stable  HEENT:   NC/AT  EOMI, no oropharyngeal lesions    NECK:   suppel    CV:  +S1, +S2, regular, no murmurs or rubs    RESP:   lungs clear to auscultation bilaterally, no wheezing, rales, rhonchi, good air entry bilaterally, DECREASED BS ANGELICA, SCATTERED CRACKLES AT BASES    GI:  abdomen soft, non-tender, non-distended, normal BS,  no abdominal masses, no palpable masses    RECTAL:  not examined    :  not examined    MSK:   normal muscle tone, no atrophy, no rigidity, no contractions    EXT:   no clubbing, no cyanosis, no edema, no calf pain, swelling or erythema, LEFT HIP INCISION C/D/I    VASCULAR:  pulses equal and symmetric in the upper and lower extremities    NEURO:  AAOX3, no focal neurological deficits, follows all commands, able to move extremities spontaneously    SKIN:  no ulcers, lesions or rashes    LABS                            8.6    5.8   )-----------( 226      ( 06 Mar 2018 05:32 )             26.7     03-06    143  |  115<H>  |  35<H>  ----------------------------<  136<H>  4.1   |  22  |  1.54<H>    Ca    8.1<L>      06 Mar 2018 05:32              Urinalysis Basic - ( 05 Mar 2018 21:10 )    Color: Yellow / Appearance: very cloudy / S.015 / pH: x  Gluc: x / Ketone: Negative  / Bili: Negative / Urobili: Negative mg/dL   Blood: x / Protein: 100 mg/dL / Nitrite: Positive   Leuk Esterase: Moderate / RBC: 6-10 /HPF / WBC >50   Sq Epi: x / Non Sq Epi: Moderate / Bacteria: Many          Blood, Urine: Moderate ( @ 21:10)        MEDICATIONS  (STANDING):  atorvastatin 20 milliGRAM(s) Oral at bedtime  cefepime  IVPB 1000 milliGRAM(s) IV Intermittent every 12 hours  dextrose 5%. 1000 milliLiter(s) (50 mL/Hr) IV Continuous <Continuous>  dextrose 50% Injectable 12.5 Gram(s) IV Push once  diltiazem    Tablet 60 milliGRAM(s) Oral every 8 hours  docusate sodium 100 milliGRAM(s) Oral two times a day  folic acid 1 milliGRAM(s) Oral daily  heparin  Injectable 5000 Unit(s) SubCutaneous every 12 hours  hydrALAZINE 25 milliGRAM(s) Oral every 8 hours  insulin glargine Injectable (LANTUS) 15 Unit(s) SubCutaneous at bedtime  insulin lispro (HumaLOG) corrective regimen sliding scale   SubCutaneous three times a day before meals  levothyroxine 112 MICROGram(s) Oral daily  mirtazapine 7.5 milliGRAM(s) Oral at bedtime  pantoprazole  Injectable 40 milliGRAM(s) IV Push every 12 hours  tiotropium 18 MICROgram(s) Capsule 1 Capsule(s) Inhalation at bedtime    MEDICATIONS  (PRN):  aluminum hydroxide/magnesium hydroxide/simethicone Suspension 30 milliLiter(s) Oral every 4 hours PRN Dyspepsia  dextrose Gel 1 Dose(s) Oral once PRN Blood Glucose LESS THAN 70 milliGRAM(s)/deciliter  glucagon  Injectable 1 milliGRAM(s) IntraMuscular once PRN Glucose LESS THAN 70 milligrams/deciliter  melatonin 3 milliGRAM(s) Oral at bedtime PRN Insomnia  ondansetron Injectable 4 milliGRAM(s) IV Push every 6 hours PRN Nausea  oxyCODONE    5 mG/acetaminophen 325 mG 1 Tablet(s) Oral every 6 hours PRN Moderate Pain (4 - 6)

## 2018-03-06 NOTE — PROGRESS NOTE ADULT - ASSESSMENT
Pt is a 76 y/o female with h/o chronic diastolic chf, copd with chronic hypoxic respiratory failure (on home o2), osteoarthritis, anxiety, HTN, type 2 diabetes s/p fall and IM nailing of left hip months prior now s/p left THR who was admitted for profound anemia, then with fever    * Fever - UNCLER SOURCE - POSSIBLY FROM URINE, MAYBE MICROASPIRATION AS PER id; ON CEFEPIME, VACN DC'D AS CR INCREASED A BIT TODAY; NO FURTHER FEVERS  * Profound Anemia-no obvious source of bleeding, ? chronic GI loss, h/h improved after blood transfusion, monitor, endoscopic w/up per GI. 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  * Comm- d/w pt, RN, DR MEANS

## 2018-03-07 LAB
ANION GAP SERPL CALC-SCNC: 7 MMOL/L — SIGNIFICANT CHANGE UP (ref 5–17)
BUN SERPL-MCNC: 34 MG/DL — HIGH (ref 7–23)
CALCIUM SERPL-MCNC: 8.1 MG/DL — LOW (ref 8.5–10.1)
CHLORIDE SERPL-SCNC: 113 MMOL/L — HIGH (ref 96–108)
CO2 SERPL-SCNC: 22 MMOL/L — SIGNIFICANT CHANGE UP (ref 22–31)
CREAT SERPL-MCNC: 1.45 MG/DL — HIGH (ref 0.5–1.3)
GLUCOSE BLDC GLUCOMTR-MCNC: 168 MG/DL — HIGH (ref 70–99)
GLUCOSE BLDC GLUCOMTR-MCNC: 170 MG/DL — HIGH (ref 70–99)
GLUCOSE BLDC GLUCOMTR-MCNC: 196 MG/DL — HIGH (ref 70–99)
GLUCOSE BLDC GLUCOMTR-MCNC: 210 MG/DL — HIGH (ref 70–99)
GLUCOSE SERPL-MCNC: 170 MG/DL — HIGH (ref 70–99)
HCT VFR BLD CALC: 25.1 % — LOW (ref 34.5–45)
HGB BLD-MCNC: 8.1 G/DL — LOW (ref 11.5–15.5)
MCHC RBC-ENTMCNC: 28.9 PG — SIGNIFICANT CHANGE UP (ref 27–34)
MCHC RBC-ENTMCNC: 32.3 GM/DL — SIGNIFICANT CHANGE UP (ref 32–36)
MCV RBC AUTO: 89.6 FL — SIGNIFICANT CHANGE UP (ref 80–100)
NRBC # BLD: 0 /100 WBCS — SIGNIFICANT CHANGE UP (ref 0–0)
PLATELET # BLD AUTO: 192 K/UL — SIGNIFICANT CHANGE UP (ref 150–400)
POTASSIUM SERPL-MCNC: 4.1 MMOL/L — SIGNIFICANT CHANGE UP (ref 3.5–5.3)
POTASSIUM SERPL-SCNC: 4.1 MMOL/L — SIGNIFICANT CHANGE UP (ref 3.5–5.3)
RBC # BLD: 2.8 M/UL — LOW (ref 3.8–5.2)
RBC # FLD: 17 % — HIGH (ref 10.3–14.5)
SODIUM SERPL-SCNC: 142 MMOL/L — SIGNIFICANT CHANGE UP (ref 135–145)
WBC # BLD: 5.42 K/UL — SIGNIFICANT CHANGE UP (ref 3.8–10.5)
WBC # FLD AUTO: 5.42 K/UL — SIGNIFICANT CHANGE UP (ref 3.8–10.5)

## 2018-03-07 RX ADMIN — Medication 1: at 17:44

## 2018-03-07 RX ADMIN — Medication 1: at 08:41

## 2018-03-07 RX ADMIN — Medication 25 MILLIGRAM(S): at 13:09

## 2018-03-07 RX ADMIN — Medication 112 MICROGRAM(S): at 05:19

## 2018-03-07 RX ADMIN — Medication 100 MILLIGRAM(S): at 17:56

## 2018-03-07 RX ADMIN — CEFEPIME 100 MILLIGRAM(S): 1 INJECTION, POWDER, FOR SOLUTION INTRAMUSCULAR; INTRAVENOUS at 05:18

## 2018-03-07 RX ADMIN — HEPARIN SODIUM 5000 UNIT(S): 5000 INJECTION INTRAVENOUS; SUBCUTANEOUS at 17:56

## 2018-03-07 RX ADMIN — ONDANSETRON 4 MILLIGRAM(S): 8 TABLET, FILM COATED ORAL at 06:12

## 2018-03-07 RX ADMIN — TIOTROPIUM BROMIDE 1 CAPSULE(S): 18 CAPSULE ORAL; RESPIRATORY (INHALATION) at 07:55

## 2018-03-07 RX ADMIN — PANTOPRAZOLE SODIUM 40 MILLIGRAM(S): 20 TABLET, DELAYED RELEASE ORAL at 05:19

## 2018-03-07 RX ADMIN — INSULIN GLARGINE 15 UNIT(S): 100 INJECTION, SOLUTION SUBCUTANEOUS at 21:01

## 2018-03-07 RX ADMIN — Medication 25 MILLIGRAM(S): at 21:01

## 2018-03-07 RX ADMIN — Medication 100 MILLIGRAM(S): at 05:18

## 2018-03-07 RX ADMIN — ATORVASTATIN CALCIUM 20 MILLIGRAM(S): 80 TABLET, FILM COATED ORAL at 21:01

## 2018-03-07 RX ADMIN — CEFEPIME 100 MILLIGRAM(S): 1 INJECTION, POWDER, FOR SOLUTION INTRAMUSCULAR; INTRAVENOUS at 17:56

## 2018-03-07 RX ADMIN — Medication 1 MILLIGRAM(S): at 11:54

## 2018-03-07 RX ADMIN — MIRTAZAPINE 7.5 MILLIGRAM(S): 45 TABLET, ORALLY DISINTEGRATING ORAL at 21:01

## 2018-03-07 RX ADMIN — Medication 25 MILLIGRAM(S): at 05:18

## 2018-03-07 RX ADMIN — Medication 1: at 12:01

## 2018-03-07 RX ADMIN — PANTOPRAZOLE SODIUM 40 MILLIGRAM(S): 20 TABLET, DELAYED RELEASE ORAL at 17:56

## 2018-03-07 RX ADMIN — HEPARIN SODIUM 5000 UNIT(S): 5000 INJECTION INTRAVENOUS; SUBCUTANEOUS at 05:18

## 2018-03-07 NOTE — PROGRESS NOTE ADULT - ASSESSMENT
Anemia- pt was noted to have 3 angiodysplastic lesions that were cauterized during this admission.  GI input appreciated.     Bradycardia- overnight no significant events.  Currently on short acting cardizem for BP control.     Chronic HFpEF- Has CardioMEMS-Her CardioMEMS readings were not being transmitted. Advised staff.  Appears euvolemic on exam. Will continue to monitor off diuretics.    close monitoring of the renal function and electrolytes.  Goal potassium of 4 and magnesium of 2.     CAD, s/p MI in past- last stress test about an yr ago did not reveal any ischemia.  Continue statin. Antiplatelet agents on hold.    HTN -continue currrent meds.       Other medical issues- Management per primary team.   Thank you for allowing me to participate in the care of this patient. Please feel free to contact me with any questions.

## 2018-03-07 NOTE — PROGRESS NOTE ADULT - SUBJECTIVE AND OBJECTIVE BOX
Patient is a 77y old  Female who presents with a chief complaint of fatigue, low hbg.     HPI:  77 year old female PMH of CHF( diastolic dysfn) last exacerbation 2018 ( pt has mems implant), COPD ( never intubated, but on home O2), HTN, HLD, CAD ( on plavix and aspirin), T2DM on insulin, CKD , chronic UTI on Hiprex, lobectomy of lung for precancerous cells  s/p IM nailing of Left hip fracture after fall in , recent ORIF of left hip due to hardware malfunctioning and was sent to rehab on coumadin sent in from rehab yesterday for symptomatic anemia.    Pt was transfused with PRBC since admission and she was noted to be bradycardic today.  She was c/o mild left chest pain yesterday that was very brief without any radiation or recurrence.    18- pt seen and examined by me today. Pt still c/o fatigue.  Denies any CP or SOB.  no events overnight.  3/1- pt seen and examined this am, Denies any CP or SOB.  No overnight events.   3/2- Pt seen and examined by me today.  She is sleeping but arousable.  Denies any CP or SOB this am.    3/6- pt seen and examined by me this am. She denies any CP or SOB.  She was tearful yesterday.  3/7- Pt seen and examined, denies any symptoms this am.         PAST MEDICAL & SURGICAL HISTORY:  [place] (hard of hearing): hearing aid  Anxiety  Hypothyroid  GERD (gastroesophageal reflux disease)  CKD (chronic kidney disease)  MI, old: x3  Chronic UTI  Chronic diastolic congestive heart failure: last exacerbation 2017  Hyperlipidemia, unspecified hyperlipidemia type  Essential hypertension  Neuropathy  Diabetes  Chronic obstructive pulmonary disease, unspecified COPD type  History of cataract surgery: bilateral IOL  S/P lobectomy of lung: left lung pre-cancerous  History of ear surgery  History of cholecystectomy  S/P ORIF (open reduction internal fixation) fracture: left hip 2017  History of total knee replacement, unspecified laterality: bilateral right 2008 left   H/O hernia repair: umbilical and incisional   delivery delivered  S/P appendectomy      MEDICATIONS  (STANDING):  atorvastatin 20 milliGRAM(s) Oral at bedtime  dextrose 5%. 1000 milliLiter(s) (50 mL/Hr) IV Continuous <Continuous>  dextrose 50% Injectable 12.5 Gram(s) IV Push once  diltiazem    milliGRAM(s) Oral daily  docusate sodium 100 milliGRAM(s) Oral two times a day  folic acid 1 milliGRAM(s) Oral daily  insulin glargine Injectable (LANTUS) 15 Unit(s) SubCutaneous at bedtime  insulin lispro (HumaLOG) corrective regimen sliding scale   SubCutaneous three times a day before meals  levothyroxine 112 MICROGram(s) Oral daily  mirtazapine 7.5 milliGRAM(s) Oral at bedtime  pantoprazole  Injectable 40 milliGRAM(s) IV Push every 12 hours  sodium chloride 0.9%. 1000 milliLiter(s) (75 mL/Hr) IV Continuous <Continuous>  tiotropium 18 MICROgram(s) Capsule 1 Capsule(s) Inhalation at bedtime    MEDICATIONS  (PRN):  aluminum hydroxide/magnesium hydroxide/simethicone Suspension 30 milliLiter(s) Oral every 4 hours PRN Dyspepsia  dextrose Gel 1 Dose(s) Oral once PRN Blood Glucose LESS THAN 70 milliGRAM(s)/deciliter  glucagon  Injectable 1 milliGRAM(s) IntraMuscular once PRN Glucose LESS THAN 70 milligrams/deciliter  ondansetron Injectable 4 milliGRAM(s) IV Push every 6 hours PRN Nausea  oxyCODONE    5 mG/acetaminophen 325 mG 1 Tablet(s) Oral every 6 hours PRN Moderate Pain (4 - 6)      FAMILY HISTORY:  Family history of CHF (congestive heart failure) (Mother)      SOCIAL HISTORY:  ex smoker    REVIEW OF SYSTEMS:  CONSTITUTIONAL:  No night sweats.  c/o fatigue, malaise.  HEENT:  Eyes:  No visual changes.  No eye pain.      ENT:  No runny nose.  No epistaxis.  No sinus pain.  No sore throat.  No odynophagia.  No ear pain.  No congestion.  RESPIRATORY:  No cough.  No wheeze.  No hemoptysis.  No shortness of breath.  CARDIOVASCULAR:  No chest pains.  No palpitations. No shortness of breath, No orthopnea or PND.  GASTROINTESTINAL:  No abdominal pain.  No nausea or vomiting.  No diarrhea or constipation.  No hematemesis.  No hematochezia.  No melena.  GENITOURINARY:  No urgency.  No frequency.  No dysuria.  No hematuria.  No obstructive symptoms.  No discharge.  No pain.  No significant abnormal bleeding.  MUSCULOSKELETAL:  No musculoskeletal pain.  No joint swelling.  No arthritis.  NEUROLOGICAL:  No tingling or numbness or weakness.  PSYCHIATRIC:  No confusion  SKIN:  No rashes.  No lesions.  No wounds.  ENDOCRINE:  No unexplained weight loss.  No polydipsia.  No polyuria.  No polyphagia.  HEMATOLOGIC:  No anemia.  No purpura.  No petechiae.  No prolonged or excessive bleeding.   ALLERGIC AND IMMUNOLOGIC:  No pruritus.  No swelling.         Vital Signs Last 24 Hrs  T(C): 36.3 (2018 11:33), Max: 36.9 (2018 01:20)  T(F): 97.3 (2018 11:33), Max: 98.5 (2018 01:20)  HR: 63 (:) (63 - 87)  BP: 155/38 (2018 11:33) (98/46 - 155/38)  BP(mean): --  RR: 17 (:) (15 - 20)  SpO2: 100% (:) (98% - 100%)    PHYSICAL EXAM-    Constitutional: ill looking frail elderly female in no acute distress.    Head: Head is normocephalic and atraumatic.      Neck: The patient's neck is supple without enlargement, has no palpable thyromegaly nor thyroid nodules and has no jugular venous distention. No audible carotid bruits. There are strong carotid pulses bilaterally. No JVD.     Cardiovascular: Regular rate and rhythm without S3, S4. No murmurs or rubs are appreciated.      Respiratory: CTA b/l   Abdomen: Soft, nontender, nondistended with positive bowel sounds.      Extremity: No tenderness. No  pitting edema No skin discoloration No clubbing No cyanosis.     Neurologic: The patient is alert and oriented.      Skin: No rash, no obvious lesions noted.      Psychiatric: The patient appears to be emotionally stable.      INTERPRETATION OF TELEMETRY: sinus rythm, sinsu pause about 1 sec, NSVT with 4 beats.   ECG: sinus rythm, normal axis, no st t changes.     I&O's Detail    2018 07:01  -  2018 07:00  --------------------------------------------------------  IN:    Packed Red Blood Cells: 291 mL  Total IN: 291 mL    OUT:  Total OUT: 0 mL    Total NET: 291 mL          LABS:                        7.8    10.1  )-----------( 499      ( 2018 09:08 )             23.5     02    143  |  111<H>  |  81<H>  ----------------------------<  182<H>  4.6   |  23  |  2.05<H>    Ca    7.8<L>      2018 09:08    TPro  5.9<L>  /  Alb  2.4<L>  /  TBili  0.3  /  DBili  x   /  AST  19  /  ALT  15  /  AlkPhos  141<H>          PT/INR - ( 2018 09:08 )   PT: 32.3 sec;   INR: 2.93 ratio         PTT - ( 2018 18:15 )  PTT:37.8 sec  Urinalysis Basic - ( 2018 18:15 )    Color: Yellow / Appearance: Slightly Turbid / S.010 / pH: x  Gluc: x / Ketone: Negative  / Bili: Negative / Urobili: Negative mg/dL   Blood: x / Protein: 15 mg/dL / Nitrite: Positive   Leuk Esterase: Moderate / RBC: 3-5 /HPF / WBC 11-25   Sq Epi: x / Non Sq Epi: Few / Bacteria: Many      I&O's Summary    2018 07:01  -  2018 07:00  --------------------------------------------------------  IN: 291 mL / OUT: 0 mL / NET: 291 mL      BNP  RADIOLOGY & ADDITIONAL STUDIES:

## 2018-03-07 NOTE — PROGRESS NOTE ADULT - ASSESSMENT
Pt is a 76 y/o female with h/o chronic diastolic chf, copd with chronic hypoxic respiratory failure (on home o2), osteoarthritis, anxiety, HTN, type 2 diabetes s/p fall and IM nailing of left hip months prior now s/p left THR who was admitted for profound anemia, then with fever    * Fever - UNCLEAR SOURCE - POSSIBLY FROM URINE, MAYBE MICROASPIRATION AS PER id; ON CEFEPIME, VACN DC'D AS CR INCREASED A BIT TODAY; NO FURTHER FEVERS  * Profound Anemia-no obvious source of bleeding, ? chronic GI loss, h/h improved after blood transfusion, monitor, endoscopic w/up per GI. 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  * Comm- d/w pt, RN Pt is a 76 y/o female with h/o chronic diastolic chf, copd with chronic hypoxic respiratory failure (on home o2), osteoarthritis, anxiety, HTN, type 2 diabetes s/p fall and IM nailing of left hip months prior now s/p left THR who was admitted for profound anemia, then with fever    * Fever - UNCLEAR SOURCE - POSSIBLY FROM URINE, MAYBE MICROASPIRATION AS PER id; ON CEFEPIME, VACN DC'D AS CR INCREASED ; NO FURTHER FEVERS, ID TO DC CEFEPIME TOMORROW  * Profound Anemia-no obvious source of bleeding, ? chronic GI loss, h/h improved after blood transfusion, monitor, endoscopic w/up per GI. 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

## 2018-03-07 NOTE — PROGRESS NOTE ADULT - SUBJECTIVE AND OBJECTIVE BOX
Patient is a 77y old  Female who presents with a chief complaint of fatigue, low hb (27 Feb 2018 07:55)    HPI:  77 year old female PMH of CHF( diastolic dysfn),  COPD,  HTN, HLD, CAD, DM, CKD , chronic UTI, lobectomy of lung for precancerous cells  s/p IM nailing of Left hip fracture after fall in 2017, recent ORIF of left hip due to hardware malfunctioning admitted on 2/26 for evaluation of abnormal labs; of note the patient stated she feels good since admission, though she required blood transfusions. She had no specific complaints, though is a poor historian and history per medical record. (initial history for consult)     Date of service: 03-07-18 @ 11:18  Saw patient on 3/7/18, has no complaints, no further fever  Medications and Vitals reviewed on 3/7/18    MEDICATIONS  (STANDING):  atorvastatin 20 milliGRAM(s) Oral at bedtime  cefepime  IVPB 1000 milliGRAM(s) IV Intermittent every 12 hours  dextrose 5%. 1000 milliLiter(s) (50 mL/Hr) IV Continuous <Continuous>  dextrose 50% Injectable 12.5 Gram(s) IV Push once  diltiazem    Tablet 60 milliGRAM(s) Oral every 8 hours  docusate sodium 100 milliGRAM(s) Oral two times a day  folic acid 1 milliGRAM(s) Oral daily  heparin  Injectable 5000 Unit(s) SubCutaneous every 12 hours  hydrALAZINE 25 milliGRAM(s) Oral every 8 hours  insulin glargine Injectable (LANTUS) 15 Unit(s) SubCutaneous at bedtime  insulin lispro (HumaLOG) corrective regimen sliding scale   SubCutaneous three times a day before meals  levothyroxine 112 MICROGram(s) Oral daily  mirtazapine 7.5 milliGRAM(s) Oral at bedtime  pantoprazole  Injectable 40 milliGRAM(s) IV Push every 12 hours  tiotropium 18 MICROgram(s) Capsule 1 Capsule(s) Inhalation at bedtime    MEDICATIONS  (PRN):  aluminum hydroxide/magnesium hydroxide/simethicone Suspension 30 milliLiter(s) Oral every 4 hours PRN Dyspepsia  dextrose Gel 1 Dose(s) Oral once PRN Blood Glucose LESS THAN 70 milliGRAM(s)/deciliter  glucagon  Injectable 1 milliGRAM(s) IntraMuscular once PRN Glucose LESS THAN 70 milligrams/deciliter  melatonin 3 milliGRAM(s) Oral at bedtime PRN Insomnia  ondansetron Injectable 4 milliGRAM(s) IV Push every 6 hours PRN Nausea      Vital Signs Last 24 Hrs  T(C): 36.8 (07 Mar 2018 05:16), Max: 37.3 (06 Mar 2018 16:34)  T(F): 98.3 (07 Mar 2018 05:16), Max: 99.2 (06 Mar 2018 16:34)  HR: 52 (07 Mar 2018 09:07) (52 - 93)  BP: 146/34 (07 Mar 2018 09:07) (144/36 - 152/31)  BP(mean): --  RR: 18 (07 Mar 2018 05:16) (18 - 18)  SpO2: 96% (07 Mar 2018 05:16) (96% - 100%)    Physical Exam:          Constitutional: frail looking  HEENT: NC/AT, EOMI, PERRLA  Neck: supple  Respiratory: clear, no r/r/w  Cardiovascular: S1S2 regular, no murmurs  Abdomen: soft, not tender, not distended, positive BS  Genitourinary: deferred  Rectal: deferred  Musculoskeletal: no muscle tenderness, left hip with steristrips, nontender, no erythema  Neurological: moving all extremities, no focal deficits  Skin: no rashes         Labs:                        8.1    5.42  )-----------( 192      ( 07 Mar 2018 05:58 )             25.1     03-07    142  |  113<H>  |  34<H>  ----------------------------<  170<H>  4.1   |  22  |  1.45<H>    Ca    8.1<L>      07 Mar 2018 05:58             Cultures:       Culture - Blood (collected 03-05-18 @ 07:09)  Source: .Blood None  Preliminary Report (03-06-18 @ 14:01):    No growth to date.      All labs reviewed by me on 3/7/18        Radiology:< from: Xray Chest 1 View AP/PA. (03.05.18 @ 07:11) >    EXAM:  XR CHEST AP OR PA 1V                            PROCEDURE DATE:  03/05/2018          INTERPRETATION:  Exam Date: 3/5/2018 7:11 AM    Chest radiograph (one view)         CLINICAL INFORMATION:  code sepsis    TECHNIQUE:  Single frontal view ofthe chest was obtained.    COMPARISON: 1/31/2018    FINDINGS/  IMPRESSION:          No focal consolidation. Tiny left pleural effusion cannot be excluded.    Cardiomegaly.      < end of copied text >      Advanced directive addressed: full resuscitation

## 2018-03-07 NOTE — PROGRESS NOTE ADULT - ASSESSMENT
1. Patient admitted with profound anemia, given blood transfusions; fever came on suddenly and patient gives no complaints  - panculture in progress, possible microaspiration, versus urinary source of infection? though no specific complaints, wound on left hip looks good  - day #3 cefepime   - tolerating antibiotics without rashes or side effects   - will stop cefepime after today's dose  - consider discharge planning  - serial cbc and monitor temperature   - iv hydration and supportive care   2. other issues:  CHF( diastolic dysfn),  COPD,  HTN, HLD, CAD, DM, CKD , chronic UTI, lobectomy of lung for precancerous cells  - per medicine  If further ID issues please reconsult

## 2018-03-07 NOTE — PROGRESS NOTE ADULT - SUBJECTIVE AND OBJECTIVE BOX
3/5/18: Code sepsis called early this am for a fever of 102. Pt denies any pain or cough, but she is weepy.  States doesnt know what everyone is doing to her. No cp, sob,n/v  3/6/18: feeling much better today; wants to get up and move today; denies any cp, sob, n/v/f/c; no urinary pain  3/7/18:     ROS: AS PER HPI OTHERWISE ALL OTHER SYSTEMS REVIEWED AND ARE NEGATIVE    Vital Signs Last 24 Hrs  T(C): 36.8 (07 Mar 2018 05:16), Max: 37.3 (06 Mar 2018 16:34)  T(F): 98.3 (07 Mar 2018 05:16), Max: 99.2 (06 Mar 2018 16:34)  HR: 52 (07 Mar 2018 09:07) (52 - 93)  BP: 146/34 (07 Mar 2018 09:07) (144/36 - 154/39)  BP(mean): --  RR: 18 (07 Mar 2018 05:16) (18 - 18)  SpO2: 96% (07 Mar 2018 05:16) (96% - 100%)    GEN: A and O, NAD, mood stable  HEENT:   NC/AT  EOMI, no oropharyngeal lesions    NECK:   supple    CV:  +S1, +S2, regular, no murmurs or rubs    RESP:   lungs clear to auscultation bilaterally, no wheezing, rales, rhonchi, good air entry bilaterally, DECREASED BS ANGELICA, SCATTERED CRACKLES AT BASES    GI:  abdomen soft, non-tender, non-distended, normal BS,  no abdominal masses, no palpable masses    RECTAL:  not examined    :  not examined    MSK:   normal muscle tone, no atrophy, no rigidity, no contractions    EXT:   no clubbing, no cyanosis, no edema, no calf pain, swelling or erythema, LEFT HIP INCISION C/D/I    VASCULAR:  pulses equal and symmetric in the upper and lower extremities    NEURO:  AAOX3, no focal neurological deficits, follows all commands, able to move extremities spontaneously    SKIN:  no ulcers, lesions or rashes    LABS                              8.1    5.42  )-----------( 192      ( 07 Mar 2018 05:58 )             25.1     03-07    142  |  113<H>  |  34<H>  ----------------------------<  170<H>  4.1   |  22  |  1.45<H>    Ca    8.1<L>      07 Mar 2018 05:58              Urinalysis Basic - ( 05 Mar 2018 21:10 )    Color: Yellow / Appearance: very cloudy / S.015 / pH: x  Gluc: x / Ketone: Negative  / Bili: Negative / Urobili: Negative mg/dL   Blood: x / Protein: 100 mg/dL / Nitrite: Positive   Leuk Esterase: Moderate / RBC: 6-10 /HPF / WBC >50   Sq Epi: x / Non Sq Epi: Moderate / Bacteria: Many      MEDICATIONS  (STANDING):  atorvastatin 20 milliGRAM(s) Oral at bedtime  cefepime  IVPB 1000 milliGRAM(s) IV Intermittent every 12 hours  dextrose 5%. 1000 milliLiter(s) (50 mL/Hr) IV Continuous <Continuous>  dextrose 50% Injectable 12.5 Gram(s) IV Push once  diltiazem    Tablet 60 milliGRAM(s) Oral every 8 hours  docusate sodium 100 milliGRAM(s) Oral two times a day  folic acid 1 milliGRAM(s) Oral daily  heparin  Injectable 5000 Unit(s) SubCutaneous every 12 hours  hydrALAZINE 25 milliGRAM(s) Oral every 8 hours  insulin glargine Injectable (LANTUS) 15 Unit(s) SubCutaneous at bedtime  insulin lispro (HumaLOG) corrective regimen sliding scale   SubCutaneous three times a day before meals  levothyroxine 112 MICROGram(s) Oral daily  mirtazapine 7.5 milliGRAM(s) Oral at bedtime  pantoprazole  Injectable 40 milliGRAM(s) IV Push every 12 hours  tiotropium 18 MICROgram(s) Capsule 1 Capsule(s) Inhalation at bedtime    MEDICATIONS  (PRN):  aluminum hydroxide/magnesium hydroxide/simethicone Suspension 30 milliLiter(s) Oral every 4 hours PRN Dyspepsia  dextrose Gel 1 Dose(s) Oral once PRN Blood Glucose LESS THAN 70 milliGRAM(s)/deciliter  glucagon  Injectable 1 milliGRAM(s) IntraMuscular once PRN Glucose LESS THAN 70 milligrams/deciliter  melatonin 3 milliGRAM(s) Oral at bedtime PRN Insomnia  ondansetron Injectable 4 milliGRAM(s) IV Push every 6 hours PRN Nausea 3/5/18: Code sepsis called early this am for a fever of 102. Pt denies any pain or cough, but she is weepy.  States doesnt know what everyone is doing to her. No cp, sob,n/v  3/6/18: feeling much better today; wants to get up and move today; denies any cp, sob, n/v/f/c; no urinary pain  3/7/18: Felt nauseated earlier, now better; no cp, sob, vomiting; left hip feels fine; no dysuria or cough    ROS: AS PER HPI OTHERWISE ALL OTHER SYSTEMS REVIEWED AND ARE NEGATIVE    Vital Signs Last 24 Hrs  T(C): 36.8 (07 Mar 2018 05:16), Max: 37.3 (06 Mar 2018 16:34)  T(F): 98.3 (07 Mar 2018 05:16), Max: 99.2 (06 Mar 2018 16:34)  HR: 52 (07 Mar 2018 09:07) (52 - 93)  BP: 146/34 (07 Mar 2018 09:07) (144/36 - 154/39)  BP(mean): --  RR: 18 (07 Mar 2018 05:16) (18 - 18)  SpO2: 96% (07 Mar 2018 05:16) (96% - 100%)    GEN: A and O, NAD, mood stable  HEENT:   NC/AT  EOMI, no oropharyngeal lesions    NECK:   supple    CV:  +S1, +S2, regular, no murmurs or rubs    RESP:   lungs clear to auscultation bilaterally, no wheezing, rales, rhonchi, good air entry bilaterally, DECREASED BS ANGELICA    GI:  abdomen soft, non-tender, non-distended, normal BS,  no abdominal masses, no palpable masses    RECTAL:  not examined    :  not examined    MSK:   normal muscle tone, no atrophy, no rigidity, no contractions    EXT:   no clubbing, no cyanosis, no edema, no calf pain, swelling or erythema, LEFT HIP INCISION C/D/I    VASCULAR:  pulses equal and symmetric in the upper and lower extremities    NEURO:  AAOX3, no focal neurological deficits, follows all commands, able to move extremities spontaneously    SKIN:  no ulcers, lesions or rashes    LABS                              8.1    5.42  )-----------( 192      ( 07 Mar 2018 05:58 )             25.1     03-07    142  |  113<H>  |  34<H>  ----------------------------<  170<H>  4.1   |  22  |  1.45<H>    Ca    8.1<L>      07 Mar 2018 05:58              Urinalysis Basic - ( 05 Mar 2018 21:10 )    Color: Yellow / Appearance: very cloudy / S.015 / pH: x  Gluc: x / Ketone: Negative  / Bili: Negative / Urobili: Negative mg/dL   Blood: x / Protein: 100 mg/dL / Nitrite: Positive   Leuk Esterase: Moderate / RBC: 6-10 /HPF / WBC >50   Sq Epi: x / Non Sq Epi: Moderate / Bacteria: Many      MEDICATIONS  (STANDING):  atorvastatin 20 milliGRAM(s) Oral at bedtime  cefepime  IVPB 1000 milliGRAM(s) IV Intermittent every 12 hours  dextrose 5%. 1000 milliLiter(s) (50 mL/Hr) IV Continuous <Continuous>  dextrose 50% Injectable 12.5 Gram(s) IV Push once  diltiazem    Tablet 60 milliGRAM(s) Oral every 8 hours  docusate sodium 100 milliGRAM(s) Oral two times a day  folic acid 1 milliGRAM(s) Oral daily  heparin  Injectable 5000 Unit(s) SubCutaneous every 12 hours  hydrALAZINE 25 milliGRAM(s) Oral every 8 hours  insulin glargine Injectable (LANTUS) 15 Unit(s) SubCutaneous at bedtime  insulin lispro (HumaLOG) corrective regimen sliding scale   SubCutaneous three times a day before meals  levothyroxine 112 MICROGram(s) Oral daily  mirtazapine 7.5 milliGRAM(s) Oral at bedtime  pantoprazole  Injectable 40 milliGRAM(s) IV Push every 12 hours  tiotropium 18 MICROgram(s) Capsule 1 Capsule(s) Inhalation at bedtime    MEDICATIONS  (PRN):  aluminum hydroxide/magnesium hydroxide/simethicone Suspension 30 milliLiter(s) Oral every 4 hours PRN Dyspepsia  dextrose Gel 1 Dose(s) Oral once PRN Blood Glucose LESS THAN 70 milliGRAM(s)/deciliter  glucagon  Injectable 1 milliGRAM(s) IntraMuscular once PRN Glucose LESS THAN 70 milligrams/deciliter  melatonin 3 milliGRAM(s) Oral at bedtime PRN Insomnia  ondansetron Injectable 4 milliGRAM(s) IV Push every 6 hours PRN Nausea

## 2018-03-08 LAB
ANION GAP SERPL CALC-SCNC: 6 MMOL/L — SIGNIFICANT CHANGE UP (ref 5–17)
APPEARANCE UR: CLEAR — SIGNIFICANT CHANGE UP
BACTERIA # UR AUTO: (no result)
BILIRUB UR-MCNC: NEGATIVE — SIGNIFICANT CHANGE UP
BUN SERPL-MCNC: 33 MG/DL — HIGH (ref 7–23)
CALCIUM SERPL-MCNC: 8.4 MG/DL — LOW (ref 8.5–10.1)
CHLORIDE SERPL-SCNC: 113 MMOL/L — HIGH (ref 96–108)
CO2 SERPL-SCNC: 25 MMOL/L — SIGNIFICANT CHANGE UP (ref 22–31)
COLOR SPEC: YELLOW — SIGNIFICANT CHANGE UP
COMMENT - URINE: SIGNIFICANT CHANGE UP
CREAT SERPL-MCNC: 1.56 MG/DL — HIGH (ref 0.5–1.3)
DIFF PNL FLD: (no result)
EPI CELLS # UR: SIGNIFICANT CHANGE UP
GLUCOSE BLDC GLUCOMTR-MCNC: 150 MG/DL — HIGH (ref 70–99)
GLUCOSE BLDC GLUCOMTR-MCNC: 180 MG/DL — HIGH (ref 70–99)
GLUCOSE BLDC GLUCOMTR-MCNC: 185 MG/DL — HIGH (ref 70–99)
GLUCOSE BLDC GLUCOMTR-MCNC: 229 MG/DL — HIGH (ref 70–99)
GLUCOSE SERPL-MCNC: 134 MG/DL — HIGH (ref 70–99)
GLUCOSE UR QL: NEGATIVE MG/DL — SIGNIFICANT CHANGE UP
HCT VFR BLD CALC: 27.8 % — LOW (ref 34.5–45)
HGB BLD-MCNC: 8.8 G/DL — LOW (ref 11.5–15.5)
KETONES UR-MCNC: NEGATIVE — SIGNIFICANT CHANGE UP
LEUKOCYTE ESTERASE UR-ACNC: (no result)
MCHC RBC-ENTMCNC: 28.9 PG — SIGNIFICANT CHANGE UP (ref 27–34)
MCHC RBC-ENTMCNC: 31.7 GM/DL — LOW (ref 32–36)
MCV RBC AUTO: 91.4 FL — SIGNIFICANT CHANGE UP (ref 80–100)
NITRITE UR-MCNC: NEGATIVE — SIGNIFICANT CHANGE UP
NRBC # BLD: 0 /100 WBCS — SIGNIFICANT CHANGE UP (ref 0–0)
PH UR: 5 — SIGNIFICANT CHANGE UP (ref 5–8)
PLATELET # BLD AUTO: 227 K/UL — SIGNIFICANT CHANGE UP (ref 150–400)
POTASSIUM SERPL-MCNC: 4.6 MMOL/L — SIGNIFICANT CHANGE UP (ref 3.5–5.3)
POTASSIUM SERPL-SCNC: 4.6 MMOL/L — SIGNIFICANT CHANGE UP (ref 3.5–5.3)
PROT UR-MCNC: 100 MG/DL
RBC # BLD: 3.04 M/UL — LOW (ref 3.8–5.2)
RBC # FLD: 16.9 % — HIGH (ref 10.3–14.5)
RBC CASTS # UR COMP ASSIST: (no result) /HPF (ref 0–4)
SODIUM SERPL-SCNC: 144 MMOL/L — SIGNIFICANT CHANGE UP (ref 135–145)
SP GR SPEC: 1.02 — SIGNIFICANT CHANGE UP (ref 1.01–1.02)
UROBILINOGEN FLD QL: NEGATIVE MG/DL — SIGNIFICANT CHANGE UP
WBC # BLD: 6.39 K/UL — SIGNIFICANT CHANGE UP (ref 3.8–10.5)
WBC # FLD AUTO: 6.39 K/UL — SIGNIFICANT CHANGE UP (ref 3.8–10.5)
WBC UR QL: >50

## 2018-03-08 RX ORDER — PANTOPRAZOLE SODIUM 20 MG/1
40 TABLET, DELAYED RELEASE ORAL ONCE
Qty: 0 | Refills: 0 | Status: COMPLETED | OUTPATIENT
Start: 2018-03-08 | End: 2018-03-08

## 2018-03-08 RX ADMIN — MIRTAZAPINE 7.5 MILLIGRAM(S): 45 TABLET, ORALLY DISINTEGRATING ORAL at 21:19

## 2018-03-08 RX ADMIN — Medication 100 MILLIGRAM(S): at 06:00

## 2018-03-08 RX ADMIN — Medication 1 MILLIGRAM(S): at 11:54

## 2018-03-08 RX ADMIN — ATORVASTATIN CALCIUM 20 MILLIGRAM(S): 80 TABLET, FILM COATED ORAL at 21:20

## 2018-03-08 RX ADMIN — Medication 25 MILLIGRAM(S): at 21:19

## 2018-03-08 RX ADMIN — TIOTROPIUM BROMIDE 1 CAPSULE(S): 18 CAPSULE ORAL; RESPIRATORY (INHALATION) at 08:20

## 2018-03-08 RX ADMIN — Medication 112 MICROGRAM(S): at 06:00

## 2018-03-08 RX ADMIN — HEPARIN SODIUM 5000 UNIT(S): 5000 INJECTION INTRAVENOUS; SUBCUTANEOUS at 17:09

## 2018-03-08 RX ADMIN — INSULIN GLARGINE 15 UNIT(S): 100 INJECTION, SOLUTION SUBCUTANEOUS at 21:20

## 2018-03-08 RX ADMIN — PANTOPRAZOLE SODIUM 40 MILLIGRAM(S): 20 TABLET, DELAYED RELEASE ORAL at 07:24

## 2018-03-08 RX ADMIN — HEPARIN SODIUM 5000 UNIT(S): 5000 INJECTION INTRAVENOUS; SUBCUTANEOUS at 06:00

## 2018-03-08 RX ADMIN — Medication 100 MILLIGRAM(S): at 17:09

## 2018-03-08 RX ADMIN — PANTOPRAZOLE SODIUM 40 MILLIGRAM(S): 20 TABLET, DELAYED RELEASE ORAL at 17:22

## 2018-03-08 RX ADMIN — Medication 3 MILLIGRAM(S): at 21:20

## 2018-03-08 RX ADMIN — Medication 25 MILLIGRAM(S): at 13:33

## 2018-03-08 RX ADMIN — Medication 25 MILLIGRAM(S): at 06:00

## 2018-03-08 RX ADMIN — Medication 2: at 11:54

## 2018-03-08 RX ADMIN — Medication 1: at 16:54

## 2018-03-08 NOTE — PROVIDER CONTACT NOTE (OTHER) - NAME OF MD/NP/PA/DO NOTIFIED:
FOLLOW UP APPOINTMENT WITH DR. HENRY @ .
 CARDIOLOGY
DR.ZINKIN CORRALES
Dr. Griggs
Dr. Thacker on call for Dr. Jacinto
MD. Egan

## 2018-03-08 NOTE — PROGRESS NOTE ADULT - SUBJECTIVE AND OBJECTIVE BOX
3/5/18: Code sepsis called early this am for a fever of 102. Pt denies any pain or cough, but she is weepy.  States doesnt know what everyone is doing to her. No cp, sob,n/v  3/6/18: feeling much better today; wants to get up and move today; denies any cp, sob, n/v/f/c; no urinary pain  3/7/18: Felt nauseated earlier, now better; no cp, sob, vomiting; left hip feels fine; no dysuria or cough  3/8/18: No cp, sob, feels better -- walked yest, doesnt feel as tired; left hip pain well controlled; eating better    ROS: AS PER HPI OTHERWISE ALL OTHER SYSTEMS REVIEWED AND ARE NEGATIVE    Vital Signs Last 24 Hrs  T(C): 36.8 (08 Mar 2018 05:44), Max: 37.1 (07 Mar 2018 11:21)  T(F): 98.2 (08 Mar 2018 05:44), Max: 98.7 (07 Mar 2018 11:21)  HR: 65 (08 Mar 2018 05:44) (52 - 66)  BP: 161/36 (08 Mar 2018 05:44) (146/34 - 167/37)  BP(mean): --  RR: 18 (08 Mar 2018 05:44) (17 - 20)  SpO2: 96% (08 Mar 2018 05:44) (96% - 100%)    GEN: A and O, NAD, mood stable  HEENT:   NC/AT  EOMI, no oropharyngeal lesions    NECK:   supple    CV:  +S1, +S2, regular, no murmurs or rubs    RESP:   lungs clear to auscultation bilaterally, no wheezing, rales, rhonchi, good air entry bilaterally, DECREASED BS ANGELICA    GI:  abdomen soft, non-tender, non-distended, normal BS,  no abdominal masses, no palpable masses    RECTAL:  not examined    :  not examined    MSK:   normal muscle tone, no atrophy, no rigidity, no contractions    EXT:   no clubbing, no cyanosis, no edema, no calf pain, swelling or erythema, LEFT HIP INCISION C/D/I    VASCULAR:  pulses equal and symmetric in the upper and lower extremities    NEURO:  AAOX3, no focal neurological deficits, follows all commands, able to move extremities spontaneously    SKIN:  no ulcers, lesions or rashes    LABS                              8.8    6.39  )-----------( 227      ( 08 Mar 2018 05:53 )             27.8     03-08    144  |  113<H>  |  33<H>  ----------------------------<  134<H>  4.6   |  25  |  1.56<H>    Ca    8.4<L>      08 Mar 2018 05:53      MEDICATIONS  (STANDING):  atorvastatin 20 milliGRAM(s) Oral at bedtime  dextrose 5%. 1000 milliLiter(s) (50 mL/Hr) IV Continuous <Continuous>  dextrose 50% Injectable 12.5 Gram(s) IV Push once  diltiazem    Tablet 60 milliGRAM(s) Oral every 8 hours  docusate sodium 100 milliGRAM(s) Oral two times a day  folic acid 1 milliGRAM(s) Oral daily  heparin  Injectable 5000 Unit(s) SubCutaneous every 12 hours  hydrALAZINE 25 milliGRAM(s) Oral every 8 hours  insulin glargine Injectable (LANTUS) 15 Unit(s) SubCutaneous at bedtime  insulin lispro (HumaLOG) corrective regimen sliding scale   SubCutaneous three times a day before meals  levothyroxine 112 MICROGram(s) Oral daily  mirtazapine 7.5 milliGRAM(s) Oral at bedtime  pantoprazole  Injectable 40 milliGRAM(s) IV Push every 12 hours  tiotropium 18 MICROgram(s) Capsule 1 Capsule(s) Inhalation at bedtime    MEDICATIONS  (PRN):  aluminum hydroxide/magnesium hydroxide/simethicone Suspension 30 milliLiter(s) Oral every 4 hours PRN Dyspepsia  dextrose Gel 1 Dose(s) Oral once PRN Blood Glucose LESS THAN 70 milliGRAM(s)/deciliter  glucagon  Injectable 1 milliGRAM(s) IntraMuscular once PRN Glucose LESS THAN 70 milligrams/deciliter  melatonin 3 milliGRAM(s) Oral at bedtime PRN Insomnia  ondansetron Injectable 4 milliGRAM(s) IV Push every 6 hours PRN Nausea

## 2018-03-08 NOTE — PROGRESS NOTE ADULT - ASSESSMENT
Pt is a 78 y/o female with h/o chronic diastolic chf, copd with chronic hypoxic respiratory failure (on home o2), osteoarthritis, anxiety, HTN, type 2 diabetes s/p fall and IM nailing of left hip months prior now s/p left THR who was admitted for profound anemia, then with fever    * Fever - UNCLEAR SOURCE - POSSIBLY FROM URINE, MAYBE MICROASPIRATION AS PER id; S/P CEFEPIME AND VANC; NO FURTHER FEVERS,  * Profound Anemia-no obvious source of bleeding, ? chronic GI loss, h/h improved after blood transfusion, monitor, endoscopic w/up per GI. 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

## 2018-03-08 NOTE — PROGRESS NOTE ADULT - ASSESSMENT
Anemia- pt was noted to have 3 angiodysplastic lesions that were cauterized during this admission.  GI input appreciated.     Bradycardia- overnight no significant events.  Continue cardizem.      Chronic HFpEF- Has CardioMEMS-Her CardioMEMS readings were not being transmitted. Advised staff and pt to do it today.  Appears euvolemic on exam. Will continue to monitor off diuretics.    close monitoring of the renal function and electrolytes.  Goal potassium of 4 and magnesium of 2.     CAD, s/p MI in past- last stress test about an yr ago did not reveal any ischemia.  Continue statin. Antiplatelet agents on hold.    HTN -continue current meds.       Other medical issues- Management per primary team.   Thank you for allowing me to participate in the care of this patient. Please feel free to contact me with any questions.

## 2018-03-08 NOTE — PROGRESS NOTE ADULT - SUBJECTIVE AND OBJECTIVE BOX
Patient is a 77y old  Female who presents with a chief complaint of fatigue, low hbg.     HPI:  77 year old female PMH of CHF( diastolic dysfn) last exacerbation 2018 ( pt has mems implant), COPD ( never intubated, but on home O2), HTN, HLD, CAD ( on plavix and aspirin), T2DM on insulin, CKD , chronic UTI on Hiprex, lobectomy of lung for precancerous cells  s/p IM nailing of Left hip fracture after fall in , recent ORIF of left hip due to hardware malfunctioning and was sent to rehab on coumadin sent in from rehab yesterday for symptomatic anemia.    Pt was transfused with PRBC since admission and she was noted to be bradycardic today.  She was c/o mild left chest pain yesterday that was very brief without any radiation or recurrence.    18- pt seen and examined by me today. Pt still c/o fatigue.  Denies any CP or SOB.  no events overnight.  3/1- pt seen and examined this am, Denies any CP or SOB.  No overnight events.   3/2- Pt seen and examined by me today.  She is sleeping but arousable.  Denies any CP or SOB this am.    3/6- pt seen and examined by me this am. She denies any CP or SOB.  She was tearful yesterday.  3/7- Pt seen and examined, denies any symptoms this am.   3/8- Pt seen and examined by me today.  Pt denies any CP or SOB this am sitting in chair.         PAST MEDICAL & SURGICAL HISTORY:  Kake (hard of hearing): hearing aid  Anxiety  Hypothyroid  GERD (gastroesophageal reflux disease)  CKD (chronic kidney disease)  MI, old: x3  Chronic UTI  Chronic diastolic congestive heart failure: last exacerbation 2017  Hyperlipidemia, unspecified hyperlipidemia type  Essential hypertension  Neuropathy  Diabetes  Chronic obstructive pulmonary disease, unspecified COPD type  History of cataract surgery: bilateral IOL  S/P lobectomy of lung: left lung pre-cancerous  History of ear surgery  History of cholecystectomy  S/P ORIF (open reduction internal fixation) fracture: left hip 2017  History of total knee replacement, unspecified laterality: bilateral right  left   H/O hernia repair: umbilical and incisional   delivery delivered  S/P appendectomy      MEDICATIONS  (STANDING):  atorvastatin 20 milliGRAM(s) Oral at bedtime  dextrose 5%. 1000 milliLiter(s) (50 mL/Hr) IV Continuous <Continuous>  dextrose 50% Injectable 12.5 Gram(s) IV Push once  diltiazem    milliGRAM(s) Oral daily  docusate sodium 100 milliGRAM(s) Oral two times a day  folic acid 1 milliGRAM(s) Oral daily  insulin glargine Injectable (LANTUS) 15 Unit(s) SubCutaneous at bedtime  insulin lispro (HumaLOG) corrective regimen sliding scale   SubCutaneous three times a day before meals  levothyroxine 112 MICROGram(s) Oral daily  mirtazapine 7.5 milliGRAM(s) Oral at bedtime  pantoprazole  Injectable 40 milliGRAM(s) IV Push every 12 hours  sodium chloride 0.9%. 1000 milliLiter(s) (75 mL/Hr) IV Continuous <Continuous>  tiotropium 18 MICROgram(s) Capsule 1 Capsule(s) Inhalation at bedtime    MEDICATIONS  (PRN):  aluminum hydroxide/magnesium hydroxide/simethicone Suspension 30 milliLiter(s) Oral every 4 hours PRN Dyspepsia  dextrose Gel 1 Dose(s) Oral once PRN Blood Glucose LESS THAN 70 milliGRAM(s)/deciliter  glucagon  Injectable 1 milliGRAM(s) IntraMuscular once PRN Glucose LESS THAN 70 milligrams/deciliter  ondansetron Injectable 4 milliGRAM(s) IV Push every 6 hours PRN Nausea  oxyCODONE    5 mG/acetaminophen 325 mG 1 Tablet(s) Oral every 6 hours PRN Moderate Pain (4 - 6)      FAMILY HISTORY:  Family history of CHF (congestive heart failure) (Mother)      SOCIAL HISTORY:  ex smoker    REVIEW OF SYSTEMS:  CONSTITUTIONAL:  No night sweats.  no fatigue, malaise.  HEENT:  Eyes:  No visual changes.  No eye pain.      ENT:  No runny nose.  No epistaxis.  No sinus pain.  No sore throat.  No odynophagia.  No ear pain.  No congestion.  RESPIRATORY:  No cough.  No wheeze.  No hemoptysis.  No shortness of breath.  CARDIOVASCULAR:  No chest pains.  No palpitations. No shortness of breath, No orthopnea or PND.  GASTROINTESTINAL:  No abdominal pain.  No nausea or vomiting.  No diarrhea or constipation.  No hematemesis.  No hematochezia.  No melena.  GENITOURINARY:  No urgency.  No frequency.  No dysuria.  No hematuria.  No obstructive symptoms.  No discharge.  No pain.  No significant abnormal bleeding.  MUSCULOSKELETAL:  No musculoskeletal pain.  No joint swelling.  No arthritis.  NEUROLOGICAL:  No tingling or numbness or weakness.  PSYCHIATRIC:  No confusion  SKIN:  No rashes.  No lesions.  No wounds.  ENDOCRINE:  No unexplained weight loss.  No polydipsia.  No polyuria.  No polyphagia.  HEMATOLOGIC:  No anemia.  No purpura.  No petechiae.  No prolonged or excessive bleeding.   ALLERGIC AND IMMUNOLOGIC:  No pruritus.  No swelling.         Vital Signs Last 24 Hrs  T(C): 36.3 (2018 11:33), Max: 36.9 (2018 01:20)  T(F): 97.3 (2018 11:33), Max: 98.5 (2018 01:20)  HR: 63 (2018 11:33) (63 - 87)  BP: 155/38 (2018 11:33) (98/46 - 155/38)  BP(mean): --  RR: 17 (2018 11:33) (15 - 20)  SpO2: 100% (2018 11:33) (98% - 100%)    PHYSICAL EXAM-    Constitutional: ill looking frail elderly female in no acute distress.    Head: Head is normocephalic and atraumatic.      Neck: The patient's neck is supple without enlargement, has no palpable thyromegaly nor thyroid nodules and has no jugular venous distention. No audible carotid bruits. There are strong carotid pulses bilaterally. No JVD.     Cardiovascular: Regular rate and rhythm without S3, S4. No murmurs or rubs are appreciated.      Respiratory: CTA b/l   Abdomen: Soft, nontender, nondistended with positive bowel sounds.      Extremity: No tenderness. No  pitting edema No skin discoloration No clubbing No cyanosis.     Neurologic: The patient is alert and oriented.      Skin: No rash, no obvious lesions noted.      Psychiatric: The patient appears to be emotionally stable.      INTERPRETATION OF TELEMETRY: sinus rythm.    ECG: sinus rythm, normal axis, no st t changes.     I&O's Detail    2018 07:01  -  2018 07:00  --------------------------------------------------------  IN:    Packed Red Blood Cells: 291 mL  Total IN: 291 mL    OUT:  Total OUT: 0 mL    Total NET: 291 mL          LABS:                        7.8    10.1  )-----------( 499      ( 2018 09:08 )             23.5     02-    143  |  111<H>  |  81<H>  ----------------------------<  182<H>  4.6   |  23  |  2.05<H>    Ca    7.8<L>      2018 09:08    TPro  5.9<L>  /  Alb  2.4<L>  /  TBili  0.3  /  DBili  x   /  AST  19  /  ALT  15  /  AlkPhos  141<H>          PT/INR - ( 2018 09:08 )   PT: 32.3 sec;   INR: 2.93 ratio         PTT - ( 2018 18:15 )  PTT:37.8 sec  Urinalysis Basic - ( 2018 18:15 )    Color: Yellow / Appearance: Slightly Turbid / S.010 / pH: x  Gluc: x / Ketone: Negative  / Bili: Negative / Urobili: Negative mg/dL   Blood: x / Protein: 15 mg/dL / Nitrite: Positive   Leuk Esterase: Moderate / RBC: 3-5 /HPF / WBC 11-25   Sq Epi: x / Non Sq Epi: Few / Bacteria: Many      I&O's Summary    2018 07:01  -  2018 07:00  --------------------------------------------------------  IN: 291 mL / OUT: 0 mL / NET: 291 mL      BNP  RADIOLOGY & ADDITIONAL STUDIES:

## 2018-03-08 NOTE — PROVIDER CONTACT NOTE (OTHER) - DATE AND TIME:
08-Mar-2018 11:15
01-Mar-2018 12:44
02-Mar-2018
05-Mar-2018 06:10
27-Feb-2018 11:53
27-Feb-2018 12:03

## 2018-03-09 LAB
ANION GAP SERPL CALC-SCNC: 5 MMOL/L — SIGNIFICANT CHANGE UP (ref 5–17)
BUN SERPL-MCNC: 32 MG/DL — HIGH (ref 7–23)
CALCIUM SERPL-MCNC: 8.5 MG/DL — SIGNIFICANT CHANGE UP (ref 8.5–10.1)
CHLORIDE SERPL-SCNC: 113 MMOL/L — HIGH (ref 96–108)
CO2 SERPL-SCNC: 25 MMOL/L — SIGNIFICANT CHANGE UP (ref 22–31)
CREAT SERPL-MCNC: 1.47 MG/DL — HIGH (ref 0.5–1.3)
GLUCOSE BLDC GLUCOMTR-MCNC: 145 MG/DL — HIGH (ref 70–99)
GLUCOSE BLDC GLUCOMTR-MCNC: 154 MG/DL — HIGH (ref 70–99)
GLUCOSE BLDC GLUCOMTR-MCNC: 163 MG/DL — HIGH (ref 70–99)
GLUCOSE BLDC GLUCOMTR-MCNC: 260 MG/DL — HIGH (ref 70–99)
GLUCOSE SERPL-MCNC: 124 MG/DL — HIGH (ref 70–99)
HCT VFR BLD CALC: 26.7 % — LOW (ref 34.5–45)
HGB BLD-MCNC: 8.3 G/DL — LOW (ref 11.5–15.5)
MCHC RBC-ENTMCNC: 28.4 PG — SIGNIFICANT CHANGE UP (ref 27–34)
MCHC RBC-ENTMCNC: 31.1 GM/DL — LOW (ref 32–36)
MCV RBC AUTO: 91.4 FL — SIGNIFICANT CHANGE UP (ref 80–100)
NRBC # BLD: 0 /100 WBCS — SIGNIFICANT CHANGE UP (ref 0–0)
PLATELET # BLD AUTO: 193 K/UL — SIGNIFICANT CHANGE UP (ref 150–400)
POTASSIUM SERPL-MCNC: 4.4 MMOL/L — SIGNIFICANT CHANGE UP (ref 3.5–5.3)
POTASSIUM SERPL-SCNC: 4.4 MMOL/L — SIGNIFICANT CHANGE UP (ref 3.5–5.3)
RBC # BLD: 2.92 M/UL — LOW (ref 3.8–5.2)
RBC # FLD: 16.6 % — HIGH (ref 10.3–14.5)
SODIUM SERPL-SCNC: 143 MMOL/L — SIGNIFICANT CHANGE UP (ref 135–145)
WBC # BLD: 4.96 K/UL — SIGNIFICANT CHANGE UP (ref 3.8–10.5)
WBC # FLD AUTO: 4.96 K/UL — SIGNIFICANT CHANGE UP (ref 3.8–10.5)

## 2018-03-09 PROCEDURE — 93010 ELECTROCARDIOGRAM REPORT: CPT

## 2018-03-09 RX ADMIN — Medication 25 MILLIGRAM(S): at 05:12

## 2018-03-09 RX ADMIN — Medication 25 MILLIGRAM(S): at 13:16

## 2018-03-09 RX ADMIN — ATORVASTATIN CALCIUM 20 MILLIGRAM(S): 80 TABLET, FILM COATED ORAL at 21:37

## 2018-03-09 RX ADMIN — Medication 112 MICROGRAM(S): at 05:12

## 2018-03-09 RX ADMIN — Medication 100 MILLIGRAM(S): at 17:22

## 2018-03-09 RX ADMIN — HEPARIN SODIUM 5000 UNIT(S): 5000 INJECTION INTRAVENOUS; SUBCUTANEOUS at 17:22

## 2018-03-09 RX ADMIN — TIOTROPIUM BROMIDE 1 CAPSULE(S): 18 CAPSULE ORAL; RESPIRATORY (INHALATION) at 08:00

## 2018-03-09 RX ADMIN — MIRTAZAPINE 7.5 MILLIGRAM(S): 45 TABLET, ORALLY DISINTEGRATING ORAL at 21:37

## 2018-03-09 RX ADMIN — Medication 3 MILLIGRAM(S): at 21:37

## 2018-03-09 RX ADMIN — PANTOPRAZOLE SODIUM 40 MILLIGRAM(S): 20 TABLET, DELAYED RELEASE ORAL at 05:13

## 2018-03-09 RX ADMIN — Medication 3: at 12:05

## 2018-03-09 RX ADMIN — Medication 25 MILLIGRAM(S): at 21:37

## 2018-03-09 RX ADMIN — HEPARIN SODIUM 5000 UNIT(S): 5000 INJECTION INTRAVENOUS; SUBCUTANEOUS at 05:12

## 2018-03-09 RX ADMIN — Medication 1 MILLIGRAM(S): at 11:03

## 2018-03-09 RX ADMIN — Medication 100 MILLIGRAM(S): at 05:12

## 2018-03-09 RX ADMIN — PANTOPRAZOLE SODIUM 40 MILLIGRAM(S): 20 TABLET, DELAYED RELEASE ORAL at 17:23

## 2018-03-09 RX ADMIN — INSULIN GLARGINE 15 UNIT(S): 100 INJECTION, SOLUTION SUBCUTANEOUS at 21:38

## 2018-03-09 RX ADMIN — Medication 1: at 17:22

## 2018-03-09 NOTE — PROGRESS NOTE ADULT - SUBJECTIVE AND OBJECTIVE BOX
Patient is a 77y old  Female who presents with a chief complaint of fatigue, low hbg.     HPI:  77 year old female PMH of CHF( diastolic dysfn) last exacerbation 2018 ( pt has mems implant), COPD ( never intubated, but on home O2), HTN, HLD, CAD ( on plavix and aspirin), T2DM on insulin, CKD , chronic UTI on Hiprex, lobectomy of lung for precancerous cells  s/p IM nailing of Left hip fracture after fall in , recent ORIF of left hip due to hardware malfunctioning and was sent to rehab on coumadin sent in from rehab yesterday for symptomatic anemia.    Pt was transfused with PRBC since admission and she was noted to be bradycardic today.  She was c/o mild left chest pain yesterday that was very brief without any radiation or recurrence.    18- pt seen and examined by me today. Pt still c/o fatigue.  Denies any CP or SOB.  no events overnight.  3/1- pt seen and examined this am, Denies any CP or SOB.  No overnight events.   3/2- Pt seen and examined by me today.  She is sleeping but arousable.  Denies any CP or SOB this am.    3/6- pt seen and examined by me this am. She denies any CP or SOB.  She was tearful yesterday.  3/7- Pt seen and examined, denies any symptoms this am.   3/8- Pt seen and examined by me today.  Pt denies any CP or SOB this am sitting in chair.   3/9- pt seen and examined, Denies any symptoms this am.       PAST MEDICAL & SURGICAL HISTORY:  [place] (hard of hearing): hearing aid  Anxiety  Hypothyroid  GERD (gastroesophageal reflux disease)  CKD (chronic kidney disease)  MI, old: x3  Chronic UTI  Chronic diastolic congestive heart failure: last exacerbation 2017  Hyperlipidemia, unspecified hyperlipidemia type  Essential hypertension  Neuropathy  Diabetes  Chronic obstructive pulmonary disease, unspecified COPD type  History of cataract surgery: bilateral IOL  S/P lobectomy of lung: left lung pre-cancerous  History of ear surgery  History of cholecystectomy  S/P ORIF (open reduction internal fixation) fracture: left hip 2017  History of total knee replacement, unspecified laterality: bilateral right  left   H/O hernia repair: umbilical and incisional   delivery delivered  S/P appendectomy      MEDICATIONS  (STANDING):  atorvastatin 20 milliGRAM(s) Oral at bedtime  dextrose 5%. 1000 milliLiter(s) (50 mL/Hr) IV Continuous <Continuous>  dextrose 50% Injectable 12.5 Gram(s) IV Push once  diltiazem    milliGRAM(s) Oral daily  docusate sodium 100 milliGRAM(s) Oral two times a day  folic acid 1 milliGRAM(s) Oral daily  insulin glargine Injectable (LANTUS) 15 Unit(s) SubCutaneous at bedtime  insulin lispro (HumaLOG) corrective regimen sliding scale   SubCutaneous three times a day before meals  levothyroxine 112 MICROGram(s) Oral daily  mirtazapine 7.5 milliGRAM(s) Oral at bedtime  pantoprazole  Injectable 40 milliGRAM(s) IV Push every 12 hours  sodium chloride 0.9%. 1000 milliLiter(s) (75 mL/Hr) IV Continuous <Continuous>  tiotropium 18 MICROgram(s) Capsule 1 Capsule(s) Inhalation at bedtime    MEDICATIONS  (PRN):  aluminum hydroxide/magnesium hydroxide/simethicone Suspension 30 milliLiter(s) Oral every 4 hours PRN Dyspepsia  dextrose Gel 1 Dose(s) Oral once PRN Blood Glucose LESS THAN 70 milliGRAM(s)/deciliter  glucagon  Injectable 1 milliGRAM(s) IntraMuscular once PRN Glucose LESS THAN 70 milligrams/deciliter  ondansetron Injectable 4 milliGRAM(s) IV Push every 6 hours PRN Nausea  oxyCODONE    5 mG/acetaminophen 325 mG 1 Tablet(s) Oral every 6 hours PRN Moderate Pain (4 - 6)      FAMILY HISTORY:  Family history of CHF (congestive heart failure) (Mother)      SOCIAL HISTORY:  ex smoker    REVIEW OF SYSTEMS:  CONSTITUTIONAL:  No night sweats.  no fatigue, malaise.  HEENT:  Eyes:  No visual changes.  No eye pain.      ENT:  No runny nose.  No epistaxis.  No sinus pain.  No sore throat.  No odynophagia.  No ear pain.  No congestion.  RESPIRATORY:  No cough.  No wheeze.  No hemoptysis.  No shortness of breath.  CARDIOVASCULAR:  No chest pains.  No palpitations. No shortness of breath, No orthopnea or PND.  GASTROINTESTINAL:  No abdominal pain.  No nausea or vomiting.  No diarrhea or constipation.  No hematemesis.  No hematochezia.  No melena.  GENITOURINARY:  No urgency.  No frequency.  No dysuria.  No hematuria.  No obstructive symptoms.  No discharge.  No pain.  No significant abnormal bleeding.  MUSCULOSKELETAL:  No musculoskeletal pain.  No joint swelling.  No arthritis.  NEUROLOGICAL:  No tingling or numbness or weakness.  PSYCHIATRIC:  No confusion  SKIN:  No rashes.  No lesions.  No wounds.  ENDOCRINE:  No unexplained weight loss.  No polydipsia.  No polyuria.  No polyphagia.  HEMATOLOGIC:  No anemia.  No purpura.  No petechiae.  No prolonged or excessive bleeding.   ALLERGIC AND IMMUNOLOGIC:  No pruritus.  No swelling.         Vital Signs Last 24 Hrs  T(C): 36.3 (2018 11:33), Max: 36.9 (2018 01:20)  T(F): 97.3 (2018 11:33), Max: 98.5 (2018 01:20)  HR: 63 (2018 11:33) (63 - 87)  BP: 155/38 (2018 11:33) (98/46 - 155/38)  BP(mean): --  RR: 17 (2018 11:33) (15 - 20)  SpO2: 100% (2018 11:33) (98% - 100%)    PHYSICAL EXAM-    Constitutional: ill looking frail elderly female in no acute distress.    Head: Head is normocephalic and atraumatic.      Neck: The patient's neck is supple without enlargement, has no palpable thyromegaly nor thyroid nodules and has no jugular venous distention. No audible carotid bruits. There are strong carotid pulses bilaterally. No JVD.     Cardiovascular: Regular rate and rhythm without S3, S4. No murmurs or rubs are appreciated.      Respiratory: CTA b/l   Abdomen: Soft, nontender, nondistended with positive bowel sounds.      Extremity: No tenderness. No  pitting edema No skin discoloration No clubbing No cyanosis.     Neurologic: The patient is alert and oriented.      Skin: No rash, no obvious lesions noted.      Psychiatric: The patient appears to be emotionally stable.      INTERPRETATION OF TELEMETRY: sinus rythm. Bradycardia upto 40/min at the most and nothing lower rate than that.     ECG: sinus rythm, normal axis, no st t changes.     I&O's Detail    2018 07:  -  2018 07:00  --------------------------------------------------------  IN:    Packed Red Blood Cells: 291 mL  Total IN: 291 mL    OUT:  Total OUT: 0 mL    Total NET: 291 mL          LABS:                        7.8    10.1  )-----------( 499      ( 2018 09:08 )             23.5         143  |  111<H>  |  81<H>  ----------------------------<  182<H>  4.6   |  23  |  2.05<H>    Ca    7.8<L>      2018 09:08    TPro  5.9<L>  /  Alb  2.4<L>  /  TBili  0.3  /  DBili  x   /  AST  19  /  ALT  15  /  AlkPhos  141<H>          PT/INR - ( 2018 09:08 )   PT: 32.3 sec;   INR: 2.93 ratio         PTT - ( 2018 18:15 )  PTT:37.8 sec  Urinalysis Basic - ( 2018 18:15 )    Color: Yellow / Appearance: Slightly Turbid / S.010 / pH: x  Gluc: x / Ketone: Negative  / Bili: Negative / Urobili: Negative mg/dL   Blood: x / Protein: 15 mg/dL / Nitrite: Positive   Leuk Esterase: Moderate / RBC: 3-5 /HPF / WBC 11-25   Sq Epi: x / Non Sq Epi: Few / Bacteria: Many      I&O's Summary    2018 07:  -  2018 07:00  --------------------------------------------------------  IN: 291 mL / OUT: 0 mL / NET: 291 mL      BNP  RADIOLOGY & ADDITIONAL STUDIES:

## 2018-03-09 NOTE — PROGRESS NOTE ADULT - ASSESSMENT
Anemia- pt was noted to have 3 angiodysplastic lesions that were cauterized during this admission.  GI input appreciated.     Bradycardia- Can switch cardizem to cardizem CD at the time of discharge to her prior dose.     Chronic HFpEF- Has CardioMEMS-  Appears euvolemic on exam. Will continue to monitor off diuretics.    close monitoring of the renal function and electrolytes.  Goal potassium of 4 and magnesium of 2.     CAD, s/p MI in past- last stress test about an yr ago did not reveal any ischemia.  Continue statin. Antiplatelet agents on hold.    HTN -continue current meds cardizem and hydralazine.       Other medical issues- Management per primary team.   Thank you for allowing me to participate in the care of this patient. Please feel free to contact me with any questions.

## 2018-03-09 NOTE — PROGRESS NOTE ADULT - SUBJECTIVE AND OBJECTIVE BOX
3/5/18: Code sepsis called early this am for a fever of 102. Pt denies any pain or cough, but she is weepy.  States doesnt know what everyone is doing to her. No cp, sob,n/v  3/6/18: feeling much better today; wants to get up and move today; denies any cp, sob, n/v/f/c; no urinary pain  3/7/18: Felt nauseated earlier, now better; no cp, sob, vomiting; left hip feels fine; no dysuria or cough  3/8/18: No cp, sob, feels better -- walked yest, doesnt feel as tired; left hip pain well controlled; eating better  3/9/18: No cp, sob, feels ok, not as tired; ambulated yest; no dysuria, fever sor chills     ROS: AS PER HPI OTHERWISE ALL OTHER SYSTEMS REVIEWED AND ARE NEGATIVE    Vital Signs Last 24 Hrs  T(C): 36.7 (09 Mar 2018 04:52), Max: 36.7 (09 Mar 2018 04:52)  T(F): 98 (09 Mar 2018 04:52), Max: 98 (09 Mar 2018 04:52)  HR: 57 (09 Mar 2018 04:52) (47 - 68)  BP: 154/39 (09 Mar 2018 04:52) (129/66 - 154/39)  BP(mean): --  RR: 18 (09 Mar 2018 04:52) (18 - 19)  SpO2: 100% (09 Mar 2018 04:52) (100% - 100%)    GEN: A and O, NAD, mood stable    HEENT:   NC/AT  EOMI, no oropharyngeal lesions    NECK:   supple    CV:  +S1, +S2, regular, no murmurs or rubs    RESP:   lungs clear to auscultation bilaterally, no wheezing, rales, rhonchi, good air entry bilaterally, DECREASED BS ANGELICA    GI:  abdomen soft, non-tender, non-distended, normal BS,  no abdominal masses, no palpable masses    RECTAL:  not examined    :  not examined    MSK:   normal muscle tone, no atrophy, no rigidity, no contractions    EXT:   no clubbing, no cyanosis, no edema, no calf pain, swelling or erythema, LEFT HIP INCISION C/D/I    VASCULAR:  pulses equal and symmetric in the upper and lower extremities    NEURO:  AAOX3, no focal neurological deficits, follows all commands, able to move extremities spontaneously    SKIN:  no ulcers, lesions or rashes    LABS                              8.3    4.96  )-----------( 193      ( 09 Mar 2018 05:30 )             26.7         143  |  113<H>  |  32<H>  ----------------------------<  124<H>  4.4   |  25  |  1.47<H>    Ca    8.5      09 Mar 2018 05:30              Urinalysis Basic - ( 08 Mar 2018 23:00 )    Color: Yellow / Appearance: Clear / S.020 / pH: x  Gluc: x / Ketone: Negative  / Bili: Negative / Urobili: Negative mg/dL   Blood: x / Protein: 100 mg/dL / Nitrite: Negative   Leuk Esterase: Moderate / RBC: 6-10 /HPF / WBC >50   Sq Epi: x / Non Sq Epi: Few / Bacteria: Many          Blood, Urine: Small ( @ 23:00)                        MEDICATIONS  (STANDING):  atorvastatin 20 milliGRAM(s) Oral at bedtime  dextrose 5%. 1000 milliLiter(s) (50 mL/Hr) IV Continuous <Continuous>  dextrose 50% Injectable 12.5 Gram(s) IV Push once  diltiazem    Tablet 60 milliGRAM(s) Oral every 8 hours  docusate sodium 100 milliGRAM(s) Oral two times a day  folic acid 1 milliGRAM(s) Oral daily  heparin  Injectable 5000 Unit(s) SubCutaneous every 12 hours  hydrALAZINE 25 milliGRAM(s) Oral every 8 hours  insulin glargine Injectable (LANTUS) 15 Unit(s) SubCutaneous at bedtime  insulin lispro (HumaLOG) corrective regimen sliding scale   SubCutaneous three times a day before meals  levothyroxine 112 MICROGram(s) Oral daily  mirtazapine 7.5 milliGRAM(s) Oral at bedtime  pantoprazole  Injectable 40 milliGRAM(s) IV Push every 12 hours  tiotropium 18 MICROgram(s) Capsule 1 Capsule(s) Inhalation at bedtime    MEDICATIONS  (PRN):  aluminum hydroxide/magnesium hydroxide/simethicone Suspension 30 milliLiter(s) Oral every 4 hours PRN Dyspepsia  dextrose Gel 1 Dose(s) Oral once PRN Blood Glucose LESS THAN 70 milliGRAM(s)/deciliter  glucagon  Injectable 1 milliGRAM(s) IntraMuscular once PRN Glucose LESS THAN 70 milligrams/deciliter  melatonin 3 milliGRAM(s) Oral at bedtime PRN Insomnia  ondansetron Injectable 4 milliGRAM(s) IV Push every 6 hours PRN Nausea

## 2018-03-09 NOTE — PROGRESS NOTE ADULT - ASSESSMENT
Pt is a 76 y/o female with h/o chronic diastolic chf, copd with chronic hypoxic respiratory failure (on home o2), osteoarthritis, anxiety, HTN, type 2 diabetes s/p fall and IM nailing of left hip months prior now s/p left THR who was admitted for profound anemia, then with fever    * Fever - UNCLEAR SOURCE - POSSIBLY FROM URINE, MAYBE MICROASPIRATION AS PER id; S/P CEFEPIME AND VANC; NO FURTHER FEVERS, repeat ua sent again yest? pos ua, send urine culture -- never sent initially  * Profound Anemia-no obvious source of bleeding, ? chronic GI loss, h/h improved after blood transfusion, monitor, endoscopic w/up per GI. 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

## 2018-03-10 LAB
ANION GAP SERPL CALC-SCNC: 7 MMOL/L — SIGNIFICANT CHANGE UP (ref 5–17)
BUN SERPL-MCNC: 30 MG/DL — HIGH (ref 7–23)
CALCIUM SERPL-MCNC: 8.6 MG/DL — SIGNIFICANT CHANGE UP (ref 8.5–10.1)
CHLORIDE SERPL-SCNC: 111 MMOL/L — HIGH (ref 96–108)
CO2 SERPL-SCNC: 22 MMOL/L — SIGNIFICANT CHANGE UP (ref 22–31)
CREAT SERPL-MCNC: 1.34 MG/DL — HIGH (ref 0.5–1.3)
CULTURE RESULTS: SIGNIFICANT CHANGE UP
GLUCOSE BLDC GLUCOMTR-MCNC: 136 MG/DL — HIGH (ref 70–99)
GLUCOSE BLDC GLUCOMTR-MCNC: 178 MG/DL — HIGH (ref 70–99)
GLUCOSE BLDC GLUCOMTR-MCNC: 197 MG/DL — HIGH (ref 70–99)
GLUCOSE BLDC GLUCOMTR-MCNC: 265 MG/DL — HIGH (ref 70–99)
GLUCOSE SERPL-MCNC: 167 MG/DL — HIGH (ref 70–99)
HCT VFR BLD CALC: 24.8 % — LOW (ref 34.5–45)
HGB BLD-MCNC: 7.9 G/DL — LOW (ref 11.5–15.5)
MCHC RBC-ENTMCNC: 29.2 PG — SIGNIFICANT CHANGE UP (ref 27–34)
MCHC RBC-ENTMCNC: 31.9 GM/DL — LOW (ref 32–36)
MCV RBC AUTO: 91.5 FL — SIGNIFICANT CHANGE UP (ref 80–100)
NRBC # BLD: 0 /100 WBCS — SIGNIFICANT CHANGE UP (ref 0–0)
PLATELET # BLD AUTO: 194 K/UL — SIGNIFICANT CHANGE UP (ref 150–400)
POTASSIUM SERPL-MCNC: 4.4 MMOL/L — SIGNIFICANT CHANGE UP (ref 3.5–5.3)
POTASSIUM SERPL-SCNC: 4.4 MMOL/L — SIGNIFICANT CHANGE UP (ref 3.5–5.3)
RBC # BLD: 2.71 M/UL — LOW (ref 3.8–5.2)
RBC # FLD: 16.6 % — HIGH (ref 10.3–14.5)
SODIUM SERPL-SCNC: 140 MMOL/L — SIGNIFICANT CHANGE UP (ref 135–145)
SPECIMEN SOURCE: SIGNIFICANT CHANGE UP
WBC # BLD: 5.46 K/UL — SIGNIFICANT CHANGE UP (ref 3.8–10.5)
WBC # FLD AUTO: 5.46 K/UL — SIGNIFICANT CHANGE UP (ref 3.8–10.5)

## 2018-03-10 RX ADMIN — Medication 1 MILLIGRAM(S): at 12:35

## 2018-03-10 RX ADMIN — Medication 25 MILLIGRAM(S): at 22:07

## 2018-03-10 RX ADMIN — Medication 100 MILLIGRAM(S): at 17:22

## 2018-03-10 RX ADMIN — Medication 25 MILLIGRAM(S): at 12:36

## 2018-03-10 RX ADMIN — Medication 25 MILLIGRAM(S): at 05:55

## 2018-03-10 RX ADMIN — HEPARIN SODIUM 5000 UNIT(S): 5000 INJECTION INTRAVENOUS; SUBCUTANEOUS at 17:22

## 2018-03-10 RX ADMIN — PANTOPRAZOLE SODIUM 40 MILLIGRAM(S): 20 TABLET, DELAYED RELEASE ORAL at 05:56

## 2018-03-10 RX ADMIN — Medication 3 MILLIGRAM(S): at 22:07

## 2018-03-10 RX ADMIN — TIOTROPIUM BROMIDE 1 CAPSULE(S): 18 CAPSULE ORAL; RESPIRATORY (INHALATION) at 08:31

## 2018-03-10 RX ADMIN — INSULIN GLARGINE 15 UNIT(S): 100 INJECTION, SOLUTION SUBCUTANEOUS at 22:08

## 2018-03-10 RX ADMIN — PANTOPRAZOLE SODIUM 40 MILLIGRAM(S): 20 TABLET, DELAYED RELEASE ORAL at 17:22

## 2018-03-10 RX ADMIN — ATORVASTATIN CALCIUM 20 MILLIGRAM(S): 80 TABLET, FILM COATED ORAL at 22:07

## 2018-03-10 RX ADMIN — Medication 100 MILLIGRAM(S): at 05:55

## 2018-03-10 RX ADMIN — MIRTAZAPINE 7.5 MILLIGRAM(S): 45 TABLET, ORALLY DISINTEGRATING ORAL at 22:07

## 2018-03-10 RX ADMIN — Medication 1: at 08:21

## 2018-03-10 RX ADMIN — Medication 112 MICROGRAM(S): at 05:55

## 2018-03-10 RX ADMIN — Medication 1: at 12:34

## 2018-03-10 RX ADMIN — HEPARIN SODIUM 5000 UNIT(S): 5000 INJECTION INTRAVENOUS; SUBCUTANEOUS at 05:56

## 2018-03-10 NOTE — PROGRESS NOTE ADULT - SUBJECTIVE AND OBJECTIVE BOX
Pt seen and examined, chart reviewed.  Pt c/o generalized weakness, no CP or SOB.      Tele-junctional to sinus andrez to nsr    Vital Signs Last 24 Hrs  T(C): 36.6 (10 Mar 2018 05:36), Max: 37.3 (09 Mar 2018 17:15)  T(F): 97.9 (10 Mar 2018 05:36), Max: 99.1 (09 Mar 2018 17:15)  HR: 66 (10 Mar 2018 08:33) (45 - 67)  BP: 152/40 (10 Mar 2018 05:36) (126/44 - 163/43)  BP(mean): --  RR: 18 (09 Mar 2018 17:15) (18 - 18)  SpO2: 100% (10 Mar 2018 05:36) (100% - 100%)    Daily     Daily     I&O's Detail      CAPILLARY BLOOD GLUCOSE      POCT Blood Glucose.: 178 mg/dL (10 Mar 2018 07:28)  POCT Blood Glucose.: 163 mg/dL (09 Mar 2018 20:41)  POCT Blood Glucose.: 154 mg/dL (09 Mar 2018 16:53)  POCT Blood Glucose.: 260 mg/dL (09 Mar 2018 11:43)                                      7.9    5.46  )-----------( 194      ( 10 Mar 2018 05:34 )             24.8       03-10    140  |  111<H>  |  30<H>  ----------------------------<  167<H>  4.4   |  22  |  1.34<H>    Ca    8.6      10 Mar 2018 05:34                Urinalysis Basic - ( 08 Mar 2018 23:00 )    Color: Yellow / Appearance: Clear / S.020 / pH: x  Gluc: x / Ketone: Negative  / Bili: Negative / Urobili: Negative mg/dL   Blood: x / Protein: 100 mg/dL / Nitrite: Negative   Leuk Esterase: Moderate / RBC: 6-10 /HPF / WBC >50   Sq Epi: x / Non Sq Epi: Few / Bacteria: Many            MEDICATIONS  (STANDING):  atorvastatin 20 milliGRAM(s) Oral at bedtime  dextrose 5%. 1000 milliLiter(s) (50 mL/Hr) IV Continuous <Continuous>  dextrose 50% Injectable 12.5 Gram(s) IV Push once  diltiazem    Tablet 60 milliGRAM(s) Oral every 8 hours  docusate sodium 100 milliGRAM(s) Oral two times a day  folic acid 1 milliGRAM(s) Oral daily  heparin  Injectable 5000 Unit(s) SubCutaneous every 12 hours  hydrALAZINE 25 milliGRAM(s) Oral every 8 hours  insulin glargine Injectable (LANTUS) 15 Unit(s) SubCutaneous at bedtime  insulin lispro (HumaLOG) corrective regimen sliding scale   SubCutaneous three times a day before meals  levothyroxine 112 MICROGram(s) Oral daily  mirtazapine 7.5 milliGRAM(s) Oral at bedtime  pantoprazole  Injectable 40 milliGRAM(s) IV Push every 12 hours  tiotropium 18 MICROgram(s) Capsule 1 Capsule(s) Inhalation at bedtime    MEDICATIONS  (PRN):  aluminum hydroxide/magnesium hydroxide/simethicone Suspension 30 milliLiter(s) Oral every 4 hours PRN Dyspepsia  dextrose Gel 1 Dose(s) Oral once PRN Blood Glucose LESS THAN 70 milliGRAM(s)/deciliter  glucagon  Injectable 1 milliGRAM(s) IntraMuscular once PRN Glucose LESS THAN 70 milligrams/deciliter  melatonin 3 milliGRAM(s) Oral at bedtime PRN Insomnia  ondansetron Injectable 4 milliGRAM(s) IV Push every 6 hours PRN Nausea

## 2018-03-10 NOTE — PROGRESS NOTE ADULT - ASSESSMENT
Pt is a 78 y/o female with h/o chronic diastolic chf, copd with chronic hypoxic respiratory failure (on home o2), osteoarthritis, anxiety, HTN, type 2 diabetes s/p fall and IM nailing of left hip months prior now s/p left THR who was admitted for profound anemia.  Her hospital course complicated by fevers and code sepsis.    * Profound Anemia- chronic GI loss, h/h improved after blood transfusion, now fluctuating, monitor.  * Bradycardia-due to Cardizem, monitor on short acting cardizem, further recommendations per cardiology.  * Fever-s/p code sepsis ? due to micro aspiraiton, resolved, s/p IV abx.   Monitor for now.  * Lt Hip Replacement- continue pain control, PT  * Stage 3-4 CKD- stable, monitor  * Anxiety-supportive care, xanax prn, increase Remeron monitor  * HTN- bp high overall, add hydralazine, Cardizem with parameters  * Type 2 Diabetes-continue lantus with ISS  * Proph- she is high risk for DVT with recent hip surgery, sedentary, since no signs of active or obvious bleeding, continue proph dose of heparin and monitor for bleeding.  * Disp-OOB, PT, d/c planning ? home with home care ? rehab  * Comm- d/w pt in details at bedside, RN

## 2018-03-11 LAB
ANION GAP SERPL CALC-SCNC: 8 MMOL/L — SIGNIFICANT CHANGE UP (ref 5–17)
BUN SERPL-MCNC: 27 MG/DL — HIGH (ref 7–23)
CALCIUM SERPL-MCNC: 8.4 MG/DL — LOW (ref 8.5–10.1)
CHLORIDE SERPL-SCNC: 112 MMOL/L — HIGH (ref 96–108)
CO2 SERPL-SCNC: 21 MMOL/L — LOW (ref 22–31)
CREAT SERPL-MCNC: 1.35 MG/DL — HIGH (ref 0.5–1.3)
CULTURE RESULTS: SIGNIFICANT CHANGE UP
GLUCOSE BLDC GLUCOMTR-MCNC: 125 MG/DL — HIGH (ref 70–99)
GLUCOSE BLDC GLUCOMTR-MCNC: 164 MG/DL — HIGH (ref 70–99)
GLUCOSE BLDC GLUCOMTR-MCNC: 173 MG/DL — HIGH (ref 70–99)
GLUCOSE BLDC GLUCOMTR-MCNC: 205 MG/DL — HIGH (ref 70–99)
GLUCOSE SERPL-MCNC: 136 MG/DL — HIGH (ref 70–99)
HCT VFR BLD CALC: 28.7 % — LOW (ref 34.5–45)
HGB BLD-MCNC: 8.8 G/DL — LOW (ref 11.5–15.5)
MCHC RBC-ENTMCNC: 28.8 PG — SIGNIFICANT CHANGE UP (ref 27–34)
MCHC RBC-ENTMCNC: 30.7 GM/DL — LOW (ref 32–36)
MCV RBC AUTO: 93.8 FL — SIGNIFICANT CHANGE UP (ref 80–100)
NRBC # BLD: 0 /100 WBCS — SIGNIFICANT CHANGE UP (ref 0–0)
PLATELET # BLD AUTO: 230 K/UL — SIGNIFICANT CHANGE UP (ref 150–400)
POTASSIUM SERPL-MCNC: 5 MMOL/L — SIGNIFICANT CHANGE UP (ref 3.5–5.3)
POTASSIUM SERPL-SCNC: 5 MMOL/L — SIGNIFICANT CHANGE UP (ref 3.5–5.3)
RBC # BLD: 3.06 M/UL — LOW (ref 3.8–5.2)
RBC # FLD: 16.4 % — HIGH (ref 10.3–14.5)
SODIUM SERPL-SCNC: 141 MMOL/L — SIGNIFICANT CHANGE UP (ref 135–145)
SPECIMEN SOURCE: SIGNIFICANT CHANGE UP
WBC # BLD: 7.18 K/UL — SIGNIFICANT CHANGE UP (ref 3.8–10.5)
WBC # FLD AUTO: 7.18 K/UL — SIGNIFICANT CHANGE UP (ref 3.8–10.5)

## 2018-03-11 RX ORDER — OXYCODONE AND ACETAMINOPHEN 5; 325 MG/1; MG/1
1 TABLET ORAL EVERY 6 HOURS
Qty: 0 | Refills: 0 | Status: DISCONTINUED | OUTPATIENT
Start: 2018-03-11 | End: 2018-03-12

## 2018-03-11 RX ADMIN — Medication 3 MILLIGRAM(S): at 22:12

## 2018-03-11 RX ADMIN — PANTOPRAZOLE SODIUM 40 MILLIGRAM(S): 20 TABLET, DELAYED RELEASE ORAL at 17:20

## 2018-03-11 RX ADMIN — INSULIN GLARGINE 15 UNIT(S): 100 INJECTION, SOLUTION SUBCUTANEOUS at 22:08

## 2018-03-11 RX ADMIN — Medication 100 MILLIGRAM(S): at 17:21

## 2018-03-11 RX ADMIN — Medication 2: at 12:27

## 2018-03-11 RX ADMIN — HEPARIN SODIUM 5000 UNIT(S): 5000 INJECTION INTRAVENOUS; SUBCUTANEOUS at 17:21

## 2018-03-11 RX ADMIN — Medication 25 MILLIGRAM(S): at 06:21

## 2018-03-11 RX ADMIN — TIOTROPIUM BROMIDE 1 CAPSULE(S): 18 CAPSULE ORAL; RESPIRATORY (INHALATION) at 09:00

## 2018-03-11 RX ADMIN — Medication 1: at 17:20

## 2018-03-11 RX ADMIN — Medication 25 MILLIGRAM(S): at 13:35

## 2018-03-11 RX ADMIN — Medication 100 MILLIGRAM(S): at 06:21

## 2018-03-11 RX ADMIN — HEPARIN SODIUM 5000 UNIT(S): 5000 INJECTION INTRAVENOUS; SUBCUTANEOUS at 06:20

## 2018-03-11 RX ADMIN — OXYCODONE AND ACETAMINOPHEN 1 TABLET(S): 5; 325 TABLET ORAL at 22:12

## 2018-03-11 RX ADMIN — MIRTAZAPINE 7.5 MILLIGRAM(S): 45 TABLET, ORALLY DISINTEGRATING ORAL at 22:07

## 2018-03-11 RX ADMIN — Medication 25 MILLIGRAM(S): at 22:07

## 2018-03-11 RX ADMIN — Medication 1 MILLIGRAM(S): at 13:35

## 2018-03-11 RX ADMIN — ATORVASTATIN CALCIUM 20 MILLIGRAM(S): 80 TABLET, FILM COATED ORAL at 22:07

## 2018-03-11 RX ADMIN — Medication 112 MICROGRAM(S): at 06:21

## 2018-03-11 RX ADMIN — PANTOPRAZOLE SODIUM 40 MILLIGRAM(S): 20 TABLET, DELAYED RELEASE ORAL at 06:20

## 2018-03-11 NOTE — PROGRESS NOTE ADULT - CARDIOVASCULAR DETAILS
positive S2/positive S1
positive S1/positive S2
positive S1/positive S2
bradycardia/positive S1/positive S2

## 2018-03-11 NOTE — PROGRESS NOTE ADULT - MS EXT PE MLT D E PC
no clubbing/no pedal edema/no cyanosis
no clubbing/no cyanosis
no cyanosis/no clubbing
no clubbing/no cyanosis/no pedal edema

## 2018-03-11 NOTE — PROGRESS NOTE ADULT - RS GEN PE MLT RESP DETAILS PC
respirations non-labored/breath sounds equal/clear to auscultation bilaterally
breath sounds equal/clear to auscultation bilaterally/respirations non-labored
breath sounds equal/respirations non-labored/clear to auscultation bilaterally
clear to auscultation bilaterally/breath sounds equal/respirations non-labored

## 2018-03-11 NOTE — PROGRESS NOTE ADULT - CONSTITUTIONAL COMMENTS
Elderly female in NAD
In NAD, sitting up in chair
In NAD, sitting up in chair.
Older female, sitting up in bed in NAD

## 2018-03-11 NOTE — PROGRESS NOTE ADULT - SUBJECTIVE AND OBJECTIVE BOX
Pt with uneventful night, no CP, SOB or bleeding.  Pt denies any urinary symptoms.  Her cardizem is being held due to her bradycardia.     Vital Signs Last 24 Hrs  T(C): 36.6 (11 Mar 2018 05:04), Max: 36.8 (10 Mar 2018 22:13)  T(F): 97.9 (11 Mar 2018 05:04), Max: 98.2 (10 Mar 2018 22:13)  HR: 60 (11 Mar 2018 09:01) (49 - 67)  BP: 167/41 (11 Mar 2018 05:04) (132/44 - 175/36)  BP(mean): --  RR: 16 (10 Mar 2018 22:13) (16 - 16)  SpO2: 100% (11 Mar 2018 05:04) (99% - 100%)    Daily     Daily Weight in k.2 (11 Mar 2018 06:18)    I&O's Detail    10 Mar 2018 06:01  -  11 Mar 2018 07:00  --------------------------------------------------------  IN:    Oral Fluid: 600 mL  Total IN: 600 mL    OUT:  Total OUT: 0 mL    Total NET: 600 mL          CAPILLARY BLOOD GLUCOSE      POCT Blood Glucose.: 125 mg/dL (11 Mar 2018 07:36)  POCT Blood Glucose.: 265 mg/dL (10 Mar 2018 22:03)  POCT Blood Glucose.: 136 mg/dL (10 Mar 2018 16:43)  POCT Blood Glucose.: 197 mg/dL (10 Mar 2018 11:53)        Urine cult-10-49K candida                               7.9    5.46  )-----------( 194      ( 10 Mar 2018 05:34 )             24.8       03-10    140  |  111<H>  |  30<H>  ----------------------------<  167<H>  4.4   |  22  |  1.34<H>    Ca    8.6      10 Mar 2018 05:34                        MEDICATIONS  (STANDING):  atorvastatin 20 milliGRAM(s) Oral at bedtime  dextrose 5%. 1000 milliLiter(s) (50 mL/Hr) IV Continuous <Continuous>  dextrose 50% Injectable 12.5 Gram(s) IV Push once  diltiazem    Tablet 60 milliGRAM(s) Oral every 8 hours  docusate sodium 100 milliGRAM(s) Oral two times a day  folic acid 1 milliGRAM(s) Oral daily  heparin  Injectable 5000 Unit(s) SubCutaneous every 12 hours  hydrALAZINE 25 milliGRAM(s) Oral every 8 hours  insulin glargine Injectable (LANTUS) 15 Unit(s) SubCutaneous at bedtime  insulin lispro (HumaLOG) corrective regimen sliding scale   SubCutaneous three times a day before meals  levothyroxine 112 MICROGram(s) Oral daily  mirtazapine 7.5 milliGRAM(s) Oral at bedtime  pantoprazole  Injectable 40 milliGRAM(s) IV Push every 12 hours  tiotropium 18 MICROgram(s) Capsule 1 Capsule(s) Inhalation at bedtime    MEDICATIONS  (PRN):  aluminum hydroxide/magnesium hydroxide/simethicone Suspension 30 milliLiter(s) Oral every 4 hours PRN Dyspepsia  dextrose Gel 1 Dose(s) Oral once PRN Blood Glucose LESS THAN 70 milliGRAM(s)/deciliter  glucagon  Injectable 1 milliGRAM(s) IntraMuscular once PRN Glucose LESS THAN 70 milligrams/deciliter  melatonin 3 milliGRAM(s) Oral at bedtime PRN Insomnia  ondansetron Injectable 4 milliGRAM(s) IV Push every 6 hours PRN Nausea

## 2018-03-11 NOTE — PROGRESS NOTE ADULT - ASSESSMENT
Pt is a 78 y/o female with h/o chronic diastolic chf, copd with chronic hypoxic respiratory failure (on home o2), osteoarthritis, anxiety, HTN, type 2 diabetes s/p fall and IM nailing of left hip months prior now s/p left THR who was admitted for profound anemia.  Her hospital course complicated by fevers and code sepsis.    * Profound Anemia- chronic GI loss, h/h improved after blood transfusion, now fluctuating, monitor, if continues to drop than needs evaluation.  * Bradycardia-due to Cardizem, monitor on short acting cardizem which is being held, further recommendations per cardiology.  * Fever-s/p code sepsis ? due to micro aspiraiton, resolved, s/p IV abx.   Monitor for now, her candida in her urine cult does not need treatment ? 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  * Disp-OOB, PT, d/c planning ? home with home care for tomorrow if stable.  * Comm- d/w pt in details at bedside, also updated her daughter last night (all questions answered), RN

## 2018-03-11 NOTE — PROGRESS NOTE ADULT - GASTROINTESTINAL DETAILS
no distention/nontender/soft/bowel sounds normal
nontender/bowel sounds normal/soft/no distention
nontender/no distention/soft/bowel sounds normal
no distention/soft/bowel sounds normal/nontender

## 2018-03-11 NOTE — PROGRESS NOTE ADULT - PSYCHIATRIC DETAILS
normal affect/normal behavior
normal affect/normal behavior
normal behavior/normal affect
normal affect/normal behavior

## 2018-03-12 ENCOUNTER — TRANSCRIPTION ENCOUNTER (OUTPATIENT)
Age: 78
End: 2018-03-12

## 2018-03-12 VITALS — WEIGHT: 149.69 LBS

## 2018-03-12 LAB
ANION GAP SERPL CALC-SCNC: 6 MMOL/L — SIGNIFICANT CHANGE UP (ref 5–17)
BUN SERPL-MCNC: 29 MG/DL — HIGH (ref 7–23)
CALCIUM SERPL-MCNC: 8.7 MG/DL — SIGNIFICANT CHANGE UP (ref 8.5–10.1)
CHLORIDE SERPL-SCNC: 110 MMOL/L — HIGH (ref 96–108)
CO2 SERPL-SCNC: 24 MMOL/L — SIGNIFICANT CHANGE UP (ref 22–31)
CREAT SERPL-MCNC: 1.34 MG/DL — HIGH (ref 0.5–1.3)
GLUCOSE BLDC GLUCOMTR-MCNC: 152 MG/DL — HIGH (ref 70–99)
GLUCOSE BLDC GLUCOMTR-MCNC: 157 MG/DL — HIGH (ref 70–99)
GLUCOSE SERPL-MCNC: 145 MG/DL — HIGH (ref 70–99)
HCT VFR BLD CALC: 28.1 % — LOW (ref 34.5–45)
HGB BLD-MCNC: 8.6 G/DL — LOW (ref 11.5–15.5)
MCHC RBC-ENTMCNC: 28.8 PG — SIGNIFICANT CHANGE UP (ref 27–34)
MCHC RBC-ENTMCNC: 30.6 GM/DL — LOW (ref 32–36)
MCV RBC AUTO: 94 FL — SIGNIFICANT CHANGE UP (ref 80–100)
NRBC # BLD: 0 /100 WBCS — SIGNIFICANT CHANGE UP (ref 0–0)
PLATELET # BLD AUTO: 244 K/UL — SIGNIFICANT CHANGE UP (ref 150–400)
POTASSIUM SERPL-MCNC: 4.9 MMOL/L — SIGNIFICANT CHANGE UP (ref 3.5–5.3)
POTASSIUM SERPL-SCNC: 4.9 MMOL/L — SIGNIFICANT CHANGE UP (ref 3.5–5.3)
RBC # BLD: 2.99 M/UL — LOW (ref 3.8–5.2)
RBC # FLD: 16.5 % — HIGH (ref 10.3–14.5)
SODIUM SERPL-SCNC: 140 MMOL/L — SIGNIFICANT CHANGE UP (ref 135–145)
WBC # BLD: 6.29 K/UL — SIGNIFICANT CHANGE UP (ref 3.8–10.5)
WBC # FLD AUTO: 6.29 K/UL — SIGNIFICANT CHANGE UP (ref 3.8–10.5)

## 2018-03-12 RX ORDER — BUMETANIDE 0.25 MG/ML
1 INJECTION INTRAMUSCULAR; INTRAVENOUS
Qty: 0 | Refills: 0 | COMMUNITY

## 2018-03-12 RX ADMIN — Medication 25 MILLIGRAM(S): at 13:15

## 2018-03-12 RX ADMIN — Medication 1 MILLIGRAM(S): at 12:32

## 2018-03-12 RX ADMIN — PANTOPRAZOLE SODIUM 40 MILLIGRAM(S): 20 TABLET, DELAYED RELEASE ORAL at 05:47

## 2018-03-12 RX ADMIN — Medication 1: at 10:47

## 2018-03-12 RX ADMIN — Medication 25 MILLIGRAM(S): at 05:46

## 2018-03-12 RX ADMIN — Medication 1: at 08:36

## 2018-03-12 RX ADMIN — Medication 112 MICROGRAM(S): at 05:47

## 2018-03-12 RX ADMIN — TIOTROPIUM BROMIDE 1 CAPSULE(S): 18 CAPSULE ORAL; RESPIRATORY (INHALATION) at 08:51

## 2018-03-12 RX ADMIN — HEPARIN SODIUM 5000 UNIT(S): 5000 INJECTION INTRAVENOUS; SUBCUTANEOUS at 05:47

## 2018-03-12 RX ADMIN — Medication 100 MILLIGRAM(S): at 05:47

## 2018-03-12 NOTE — DISCHARGE NOTE ADULT - PLAN OF CARE
no further bleeding h/h holding stable  will need outpt iv iron infusions as was taking in the past - follow up with giancarlo  monitor for any rectal bleeding or dark stools conpensated  f/u with brenda re: when to restart bumex stable o2 sats, no sob cont o2 supplementation

## 2018-03-12 NOTE — DISCHARGE NOTE ADULT - MEDICATION SUMMARY - MEDICATIONS TO TAKE
I will START or STAY ON the medications listed below when I get home from the hospital:    aspirin 81 mg oral tablet  -- 1 tab(s) by mouth once a day  -- Indication: For Cad    oxyCODONE-acetaminophen 5 mg-325 mg oral tablet  -- 1 tab(s) by mouth every 6 hours, As Needed -for severe pain MDD:6  -- Caution federal law prohibits the transfer of this drug to any person other  than the person for whom it was prescribed.  May cause drowsiness.  Alcohol may intensify this effect.  Use care when operating dangerous machinery.  This prescription cannot be refilled.  This product contains acetaminophen.  Do not use  with any other product containing acetaminophen to prevent possible liver damage.  Using more of this medication than prescribed may cause serious breathing problems.    -- Indication: For Hip pain    dilTIAZem 180 mg/24 hours oral capsule, extended release  -- 1 cap(s) by mouth once a day  -- Indication: For Htn    Remeron  -- 7.5 mg daily evening  -- Indication: For insomnia    HumaLOG 100 units/mL injectable solution  -- sliding scale      18 units at dinner last night  -- Indication: For Diabetes    Toujeo SoloStar 300 units/mL subcutaneous solution  -- 45 units at bedtime    took 20 units last night  -- Indication: For Diabetes    atorvastatin 20 mg oral tablet  -- 1 tab(s) by mouth once a day (at bedtime)  -- Indication: For Hyperlipidemia    Plavix 75 mg oral tablet  -- 1 tab(s) by mouth once a day-hold  -- Indication: For Cad    Advair Diskus 250 mcg-50 mcg inhalation powder  -- 1 puff(s) inhaled 2 times a day  -- Indication: For Copd    Spiriva 18 mcg inhalation capsule  -- 1 cap(s) inhaled once a day (in the evening)  -- Indication: For Copd    Colace 100 mg oral capsule  -- 1 cap(s) by mouth 2 times a day MDD:2, while on narcotics  -- Medication should be taken with plenty of water.    -- Indication: For Constipation    pantoprazole 40 mg oral delayed release tablet  -- 1 tab(s) by mouth once a day, stomach protection  -- It is very important that you take or use this exactly as directed.  Do not skip doses or discontinue unless directed by your doctor.  Obtain medical advice before taking any non-prescription drugs as some may affect the action of this medication.  Swallow whole.  Do not crush.    -- Indication: For Gerd    levothyroxine 112 mcg (0.112 mg) oral tablet  -- 1 tab(s) by mouth once a day  -- Indication: For Hypothyroidism    Hiprex 1 g oral tablet  -- 1 tab evening  -- Indication: For Chronic uti    Caltrate 600 + D oral tablet  -- 1 tab(s) by mouth 2 times a day  -- Indication: For Gen health    folic acid 1 mg oral tablet  -- 1 tab(s) by mouth once a day (in the morning)  -- Indication: For Gen health

## 2018-03-12 NOTE — DISCHARGE NOTE ADULT - SECONDARY DIAGNOSIS.
Chronic diastolic congestive heart failure Chronic obstructive pulmonary disease, unspecified COPD type

## 2018-03-12 NOTE — PROGRESS NOTE ADULT - SUBJECTIVE AND OBJECTIVE BOX
3/12/18: Feels good today; just walked; sitting up in the chair eating breakfast.  No complaints denies any dizziness, cp, sob, palpitations    ROS: AS PER HPI OTHERWISE ALL OTHER SYSTEMS REVIEWED AND ARE NEGATIVE    Vital Signs Last 24 Hrs  T(C): 36.8 (12 Mar 2018 05:34), Max: 36.8 (12 Mar 2018 05:34)  T(F): 98.2 (12 Mar 2018 05:34), Max: 98.2 (12 Mar 2018 05:34)  HR: 60 (12 Mar 2018 08:50) (60 - 73)  BP: 150/40 (12 Mar 2018 05:34) (147/42 - 174/50)  BP(mean): --  RR: 18 (12 Mar 2018 05:34) (18 - 18)  SpO2: 99% (12 Mar 2018 05:34) (99% - 100%)    GEN: A and O, NAD, mood stable  HEENT:  NC/AT, EOMI, no oropharyngeal lesions    NECK:   supple    CV:  +S1, +S2, regular, no murmurs or rubs    RESP:   lungs clear to auscultation bilaterally, no wheezing, rales, rhonchi, good air entry bilaterally, DECREASED BS ANGELICA    GI:  abdomen soft, non-tender, non-distended, normal BS,  no abdominal masses, no palpable masses    RECTAL:  not examined    :  not examined    MSK:   normal muscle tone, no atrophy, no rigidity, no contractions    EXT:   no clubbing, no cyanosis, no edema, no calf pain, swelling or erythema    VASCULAR:  pulses equal and symmetric in the upper and lower extremities    NEURO:  AAOX3, no focal neurological deficits, follows all commands, able to move extremities spontaneously    SKIN:  no ulcers, lesions or rashes    LABS:                            8.6    6.29  )-----------( 244      ( 12 Mar 2018 06:19 )             28.1     03-12    140  |  110<H>  |  29<H>  ----------------------------<  145<H>  4.9   |  24  |  1.34<H>    Ca    8.7      12 Mar 2018 06:19    MEDICATIONS  (STANDING):  atorvastatin 20 milliGRAM(s) Oral at bedtime  dextrose 5%. 1000 milliLiter(s) (50 mL/Hr) IV Continuous <Continuous>  dextrose 50% Injectable 12.5 Gram(s) IV Push once  diltiazem    Tablet 60 milliGRAM(s) Oral every 8 hours  docusate sodium 100 milliGRAM(s) Oral two times a day  folic acid 1 milliGRAM(s) Oral daily  heparin  Injectable 5000 Unit(s) SubCutaneous every 12 hours  hydrALAZINE 25 milliGRAM(s) Oral every 8 hours  insulin glargine Injectable (LANTUS) 15 Unit(s) SubCutaneous at bedtime  insulin lispro (HumaLOG) corrective regimen sliding scale   SubCutaneous three times a day before meals  levothyroxine 112 MICROGram(s) Oral daily  mirtazapine 7.5 milliGRAM(s) Oral at bedtime  pantoprazole  Injectable 40 milliGRAM(s) IV Push every 12 hours  tiotropium 18 MICROgram(s) Capsule 1 Capsule(s) Inhalation at bedtime    MEDICATIONS  (PRN):  aluminum hydroxide/magnesium hydroxide/simethicone Suspension 30 milliLiter(s) Oral every 4 hours PRN Dyspepsia  dextrose Gel 1 Dose(s) Oral once PRN Blood Glucose LESS THAN 70 milliGRAM(s)/deciliter  glucagon  Injectable 1 milliGRAM(s) IntraMuscular once PRN Glucose LESS THAN 70 milligrams/deciliter  melatonin 3 milliGRAM(s) Oral at bedtime PRN Insomnia  ondansetron Injectable 4 milliGRAM(s) IV Push every 6 hours PRN Nausea  oxyCODONE    5 mG/acetaminophen 325 mG 1 Tablet(s) Oral every 6 hours PRN Severe Pain (7 - 10)

## 2018-03-12 NOTE — PROGRESS NOTE ADULT - ASSESSMENT
Pt is a 78 y/o female with h/o chronic diastolic chf, copd with chronic hypoxic respiratory failure (on home o2), osteoarthritis, anxiety, HTN, type 2 diabetes s/p fall and IM nailing of left hip months prior now s/p left THR who was admitted for profound anemia.  Her hospital course complicated by fevers and code sepsis.    * Profound Anemia- chronic GI loss, h/h improved after blood transfusion, now fluctuating, monitor, if continues to drop than needs evaluation. h/h STABLE. Pt has a hx of chronic anemia and is on IV iron infusions -- pt to follow /up with heme for IV iron  * Bradycardia-due to Cardizem, monitor on short acting cardizem which is being held, further recommendations per cardiology.  * Fever-s/p code sepsis ? due to micro aspiraiton, resolved, s/p IV abx.   Monitor for now, her candida in her urine cult does not need treatment ? 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  * Disp-OOB, PT, d/c planning - to go home  * Comm- d/w pt in details at bedside, rn, social work

## 2018-03-12 NOTE — PROGRESS NOTE ADULT - SUBJECTIVE AND OBJECTIVE BOX
Patient is a 77y old  Female who presents with a chief complaint of fatigue, low hbg.     HPI:  77 year old female PMH of CHF( diastolic dysfn) last exacerbation 2018 ( pt has mems implant), COPD ( never intubated, but on home O2), HTN, HLD, CAD ( on plavix and aspirin), T2DM on insulin, CKD , chronic UTI on Hiprex, lobectomy of lung for precancerous cells  s/p IM nailing of Left hip fracture after fall in , recent ORIF of left hip due to hardware malfunctioning and was sent to rehab on coumadin sent in from rehab yesterday for symptomatic anemia.    Pt was transfused with PRBC since admission and she was noted to be bradycardic today.  She was c/o mild left chest pain yesterday that was very brief without any radiation or recurrence.    18- pt seen and examined by me today. Pt still c/o fatigue.  Denies any CP or SOB.  no events overnight.  3/1- pt seen and examined this am, Denies any CP or SOB.  No overnight events.   3/2- Pt seen and examined by me today.  She is sleeping but arousable.  Denies any CP or SOB this am.    3/6- pt seen and examined by me this am. She denies any CP or SOB.  She was tearful yesterday.  3/7- Pt seen and examined, denies any symptoms this am.   3/8- Pt seen and examined by me today.  Pt denies any CP or SOB this am sitting in chair.   3/9- pt seen and examined, Denies any symptoms this am.     3/9 Examined at bedsite, doing well, no complains at present      PAST MEDICAL & SURGICAL HISTORY:  [place] (hard of hearing): hearing aid  Anxiety  Hypothyroid  GERD (gastroesophageal reflux disease)  CKD (chronic kidney disease)  MI, old: x3  Chronic UTI  Chronic diastolic congestive heart failure: last exacerbation 2017  Hyperlipidemia, unspecified hyperlipidemia type  Essential hypertension  Neuropathy  Diabetes  Chronic obstructive pulmonary disease, unspecified COPD type  History of cataract surgery: bilateral IOL  S/P lobectomy of lung: left lung pre-cancerous  History of ear surgery  History of cholecystectomy  S/P ORIF (open reduction internal fixation) fracture: left hip 2017  History of total knee replacement, unspecified laterality: bilateral right  left   H/O hernia repair: umbilical and incisional   delivery delivered  S/P appendectomy      MEDICATIONS  (STANDING):  atorvastatin 20 milliGRAM(s) Oral at bedtime  dextrose 5%. 1000 milliLiter(s) (50 mL/Hr) IV Continuous <Continuous>  dextrose 50% Injectable 12.5 Gram(s) IV Push once  diltiazem    milliGRAM(s) Oral daily  docusate sodium 100 milliGRAM(s) Oral two times a day  folic acid 1 milliGRAM(s) Oral daily  insulin glargine Injectable (LANTUS) 15 Unit(s) SubCutaneous at bedtime  insulin lispro (HumaLOG) corrective regimen sliding scale   SubCutaneous three times a day before meals  levothyroxine 112 MICROGram(s) Oral daily  mirtazapine 7.5 milliGRAM(s) Oral at bedtime  pantoprazole  Injectable 40 milliGRAM(s) IV Push every 12 hours  sodium chloride 0.9%. 1000 milliLiter(s) (75 mL/Hr) IV Continuous <Continuous>  tiotropium 18 MICROgram(s) Capsule 1 Capsule(s) Inhalation at bedtime    MEDICATIONS  (PRN):  aluminum hydroxide/magnesium hydroxide/simethicone Suspension 30 milliLiter(s) Oral every 4 hours PRN Dyspepsia  dextrose Gel 1 Dose(s) Oral once PRN Blood Glucose LESS THAN 70 milliGRAM(s)/deciliter  glucagon  Injectable 1 milliGRAM(s) IntraMuscular once PRN Glucose LESS THAN 70 milligrams/deciliter  ondansetron Injectable 4 milliGRAM(s) IV Push every 6 hours PRN Nausea  oxyCODONE    5 mG/acetaminophen 325 mG 1 Tablet(s) Oral every 6 hours PRN Moderate Pain (4 - 6)      FAMILY HISTORY:  Family history of CHF (congestive heart failure) (Mother)      SOCIAL HISTORY:  ex smoker    REVIEW OF SYSTEMS:  CONSTITUTIONAL:  No night sweats.  no fatigue, malaise.  HEENT:  Eyes:  No visual changes.  No eye pain.      ENT:  No runny nose.  No epistaxis.  No sinus pain.  No sore throat.  No odynophagia.  No ear pain.  No congestion.  RESPIRATORY:  No cough.  No wheeze.  No hemoptysis.  No shortness of breath.  CARDIOVASCULAR:  No chest pains.  No palpitations. No shortness of breath, No orthopnea or PND.  GASTROINTESTINAL:  No abdominal pain.  No nausea or vomiting.  No diarrhea or constipation.  No hematemesis.  No hematochezia.  No melena.  GENITOURINARY:  No urgency.  No frequency.  No dysuria.  No hematuria.  No obstructive symptoms.  No discharge.  No pain.  No significant abnormal bleeding.  MUSCULOSKELETAL:  No musculoskeletal pain.  No joint swelling.  No arthritis.  NEUROLOGICAL:  No tingling or numbness or weakness.  PSYCHIATRIC:  No confusion  SKIN:  No rashes.  No lesions.  No wounds.  ENDOCRINE:  No unexplained weight loss.  No polydipsia.  No polyuria.  No polyphagia.  HEMATOLOGIC:  No anemia.  No purpura.  No petechiae.  No prolonged or excessive bleeding.   ALLERGIC AND IMMUNOLOGIC:  No pruritus.  No swelling.         Vital Signs Last 24 Hrs  T(C): 36.3 (2018 11:33), Max: 36.9 (2018 01:20)  T(F): 97.3 (:), Max: 98.5 (2018 01:20)  HR: 63 (:) (63 - 87)  BP: 155/38 (2018 11:33) (98/46 - 155/38)  BP(mean): --  RR: 17 (:) (15 - 20)  SpO2: 100% (:) (98% - 100%)    PHYSICAL EXAM-    Constitutional: ill looking frail elderly female in no acute distress.    Head: Head is normocephalic and atraumatic.      Neck: The patient's neck is supple without enlargement, has no palpable thyromegaly nor thyroid nodules and has no jugular venous distention. No audible carotid bruits. There are strong carotid pulses bilaterally. No JVD.     Cardiovascular: Regular rate and rhythm without S3, S4. No murmurs or rubs are appreciated.      Respiratory: CTA b/l   Abdomen: Soft, nontender, nondistended with positive bowel sounds.      Extremity: No tenderness. No  pitting edema No skin discoloration No clubbing No cyanosis.     Neurologic: The patient is alert and oriented.      Skin: No rash, no obvious lesions noted.      Psychiatric: The patient appears to be emotionally stable.      INTERPRETATION OF TELEMETRY: sinus rythm. Bradycardia upto 40/min at the most and nothing lower rate than that.     ECG: sinus rythm, normal axis, no st t changes.     I&O's Detail    2018 07:  -  2018 07:00  --------------------------------------------------------  IN:    Packed Red Blood Cells: 291 mL  Total IN: 291 mL    OUT:  Total OUT: 0 mL    Total NET: 291 mL          LABS:                        7.8    10.1  )-----------( 499      ( 2018 09:08 )             23.5         143  |  111<H>  |  81<H>  ----------------------------<  182<H>  4.6   |  23  |  2.05<H>    Ca    7.8<L>      2018 09:08    TPro  5.9<L>  /  Alb  2.4<L>  /  TBili  0.3  /  DBili  x   /  AST  19  /  ALT  15  /  AlkPhos  141<H>          PT/INR - ( 2018 09:08 )   PT: 32.3 sec;   INR: 2.93 ratio         PTT - ( 2018 18:15 )  PTT:37.8 sec  Urinalysis Basic - ( 2018 18:15 )    Color: Yellow / Appearance: Slightly Turbid / S.010 / pH: x  Gluc: x / Ketone: Negative  / Bili: Negative / Urobili: Negative mg/dL   Blood: x / Protein: 15 mg/dL / Nitrite: Positive   Leuk Esterase: Moderate / RBC: 3-5 /HPF / WBC 11-25   Sq Epi: x / Non Sq Epi: Few / Bacteria: Many      I&O's Summary    2018 07:  -  2018 07:00  --------------------------------------------------------  IN: 291 mL / OUT: 0 mL / NET: 291 mL      BNP  RADIOLOGY & ADDITIONAL STUDIES:

## 2018-03-12 NOTE — DISCHARGE NOTE ADULT - CARE PLAN
Principal Discharge DX:	Anemia  Goal:	no further bleeding  Assessment and plan of treatment:	h/h holding stable  will need outpt iv iron infusions as was taking in the past - [facility]  monitor for any rectal bleeding or dark stools  Secondary Diagnosis:	Chronic diastolic congestive heart failure  Assessment and plan of treatment:	conpensated  f/u with brenda re: when to restart bumex  Secondary Diagnosis:	Chronic obstructive pulmonary disease, unspecified COPD type  Goal:	stable o2 sats, no sob  Assessment and plan of treatment:	cont o2 supplementation

## 2018-03-12 NOTE — DISCHARGE NOTE ADULT - MEDICATION SUMMARY - MEDICATIONS TO STOP TAKING
I will STOP taking the medications listed below when I get home from the hospital:    Bumex 1 mg oral tablet  -- 1 tab(s) by mouth once a day    Coumadin 2.5 mg oral tablet  -- 1 tab(s) by mouth once a day (at bedtime) MDD:2.5mg take as directed by anticoagulstion services  -- Do not take this drug if you are pregnant.  It is very important that you take or use this exactly as directed.  Do not skip doses or discontinue unless directed by your doctor.  Obtain medical advice before taking any non-prescription drugs as some may affect the action of this medication.

## 2018-03-12 NOTE — PROGRESS NOTE ADULT - ASSESSMENT
Anemia- pt was noted to have 3 angiodysplastic lesions that were cauterized during this admission.  GI input appreciated.     Bradycardia- Can switch cardizem to cardizem CD at the time of discharge to her prior dose.     Chronic HFpEF- Has CardioMEMS-  Appears euvolemic on exam. Will continue to monitor off diuretics.    close monitoring of the renal function and electrolytes.  Goal potassium of 4 and magnesium of 2.     CAD, s/p MI in past- last stress test about an yr ago did not reveal any ischemia.  Continue statin. Antiplatelet agents on hold.    D/C planning

## 2018-03-12 NOTE — PROGRESS NOTE ADULT - PROVIDER SPECIALTY LIST ADULT
Cardiology
Gastroenterology
Infectious Disease
Infectious Disease
Internal Medicine
Cardiology
Cardiology
Internal Medicine
Internal Medicine
Cardiology
Gastroenterology
Internal Medicine

## 2018-03-12 NOTE — DISCHARGE NOTE ADULT - PATIENT PORTAL LINK FT
You can access the [facility] [facility] Portal, offered by [facility], by registering with the following website: [email or web address]

## 2018-03-12 NOTE — DISCHARGE NOTE ADULT - CARE PROVIDERS DIRECT ADDRESSES
,DirectAddress_Unknown,[email or web address],DirectAddress_Unknown

## 2018-03-12 NOTE — DISCHARGE NOTE ADULT - HOSPITAL COURSE
Pt is a 76 y/o female with h/o chronic diastolic chf, copd with chronic hypoxic respiratory failure (on home o2), osteoarthritis, anxiety, HTN, type 2 diabetes s/p fall and IM nailing of left hip months prior now s/p left THR who was admitted for profound anemia.  Her hospital course complicated by fevers and code sepsis.    * Profound Anemia- chronic GI loss, h/h improved after blood transfusion, now fluctuating, monitor, if continues to drop than needs evaluation. h/h STABLE. Pt has a hx of chronic anemia and is on IV iron infusions -- pt to follow /up with heme for IV iron  * Bradycardia-due to Cardizem, monitor on short acting cardizem which is being held, further recommendations per cardiology.  * Fever-s/p code sepsis ? due to micro aspiraiton, resolved, s/p IV abx.   Monitor for now, her candida in her urine cult does not need treatment ? 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  * Disp-OOB, PT, d/c planning - to go home    See today's progress note for full exam and details!

## 2018-03-12 NOTE — DISCHARGE NOTE ADULT - CARE PROVIDER_API CALL
Soco Phelps), Internal Medicine  [address]  [place] [address]  Phone: [phone]  Fax: [phone]    Doug Carmichael), Medical Oncology  [address] [place] [address]  Phone: [phone]  Fax: [phone]    Kathie Marquez (JUAN), Cardiovascular Disease; Internal Medicine  [address] [place] [address]  Phone: [phone]  Fax: [phone]

## 2018-03-16 DIAGNOSIS — Z88.0 ALLERGY STATUS TO PENICILLIN: ICD-10-CM

## 2018-03-16 DIAGNOSIS — N17.9 ACUTE KIDNEY FAILURE, UNSPECIFIED: ICD-10-CM

## 2018-03-16 DIAGNOSIS — D50.0 IRON DEFICIENCY ANEMIA SECONDARY TO BLOOD LOSS (CHRONIC): ICD-10-CM

## 2018-03-16 DIAGNOSIS — E11.22 TYPE 2 DIABETES MELLITUS WITH DIABETIC CHRONIC KIDNEY DISEASE: ICD-10-CM

## 2018-03-16 DIAGNOSIS — I50.32 CHRONIC DIASTOLIC (CONGESTIVE) HEART FAILURE: ICD-10-CM

## 2018-03-16 DIAGNOSIS — E03.9 HYPOTHYROIDISM, UNSPECIFIED: ICD-10-CM

## 2018-03-16 DIAGNOSIS — F41.9 ANXIETY DISORDER, UNSPECIFIED: ICD-10-CM

## 2018-03-16 DIAGNOSIS — K21.9 GASTRO-ESOPHAGEAL REFLUX DISEASE WITHOUT ESOPHAGITIS: ICD-10-CM

## 2018-03-16 DIAGNOSIS — D62 ACUTE POSTHEMORRHAGIC ANEMIA: ICD-10-CM

## 2018-03-16 DIAGNOSIS — R50.9 FEVER, UNSPECIFIED: ICD-10-CM

## 2018-03-16 DIAGNOSIS — J44.9 CHRONIC OBSTRUCTIVE PULMONARY DISEASE, UNSPECIFIED: ICD-10-CM

## 2018-03-16 DIAGNOSIS — E11.40 TYPE 2 DIABETES MELLITUS WITH DIABETIC NEUROPATHY, UNSPECIFIED: ICD-10-CM

## 2018-03-16 DIAGNOSIS — G47.00 INSOMNIA, UNSPECIFIED: ICD-10-CM

## 2018-03-16 DIAGNOSIS — Z96.653 PRESENCE OF ARTIFICIAL KNEE JOINT, BILATERAL: ICD-10-CM

## 2018-03-16 DIAGNOSIS — I13.0 HYPERTENSIVE HEART AND CHRONIC KIDNEY DISEASE WITH HEART FAILURE AND STAGE 1 THROUGH STAGE 4 CHRONIC KIDNEY DISEASE, OR UNSPECIFIED CHRONIC KIDNEY DISEASE: ICD-10-CM

## 2018-03-16 DIAGNOSIS — Z96.642 PRESENCE OF LEFT ARTIFICIAL HIP JOINT: ICD-10-CM

## 2018-03-16 DIAGNOSIS — J96.10 CHRONIC RESPIRATORY FAILURE, UNSPECIFIED WHETHER WITH HYPOXIA OR HYPERCAPNIA: ICD-10-CM

## 2018-03-16 DIAGNOSIS — Z79.4 LONG TERM (CURRENT) USE OF INSULIN: ICD-10-CM

## 2018-03-16 DIAGNOSIS — I25.10 ATHEROSCLEROTIC HEART DISEASE OF NATIVE CORONARY ARTERY WITHOUT ANGINA PECTORIS: ICD-10-CM

## 2018-03-16 DIAGNOSIS — I25.2 OLD MYOCARDIAL INFARCTION: ICD-10-CM

## 2018-03-16 DIAGNOSIS — E78.5 HYPERLIPIDEMIA, UNSPECIFIED: ICD-10-CM

## 2018-03-16 DIAGNOSIS — N18.4 CHRONIC KIDNEY DISEASE, STAGE 4 (SEVERE): ICD-10-CM

## 2018-03-16 DIAGNOSIS — Z87.891 PERSONAL HISTORY OF NICOTINE DEPENDENCE: ICD-10-CM

## 2018-09-18 RX ORDER — CEFUROXIME AXETIL 250 MG
1 TABLET ORAL
Qty: 0 | Refills: 0 | COMMUNITY
Start: 2018-09-18 | End: 2018-09-24

## 2018-10-10 ENCOUNTER — EMERGENCY (EMERGENCY)
Facility: HOSPITAL | Age: 78
LOS: 0 days | Discharge: HOME | End: 2018-10-10
Attending: STUDENT IN AN ORGANIZED HEALTH CARE EDUCATION/TRAINING PROGRAM | Admitting: STUDENT IN AN ORGANIZED HEALTH CARE EDUCATION/TRAINING PROGRAM
Payer: MEDICARE

## 2018-10-10 VITALS — HEIGHT: 62 IN | WEIGHT: 160.94 LBS

## 2018-10-10 VITALS
TEMPERATURE: 98 F | OXYGEN SATURATION: 94 % | RESPIRATION RATE: 18 BRPM | DIASTOLIC BLOOD PRESSURE: 40 MMHG | SYSTOLIC BLOOD PRESSURE: 121 MMHG | HEART RATE: 66 BPM

## 2018-10-10 DIAGNOSIS — R19.7 DIARRHEA, UNSPECIFIED: ICD-10-CM

## 2018-10-10 DIAGNOSIS — E86.0 DEHYDRATION: ICD-10-CM

## 2018-10-10 DIAGNOSIS — Z98.890 OTHER SPECIFIED POSTPROCEDURAL STATES: Chronic | ICD-10-CM

## 2018-10-10 DIAGNOSIS — E11.22 TYPE 2 DIABETES MELLITUS WITH DIABETIC CHRONIC KIDNEY DISEASE: ICD-10-CM

## 2018-10-10 DIAGNOSIS — Z79.4 LONG TERM (CURRENT) USE OF INSULIN: ICD-10-CM

## 2018-10-10 DIAGNOSIS — Z90.2 ACQUIRED ABSENCE OF LUNG [PART OF]: Chronic | ICD-10-CM

## 2018-10-10 DIAGNOSIS — Z98.49 CATARACT EXTRACTION STATUS, UNSPECIFIED EYE: Chronic | ICD-10-CM

## 2018-10-10 DIAGNOSIS — E03.9 HYPOTHYROIDISM, UNSPECIFIED: ICD-10-CM

## 2018-10-10 DIAGNOSIS — I50.32 CHRONIC DIASTOLIC (CONGESTIVE) HEART FAILURE: ICD-10-CM

## 2018-10-10 DIAGNOSIS — N18.9 CHRONIC KIDNEY DISEASE, UNSPECIFIED: ICD-10-CM

## 2018-10-10 DIAGNOSIS — J44.9 CHRONIC OBSTRUCTIVE PULMONARY DISEASE, UNSPECIFIED: ICD-10-CM

## 2018-10-10 DIAGNOSIS — K21.9 GASTRO-ESOPHAGEAL REFLUX DISEASE WITHOUT ESOPHAGITIS: ICD-10-CM

## 2018-10-10 DIAGNOSIS — I25.2 OLD MYOCARDIAL INFARCTION: ICD-10-CM

## 2018-10-10 DIAGNOSIS — Z96.659 PRESENCE OF UNSPECIFIED ARTIFICIAL KNEE JOINT: Chronic | ICD-10-CM

## 2018-10-10 DIAGNOSIS — Z79.82 LONG TERM (CURRENT) USE OF ASPIRIN: ICD-10-CM

## 2018-10-10 DIAGNOSIS — Z96.7 PRESENCE OF OTHER BONE AND TENDON IMPLANTS: Chronic | ICD-10-CM

## 2018-10-10 DIAGNOSIS — Z90.49 ACQUIRED ABSENCE OF OTHER SPECIFIED PARTS OF DIGESTIVE TRACT: Chronic | ICD-10-CM

## 2018-10-10 DIAGNOSIS — I13.0 HYPERTENSIVE HEART AND CHRONIC KIDNEY DISEASE WITH HEART FAILURE AND STAGE 1 THROUGH STAGE 4 CHRONIC KIDNEY DISEASE, OR UNSPECIFIED CHRONIC KIDNEY DISEASE: ICD-10-CM

## 2018-10-10 DIAGNOSIS — E78.5 HYPERLIPIDEMIA, UNSPECIFIED: ICD-10-CM

## 2018-10-10 DIAGNOSIS — Z79.02 LONG TERM (CURRENT) USE OF ANTITHROMBOTICS/ANTIPLATELETS: ICD-10-CM

## 2018-10-10 PROBLEM — N39.0 URINARY TRACT INFECTION, SITE NOT SPECIFIED: Chronic | Status: ACTIVE | Noted: 2018-01-31

## 2018-10-10 PROBLEM — H91.90 UNSPECIFIED HEARING LOSS, UNSPECIFIED EAR: Chronic | Status: ACTIVE | Noted: 2018-01-31

## 2018-10-10 PROBLEM — I10 ESSENTIAL (PRIMARY) HYPERTENSION: Chronic | Status: ACTIVE | Noted: 2017-06-20

## 2018-10-10 PROBLEM — G62.9 POLYNEUROPATHY, UNSPECIFIED: Chronic | Status: ACTIVE | Noted: 2017-06-20

## 2018-10-10 PROBLEM — F41.9 ANXIETY DISORDER, UNSPECIFIED: Chronic | Status: ACTIVE | Noted: 2018-01-31

## 2018-10-10 LAB
ALBUMIN SERPL ELPH-MCNC: 3.1 G/DL — LOW (ref 3.3–5)
ALP SERPL-CCNC: 112 U/L — SIGNIFICANT CHANGE UP (ref 40–120)
ALT FLD-CCNC: 13 U/L — SIGNIFICANT CHANGE UP (ref 12–78)
ANION GAP SERPL CALC-SCNC: 8 MMOL/L — SIGNIFICANT CHANGE UP (ref 5–17)
AST SERPL-CCNC: 14 U/L — LOW (ref 15–37)
BASOPHILS # BLD AUTO: 0.04 K/UL — SIGNIFICANT CHANGE UP (ref 0–0.2)
BASOPHILS NFR BLD AUTO: 0.5 % — SIGNIFICANT CHANGE UP (ref 0–2)
BILIRUB SERPL-MCNC: 0.6 MG/DL — SIGNIFICANT CHANGE UP (ref 0.2–1.2)
BUN SERPL-MCNC: 68 MG/DL — HIGH (ref 7–23)
CALCIUM SERPL-MCNC: 9.3 MG/DL — SIGNIFICANT CHANGE UP (ref 8.5–10.1)
CHLORIDE SERPL-SCNC: 112 MMOL/L — HIGH (ref 96–108)
CO2 SERPL-SCNC: 24 MMOL/L — SIGNIFICANT CHANGE UP (ref 22–31)
CREAT SERPL-MCNC: 2.73 MG/DL — HIGH (ref 0.5–1.3)
EOSINOPHIL # BLD AUTO: 0.15 K/UL — SIGNIFICANT CHANGE UP (ref 0–0.5)
EOSINOPHIL NFR BLD AUTO: 1.9 % — SIGNIFICANT CHANGE UP (ref 0–6)
GLUCOSE BLDC GLUCOMTR-MCNC: 102 MG/DL — HIGH (ref 70–99)
GLUCOSE SERPL-MCNC: 171 MG/DL — HIGH (ref 70–99)
HCT VFR BLD CALC: 34.2 % — LOW (ref 34.5–45)
HGB BLD-MCNC: 10.8 G/DL — LOW (ref 11.5–15.5)
IMM GRANULOCYTES NFR BLD AUTO: 0.3 % — SIGNIFICANT CHANGE UP (ref 0–1.5)
LACTATE SERPL-SCNC: 0.5 MMOL/L — LOW (ref 0.7–2)
LIDOCAIN IGE QN: 53 U/L — LOW (ref 73–393)
LYMPHOCYTES # BLD AUTO: 0.93 K/UL — LOW (ref 1–3.3)
LYMPHOCYTES # BLD AUTO: 11.8 % — LOW (ref 13–44)
MAGNESIUM SERPL-MCNC: 2 MG/DL — SIGNIFICANT CHANGE UP (ref 1.6–2.6)
MCHC RBC-ENTMCNC: 30.3 PG — SIGNIFICANT CHANGE UP (ref 27–34)
MCHC RBC-ENTMCNC: 31.6 GM/DL — LOW (ref 32–36)
MCV RBC AUTO: 96.1 FL — SIGNIFICANT CHANGE UP (ref 80–100)
MONOCYTES # BLD AUTO: 0.74 K/UL — SIGNIFICANT CHANGE UP (ref 0–0.9)
MONOCYTES NFR BLD AUTO: 9.4 % — SIGNIFICANT CHANGE UP (ref 2–14)
NEUTROPHILS # BLD AUTO: 5.99 K/UL — SIGNIFICANT CHANGE UP (ref 1.8–7.4)
NEUTROPHILS NFR BLD AUTO: 76.1 % — SIGNIFICANT CHANGE UP (ref 43–77)
NRBC # BLD: 0 /100 WBCS — SIGNIFICANT CHANGE UP (ref 0–0)
PLATELET # BLD AUTO: 201 K/UL — SIGNIFICANT CHANGE UP (ref 150–400)
POTASSIUM SERPL-MCNC: 4.5 MMOL/L — SIGNIFICANT CHANGE UP (ref 3.5–5.3)
POTASSIUM SERPL-SCNC: 4.5 MMOL/L — SIGNIFICANT CHANGE UP (ref 3.5–5.3)
PROT SERPL-MCNC: 7.1 GM/DL — SIGNIFICANT CHANGE UP (ref 6–8.3)
RBC # BLD: 3.56 M/UL — LOW (ref 3.8–5.2)
RBC # FLD: 13.7 % — SIGNIFICANT CHANGE UP (ref 10.3–14.5)
SODIUM SERPL-SCNC: 144 MMOL/L — SIGNIFICANT CHANGE UP (ref 135–145)
WBC # BLD: 7.87 K/UL — SIGNIFICANT CHANGE UP (ref 3.8–10.5)
WBC # FLD AUTO: 7.87 K/UL — SIGNIFICANT CHANGE UP (ref 3.8–10.5)

## 2018-10-10 PROCEDURE — 74176 CT ABD & PELVIS W/O CONTRAST: CPT | Mod: 26

## 2018-10-10 PROCEDURE — 99285 EMERGENCY DEPT VISIT HI MDM: CPT | Mod: 25

## 2018-10-10 RX ORDER — SODIUM CHLORIDE 9 MG/ML
500 INJECTION INTRAMUSCULAR; INTRAVENOUS; SUBCUTANEOUS ONCE
Qty: 0 | Refills: 0 | Status: COMPLETED | OUTPATIENT
Start: 2018-10-10 | End: 2018-10-10

## 2018-10-10 RX ORDER — SODIUM CHLORIDE 9 MG/ML
1000 INJECTION INTRAMUSCULAR; INTRAVENOUS; SUBCUTANEOUS ONCE
Qty: 0 | Refills: 0 | Status: COMPLETED | OUTPATIENT
Start: 2018-10-10 | End: 2018-10-10

## 2018-10-10 RX ADMIN — SODIUM CHLORIDE 500 MILLILITER(S): 9 INJECTION INTRAMUSCULAR; INTRAVENOUS; SUBCUTANEOUS at 20:05

## 2018-10-10 RX ADMIN — SODIUM CHLORIDE 500 MILLILITER(S): 9 INJECTION INTRAMUSCULAR; INTRAVENOUS; SUBCUTANEOUS at 22:17

## 2018-10-10 RX ADMIN — SODIUM CHLORIDE 1000 MILLILITER(S): 9 INJECTION INTRAMUSCULAR; INTRAVENOUS; SUBCUTANEOUS at 14:55

## 2018-10-10 RX ADMIN — SODIUM CHLORIDE 1000 MILLILITER(S): 9 INJECTION INTRAMUSCULAR; INTRAVENOUS; SUBCUTANEOUS at 19:00

## 2018-10-10 NOTE — ED ADULT NURSE NOTE - CHPI ED NUR SYMPTOMS NEG
no blood in stool/no burning urination/no dysuria/no hematuria/no nausea/no vomiting/no fever/no chills

## 2018-10-10 NOTE — ED PROVIDER NOTE - OBJECTIVE STATEMENT
77 y/o female with a PMHx CKD, COPD, HTN, HLD, hypothyroid, CHF, DM, GERD, Chronic UTI, MI old x3 presents to the ED c/o multiple episodes of malodorous diarrhea x3 days. Pt has experienced 7 episodes of diarrhea today. Pt was on abx 2 weeks ago and competed course 1 week ago for UTI. Pt reports generalized weakens. Denies Nausea, vomiting, abd pain. Saw PCP yesterday 77 y/o female with a PMHx CKD, COPD, HTN, HLD, hypothyroid, CHF, DM, GERD, Chronic UTI, MI old x3 presents to the ED c/o multiple episodes of malodorous diarrhea x3 days. Pt has experienced 7 episodes of diarrhea today. Pt was on abx 2 weeks ago and competed course 1 week ago for UTI. Pt reports generalized weakness. Denies Nausea, vomiting, abd pain. Saw PCP yesterday

## 2018-10-10 NOTE — ED ADULT NURSE NOTE - NSHISCREENINGQ1_ED_A_ED
No Airway patent, Nasal mucosa clear. Mouth with normal mucosa. Throat has no vesicles, no oropharyngeal exudates and uvula is midline.

## 2018-10-10 NOTE — ED ADULT NURSE REASSESSMENT NOTE - NS ED NURSE REASSESS COMMENT FT1
Pt received resting safe and comfortable. No complaints of pain, no distress noted. Pt unable to void or have BM at this time for collection of samples. Pt aware of need for samples. Hat and urine cup at bedside. Will monitor.

## 2018-10-10 NOTE — ED PROVIDER NOTE - MEDICAL DECISION MAKING DETAILS
77 y/o female with diarrhea. Plan for rule out C. diff, labs, fluids, stool culture and revaluation.

## 2018-10-10 NOTE — ED ADULT NURSE REASSESSMENT NOTE - NS ED NURSE REASSESS COMMENT FT1
PT sent home as per md order. Pt left a&ox3, VSS, AND AMBULATORY. PT home stool sent to lab as per Dr. Britton. Pt left safe and comfortable w/o distress.

## 2018-10-10 NOTE — ED PROVIDER NOTE - PMH
Anxiety    Chronic diastolic congestive heart failure  last exacerbation 7/2017  Chronic obstructive pulmonary disease, unspecified COPD type    Chronic UTI    CKD (chronic kidney disease)    Diabetes    Essential hypertension    GERD (gastroesophageal reflux disease)    [place] (hard of hearing)  hearing aid  Hyperlipidemia, unspecified hyperlipidemia type    Hypothyroid    MI, old  x3  Neuropathy

## 2018-10-10 NOTE — ED PROVIDER NOTE - PROGRESS NOTE DETAILS
[place]ester DO: S/o to Dr. Britton pending labs, re-eval at this time. Spoke with Dr. Whelan. Negative CT, patient tolerating PO, no acute distress, OK to go home. Family happy and eager to leave with tomorrow outpatient follow up.

## 2018-10-11 LAB
C DIFF BY PCR RESULT: SIGNIFICANT CHANGE UP
C DIFF TOX GENS STL QL NAA+PROBE: SIGNIFICANT CHANGE UP

## 2018-10-18 ENCOUNTER — INPATIENT (INPATIENT)
Facility: HOSPITAL | Age: 78
LOS: 3 days | Discharge: HOME | End: 2018-10-22
Attending: INTERNAL MEDICINE | Admitting: INTERNAL MEDICINE
Payer: MEDICARE

## 2018-10-18 VITALS
HEIGHT: 62 IN | DIASTOLIC BLOOD PRESSURE: 64 MMHG | TEMPERATURE: 99 F | RESPIRATION RATE: 18 BRPM | WEIGHT: 158.07 LBS | OXYGEN SATURATION: 100 % | HEART RATE: 67 BPM | SYSTOLIC BLOOD PRESSURE: 179 MMHG

## 2018-10-18 DIAGNOSIS — Z90.49 ACQUIRED ABSENCE OF OTHER SPECIFIED PARTS OF DIGESTIVE TRACT: Chronic | ICD-10-CM

## 2018-10-18 DIAGNOSIS — Z98.890 OTHER SPECIFIED POSTPROCEDURAL STATES: Chronic | ICD-10-CM

## 2018-10-18 DIAGNOSIS — Z96.659 PRESENCE OF UNSPECIFIED ARTIFICIAL KNEE JOINT: Chronic | ICD-10-CM

## 2018-10-18 DIAGNOSIS — Z98.49 CATARACT EXTRACTION STATUS, UNSPECIFIED EYE: Chronic | ICD-10-CM

## 2018-10-18 DIAGNOSIS — Z90.2 ACQUIRED ABSENCE OF LUNG [PART OF]: Chronic | ICD-10-CM

## 2018-10-18 DIAGNOSIS — Z96.7 PRESENCE OF OTHER BONE AND TENDON IMPLANTS: Chronic | ICD-10-CM

## 2018-10-18 LAB
ALBUMIN SERPL ELPH-MCNC: 3.1 G/DL — LOW (ref 3.3–5)
ALP SERPL-CCNC: 108 U/L — SIGNIFICANT CHANGE UP (ref 40–120)
ALT FLD-CCNC: 13 U/L — SIGNIFICANT CHANGE UP (ref 12–78)
ANION GAP SERPL CALC-SCNC: 9 MMOL/L — SIGNIFICANT CHANGE UP (ref 5–17)
AST SERPL-CCNC: 17 U/L — SIGNIFICANT CHANGE UP (ref 15–37)
BASOPHILS # BLD AUTO: 0.04 K/UL — SIGNIFICANT CHANGE UP (ref 0–0.2)
BASOPHILS NFR BLD AUTO: 0.5 % — SIGNIFICANT CHANGE UP (ref 0–2)
BILIRUB SERPL-MCNC: 0.3 MG/DL — SIGNIFICANT CHANGE UP (ref 0.2–1.2)
BUN SERPL-MCNC: 70 MG/DL — HIGH (ref 7–23)
CALCIUM SERPL-MCNC: 8.9 MG/DL — SIGNIFICANT CHANGE UP (ref 8.5–10.1)
CHLORIDE SERPL-SCNC: 112 MMOL/L — HIGH (ref 96–108)
CO2 SERPL-SCNC: 24 MMOL/L — SIGNIFICANT CHANGE UP (ref 22–31)
CREAT SERPL-MCNC: 2.71 MG/DL — HIGH (ref 0.5–1.3)
EOSINOPHIL # BLD AUTO: 0.29 K/UL — SIGNIFICANT CHANGE UP (ref 0–0.5)
EOSINOPHIL NFR BLD AUTO: 3.7 % — SIGNIFICANT CHANGE UP (ref 0–6)
GLUCOSE SERPL-MCNC: 107 MG/DL — HIGH (ref 70–99)
HCT VFR BLD CALC: 33.1 % — LOW (ref 34.5–45)
HGB BLD-MCNC: 10.7 G/DL — LOW (ref 11.5–15.5)
IMM GRANULOCYTES NFR BLD AUTO: 0.4 % — SIGNIFICANT CHANGE UP (ref 0–1.5)
LYMPHOCYTES # BLD AUTO: 1.02 K/UL — SIGNIFICANT CHANGE UP (ref 1–3.3)
LYMPHOCYTES # BLD AUTO: 13 % — SIGNIFICANT CHANGE UP (ref 13–44)
MCHC RBC-ENTMCNC: 29.7 PG — SIGNIFICANT CHANGE UP (ref 27–34)
MCHC RBC-ENTMCNC: 32.3 GM/DL — SIGNIFICANT CHANGE UP (ref 32–36)
MCV RBC AUTO: 91.9 FL — SIGNIFICANT CHANGE UP (ref 80–100)
MONOCYTES # BLD AUTO: 0.66 K/UL — SIGNIFICANT CHANGE UP (ref 0–0.9)
MONOCYTES NFR BLD AUTO: 8.4 % — SIGNIFICANT CHANGE UP (ref 2–14)
NEUTROPHILS # BLD AUTO: 5.81 K/UL — SIGNIFICANT CHANGE UP (ref 1.8–7.4)
NEUTROPHILS NFR BLD AUTO: 74 % — SIGNIFICANT CHANGE UP (ref 43–77)
NRBC # BLD: 0 /100 WBCS — SIGNIFICANT CHANGE UP (ref 0–0)
PLATELET # BLD AUTO: 251 K/UL — SIGNIFICANT CHANGE UP (ref 150–400)
POTASSIUM SERPL-MCNC: 5 MMOL/L — SIGNIFICANT CHANGE UP (ref 3.5–5.3)
POTASSIUM SERPL-SCNC: 5 MMOL/L — SIGNIFICANT CHANGE UP (ref 3.5–5.3)
PROT SERPL-MCNC: 7.5 GM/DL — SIGNIFICANT CHANGE UP (ref 6–8.3)
RBC # BLD: 3.6 M/UL — LOW (ref 3.8–5.2)
RBC # FLD: 13.4 % — SIGNIFICANT CHANGE UP (ref 10.3–14.5)
SODIUM SERPL-SCNC: 145 MMOL/L — SIGNIFICANT CHANGE UP (ref 135–145)
TROPONIN I SERPL-MCNC: <0.015 NG/ML — SIGNIFICANT CHANGE UP (ref 0.01–0.04)
WBC # BLD: 7.85 K/UL — SIGNIFICANT CHANGE UP (ref 3.8–10.5)
WBC # FLD AUTO: 7.85 K/UL — SIGNIFICANT CHANGE UP (ref 3.8–10.5)

## 2018-10-18 PROCEDURE — 71045 X-RAY EXAM CHEST 1 VIEW: CPT | Mod: 26

## 2018-10-18 PROCEDURE — 93010 ELECTROCARDIOGRAM REPORT: CPT

## 2018-10-18 PROCEDURE — 70450 CT HEAD/BRAIN W/O DYE: CPT | Mod: 26

## 2018-10-18 NOTE — ED ADULT NURSE NOTE - NSSISCREENINGQ1_ED_A_ED
----- Message from Yu Barakat sent at 11/24/2017 12:44 PM CST -----  Contact: [phone]/Jody with [facility]   Ms Vera called stating pt its in need of an appointment as soon as possible. Please contact the pt at [phone]. Please advise   
appt scheduled Dec 13th, patient and nurse notified.   
No

## 2018-10-18 NOTE — ED ADULT NURSE NOTE - CHPI ED NUR SYMPTOMS NEG
no shortness of breath/no nausea/no dizziness/no diaphoresis/no congestion/no back pain/no chest pain/no chills/no fever/no vomiting

## 2018-10-18 NOTE — ED PROVIDER NOTE - MEDICAL DECISION MAKING DETAILS
Pt s/p multiple syncopal episode without clear cause. Given disease risk, will admit for syncope evaluation.

## 2018-10-18 NOTE — ED ADULT NURSE NOTE - OBJECTIVE STATEMENT
pt is 77 yo pmhx, HTN, hypercholestermo, DM, hypothyroidism, anxiety, CHF, UTI, left hip replacement female presents to er for syncopal episode, pt states was standing, bent over the kitchen counter, family found pt, pt states sugar level was low.  leaning forward, did nto answer family, knee collapsing, denies falling to ground, daughter supported from back, fs at home was 180.   pt states felt going across the head.  pt deneis dizziness, cp, sob, nvd, pt is 77 yo with pmhx, HTN, hypercholestermo, DM, hypothyroidism, anxiety, CHF, UTI, left hip replacement female presents to er for near syncopal episode.  pt states she was making sauce all day, was standing in front of the kitchen counter.  pt's family found pt bent over and leaning forward to kitchen counter, pt did not answer family when called, knee collapsed, denies falling to ground, daughter supported from back, fs at home was 180.  pt denies ha, dizziness, cp, sob, nvd. pt is 77 yo famale with pmhx, COPD, HTN, hypercholesteremia, DM, hypothyroidism, anxiety, CHF, UTI, left hip replacement, presents to er for near syncopal episode.  pt states she was making sauce all day, was standing in front of the kitchen counter.  pt's family states found pt bent over and leaning forward onto kitchen counter, pt did not answer family when called, knee collapsed, denies falling to ground, daughter supported from back, fs at home was 180.  pt denies ha, dizziness, cp, sob, nvd.

## 2018-10-18 NOTE — ED PROVIDER NOTE - PMH
Anxiety    Chronic diastolic congestive heart failure  last exacerbation 7/2017  Chronic obstructive pulmonary disease, unspecified COPD type    Chronic UTI    CKD (chronic kidney disease)    Diabetes    Essential hypertension    GERD (gastroesophageal reflux disease)    [place] (hard of hearing)  hearing aid  Hyperlipidemia, unspecified hyperlipidemia type    Hypothyroid    MI, old  x3  Neuropathy

## 2018-10-18 NOTE — ED PROVIDER NOTE - NS_ ATTENDINGSCRIBEDETAILS _ED_A_ED_FT
The scribe's documentation has been prepared under my direction and personally reviewed by me in its entirety. I confirm that the note above accurately reflects all work, treatment, procedures, and medical decision-making performed by me.  Wilian Stevens MD

## 2018-10-18 NOTE — ED PROVIDER NOTE - PROGRESS NOTE DETAILS
d/w Dr. Phelps, will admit to tele for syncope eval. called to bedside for shaking of bilateral feet, remained conscious throughout event.

## 2018-10-18 NOTE — ED ADULT NURSE NOTE - NSIMPLEMENTINTERV_GEN_ALL_ED
Implemented All Fall Risk Interventions:  [place] to call system. Call bell, personal items and telephone within reach. Instruct patient to call for assistance. Room bathroom lighting operational. Non-slip footwear when patient is off stretcher. Physically safe environment: no spills, clutter or unnecessary equipment. Stretcher in lowest position, wheels locked, appropriate side rails in place. Provide visual cue, wrist band, yellow gown, etc. Monitor gait and stability. Monitor for mental status changes and reorient to person, place, and time. Review medications for side effects contributing to fall risk. Reinforce activity limits and safety measures with patient and family.

## 2018-10-18 NOTE — ED PROVIDER NOTE - OBJECTIVE STATEMENT
Pt is a 79 y/o F, with PMHx of CAD s/p MI x3, COPD on 2L O2, CHF, HTN, HLD, DM, and hypothyroidism, presenting to the ED with near syncope episodes tonight, prior to arrival. Pt was in the kitchen cooking pasta and standing but leaning over the kitchen counter. Daughter states she walked in to see the pt leaning over but noticed that her knees had buckled multiple times and she was not responding to being called. No falls to the ground or head trauma. Pt was placed in a chair where she had multiple more similar episodes of the same. Pt is unable to recall full events of the episode and admits to feeling a tingling across her forehead during this time. Describes the episodes as feeling hypoglycemic. Pt's VS were taken after the last episode prior to EMS dispatch which was 134/44, , and stable O2 on home oxygen. Denies CP, SOB, fever, cough, abd pain, and vomiting. Pt has been having diarrhea for the past couple of week, evaluated in the ED last week, and pt notes she was feeling significantly better today than she had in the previous days. No sick contacts or travel. Pt with recent UTI s/p bladder wash out.   DeBlasio- oncologist  Lenin- MIGUEL ANGEL Marquez- maty Pt is a 77 y/o F, with PMHx of CAD s/p MI x3, COPD on 2L O2, CHF, HTN, HLD, DM, and hypothyroidism, presenting to the ED with near syncope episodes tonight, prior to arrival. Pt was in the kitchen cooking pasta and standing but leaning over the kitchen counter. Daughter states she walked in to see the pt leaning over but noticed that her knees had buckled multiple times and she was not responding to being called. No falls to the ground or head trauma. Pt was placed in a chair where she had multiple more similar episodes of the same. Pt is unable to recall full events of the episode and admits to feeling a tingling across her forehead during this time. Describes the episodes as feeling hypoglycemic. Pt's VS were taken after the last episode prior to EMS dispatch which was 134/44, , and stable O2 on home oxygen. Denies CP, SOB, fever, cough, abd pain, and vomiting. Pt has been having diarrhea for the past couple of week, evaluated in the ED last week, and pt notes she was feeling significantly better today than she had in the previous days. No sick contacts or travel. Pt with recent UTI s/p bladder wash out.   DeBlasio- urology  Lenin- MIGUEL ANGEL Marquez- maty

## 2018-10-19 DIAGNOSIS — R55 SYNCOPE AND COLLAPSE: ICD-10-CM

## 2018-10-19 LAB
ANION GAP SERPL CALC-SCNC: 9 MMOL/L — SIGNIFICANT CHANGE UP (ref 5–17)
BASOPHILS # BLD AUTO: 0.04 K/UL — SIGNIFICANT CHANGE UP (ref 0–0.2)
BASOPHILS NFR BLD AUTO: 0.6 % — SIGNIFICANT CHANGE UP (ref 0–2)
BUN SERPL-MCNC: 64 MG/DL — HIGH (ref 7–23)
CALCIUM SERPL-MCNC: 8.2 MG/DL — LOW (ref 8.5–10.1)
CHLORIDE SERPL-SCNC: 112 MMOL/L — HIGH (ref 96–108)
CK SERPL-CCNC: 43 U/L — SIGNIFICANT CHANGE UP (ref 26–192)
CO2 SERPL-SCNC: 24 MMOL/L — SIGNIFICANT CHANGE UP (ref 22–31)
CREAT SERPL-MCNC: 2.47 MG/DL — HIGH (ref 0.5–1.3)
EOSINOPHIL # BLD AUTO: 0.31 K/UL — SIGNIFICANT CHANGE UP (ref 0–0.5)
EOSINOPHIL NFR BLD AUTO: 4.7 % — SIGNIFICANT CHANGE UP (ref 0–6)
GLUCOSE BLDC GLUCOMTR-MCNC: 155 MG/DL — HIGH (ref 70–99)
GLUCOSE SERPL-MCNC: 91 MG/DL — SIGNIFICANT CHANGE UP (ref 70–99)
HBA1C BLD-MCNC: 6.4 % — HIGH (ref 4–5.6)
HCT VFR BLD CALC: 29.4 % — LOW (ref 34.5–45)
HGB BLD-MCNC: 9.5 G/DL — LOW (ref 11.5–15.5)
IMM GRANULOCYTES NFR BLD AUTO: 0.6 % — SIGNIFICANT CHANGE UP (ref 0–1.5)
LYMPHOCYTES # BLD AUTO: 1.38 K/UL — SIGNIFICANT CHANGE UP (ref 1–3.3)
LYMPHOCYTES # BLD AUTO: 20.7 % — SIGNIFICANT CHANGE UP (ref 13–44)
MCHC RBC-ENTMCNC: 29.8 PG — SIGNIFICANT CHANGE UP (ref 27–34)
MCHC RBC-ENTMCNC: 32.3 GM/DL — SIGNIFICANT CHANGE UP (ref 32–36)
MCV RBC AUTO: 92.2 FL — SIGNIFICANT CHANGE UP (ref 80–100)
MONOCYTES # BLD AUTO: 0.65 K/UL — SIGNIFICANT CHANGE UP (ref 0–0.9)
MONOCYTES NFR BLD AUTO: 9.8 % — SIGNIFICANT CHANGE UP (ref 2–14)
NEUTROPHILS # BLD AUTO: 4.24 K/UL — SIGNIFICANT CHANGE UP (ref 1.8–7.4)
NEUTROPHILS NFR BLD AUTO: 63.6 % — SIGNIFICANT CHANGE UP (ref 43–77)
NRBC # BLD: 0 /100 WBCS — SIGNIFICANT CHANGE UP (ref 0–0)
PLATELET # BLD AUTO: 211 K/UL — SIGNIFICANT CHANGE UP (ref 150–400)
POTASSIUM SERPL-MCNC: 4 MMOL/L — SIGNIFICANT CHANGE UP (ref 3.5–5.3)
POTASSIUM SERPL-SCNC: 4 MMOL/L — SIGNIFICANT CHANGE UP (ref 3.5–5.3)
RBC # BLD: 3.19 M/UL — LOW (ref 3.8–5.2)
RBC # FLD: 13.2 % — SIGNIFICANT CHANGE UP (ref 10.3–14.5)
SODIUM SERPL-SCNC: 145 MMOL/L — SIGNIFICANT CHANGE UP (ref 135–145)
TROPONIN I SERPL-MCNC: <0.015 NG/ML — SIGNIFICANT CHANGE UP (ref 0.01–0.04)
TROPONIN I SERPL-MCNC: <0.015 NG/ML — SIGNIFICANT CHANGE UP (ref 0.01–0.04)
WBC # BLD: 6.66 K/UL — SIGNIFICANT CHANGE UP (ref 3.8–10.5)
WBC # FLD AUTO: 6.66 K/UL — SIGNIFICANT CHANGE UP (ref 3.8–10.5)

## 2018-10-19 PROCEDURE — 99223 1ST HOSP IP/OBS HIGH 75: CPT

## 2018-10-19 PROCEDURE — 93010 ELECTROCARDIOGRAM REPORT: CPT | Mod: 76

## 2018-10-19 PROCEDURE — 99285 EMERGENCY DEPT VISIT HI MDM: CPT

## 2018-10-19 PROCEDURE — 95813 EEG EXTND MNTR 61-119 MIN: CPT | Mod: 26

## 2018-10-19 RX ORDER — TIOTROPIUM BROMIDE 18 UG/1
1 CAPSULE ORAL; RESPIRATORY (INHALATION) AT BEDTIME
Qty: 0 | Refills: 0 | Status: DISCONTINUED | OUTPATIENT
Start: 2018-10-19 | End: 2018-10-22

## 2018-10-19 RX ORDER — SODIUM CHLORIDE 9 MG/ML
1000 INJECTION, SOLUTION INTRAVENOUS
Qty: 0 | Refills: 0 | Status: DISCONTINUED | OUTPATIENT
Start: 2018-10-19 | End: 2018-10-22

## 2018-10-19 RX ORDER — ACETAMINOPHEN 500 MG
650 TABLET ORAL EVERY 6 HOURS
Qty: 0 | Refills: 0 | Status: DISCONTINUED | OUTPATIENT
Start: 2018-10-19 | End: 2018-10-22

## 2018-10-19 RX ORDER — CLOPIDOGREL BISULFATE 75 MG/1
1 TABLET, FILM COATED ORAL
Qty: 0 | Refills: 0 | COMMUNITY

## 2018-10-19 RX ORDER — INSULIN GLARGINE 100 [IU]/ML
20 INJECTION, SOLUTION SUBCUTANEOUS AT BEDTIME
Qty: 0 | Refills: 0 | Status: DISCONTINUED | OUTPATIENT
Start: 2018-10-19 | End: 2018-10-22

## 2018-10-19 RX ORDER — DEXTROSE 50 % IN WATER 50 %
12.5 SYRINGE (ML) INTRAVENOUS ONCE
Qty: 0 | Refills: 0 | Status: DISCONTINUED | OUTPATIENT
Start: 2018-10-19 | End: 2018-10-22

## 2018-10-19 RX ORDER — LEVOTHYROXINE SODIUM 125 MCG
112 TABLET ORAL DAILY
Qty: 0 | Refills: 0 | Status: DISCONTINUED | OUTPATIENT
Start: 2018-10-19 | End: 2018-10-22

## 2018-10-19 RX ORDER — DEXTROSE 50 % IN WATER 50 %
15 SYRINGE (ML) INTRAVENOUS ONCE
Qty: 0 | Refills: 0 | Status: DISCONTINUED | OUTPATIENT
Start: 2018-10-19 | End: 2018-10-22

## 2018-10-19 RX ORDER — ASPIRIN/CALCIUM CARB/MAGNESIUM 324 MG
81 TABLET ORAL DAILY
Qty: 0 | Refills: 0 | Status: DISCONTINUED | OUTPATIENT
Start: 2018-10-19 | End: 2018-10-22

## 2018-10-19 RX ORDER — ATORVASTATIN CALCIUM 80 MG/1
20 TABLET, FILM COATED ORAL AT BEDTIME
Qty: 0 | Refills: 0 | Status: DISCONTINUED | OUTPATIENT
Start: 2018-10-19 | End: 2018-10-22

## 2018-10-19 RX ORDER — INSULIN LISPRO 100/ML
VIAL (ML) SUBCUTANEOUS AT BEDTIME
Qty: 0 | Refills: 0 | Status: DISCONTINUED | OUTPATIENT
Start: 2018-10-19 | End: 2018-10-22

## 2018-10-19 RX ORDER — DILTIAZEM HCL 120 MG
180 CAPSULE, EXT RELEASE 24 HR ORAL DAILY
Qty: 0 | Refills: 0 | Status: DISCONTINUED | OUTPATIENT
Start: 2018-10-19 | End: 2018-10-19

## 2018-10-19 RX ORDER — INSULIN GLARGINE 100 [IU]/ML
0 INJECTION, SOLUTION SUBCUTANEOUS
Qty: 0 | Refills: 0 | COMMUNITY

## 2018-10-19 RX ORDER — MIRTAZAPINE 45 MG/1
0 TABLET, ORALLY DISINTEGRATING ORAL
Qty: 0 | Refills: 0 | COMMUNITY

## 2018-10-19 RX ORDER — METHENAMINE MANDELATE 1 G
0 TABLET ORAL
Qty: 0 | Refills: 0 | COMMUNITY

## 2018-10-19 RX ORDER — VANCOMYCIN HCL 1 G
1000 VIAL (EA) INTRAVENOUS ONCE
Qty: 0 | Refills: 0 | Status: COMPLETED | OUTPATIENT
Start: 2018-10-19 | End: 2018-10-19

## 2018-10-19 RX ORDER — FOLIC ACID 0.8 MG
1 TABLET ORAL DAILY
Qty: 0 | Refills: 0 | Status: DISCONTINUED | OUTPATIENT
Start: 2018-10-19 | End: 2018-10-22

## 2018-10-19 RX ORDER — GLUCAGON INJECTION, SOLUTION 0.5 MG/.1ML
1 INJECTION, SOLUTION SUBCUTANEOUS ONCE
Qty: 0 | Refills: 0 | Status: DISCONTINUED | OUTPATIENT
Start: 2018-10-19 | End: 2018-10-22

## 2018-10-19 RX ORDER — INFLUENZA VIRUS VACCINE 15; 15; 15; 15 UG/.5ML; UG/.5ML; UG/.5ML; UG/.5ML
0.5 SUSPENSION INTRAMUSCULAR ONCE
Qty: 0 | Refills: 0 | Status: COMPLETED | OUTPATIENT
Start: 2018-10-19 | End: 2018-10-19

## 2018-10-19 RX ORDER — SENNA PLUS 8.6 MG/1
2 TABLET ORAL AT BEDTIME
Qty: 0 | Refills: 0 | Status: DISCONTINUED | OUTPATIENT
Start: 2018-10-19 | End: 2018-10-22

## 2018-10-19 RX ORDER — INSULIN LISPRO 100/ML
0 VIAL (ML) SUBCUTANEOUS
Qty: 0 | Refills: 0 | COMMUNITY

## 2018-10-19 RX ORDER — PANTOPRAZOLE SODIUM 20 MG/1
40 TABLET, DELAYED RELEASE ORAL
Qty: 0 | Refills: 0 | Status: DISCONTINUED | OUTPATIENT
Start: 2018-10-19 | End: 2018-10-22

## 2018-10-19 RX ORDER — BUDESONIDE AND FORMOTEROL FUMARATE DIHYDRATE 160; 4.5 UG/1; UG/1
2 AEROSOL RESPIRATORY (INHALATION)
Qty: 0 | Refills: 0 | Status: DISCONTINUED | OUTPATIENT
Start: 2018-10-19 | End: 2018-10-22

## 2018-10-19 RX ORDER — HEPARIN SODIUM 5000 [USP'U]/ML
5000 INJECTION INTRAVENOUS; SUBCUTANEOUS EVERY 12 HOURS
Qty: 0 | Refills: 0 | Status: DISCONTINUED | OUTPATIENT
Start: 2018-10-19 | End: 2018-10-20

## 2018-10-19 RX ORDER — SODIUM CHLORIDE 9 MG/ML
500 INJECTION INTRAMUSCULAR; INTRAVENOUS; SUBCUTANEOUS ONCE
Qty: 0 | Refills: 0 | Status: COMPLETED | OUTPATIENT
Start: 2018-10-19 | End: 2018-10-19

## 2018-10-19 RX ADMIN — HEPARIN SODIUM 5000 UNIT(S): 5000 INJECTION INTRAVENOUS; SUBCUTANEOUS at 07:04

## 2018-10-19 RX ADMIN — ATORVASTATIN CALCIUM 20 MILLIGRAM(S): 80 TABLET, FILM COATED ORAL at 21:55

## 2018-10-19 RX ADMIN — Medication 81 MILLIGRAM(S): at 13:03

## 2018-10-19 RX ADMIN — Medication 250 MILLIGRAM(S): at 16:00

## 2018-10-19 RX ADMIN — HEPARIN SODIUM 5000 UNIT(S): 5000 INJECTION INTRAVENOUS; SUBCUTANEOUS at 17:20

## 2018-10-19 RX ADMIN — TIOTROPIUM BROMIDE 1 CAPSULE(S): 18 CAPSULE ORAL; RESPIRATORY (INHALATION) at 20:01

## 2018-10-19 RX ADMIN — SODIUM CHLORIDE 50 MILLILITER(S): 9 INJECTION INTRAMUSCULAR; INTRAVENOUS; SUBCUTANEOUS at 13:08

## 2018-10-19 RX ADMIN — Medication 1 MILLIGRAM(S): at 13:03

## 2018-10-19 RX ADMIN — Medication 112 MICROGRAM(S): at 07:04

## 2018-10-19 RX ADMIN — PANTOPRAZOLE SODIUM 40 MILLIGRAM(S): 20 TABLET, DELAYED RELEASE ORAL at 07:04

## 2018-10-19 NOTE — CHART NOTE - NSCHARTNOTEFT_GEN_A_CORE
RAPID RESPONSE CALLED TODAY FOR PAUSE OF 10SEC TODAY WHILE PATIENT WAS IN BED -- ARMS STARTED SHAKING, THEN EPISODE RESOLVED ON ITS OWN AND PATIENT CAME TO.  PT CURRENTLY FEELS FINE  DENIES ANY CP, SOB, PALPITATIONS  ORTHOSTATICS DONE EARLIER THIS AFTERNOON WAS POSITIVE.    VITALS - STABLE AS PER FLOW SHEET  GEN: A AND O, NAD  HEENT: NC/AT, EOMI  OROPHARYNX - CLEAR  CV: POS S1, S2, RRR  CHEST: CTA ANGELICA  ABD: SOFT, NT, ND POS BS  EXT: NO C/C/E; NO RASHES    IMP: SYNCOPE SECONDARY TO HEART BLOCK  PLAN:  TRANSFER TO CCU  DC CARDIZEM  IVF  PACER PADS PLACED ON PATIENT  EP EVAL FOR TEMPORARY PACING    CASE D/W DR MEANS AND EP PHYSICIAN - SINCE PT ATE WILL NOT PLAN FOR EPS/PPM NOW; CONT TO MONITOR OFF CARDIZEM      TOTAL PROLONGED TIME: 42 MINUTES  CASE D/W DR EMANS, EP, NURSING SUPERVISOR, NURSE MANAGER AND RN

## 2018-10-19 NOTE — CONSULT NOTE ADULT - SUBJECTIVE AND OBJECTIVE BOX
Neurology Consult requested by:   Patient is a 78y old  Female who presents with a chief complaint of PASSING OUT (19 Oct 2018 10:43)     HPI:  77 Y/O woman hx of DM, CAD presents with recurrent syncopal events. Witnessed by  and daughter. Occurred while standing, patient had been cooking, suddenly laid her head down, seem to shake, family helped her sit, c/o feeling warm, dizzy. Occurred twice more while sitting, suddenly unresponsive, extremities shaking, No tongue bitting, no unilateral weakness, no /B dysfunction. No hx of seizures, CVA in the past. Today feel fine.      PAST MEDICAL & SURGICAL HISTORY:  [place] (hard of hearing): hearing aid  Anxiety  Hypothyroid  GERD (gastroesophageal reflux disease)  CKD (chronic kidney disease)  MI, old: x3  Chronic UTI  Chronic diastolic congestive heart failure: last exacerbation 2017  Hyperlipidemia, unspecified hyperlipidemia type  Essential hypertension  Neuropathy  Diabetes  Chronic obstructive pulmonary disease, unspecified COPD type  History of cataract surgery: bilateral IOL  S/P lobectomy of lung: left lung pre-cancerous  History of ear surgery  History of cholecystectomy  S/P ORIF (open reduction internal fixation) fracture: left hip 2017  History of total knee replacement, unspecified laterality: bilateral right  left   H/O hernia repair: umbilical and incisional   delivery delivered  S/P appendectomy    FAMILY HISTORY:  Family history of CHF (congestive heart failure) (Mother)    Social Hx:  Nonsmoker, no drug or alcohol use  Medications and Allergies ReviewedMEDICATIONS  (STANDING):  aspirin  chewable 81 milliGRAM(s) Oral daily  atorvastatin 20 milliGRAM(s) Oral at bedtime  buDESOnide  80 MICROgram(s)/formoterol 4.5 MICROgram(s) Inhaler 2 Puff(s) Inhalation two times a day  diltiazem    milliGRAM(s) Oral daily  folic acid 1 milliGRAM(s) Oral daily  heparin  Injectable 5000 Unit(s) SubCutaneous every 12 hours  influenza   Vaccine 0.5 milliLiter(s) IntraMuscular once  insulin glargine Injectable (LANTUS) 20 Unit(s) SubCutaneous at bedtime  insulin lispro (HumaLOG) corrective regimen sliding scale   SubCutaneous at bedtime  levothyroxine 112 MICROGram(s) Oral daily  pantoprazole    Tablet 40 milliGRAM(s) Oral before breakfast  sodium chloride 0.9% Bolus 500 milliLiter(s) IV Bolus once  tiotropium 18 MICROgram(s) Capsule 1 Capsule(s) Inhalation at bedtime     ROS: Pertinent positives in HPI, all other ROS were reviewed and are negative.      Examination:   Vital Signs Last 24 Hrs  T(C): 36.2 (19 Oct 2018 09:57), Max: 37.1 (18 Oct 2018 20:18)  T(F): 97.1 (19 Oct 2018 09:57), Max: 98.7 (18 Oct 2018 20:18)  HR: 49 (19 Oct 2018 09:57) (49 - 67)  BP: 151/38 (19 Oct 2018 09:57) (149/40 - 179/64)  BP(mean): --  RR: 18 (19 Oct 2018 09:57) (16 - 18)  SpO2: 100% (19 Oct 2018 09:57) (98% - 100%)  General: Cooperative, NAD   NECK: supple, no masses  ENT: reduced hearing   Vascular : no carotid bruits,   Lungs: CTAB  Chest: RRR, no murmurs  Extremities: nontender, no edema  Musculoskeletal: no adventitious movements, no joint stiffness  Skin: no rash    Neurological Examination:  NIHSS:  MS: AOx3. Appropriately interactive, normal affect. Speech fluent w/o paraphasic error, repetition, naming is intact   CN: VFFTC, PERLL, EOMI, V1-3 sensation intact, face symmetric, hearing intact, palate elevates symmetrically, tongue midline, SCM equal bilaterally  Motor: normal bulk and tone, no tremor, rigidity or bradykinesia.  5/5 all over   Sens: Intact to light touch.    Reflexes: 0-1/4 all over, downgoing toes b/l  Coord:  No dysmetria, RANJIT intact   Gait: Cannot test    Labs: Reviewed  Imaging:   < from: CT Head No Cont (10.18.18 @ 21:52) >  FINDINGS:   There is no CT evidence of acute intracranial hemorrhage, mass effect,   midline shift or acute territorial infarct. The basal cisterns are patent.    There is mild generalized cerebral volume loss. There is  moderate   periventricular  white matter hypoattenuation which is nonspecific in   etiology but likely related to chronic microvascular ischemic disease.    A mucous retention cyst versus polyp is partially visualizedin the left   maxilla sinus.    The mastoid air cells and middle ear cavities are clear.    The calvarium, skull base, and extracranial soft tissues are unremarkable.    IMPRESSION:   No CT evidence of acute intracranial hemorrhage, mass effect or acute   territorial infarct.  Moderate chronic microvascular ischemic disease.    Follow-up MRI can be performed as clinically warranted.

## 2018-10-19 NOTE — H&P ADULT - PMH
Anxiety    Chronic diastolic congestive heart failure  last exacerbation 7/2017  Chronic obstructive pulmonary disease, unspecified COPD type    Chronic UTI    CKD (chronic kidney disease)    Diabetes    Essential hypertension    GERD (gastroesophageal reflux disease)    [place] (hard of hearing)  hearing aid  Hyperlipidemia, unspecified hyperlipidemia type    Hypothyroid    MI, old  x3  Neuropathy

## 2018-10-19 NOTE — H&P ADULT - NSHPPHYSICALEXAM_GEN_ALL_CORE
Vital Signs Last 24 Hrs  T(C): 36.4 (19 Oct 2018 06:50), Max: 37.1 (18 Oct 2018 20:18)  T(F): 97.5 (19 Oct 2018 06:50), Max: 98.7 (18 Oct 2018 20:18)  HR: 56 (19 Oct 2018 06:50) (56 - 67)  BP: 149/44 (19 Oct 2018 06:50) (149/40 - 179/64)  BP(mean): --  RR: 16 (19 Oct 2018 06:50) (16 - 18)  SpO2: 99% (19 Oct 2018 06:50) (98% - 100%)    GEN: A and O, NAD, [place], mood stable  HEENT:   pupils equal and reactive, EOMI, no oropharyngeal lesions    NECK:   supple    CV:  +S1, +S2, regular, no murmurs or rubs    RESP:   lungs clear to auscultation bilaterally, DECREASED BS ANGELICA NO WHEEZES    GI:  abdomen soft, non-tender, non-distended, normal BS, no abdominal masses, no palpable masses    RECTAL:  not examined    :  not examined    MSK:   normal muscle tone, no atrophy, no rigidity, no contractions    EXT:   no clubbing, no cyanosis, no edema, no calf pain, swelling or erythema    VASCULAR:  pulses equal and symmetric in the upper and lower extremities    NEURO:  AAOX3, no focal neurological deficits, follows all commands, able to move extremities spontaneously    SKIN:  no ulcers, lesions or rashes

## 2018-10-19 NOTE — PROGRESS NOTE ADULT - ASSESSMENT
Patient seen and full consult dictated     - COPD stable with no indication for the hypoxia contributing to the events . no evidence for the exacerbation of the symptoms . 02 dependent at home   - jeky movements with possible  transient loss of consciousness being evaluated for the syncope versus seizures    - History of lung cancer treated with no recurrence .  - chf with the history of diastolic dysfunction no decompensation .  - sinus pauses noted in the tele with the andrez cardia   - muliple other issues that include D.M and recurrent UTI and hypothyroidism     PLAN     - continue with the advair and spiriva and use of supplemental 02 for the copd   - work for the syncope verus seizure as per the medicine . no active pulmonary issues and would sign off the case . please consult as needed for the pulmonary issues

## 2018-10-19 NOTE — H&P ADULT - HISTORY OF PRESENT ILLNESS
79 Y/O FEMALE WITH HTN, COPD (ON OXYGEN AT  NIGHT), RECURRENT UTI'S, CHF (NOT ON ACEINH/ARB DUE TO HYPERKALEMIA) PRESENTS WITH A COUPLE EPISODES OF NEAR PASSING OUT IN FRONT OF HER DAUGHTER FOLLOWED BY SHAKING MOVEMENTS OF BOTH HER ARMS AND LEGS.  DAUGHTER STATES SHE DID NOT LOSE CONSCIOUSNESS, NO URINARY OR FECAL INCONTINENCE -- EPISODES LAST A FEW SECONDS THEN RESOLVE.  NO HEAD TRAUMA  NO RECENT ILLNESSES EXCEPT FOR A UTI -- SAW UROLOGY AND HAD A BLADDER WASHING WITH ABX.  PT DOES NOT REMEMBER THE EPISODES YEST  NO FEVERS, CHILLS, N/V/D

## 2018-10-19 NOTE — CONSULT NOTE ADULT - ASSESSMENT
Syncope- recurrent-  will hold off diuretics for now.  orthostatic BP readings showed orthostasis.  IVF ordered.  Close monitoring of the volume status.    CKD- close monitoring of the renal function.    HFPEF- will monitor with IVF.    Other medical issues- Management per primary team.   Thank you for allowing me to participate in the care of this patient. Please feel free to contact me with any questions.

## 2018-10-19 NOTE — H&P ADULT - NSHPLABSRESULTS_GEN_ALL_CORE
23:00 9.5    6.66  )-----------( 211      ( 19 Oct 2018 06:55 )             29.4     10-19    145  |  112<H>  |  64<H>  ----------------------------<  91  4.0   |  24  |  2.47<H>    Ca    8.2<L>      19 Oct 2018 06:55    TPro  7.5  /  Alb  3.1<L>  /  TBili  0.3  /  DBili  x   /  AST  17  /  ALT  13  /  AlkPhos  108  10-18    CARDIAC MARKERS ( 19 Oct 2018 01:51 )  <0.015 ng/mL / x     / 43 U/L / x     / x      CARDIAC MARKERS ( 18 Oct 2018 23:44 )  <0.015 ng/mL / x     / x     / x     / x      CARDIAC MARKERS ( 18 Oct 2018 20:43 )  <0.015 ng/mL / x     / x     / x     / x          LIVER FUNCTIONS - ( 18 Oct 2018 20:43 )  Alb: 3.1 g/dL / Pro: 7.5 gm/dL / ALK PHOS: 108 U/L / ALT: 13 U/L / AST: 17 U/L / GGT: x                                             EKG: NSR

## 2018-10-19 NOTE — CONSULT NOTE ADULT - SUBJECTIVE AND OBJECTIVE BOX
Patient is a 78y old  Female who presents with a chief complaint of recurrent syncope.     HPI:78yof with pmh as stated below presented with recurrent witnessed syncope.  She was at the counter in kitchen and just made pasty and put her head on counter.  Her daughter checked on her and pt stated that she felt tired and later daughter saw her lowering to ground and caught her.  Her BS and BP normal at that time per daughter.  This happened several times and the last one she had shaking of the extremities.    She recently had diarrhea and slight worsening of her renal funcitons.  She saw me in the office this week and c/o fatigue.  Her exam and vitals and Cardiomems readings were normal.        PAST MEDICAL & SURGICAL HISTORY:  [place] (hard of hearing): hearing aid  Anxiety  Hypothyroid  GERD (gastroesophageal reflux disease)  CKD (chronic kidney disease)  MI, old: x3  Chronic UTI  Chronic diastolic congestive heart failure: last exacerbation 2017  Hyperlipidemia, unspecified hyperlipidemia type  Essential hypertension  Neuropathy  Diabetes  Chronic obstructive pulmonary disease, unspecified COPD type  History of cataract surgery: bilateral IOL  S/P lobectomy of lung: left lung pre-cancerous  History of ear surgery  History of cholecystectomy  S/P ORIF (open reduction internal fixation) fracture: left hip 2017  History of total knee replacement, unspecified laterality: bilateral right  left   H/O hernia repair: umbilical and incisional   delivery delivered  S/P appendectomy      MEDICATIONS  (STANDING):  aspirin  chewable 81 milliGRAM(s) Oral daily  atorvastatin 20 milliGRAM(s) Oral at bedtime  buDESOnide  80 MICROgram(s)/formoterol 4.5 MICROgram(s) Inhaler 2 Puff(s) Inhalation two times a day  dextrose 5%. 1000 milliLiter(s) (50 mL/Hr) IV Continuous <Continuous>  dextrose 50% Injectable 12.5 Gram(s) IV Push once  diltiazem    milliGRAM(s) Oral daily  folic acid 1 milliGRAM(s) Oral daily  heparin  Injectable 5000 Unit(s) SubCutaneous every 12 hours  influenza   Vaccine 0.5 milliLiter(s) IntraMuscular once  insulin glargine Injectable (LANTUS) 20 Unit(s) SubCutaneous at bedtime  insulin lispro (HumaLOG) corrective regimen sliding scale   SubCutaneous at bedtime  levothyroxine 112 MICROGram(s) Oral daily  pantoprazole    Tablet 40 milliGRAM(s) Oral before breakfast  tiotropium 18 MICROgram(s) Capsule 1 Capsule(s) Inhalation at bedtime    MEDICATIONS  (PRN):  acetaminophen   Tablet .. 650 milliGRAM(s) Oral every 6 hours PRN Mild Pain (1 - 3)  dextrose 40% Gel 15 Gram(s) Oral once PRN Blood Glucose LESS THAN 70 milliGRAM(s)/deciliter  glucagon  Injectable 1 milliGRAM(s) IntraMuscular once PRN Glucose LESS THAN 70 milligrams/deciliter  senna 2 Tablet(s) Oral at bedtime PRN Constipation      FAMILY HISTORY:  Family history of CHF (congestive heart failure) (Mother)      SOCIAL HISTORY:  ex smoker    REVIEW OF SYSTEMS:  CONSTITUTIONAL:    c/o fatigue.   HEENT:  Eyes:  No visual changes.     ENT:  No epistaxis.  No sinus pain.    RESPIRATORY:  No cough.  No wheeze.  No hemoptysis.  No shortness of breath.  CARDIOVASCULAR:  No chest pains.  No palpitations. No shortness of breath, No orthopnea or PND.  GASTROINTESTINAL:  No abdominal pain.  No nausea or vomiting.    GENITOURINARY:    No hematuria.    MUSCULOSKELETAL:  No musculoskeletal pain.  No joint swelling.  No arthritis.  NEUROLOGICAL:  No tingling or numbness or weakness.  PSYCHIATRIC:  No confusion  SKIN:  No rashes.    ENDOCRINE:  No unexplained weight loss.  No polydipsia.   HEMATOLOGIC:  No anemia.  No prolonged or excessive bleeding.   ALLERGIC AND IMMUNOLOGIC:  No pruritus.          Vital Signs Last 24 Hrs  T(C): 36.2 (19 Oct 2018 09:57), Max: 37.1 (18 Oct 2018 20:18)  T(F): 97.1 (19 Oct 2018 09:57), Max: 98.7 (18 Oct 2018 20:18)  HR: 49 (19 Oct 2018 09:57) (49 - 67)  BP: 151/38 (19 Oct 2018 09:57) (149/40 - 179/64)  BP(mean): --  RR: 18 (19 Oct 2018 09:57) (16 - 18)  SpO2: 100% (19 Oct 2018 09:57) (98% - 100%)    PHYSICAL EXAM-    Constitutional: The patient appears to be normal, well developed, well nourished and alert and oriented to time, place and person. The patient does not appear acutely ill.     Head: Head is normocephalic and atraumatic.      Neck: No jugular venous distention. No audible carotid bruits. There are strong carotid pulses bilaterally. No JVD.     Cardiovascular: Regular rate and rhythm without S3, S4. No murmurs or rubs are appreciated.      Respiratory: Breath sounds are normal. No rales. No wheezing.    Abdomen: Soft, nontender, nondistended with positive bowel sounds.      Extremity: No tenderness. No  pitting edema     Neurologic: The patient is alert and oriented.      Skin: No rash, no obvious lesions noted.      Psychiatric: The patient appears to be emotionally stable.      INTERPRETATION OF TELEMETRY: sinus rythm    ECG: Sinus rythm , no ST T changes.     I&O's Detail      LABS:                        9.5    6.66  )-----------( 211      ( 19 Oct 2018 06:55 )             29.4     10-    145  |  112<H>  |  64<H>  ----------------------------<  91  4.0   |  24  |  2.47<H>    Ca    8.2<L>      19 Oct 2018 06:55    TPro  7.5  /  Alb  3.1<L>  /  TBili  0.3  /  DBili  x   /  AST  17  /  ALT  13  /  AlkPhos  108  10-18    CARDIAC MARKERS ( 19 Oct 2018 01:51 )  <0.015 ng/mL / x     / 43 U/L / x     / x      CARDIAC MARKERS ( 18 Oct 2018 23:44 )  <0.015 ng/mL / x     / x     / x     / x      CARDIAC MARKERS ( 18 Oct 2018 20:43 )  <0.015 ng/mL / x     / x     / x     / x              I&O's Summary    BNP  RADIOLOGY & ADDITIONAL STUDIES:  < from: Xray Chest 1 View- PORTABLE-Urgent (10.18.18 @ 21:42) >    EXAM:  XR CHEST PORTABLE URGENT 1V                            PROCEDURE DATE:  10/18/2018          INTERPRETATION:  History: Syncope    Chest:  one view.      Comparison: 3/5/2018    AP radiograph of the chest demonstrates no evidence of infiltrate,   pleural effusion or vascular congestion. No atelectasis is seen. The   cardiac silhouette is normal in size. Osseous structures are intact.    Impression: No active pulmonary disease.                LUL THOMPSON M.D., ATTENDING RADIOLOGIST  Thisdocument has been electronically signed. Oct 19 2018 10:22AM        < end of copied text >

## 2018-10-19 NOTE — H&P ADULT - ASSESSMENT
79 Y/O FEMALE WITH THE ABVE MED HX ADMITTED WITH NEAR SYNCOPE, JERKING MOVEMENTS OF THE LIMBS    *NEAR SYNCOPE/JERKING MOVEMENTS OF THE BODY -   MONITOR ON TELE - NSR CURRENTLY  NEURO EVAL -- EEG, ELECTROLYTES STABLE  R/O SEIZURE  NO S/SX OF INFECTION  BUMEX HELD, HYDRATED    *COPD - NO SIGNS OF EXACERBATION, CONT O2  *ANI AON CKD - CR 2.7, BUMEX HELD, HYDRATED IN ED -- MONITOR. CR IMPROVED  *CHF - CANNOT TOLERATE ACEINH/ARB DUE TO HYPERKALEMIA IN THE PAST  BUMEX HELD DUE TO VOLUME CONTRACTION  REASSESS TOMORROW  *IDDM - ON TUJEO 405 UNITS AT HOME, BU NOT EATING MUCH  GIVE LANTUS 20 UNITS HERE AND LOW DOSE ISS  ADJUST AS NEEDED  *HTN - ON CARDIZEM  *DVT PROPHY - SQ HEPARIN

## 2018-10-19 NOTE — CONSULT NOTE ADULT - ASSESSMENT
78 year old woman hx of recurrent episodes of LOC, ? seizure,? convulsive syncope. CT of head showed no acute changes. + orthostatic hypotension, probably due to DM autonomic neuropathy, may have contributed to her admission events..

## 2018-10-19 NOTE — CONSULT NOTE ADULT - ASSESSMENT
78 year old female with PMHx of CAD s/p MI x3, HFpEF, COPD on 2L O2, HTN, HLD, DMII, CKD and hypothyroidism, admitted last night for syncopal episode. This afternoon on telemetry there was a 10 second pause.     Syncope- Likely multifactorial patient had a 10 second pause on telemetry this afternoon, and likely has underlying conduction disease. In addition patient had positive orthostatic vitals.   -Patient would benefit from PPM placement.   -NPO for now.   -Plan to hold AV ty blockers.   -She is going to be transferred to the CCU with bedside transcutaneous pacing in place.   -Continue IVF hydration and hold diuretics. 78 year old female with PMHx of CAD s/p MI x3, HFpEF, COPD on 2L O2, HTN, HLD, DMII, CKD and hypothyroidism, admitted last night for syncopal episode.    Syncope- Likely multifactorial patient had a 10 second pause on telemetry this afternoon, and likely has underlying conduction disease. In addition patient had positive orthostatic vitals.   -Patient would benefit from PPM placement.   -NPO for now.   -Plan to hold AV ty blockers.   -She is going to be transferred to the CCU with bedside transcutaneous pacing in place.   -Continue IVF hydration and hold diuretics. 78 year old female with PMHx of CAD s/p MI x3, HFpEF, COPD on 2L O2, HTN, HLD, DMII, CKD and hypothyroidism, admitted last night for syncopal episode.    Syncope- Likely multifactorial patient had a 10 second episode of Heart Block on telemetry this afternoon, and likely has underlying conduction disease. In addition patient had positive orthostatic vitals.   -Patient would benefit from PPM placement.   -NPO for now.   -Plan to hold AV ty blockers.   -She is going to be transferred to the CCU with bedside transcutaneous pacing in place.   -Continue IVF hydration and hold diuretics. 78 year old female with PMHx of CAD s/p MI x3, HFpEF, COPD on 2L O2, HTN, HLD, DMII, CKD and hypothyroidism, admitted last night for syncopal episode.    Syncope- Patient had a 10 second episode of Heart Block on telemetry this afternoon, and likely has underlying conduction disease, may also be 2/2 to Cardizem.   -She is going to be transferred to the CCU with bedside transcutaneous pacing in place. If patient develops any further episodes of heart block, PPM will be placed.   -Plan to hold AV ty blockers.   -Check Transthoracic ECHO  -Continue IVF hydration and hold diuretics as patient has positive orthostatic vital signs.   -EP to follow. 78 year old female with PMHx of CAD s/p MI x3, HFpEF, COPD on 2L O2, HTN, HLD, DMII, CKD and hypothyroidism, admitted last night for syncopal episode.    Syncope- Patient had a 10 second episode of Heart Block on telemetry this afternoon, and likely has underlying conduction disease, may also be 2/2 to Cardizem.   -She is going to be transferred to the CCU with bedside transcutaneous pacing in place. If patient develops any further episodes of heart block, PPM will be placed.  She had lunch and is elevated risk for Aspiration if PPM were to be placed today.   -Plan to hold AV ty blockers.   -Check Transthoracic ECHO  -Continue IVF hydration and hold diuretics as patient has positive orthostatic vital signs.   -EP to follow.

## 2018-10-19 NOTE — H&P ADULT - NSHPREVIEWOFSYSTEMS_GEN_ALL_CORE
REVIEW OF SYSTEMS:    CONSTITUTIONAL: No weakness, fevers or chills  EYES/ENT: No visual changes;  No vertigo or throat pain   NECK: No pain or stiffness  RESPIRATORY: No cough, wheezing, hemoptysis; No shortness of breath  CARDIOVASCULAR: No chest pain or palpitations  GASTROINTESTINAL: No abdominal or epigastric pain. No nausea, vomiting, or hematemesis; No diarrhea or constipation. No melena or hematochezia.  GENITOURINARY: No dysuria, frequency or hematuria  NEUROLOGICAL: No numbness or weakness POS JERKING MOVEMENTS OF BODY  SKIN: No itching, burning, rashes, or lesions   All other review of systems is negative unless indicated above.

## 2018-10-19 NOTE — CHART NOTE - NSCHARTNOTEFT_GEN_A_CORE
Was called by CCU RN that pt had an episode of symptomatic asystolic on tele. As per RN, pt was unresponsive and gain consciousness when she was shaking pt.   On my interview, pt reported to had "warm feeling" prior to the episode with might having lightheadedness, denies SOB, dizziness, diaphoresis or feeling of faint.   (Outpt cardiologist: Dr. Griggs)    BP: 170s/40s, O2 sat: 97% with O2 NC  Stat EKG: SR with PVC at rate 69 bpm, JAISON: 164 ms, QRS: 96 ms, QTc: 443 ms     Physical exam:  Neuro: alert, oriented x4  Pulmonary: clear, no wheezing or rale  CV: RRR, no murmur or rub  Extremities: dry, no edema     A/P  78 year old female with PMHx of CAD s/p MI x3, HFpEF, COPD on 2L O2, HTN, HLD, DMII, CKD and hypothyroidism, admitted for syncopal episode who had found with 10 second episode of Heart Block on telemetry this afternoon in ED, and likely has underlying conduction disease. Last dose of Cardizem was on 10/18 and currently been hold. This evening, pt had another episode of 27 sec pause with syncopal episode.   - remains R2 pad on patient and connect to external pacer   - consider TVP  - Please, contact EP on call as needed    Discussed with CCU RN and Dr. Grimm

## 2018-10-19 NOTE — CONSULT NOTE ADULT - SUBJECTIVE AND OBJECTIVE BOX
CC: Passed Out    HPI: Pt is a 79 y/o F, with PMHx of CAD s/p MI x3, HFpEF, COPD on 2L O2, HTN, HLD, DMII, CKD and hypothyroidism, presenting to the ED with near syncope episodes tonight, prior to arrival. Pt was in the kitchen cooking pasta and standing but leaning over the kitchen counter. Daughter states she walked in to see the pt leaning over but noticed that her knees had buckled multiple times and she was not responding to being called. No falls to the ground or head trauma. Pt was placed in a chair where she had multiple more similar episodes of the same. Pt is unable to recall full events of the episode and admits to feeling a tingling across her forehead during this time. Of note the patient admits to approximately 7 day duration of diarrhea which resolved a week ago. She states she has had one syncopal episode in the past which was last year and attributed it to a lack of Oxygen. She currently denies any chest pain, shortness of breath, palpitations, lower extremity edema, fevers, chill dysuria, n/v/d. The patient follows with Dr. Griggs, and had a Cardiomems device implanted last year.     INTERPRETATION OF TELEMETRY: On telemetry this afternoon at 12:44pm she had an approx 10 sec pause.     ECG: NSR 65 BPM Normal Axis Normal Intervals No ischemic Changes      PAST MEDICAL & SURGICAL HISTORY:  [place] (hard of hearing): hearing aid  Anxiety  Hypothyroid  GERD (gastroesophageal reflux disease)  CKD (chronic kidney disease)  MI, old: x3  Chronic UTI  Chronic diastolic congestive heart failure: last exacerbation 2017  Hyperlipidemia, unspecified hyperlipidemia type  Essential hypertension  Neuropathy  Diabetes  Chronic obstructive pulmonary disease, unspecified COPD type  History of cataract surgery: bilateral IOL  S/P lobectomy of lung: left lung pre-cancerous  History of ear surgery  History of cholecystectomy  S/P ORIF (open reduction internal fixation) fracture: left hip 2017  History of total knee replacement, unspecified laterality: bilateral right  left   H/O hernia repair: umbilical and incisional   delivery delivered  S/P appendectomy    MEDICATIONS  (STANDING):  aspirin  chewable 81 milliGRAM(s) Oral daily, atorvastatin 20 milliGRAM(s) Oral at bedtime, diltiazem    milliGRAM(s) Oral daily, buDESOnide  80 MICROgram(s)/formoterol 4.5 MICROgram(s) Inhaler 2 Puff(s) Inhalation two times a day, folic acid 1 milliGRAM(s) Oral daily, heparin  Injectable 5000 Unit(s) SubCutaneous every 12 hours, insulin glargine Injectable (LANTUS) 20 Unit(s) SubCutaneous at bedtime, levothyroxine 112 MICROGram(s) Oral daily, pantoprazole    Tablet 40 milliGRAM(s) Oral before breakfast, tiotropium 18 MICROgram(s) Capsule 1 Capsule(s) Inhalation at bedtime    Allergies: Reviewed.     FAMILY HISTORY:  Family history of CHF (congestive heart failure) (Mother)    SOCIAL HISTORY: Former smoker she quit in , She denies alcohol and recreational drug use.     PHYSICAL EXAM:    Vital Signs Last 24 Hrs  T(C): 36.2 (19 Oct 2018 09:57), Max: 37.1 (18 Oct 2018 20:18)  T(F): 97.1 (19 Oct 2018 09:57), Max: 98.7 (18 Oct 2018 20:18)  HR: 49 (19 Oct 2018 09:57) (49 - 67)  BP: 151/38 (19 Oct 2018 09:57) (149/40 - 179/64)  BP(mean): --  RR: 18 (19 Oct 2018 09:57) (16 - 18)  SpO2: 100% (19 Oct 2018 09:57) (98% - 100%)    GENERAL: Obese elderly female in NAD  HEAD:  Patient has EEG electrodes on scalp.   NECK:  No JVD or carotid bruit.   HEART: Regular rate and rhythm; Soft systolic murmur II/VI  rubs, or gallops.  PULMONARY: Clear to auscultation and perfusion.  No rales, wheezing, or rhonchi bilaterally.  ABDOMEN:  Obese abdomen, soft nontender, BS+ x 4  EXTREMITIES:  No pedal edema.   NEUROLOGICAL: Grossly nonfocal    LABS:                        9.5    6.66  )-----------( 211      ( 19 Oct 2018 06:55 )             29.4     10-    145  |  112<H>  |  64<H>  ----------------------------<  91  4.0   |  24  |  2.47<H>    Ca    8.2<L>      19 Oct 2018 06:55    TPro  7.5  /  Alb  3.1<L>  /  TBili  0.3  /  DBili  x   /  AST  17  /  ALT  13  /  AlkPhos  108  10-18    CARDIAC MARKERS ( 19 Oct 2018 01:51 )  <0.015 ng/mL / x     / 43 U/L / x     / x      CARDIAC MARKERS ( 18 Oct 2018 23:44 )  <0.015 ng/mL / x     / x     / x     / x      CARDIAC MARKERS ( 18 Oct 2018 20:43 )  <0.015 ng/mL / x     / x     / x     / x        RADIOLOGY & ADDITIONAL STUDIES:    ECHO in 2017:  Estimated left ventricular ejection fraction is 60 -65 %.  Moderate mitral annular calcification is present.  There is thickening of mitral valve leaflets. The leaflet opening is  mildly restricted.  Mild mitral stenosis is present.  Mild (1+) mitral regurgitation is present.  Moderate (2+) tricuspid valve regurgitation is present.  Moderate pulmonary hypertension.    CT Head: IMPRESSION: No CT evidence of acute intracranial hemorrhage, mass effect or acute territorial infarct.  Moderate chronic microvascular ischemic disease.  Follow-up MRI can be performed as clinically warranted.    Chest X-Ray: No active pulmonary disease. CC: Passed Out    HPI: Pt is a 77 y/o F, with PMHx of CAD s/p MI x3, HFpEF, COPD on 2L O2, HTN, HLD, DMII, CKD and hypothyroidism, presenting to the ED with near syncope episodes tonight, prior to arrival. Pt was in the kitchen cooking pasta and standing but leaning over the kitchen counter. Daughter states she walked in to see the pt leaning over but noticed that her knees had buckled multiple times and she was not responding to being called. No falls to the ground or head trauma. Pt was placed in a chair where she had multiple more similar episodes of the same. Pt is unable to recall full events of the episode and admits to feeling a tingling across her forehead during this time. Of note the patient admits to approximately 7 day duration of diarrhea which resolved a week ago. She states she has had one syncopal episode in the past which was last year and attributed it to a lack of Oxygen. She currently denies any chest pain, shortness of breath, palpitations, lower extremity edema, fevers, chill dysuria, n/v/d. The patient follows with Dr. Griggs, and had a Cardiomems device implanted last year.     INTERPRETATION OF TELEMETRY: On telemetry this afternoon at 12:44pm she had an approx 10 sec episode of heart block.     ECG: NSR 65 BPM Normal Axis Normal Intervals No ischemic Changes      PAST MEDICAL & SURGICAL HISTORY:  [place] (hard of hearing): hearing aid  Anxiety  Hypothyroid  GERD (gastroesophageal reflux disease)  CKD (chronic kidney disease)  MI, old: x3  Chronic UTI  Chronic diastolic congestive heart failure: last exacerbation 2017  Hyperlipidemia, unspecified hyperlipidemia type  Essential hypertension  Neuropathy  Diabetes  Chronic obstructive pulmonary disease, unspecified COPD type  History of cataract surgery: bilateral IOL  S/P lobectomy of lung: left lung pre-cancerous  History of ear surgery  History of cholecystectomy  S/P ORIF (open reduction internal fixation) fracture: left hip 2017  History of total knee replacement, unspecified laterality: bilateral right  left   H/O hernia repair: umbilical and incisional   delivery delivered  S/P appendectomy    MEDICATIONS  (STANDING):  aspirin  chewable 81 milliGRAM(s) Oral daily, atorvastatin 20 milliGRAM(s) Oral at bedtime, diltiazem    milliGRAM(s) Oral daily, buDESOnide  80 MICROgram(s)/formoterol 4.5 MICROgram(s) Inhaler 2 Puff(s) Inhalation two times a day, folic acid 1 milliGRAM(s) Oral daily, heparin  Injectable 5000 Unit(s) SubCutaneous every 12 hours, insulin glargine Injectable (LANTUS) 20 Unit(s) SubCutaneous at bedtime, levothyroxine 112 MICROGram(s) Oral daily, pantoprazole    Tablet 40 milliGRAM(s) Oral before breakfast, tiotropium 18 MICROgram(s) Capsule 1 Capsule(s) Inhalation at bedtime    Allergies: Reviewed.     FAMILY HISTORY:  Family history of CHF (congestive heart failure) (Mother)    SOCIAL HISTORY: Former smoker she quit in , She denies alcohol and recreational drug use.     PHYSICAL EXAM:    Vital Signs Last 24 Hrs  T(C): 36.2 (19 Oct 2018 09:57), Max: 37.1 (18 Oct 2018 20:18)  T(F): 97.1 (19 Oct 2018 09:57), Max: 98.7 (18 Oct 2018 20:18)  HR: 49 (19 Oct 2018 09:57) (49 - 67)  BP: 151/38 (19 Oct 2018 09:57) (149/40 - 179/64)  BP(mean): --  RR: 18 (19 Oct 2018 09:57) (16 - 18)  SpO2: 100% (19 Oct 2018 09:57) (98% - 100%)    GENERAL: Obese elderly female in NAD  HEAD:  Patient has EEG electrodes on scalp.   NECK:  No JVD or carotid bruit.   HEART: Regular rate and rhythm; Soft systolic murmur II/VI  rubs, or gallops.  PULMONARY: Clear to auscultation and perfusion.  No rales, wheezing, or rhonchi bilaterally.  ABDOMEN:  Obese abdomen, soft nontender, BS+ x 4  EXTREMITIES:  No pedal edema.   NEUROLOGICAL: Grossly nonfocal    LABS:                        9.5    6.66  )-----------( 211      ( 19 Oct 2018 06:55 )             29.4     10-    145  |  112<H>  |  64<H>  ----------------------------<  91  4.0   |  24  |  2.47<H>    Ca    8.2<L>      19 Oct 2018 06:55    TPro  7.5  /  Alb  3.1<L>  /  TBili  0.3  /  DBili  x   /  AST  17  /  ALT  13  /  AlkPhos  108  10-18    CARDIAC MARKERS ( 19 Oct 2018 01:51 )  <0.015 ng/mL / x     / 43 U/L / x     / x      CARDIAC MARKERS ( 18 Oct 2018 23:44 )  <0.015 ng/mL / x     / x     / x     / x      CARDIAC MARKERS ( 18 Oct 2018 20:43 )  <0.015 ng/mL / x     / x     / x     / x        RADIOLOGY & ADDITIONAL STUDIES:    ECHO in 2017:  Estimated left ventricular ejection fraction is 60 -65 %.  Moderate mitral annular calcification is present.  There is thickening of mitral valve leaflets. The leaflet opening is  mildly restricted.  Mild mitral stenosis is present.  Mild (1+) mitral regurgitation is present.  Moderate (2+) tricuspid valve regurgitation is present.  Moderate pulmonary hypertension.    CT Head: IMPRESSION: No CT evidence of acute intracranial hemorrhage, mass effect or acute territorial infarct.  Moderate chronic microvascular ischemic disease.  Follow-up MRI can be performed as clinically warranted.    Chest X-Ray: No active pulmonary disease.

## 2018-10-20 LAB
ANION GAP SERPL CALC-SCNC: 8 MMOL/L — SIGNIFICANT CHANGE UP (ref 5–17)
APTT BLD: 28.3 SEC — SIGNIFICANT CHANGE UP (ref 27.5–37.4)
BUN SERPL-MCNC: 57 MG/DL — HIGH (ref 7–23)
CALCIUM SERPL-MCNC: 8.5 MG/DL — SIGNIFICANT CHANGE UP (ref 8.5–10.1)
CHLORIDE SERPL-SCNC: 115 MMOL/L — HIGH (ref 96–108)
CO2 SERPL-SCNC: 24 MMOL/L — SIGNIFICANT CHANGE UP (ref 22–31)
CREAT SERPL-MCNC: 2.02 MG/DL — HIGH (ref 0.5–1.3)
GLUCOSE BLDC GLUCOMTR-MCNC: 106 MG/DL — HIGH (ref 70–99)
GLUCOSE BLDC GLUCOMTR-MCNC: 150 MG/DL — HIGH (ref 70–99)
GLUCOSE BLDC GLUCOMTR-MCNC: 157 MG/DL — HIGH (ref 70–99)
GLUCOSE BLDC GLUCOMTR-MCNC: 210 MG/DL — HIGH (ref 70–99)
GLUCOSE SERPL-MCNC: 117 MG/DL — HIGH (ref 70–99)
HCT VFR BLD CALC: 30.7 % — LOW (ref 34.5–45)
HGB BLD-MCNC: 10.1 G/DL — LOW (ref 11.5–15.5)
INR BLD: 1.24 RATIO — HIGH (ref 0.88–1.16)
MCHC RBC-ENTMCNC: 29.5 PG — SIGNIFICANT CHANGE UP (ref 27–34)
MCHC RBC-ENTMCNC: 32.9 GM/DL — SIGNIFICANT CHANGE UP (ref 32–36)
MCV RBC AUTO: 89.8 FL — SIGNIFICANT CHANGE UP (ref 80–100)
NRBC # BLD: 0 /100 WBCS — SIGNIFICANT CHANGE UP (ref 0–0)
PLATELET # BLD AUTO: 221 K/UL — SIGNIFICANT CHANGE UP (ref 150–400)
POTASSIUM SERPL-MCNC: 4.2 MMOL/L — SIGNIFICANT CHANGE UP (ref 3.5–5.3)
POTASSIUM SERPL-SCNC: 4.2 MMOL/L — SIGNIFICANT CHANGE UP (ref 3.5–5.3)
PROTHROM AB SERPL-ACNC: 13.4 SEC — HIGH (ref 9.8–12.7)
RBC # BLD: 3.42 M/UL — LOW (ref 3.8–5.2)
RBC # FLD: 13.2 % — SIGNIFICANT CHANGE UP (ref 10.3–14.5)
SODIUM SERPL-SCNC: 147 MMOL/L — HIGH (ref 135–145)
WBC # BLD: 8.37 K/UL — SIGNIFICANT CHANGE UP (ref 3.8–10.5)
WBC # FLD AUTO: 8.37 K/UL — SIGNIFICANT CHANGE UP (ref 3.8–10.5)

## 2018-10-20 PROCEDURE — 93010 ELECTROCARDIOGRAM REPORT: CPT | Mod: 76

## 2018-10-20 PROCEDURE — 71045 X-RAY EXAM CHEST 1 VIEW: CPT | Mod: 26

## 2018-10-20 PROCEDURE — 93306 TTE W/DOPPLER COMPLETE: CPT | Mod: 26

## 2018-10-20 RX ORDER — SODIUM CHLORIDE 9 MG/ML
1000 INJECTION, SOLUTION INTRAVENOUS
Qty: 0 | Refills: 0 | Status: DISCONTINUED | OUTPATIENT
Start: 2018-10-20 | End: 2018-10-21

## 2018-10-20 RX ORDER — ACETAMINOPHEN 500 MG
650 TABLET ORAL EVERY 6 HOURS
Qty: 0 | Refills: 0 | Status: DISCONTINUED | OUTPATIENT
Start: 2018-10-20 | End: 2018-10-22

## 2018-10-20 RX ORDER — HYDRALAZINE HCL 50 MG
10 TABLET ORAL EVERY 4 HOURS
Qty: 0 | Refills: 0 | Status: DISCONTINUED | OUTPATIENT
Start: 2018-10-20 | End: 2018-10-22

## 2018-10-20 RX ORDER — VANCOMYCIN HCL 1 G
1000 VIAL (EA) INTRAVENOUS ONCE
Qty: 0 | Refills: 0 | Status: COMPLETED | OUTPATIENT
Start: 2018-10-20 | End: 2018-10-20

## 2018-10-20 RX ORDER — ACETAMINOPHEN 500 MG
1000 TABLET ORAL ONCE
Qty: 0 | Refills: 0 | Status: COMPLETED | OUTPATIENT
Start: 2018-10-20 | End: 2018-10-20

## 2018-10-20 RX ORDER — HEPARIN SODIUM 5000 [USP'U]/ML
5000 INJECTION INTRAVENOUS; SUBCUTANEOUS EVERY 12 HOURS
Qty: 0 | Refills: 0 | Status: DISCONTINUED | OUTPATIENT
Start: 2018-10-20 | End: 2018-10-22

## 2018-10-20 RX ADMIN — HEPARIN SODIUM 5000 UNIT(S): 5000 INJECTION INTRAVENOUS; SUBCUTANEOUS at 17:45

## 2018-10-20 RX ADMIN — Medication 250 MILLIGRAM(S): at 10:05

## 2018-10-20 RX ADMIN — ATORVASTATIN CALCIUM 20 MILLIGRAM(S): 80 TABLET, FILM COATED ORAL at 21:34

## 2018-10-20 RX ADMIN — Medication 400 MILLIGRAM(S): at 13:18

## 2018-10-20 RX ADMIN — Medication 1000 MILLIGRAM(S): at 15:18

## 2018-10-20 RX ADMIN — Medication 112 MICROGRAM(S): at 15:18

## 2018-10-20 RX ADMIN — INSULIN GLARGINE 20 UNIT(S): 100 INJECTION, SOLUTION SUBCUTANEOUS at 00:09

## 2018-10-20 RX ADMIN — Medication 81 MILLIGRAM(S): at 15:16

## 2018-10-20 RX ADMIN — INSULIN GLARGINE 20 UNIT(S): 100 INJECTION, SOLUTION SUBCUTANEOUS at 21:33

## 2018-10-20 RX ADMIN — Medication 10 MILLIGRAM(S): at 15:17

## 2018-10-20 NOTE — PROVIDER CONTACT NOTE (CHANGE IN STATUS NOTIFICATION) - ASSESSMENT
Call placed to Dr LASSITER service regarding plan of care. Per MD leave pacer pads on and have Zoll on at bedside to pace if needed. Joe spoke with intensivist as well.

## 2018-10-20 NOTE — PROVIDER CONTACT NOTE (CHANGE IN STATUS NOTIFICATION) - ACTION/TREATMENT ORDERED:
Spoke with intensivist regarding pause. Continue to monitor at this time. The duration of the morning pt remained SB/SR 50-60's

## 2018-10-20 NOTE — PACU DISCHARGE NOTE - COMMENTS
Patient to transfer to CCU report given to Tania ARMENDARIZ. Will accompany patient transferring to CCU

## 2018-10-20 NOTE — PROVIDER CONTACT NOTE (CHANGE IN STATUS NOTIFICATION) - SITUATION
At 1954 pt had 27 sec arrest on monitor with unresponsiveness. Sternal rub done pt regained responsiveness and back into SR. Cardiac NP called to bedside. Call placed by her to EP physician.

## 2018-10-20 NOTE — PROVIDER CONTACT NOTE (CHANGE IN STATUS NOTIFICATION) - BACKGROUND
At 2100 pt c/o (L) sided chest pressure, EKG done, cardiac NP called again, EKG reviewed. Dr Barnard made aware per cardiac NP

## 2018-10-20 NOTE — ASU PATIENT PROFILE, ADULT - PMH
Anxiety    Chronic diastolic congestive heart failure  last exacerbation 7/2017  Chronic obstructive pulmonary disease, unspecified COPD type    Chronic UTI    CKD (chronic kidney disease)    Diabetes    Essential hypertension    GERD (gastroesophageal reflux disease)    [place] (hard of hearing)  hearing aid  Hyperlipidemia, unspecified hyperlipidemia type    Hypothyroid    MI, old  x3  Neuropathy

## 2018-10-20 NOTE — PROGRESS NOTE ADULT - ASSESSMENT
78 year old female with PMHx of CAD s/p MI x3, HFpEF, chronic hypoxic respiratory failure due to COPD on 2L O2, HTN, Hyperlipidemia, type 2 Dibaetes, stage 3 CKD, hypothyroidism, admitted for syncopal episode who had found to have symptomatic bradycardia.    * Symptomatic Bradycardia-s/p PPM, resume Cardizem but short acting dose for now.  * HTN-bp elevated, resume Cardizem  * Hyperlipidemia-atorvastatin  * Type 2 Diabetes- Lantus with ISS   * Stage 3 CKD- monitor on gentle IVF  * Hypernatremia- isotonic IVF (d5w at 75 cc/hr for 1 liter)  * Proph- heparin if okay with EPS 78 year old female with PMHx of CAD s/p MI x3, HFpEF, chronic hypoxic respiratory failure due to COPD on 2L O2, HTN, Hyperlipidemia, type 2 Dibaetes, stage 3 CKD, hypothyroidism, admitted for syncopal episode who had found to have symptomatic bradycardia.    * Symptomatic Bradycardia-s/p PPM, resume Cardizem but short acting dose for now.  * HTN-bp elevated, resume Cardizem  * Hyperlipidemia-atorvastatin  * Type 2 Diabetes- Lantus with ISS   * Stage 3 CKD- monitor on gentle IVF  * Hypernatremia- isotonic IVF (d5w at 75 cc/hr for 1 liter)  * Proph- heparin okay with EPS  * Disp-OOB, PT when able  * Comm-d/w pt, daughter, Dr Erickson

## 2018-10-20 NOTE — PROVIDER CONTACT NOTE (CHANGE IN STATUS NOTIFICATION) - RECOMMENDATIONS
At 0220 was at bedside with pt sitting her up in bed. Pt reported feeling very hot and that it was going to happen again. Pt then had 6.54 sec pause. Pt did not lose consciousness.

## 2018-10-20 NOTE — PROGRESS NOTE ADULT - ASSESSMENT
Syncope- recurrent-  secondary to pauses and bradycardia was on cardizem for HTN up until 2 days PTA but still with pauses off cardizem , ECho at bedside shows preserved LV function     CKD- close monitoring of the renal function.    for PPM today with Dr Juarez , she had CP this AM but has jh/o normal cors on prior CAth and a rcent negative stress test , secondary to pauses?

## 2018-10-21 LAB
ANION GAP SERPL CALC-SCNC: 10 MMOL/L — SIGNIFICANT CHANGE UP (ref 5–17)
BUN SERPL-MCNC: 53 MG/DL — HIGH (ref 7–23)
CALCIUM SERPL-MCNC: 8.3 MG/DL — LOW (ref 8.5–10.1)
CHLORIDE SERPL-SCNC: 108 MMOL/L — SIGNIFICANT CHANGE UP (ref 96–108)
CO2 SERPL-SCNC: 21 MMOL/L — LOW (ref 22–31)
CREAT SERPL-MCNC: 2.12 MG/DL — HIGH (ref 0.5–1.3)
GLUCOSE BLDC GLUCOMTR-MCNC: 144 MG/DL — HIGH (ref 70–99)
GLUCOSE BLDC GLUCOMTR-MCNC: 160 MG/DL — HIGH (ref 70–99)
GLUCOSE BLDC GLUCOMTR-MCNC: 166 MG/DL — HIGH (ref 70–99)
GLUCOSE BLDC GLUCOMTR-MCNC: 207 MG/DL — HIGH (ref 70–99)
GLUCOSE SERPL-MCNC: 167 MG/DL — HIGH (ref 70–99)
HCT VFR BLD CALC: 31.1 % — LOW (ref 34.5–45)
HGB BLD-MCNC: 10.4 G/DL — LOW (ref 11.5–15.5)
MCHC RBC-ENTMCNC: 29.9 PG — SIGNIFICANT CHANGE UP (ref 27–34)
MCHC RBC-ENTMCNC: 33.4 GM/DL — SIGNIFICANT CHANGE UP (ref 32–36)
MCV RBC AUTO: 89.4 FL — SIGNIFICANT CHANGE UP (ref 80–100)
NRBC # BLD: 0 /100 WBCS — SIGNIFICANT CHANGE UP (ref 0–0)
PLATELET # BLD AUTO: 214 K/UL — SIGNIFICANT CHANGE UP (ref 150–400)
POTASSIUM SERPL-MCNC: 4 MMOL/L — SIGNIFICANT CHANGE UP (ref 3.5–5.3)
POTASSIUM SERPL-SCNC: 4 MMOL/L — SIGNIFICANT CHANGE UP (ref 3.5–5.3)
RBC # BLD: 3.48 M/UL — LOW (ref 3.8–5.2)
RBC # FLD: 13.3 % — SIGNIFICANT CHANGE UP (ref 10.3–14.5)
SODIUM SERPL-SCNC: 139 MMOL/L — SIGNIFICANT CHANGE UP (ref 135–145)
WBC # BLD: 10.22 K/UL — SIGNIFICANT CHANGE UP (ref 3.8–10.5)
WBC # FLD AUTO: 10.22 K/UL — SIGNIFICANT CHANGE UP (ref 3.8–10.5)

## 2018-10-21 PROCEDURE — 71045 X-RAY EXAM CHEST 1 VIEW: CPT | Mod: 26

## 2018-10-21 RX ORDER — TRAMADOL HYDROCHLORIDE 50 MG/1
25 TABLET ORAL EVERY 6 HOURS
Qty: 0 | Refills: 0 | Status: DISCONTINUED | OUTPATIENT
Start: 2018-10-21 | End: 2018-10-22

## 2018-10-21 RX ORDER — ACETAMINOPHEN 500 MG
650 TABLET ORAL ONCE
Qty: 0 | Refills: 0 | Status: COMPLETED | OUTPATIENT
Start: 2018-10-21 | End: 2018-10-21

## 2018-10-21 RX ORDER — INSULIN LISPRO 100/ML
2 VIAL (ML) SUBCUTANEOUS ONCE
Qty: 0 | Refills: 0 | Status: COMPLETED | OUTPATIENT
Start: 2018-10-21 | End: 2018-10-21

## 2018-10-21 RX ADMIN — HEPARIN SODIUM 5000 UNIT(S): 5000 INJECTION INTRAVENOUS; SUBCUTANEOUS at 06:33

## 2018-10-21 RX ADMIN — INSULIN GLARGINE 20 UNIT(S): 100 INJECTION, SOLUTION SUBCUTANEOUS at 22:00

## 2018-10-21 RX ADMIN — Medication 650 MILLIGRAM(S): at 11:08

## 2018-10-21 RX ADMIN — Medication 81 MILLIGRAM(S): at 11:09

## 2018-10-21 RX ADMIN — Medication 1 MILLIGRAM(S): at 11:09

## 2018-10-21 RX ADMIN — Medication 112 MICROGRAM(S): at 06:32

## 2018-10-21 RX ADMIN — BUDESONIDE AND FORMOTEROL FUMARATE DIHYDRATE 2 PUFF(S): 160; 4.5 AEROSOL RESPIRATORY (INHALATION) at 21:06

## 2018-10-21 RX ADMIN — Medication 650 MILLIGRAM(S): at 06:33

## 2018-10-21 RX ADMIN — PANTOPRAZOLE SODIUM 40 MILLIGRAM(S): 20 TABLET, DELAYED RELEASE ORAL at 06:33

## 2018-10-21 RX ADMIN — TIOTROPIUM BROMIDE 1 CAPSULE(S): 18 CAPSULE ORAL; RESPIRATORY (INHALATION) at 23:08

## 2018-10-21 RX ADMIN — ATORVASTATIN CALCIUM 20 MILLIGRAM(S): 80 TABLET, FILM COATED ORAL at 22:00

## 2018-10-21 RX ADMIN — HEPARIN SODIUM 5000 UNIT(S): 5000 INJECTION INTRAVENOUS; SUBCUTANEOUS at 17:35

## 2018-10-21 RX ADMIN — Medication 650 MILLIGRAM(S): at 10:40

## 2018-10-21 RX ADMIN — Medication 650 MILLIGRAM(S): at 07:30

## 2018-10-21 RX ADMIN — SODIUM CHLORIDE 75 MILLILITER(S): 9 INJECTION, SOLUTION INTRAVENOUS at 02:38

## 2018-10-21 NOTE — CONSULT NOTE ADULT - ASSESSMENT
77 Y/O female with past medical history hypertension, copd, chf admitted on 10/18 for evaluation of near syncopal events, the patient had PPM placed on 10/20 and had fever to 101 on morning of 10/21. She is sleepy and states she is tired but has no other complaints, no cough, shortness of breath, sore throat, GI or urinary complaints.   1. Patient admitted with syncopal events, underwent PPM placement, had subsequent fever, most likely due to atelectasis, doubt PPM site infection area looks more inflammed  - iv hydration and supportive care   - follow up cultures   - serial cbc and monitor temperature   - reviewed prior medical records to evaluate for resistant or atypical pathogens   - would encourage spirometry and out of bed to chair  - if continues to have fever can start a short course of po doxycyline 100 mg po q 12 hours  2. other issues: hypertension, copd, chf  - per medicine

## 2018-10-21 NOTE — PROGRESS NOTE ADULT - ASSESSMENT
Syncope- recurrent-  secondary to pauses and bradycardia was on cardizem for HTN up until 2 days PTA but still with pauses off cardizem , ECho at bedside shows preserved LV function     CKD- close monitoring of the renal function.    for PPM today with Dr Juarez , she had CP this AM but has jh/o normal cors on prior CAth and a rcent negative stress test , secondary to pauses?    now with fever post PPM , will check BCx , Ucx , CXR , ID consult   back on cardizem for BP control

## 2018-10-21 NOTE — PROGRESS NOTE ADULT - ASSESSMENT
78 year old female with PMHx of CAD s/p MI x3, HFpEF, chronic hypoxic respiratory failure due to COPD on 2L O2, HTN, Hyperlipidemia, type 2 Dibaetes, stage 3 CKD, hypothyroidism, admitted for syncopal episode who had found to have symptomatic bradycardia.    * Symptomatic Bradycardia-s/p PPM, continue cardizem, monitor in CCU.  * HTN-bp much better controlled on Cardizem  * Fever-? atelectasis, no obvious signs of infection, monitor for now, await cult, ID eval, order CXR.  Monitor off abx.    * Hyperlipidemia-atorvastatin  * Type 2 Diabetes- Lantus with ISS   * Stage 3 CKD- stable  * Hypernatremia-resolved.  * Proph- heparin   * Disp-OOB, PT    * Comm-d/w pt, daughter, RN, Dr Cox

## 2018-10-21 NOTE — PROGRESS NOTE ADULT - RS GEN PE MLT RESP DETAILS PC
no wheezes/clear to auscultation bilaterally/breath sounds equal/respirations non-labored/no rales
respirations non-labored/no rhonchi/no wheezes/no rales/breath sounds equal/clear to auscultation bilaterally

## 2018-10-21 NOTE — CONSULT NOTE ADULT - SUBJECTIVE AND OBJECTIVE BOX
Patient is a 78y old  Female who presents with a chief complaint of PASSING OUT (21 Oct 2018 09:40)    HPI:  77 Y/O female with past medical history hypertension, copd, chf admitted on 10/18 for evaluation of near syncopal events, the patient had PPM placed on 10/20 and had fever to 101 on morning of 10/21. She is sleepy and states she is tired but has no other complaints, no cough, shortness of breath, sore throat, GI or urinary complaints.         PMH: as above  PSH: as above  Meds: per reconciliation sheet, noted below  MEDICATIONS  (STANDING):  aspirin  chewable 81 milliGRAM(s) Oral daily  atorvastatin 20 milliGRAM(s) Oral at bedtime  buDESOnide  80 MICROgram(s)/formoterol 4.5 MICROgram(s) Inhaler 2 Puff(s) Inhalation two times a day  dextrose 5%. 1000 milliLiter(s) (50 mL/Hr) IV Continuous <Continuous>  dextrose 50% Injectable 12.5 Gram(s) IV Push once  diltiazem    Tablet 60 milliGRAM(s) Oral four times a day  folic acid 1 milliGRAM(s) Oral daily  heparin  Injectable 5000 Unit(s) SubCutaneous every 12 hours  influenza   Vaccine 0.5 milliLiter(s) IntraMuscular once  insulin glargine Injectable (LANTUS) 20 Unit(s) SubCutaneous at bedtime  insulin lispro (HumaLOG) corrective regimen sliding scale   SubCutaneous at bedtime  levothyroxine 112 MICROGram(s) Oral daily  pantoprazole    Tablet 40 milliGRAM(s) Oral before breakfast  tiotropium 18 MICROgram(s) Capsule 1 Capsule(s) Inhalation at bedtime    MEDICATIONS  (PRN):  acetaminophen   Tablet .. 650 milliGRAM(s) Oral every 6 hours PRN Moderate Pain (4 - 6)  acetaminophen   Tablet .. 650 milliGRAM(s) Oral every 6 hours PRN Mild Pain (1 - 3)  dextrose 40% Gel 15 Gram(s) Oral once PRN Blood Glucose LESS THAN 70 milliGRAM(s)/deciliter  glucagon  Injectable 1 milliGRAM(s) IntraMuscular once PRN Glucose LESS THAN 70 milligrams/deciliter  hydrALAZINE Injectable 10 milliGRAM(s) IV Push every 4 hours PRN for SBP greater than or equal to 160.  senna 2 Tablet(s) Oral at bedtime PRN Constipation  traMADol 25 milliGRAM(s) Oral every 6 hours PRN Moderate Pain (4 - 6)    Allergies    Bactrim (Other)  ciprofloxacin (Other (Mild))  clindamycin (Unknown)  ibuprofen (Unknown)  penicillins (Other)  sulfa drugs (Unknown)    Intolerances      Social: no smoking, no alcohol, no illegal drugs; no recent travel, no exposure to TB  FAMILY HISTORY:  Family history of CHF (congestive heart failure) (Mother)     no history of premature cardiovascular disease in first degree relatives  ROS: the patient has no chills, no HA, no dizziness, no sore throat, no blurry vision, no CP, no palpitations, no SOB, no cough, no abdominal pain, no diarrhea, no N/V, no dysuria, no leg pain, no claudication, no rash, no joint aches, no rectal pain or bleeding, no night sweats  All other systems reviewed and are negative    Vital Signs Last 24 Hrs  T(C): 36.4 (21 Oct 2018 08:16), Max: 38.4 (21 Oct 2018 05:53)  T(F): 97.5 (21 Oct 2018 08:16), Max: 101.2 (21 Oct 2018 05:53)  HR: 60 (21 Oct 2018 12:00) (60 - 65)  BP: 139/42 (21 Oct 2018 12:00) (57/25 - 194/48)  BP(mean): 67 (21 Oct 2018 12:00) (31 - 109)  RR: 16 (21 Oct 2018 12:00) (12 - 17)  SpO2: 99% (21 Oct 2018 12:00) (93% - 100%)  Daily     Daily Weight in k.6 (21 Oct 2018 05:53)    PE:    Constitutional: frail looking  HEENT: NC/AT, EOMI, PERRLA, conjunctivae clear; ears and nose atraumatic; pharynx clear  Neck: supple; thyroid not palpable  Back: no tenderness  Respiratory: respiratory effort normal; clear to auscultation  Cardiovascular: S1S2 regular, no murmurs  Abdomen: soft, not tender, not distended, positive BS; no liver or spleen organomegaly  Genitourinary: no suprapubic tenderness  Musculoskeletal: no muscle tenderness, no joint swelling or tenderness  Neurological/ Psychiatric: AxOx3, judgement and insight normal;  moving all extremities  Skin: no rashes; no palpable lesions; left upper chest with mild erythema, ecchymosis    Labs: all available labs reviewed                        10.4   10.22 )-----------( 214      ( 21 Oct 2018 06:51 )             31.1     10-21    139  |  108  |  53<H>  ----------------------------<  167<H>  4.0   |  21<L>  |  2.12<H>    Ca    8.3<L>      21 Oct 2018 06:51       < from: Xray Chest 1 View AP/PA. (10.21.18 @ 10:18) >    EXAM:  XR CHEST AP OR PA 1V                            PROCEDURE DATE:  10/21/2018          INTERPRETATION:  History: Fever.    Single view of the chest was performed.    Comparison is made to 2018.    Findings:    There is a left-sidedpacemaker in place. There is a cardioMEMS device in   place. There is opacity in the left lower lung field consistent with a   combination of partial atelectasis, effusion, and/or pneumonia.   Atherosclerotic disease is noted.    Impression: Unchanged opacity at the left lower lung which may be related   to partial atelectasis, effusion, and/or pneumonia.      < end of copied text >          Radiology: all available radiological tests reviewed    Advanced directives addressed: full resuscitation

## 2018-10-22 ENCOUNTER — TRANSCRIPTION ENCOUNTER (OUTPATIENT)
Age: 78
End: 2018-10-22

## 2018-10-22 VITALS
RESPIRATION RATE: 18 BRPM | OXYGEN SATURATION: 100 % | DIASTOLIC BLOOD PRESSURE: 73 MMHG | SYSTOLIC BLOOD PRESSURE: 130 MMHG | HEART RATE: 60 BPM

## 2018-10-22 LAB
CULTURE RESULTS: SIGNIFICANT CHANGE UP
GLUCOSE BLDC GLUCOMTR-MCNC: 140 MG/DL — HIGH (ref 70–99)
GLUCOSE BLDC GLUCOMTR-MCNC: 277 MG/DL — HIGH (ref 70–99)
SPECIMEN SOURCE: SIGNIFICANT CHANGE UP

## 2018-10-22 RX ORDER — FOLIC ACID 0.8 MG
1 TABLET ORAL
Qty: 0 | Refills: 0 | DISCHARGE
Start: 2018-10-22

## 2018-10-22 RX ORDER — MIRTAZAPINE 45 MG/1
7.5 TABLET, ORALLY DISINTEGRATING ORAL
Qty: 0 | Refills: 0 | COMMUNITY

## 2018-10-22 RX ORDER — BUMETANIDE 0.25 MG/ML
1 INJECTION INTRAMUSCULAR; INTRAVENOUS
Qty: 0 | Refills: 0 | COMMUNITY

## 2018-10-22 RX ADMIN — Medication 650 MILLIGRAM(S): at 06:11

## 2018-10-22 RX ADMIN — Medication 1 MILLIGRAM(S): at 10:52

## 2018-10-22 RX ADMIN — Medication 112 MICROGRAM(S): at 06:11

## 2018-10-22 RX ADMIN — Medication 81 MILLIGRAM(S): at 10:52

## 2018-10-22 RX ADMIN — PANTOPRAZOLE SODIUM 40 MILLIGRAM(S): 20 TABLET, DELAYED RELEASE ORAL at 06:11

## 2018-10-22 RX ADMIN — HEPARIN SODIUM 5000 UNIT(S): 5000 INJECTION INTRAVENOUS; SUBCUTANEOUS at 06:11

## 2018-10-22 RX ADMIN — BUDESONIDE AND FORMOTEROL FUMARATE DIHYDRATE 2 PUFF(S): 160; 4.5 AEROSOL RESPIRATORY (INHALATION) at 12:26

## 2018-10-22 NOTE — PROGRESS NOTE ADULT - ASSESSMENT
79 Y/O female with past medical history hypertension, copd, chf admitted on 10/18 for evaluation of near syncopal events, the patient had PPM placed on 10/20 and had fever to 101 on morning of 10/21. She is sleepy and states she is tired but has no other complaints, no cough, shortness of breath, sore throat, GI or urinary complaints.   1. Patient admitted with syncopal events, underwent PPM placement, had subsequent fever, most likely due to atelectasis, doubt PPM site infection area looks more inflammed  - iv hydration and supportive care   - follow up cultures   - serial cbc and monitor temperature   - reviewed prior medical records to evaluate for resistant or atypical pathogens   - would encourage spirometry and out of bed to chair  - okay from id standpoint to discharge home on no antibiotics  2. other issues: hypertension, copd, chf  - per medicine

## 2018-10-22 NOTE — PHYSICAL THERAPY INITIAL EVALUATION ADULT - GENERAL OBSERVATIONS, REHAB EVAL
O2 2L/min nc; HM; BP cuff; pulse oxym; pt rec'd in bed supine; HR 60; /33; O2 Sat 99%; RR 18; pt denied pain; spouse present

## 2018-10-22 NOTE — PHYSICAL THERAPY INITIAL EVALUATION ADULT - MODALITIES TREATMENT COMMENTS
pt left seated in chair post Eval; chair alarm donned; all above lines / monitors in place; L UE supported on pillow; spouse present; callbell in reach; pt instructed not to get up alone; call nursing for assist; mendy well; denied pain; RN Michelle made aware pt OOB

## 2018-10-22 NOTE — DISCHARGE NOTE ADULT - MEDICATION SUMMARY - MEDICATIONS TO TAKE
I will START or STAY ON the medications listed below when I get home from the hospital:    aspirin 81 mg oral tablet  -- 1 tab(s) by mouth once a day  -- Indication: For cad    losartan 50 mg oral tablet  -- 1 tab(s) by mouth once a day  -- Indication: For htn    DilTIAZem Hydrochloride  mg/24 hours oral capsule, extended release  -- 1 cap(s) by mouth once a day  -- Indication: For htn    Lyrica 50 mg oral capsule  -- 1 cap(s) by mouth once a day (at bedtime)  -- Indication: For neuropathy    Toujeo SoloStar 300 units/mL subcutaneous solution  -- 45 unit(s) subcutaneous once a day (at bedtime)  -- Indication: For diabetes    HumaLOG 100 units/mL injectable solution  -- 14 unit(s) injectable 3 times a day (before meals)  -- Indication: For diabetes    HumaLOG 100 units/mL injectable solution  -- injectable 3 times a day (before meals) **SLIDING SCALE IN ADDITION TO 14 UNITS TID **  -- Indication: For diabetes    atorvastatin 20 mg oral tablet  -- 1 tab(s) by mouth once a day (at bedtime)  -- Indication: For hl    Spiriva 18 mcg inhalation capsule  -- 1 cap(s) inhaled once a day (in the evening)  -- Indication: For copd    Advair Diskus 250 mcg-50 mcg inhalation powder  -- 1 puff(s) inhaled 2 times a day  -- Indication: For copd    montelukast 10 mg oral tablet  -- 1 tab(s) by mouth once a day (at bedtime)  -- Indication: For copd    pantoprazole 40 mg oral delayed release tablet  -- 1 tab(s) by mouth once a day, stomach protection  -- It is very important that you take or use this exactly as directed.  Do not skip doses or discontinue unless directed by your doctor.  Obtain medical advice before taking any non-prescription drugs as some may affect the action of this medication.  Swallow whole.  Do not crush.    -- Indication: For gerd    levothyroxine 112 mcg (0.112 mg) oral tablet  -- 1 tab(s) by mouth once a day  -- Indication: For hypothyroid    Multiple Vitamins oral tablet  -- 1 tab(s) by mouth once a day  -- Indication: For gen health    Caltrate 600 + D oral tablet  -- 1 tab(s) by mouth 2 times a day  -- Indication: For gen health    folic acid 1 mg oral tablet  -- 1 tab(s) by mouth once a day (in the morning)  -- Indication: For gen health    folic acid 1 mg oral tablet  -- 1 tab(s) by mouth once a day  -- Indication: For gen health

## 2018-10-22 NOTE — DISCHARGE NOTE ADULT - CARE PLAN
Principal Discharge DX:	Syncope, unspecified syncope type  Goal:	resolution of symptoms  Assessment and plan of treatment:	cont meds  ppm placed

## 2018-10-22 NOTE — PROGRESS NOTE ADULT - REASON FOR ADMISSION
PASSING OUT

## 2018-10-22 NOTE — PROGRESS NOTE ADULT - SUBJECTIVE AND OBJECTIVE BOX
Pt seen and examined, sedated as she just had her PPM placed.  Pt c/o mild pain from PPM insertion site.  No SOB, fevers or chills.    Date of Service: 10-20-18 @ 13:30    Vital Signs Last 24 Hrs  T(C): 36.1 (20 Oct 2018 10:35), Max: 37.3 (20 Oct 2018 07:53)  T(F): 97 (20 Oct 2018 10:35), Max: 99.1 (20 Oct 2018 07:53)  HR: 62 (20 Oct 2018 12:30) (54 - 73)  BP: 186/46 (20 Oct 2018 12:30) (158/45 - 208/53)  BP(mean): 84 (20 Oct 2018 12:30) (71 - 87)  RR: 18 (20 Oct 2018 12:30) (13 - 19)  SpO2: 100% (20 Oct 2018 12:30) (85% - 100%)    Daily Height in cm: 157 (20 Oct 2018 09:34)    Daily Weight in k (20 Oct 2018 02:00)    I&O's Detail      CAPILLARY BLOOD GLUCOSE  150 (20 Oct 2018 11:50)  106 (20 Oct 2018 09:40)      POCT Blood Glucose.: 150 mg/dL (20 Oct 2018 11:56)  POCT Blood Glucose.: 106 mg/dL (20 Oct 2018 09:43)  POCT Blood Glucose.: 155 mg/dL (19 Oct 2018 21:47)          CARDIAC MARKERS ( 19 Oct 2018 01:51 )  <0.015 ng/mL / x     / 43 U/L / x     / x      CARDIAC MARKERS ( 18 Oct 2018 23:44 )  <0.015 ng/mL / x     / x     / x     / x      CARDIAC MARKERS ( 18 Oct 2018 20:43 )  <0.015 ng/mL / x     / x     / x     / x                                  10.1   8.37  )-----------( 221      ( 20 Oct 2018 05:55 )             30.7       10-20    147<H>  |  115<H>  |  57<H>  ----------------------------<  117<H>  4.2   |  24  |  2.02<H>    Ca    8.5      20 Oct 2018 05:55    TPro  7.5  /  Alb  3.1<L>  /  TBili  0.3  /  DBili  x   /  AST  17  /  ALT  13  /  AlkPhos  108  10-18      PT/INR - ( 19 Oct 2018 23:55 )   PT: 13.4 sec;   INR: 1.24 ratio         PTT - ( 19 Oct 2018 23:55 )  PTT:28.3 sec    LIVER FUNCTIONS - ( 18 Oct 2018 20:43 )  Alb: 3.1 g/dL / Pro: 7.5 gm/dL / ALK PHOS: 108 U/L / ALT: 13 U/L / AST: 17 U/L / GGT: x                     MEDICATIONS  (STANDING):  acetaminophen  IVPB .. 1000 milliGRAM(s) IV Intermittent once  aspirin  chewable 81 milliGRAM(s) Oral daily  atorvastatin 20 milliGRAM(s) Oral at bedtime  buDESOnide  80 MICROgram(s)/formoterol 4.5 MICROgram(s) Inhaler 2 Puff(s) Inhalation two times a day  dextrose 5%. 1000 milliLiter(s) (50 mL/Hr) IV Continuous <Continuous>  dextrose 5%. 1000 milliLiter(s) (75 mL/Hr) IV Continuous <Continuous>  dextrose 50% Injectable 12.5 Gram(s) IV Push once  diltiazem    Tablet 60 milliGRAM(s) Oral four times a day  folic acid 1 milliGRAM(s) Oral daily  influenza   Vaccine 0.5 milliLiter(s) IntraMuscular once  insulin glargine Injectable (LANTUS) 20 Unit(s) SubCutaneous at bedtime  insulin lispro (HumaLOG) corrective regimen sliding scale   SubCutaneous at bedtime  levothyroxine 112 MICROGram(s) Oral daily  pantoprazole    Tablet 40 milliGRAM(s) Oral before breakfast  tiotropium 18 MICROgram(s) Capsule 1 Capsule(s) Inhalation at bedtime    MEDICATIONS  (PRN):  acetaminophen   Tablet .. 650 milliGRAM(s) Oral every 6 hours PRN Moderate Pain (4 - 6)  acetaminophen   Tablet .. 650 milliGRAM(s) Oral every 6 hours PRN Mild Pain (1 - 3)  dextrose 40% Gel 15 Gram(s) Oral once PRN Blood Glucose LESS THAN 70 milliGRAM(s)/deciliter  glucagon  Injectable 1 milliGRAM(s) IntraMuscular once PRN Glucose LESS THAN 70 milligrams/deciliter  senna 2 Tablet(s) Oral at bedtime PRN Constipation
10/22/18: No cp, sob, reports some left flank pain - xrays done yest; no fevers, chills, n/v; tired but did ambulate ; sore at site of ppm     ROS: AS PER HPI OTHERWISE ALL OTHER SYSTEMS REVIEWED AND ARE NEGATIVE      Vital Signs Last 24 Hrs  T(C): 37.4 (22 Oct 2018 00:41), Max: 37.4 (22 Oct 2018 00:41)  T(F): 99.3 (22 Oct 2018 00:41), Max: 99.3 (22 Oct 2018 00:41)  HR: 61 (22 Oct 2018 09:00) (60 - 64)  BP: 142/35 (22 Oct 2018 09:00) (131/38 - 167/42)  BP(mean): 63 (22 Oct 2018 09:00) (61 - 135)  RR: 15 (22 Oct 2018 09:00) (11 - 20)  SpO2: 99% (22 Oct 2018 09:00) (89% - 100%)    GEN: A and O, NAD,  mood stable  HEENT:  NC/AT, EOMI, no oropharyngeal lesions, erythema    NECK:   supple    CV:  +S1, +S2, regular, no murmurs or rubs    RESP:   lungs clear to auscultation bilaterally, no wheezing, rales, rhonchi, good air entry bilaterally DECREASED BS ANGELICA, LEFT UPPER CHEST WITH PPM SITE C/D/I, NO DISCHARGE FROM SITE    GI:  abdomen soft, non-tender, non-distended, normal BS, no abdominal masses, no palpable masses    RECTAL:  not examined    :  not examined    MSK:   normal muscle tone, no atrophy, no rigidity, no contractions    EXT:   no clubbing, no cyanosis, no edema, no calf pain, swelling or erythema    VASCULAR:  pulses equal and symmetric in the upper and lower extremities    NEURO:  AAOX3, no focal neurological deficits, follows all commands, able to move extremities spontaneously    SKIN:  no ulcers, lesions or rashes                              10.4   10.22 )-----------( 214      ( 21 Oct 2018 06:51 )             31.1     10-21    139  |  108  |  53<H>  ----------------------------<  167<H>  4.0   |  21<L>  |  2.12<H>    Ca    8.3<L>      21 Oct 2018 06:51      MEDICATIONS  (STANDING):  aspirin  chewable 81 milliGRAM(s) Oral daily  atorvastatin 20 milliGRAM(s) Oral at bedtime  buDESOnide  80 MICROgram(s)/formoterol 4.5 MICROgram(s) Inhaler 2 Puff(s) Inhalation two times a day  dextrose 5%. 1000 milliLiter(s) (50 mL/Hr) IV Continuous <Continuous>  dextrose 50% Injectable 12.5 Gram(s) IV Push once  diltiazem    Tablet 60 milliGRAM(s) Oral four times a day  folic acid 1 milliGRAM(s) Oral daily  heparin  Injectable 5000 Unit(s) SubCutaneous every 12 hours  influenza   Vaccine 0.5 milliLiter(s) IntraMuscular once  insulin glargine Injectable (LANTUS) 20 Unit(s) SubCutaneous at bedtime  insulin lispro (HumaLOG) corrective regimen sliding scale   SubCutaneous at bedtime  levothyroxine 112 MICROGram(s) Oral daily  pantoprazole    Tablet 40 milliGRAM(s) Oral before breakfast  tiotropium 18 MICROgram(s) Capsule 1 Capsule(s) Inhalation at bedtime    MEDICATIONS  (PRN):  acetaminophen   Tablet .. 650 milliGRAM(s) Oral every 6 hours PRN Moderate Pain (4 - 6)  acetaminophen   Tablet .. 650 milliGRAM(s) Oral every 6 hours PRN Mild Pain (1 - 3)  dextrose 40% Gel 15 Gram(s) Oral once PRN Blood Glucose LESS THAN 70 milliGRAM(s)/deciliter  glucagon  Injectable 1 milliGRAM(s) IntraMuscular once PRN Glucose LESS THAN 70 milligrams/deciliter  hydrALAZINE Injectable 10 milliGRAM(s) IV Push every 4 hours PRN for SBP greater than or equal to 160.  senna 2 Tablet(s) Oral at bedtime PRN Constipation  traMADol 25 milliGRAM(s) Oral every 6 hours PRN Moderate Pain (4 - 6)
BRIEF PROCEDURE NOTE : Date 10/20/18    OLI ESCALERA  [id number]    Pre-op Diagnosis: Episodic CHB    Post-op Diagnosis: same    Procedure: PPM implant    Electrophysiologist: Ezio Grimm MD    Assistant:    Anesthesia: Sedation    Access: left axillary vein    Description:    Complications: None    EBL: < 10 cc    Disposition: CCU    Plan: Post op abx, chest xray
Patient is a 78y old  Female who presents with a chief complaint of PASSING OUT (21 Oct 2018 06:28)    10/21- pt with fever, s/p PPM yesterday    MEDICATIONS  (STANDING):  aspirin  chewable 81 milliGRAM(s) Oral daily  atorvastatin 20 milliGRAM(s) Oral at bedtime  buDESOnide  80 MICROgram(s)/formoterol 4.5 MICROgram(s) Inhaler 2 Puff(s) Inhalation two times a day  dextrose 5%. 1000 milliLiter(s) (50 mL/Hr) IV Continuous <Continuous>  dextrose 5%. 1000 milliLiter(s) (75 mL/Hr) IV Continuous <Continuous>  dextrose 50% Injectable 12.5 Gram(s) IV Push once  diltiazem    Tablet 60 milliGRAM(s) Oral four times a day  folic acid 1 milliGRAM(s) Oral daily  heparin  Injectable 5000 Unit(s) SubCutaneous every 12 hours  influenza   Vaccine 0.5 milliLiter(s) IntraMuscular once  insulin glargine Injectable (LANTUS) 20 Unit(s) SubCutaneous at bedtime  insulin lispro (HumaLOG) corrective regimen sliding scale   SubCutaneous at bedtime  levothyroxine 112 MICROGram(s) Oral daily  pantoprazole    Tablet 40 milliGRAM(s) Oral before breakfast  tiotropium 18 MICROgram(s) Capsule 1 Capsule(s) Inhalation at bedtime    MEDICATIONS  (PRN):  acetaminophen   Tablet .. 650 milliGRAM(s) Oral every 6 hours PRN Moderate Pain (4 - 6)  acetaminophen   Tablet .. 650 milliGRAM(s) Oral every 6 hours PRN Mild Pain (1 - 3)  dextrose 40% Gel 15 Gram(s) Oral once PRN Blood Glucose LESS THAN 70 milliGRAM(s)/deciliter  glucagon  Injectable 1 milliGRAM(s) IntraMuscular once PRN Glucose LESS THAN 70 milligrams/deciliter  hydrALAZINE Injectable 10 milliGRAM(s) IV Push every 4 hours PRN for SBP greater than or equal to 160.  senna 2 Tablet(s) Oral at bedtime PRN Constipation            Vital Signs Last 24 Hrs  T(C): 38.4 (21 Oct 2018 05:53), Max: 38.4 (21 Oct 2018 05:53)  T(F): 101.2 (21 Oct 2018 05:53), Max: 101.2 (21 Oct 2018 05:53)  HR: 61 (21 Oct 2018 07:00) (60 - 73)  BP: 173/39 (21 Oct 2018 07:00) (57/25 - 208/53)  BP(mean): 74 (21 Oct 2018 07:00) (31 - 109)  RR: 15 (20 Oct 2018 15:00) (12 - 18)  SpO2: 100% (21 Oct 2018 07:00) (94% - 100%)            INTERPRETATION OF TELEMETRY: SR     ECG:        LABS:                        10.1   8.37  )-----------( 221      ( 20 Oct 2018 05:55 )             30.7     10-20    147<H>  |  115<H>  |  57<H>  ----------------------------<  117<H>  4.2   |  24  |  2.02<H>    Ca    8.5      20 Oct 2018 05:55          PT/INR - ( 19 Oct 2018 23:55 )   PT: 13.4 sec;   INR: 1.24 ratio         PTT - ( 19 Oct 2018 23:55 )  PTT:28.3 sec    I&O's Summary    20 Oct 2018 07:01  -  21 Oct 2018 07:00  --------------------------------------------------------  IN: 900 mL / OUT: 0 mL / NET: 900 mL      BNP  RADIOLOGY & ADDITIONAL STUDIES:
Patient is a 78y old  Female who presents with a chief complaint of PASSING OUT (21 Oct 2018 09:40)      Date of service: 10-22-18 @ 12:39    Patient comfortable  Afebrile        ROS: no fever or chills; denies dizziness, no HA, no SOB or cough, no abdominal pain, no diarrhea or constipation; no dysuria, no urinary frequency, no legs pain, no rashes    MEDICATIONS  (STANDING):  aspirin  chewable 81 milliGRAM(s) Oral daily  atorvastatin 20 milliGRAM(s) Oral at bedtime  buDESOnide  80 MICROgram(s)/formoterol 4.5 MICROgram(s) Inhaler 2 Puff(s) Inhalation two times a day  dextrose 5%. 1000 milliLiter(s) (50 mL/Hr) IV Continuous <Continuous>  dextrose 50% Injectable 12.5 Gram(s) IV Push once  diltiazem    Tablet 60 milliGRAM(s) Oral four times a day  folic acid 1 milliGRAM(s) Oral daily  heparin  Injectable 5000 Unit(s) SubCutaneous every 12 hours  insulin glargine Injectable (LANTUS) 20 Unit(s) SubCutaneous at bedtime  insulin lispro (HumaLOG) corrective regimen sliding scale   SubCutaneous at bedtime  levothyroxine 112 MICROGram(s) Oral daily  pantoprazole    Tablet 40 milliGRAM(s) Oral before breakfast  tiotropium 18 MICROgram(s) Capsule 1 Capsule(s) Inhalation at bedtime    MEDICATIONS  (PRN):  acetaminophen   Tablet .. 650 milliGRAM(s) Oral every 6 hours PRN Moderate Pain (4 - 6)  acetaminophen   Tablet .. 650 milliGRAM(s) Oral every 6 hours PRN Mild Pain (1 - 3)  dextrose 40% Gel 15 Gram(s) Oral once PRN Blood Glucose LESS THAN 70 milliGRAM(s)/deciliter  glucagon  Injectable 1 milliGRAM(s) IntraMuscular once PRN Glucose LESS THAN 70 milligrams/deciliter  hydrALAZINE Injectable 10 milliGRAM(s) IV Push every 4 hours PRN for SBP greater than or equal to 160.  senna 2 Tablet(s) Oral at bedtime PRN Constipation  traMADol 25 milliGRAM(s) Oral every 6 hours PRN Moderate Pain (4 - 6)      Vital Signs Last 24 Hrs  T(C): 36.3 (22 Oct 2018 11:15), Max: 37.4 (22 Oct 2018 00:41)  T(F): 97.3 (22 Oct 2018 11:15), Max: 99.3 (22 Oct 2018 00:41)  HR: 61 (22 Oct 2018 09:00) (60 - 64)  BP: 142/35 (22 Oct 2018 09:00) (131/40 - 167/42)  BP(mean): 63 (22 Oct 2018 09:00) (61 - 135)  RR: 15 (22 Oct 2018 09:00) (11 - 20)  SpO2: 99% (22 Oct 2018 09:00) (89% - 100%)    Physical Exam:        Constitutional: frail looking  HEENT: NC/AT, EOMI, PERRLA, conjunctivae clear; ears and nose atraumatic; pharynx clear  Neck: supple; thyroid not palpable  Back: no tenderness  Respiratory: respiratory effort normal; clear to auscultation  Cardiovascular: S1S2 regular, no murmurs  Abdomen: soft, not tender, not distended, positive BS; no liver or spleen organomegaly  Genitourinary: no suprapubic tenderness  Musculoskeletal: no muscle tenderness, no joint swelling or tenderness  Neurological/ Psychiatric: AxOx3, judgement and insight normal;  moving all extremities  Skin: no rashes; no palpable lesions; left upper chest with mild erythema, ecchymosis    Labs: all available labs reviewed                       Labs:                        10.4   10.22 )-----------( 214      ( 21 Oct 2018 06:51 )             31.1     10-21    139  |  108  |  53<H>  ----------------------------<  167<H>  4.0   |  21<L>  |  2.12<H>    Ca    8.3<L>      21 Oct 2018 06:51             Cultures:            < from: Xray Chest 1 View AP/PA. (10.21.18 @ 10:18) >    EXAM:  XR CHEST AP OR PA 1V                            PROCEDURE DATE:  10/21/2018          INTERPRETATION:  History: Fever.    Single view of the chest was performed.    Comparison is made to October 20, 2018.    Findings:    There is a left-sidedpacemaker in place. There is a cardioMEMS device in   place. There is opacity in the left lower lung field consistent with a   combination of partial atelectasis, effusion, and/or pneumonia.   Atherosclerotic disease is noted.    Impression: Unchanged opacity at the left lower lung which may be related   to partial atelectasis, effusion, and/or pneumonia.      < end of copied text >          Radiology: all available radiological tests reviewed    Advanced directives addressed: full resuscitation
Patient is a 78y old  Female who presents with a chief complaint of Passing out (19 Oct 2018 13:50)  10/20- episode of asystole last night (30 sec) with occassional escape beats      MEDICATIONS  (STANDING):  aspirin  chewable 81 milliGRAM(s) Oral daily  atorvastatin 20 milliGRAM(s) Oral at bedtime  buDESOnide  80 MICROgram(s)/formoterol 4.5 MICROgram(s) Inhaler 2 Puff(s) Inhalation two times a day  dextrose 5%. 1000 milliLiter(s) (50 mL/Hr) IV Continuous <Continuous>  dextrose 50% Injectable 12.5 Gram(s) IV Push once  folic acid 1 milliGRAM(s) Oral daily  heparin  Injectable 5000 Unit(s) SubCutaneous every 12 hours  influenza   Vaccine 0.5 milliLiter(s) IntraMuscular once  insulin glargine Injectable (LANTUS) 20 Unit(s) SubCutaneous at bedtime  insulin lispro (HumaLOG) corrective regimen sliding scale   SubCutaneous at bedtime  levothyroxine 112 MICROGram(s) Oral daily  pantoprazole    Tablet 40 milliGRAM(s) Oral before breakfast  tiotropium 18 MICROgram(s) Capsule 1 Capsule(s) Inhalation at bedtime    MEDICATIONS  (PRN):  acetaminophen   Tablet .. 650 milliGRAM(s) Oral every 6 hours PRN Mild Pain (1 - 3)  dextrose 40% Gel 15 Gram(s) Oral once PRN Blood Glucose LESS THAN 70 milliGRAM(s)/deciliter  glucagon  Injectable 1 milliGRAM(s) IntraMuscular once PRN Glucose LESS THAN 70 milligrams/deciliter  senna 2 Tablet(s) Oral at bedtime PRN Constipation            Vital Signs Last 24 Hrs  T(C): 36.8 (20 Oct 2018 05:00), Max: 36.8 (20 Oct 2018 05:00)  T(F): 98.2 (20 Oct 2018 05:00), Max: 98.2 (20 Oct 2018 05:00)  HR: 59 (20 Oct 2018 06:00) (49 - 71)  BP: 161/38 (20 Oct 2018 06:00) (151/38 - 192/50)  BP(mean): 71 (20 Oct 2018 06:00) (71 - 87)  RR: 18 (20 Oct 2018 06:00) (13 - 19)  SpO2: 94% (20 Oct 2018 06:00) (85% - 100%)            INTERPRETATION OF TELEMETRY: 30sec pause    ECG:        LABS:                        10.1   8.37  )-----------( 221      ( 20 Oct 2018 05:55 )             30.7     10-20    147<H>  |  115<H>  |  57<H>  ----------------------------<  117<H>  4.2   |  24  |  2.02<H>    Ca    8.5      20 Oct 2018 05:55    TPro  7.5  /  Alb  3.1<L>  /  TBili  0.3  /  DBili  x   /  AST  17  /  ALT  13  /  AlkPhos  108  10-18    CARDIAC MARKERS ( 19 Oct 2018 01:51 )  <0.015 ng/mL / x     / 43 U/L / x     / x      CARDIAC MARKERS ( 18 Oct 2018 23:44 )  <0.015 ng/mL / x     / x     / x     / x      CARDIAC MARKERS ( 18 Oct 2018 20:43 )  <0.015 ng/mL / x     / x     / x     / x          PT/INR - ( 19 Oct 2018 23:55 )   PT: 13.4 sec;   INR: 1.24 ratio         PTT - ( 19 Oct 2018 23:55 )  PTT:28.3 sec    I&O's Summary    BNP  RADIOLOGY & ADDITIONAL STUDIES:
Patient is a 78y old  Female who presents with a chief complaint of recurrent syncope.     HPI:78yof with pmh as stated below presented with recurrent witnessed syncope.  She was at the counter in kitchen and just made pasty and put her head on counter.  Her daughter checked on her and pt stated that she felt tired and later daughter saw her lowering to ground and caught her.  Her BS and BP normal at that time per daughter.  This happened several times and the last one she had shaking of the extremities.    She recently had diarrhea and slight worsening of her renal funcitons.  She saw me in the office this week and c/o fatigue.  Her exam and vitals and Cardiomems readings were normal.    10/22- pt seen and examined this am. pt denies any symptoms.     PAST MEDICAL & SURGICAL HISTORY:  [place] (hard of hearing): hearing aid  Anxiety  Hypothyroid  GERD (gastroesophageal reflux disease)  CKD (chronic kidney disease)  MI, old: x3  Chronic UTI  Chronic diastolic congestive heart failure: last exacerbation 2017  Hyperlipidemia, unspecified hyperlipidemia type  Essential hypertension  Neuropathy  Diabetes  Chronic obstructive pulmonary disease, unspecified COPD type  History of cataract surgery: bilateral IOL  S/P lobectomy of lung: left lung pre-cancerous  History of ear surgery  History of cholecystectomy  S/P ORIF (open reduction internal fixation) fracture: left hip 2017  History of total knee replacement, unspecified laterality: bilateral right  left   H/O hernia repair: umbilical and incisional   delivery delivered  S/P appendectomy      MEDICATIONS  (STANDING):  aspirin  chewable 81 milliGRAM(s) Oral daily  atorvastatin 20 milliGRAM(s) Oral at bedtime  buDESOnide  80 MICROgram(s)/formoterol 4.5 MICROgram(s) Inhaler 2 Puff(s) Inhalation two times a day  dextrose 5%. 1000 milliLiter(s) (50 mL/Hr) IV Continuous <Continuous>  dextrose 50% Injectable 12.5 Gram(s) IV Push once  diltiazem    milliGRAM(s) Oral daily  folic acid 1 milliGRAM(s) Oral daily  heparin  Injectable 5000 Unit(s) SubCutaneous every 12 hours  influenza   Vaccine 0.5 milliLiter(s) IntraMuscular once  insulin glargine Injectable (LANTUS) 20 Unit(s) SubCutaneous at bedtime  insulin lispro (HumaLOG) corrective regimen sliding scale   SubCutaneous at bedtime  levothyroxine 112 MICROGram(s) Oral daily  pantoprazole    Tablet 40 milliGRAM(s) Oral before breakfast  tiotropium 18 MICROgram(s) Capsule 1 Capsule(s) Inhalation at bedtime    MEDICATIONS  (PRN):  acetaminophen   Tablet .. 650 milliGRAM(s) Oral every 6 hours PRN Mild Pain (1 - 3)  dextrose 40% Gel 15 Gram(s) Oral once PRN Blood Glucose LESS THAN 70 milliGRAM(s)/deciliter  glucagon  Injectable 1 milliGRAM(s) IntraMuscular once PRN Glucose LESS THAN 70 milligrams/deciliter  senna 2 Tablet(s) Oral at bedtime PRN Constipation      FAMILY HISTORY:  Family history of CHF (congestive heart failure) (Mother)      SOCIAL HISTORY:  ex smoker    REVIEW OF SYSTEMS:  CONSTITUTIONAL:    c/o fatigue.   HEENT:  Eyes:  No visual changes.     ENT:  No epistaxis.  No sinus pain.    RESPIRATORY:  No cough.  No wheeze.  No hemoptysis.  No shortness of breath.  CARDIOVASCULAR:  No chest pains.  No palpitations. No shortness of breath, No orthopnea or PND. c/o syncope  GASTROINTESTINAL:  No abdominal pain.  No nausea or vomiting.    GENITOURINARY:    No hematuria.    MUSCULOSKELETAL:  No musculoskeletal pain.  No joint swelling.  No arthritis.  NEUROLOGICAL:  No tingling or numbness or weakness.  PSYCHIATRIC:  No confusion  SKIN:  No rashes.    ENDOCRINE:  No unexplained weight loss.  No polydipsia.   HEMATOLOGIC:  No anemia.  No prolonged or excessive bleeding.   ALLERGIC AND IMMUNOLOGIC:  No pruritus.          Vital Signs Last 24 Hrs  T(C): 36.2 (19 Oct 2018 09:57), Max: 37.1 (18 Oct 2018 20:18)  T(F): 97.1 (19 Oct 2018 09:57), Max: 98.7 (18 Oct 2018 20:18)  HR: 49 (19 Oct 2018 09:57) (49 - 67)  BP: 151/38 (19 Oct 2018 09:57) (149/40 - 179/64)  BP(mean): --  RR: 18 (19 Oct 2018 09:57) (16 - 18)  SpO2: 100% (19 Oct 2018 09:57) (98% - 100%)    PHYSICAL EXAM-    Constitutional: no distress    Head: Head is normocephalic and atraumatic.      Neck: No jugular venous distention. No audible carotid bruits. There are strong carotid pulses bilaterally. No JVD.     Cardiovascular: Regular rate and rhythm without S3, S4. No murmurs or rubs are appreciated.      Respiratory: Breath sounds are normal. No rales. No wheezing.    Abdomen: Soft, nontender, nondistended with positive bowel sounds.      Extremity: No tenderness. No  pitting edema     Neurologic: The patient is alert and oriented.      Skin: No rash, no obvious lesions noted.      Psychiatric: The patient appears to be emotionally stable.      INTERPRETATION OF TELEMETRY: A paced    ECG:     I&O's Detail      LABS:                        9.5    6.66  )-----------( 211      ( 19 Oct 2018 06:55 )             29.4     10-19    145  |  112<H>  |  64<H>  ----------------------------<  91  4.0   |  24  |  2.47<H>    Ca    8.2<L>      19 Oct 2018 06:55    TPro  7.5  /  Alb  3.1<L>  /  TBili  0.3  /  DBili  x   /  AST  17  /  ALT  13  /  AlkPhos  108  10-18    CARDIAC MARKERS ( 19 Oct 2018 01:51 )  <0.015 ng/mL / x     / 43 U/L / x     / x      CARDIAC MARKERS ( 18 Oct 2018 23:44 )  <0.015 ng/mL / x     / x     / x     / x      CARDIAC MARKERS ( 18 Oct 2018 20:43 )  <0.015 ng/mL / x     / x     / x     / x              I&O's Summary    BNP  RADIOLOGY & ADDITIONAL STUDIES:  < from: Xray Chest 1 View- PORTABLE-Urgent (10.18.18 @ 21:42) >    EXAM:  XR CHEST PORTABLE URGENT 1V                            PROCEDURE DATE:  10/18/2018          INTERPRETATION:  History: Syncope    Chest:  one view.      Comparison: 3/5/2018    AP radiograph of the chest demonstrates no evidence of infiltrate,   pleural effusion or vascular congestion. No atelectasis is seen. The   cardiac silhouette is normal in size. Osseous structures are intact.    Impression: No active pulmonary disease.                LUL THOMPSON M.D., ATTENDING RADIOLOGIST  Thisdocument has been electronically signed. Oct 19 2018 10:22AM        < end of copied text >
Pt spiked a fever of 101.2 this morning, denies any cough, SOB, dysuria or abdominal pain.  C/o pain from Lt chest wall PPM site.    Date of Service: 10-21-18 @ 09:41    Vital Signs Last 24 Hrs  T(C): 36.4 (21 Oct 2018 08:16), Max: 38.4 (21 Oct 2018 05:53)  T(F): 97.5 (21 Oct 2018 08:16), Max: 101.2 (21 Oct 2018 05:53)  HR: 60 (21 Oct 2018 09:00) (60 - 73)  BP: 139/42 (21 Oct 2018 09:00) (57/25 - 208/53)  BP(mean): 67 (21 Oct 2018 09:00) (31 - 109)  RR: 17 (21 Oct 2018 09:00) (12 - 18)  SpO2: 93% (21 Oct 2018 09:00) (93% - 100%)    Daily     Daily Weight in k.6 (21 Oct 2018 05:53)    I&O's Detail    20 Oct 2018 07:  -  21 Oct 2018 07:00  --------------------------------------------------------  IN:    dextrose 5%.: 900 mL  Total IN: 900 mL    OUT:  Total OUT: 0 mL    Total NET: 900 mL      21 Oct 2018 07:01  -  21 Oct 2018 09:41  --------------------------------------------------------  IN:    dextrose 5%.: 150 mL  Total IN: 150 mL    OUT:  Total OUT: 0 mL    Total NET: 150 mL          CAPILLARY BLOOD GLUCOSE  150 (20 Oct 2018 11:50)      POCT Blood Glucose.: 160 mg/dL (21 Oct 2018 08:53)  POCT Blood Glucose.: 210 mg/dL (20 Oct 2018 21:14)  POCT Blood Glucose.: 157 mg/dL (20 Oct 2018 16:22)  POCT Blood Glucose.: 150 mg/dL (20 Oct 2018 11:56)  POCT Blood Glucose.: 106 mg/dL (20 Oct 2018 09:43)                                      10.4   10.22 )-----------( 214      ( 21 Oct 2018 06:51 )             31.1       10-    139  |  108  |  53<H>  ----------------------------<  167<H>  4.0   |  21<L>  |  2.12<H>    Ca    8.3<L>      21 Oct 2018 06:51        PT/INR - ( 19 Oct 2018 23:55 )   PT: 13.4 sec;   INR: 1.24 ratio         PTT - ( 19 Oct 2018 23:55 )  PTT:28.3 sec                MEDICATIONS  (STANDING):  aspirin  chewable 81 milliGRAM(s) Oral daily  atorvastatin 20 milliGRAM(s) Oral at bedtime  buDESOnide  80 MICROgram(s)/formoterol 4.5 MICROgram(s) Inhaler 2 Puff(s) Inhalation two times a day  dextrose 5%. 1000 milliLiter(s) (50 mL/Hr) IV Continuous <Continuous>  dextrose 50% Injectable 12.5 Gram(s) IV Push once  diltiazem    Tablet 60 milliGRAM(s) Oral four times a day  folic acid 1 milliGRAM(s) Oral daily  heparin  Injectable 5000 Unit(s) SubCutaneous every 12 hours  influenza   Vaccine 0.5 milliLiter(s) IntraMuscular once  insulin glargine Injectable (LANTUS) 20 Unit(s) SubCutaneous at bedtime  insulin lispro (HumaLOG) corrective regimen sliding scale   SubCutaneous at bedtime  levothyroxine 112 MICROGram(s) Oral daily  pantoprazole    Tablet 40 milliGRAM(s) Oral before breakfast  tiotropium 18 MICROgram(s) Capsule 1 Capsule(s) Inhalation at bedtime    MEDICATIONS  (PRN):  acetaminophen   Tablet .. 650 milliGRAM(s) Oral every 6 hours PRN Moderate Pain (4 - 6)  acetaminophen   Tablet .. 650 milliGRAM(s) Oral every 6 hours PRN Mild Pain (1 - 3)  dextrose 40% Gel 15 Gram(s) Oral once PRN Blood Glucose LESS THAN 70 milliGRAM(s)/deciliter  glucagon  Injectable 1 milliGRAM(s) IntraMuscular once PRN Glucose LESS THAN 70 milligrams/deciliter  hydrALAZINE Injectable 10 milliGRAM(s) IV Push every 4 hours PRN for SBP greater than or equal to 160.  senna 2 Tablet(s) Oral at bedtime PRN Constipation
Pt was noted to have a 10 sec pause on tele and then 1.5 sec pause.   Rapid response called.  Pt felt a prodrome at the time of pause.  Currently she is alert and conversing.  Denies any symptoms.  Daughter at bedside.  Dr Stnaley EP team consulted and I spoke with him and EP fellow to see pt.  Keep pacer pads on and continue IVF.  Transfer to CCU.
She is s/p PPM  Feeling well but had temp to 101  No specific complaints  Baseline SOB, No abdominal paing    EKG: Atrial paced ventricular sensed.   TELE:    MEDICATIONS  (STANDING):  aspirin  chewable 81 milliGRAM(s) Oral daily  atorvastatin 20 milliGRAM(s) Oral at bedtime  buDESOnide  80 MICROgram(s)/formoterol 4.5 MICROgram(s) Inhaler 2 Puff(s) Inhalation two times a day  dextrose 5%. 1000 milliLiter(s) (50 mL/Hr) IV Continuous <Continuous>  dextrose 50% Injectable 12.5 Gram(s) IV Push once  diltiazem    Tablet 60 milliGRAM(s) Oral four times a day  folic acid 1 milliGRAM(s) Oral daily  heparin  Injectable 5000 Unit(s) SubCutaneous every 12 hours  influenza   Vaccine 0.5 milliLiter(s) IntraMuscular once  insulin glargine Injectable (LANTUS) 20 Unit(s) SubCutaneous at bedtime  insulin lispro (HumaLOG) corrective regimen sliding scale   SubCutaneous at bedtime  levothyroxine 112 MICROGram(s) Oral daily  pantoprazole    Tablet 40 milliGRAM(s) Oral before breakfast  tiotropium 18 MICROgram(s) Capsule 1 Capsule(s) Inhalation at bedtime    MEDICATIONS  (PRN):  acetaminophen   Tablet .. 650 milliGRAM(s) Oral every 6 hours PRN Moderate Pain (4 - 6)  acetaminophen   Tablet .. 650 milliGRAM(s) Oral every 6 hours PRN Mild Pain (1 - 3)  dextrose 40% Gel 15 Gram(s) Oral once PRN Blood Glucose LESS THAN 70 milliGRAM(s)/deciliter  glucagon  Injectable 1 milliGRAM(s) IntraMuscular once PRN Glucose LESS THAN 70 milligrams/deciliter  hydrALAZINE Injectable 10 milliGRAM(s) IV Push every 4 hours PRN for SBP greater than or equal to 160.  senna 2 Tablet(s) Oral at bedtime PRN Constipation  traMADol 25 milliGRAM(s) Oral every 6 hours PRN Moderate Pain (4 - 6)      Allergies    Bactrim (Other)  ciprofloxacin (Other (Mild))  clindamycin (Unknown)  ibuprofen (Unknown)  penicillins (Other)  sulfa drugs (Unknown)    Intolerances      PAST MEDICAL & SURGICAL HISTORY:  [place] (hard of hearing): hearing aid  Anxiety  Hypothyroid  GERD (gastroesophageal reflux disease)  CKD (chronic kidney disease)  MI, old: x3  Chronic UTI  Chronic diastolic congestive heart failure: last exacerbation 2017  Hyperlipidemia, unspecified hyperlipidemia type  Essential hypertension  Neuropathy  Diabetes  Chronic obstructive pulmonary disease, unspecified COPD type  History of cataract surgery: bilateral IOL  S/P lobectomy of lung: left lung pre-cancerous  History of ear surgery  History of cholecystectomy  S/P ORIF (open reduction internal fixation) fracture: left hip 2017  History of total knee replacement, unspecified laterality: bilateral right  left   H/O hernia repair: umbilical and incisional   delivery delivered  S/P appendectomy      Vital Signs Last 24 Hrs  T(C): 36.1 (21 Oct 2018 17:00), Max: 38.4 (21 Oct 2018 05:53)  T(F): 97 (21 Oct 2018 17:00), Max: 101.2 (21 Oct 2018 05:53)  HR: 60 (21 Oct 2018 21:09) (60 - 61)  BP: 151/39 (21 Oct 2018 20:00) (57/25 - 173/39)  BP(mean): 68 (21 Oct 2018 20:00) (31 - 112)  RR: 17 (21 Oct 2018 20:00) (14 - 20)  SpO2: 99% (21 Oct 2018 21:09) (93% - 100%)    Physical Exam:  Constitutional: NAD, AAOx3  Cardiovascular: +S1S2 RRR  Pulmonary: CTA b/l, unlabored  Abd: soft NTND +BS  Extremities: no pedal edema, +distal pulses b/l  Neuro: non focal  PM site - appears normal  LABS:                        10.4   10.22 )-----------( 214      ( 21 Oct 2018 06:51 )             31.1     10-    139  |  108  |  53<H>  ----------------------------<  167<H>  4.0   |  21<L>  |  2.12<H>    Ca    8.3<L>      21 Oct 2018 06:51      PT/INR - ( 19 Oct 2018 23:55 )   PT: 13.4 sec;   INR: 1.24 ratio         PTT - ( 19 Oct 2018 23:55 )  PTT:28.3 sec          Plan:     Access site care and activity limitations reviewed w/ pt.   Outpt f/up in 4-6 weeks.

## 2018-10-22 NOTE — PHYSICAL THERAPY INITIAL EVALUATION ADULT - LIGHT TOUCH SENSATION, RUE, REHAB EVAL
FYI... Patient contacted office stating that her neurologist's office contacted her stating that her Vitamin D level is low and that she would have to contact her PCP for supplementation. She was also informed that she would have to go through us for disability forms as she is not able to work and as of Sept 2016 they are not allowed to fill out forms their.     Electronically signed by Jessica Trotter on 7/3/2018 at 3:25 PM within normal limits

## 2018-10-22 NOTE — PROGRESS NOTE ADULT - ASSESSMENT
Syncope- recurrent-  Complete AV block- s/p PPM-   Discussed with EP team Dr Mcfadden this am.  PPM function seems to be optimal.     CKD- close monitoring of the renal function.    HFPEF- chronic compensated.   Euvolemic.  Resume home diuretics prior to discharge.  Discharge planning from cardiology standpoint.     Other medical issues- Management per primary team.   Thank you for allowing me to participate in the care of this patient. Please feel free to contact me with any questions.

## 2018-10-22 NOTE — DISCHARGE NOTE ADULT - CARE PROVIDER_API CALL
Soco Phelps (MD), Internal Medicine  [address]  [place] [address]  Phone: [phone]  Fax: [phone]

## 2018-10-22 NOTE — DISCHARGE NOTE ADULT - PATIENT PORTAL LINK FT
You can access the [facility] Patient Portal, offered by [facility], by registering with the following website: [email or web address]

## 2018-10-22 NOTE — PROGRESS NOTE ADULT - ASSESSMENT
78 year old female with PMHx of CAD s/p MI x3, HFpEF, chronic hypoxic respiratory failure due to COPD on 2L O2, HTN, Hyperlipidemia, type 2 Dibaetes, stage 3 CKD, hypothyroidism, admitted for syncopal episode who had found to have symptomatic bradycardia.    * Symptomatic Bradycardia-s/p PPM, continue cardizem, monitor in CCU.  * HTN-bp much better controlled on Cardizem   * Fever-? atelectasis, no obvious signs of infection, monitor for now, await cult, ID eval, order CXR.  Monitor off abx.  No fevers thereafter, monitor  * Hyperlipidemia-atorvastatin  * Type 2 Diabetes- Lantus with ISS , bgms stable one episode of hyperglycemia  * Stage 3 CKD- stable  * Hypernatremia-resolved.  *CHF - compensated; hasnt been restarted on bumex yet - will defer to cardio  *left flank pain - f/u xr  * Proph- heparin   * Disp-OOB, PT    * Comm-d/w pt, Pt's , RN

## 2018-10-22 NOTE — PROGRESS NOTE ADULT - PROVIDER SPECIALTY LIST ADULT
Cardiology
Electrophysiology
Electrophysiology
Infectious Disease
Internal Medicine
Internal Medicine
Pulmonology
Internal Medicine

## 2018-10-23 LAB
A PHAGOCYTOPH IGG TITR SER IF: SIGNIFICANT CHANGE UP TITER
ADD ON TEST-SPECIMEN IN LAB: SIGNIFICANT CHANGE UP
B BURGDOR AB SER QL IA: NEGATIVE — SIGNIFICANT CHANGE UP
B MICROTI IGG TITR SER: SIGNIFICANT CHANGE UP TITER
E CHAFFEENSIS IGG TITR SER IF: SIGNIFICANT CHANGE UP TITER

## 2018-10-26 DIAGNOSIS — Z28.21 IMMUNIZATION NOT CARRIED OUT BECAUSE OF PATIENT REFUSAL: ICD-10-CM

## 2018-10-26 DIAGNOSIS — Z88.1 ALLERGY STATUS TO OTHER ANTIBIOTIC AGENTS STATUS: ICD-10-CM

## 2018-10-26 DIAGNOSIS — Z79.4 LONG TERM (CURRENT) USE OF INSULIN: ICD-10-CM

## 2018-10-26 DIAGNOSIS — K21.9 GASTRO-ESOPHAGEAL REFLUX DISEASE WITHOUT ESOPHAGITIS: ICD-10-CM

## 2018-10-26 DIAGNOSIS — J44.9 CHRONIC OBSTRUCTIVE PULMONARY DISEASE, UNSPECIFIED: ICD-10-CM

## 2018-10-26 DIAGNOSIS — N18.3 CHRONIC KIDNEY DISEASE, STAGE 3 (MODERATE): ICD-10-CM

## 2018-10-26 DIAGNOSIS — E11.22 TYPE 2 DIABETES MELLITUS WITH DIABETIC CHRONIC KIDNEY DISEASE: ICD-10-CM

## 2018-10-26 DIAGNOSIS — Z99.81 DEPENDENCE ON SUPPLEMENTAL OXYGEN: ICD-10-CM

## 2018-10-26 DIAGNOSIS — R55 SYNCOPE AND COLLAPSE: ICD-10-CM

## 2018-10-26 DIAGNOSIS — I13.0 HYPERTENSIVE HEART AND CHRONIC KIDNEY DISEASE WITH HEART FAILURE AND STAGE 1 THROUGH STAGE 4 CHRONIC KIDNEY DISEASE, OR UNSPECIFIED CHRONIC KIDNEY DISEASE: ICD-10-CM

## 2018-10-26 DIAGNOSIS — N17.9 ACUTE KIDNEY FAILURE, UNSPECIFIED: ICD-10-CM

## 2018-10-26 DIAGNOSIS — I50.32 CHRONIC DIASTOLIC (CONGESTIVE) HEART FAILURE: ICD-10-CM

## 2018-10-26 DIAGNOSIS — I45.9 CONDUCTION DISORDER, UNSPECIFIED: ICD-10-CM

## 2018-10-26 DIAGNOSIS — E87.1 HYPO-OSMOLALITY AND HYPONATREMIA: ICD-10-CM

## 2018-10-26 DIAGNOSIS — Z79.02 LONG TERM (CURRENT) USE OF ANTITHROMBOTICS/ANTIPLATELETS: ICD-10-CM

## 2018-10-26 DIAGNOSIS — Z88.2 ALLERGY STATUS TO SULFONAMIDES: ICD-10-CM

## 2018-10-26 DIAGNOSIS — E03.9 HYPOTHYROIDISM, UNSPECIFIED: ICD-10-CM

## 2018-10-26 DIAGNOSIS — J98.11 ATELECTASIS: ICD-10-CM

## 2018-10-26 DIAGNOSIS — I25.2 OLD MYOCARDIAL INFARCTION: ICD-10-CM

## 2018-10-26 DIAGNOSIS — E78.5 HYPERLIPIDEMIA, UNSPECIFIED: ICD-10-CM

## 2018-10-26 DIAGNOSIS — Z88.0 ALLERGY STATUS TO PENICILLIN: ICD-10-CM

## 2018-10-26 DIAGNOSIS — F41.9 ANXIETY DISORDER, UNSPECIFIED: ICD-10-CM

## 2018-10-26 DIAGNOSIS — Z79.82 LONG TERM (CURRENT) USE OF ASPIRIN: ICD-10-CM

## 2018-10-26 LAB
CULTURE RESULTS: SIGNIFICANT CHANGE UP
CULTURE RESULTS: SIGNIFICANT CHANGE UP
SPECIMEN SOURCE: SIGNIFICANT CHANGE UP
SPECIMEN SOURCE: SIGNIFICANT CHANGE UP

## 2018-11-02 ENCOUNTER — APPOINTMENT (OUTPATIENT)
Dept: ELECTROPHYSIOLOGY | Facility: CLINIC | Age: 78
End: 2018-11-02
Payer: MEDICARE

## 2018-11-02 VITALS — HEART RATE: 63 BPM | HEIGHT: 62 IN

## 2018-11-02 DIAGNOSIS — I44.2 ATRIOVENTRICULAR BLOCK, COMPLETE: ICD-10-CM

## 2018-11-02 DIAGNOSIS — Z95.0 PRESENCE OF CARDIAC PACEMAKER: ICD-10-CM

## 2018-11-02 PROCEDURE — ZZZZZ: CPT

## 2019-01-30 ENCOUNTER — INPATIENT (INPATIENT)
Facility: HOSPITAL | Age: 79
LOS: 5 days | Discharge: HOME | End: 2019-02-05
Attending: INTERNAL MEDICINE | Admitting: INTERNAL MEDICINE
Payer: MEDICARE

## 2019-01-30 VITALS
OXYGEN SATURATION: 97 % | SYSTOLIC BLOOD PRESSURE: 139 MMHG | DIASTOLIC BLOOD PRESSURE: 68 MMHG | TEMPERATURE: 97 F | RESPIRATION RATE: 19 BRPM | HEART RATE: 88 BPM

## 2019-01-30 DIAGNOSIS — Z98.49 CATARACT EXTRACTION STATUS, UNSPECIFIED EYE: Chronic | ICD-10-CM

## 2019-01-30 DIAGNOSIS — Z98.890 OTHER SPECIFIED POSTPROCEDURAL STATES: Chronic | ICD-10-CM

## 2019-01-30 DIAGNOSIS — Z90.49 ACQUIRED ABSENCE OF OTHER SPECIFIED PARTS OF DIGESTIVE TRACT: Chronic | ICD-10-CM

## 2019-01-30 DIAGNOSIS — Z90.2 ACQUIRED ABSENCE OF LUNG [PART OF]: Chronic | ICD-10-CM

## 2019-01-30 DIAGNOSIS — Z96.659 PRESENCE OF UNSPECIFIED ARTIFICIAL KNEE JOINT: Chronic | ICD-10-CM

## 2019-01-30 DIAGNOSIS — Z96.7 PRESENCE OF OTHER BONE AND TENDON IMPLANTS: Chronic | ICD-10-CM

## 2019-01-30 LAB
ALBUMIN SERPL ELPH-MCNC: 3.1 G/DL — LOW (ref 3.3–5)
ALP SERPL-CCNC: 109 U/L — SIGNIFICANT CHANGE UP (ref 40–120)
ALT FLD-CCNC: 15 U/L — SIGNIFICANT CHANGE UP (ref 12–78)
ANION GAP SERPL CALC-SCNC: 10 MMOL/L — SIGNIFICANT CHANGE UP (ref 5–17)
APPEARANCE UR: ABNORMAL
APTT BLD: 32.2 SEC — SIGNIFICANT CHANGE UP (ref 27.5–36.3)
AST SERPL-CCNC: 17 U/L — SIGNIFICANT CHANGE UP (ref 15–37)
BACTERIA # UR AUTO: ABNORMAL
BASOPHILS # BLD AUTO: 0.04 K/UL — SIGNIFICANT CHANGE UP (ref 0–0.2)
BASOPHILS NFR BLD AUTO: 0.5 % — SIGNIFICANT CHANGE UP (ref 0–2)
BILIRUB SERPL-MCNC: 0.4 MG/DL — SIGNIFICANT CHANGE UP (ref 0.2–1.2)
BILIRUB UR-MCNC: NEGATIVE — SIGNIFICANT CHANGE UP
BUN SERPL-MCNC: 145 MG/DL — HIGH (ref 7–23)
CALCIUM SERPL-MCNC: 5.4 MG/DL — CRITICAL LOW (ref 8.5–10.1)
CHLORIDE SERPL-SCNC: 112 MMOL/L — HIGH (ref 96–108)
CO2 SERPL-SCNC: 21 MMOL/L — LOW (ref 22–31)
COLOR SPEC: YELLOW — SIGNIFICANT CHANGE UP
CREAT SERPL-MCNC: 5.68 MG/DL — HIGH (ref 0.5–1.3)
DIFF PNL FLD: ABNORMAL
EOSINOPHIL # BLD AUTO: 0.18 K/UL — SIGNIFICANT CHANGE UP (ref 0–0.5)
EOSINOPHIL NFR BLD AUTO: 2.5 % — SIGNIFICANT CHANGE UP (ref 0–6)
EPI CELLS # UR: ABNORMAL
GLUCOSE SERPL-MCNC: 155 MG/DL — HIGH (ref 70–99)
GLUCOSE UR QL: NEGATIVE MG/DL — SIGNIFICANT CHANGE UP
HCT VFR BLD CALC: 34.2 % — LOW (ref 34.5–45)
HGB BLD-MCNC: 10.7 G/DL — LOW (ref 11.5–15.5)
IMM GRANULOCYTES NFR BLD AUTO: 0.4 % — SIGNIFICANT CHANGE UP (ref 0–1.5)
INR BLD: 1.23 RATIO — HIGH (ref 0.88–1.16)
KETONES UR-MCNC: NEGATIVE — SIGNIFICANT CHANGE UP
LACTATE SERPL-SCNC: 0.6 MMOL/L — LOW (ref 0.7–2)
LEUKOCYTE ESTERASE UR-ACNC: ABNORMAL
LYMPHOCYTES # BLD AUTO: 0.95 K/UL — LOW (ref 1–3.3)
LYMPHOCYTES # BLD AUTO: 13 % — SIGNIFICANT CHANGE UP (ref 13–44)
MCHC RBC-ENTMCNC: 28.9 PG — SIGNIFICANT CHANGE UP (ref 27–34)
MCHC RBC-ENTMCNC: 31.3 GM/DL — LOW (ref 32–36)
MCV RBC AUTO: 92.4 FL — SIGNIFICANT CHANGE UP (ref 80–100)
MONOCYTES # BLD AUTO: 0.6 K/UL — SIGNIFICANT CHANGE UP (ref 0–0.9)
MONOCYTES NFR BLD AUTO: 8.2 % — SIGNIFICANT CHANGE UP (ref 2–14)
NEUTROPHILS # BLD AUTO: 5.5 K/UL — SIGNIFICANT CHANGE UP (ref 1.8–7.4)
NEUTROPHILS NFR BLD AUTO: 75.4 % — SIGNIFICANT CHANGE UP (ref 43–77)
NITRITE UR-MCNC: NEGATIVE — SIGNIFICANT CHANGE UP
NRBC # BLD: 0 /100 WBCS — SIGNIFICANT CHANGE UP (ref 0–0)
PH UR: 5 — SIGNIFICANT CHANGE UP (ref 5–8)
PLATELET # BLD AUTO: 222 K/UL — SIGNIFICANT CHANGE UP (ref 150–400)
POTASSIUM SERPL-MCNC: 5.3 MMOL/L — SIGNIFICANT CHANGE UP (ref 3.5–5.3)
POTASSIUM SERPL-SCNC: 5.3 MMOL/L — SIGNIFICANT CHANGE UP (ref 3.5–5.3)
PROT SERPL-MCNC: 7.5 GM/DL — SIGNIFICANT CHANGE UP (ref 6–8.3)
PROT UR-MCNC: 100 MG/DL
PROTHROM AB SERPL-ACNC: 13.7 SEC — HIGH (ref 10–12.9)
RBC # BLD: 3.7 M/UL — LOW (ref 3.8–5.2)
RBC # FLD: 14.5 % — SIGNIFICANT CHANGE UP (ref 10.3–14.5)
RBC CASTS # UR COMP ASSIST: ABNORMAL /HPF (ref 0–4)
SODIUM SERPL-SCNC: 143 MMOL/L — SIGNIFICANT CHANGE UP (ref 135–145)
SP GR SPEC: 1.01 — SIGNIFICANT CHANGE UP (ref 1.01–1.02)
UROBILINOGEN FLD QL: NEGATIVE MG/DL — SIGNIFICANT CHANGE UP
WBC # BLD: 7.3 K/UL — SIGNIFICANT CHANGE UP (ref 3.8–10.5)
WBC # FLD AUTO: 7.3 K/UL — SIGNIFICANT CHANGE UP (ref 3.8–10.5)
WBC UR QL: >50

## 2019-01-30 PROCEDURE — 99285 EMERGENCY DEPT VISIT HI MDM: CPT

## 2019-01-30 PROCEDURE — 71045 X-RAY EXAM CHEST 1 VIEW: CPT | Mod: 26

## 2019-01-30 PROCEDURE — 70450 CT HEAD/BRAIN W/O DYE: CPT | Mod: 26

## 2019-01-30 PROCEDURE — 93010 ELECTROCARDIOGRAM REPORT: CPT

## 2019-01-30 RX ORDER — TIOTROPIUM BROMIDE 18 UG/1
1 CAPSULE ORAL; RESPIRATORY (INHALATION) DAILY
Qty: 0 | Refills: 0 | Status: DISCONTINUED | OUTPATIENT
Start: 2019-01-30 | End: 2019-02-05

## 2019-01-30 RX ORDER — HEPARIN SODIUM 5000 [USP'U]/ML
5000 INJECTION INTRAVENOUS; SUBCUTANEOUS EVERY 12 HOURS
Qty: 0 | Refills: 0 | Status: DISCONTINUED | OUTPATIENT
Start: 2019-01-30 | End: 2019-02-05

## 2019-01-30 RX ORDER — FOLIC ACID 0.8 MG
1 TABLET ORAL DAILY
Qty: 0 | Refills: 0 | Status: DISCONTINUED | OUTPATIENT
Start: 2019-01-30 | End: 2019-02-05

## 2019-01-30 RX ORDER — PANTOPRAZOLE SODIUM 20 MG/1
40 TABLET, DELAYED RELEASE ORAL
Qty: 0 | Refills: 0 | Status: DISCONTINUED | OUTPATIENT
Start: 2019-01-30 | End: 2019-02-05

## 2019-01-30 RX ORDER — ASPIRIN/CALCIUM CARB/MAGNESIUM 324 MG
81 TABLET ORAL DAILY
Qty: 0 | Refills: 0 | Status: DISCONTINUED | OUTPATIENT
Start: 2019-01-30 | End: 2019-02-05

## 2019-01-30 RX ORDER — CEFTRIAXONE 500 MG/1
INJECTION, POWDER, FOR SOLUTION INTRAMUSCULAR; INTRAVENOUS
Qty: 0 | Refills: 0 | Status: DISCONTINUED | OUTPATIENT
Start: 2019-01-30 | End: 2019-01-31

## 2019-01-30 RX ORDER — GLUCAGON INJECTION, SOLUTION 0.5 MG/.1ML
1 INJECTION, SOLUTION SUBCUTANEOUS ONCE
Qty: 0 | Refills: 0 | Status: DISCONTINUED | OUTPATIENT
Start: 2019-01-30 | End: 2019-02-05

## 2019-01-30 RX ORDER — SODIUM CHLORIDE 9 MG/ML
1000 INJECTION INTRAMUSCULAR; INTRAVENOUS; SUBCUTANEOUS
Qty: 0 | Refills: 0 | Status: DISCONTINUED | OUTPATIENT
Start: 2019-01-30 | End: 2019-01-31

## 2019-01-30 RX ORDER — ONDANSETRON 8 MG/1
4 TABLET, FILM COATED ORAL EVERY 6 HOURS
Qty: 0 | Refills: 0 | Status: DISCONTINUED | OUTPATIENT
Start: 2019-01-30 | End: 2019-02-05

## 2019-01-30 RX ORDER — DEXTROSE 50 % IN WATER 50 %
15 SYRINGE (ML) INTRAVENOUS ONCE
Qty: 0 | Refills: 0 | Status: DISCONTINUED | OUTPATIENT
Start: 2019-01-30 | End: 2019-02-05

## 2019-01-30 RX ORDER — DEXTROSE 50 % IN WATER 50 %
12.5 SYRINGE (ML) INTRAVENOUS ONCE
Qty: 0 | Refills: 0 | Status: DISCONTINUED | OUTPATIENT
Start: 2019-01-30 | End: 2019-02-05

## 2019-01-30 RX ORDER — ATORVASTATIN CALCIUM 80 MG/1
20 TABLET, FILM COATED ORAL AT BEDTIME
Qty: 0 | Refills: 0 | Status: DISCONTINUED | OUTPATIENT
Start: 2019-01-30 | End: 2019-02-05

## 2019-01-30 RX ORDER — SODIUM CHLORIDE 9 MG/ML
500 INJECTION INTRAMUSCULAR; INTRAVENOUS; SUBCUTANEOUS ONCE
Qty: 0 | Refills: 0 | Status: COMPLETED | OUTPATIENT
Start: 2019-01-30 | End: 2019-01-30

## 2019-01-30 RX ORDER — INSULIN LISPRO 100/ML
VIAL (ML) SUBCUTANEOUS
Qty: 0 | Refills: 0 | Status: DISCONTINUED | OUTPATIENT
Start: 2019-01-30 | End: 2019-02-05

## 2019-01-30 RX ORDER — LEVOTHYROXINE SODIUM 125 MCG
112 TABLET ORAL DAILY
Qty: 0 | Refills: 0 | Status: DISCONTINUED | OUTPATIENT
Start: 2019-01-30 | End: 2019-02-05

## 2019-01-30 RX ORDER — SODIUM CHLORIDE 9 MG/ML
1000 INJECTION, SOLUTION INTRAVENOUS
Qty: 0 | Refills: 0 | Status: DISCONTINUED | OUTPATIENT
Start: 2019-01-30 | End: 2019-02-05

## 2019-01-30 RX ORDER — MONTELUKAST 4 MG/1
10 TABLET, CHEWABLE ORAL AT BEDTIME
Qty: 0 | Refills: 0 | Status: DISCONTINUED | OUTPATIENT
Start: 2019-01-30 | End: 2019-02-05

## 2019-01-30 RX ORDER — DILTIAZEM HCL 120 MG
240 CAPSULE, EXT RELEASE 24 HR ORAL DAILY
Qty: 0 | Refills: 0 | Status: DISCONTINUED | OUTPATIENT
Start: 2019-01-30 | End: 2019-02-05

## 2019-01-30 RX ADMIN — SODIUM CHLORIDE 500 MILLILITER(S): 9 INJECTION INTRAMUSCULAR; INTRAVENOUS; SUBCUTANEOUS at 23:17

## 2019-01-30 RX ADMIN — SODIUM CHLORIDE 500 MILLILITER(S): 9 INJECTION INTRAMUSCULAR; INTRAVENOUS; SUBCUTANEOUS at 19:58

## 2019-01-30 RX ADMIN — SODIUM CHLORIDE 500 MILLILITER(S): 9 INJECTION INTRAMUSCULAR; INTRAVENOUS; SUBCUTANEOUS at 20:58

## 2019-01-30 RX ADMIN — SODIUM CHLORIDE 500 MILLILITER(S): 9 INJECTION INTRAMUSCULAR; INTRAVENOUS; SUBCUTANEOUS at 22:17

## 2019-01-30 NOTE — ED PROVIDER NOTE - PMH
Anxiety    Chronic diastolic congestive heart failure  last exacerbation 7/2017  Chronic obstructive pulmonary disease, unspecified COPD type    Chronic UTI    CKD (chronic kidney disease)    Diabetes    Essential hypertension    GERD (gastroesophageal reflux disease)    [place] (hard of hearing)  hearing aid  Hyperlipidemia, unspecified hyperlipidemia type    Hypothyroid    MI, old  x3  Neuropathy

## 2019-01-30 NOTE — ED PROVIDER NOTE - ENMT, MLM
Airway patent, Nasal mucosa clear. Mouth with dry mucosa. Throat has no vesicles, no oropharyngeal exudates and uvula is midline. +Ecchymotic on forehead and nasal area.

## 2019-01-30 NOTE — ED PROVIDER NOTE - MEDICAL DECISION MAKING DETAILS
Pt with DM, CHF, COPD, HTN, O2 dependent here with diarrhea and lethargy. Recent head injury. Plan: labs, hydration, CT head, UA.

## 2019-01-30 NOTE — ED ADULT NURSE NOTE - NSIMPLEMENTINTERV_GEN_ALL_ED
Implemented All Fall Risk Interventions:  [place] to call system. Call bell, personal items and telephone within reach. Instruct patient to call for assistance. Room bathroom lighting operational. Non-slip footwear when patient is off stretcher. Physically safe environment: no spills, clutter or unnecessary equipment. Stretcher in lowest position, wheels locked, appropriate side rails in place. Provide visual cue, wrist band, yellow gown, etc. Monitor gait and stability. Monitor for mental status changes and reorient to person, place, and time. Review medications for side effects contributing to fall risk. Reinforce activity limits and safety measures with patient and family.

## 2019-01-30 NOTE — ED PROVIDER NOTE - OBJECTIVE STATEMENT
77 y/o F with PMHx of CHF s/p pacemaker, COPD, CKD, DM, HTN presenting to the ED c/o dizziness x3 days. Pt states she feels as thought shes "in a fog." Pt has also had diarrhea. Lives at home with daughter. On 2L of O2 at home.      Allergies: Bactrim, Penicillin, Cipro, Clindamycin, Ibuprofen, Sulfa. 77 y/o F with PMHx of CHF s/p pacemaker, COPD, CKD, DM, HTN on Plavix and 81mg Asprin, frequent UTI's presenting to the ED with daughter c/o dizziness x3 days. Pt states she feels as though shes "in a fog," and pt's daughter notes that pt has been sleeping more than usual. Pt has also had uncontrollable diarrhea for the past 2 days, and lives at home with her daughter. Pt's daughter notes that pt gets frequent UTI's and this 'episode' might be the onset of a new UTI. Pt is on 2L of O2 at home. As per pt and daughter, pt tripped and fell down over a week ago and hit her head on the floor, but pt refused to come to ED for evaluation, and now has a bruise on her forehead. +decreased appetite. Denies CP, or SOB. Allergies: Bactrim, Penicillin, Cipro, Clindamycin, Ibuprofen, Sulfa. Pulmonologist: Dr. Forte. Neurologist: Dr. Douglas. PMD: Dr. Phelps.

## 2019-01-30 NOTE — ED PROVIDER NOTE - CARE PLAN
Principal Discharge DX:	Dehydration  Secondary Diagnosis:	Acute on chronic renal failure  Secondary Diagnosis:	UTI (urinary tract infection)

## 2019-01-30 NOTE — ED ADULT TRIAGE NOTE - CHIEF COMPLAINT QUOTE
Pt presents to the ED c/o dizziness x 3 days. Pt states she feels " shes in a fog." Pt has also had diarrhea. Lives at home with daughter. On 2L o2 at home. Hx: copd, chf, pacemaker.

## 2019-01-31 LAB
ADD ON TEST-SPECIMEN IN LAB: SIGNIFICANT CHANGE UP
ANION GAP SERPL CALC-SCNC: 11 MMOL/L — SIGNIFICANT CHANGE UP (ref 5–17)
ANION GAP SERPL CALC-SCNC: 9 MMOL/L — SIGNIFICANT CHANGE UP (ref 5–17)
BASOPHILS # BLD AUTO: 0.05 K/UL — SIGNIFICANT CHANGE UP (ref 0–0.2)
BASOPHILS NFR BLD AUTO: 0.6 % — SIGNIFICANT CHANGE UP (ref 0–2)
BUN SERPL-MCNC: 133 MG/DL — HIGH (ref 7–23)
BUN SERPL-MCNC: 136 MG/DL — HIGH (ref 7–23)
CALCIUM SERPL-MCNC: 5.1 MG/DL — CRITICAL LOW (ref 8.5–10.1)
CALCIUM SERPL-MCNC: 5.3 MG/DL — CRITICAL LOW (ref 8.5–10.1)
CHLORIDE SERPL-SCNC: 116 MMOL/L — HIGH (ref 96–108)
CHLORIDE SERPL-SCNC: 116 MMOL/L — HIGH (ref 96–108)
CO2 SERPL-SCNC: 19 MMOL/L — LOW (ref 22–31)
CO2 SERPL-SCNC: 20 MMOL/L — LOW (ref 22–31)
CREAT SERPL-MCNC: 4.76 MG/DL — HIGH (ref 0.5–1.3)
CREAT SERPL-MCNC: 4.85 MG/DL — HIGH (ref 0.5–1.3)
EOSINOPHIL # BLD AUTO: 0.17 K/UL — SIGNIFICANT CHANGE UP (ref 0–0.5)
EOSINOPHIL NFR BLD AUTO: 1.9 % — SIGNIFICANT CHANGE UP (ref 0–6)
GLUCOSE BLDC GLUCOMTR-MCNC: 111 MG/DL — HIGH (ref 70–99)
GLUCOSE BLDC GLUCOMTR-MCNC: 158 MG/DL — HIGH (ref 70–99)
GLUCOSE BLDC GLUCOMTR-MCNC: 193 MG/DL — HIGH (ref 70–99)
GLUCOSE BLDC GLUCOMTR-MCNC: 72 MG/DL — SIGNIFICANT CHANGE UP (ref 70–99)
GLUCOSE SERPL-MCNC: 172 MG/DL — HIGH (ref 70–99)
GLUCOSE SERPL-MCNC: 55 MG/DL — LOW (ref 70–99)
HBA1C BLD-MCNC: 6.7 % — HIGH (ref 4–5.6)
HCT VFR BLD CALC: 31.8 % — LOW (ref 34.5–45)
HGB BLD-MCNC: 9.8 G/DL — LOW (ref 11.5–15.5)
IMM GRANULOCYTES NFR BLD AUTO: 0.3 % — SIGNIFICANT CHANGE UP (ref 0–1.5)
LYMPHOCYTES # BLD AUTO: 0.92 K/UL — LOW (ref 1–3.3)
LYMPHOCYTES # BLD AUTO: 10.5 % — LOW (ref 13–44)
MAGNESIUM SERPL-MCNC: 1.6 MG/DL — SIGNIFICANT CHANGE UP (ref 1.6–2.6)
MCHC RBC-ENTMCNC: 28.6 PG — SIGNIFICANT CHANGE UP (ref 27–34)
MCHC RBC-ENTMCNC: 30.8 GM/DL — LOW (ref 32–36)
MCV RBC AUTO: 92.7 FL — SIGNIFICANT CHANGE UP (ref 80–100)
MONOCYTES # BLD AUTO: 0.75 K/UL — SIGNIFICANT CHANGE UP (ref 0–0.9)
MONOCYTES NFR BLD AUTO: 8.6 % — SIGNIFICANT CHANGE UP (ref 2–14)
NEUTROPHILS # BLD AUTO: 6.84 K/UL — SIGNIFICANT CHANGE UP (ref 1.8–7.4)
NEUTROPHILS NFR BLD AUTO: 78.1 % — HIGH (ref 43–77)
NRBC # BLD: 0 /100 WBCS — SIGNIFICANT CHANGE UP (ref 0–0)
PHOSPHATE SERPL-MCNC: 6.4 MG/DL — HIGH (ref 2.5–4.5)
PLATELET # BLD AUTO: 215 K/UL — SIGNIFICANT CHANGE UP (ref 150–400)
POTASSIUM SERPL-MCNC: 5 MMOL/L — SIGNIFICANT CHANGE UP (ref 3.5–5.3)
POTASSIUM SERPL-MCNC: 5.4 MMOL/L — HIGH (ref 3.5–5.3)
POTASSIUM SERPL-MCNC: 5.9 MMOL/L — HIGH (ref 3.5–5.3)
POTASSIUM SERPL-SCNC: 5 MMOL/L — SIGNIFICANT CHANGE UP (ref 3.5–5.3)
POTASSIUM SERPL-SCNC: 5.4 MMOL/L — HIGH (ref 3.5–5.3)
POTASSIUM SERPL-SCNC: 5.9 MMOL/L — HIGH (ref 3.5–5.3)
RBC # BLD: 3.43 M/UL — LOW (ref 3.8–5.2)
RBC # FLD: 14.5 % — SIGNIFICANT CHANGE UP (ref 10.3–14.5)
SODIUM SERPL-SCNC: 145 MMOL/L — SIGNIFICANT CHANGE UP (ref 135–145)
SODIUM SERPL-SCNC: 146 MMOL/L — HIGH (ref 135–145)
WBC # BLD: 8.76 K/UL — SIGNIFICANT CHANGE UP (ref 3.8–10.5)
WBC # FLD AUTO: 8.76 K/UL — SIGNIFICANT CHANGE UP (ref 3.8–10.5)

## 2019-01-31 RX ORDER — INFLUENZA VIRUS VACCINE 15; 15; 15; 15 UG/.5ML; UG/.5ML; UG/.5ML; UG/.5ML
0.5 SUSPENSION INTRAMUSCULAR ONCE
Qty: 0 | Refills: 0 | Status: COMPLETED | OUTPATIENT
Start: 2019-01-31 | End: 2019-01-31

## 2019-01-31 RX ORDER — SODIUM CHLORIDE 9 MG/ML
1500 INJECTION, SOLUTION INTRAVENOUS
Qty: 0 | Refills: 0 | Status: DISCONTINUED | OUTPATIENT
Start: 2019-01-31 | End: 2019-02-01

## 2019-01-31 RX ORDER — CEFTRIAXONE 500 MG/1
INJECTION, POWDER, FOR SOLUTION INTRAMUSCULAR; INTRAVENOUS
Qty: 0 | Refills: 0 | Status: DISCONTINUED | OUTPATIENT
Start: 2019-01-31 | End: 2019-02-03

## 2019-01-31 RX ORDER — CALCIUM GLUCONATE 100 MG/ML
1 VIAL (ML) INTRAVENOUS ONCE
Qty: 0 | Refills: 0 | Status: COMPLETED | OUTPATIENT
Start: 2019-01-31 | End: 2019-01-31

## 2019-01-31 RX ORDER — CEFTRIAXONE 500 MG/1
1000 INJECTION, POWDER, FOR SOLUTION INTRAMUSCULAR; INTRAVENOUS EVERY 24 HOURS
Qty: 0 | Refills: 0 | Status: DISCONTINUED | OUTPATIENT
Start: 2019-02-01 | End: 2019-02-03

## 2019-01-31 RX ORDER — SODIUM BICARBONATE 1 MEQ/ML
325 SYRINGE (ML) INTRAVENOUS
Qty: 0 | Refills: 0 | Status: COMPLETED | OUTPATIENT
Start: 2019-01-31 | End: 2019-02-02

## 2019-01-31 RX ORDER — CEFTRIAXONE 500 MG/1
1000 INJECTION, POWDER, FOR SOLUTION INTRAMUSCULAR; INTRAVENOUS ONCE
Qty: 0 | Refills: 0 | Status: COMPLETED | OUTPATIENT
Start: 2019-01-31 | End: 2019-01-31

## 2019-01-31 RX ADMIN — HEPARIN SODIUM 5000 UNIT(S): 5000 INJECTION INTRAVENOUS; SUBCUTANEOUS at 05:22

## 2019-01-31 RX ADMIN — SODIUM CHLORIDE 75 MILLILITER(S): 9 INJECTION INTRAMUSCULAR; INTRAVENOUS; SUBCUTANEOUS at 02:14

## 2019-01-31 RX ADMIN — PANTOPRAZOLE SODIUM 40 MILLIGRAM(S): 20 TABLET, DELAYED RELEASE ORAL at 05:23

## 2019-01-31 RX ADMIN — CEFTRIAXONE 1000 MILLIGRAM(S): 500 INJECTION, POWDER, FOR SOLUTION INTRAMUSCULAR; INTRAVENOUS at 01:29

## 2019-01-31 RX ADMIN — Medication 240 MILLIGRAM(S): at 05:22

## 2019-01-31 RX ADMIN — MONTELUKAST 10 MILLIGRAM(S): 4 TABLET, CHEWABLE ORAL at 22:18

## 2019-01-31 RX ADMIN — TIOTROPIUM BROMIDE 1 CAPSULE(S): 18 CAPSULE ORAL; RESPIRATORY (INHALATION) at 14:12

## 2019-01-31 RX ADMIN — CEFTRIAXONE 1000 MILLIGRAM(S): 500 INJECTION, POWDER, FOR SOLUTION INTRAMUSCULAR; INTRAVENOUS at 23:06

## 2019-01-31 RX ADMIN — Medication 200 GRAM(S): at 18:15

## 2019-01-31 RX ADMIN — Medication 112 MICROGRAM(S): at 05:22

## 2019-01-31 RX ADMIN — HEPARIN SODIUM 5000 UNIT(S): 5000 INJECTION INTRAVENOUS; SUBCUTANEOUS at 18:25

## 2019-01-31 RX ADMIN — Medication 1 MILLIGRAM(S): at 12:15

## 2019-01-31 RX ADMIN — SODIUM CHLORIDE 75 MILLILITER(S): 9 INJECTION, SOLUTION INTRAVENOUS at 12:18

## 2019-01-31 RX ADMIN — Medication 325 MILLIGRAM(S): at 18:23

## 2019-01-31 RX ADMIN — Medication 100 GRAM(S): at 09:45

## 2019-01-31 RX ADMIN — Medication 1: at 12:15

## 2019-01-31 RX ADMIN — ATORVASTATIN CALCIUM 20 MILLIGRAM(S): 80 TABLET, FILM COATED ORAL at 22:19

## 2019-01-31 RX ADMIN — Medication 1: at 08:48

## 2019-01-31 RX ADMIN — Medication 81 MILLIGRAM(S): at 12:15

## 2019-01-31 NOTE — CONSULT NOTE ADULT - ASSESSMENT
Diarrhea and lethargy- likely metabolic encephalopathy.  She can get gentle hydration with close monitoring of the volume status.  Her mental status is normal now and she can increase po intake of fluids.    Renal failure acute on CKD-   Nephrology consult appreciated.    Hypocalcemia- getting supplementation.    Chronic diastolic heart failure- all her edema resolved.  Hold all diuretics now.   Volume status management as stated above.     HTN- BP optimal  Continue cardizem CD.     Other medical issues- Management per primary team.   Thank you for allowing me to participate in the care of this patient. Please feel free to contact me with any questions.

## 2019-01-31 NOTE — H&P ADULT - NSHPLABSRESULTS_GEN_ALL_CORE
9.8    8.76  )-----------( 215      ( 2019 07:06 )             31.8         146<H>  |  116<H>  |  133<H>  ----------------------------<  172<H>  5.0   |  19<L>  |  4.85<H>    Ca    5.1<LL>      2019 07:06  Mg     1.6         TPro  7.5  /  Alb  3.1<L>  /  TBili  0.4  /  DBili  x   /  AST  17  /  ALT  15  /  AlkPhos  109          LIVER FUNCTIONS - ( 2019 18:42 )  Alb: 3.1 g/dL / Pro: 7.5 gm/dL / ALK PHOS: 109 U/L / ALT: 15 U/L / AST: 17 U/L / GGT: x           PT/INR - ( 2019 18:42 )   PT: 13.7 sec;   INR: 1.23 ratio         PTT - ( 2019 18:42 )  PTT:32.2 sec  Urinalysis Basic - ( 2019 22:04 )    Color: Yellow / Appearance: Slightly Turbid / S.015 / pH: x  Gluc: x / Ketone: Negative  / Bili: Negative / Urobili: Negative mg/dL   Blood: x / Protein: 100 mg/dL / Nitrite: Negative   Leuk Esterase: Moderate / RBC: 11-25 /HPF / WBC >50   Sq Epi: x / Non Sq Epi: Moderate / Bacteria: Many        Lactate, Blood: 0.6 mmol/L ( @ 21:32)    Blood, Urine: Moderate ( @ 22:04)      ekg: nsr@65bpm                      EKG:     RADIOLOGY STUDIES:

## 2019-01-31 NOTE — H&P ADULT - PMH
Anxiety    Chronic diastolic congestive heart failure  last exacerbation 7/2017  Chronic obstructive pulmonary disease, unspecified COPD type    Chronic UTI    CKD (chronic kidney disease)    Diabetes    Essential hypertension    GERD (gastroesophageal reflux disease)    [place] (hard of hearing)  hearing aid  Hyperlipidemia, unspecified hyperlipidemia type    Hypothyroid    MI, old  x3  Neuropathy

## 2019-01-31 NOTE — H&P ADULT - NSHPREVIEWOFSYSTEMS_GEN_ALL_CORE
REVIEW OF SYSTEMS:    CONSTITUTIONAL: POS  weakness, fevers or chills  EYES/ENT: No visual changes;  No vertigo or throat pain   NECK: No pain or stiffness  RESPIRATORY: No cough, wheezing, hemoptysis; No shortness of breath  CARDIOVASCULAR: No chest pain or palpitations  GASTROINTESTINAL: No abdominal or epigastric pain. No nausea, vomiting, or hematemesis; No diarrhea or constipation. No melena or hematochezia.  GENITOURINARY: No dysuria, frequency or hematuria  NEUROLOGICAL: No numbness or weakness  SKIN: No itching, burning, rashes, or lesions   All other review of systems is negative unless indicated above.

## 2019-01-31 NOTE — H&P ADULT - HISTORY OF PRESENT ILLNESS
79 y/o female with diastolic HF, COPD (on home O2), HTN, DM - insulin dependent, s/p PPM presents with a few days aof diarrhea and feeling lethragic.  pt reports falling and hittng her head after feeling dizzy a couple weeks ago -- but no issues thereafter except feeling tired.  Pt's  reports her feeling more lethargic bringing her to the ER.  No cp, sob, n/v/f/c  No urinary issues    Pt reports increasing her bumex dose recently to 4mg daily as per cardio recommendation    iN THE ed - PT WAS NOTED TO BE IN ANI

## 2019-01-31 NOTE — H&P ADULT - ASSESSMENT
77 y/o female with the above med hx admitted with ANI due to volume depletion.    *ANI - ACUTE ON CHRONIC KIDNEY DISEASE  BASELINE CR ABOUT 2.5  RECENT DIARRHEA WITH INCREASE IN BUMEX LEADING TO ANI  STOP DIURETICS  STOP ARB UNTIL CR STABLE  HYDRATE  RENAL EVAL    *HYPOCALCEMIA - REPLETE  *CHF - DIASTOLIC DYSFN, STABLE, ON THE DRIER SIDE  WILL MONITOR  CARDIO EVAL    *ANEMIA - CHRONIC ANEMIA  H/H STABLE  *COPD - NO ACUTE BRONCHOSPASM  WILL CONT TO ASSESS  CONT BRONCHODILATORS  *HYPERNATREMIA - FREE WATER DEPLETED  RENAL TO ADJUST IVF  * UTI - PT WITH HX OF CHRONIC UTIS  ON ROCEPHIN  F/U URINE CULTURE  *DIARRHEA - CHECK C DIFF AS ON ABX AS OUTPT RECENTLY    *DVT PROPHY - SQ HEPARIN

## 2019-01-31 NOTE — H&P ADULT - NSHPPHYSICALEXAM_GEN_ALL_CORE
Vital Signs Last 24 Hrs  T(C): 36.7 (31 Jan 2019 05:12), Max: 36.7 (31 Jan 2019 05:12)  T(F): 98 (31 Jan 2019 05:12), Max: 98 (31 Jan 2019 05:12)  HR: 71 (31 Jan 2019 05:21) (71 - 88)  BP: 131/41 (31 Jan 2019 05:21) (115/38 - 155/40)  BP(mean): 63 (31 Jan 2019 00:22) (63 - 63)  RR: 18 (31 Jan 2019 05:12) (16 - 19)  SpO2: 100% (31 Jan 2019 05:12) (97% - 100%)    GEN: A and O, NAD, mood stable  HEENT:   NC/AT, EOMI, no oropharyngeal lesions    NECK:   supple    CV:  +S1, +S2, regular, no murmurs or rubs    RESP:   lungs clear to auscultation bilaterally, no wheezing, rales, rhonchi, good air entry bilaterally DECREASED BS ANGELICA    GI:  abdomen soft, non-tender, non-distended, normal BS, no abdominal masses, no palpable masses    RECTAL:  not examined    :  not examined    MSK:   normal muscle tone, no atrophy, no rigidity, no contractions    EXT:   no clubbing, no cyanosis, no edema, no calf pain, swelling or erythema    VASCULAR:  pulses equal and symmetric in the upper and lower extremities    NEURO:  AAOX3, no focal neurological deficits, follows all commands, able to move extremities spontaneously    SKIN:  no ulcers, lesions or rashes

## 2019-01-31 NOTE — CONSULT NOTE ADULT - ASSESSMENT
77 y/o female with diastolic HF, COPD (on home O2), HTN, DM - insulin dependent, s/p PPM presents with a few days aof diarrhea and feeling lethragic.  pt reports falling and hittng her head after feeling dizzy a couple weeks ago -- but no issues thereafter except feeling tired.  Pt's  reports her feeling more lethargic bringing her to the ER.  No cp, sob, n/v/f/c  Above obtained from chart  Pt known to our service  Admitted w dehydration  hypocalcemia , most likely side effects of diuretics  on bumex and metolazone  also had diarrhea  recently treated with ceftin for UTI  being evaluated for frequent utis by urology  case d/w dr Phelps  Pt reports increasing her bumex dose recently to 4mg daily as per cardio recommendation  today still c/o dysuria  does not know if esbl    P/  CKD 3  ANI, dehydration  Hypocalcemia  Diarrhea, r/o c diff recently on po abx  Hyperchloremic acidosis    1/2 NS @ 75 ml/hrx 1 L  monitor Na,Cl, K  urine cx  ua  replace calcium iv x 1   I Ca level  Po4 level  Pth

## 2019-02-01 LAB
-  AMIKACIN: SIGNIFICANT CHANGE UP
-  AMPICILLIN/SULBACTAM: SIGNIFICANT CHANGE UP
-  AMPICILLIN: SIGNIFICANT CHANGE UP
-  AZTREONAM: SIGNIFICANT CHANGE UP
-  CEFEPIME: SIGNIFICANT CHANGE UP
-  CEFOXITIN: SIGNIFICANT CHANGE UP
-  CEFTRIAXONE: SIGNIFICANT CHANGE UP
-  CIPROFLOXACIN: SIGNIFICANT CHANGE UP
-  ERTAPENEM: SIGNIFICANT CHANGE UP
-  GENTAMICIN: SIGNIFICANT CHANGE UP
-  IMIPENEM: SIGNIFICANT CHANGE UP
-  LEVOFLOXACIN: SIGNIFICANT CHANGE UP
-  MEROPENEM: SIGNIFICANT CHANGE UP
-  NITROFURANTOIN: SIGNIFICANT CHANGE UP
-  PIPERACILLIN/TAZOBACTAM: SIGNIFICANT CHANGE UP
-  TIGECYCLINE: SIGNIFICANT CHANGE UP
-  TOBRAMYCIN: SIGNIFICANT CHANGE UP
-  TRIMETHOPRIM/SULFAMETHOXAZOLE: SIGNIFICANT CHANGE UP
24R-OH-CALCIDIOL SERPL-MCNC: 27.8 NG/ML — LOW (ref 30–80)
ANION GAP SERPL CALC-SCNC: 10 MMOL/L — SIGNIFICANT CHANGE UP (ref 5–17)
BUN SERPL-MCNC: 131 MG/DL — HIGH (ref 7–23)
CA-I BLD-SCNC: 0.78 MMOL/L — LOW (ref 1.12–1.3)
CALCIUM SERPL-MCNC: 5.2 MG/DL — CRITICAL LOW (ref 8.4–10.5)
CALCIUM SERPL-MCNC: 5.3 MG/DL — CRITICAL LOW (ref 8.5–10.1)
CHLORIDE SERPL-SCNC: 115 MMOL/L — HIGH (ref 96–108)
CO2 SERPL-SCNC: 19 MMOL/L — LOW (ref 22–31)
CREAT SERPL-MCNC: 4.39 MG/DL — HIGH (ref 0.5–1.3)
CULTURE RESULTS: SIGNIFICANT CHANGE UP
GLUCOSE BLDC GLUCOMTR-MCNC: 146 MG/DL — HIGH (ref 70–99)
GLUCOSE BLDC GLUCOMTR-MCNC: 197 MG/DL — HIGH (ref 70–99)
GLUCOSE BLDC GLUCOMTR-MCNC: 244 MG/DL — HIGH (ref 70–99)
GLUCOSE BLDC GLUCOMTR-MCNC: 91 MG/DL — SIGNIFICANT CHANGE UP (ref 70–99)
GLUCOSE SERPL-MCNC: 85 MG/DL — SIGNIFICANT CHANGE UP (ref 70–99)
HCT VFR BLD CALC: 30.1 % — LOW (ref 34.5–45)
HGB BLD-MCNC: 9.4 G/DL — LOW (ref 11.5–15.5)
MCHC RBC-ENTMCNC: 28.7 PG — SIGNIFICANT CHANGE UP (ref 27–34)
MCHC RBC-ENTMCNC: 31.2 GM/DL — LOW (ref 32–36)
MCV RBC AUTO: 91.8 FL — SIGNIFICANT CHANGE UP (ref 80–100)
METHOD TYPE: SIGNIFICANT CHANGE UP
NRBC # BLD: 0 /100 WBCS — SIGNIFICANT CHANGE UP (ref 0–0)
ORGANISM # SPEC MICROSCOPIC CNT: SIGNIFICANT CHANGE UP
ORGANISM # SPEC MICROSCOPIC CNT: SIGNIFICANT CHANGE UP
PLATELET # BLD AUTO: 186 K/UL — SIGNIFICANT CHANGE UP (ref 150–400)
POTASSIUM SERPL-MCNC: 4.8 MMOL/L — SIGNIFICANT CHANGE UP (ref 3.5–5.3)
POTASSIUM SERPL-SCNC: 4.8 MMOL/L — SIGNIFICANT CHANGE UP (ref 3.5–5.3)
PTH-INTACT FLD-MCNC: 374 PG/ML — HIGH (ref 15–65)
RBC # BLD: 3.28 M/UL — LOW (ref 3.8–5.2)
RBC # FLD: 14.4 % — SIGNIFICANT CHANGE UP (ref 10.3–14.5)
SODIUM SERPL-SCNC: 144 MMOL/L — SIGNIFICANT CHANGE UP (ref 135–145)
SPECIMEN SOURCE: SIGNIFICANT CHANGE UP
WBC # BLD: 7.36 K/UL — SIGNIFICANT CHANGE UP (ref 3.8–10.5)
WBC # FLD AUTO: 7.36 K/UL — SIGNIFICANT CHANGE UP (ref 3.8–10.5)

## 2019-02-01 RX ORDER — SODIUM CHLORIDE 9 MG/ML
1000 INJECTION, SOLUTION INTRAVENOUS
Qty: 0 | Refills: 0 | Status: DISCONTINUED | OUTPATIENT
Start: 2019-02-01 | End: 2019-02-03

## 2019-02-01 RX ORDER — CALCIUM GLUCONATE 100 MG/ML
1 VIAL (ML) INTRAVENOUS ONCE
Qty: 0 | Refills: 0 | Status: COMPLETED | OUTPATIENT
Start: 2019-02-01 | End: 2019-02-01

## 2019-02-01 RX ORDER — CALCIUM CARBONATE 500(1250)
1 TABLET ORAL THREE TIMES A DAY
Qty: 0 | Refills: 0 | Status: DISCONTINUED | OUTPATIENT
Start: 2019-02-01 | End: 2019-02-03

## 2019-02-01 RX ORDER — LANOLIN ALCOHOL/MO/W.PET/CERES
5 CREAM (GRAM) TOPICAL AT BEDTIME
Qty: 0 | Refills: 0 | Status: DISCONTINUED | OUTPATIENT
Start: 2019-02-01 | End: 2019-02-05

## 2019-02-01 RX ADMIN — Medication 200 GRAM(S): at 09:54

## 2019-02-01 RX ADMIN — MONTELUKAST 10 MILLIGRAM(S): 4 TABLET, CHEWABLE ORAL at 22:05

## 2019-02-01 RX ADMIN — Medication 81 MILLIGRAM(S): at 12:48

## 2019-02-01 RX ADMIN — Medication 325 MILLIGRAM(S): at 06:15

## 2019-02-01 RX ADMIN — Medication 1 MILLIGRAM(S): at 12:48

## 2019-02-01 RX ADMIN — PANTOPRAZOLE SODIUM 40 MILLIGRAM(S): 20 TABLET, DELAYED RELEASE ORAL at 09:56

## 2019-02-01 RX ADMIN — Medication 240 MILLIGRAM(S): at 06:16

## 2019-02-01 RX ADMIN — ATORVASTATIN CALCIUM 20 MILLIGRAM(S): 80 TABLET, FILM COATED ORAL at 21:27

## 2019-02-01 RX ADMIN — Medication 112 MICROGRAM(S): at 06:16

## 2019-02-01 RX ADMIN — Medication 2: at 12:47

## 2019-02-01 RX ADMIN — Medication 1 TABLET(S): at 21:25

## 2019-02-01 RX ADMIN — Medication 325 MILLIGRAM(S): at 17:56

## 2019-02-01 RX ADMIN — HEPARIN SODIUM 5000 UNIT(S): 5000 INJECTION INTRAVENOUS; SUBCUTANEOUS at 06:15

## 2019-02-01 RX ADMIN — SODIUM CHLORIDE 50 MILLILITER(S): 9 INJECTION, SOLUTION INTRAVENOUS at 19:13

## 2019-02-01 RX ADMIN — HEPARIN SODIUM 5000 UNIT(S): 5000 INJECTION INTRAVENOUS; SUBCUTANEOUS at 17:57

## 2019-02-01 RX ADMIN — TIOTROPIUM BROMIDE 1 CAPSULE(S): 18 CAPSULE ORAL; RESPIRATORY (INHALATION) at 08:13

## 2019-02-01 NOTE — PROGRESS NOTE ADULT - ASSESSMENT
79 y/o female with the above med hx admitted with ANI due to volume depletion.    *ANI - ACUTE ON CHRONIC KIDNEY DISEASE  BASELINE CR ABOUT 2.5  KIDNEY FUNCTION IMPROVING  RECENT DIARRHEA WITH INCREASE IN BUMEX LEADING TO ANI  STOP DIURETICS  STOP ARB UNTIL CR STABLE  HYDRATE  RENAL FOLLOWING    *HYPOCALCEMIA - REPLETE. MANAGEMENT AS PER RENAL; STILL LOW  *CHF - DIASTOLIC DYSFN, STABLE, ON THE DRIER SIDE  WILL MONITOR  CARDIO EVAL  NO DIURETICS FOR NOW  *ANEMIA - CHRONIC ANEMIA  H/H STABLE  *COPD - NO ACUTE BRONCHOSPASM  WILL CONT TO ASSESS  CONT BRONCHODILATORS  *HYPERNATREMIA - NOW RESOLVED WITH CHANGE IN IVF  * UTI - PT WITH HX OF CHRONIC UTIS  ON ROCEPHIN  F/U URINE CULTURE  *DIARRHEA - CHECK C DIFF AS ON ABX AS OUTPT RECENTLY    *DVT PROPHY - SQ HEPARIN

## 2019-02-01 NOTE — PROGRESS NOTE ADULT - ASSESSMENT
79 y/o female with diastolic HF, COPD (on home O2), HTN, DM - insulin dependent, s/p PPM presents with a few days aof diarrhea and feeling lethragic.  pt reports falling and hittng her head after feeling dizzy a couple weeks ago -- but no issues thereafter except feeling tired.  Pt's  reports her feeling more lethargic bringing her to the ER.  No cp, sob, n/v/f/c  Above obtained from chart  Pt known to our service  Admitted w dehydration  hypocalcemia , most likely side effects of diuretics  on bumex and metolazone  also had diarrhea  recently treated with ceftin for UTI  being evaluated for frequent utis by urology  case d/w dr Phelps  Pt reports increasing her bumex dose recently to 4mg daily as per cardio recommendation  today still c/o dysuria  does not know if esbl    P/  CKD 3  ANI, dehydration  Hypocalcemia  Diarrhea, r/o c diff recently on po abx  Hyperchloremic acidosis    1/2 NS @ 75 ml/hrx 1 L  monitor Na,Cl, K  urine cx  ua  replace calcium iv x 1   I Ca level  Po4 level  Pth    2/1  improving clinically   will cont prn Ca replacement  Pth elevated  s/p Diarrhea, and diuresis  creat elevated but stable

## 2019-02-01 NOTE — PROGRESS NOTE ADULT - ASSESSMENT
Diarrhea and lethargy- likely metabolic encephalopathy.  Her mental status is normal now and she can increase po intake of fluids.  On IVF now    Renal failure acute on CKD-   Nephrology consult appreciated.  IVF to be continued with close monitoring of the volume status.    Hypocalcemia- getting supplementation.    Chronic diastolic heart failure- all her edema resolved.  Hold all diuretics now.   Volume status management as stated above.   Close monitoring of volume status recommended.  Strict I/O and daily wt checks. Low sodium diet.    HTN- BP optimal  Continue cardizem CD.     Other medical issues- Management per primary team.   Thank you for allowing me to participate in the care of this patient. Please feel free to contact me with any questions.

## 2019-02-02 LAB
ANION GAP SERPL CALC-SCNC: 9 MMOL/L — SIGNIFICANT CHANGE UP (ref 5–17)
BUN SERPL-MCNC: 107 MG/DL — HIGH (ref 7–23)
CALCIUM SERPL-MCNC: 5.8 MG/DL — CRITICAL LOW (ref 8.5–10.1)
CHLORIDE SERPL-SCNC: 115 MMOL/L — HIGH (ref 96–108)
CO2 SERPL-SCNC: 20 MMOL/L — LOW (ref 22–31)
CREAT SERPL-MCNC: 3.76 MG/DL — HIGH (ref 0.5–1.3)
GLUCOSE BLDC GLUCOMTR-MCNC: 131 MG/DL — HIGH (ref 70–99)
GLUCOSE BLDC GLUCOMTR-MCNC: 159 MG/DL — HIGH (ref 70–99)
GLUCOSE BLDC GLUCOMTR-MCNC: 189 MG/DL — HIGH (ref 70–99)
GLUCOSE BLDC GLUCOMTR-MCNC: 200 MG/DL — HIGH (ref 70–99)
GLUCOSE SERPL-MCNC: 194 MG/DL — HIGH (ref 70–99)
HCT VFR BLD CALC: 29.4 % — LOW (ref 34.5–45)
HGB BLD-MCNC: 9.6 G/DL — LOW (ref 11.5–15.5)
MCHC RBC-ENTMCNC: 29 PG — SIGNIFICANT CHANGE UP (ref 27–34)
MCHC RBC-ENTMCNC: 32.7 GM/DL — SIGNIFICANT CHANGE UP (ref 32–36)
MCV RBC AUTO: 88.8 FL — SIGNIFICANT CHANGE UP (ref 80–100)
NRBC # BLD: 0 /100 WBCS — SIGNIFICANT CHANGE UP (ref 0–0)
PLATELET # BLD AUTO: 169 K/UL — SIGNIFICANT CHANGE UP (ref 150–400)
POTASSIUM SERPL-MCNC: 5 MMOL/L — SIGNIFICANT CHANGE UP (ref 3.5–5.3)
POTASSIUM SERPL-SCNC: 5 MMOL/L — SIGNIFICANT CHANGE UP (ref 3.5–5.3)
RBC # BLD: 3.31 M/UL — LOW (ref 3.8–5.2)
RBC # FLD: 14.1 % — SIGNIFICANT CHANGE UP (ref 10.3–14.5)
SODIUM SERPL-SCNC: 144 MMOL/L — SIGNIFICANT CHANGE UP (ref 135–145)
WBC # BLD: 6 K/UL — SIGNIFICANT CHANGE UP (ref 3.8–10.5)
WBC # FLD AUTO: 6 K/UL — SIGNIFICANT CHANGE UP (ref 3.8–10.5)

## 2019-02-02 RX ORDER — CALCIUM GLUCONATE 100 MG/ML
2 VIAL (ML) INTRAVENOUS EVERY 6 HOURS
Qty: 0 | Refills: 0 | Status: DISCONTINUED | OUTPATIENT
Start: 2019-02-02 | End: 2019-02-02

## 2019-02-02 RX ORDER — DIPHENHYDRAMINE HCL 50 MG
25 CAPSULE ORAL EVERY 8 HOURS
Qty: 0 | Refills: 0 | Status: DISCONTINUED | OUTPATIENT
Start: 2019-02-02 | End: 2019-02-05

## 2019-02-02 RX ORDER — CALCIUM GLUCONATE 100 MG/ML
2 VIAL (ML) INTRAVENOUS ONCE
Qty: 0 | Refills: 0 | Status: COMPLETED | OUTPATIENT
Start: 2019-02-02 | End: 2019-02-02

## 2019-02-02 RX ORDER — CALCITRIOL 0.5 UG/1
0.5 CAPSULE ORAL DAILY
Qty: 0 | Refills: 0 | Status: DISCONTINUED | OUTPATIENT
Start: 2019-02-02 | End: 2019-02-05

## 2019-02-02 RX ADMIN — Medication 100 GRAM(S): at 22:34

## 2019-02-02 RX ADMIN — HEPARIN SODIUM 5000 UNIT(S): 5000 INJECTION INTRAVENOUS; SUBCUTANEOUS at 06:00

## 2019-02-02 RX ADMIN — Medication 1 TABLET(S): at 16:12

## 2019-02-02 RX ADMIN — Medication 81 MILLIGRAM(S): at 12:03

## 2019-02-02 RX ADMIN — Medication 25 MILLIGRAM(S): at 21:03

## 2019-02-02 RX ADMIN — Medication 1 TABLET(S): at 22:34

## 2019-02-02 RX ADMIN — Medication 1 TABLET(S): at 06:02

## 2019-02-02 RX ADMIN — Medication 1: at 12:03

## 2019-02-02 RX ADMIN — Medication 112 MICROGRAM(S): at 06:01

## 2019-02-02 RX ADMIN — Medication 1 MILLIGRAM(S): at 12:03

## 2019-02-02 RX ADMIN — CEFTRIAXONE 1000 MILLIGRAM(S): 500 INJECTION, POWDER, FOR SOLUTION INTRAMUSCULAR; INTRAVENOUS at 00:40

## 2019-02-02 RX ADMIN — MONTELUKAST 10 MILLIGRAM(S): 4 TABLET, CHEWABLE ORAL at 22:34

## 2019-02-02 RX ADMIN — Medication 5 MILLIGRAM(S): at 22:34

## 2019-02-02 RX ADMIN — PANTOPRAZOLE SODIUM 40 MILLIGRAM(S): 20 TABLET, DELAYED RELEASE ORAL at 09:14

## 2019-02-02 RX ADMIN — TIOTROPIUM BROMIDE 1 CAPSULE(S): 18 CAPSULE ORAL; RESPIRATORY (INHALATION) at 08:59

## 2019-02-02 RX ADMIN — Medication 5 MILLIGRAM(S): at 00:05

## 2019-02-02 RX ADMIN — Medication 1: at 17:20

## 2019-02-02 RX ADMIN — Medication 240 MILLIGRAM(S): at 06:01

## 2019-02-02 RX ADMIN — Medication 25 MILLIGRAM(S): at 10:43

## 2019-02-02 RX ADMIN — HEPARIN SODIUM 5000 UNIT(S): 5000 INJECTION INTRAVENOUS; SUBCUTANEOUS at 17:14

## 2019-02-02 RX ADMIN — Medication 325 MILLIGRAM(S): at 06:01

## 2019-02-02 RX ADMIN — ATORVASTATIN CALCIUM 20 MILLIGRAM(S): 80 TABLET, FILM COATED ORAL at 22:34

## 2019-02-02 NOTE — PROVIDER CONTACT NOTE (OTHER) - NAME OF MD/NP/PA/DO NOTIFIED:
Dr. Cutler (Nephrology)
ELAINE, SPOKE WITH PANCHO FROM MD'S OFFICE.
INFECTIOUS DISEASE, DR. CARMONA RECEIVING CONSULT.
Dr. Phelps (PMD)

## 2019-02-02 NOTE — CONSULT NOTE ADULT - SUBJECTIVE AND OBJECTIVE BOX
NEPHROLOGY CONSULT  HPI:  77 y/o female with diastolic HF, COPD (on home O2), HTN, DM - insulin dependent, s/p PPM presents with a few days aof diarrhea and feeling lethragic.  pt reports falling and hittng her head after feeling dizzy a couple weeks ago -- but no issues thereafter except feeling tired.  Pt's  reports her feeling more lethargic bringing her to the ER.  No cp, sob, n/v/f/c  Above obtained from chart  Pt known to our service  Admitted w dehydration  hypocalcemia , most likely side effects of diuretics  on bumex and metolazone  also had diarrhea  recently treated with ceftin for UTI  being evaluated for frequent utis by urology  case d/w dr Phelps  Pt reports increasing her bumex dose recently to 4mg daily as per cardio recommendation  today still c/o dysuria  does not know if esbl    iN THE ed - PT WAS NOTED TO BE IN ANI (2019 09:22)      PAST MEDICAL & SURGICAL HISTORY:  [place] (hard of hearing): hearing aid  Anxiety  Hypothyroid  GERD (gastroesophageal reflux disease)  CKD (chronic kidney disease)  MI, old: x3  Chronic UTI  Chronic diastolic congestive heart failure: last exacerbation 2017  Hyperlipidemia, unspecified hyperlipidemia type  Essential hypertension  Neuropathy  Diabetes  Chronic obstructive pulmonary disease, unspecified COPD type  History of cataract surgery: bilateral IOL  S/P lobectomy of lung: left lung pre-cancerous  History of ear surgery  History of cholecystectomy  S/P ORIF (open reduction internal fixation) fracture: left hip 2017  History of total knee replacement, unspecified laterality: bilateral right  left   H/O hernia repair: umbilical and incisional   delivery delivered  S/P appendectomy      FAMILY HISTORY:  Family history of CHF (congestive heart failure) (Mother)      MEDICATIONS  (STANDING):  aspirin  chewable 81 milliGRAM(s) Oral daily  atorvastatin 20 milliGRAM(s) Oral at bedtime  cefTRIAXone Injectable.      dextrose 5%. 1000 milliLiter(s) (50 mL/Hr) IV Continuous <Continuous>  dextrose 50% Injectable 12.5 Gram(s) IV Push once  diltiazem    milliGRAM(s) Oral daily  folic acid 1 milliGRAM(s) Oral daily  heparin  Injectable 5000 Unit(s) SubCutaneous every 12 hours  influenza   Vaccine 0.5 milliLiter(s) IntraMuscular once  insulin lispro (HumaLOG) corrective regimen sliding scale   SubCutaneous three times a day before meals  levothyroxine 112 MICROGram(s) Oral daily  montelukast 10 milliGRAM(s) Oral at bedtime  pantoprazole    Tablet 40 milliGRAM(s) Oral before breakfast  sodium chloride 0.9%. 1000 milliLiter(s) (75 mL/Hr) IV Continuous <Continuous>  tiotropium 18 MICROgram(s) Capsule 1 Capsule(s) Inhalation daily    MEDICATIONS  (PRN):  dextrose 40% Gel 15 Gram(s) Oral once PRN Blood Glucose LESS THAN 70 milliGRAM(s)/deciliter  glucagon  Injectable 1 milliGRAM(s) IntraMuscular once PRN Glucose LESS THAN 70 milligrams/deciliter  ondansetron Injectable 4 milliGRAM(s) IV Push every 6 hours PRN Nausea      Allergies    Bactrim (Other)  ciprofloxacin (Other (Mild))  clindamycin (Unknown)  ibuprofen (Unknown)  penicillins (Other)  sulfa drugs (Unknown)    Intolerances        I&O's Summary        REVIEW OF SYSTEMS:    CONSTITUTIONAL:  As per HPI.  CONSTITUTIONAL: No weakness, fevers or chills  EYES/ENT: No visual changes;  No vertigo or throat pain   NECK: No pain or stiffness  CARDIOVASCULAR: No chest pain or palpitations  GASTROINTESTINAL:+ diarrhea  GENITOURINARY:  +dysuria,  NEUROLOGICAL: No numbness or weakness  SKIN: No itching, burning, rashes, or lesions   All other review of systems is negative unless indicated above      Vital Signs Last 24 Hrs  T(C): 36.7 (2019 05:12), Max: 36.7 (2019 05:12)  T(F): 98 (2019 05:12), Max: 98 (2019 05:12)  HR: 71 (2019 05:21) (71 - 88)  BP: 131/41 (2019 05:21) (115/38 - 155/40)  BP(mean): 63 (2019 00:22) (63 - 63)  RR: 18 (2019 05:12) (16 - 19)  SpO2: 100% (2019 05:12) (97% - 100%)  Daily     Daily Weight in k (2019 05:21)  I&O's Summary      PHYSICAL EXAM:    General:  Alert, well-developed ,No acute distress.    Neuro:  Alert and oriented to person, place, and time.     HEENT:  No JVD, no masses, Eyes anicteric,     Cardiovascular:  Regular rate and rhythm, with normal S1 and S2.     Lungs:  clear. no rales, no wheezing, .    Abdomen:  Normoactive bowel sounds. Soft, flat, non-tender, and non-distended.  No hepatosplenomegaly, positive bowel sounds    Skin:  Warm, dry, well-perfused. No rashes or other lesions.     Extremities:  no edema     LABS:                        9.8    8.76  )-----------( 215      ( 2019 07:06 )             31.8         146<H>  |  116<H>  |  133<H>  ----------------------------<  172<H>  5.0   |  19<L>  |  4.85<H>    Ca    5.1<LL>      2019 07:06  Mg     1.6         TPro  7.5  /  Alb  3.1<L>  /  TBili  0.4  /  DBili  x   /  AST  17  /  ALT  15  /  AlkPhos  109      PT/INR - ( 2019 18:42 )   PT: 13.7 sec;   INR: 1.23 ratio         PTT - ( 2019 18:42 )  PTT:32.2 sec  Urinalysis Basic - ( 2019 22:04 )    Color: Yellow / Appearance: Slightly Turbid / S.015 / pH: x  Gluc: x / Ketone: Negative  / Bili: Negative / Urobili: Negative mg/dL   Blood: x / Protein: 100 mg/dL / Nitrite: Negative   Leuk Esterase: Moderate / RBC: 11-25 /HPF / WBC >50   Sq Epi: x / Non Sq Epi: Moderate / Bacteria: Many      Magnesium, Serum: 1.6 mg/dL ( @ 07:06)
Patient is a 78y old  Female who presents with a chief complaint of diarrhea, lethargy (02 Feb 2019 10:43)    HPI:  79 y/o female with diastolic HF, COPD (on home O2), HTN, DM - insulin dependent, s/p PPM admitted on 1/30 for evaluation of increasing lethargy after and increase in her diuretic; has had diarrhea which is slowly improving. She is not sure if she has dysuria, poor historian having just received a benadryl.            PMH: as above  PSH: as above  Meds: per reconciliation sheet, noted below  MEDICATIONS  (STANDING):  aspirin  chewable 81 milliGRAM(s) Oral daily  atorvastatin 20 milliGRAM(s) Oral at bedtime  calcium carbonate    500 mG (Tums) Chewable 1 Tablet(s) Chew three times a day  cefTRIAXone Injectable. 1000 milliGRAM(s) IV Push every 24 hours  cefTRIAXone Injectable.      dextrose 5%. 1000 milliLiter(s) (50 mL/Hr) IV Continuous <Continuous>  dextrose 50% Injectable 12.5 Gram(s) IV Push once  diltiazem    milliGRAM(s) Oral daily  folic acid 1 milliGRAM(s) Oral daily  heparin  Injectable 5000 Unit(s) SubCutaneous every 12 hours  influenza   Vaccine 0.5 milliLiter(s) IntraMuscular once  insulin lispro (HumaLOG) corrective regimen sliding scale   SubCutaneous three times a day before meals  levothyroxine 112 MICROGram(s) Oral daily  melatonin 5 milliGRAM(s) Oral at bedtime  montelukast 10 milliGRAM(s) Oral at bedtime  pantoprazole    Tablet 40 milliGRAM(s) Oral before breakfast  sodium chloride 0.45%. 1000 milliLiter(s) (50 mL/Hr) IV Continuous <Continuous>  tiotropium 18 MICROgram(s) Capsule 1 Capsule(s) Inhalation daily    MEDICATIONS  (PRN):  dextrose 40% Gel 15 Gram(s) Oral once PRN Blood Glucose LESS THAN 70 milliGRAM(s)/deciliter  diphenhydrAMINE 25 milliGRAM(s) Oral every 8 hours PRN Rash and/or Itching  glucagon  Injectable 1 milliGRAM(s) IntraMuscular once PRN Glucose LESS THAN 70 milligrams/deciliter  ondansetron Injectable 4 milliGRAM(s) IV Push every 6 hours PRN Nausea    Allergies    Bactrim (Other)  ciprofloxacin (Other (Mild))  clindamycin (Unknown)  ibuprofen (Unknown)  penicillins (Other)---itchy  sulfa drugs (Unknown)    Intolerances      Social: no smoking, no alcohol, no illegal drugs; no recent travel, no exposure to TB  FAMILY HISTORY:  Family history of CHF (congestive heart failure) (Mother)     no history of premature cardiovascular disease in first degree relatives  ROS: unable to obtain secondary to patient medical condition     Vital Signs Last 24 Hrs  T(C): 36.7 (02 Feb 2019 10:53), Max: 36.7 (02 Feb 2019 10:53)  T(F): 98.1 (02 Feb 2019 10:53), Max: 98.1 (02 Feb 2019 10:53)  HR: 70 (02 Feb 2019 10:53) (62 - 70)  BP: 155/43 (02 Feb 2019 10:53) (133/54 - 157/44)  BP(mean): --  RR: 18 (02 Feb 2019 10:53) (18 - 18)  SpO2: 100% (02 Feb 2019 10:53) (98% - 100%)  Daily     Daily     PE:    Constitutional: frail looking  HEENT: NC/AT, EOMI, PERRLA, conjunctivae clear; ears and nose atraumatic; pharynx clear  Neck: supple; thyroid not palpable  Back: no tenderness  Respiratory: respiratory effort normal; clear to auscultation  Cardiovascular: S1S2 regular, no murmurs  Abdomen: soft, not tender, not distended, positive BS; no liver or spleen organomegaly  Genitourinary: no suprapubic tenderness  Musculoskeletal: no muscle tenderness, no joint swelling or tenderness  Neurological/ Psychiatric:   moving all extremities  Skin: no rashes; no palpable lesions    Labs: all available labs reviewed                        9.6    6.00  )-----------( 169      ( 02 Feb 2019 11:27 )             29.4     02-02    144  |  115<H>  |  107<H>  ----------------------------<  194<H>  5.0   |  20<L>  |  3.76<H>    Ca    5.8<LL>      02 Feb 2019 11:27       Culture - Urine (01.30.19 @ 22:04)    -  Cefoxitin: S <=8    -  Cefepime: S <=4    -  Aztreonam: S <=4    -  Ampicillin: R >16 These ampicillin results predict results for amoxicillin    -  Ampicillin/Sulbactam: S <=8/4    -  Amikacin: S <=16    -  Trimethoprim/Sulfamethoxazole: S <=2/38    -  Tobramycin: S <=4    -  Piperacillin/Tazobactam: S <=16    -  Tigecycline: S <=2    -  Nitrofurantoin: R >64 Should not be used to treat pyelonephritis    -  Meropenem: S <=1    -  Levofloxacin: S <=2    -  Imipenem: S <=1    -  Ertapenem: S <=1    -  Gentamicin: S <=4    -  Ciprofloxacin: S <=1    -  Ceftriaxone: S <=1 Enterobacter, Citrobacter, and Serratia may develop resistance during prolonged therapy    Specimen Source: .Urine None    Culture Results:   >100,000 CFU/ml Klebsiella pneumoniae    Organism Identification: Klebsiella pneumoniae    Organism: Klebsiella pneumoniae    Method Type: TESSIE            Radiology: all available radiological tests reviewed    Advanced directives addressed: full resuscitation
Patient is a 78y old  Female who presents with a chief complaint of diarrhea, lethargy.     HPI:  77 y/o female with diastolic HF, COPD (on home O2), HTN, DM - insulin dependent, s/p PPM presents with a few days aof diarrhea and feeling lethargic.  Pt reports falling and hitting her head after feeling dizzy a couple weeks ago -- but no issues thereafter except feeling tired.  Pt's  reports her feeling more lethargic bringing her to the ER.  I called her a couple of days ago and told to come to ER but family refused.  She denies any CP or SOB now.  Recently she had bout of decompensated heart failure with 20pound wt gain.  She was taking increased doses of diuretics even before the diarrhea started.  She came to see me in the office and she stated the diarrhea resolved but not completely , was having one loose bowel movement per day at that time.        PAST MEDICAL & SURGICAL HISTORY:  [place] (hard of hearing): hearing aid  Anxiety  Hypothyroid  GERD (gastroesophageal reflux disease)  CKD (chronic kidney disease)  MI, old: x3  Chronic UTI  Chronic diastolic congestive heart failure: last exacerbation 2017  Hyperlipidemia, unspecified hyperlipidemia type  Essential hypertension  Neuropathy  Diabetes  Chronic obstructive pulmonary disease, unspecified COPD type  History of cataract surgery: bilateral IOL  S/P lobectomy of lung: left lung pre-cancerous  History of ear surgery  History of cholecystectomy  S/P ORIF (open reduction internal fixation) fracture: left hip 2017  History of total knee replacement, unspecified laterality: bilateral right  left   H/O hernia repair: umbilical and incisional   delivery delivered  S/P appendectomy      MEDICATIONS  (STANDING):  aspirin  chewable 81 milliGRAM(s) Oral daily  atorvastatin 20 milliGRAM(s) Oral at bedtime  cefTRIAXone Injectable.      dextrose 5%. 1000 milliLiter(s) (50 mL/Hr) IV Continuous <Continuous>  dextrose 50% Injectable 12.5 Gram(s) IV Push once  diltiazem    milliGRAM(s) Oral daily  folic acid 1 milliGRAM(s) Oral daily  heparin  Injectable 5000 Unit(s) SubCutaneous every 12 hours  influenza   Vaccine 0.5 milliLiter(s) IntraMuscular once  insulin lispro (HumaLOG) corrective regimen sliding scale   SubCutaneous three times a day before meals  levothyroxine 112 MICROGram(s) Oral daily  montelukast 10 milliGRAM(s) Oral at bedtime  pantoprazole    Tablet 40 milliGRAM(s) Oral before breakfast  sodium bicarbonate 325 milliGRAM(s) Oral two times a day  sodium chloride 0.45%. 1500 milliLiter(s) (75 mL/Hr) IV Continuous <Continuous>  tiotropium 18 MICROgram(s) Capsule 1 Capsule(s) Inhalation daily    MEDICATIONS  (PRN):  dextrose 40% Gel 15 Gram(s) Oral once PRN Blood Glucose LESS THAN 70 milliGRAM(s)/deciliter  glucagon  Injectable 1 milliGRAM(s) IntraMuscular once PRN Glucose LESS THAN 70 milligrams/deciliter  ondansetron Injectable 4 milliGRAM(s) IV Push every 6 hours PRN Nausea      FAMILY HISTORY:  Family history of CHF (congestive heart failure) (Mother)      SOCIAL HISTORY:  ex smoker    REVIEW OF SYSTEMS:  CONSTITUTIONAL:    No fatigue, malaise, lethargy.  No fever or chills.  HEENT:  Eyes:  No visual changes.     ENT:  No epistaxis.  No sinus pain.    RESPIRATORY:  No cough.  No wheeze.  No hemoptysis.  No shortness of breath.  CARDIOVASCULAR:  No chest pains.  No palpitations. No shortness of breath, No orthopnea or PND.  GASTROINTESTINAL:  c/o diarrhea  GENITOURINARY:    No hematuria.    MUSCULOSKELETAL:  No musculoskeletal pain.  No joint swelling.  No arthritis.  NEUROLOGICAL:  No tingling or numbness or weakness.  PSYCHIATRIC:  No confusion  SKIN:  No rashes.    ENDOCRINE:  No unexplained weight loss.  No polydipsia.   HEMATOLOGIC:  No anemia.  No prolonged or excessive bleeding.   ALLERGIC AND IMMUNOLOGIC:  No pruritus.          Vital Signs Last 24 Hrs  T(C): 36.7 (2019 05:12), Max: 36.7 (2019 05:12)  T(F): 98 (2019 05:12), Max: 98 (2019 05:12)  HR: 71 (2019 05:21) (71 - 88)  BP: 131/41 (2019 05:21) (115/38 - 155/40)  BP(mean): 63 (2019 00:22) (63 - 63)  RR: 18 (2019 05:12) (16 - 19)  SpO2: 100% (2019 05:12) (97% - 100%)    PHYSICAL EXAM-    Constitutional: no acute distress     Head: Head is normocephalic and atraumatic.      Neck:  There are strong carotid pulses bilaterally. No JVD.     Cardiovascular: Regular rate and rhythm without S3, S4. No murmurs or rubs are appreciated.      Respiratory: Breathsounds are normal. No rales. No wheezing.    Abdomen: Soft, nontender, nondistended with positive bowel sounds.      Extremity: No tenderness. No  pitting edema     Neurologic: The patient is alert and oriented.  Back to her baseline mental status    Skin: No rash, no obvious lesions noted.      Psychiatric: The patient appears to be emotionally stable.      INTERPRETATION OF TELEMETRY: not on     ECG: Sinus rythm, normal axis, no st t changes.     I&O's Detail      LABS:                        9.8    8.76  )-----------( 215      ( 2019 07:06 )             31.8         146<H>  |  116<H>  |  133<H>  ----------------------------<  172<H>  5.0   |  19<L>  |  4.85<H>    Ca    5.1<LL>      2019 07:06  Mg     1.6         TPro  7.5  /  Alb  3.1<L>  /  TBili  0.4  /  DBili  x   /  AST  17  /  ALT  15  /  AlkPhos  109          PT/INR - ( 2019 18:42 )   PT: 13.7 sec;   INR: 1.23 ratio         PTT - ( 2019 18:42 )  PTT:32.2 sec  Urinalysis Basic - ( 2019 22:04 )    Color: Yellow / Appearance: Slightly Turbid / S.015 / pH: x  Gluc: x / Ketone: Negative  / Bili: Negative / Urobili: Negative mg/dL   Blood: x / Protein: 100 mg/dL / Nitrite: Negative   Leuk Esterase: Moderate / RBC: 11-25 /HPF / WBC >50   Sq Epi: x / Non Sq Epi: Moderate / Bacteria: Many      I&O's Summary    BNP  RADIOLOGY & ADDITIONAL STUDIES:
Spontaneous, unlabored and symmetrical

## 2019-02-02 NOTE — PROGRESS NOTE ADULT - ASSESSMENT
77 y/o female with diastolic HF, COPD (on home O2), HTN, DM - insulin dependent, s/p PPM presents with a few days aof diarrhea and feeling lethragic.      Jaime on CKD 3  peak Cr 5.68  w Azotemia    Prerenal  in setting of diarrhea   Cr improving slowly with IVF - continue IVF     Hypocalcemia w elevated PTH   HyperPhos sec to JAIME/CKD   secondary HyperParaThyroidism    repeat phos in AM  -  if elevated could give ca acetate    still with diarrhea poor oral Ca absorption    Ca Corrected < 6 repeat Albumin in AM    IV Ca gluconate x 2 doses    ionized calcium in AM   check mag    start rocaltrol     Diarrhea    r/o c diff recently on po abx for UTI    d/w Dr Phelps 79 y/o female with diastolic HF, COPD (on home O2), HTN, DM - insulin dependent, s/p PPM presents with a few days aof diarrhea and feeling lethragic.      Jaime on CKD 3  peak Cr 5.68  w Azotemia    Prerenal  in setting of diarrhea   Cr improving slowly with IVF - continue IVF     Hypocalcemia w elevated PTH   HyperPhos sec to JAIME/CKD   secondary HyperParaThyroidism    repeat phos in AM  -  if elevated could give ca acetate    still with diarrhea poor oral Ca absorption    Ca Corrected < 6 repeat Albumin in AM    IV Ca gluconate x 1 dose   ionized calcium in AM   check mag    start rocaltrol     Diarrhea    r/o c diff recently on po abx for UTI    d/w Dr Phelps

## 2019-02-02 NOTE — CONSULT NOTE ADULT - ASSESSMENT
79 y/o female with diastolic HF, COPD (on home O2), HTN, DM - insulin dependent, s/p PPM admitted on 1/30 for evaluation of increasing lethargy after and increase in her diuretic; has had diarrhea which is slowly improving. She is not sure if she has dysuria, poor historian having just received a benadryl.  1. Patient admitted with lethargy, admission labs suggestive of dehydration also found to have uti with Klebsiella pneumoniae  - iv hydration and supportive care   - serial cbc and monitor temperature   - reviewed prior medical records to evaluate for resistant or atypical pathogens   - was started on ceftriaxone which she is tolerating; most old penicillin allergies are to the cogeners in old penicillin formulations  - today is day #2 ceftiraxone, should complete a total 3 days rx  2. other issues: diastolic HF, COPD (on home O2), HTN, DM - insulin dependent, s/p PPM  - per medicine

## 2019-02-02 NOTE — PROGRESS NOTE ADULT - ASSESSMENT
77 y/o female with the above med hx admitted with ANI due to volume depletion.    *ANI - ACUTE ON CHRONIC KIDNEY DISEASE  BASELINE CR ABOUT 2.5  KIDNEY FUNCTION IMPROVING YEST, AWAIT AM LABS  RECENT DIARRHEA WITH INCREASE IN BUMEX LEADING TO ANI  STOP DIURETICS  STOP ARB UNTIL CR STABLE  HYDRATE, RATE DECREASED  RENAL FOLLOWING    *HYPOCALCEMIA - REPLETE. MANAGEMENT AS PER RENAL; STILL LOW AS OF YEST, BEING REPLETED, F/U THIS AM  *CHF - DIASTOLIC DYSFN, STABLE, OFF DIURETICS  WILL MONITOR  CARDIO EVAL  NO DIURETICS FOR NOW  *ANEMIA - CHRONIC ANEMIA  H/H STABLE  *COPD - NO ACUTE BRONCHOSPASM  WILL CONT TO ASSESS  CONT BRONCHODILATORS  *HYPERNATREMIA - NOW RESOLVED WITH CHANGE IN IVF  * UTI - PT WITH HX OF CHRONIC UTIS AND RECURRENT KLEBSIELLA UTI, TRETED AS OUTPT MULTIPLE TIMES WITH CEPH  ON ROCEPHIN NOW  ID EVAL   *DIARRHEA - CHECK C DIFF AS ON ABX AS OUTPT RECENTLY NO SAMPLE SENT YET - SPOKE TO RN, NEED TO SEND SMAPLE    *DVT PROPHY - SQ HEPARIN

## 2019-02-03 LAB
ALBUMIN SERPL ELPH-MCNC: 2.7 G/DL — LOW (ref 3.3–5)
ANION GAP SERPL CALC-SCNC: 10 MMOL/L — SIGNIFICANT CHANGE UP (ref 5–17)
BUN SERPL-MCNC: 93 MG/DL — HIGH (ref 7–23)
C DIFF BY PCR RESULT: SIGNIFICANT CHANGE UP
C DIFF TOX GENS STL QL NAA+PROBE: SIGNIFICANT CHANGE UP
CA-I BLD-SCNC: 0.92 MMOL/L — LOW (ref 1.12–1.3)
CALCIUM SERPL-MCNC: 6.4 MG/DL — CRITICAL LOW (ref 8.5–10.1)
CHLORIDE SERPL-SCNC: 118 MMOL/L — HIGH (ref 96–108)
CO2 SERPL-SCNC: 18 MMOL/L — LOW (ref 22–31)
CREAT SERPL-MCNC: 3.32 MG/DL — HIGH (ref 0.5–1.3)
GLUCOSE BLDC GLUCOMTR-MCNC: 139 MG/DL — HIGH (ref 70–99)
GLUCOSE BLDC GLUCOMTR-MCNC: 141 MG/DL — HIGH (ref 70–99)
GLUCOSE BLDC GLUCOMTR-MCNC: 198 MG/DL — HIGH (ref 70–99)
GLUCOSE BLDC GLUCOMTR-MCNC: 279 MG/DL — HIGH (ref 70–99)
GLUCOSE SERPL-MCNC: 135 MG/DL — HIGH (ref 70–99)
HCT VFR BLD CALC: 28.4 % — LOW (ref 34.5–45)
HGB BLD-MCNC: 9.4 G/DL — LOW (ref 11.5–15.5)
MAGNESIUM SERPL-MCNC: 1.4 MG/DL — LOW (ref 1.6–2.6)
MCHC RBC-ENTMCNC: 28.9 PG — SIGNIFICANT CHANGE UP (ref 27–34)
MCHC RBC-ENTMCNC: 33.1 GM/DL — SIGNIFICANT CHANGE UP (ref 32–36)
MCV RBC AUTO: 87.4 FL — SIGNIFICANT CHANGE UP (ref 80–100)
NRBC # BLD: 0 /100 WBCS — SIGNIFICANT CHANGE UP (ref 0–0)
PHOSPHATE SERPL-MCNC: 3.8 MG/DL — SIGNIFICANT CHANGE UP (ref 2.5–4.5)
PLATELET # BLD AUTO: 182 K/UL — SIGNIFICANT CHANGE UP (ref 150–400)
POTASSIUM SERPL-MCNC: 4.7 MMOL/L — SIGNIFICANT CHANGE UP (ref 3.5–5.3)
POTASSIUM SERPL-SCNC: 4.7 MMOL/L — SIGNIFICANT CHANGE UP (ref 3.5–5.3)
RBC # BLD: 3.25 M/UL — LOW (ref 3.8–5.2)
RBC # FLD: 13.8 % — SIGNIFICANT CHANGE UP (ref 10.3–14.5)
SODIUM SERPL-SCNC: 146 MMOL/L — HIGH (ref 135–145)
WBC # BLD: 6.27 K/UL — SIGNIFICANT CHANGE UP (ref 3.8–10.5)
WBC # FLD AUTO: 6.27 K/UL — SIGNIFICANT CHANGE UP (ref 3.8–10.5)

## 2019-02-03 RX ORDER — MAGNESIUM SULFATE 500 MG/ML
2 VIAL (ML) INJECTION ONCE
Qty: 0 | Refills: 0 | Status: COMPLETED | OUTPATIENT
Start: 2019-02-03 | End: 2019-02-03

## 2019-02-03 RX ORDER — MAGNESIUM SULFATE 500 MG/ML
1 VIAL (ML) INJECTION ONCE
Qty: 0 | Refills: 0 | Status: COMPLETED | OUTPATIENT
Start: 2019-02-03 | End: 2019-02-03

## 2019-02-03 RX ORDER — SODIUM CHLORIDE 9 MG/ML
1000 INJECTION, SOLUTION INTRAVENOUS
Qty: 0 | Refills: 0 | Status: DISCONTINUED | OUTPATIENT
Start: 2019-02-03 | End: 2019-02-04

## 2019-02-03 RX ORDER — CALCIUM GLUCONATE 100 MG/ML
1 VIAL (ML) INTRAVENOUS ONCE
Qty: 0 | Refills: 0 | Status: COMPLETED | OUTPATIENT
Start: 2019-02-03 | End: 2019-02-03

## 2019-02-03 RX ADMIN — HEPARIN SODIUM 5000 UNIT(S): 5000 INJECTION INTRAVENOUS; SUBCUTANEOUS at 17:13

## 2019-02-03 RX ADMIN — Medication 100 GRAM(S): at 09:46

## 2019-02-03 RX ADMIN — HEPARIN SODIUM 5000 UNIT(S): 5000 INJECTION INTRAVENOUS; SUBCUTANEOUS at 06:15

## 2019-02-03 RX ADMIN — MONTELUKAST 10 MILLIGRAM(S): 4 TABLET, CHEWABLE ORAL at 22:06

## 2019-02-03 RX ADMIN — Medication 25 MILLIGRAM(S): at 12:51

## 2019-02-03 RX ADMIN — ATORVASTATIN CALCIUM 20 MILLIGRAM(S): 80 TABLET, FILM COATED ORAL at 22:06

## 2019-02-03 RX ADMIN — Medication 112 MICROGRAM(S): at 06:15

## 2019-02-03 RX ADMIN — Medication 240 MILLIGRAM(S): at 06:15

## 2019-02-03 RX ADMIN — Medication 1 MILLIGRAM(S): at 11:22

## 2019-02-03 RX ADMIN — TIOTROPIUM BROMIDE 1 CAPSULE(S): 18 CAPSULE ORAL; RESPIRATORY (INHALATION) at 14:17

## 2019-02-03 RX ADMIN — Medication 200 GRAM(S): at 16:31

## 2019-02-03 RX ADMIN — Medication 5 MILLIGRAM(S): at 22:06

## 2019-02-03 RX ADMIN — Medication 1 TABLET(S): at 06:15

## 2019-02-03 RX ADMIN — Medication 81 MILLIGRAM(S): at 11:23

## 2019-02-03 RX ADMIN — Medication 3: at 11:29

## 2019-02-03 RX ADMIN — CEFTRIAXONE 1000 MILLIGRAM(S): 500 INJECTION, POWDER, FOR SOLUTION INTRAMUSCULAR; INTRAVENOUS at 00:26

## 2019-02-03 RX ADMIN — Medication 50 GRAM(S): at 11:52

## 2019-02-03 RX ADMIN — CALCITRIOL 0.5 MICROGRAM(S): 0.5 CAPSULE ORAL at 11:23

## 2019-02-03 RX ADMIN — PANTOPRAZOLE SODIUM 40 MILLIGRAM(S): 20 TABLET, DELAYED RELEASE ORAL at 06:15

## 2019-02-03 NOTE — PROGRESS NOTE ADULT - ASSESSMENT
Diarrhea and lethargy- likely metabolic encephalopathy.  Her mental status is normal now and she can increase po intake of fluids.  On IVF now    Renal failure acute on CKD-   Nephrology consult appreciated.  IVF to be continued with close monitoring of the volume status.  Improving renal status.     Hypocalcemia- getting supplementation.    Chronic diastolic heart failure- all her edema resolved.  Hold all diuretics now.   Volume status management as stated above.   Close monitoring of volume status recommended.  Strict I/O and daily wt checks. Low sodium diet.    HTN- BP optimal  Continue cardizem CD.     Other medical issues- Management per primary team.   Thank you for allowing me to participate in the care of this patient. Please feel free to contact me with any questions.

## 2019-02-03 NOTE — PROGRESS NOTE ADULT - ASSESSMENT
79 y/o female with diastolic HF, COPD (on home O2), HTN, DM - insulin dependent, s/p PPM presents with a few days aof diarrhea and feeling lethragic.      Jaime on CKD 3  peak Cr 5.68  w Azotemia    Prerenal  in setting of diarrhea   Cr improving slowly with IVF - continue IVF     Hypocalcemia w elevated PTH  HyperPhos sec to JAIME/CKD   secondary HyperParaThyroidism   hypomagnesemia    still with diarrhea poor oral Ca absorption    Ca Corrected improving  - continue calcium carbonate    Ionized ca low - IV cagluconate x 1    repleting Mg   start rocaltrol 0.5 mcg daily    monitor Phos   oral vit D      Mild Hypernatremia    = continue IVF - half saline    = increase water intake  pt advised      Diarrhea    r/o c diff w recent on po abx for UTI    d/w Dr Phelps

## 2019-02-03 NOTE — PROGRESS NOTE ADULT - ASSESSMENT
79 y/o female with the above med hx admitted with ANI due to volume depletion.    *ANI - ACUTE ON CHRONIC KIDNEY DISEASE  BASELINE CR ABOUT 2.5  KIDNEY FUNCTION CONTINUES TO IMPROVE BUT ELECTROLYTES STILL ABNORMAL  RECENT DIARRHEA WITH INCREASE IN BUMEX LEADING TO ANI  CONT TO HOLD DIURETICS  STOP ARB UNTIL CR STABLE  HYDRATE, RATE DECREASED  RENAL FOLLOWING  *HYPOMAGNESEMIA - REPLETE  *HYPOCALCEMIA - REPLETE. MANAGEMENT AS PER RENAL; STILL LOW BUT IMPROVING  *CHF - DIASTOLIC DYSFN, STABLE, OFF DIURETICS, COMPENSATED FOR NOW  WILL MONITOR  CARDIO EVAL  NO DIURETICS FOR NOW  *ANEMIA - CHRONIC ANEMIA  H/H STABLE  *COPD - NO ACUTE BRONCHOSPASM  WILL CONT TO ASSESS  CONT BRONCHODILATORS  *HYPERNATREMIA - RENAL TO ADDRESS, FREE WATER DEPLETED  * UTI - PT WITH HX OF CHRONIC UTIS AND RECURRENT KLEBSIELLA UTI, TRETED AS OUTPT MULTIPLE TIMES WITH CEPH  ON ROCEPHIN NOW  ID EVAL   *DIARRHEA - CHECK C DIFF AS ON ABX AS OUTPT; SAMPLE SENT YEST    *DVT PROPHY - SQ HEPARIN

## 2019-02-03 NOTE — PROGRESS NOTE ADULT - ASSESSMENT
79 y/o female with diastolic HF, COPD (on home O2), HTN, DM - insulin dependent, s/p PPM admitted on 1/30 for evaluation of increasing lethargy after and increase in her diuretic; has had diarrhea which is slowly improving. She is not sure if she has dysuria, poor historian having just received a benadryl.  1. Patient admitted with lethargy, admission labs suggestive of dehydration also found to have uti with Klebsiella pneumoniae  - iv hydration and supportive care   - serial cbc and monitor temperature   - reviewed prior medical records to evaluate for resistant or atypical pathogens   - was started on ceftriaxone which she is tolerating; most old penicillin allergies are to the cogeners in old penicillin formulations  - today is day #3 ceftiraxone, should complete a total 3 days rx  - tolerating antibiotics without rashes or side effects   - will stop ceftriaxone after today dose; most likely can discontinue orders for cdiff and gi pcr given that patient is having formed stool per nursing  2. other issues: diastolic HF, COPD (on home O2), HTN, DM - insulin dependent, s/p PPM  - per medicine

## 2019-02-04 LAB
ANION GAP SERPL CALC-SCNC: 6 MMOL/L — SIGNIFICANT CHANGE UP (ref 5–17)
BUN SERPL-MCNC: 78 MG/DL — HIGH (ref 7–23)
CALCIUM SERPL-MCNC: 6.6 MG/DL — LOW (ref 8.5–10.1)
CHLORIDE SERPL-SCNC: 118 MMOL/L — HIGH (ref 96–108)
CO2 SERPL-SCNC: 20 MMOL/L — LOW (ref 22–31)
CREAT SERPL-MCNC: 2.9 MG/DL — HIGH (ref 0.5–1.3)
GLUCOSE BLDC GLUCOMTR-MCNC: 140 MG/DL — HIGH (ref 70–99)
GLUCOSE BLDC GLUCOMTR-MCNC: 151 MG/DL — HIGH (ref 70–99)
GLUCOSE BLDC GLUCOMTR-MCNC: 179 MG/DL — HIGH (ref 70–99)
GLUCOSE BLDC GLUCOMTR-MCNC: 225 MG/DL — HIGH (ref 70–99)
GLUCOSE SERPL-MCNC: 138 MG/DL — HIGH (ref 70–99)
HCT VFR BLD CALC: 29.2 % — LOW (ref 34.5–45)
HGB BLD-MCNC: 9.5 G/DL — LOW (ref 11.5–15.5)
MAGNESIUM SERPL-MCNC: 2.2 MG/DL — SIGNIFICANT CHANGE UP (ref 1.6–2.6)
MCHC RBC-ENTMCNC: 28.9 PG — SIGNIFICANT CHANGE UP (ref 27–34)
MCHC RBC-ENTMCNC: 32.5 GM/DL — SIGNIFICANT CHANGE UP (ref 32–36)
MCV RBC AUTO: 88.8 FL — SIGNIFICANT CHANGE UP (ref 80–100)
NRBC # BLD: 0 /100 WBCS — SIGNIFICANT CHANGE UP (ref 0–0)
PHOSPHATE SERPL-MCNC: 3.8 MG/DL — SIGNIFICANT CHANGE UP (ref 2.5–4.5)
PLATELET # BLD AUTO: 188 K/UL — SIGNIFICANT CHANGE UP (ref 150–400)
POTASSIUM SERPL-MCNC: 5.2 MMOL/L — SIGNIFICANT CHANGE UP (ref 3.5–5.3)
POTASSIUM SERPL-SCNC: 5.2 MMOL/L — SIGNIFICANT CHANGE UP (ref 3.5–5.3)
RBC # BLD: 3.29 M/UL — LOW (ref 3.8–5.2)
RBC # FLD: 13.8 % — SIGNIFICANT CHANGE UP (ref 10.3–14.5)
SODIUM SERPL-SCNC: 144 MMOL/L — SIGNIFICANT CHANGE UP (ref 135–145)
WBC # BLD: 7.18 K/UL — SIGNIFICANT CHANGE UP (ref 3.8–10.5)
WBC # FLD AUTO: 7.18 K/UL — SIGNIFICANT CHANGE UP (ref 3.8–10.5)

## 2019-02-04 RX ADMIN — CALCITRIOL 0.5 MICROGRAM(S): 0.5 CAPSULE ORAL at 11:46

## 2019-02-04 RX ADMIN — MONTELUKAST 10 MILLIGRAM(S): 4 TABLET, CHEWABLE ORAL at 21:16

## 2019-02-04 RX ADMIN — Medication 81 MILLIGRAM(S): at 11:48

## 2019-02-04 RX ADMIN — HEPARIN SODIUM 5000 UNIT(S): 5000 INJECTION INTRAVENOUS; SUBCUTANEOUS at 05:14

## 2019-02-04 RX ADMIN — Medication 1: at 09:02

## 2019-02-04 RX ADMIN — Medication 2: at 11:46

## 2019-02-04 RX ADMIN — Medication 240 MILLIGRAM(S): at 05:14

## 2019-02-04 RX ADMIN — PANTOPRAZOLE SODIUM 40 MILLIGRAM(S): 20 TABLET, DELAYED RELEASE ORAL at 05:15

## 2019-02-04 RX ADMIN — Medication 1 MILLIGRAM(S): at 11:46

## 2019-02-04 RX ADMIN — ATORVASTATIN CALCIUM 20 MILLIGRAM(S): 80 TABLET, FILM COATED ORAL at 21:16

## 2019-02-04 RX ADMIN — Medication 112 MICROGRAM(S): at 05:14

## 2019-02-04 RX ADMIN — Medication 5 MILLIGRAM(S): at 22:26

## 2019-02-04 RX ADMIN — HEPARIN SODIUM 5000 UNIT(S): 5000 INJECTION INTRAVENOUS; SUBCUTANEOUS at 17:24

## 2019-02-04 RX ADMIN — TIOTROPIUM BROMIDE 1 CAPSULE(S): 18 CAPSULE ORAL; RESPIRATORY (INHALATION) at 07:42

## 2019-02-04 NOTE — PHYSICAL THERAPY INITIAL EVALUATION ADULT - LIVES WITH, PROFILE
spouse/house; 2 NIRU with handrail; stair lift inside
house; 2 NIRU with handrail; stair lift inside. 2 steps to enter the home with rails./spouse

## 2019-02-04 NOTE — PHYSICAL THERAPY INITIAL EVALUATION ADULT - ADDITIONAL COMMENTS
Patient is a Community Ambulator with decreased endurance.  Uses motorized carts in stores as needed.

## 2019-02-04 NOTE — PHYSICAL THERAPY INITIAL EVALUATION ADULT - GENERAL OBSERVATIONS, REHAB EVAL
Patient received in bed +IV, +O2@2L via nc. Daughter in during session. Patient denied pain.
Patient received in bed, son at bedside.  Patient refused session, waiting for lunch . (15:03 hours). RN informed

## 2019-02-04 NOTE — PHYSICAL THERAPY INITIAL EVALUATION ADULT - DIAGNOSIS, PT EVAL
dehydration, ANI, Diarrhea, r/o c diff recently on po abx; Chronic diastolic heart failure- all her edema resolved.  Hold all diuretics now.
dehydration, ANI, Diarrhea, r/o c diff recently on po abx; Chronic diastolic heart failure- all her edema resolved.  Hold all diuretics now.

## 2019-02-04 NOTE — PHYSICAL THERAPY INITIAL EVALUATION ADULT - ACTIVE RANGE OF MOTION EXAMINATION, REHAB EVAL
Mild L hip flexion deficit due to old hip injury/bilateral upper extremity Active ROM was WFL (within functional limits)/bilateral  lower extremity Active ROM was WFL (within functional limits)

## 2019-02-04 NOTE — PROGRESS NOTE ADULT - ASSESSMENT
79 y/o female with the above med hx admitted with ANI due to volume depletion.    *ANI - ACUTE ON CHRONIC KIDNEY DISEASE, resolving, ALMOST AT HER BASELINE  BASELINE CR ABOUT 2.5  KIDNEY FUNCTION CONTINUES TO IMPROVE BUT ELECTROLYTES STILL ABNORMAL  RECENT DIARRHEA WITH INCREASE IN BUMEX LEADING TO ANI  CONT TO HOLD DIURETICS  STOP ARB UNTIL CR STABLE  HYDRATE, RATE DECREASED  RENAL FOLLOWING  *HYPOMAGNESEMIA - REPLETED  *HYPOCALCEMIA - REPLETE. MANAGEMENT AS PER RENAL; STILL LOW BUT IMPROVING BETTER TODAY  *CHF - DIASTOLIC DYSFN, STABLE, OFF DIURETICS, COMPENSATED FOR NOW  WILL MONITOR  CARDIO EVAL NOTED  NO DIURETICS FOR NOW  *ANEMIA - CHRONIC ANEMIA  H/H STABLE  *COPD - NO ACUTE BRONCHOSPASM  WILL CONT TO ASSESS  CONT BRONCHODILATORS  *HYPERNATREMIA - RESOLVED TODAY  * UTI - PT WITH HX OF CHRONIC UTIS AND RECURRENT KLEBSIELLA UTI, TRETED AS OUTPT MULTIPLE TIMES WITH CEPH  ON ROCEPHIN NOW, LIKELY DC AFTER TODAY'S DOSE AS PER ID  ID EVAL   *DIARRHEA - C DIFF NEG, STOOLS FORMED, ?VIRAL GASTRO  *DVT PROPHY - SQ HEPARIN

## 2019-02-04 NOTE — PROGRESS NOTE ADULT - ASSESSMENT
77 y/o female with diastolic HF, COPD (on home O2), HTN, DM - insulin dependent, s/p PPM presents with a few days aof diarrhea and feeling lethragic.      Jaime on CKD 3  peak Cr 5.68  w Azotemia    Prerenal  in setting of diarrhea   Cr improving slowly with IVF - continue IVF     Hypocalcemia w elevated PTH  HyperPhos sec to JAIME/CKD   secondary HyperParaThyroidism   hypomagnesemia    still with diarrhea poor oral Ca absorption    Ca Corrected improving  - continue calcium carbonate    Ionized ca low - IV cagluconate x 1    repleting Mg   start rocaltrol 0.5 mcg daily    monitor Phos   oral vit D      Mild Hypernatremia    = continue IVF - half saline    = increase water intake  pt advised      Diarrhea    r/o c diff w recent on po abx for UTI    d/w Dr Phelps     2/4 SY  --JAIME/CKD : improved and stable.  D/c IVF.  Pt encouraged to maintain oral intake.  --Fluid/Electrolytes stable.  Monitor off diuretics for now.  --Hypocalcemia : continue Rocaltrol and Ca supplements.

## 2019-02-04 NOTE — PHYSICAL THERAPY INITIAL EVALUATION ADULT - PERTINENT HX OF CURRENT PROBLEM, REHAB EVAL
77 yo F admitted c/o  diarrhea and feeling lethargic. Pt reports falling and hitting her head after feeling dizzy a couple weeks ago.  + dehydration, hypocalcemia , most likely side effects of diuretics. CT head (-).
77 yo F admitted c/o  diarrhea and feeling lethargic. Pt reports falling and hitting her head after feeling dizzy a couple weeks ago.  + dehydration, hypocalcemia , most likely side effects of diuretics. CT head (-).

## 2019-02-04 NOTE — PHYSICAL THERAPY INITIAL EVALUATION ADULT - MODALITIES TREATMENT COMMENTS
Patient left OOB in chair with CBIR. Daughter at bedside.  Ow in use at all times, IV intact.  Patient independent in functional mobility with RW, no skilled physical therapy need in this setting.  May ambulate per nursing.  Will d/c from PT. RN, CM informed.

## 2019-02-04 NOTE — PROGRESS NOTE ADULT - ASSESSMENT
Diarrhea and lethargy- likely metabolic encephalopathy.  Her mental status is normal now and she can increase po intake of fluids.  On IVF now    Renal failure acute on CKD-   Nephrology consult appreciated.  IVF to be continued with close monitoring of the volume status.  Improving renal status.  Still creatinine noted to be elevated.     Hypocalcemia- getting supplementation.    Chronic diastolic heart failure- all her edema resolved.  Hold all diuretics now.   Volume status management as stated above.   Close monitoring of volume status recommended.  Strict I/O and daily wt checks. Low sodium diet.    HTN- BP optimal  Continue vinh AC.     D/W Dr Phelps    Other medical issues- Management per primary team.   Thank you for allowing me to participate in the care of this patient. Please feel free to contact me with any questions.

## 2019-02-05 ENCOUNTER — TRANSCRIPTION ENCOUNTER (OUTPATIENT)
Age: 79
End: 2019-02-05

## 2019-02-05 VITALS — WEIGHT: 174.17 LBS

## 2019-02-05 LAB
ALBUMIN SERPL ELPH-MCNC: 2.6 G/DL — LOW (ref 3.3–5)
ANION GAP SERPL CALC-SCNC: 7 MMOL/L — SIGNIFICANT CHANGE UP (ref 5–17)
BUN SERPL-MCNC: 69 MG/DL — HIGH (ref 7–23)
CALCIUM SERPL-MCNC: 6.7 MG/DL — LOW (ref 8.5–10.1)
CHLORIDE SERPL-SCNC: 119 MMOL/L — HIGH (ref 96–108)
CO2 SERPL-SCNC: 19 MMOL/L — LOW (ref 22–31)
CREAT SERPL-MCNC: 2.81 MG/DL — HIGH (ref 0.5–1.3)
GLUCOSE BLDC GLUCOMTR-MCNC: 182 MG/DL — HIGH (ref 70–99)
GLUCOSE BLDC GLUCOMTR-MCNC: 197 MG/DL — HIGH (ref 70–99)
GLUCOSE SERPL-MCNC: 147 MG/DL — HIGH (ref 70–99)
PHOSPHATE SERPL-MCNC: 3 MG/DL — SIGNIFICANT CHANGE UP (ref 2.5–4.5)
POTASSIUM SERPL-MCNC: 5.1 MMOL/L — SIGNIFICANT CHANGE UP (ref 3.5–5.3)
POTASSIUM SERPL-SCNC: 5.1 MMOL/L — SIGNIFICANT CHANGE UP (ref 3.5–5.3)
SODIUM SERPL-SCNC: 145 MMOL/L — SIGNIFICANT CHANGE UP (ref 135–145)

## 2019-02-05 RX ORDER — INSULIN LISPRO 100/ML
0 VIAL (ML) SUBCUTANEOUS
Qty: 0 | Refills: 0 | COMMUNITY

## 2019-02-05 RX ORDER — FOLIC ACID 0.8 MG
1 TABLET ORAL
Qty: 0 | Refills: 0 | COMMUNITY

## 2019-02-05 RX ORDER — CALCITRIOL 0.5 UG/1
1 CAPSULE ORAL
Qty: 0 | Refills: 0 | DISCHARGE
Start: 2019-02-05

## 2019-02-05 RX ADMIN — HEPARIN SODIUM 5000 UNIT(S): 5000 INJECTION INTRAVENOUS; SUBCUTANEOUS at 06:01

## 2019-02-05 RX ADMIN — Medication 1 MILLIGRAM(S): at 11:41

## 2019-02-05 RX ADMIN — TIOTROPIUM BROMIDE 1 CAPSULE(S): 18 CAPSULE ORAL; RESPIRATORY (INHALATION) at 09:05

## 2019-02-05 RX ADMIN — Medication 81 MILLIGRAM(S): at 11:41

## 2019-02-05 RX ADMIN — Medication 1: at 11:39

## 2019-02-05 RX ADMIN — PANTOPRAZOLE SODIUM 40 MILLIGRAM(S): 20 TABLET, DELAYED RELEASE ORAL at 06:01

## 2019-02-05 RX ADMIN — CALCITRIOL 0.5 MICROGRAM(S): 0.5 CAPSULE ORAL at 11:42

## 2019-02-05 RX ADMIN — Medication 112 MICROGRAM(S): at 06:01

## 2019-02-05 RX ADMIN — Medication 1: at 08:56

## 2019-02-05 RX ADMIN — Medication 240 MILLIGRAM(S): at 06:01

## 2019-02-05 NOTE — PROGRESS NOTE ADULT - ASSESSMENT
79 y/o female with diastolic HF, COPD (on home O2), HTN, DM - insulin dependent, s/p PPM presents with a few days aof diarrhea and feeling lethragic.      Jaime on CKD 3  peak Cr 5.68  w Azotemia    Prerenal  in setting of diarrhea   Cr improving slowly with IVF - continue IVF     Hypocalcemia w elevated PTH  HyperPhos sec to JAIME/CKD   secondary HyperParaThyroidism   hypomagnesemia    still with diarrhea poor oral Ca absorption    Ca Corrected improving  - continue calcium carbonate    Ionized ca low - IV cagluconate x 1    repleting Mg   start rocaltrol 0.5 mcg daily    monitor Phos   oral vit D      Mild Hypernatremia    = continue IVF - half saline    = increase water intake  pt advised      Diarrhea    r/o c diff w recent on po abx for UTI    d/w Dr Phelps     2/4 SY  --JAIME/CKD : improved and stable.  D/c IVF.  Pt encouraged to maintain oral intake.  --Fluid/Electrolytes stable.  Monitor off diuretics for now.  --Hypocalcemia : continue Rocaltrol and Ca supplements.    2/5  feels well  ready for dc home  diarrhea improving  labs noted, abnormal but improving   creat better today  Ca up to 6.7, albumin 2.6  corrected calcium ~7.8

## 2019-02-05 NOTE — DISCHARGE NOTE ADULT - CARE PLAN
Principal Discharge DX:	Acute on chronic renal failure  Goal:	resolution of renal failure  Assessment and plan of treatment:	keep hydrated  hold diuretics until follow up with dr gutierrez  check labs on thursday with dr rosales

## 2019-02-05 NOTE — DISCHARGE NOTE ADULT - PATIENT PORTAL LINK FT
admit
You can access the [facility] Patient Portal, offered by [facility], by registering with the following website: [email or web address]

## 2019-02-05 NOTE — DISCHARGE NOTE ADULT - CARE PROVIDER_API CALL
Soco Phelps)  Internal Medicine  [address]  [place] [address]  Phone: [phone]  Fax: [phone]  Follow Up Time:     Toribio Cutler (DO)  Nephrology  [address], [address]  [place] [address]  Phone: [phone]  Fax: [phone]  Follow Up Time:     Kathie Marquez)  Cardiovascular Disease; Internal Medicine  [address] [place] [address]  Phone: [phone]  Fax: [phone]  Follow Up Time:

## 2019-02-05 NOTE — DISCHARGE NOTE ADULT - HOSPITAL COURSE
77 y/o female with the above med hx admitted with ANI due to volume depletion.    *ANI - ACUTE ON CHRONIC KIDNEY DISEASE, resolving, ALMOST AT HER BASELINE  BASELINE CR ABOUT 2.5 - cr at 2.8 today, off ivf  KIDNEY FUNCTION CONTINUES TO IMPROVE BUT ELECTROLYTES STILL ABNORMAL  RECENT DIARRHEA WITH INCREASE IN BUMEX LEADING TO ANI  CONT TO HOLD DIURETICS  RESTART ARB UPON DC TODAY    *HYPOCALCEMIA - REPLETE. MANAGEMENT AS PER RENAL; STILL LOW BUT IMPROVING BETTER TODAY, still low, CHECK LABS ON THURSDAY  *CHF - DIASTOLIC DYSFN, STABLE, OFF DIURETICS, COMPENSATED FOR NOW - WILL F/U WITH CARDIO  *ANEMIA - CHRONIC ANEMIA  H/H STABLE  *COPD - NO ACUTE BRONCHOSPASM  WILL CONT TO ASSESS  CONT BRONCHODILATORS  * UTI - PT WITH HX OF CHRONIC UTIS AND RECURRENT KLEBSIELLA UTI, TREATED AS OUTPT MULTIPLE TIMES WITH CEPH  s/p rocephin NO FURTHER ABX AS PER ID

## 2019-02-05 NOTE — PROGRESS NOTE ADULT - SUBJECTIVE AND OBJECTIVE BOX
2/1/2019: feels a bit better, had a couple stools but not collected yet; no cp, sob, n/v/f/c; feels very tired. urinating fine  2/2/19: States she feels better and more awake today, stools are more formed now; no cp, sob, n/v/f/c  2/3/2019: Feels better overall, stool is now formed, no diarrhea; no cp, sob, n/v/f/c    ROS: AS PER HPI OTHERWISE ALL SYSTEMS REVIEWED AND NEGATIVE    Vital Signs Last 24 Hrs  T(C): 36.1 (03 Feb 2019 05:45), Max: 36.7 (02 Feb 2019 10:53)  T(F): 97 (03 Feb 2019 05:45), Max: 98.1 (02 Feb 2019 10:53)  HR: 75 (03 Feb 2019 05:45) (61 - 75)  BP: 172/51 (03 Feb 2019 05:45) (155/43 - 172/51)  BP(mean): --  RR: 18 (03 Feb 2019 05:45) (18 - 18)  SpO2: 97% (03 Feb 2019 05:45) (97% - 100%)    GEN: A and O, NAD,  mood stable, [place]  HEENT:   NC/AT, EOMI, no oropharyngeal lesions,    NECK:   supple    CV:  +S1, +S2, regular, no murmurs or rubs    RESP:   lungs clear to auscultation bilaterally, no wheezing, sone scattered rales at bases, rhonchi, good air entry bilaterally DECREASED BS    GI:  abdomen soft, non-tender, non-distended, normal BS,  no abdominal masses, no palpable masses    RECTAL:  not examined    :  not examined    MSK:   normal muscle tone, no atrophy, no rigidity, no contractions    EXT:   no clubbing, no cyanosis, no edema, no calf pain, swelling or erythema    VASCULAR:  pulses equal and symmetric in the upper and lower extremities    NEURO:  AAOX3, no focal neurological deficits, follows all commands, able to move extremities spontaneously    SKIN:  no ulcers, lesions or rashes    LABS                              9.4    6.27  )-----------( 182      ( 03 Feb 2019 06:54 )             28.4     02-03    146<H>  |  118<H>  |  93<H>  ----------------------------<  135<H>  4.7   |  18<L>  |  3.32<H>    Ca    6.4<LL>      03 Feb 2019 06:54  Phos  3.8     02-03  Mg     1.4     02-03    TPro  x   /  Alb  2.7<L>  /  TBili  x   /  DBili  x   /  AST  x   /  ALT  x   /  AlkPhos  x   02-03        LIVER FUNCTIONS - ( 03 Feb 2019 06:54 )  Alb: 2.7 g/dL / Pro: x     / ALK PHOS: x     / ALT: x     / AST: x     / GGT: x           MEDICATIONS  (STANDING):  aspirin  chewable 81 milliGRAM(s) Oral daily  atorvastatin 20 milliGRAM(s) Oral at bedtime  calcitriol   Capsule 0.5 MICROGram(s) Oral daily  calcium carbonate    500 mG (Tums) Chewable 1 Tablet(s) Chew three times a day  cefTRIAXone Injectable. 1000 milliGRAM(s) IV Push every 24 hours  cefTRIAXone Injectable.      dextrose 5%. 1000 milliLiter(s) (50 mL/Hr) IV Continuous <Continuous>  dextrose 50% Injectable 12.5 Gram(s) IV Push once  diltiazem    milliGRAM(s) Oral daily  folic acid 1 milliGRAM(s) Oral daily  heparin  Injectable 5000 Unit(s) SubCutaneous every 12 hours  influenza   Vaccine 0.5 milliLiter(s) IntraMuscular once  insulin lispro (HumaLOG) corrective regimen sliding scale   SubCutaneous three times a day before meals  levothyroxine 112 MICROGram(s) Oral daily  magnesium sulfate  IVPB 1 Gram(s) IV Intermittent once  melatonin 5 milliGRAM(s) Oral at bedtime  montelukast 10 milliGRAM(s) Oral at bedtime  pantoprazole    Tablet 40 milliGRAM(s) Oral before breakfast  sodium chloride 0.45%. 1000 milliLiter(s) (50 mL/Hr) IV Continuous <Continuous>  tiotropium 18 MICROgram(s) Capsule 1 Capsule(s) Inhalation daily    MEDICATIONS  (PRN):  dextrose 40% Gel 15 Gram(s) Oral once PRN Blood Glucose LESS THAN 70 milliGRAM(s)/deciliter  diphenhydrAMINE 25 milliGRAM(s) Oral every 8 hours PRN Rash and/or Itching  glucagon  Injectable 1 milliGRAM(s) IntraMuscular once PRN Glucose LESS THAN 70 milligrams/deciliter  ondansetron Injectable 4 milliGRAM(s) IV Push every 6 hours PRN Nausea
2/1/2019: feels a bit better, had a couple stools but not collected yet; no cp, sob, n/v/f/c; feels very tired. urinating fine  2/2/19: States she feels better and more awake today, stools are more formed now; no cp, sob, n/v/f/c  2/3/2019: Feels better overall, stool is now formed, no diarrhea; no cp, sob, n/v/f/c  2/4/2019: No cp, feels better; some sob with exertion; no further loose stools, now formed    ROS: AS PER HPI OTHERWISE ALL SYSTEMS REVIEWED AND NEGATIVE    Vital Signs Last 24 Hrs  T(C): 36.5 (04 Feb 2019 05:07), Max: 36.5 (04 Feb 2019 05:07)  T(F): 97.7 (04 Feb 2019 05:07), Max: 97.7 (04 Feb 2019 05:07)  HR: 76 (04 Feb 2019 07:43) (61 - 114)  BP: 156/47 (04 Feb 2019 05:07) (136/86 - 163/42)  BP(mean): --  RR: 18 (04 Feb 2019 05:07) (18 - 18)  SpO2: 100% (04 Feb 2019 05:07) (91% - 100%)    GEN: A and O, NAD,  mood stable, [place]  HEENT:   NC/AT, EOMI, no oropharyngeal lesions,    NECK:   supple    CV:  +S1, +S2, regular, no murmurs or rubs    RESP:   lungs clear to auscultation bilaterally, no wheezing, sone scattered rales at bases, rhonchi, good air entry bilaterally DECREASED BS    GI:  abdomen soft, non-tender, non-distended, normal BS,  no abdominal masses, no palpable masses    RECTAL:  not examined    :  not examined    MSK:   normal muscle tone, no atrophy, no rigidity, no contractions    EXT:   no clubbing, no cyanosis, no edema, no calf pain, swelling or erythema    VASCULAR:  pulses equal and symmetric in the upper and lower extremities    NEURO:  AAOX3, no focal neurological deficits, follows all commands, able to move extremities spontaneously    SKIN:  no ulcers, lesions or rashes    LABS                              9.5    7.18  )-----------( 188      ( 04 Feb 2019 07:46 )             29.2     02-04    144  |  118<H>  |  78<H>  ----------------------------<  138<H>  5.2   |  20<L>  |  2.90<H>    Ca    6.6<L>      04 Feb 2019 07:46  Phos  3.8     02-04  Mg     2.2     02-04    TPro  x   /  Alb  2.7<L>  /  TBili  x   /  DBili  x   /  AST  x   /  ALT  x   /  AlkPhos  x   02-03        LIVER FUNCTIONS - ( 03 Feb 2019 06:54 )  Alb: 2.7 g/dL / Pro: x     / ALK PHOS: x     / ALT: x     / AST: x     / GGT: x             MEDICATIONS  (STANDING):  aspirin  chewable 81 milliGRAM(s) Oral daily  atorvastatin 20 milliGRAM(s) Oral at bedtime  calcitriol   Capsule 0.5 MICROGram(s) Oral daily  dextrose 5%. 1000 milliLiter(s) (50 mL/Hr) IV Continuous <Continuous>  dextrose 50% Injectable 12.5 Gram(s) IV Push once  diltiazem    milliGRAM(s) Oral daily  folic acid 1 milliGRAM(s) Oral daily  heparin  Injectable 5000 Unit(s) SubCutaneous every 12 hours  influenza   Vaccine 0.5 milliLiter(s) IntraMuscular once  insulin lispro (HumaLOG) corrective regimen sliding scale   SubCutaneous three times a day before meals  levothyroxine 112 MICROGram(s) Oral daily  melatonin 5 milliGRAM(s) Oral at bedtime  montelukast 10 milliGRAM(s) Oral at bedtime  pantoprazole    Tablet 40 milliGRAM(s) Oral before breakfast  sodium chloride 0.45%. 1000 milliLiter(s) (50 mL/Hr) IV Continuous <Continuous>  tiotropium 18 MICROgram(s) Capsule 1 Capsule(s) Inhalation daily    MEDICATIONS  (PRN):  dextrose 40% Gel 15 Gram(s) Oral once PRN Blood Glucose LESS THAN 70 milliGRAM(s)/deciliter  diphenhydrAMINE 25 milliGRAM(s) Oral every 8 hours PRN Rash and/or Itching  glucagon  Injectable 1 milliGRAM(s) IntraMuscular once PRN Glucose LESS THAN 70 milligrams/deciliter  ondansetron Injectable 4 milliGRAM(s) IV Push every 6 hours PRN Nausea
2/1/2019: feels a bit better, had a couple stools but not collected yet; no cp, sob, n/v/f/c; feels very tired. urinating fine  2/2/19: States she feels better and more awake today, stools are more formed now; no cp, sob, n/v/f/c  2/3/2019: Feels better overall, stool is now formed, no diarrhea; no cp, sob, n/v/f/c  2/4/2019: No cp, feels better; some sob with exertion; no further loose stools, now formed  2/5/19: No cp, sob, n/v/f/c feels better, wants to go home    ROS: AS PER HPI OTHERWISE ALL SYSTEMS REVIEWED AND NEGATIVE    Vital Signs Last 24 Hrs  T(C): 36.5 (05 Feb 2019 05:39), Max: 36.5 (05 Feb 2019 05:39)  T(F): 97.7 (05 Feb 2019 05:39), Max: 97.7 (05 Feb 2019 05:39)  HR: 65 (05 Feb 2019 05:39) (61 - 68)  BP: 173/40 (05 Feb 2019 05:39) (132/40 - 173/40)  BP(mean): --  RR: 17 (05 Feb 2019 05:39) (17 - 18)  SpO2: 97% (05 Feb 2019 05:39) (97% - 100%)  GEN: A and O, NAD,  mood stable, [place]  HEENT:   NC/AT, EOMI, no oropharyngeal lesions,    NECK:   supple    CV:  +S1, +S2, regular, no murmurs or rubs    RESP:   lungs clear to auscultation bilaterally, no wheezing, sone scattered rales at bases, rhonchi, good air entry bilaterally DECREASED BS    GI:  abdomen soft, non-tender, non-distended, normal BS,  no abdominal masses, no palpable masses    RECTAL:  not examined    :  not examined    MSK:   normal muscle tone, no atrophy, no rigidity, no contractions    EXT:   no clubbing, no cyanosis, no edema, no calf pain, swelling or erythema    VASCULAR:  pulses equal and symmetric in the upper and lower extremities    NEURO:  AAOX3, no focal neurological deficits, follows all commands, able to move extremities spontaneously    SKIN:  no ulcers, lesions or rashes    LABS                              9.5    7.18  )-----------( 188      ( 04 Feb 2019 07:46 )             29.2     02-05    145  |  119<H>  |  69<H>  ----------------------------<  147<H>  5.1   |  19<L>  |  2.81<H>    Ca    6.7<L>      05 Feb 2019 06:08  Phos  3.0     02-05  Mg     2.2     02-04    TPro  x   /  Alb  2.6<L>  /  TBili  x   /  DBili  x   /  AST  x   /  ALT  x   /  AlkPhos  x   02-05        LIVER FUNCTIONS - ( 05 Feb 2019 06:08 )  Alb: 2.6 g/dL / Pro: x     / ALK PHOS: x     / ALT: x     / AST: x     / GGT: x               MEDICATIONS  (STANDING):  aspirin  chewable 81 milliGRAM(s) Oral daily  atorvastatin 20 milliGRAM(s) Oral at bedtime  calcitriol   Capsule 0.5 MICROGram(s) Oral daily  dextrose 5%. 1000 milliLiter(s) (50 mL/Hr) IV Continuous <Continuous>  dextrose 50% Injectable 12.5 Gram(s) IV Push once  diltiazem    milliGRAM(s) Oral daily  folic acid 1 milliGRAM(s) Oral daily  heparin  Injectable 5000 Unit(s) SubCutaneous every 12 hours  insulin lispro (HumaLOG) corrective regimen sliding scale   SubCutaneous three times a day before meals  levothyroxine 112 MICROGram(s) Oral daily  melatonin 5 milliGRAM(s) Oral at bedtime  montelukast 10 milliGRAM(s) Oral at bedtime  pantoprazole    Tablet 40 milliGRAM(s) Oral before breakfast  tiotropium 18 MICROgram(s) Capsule 1 Capsule(s) Inhalation daily    MEDICATIONS  (PRN):  dextrose 40% Gel 15 Gram(s) Oral once PRN Blood Glucose LESS THAN 70 milliGRAM(s)/deciliter  diphenhydrAMINE 25 milliGRAM(s) Oral every 8 hours PRN Rash and/or Itching  glucagon  Injectable 1 milliGRAM(s) IntraMuscular once PRN Glucose LESS THAN 70 milligrams/deciliter  ondansetron Injectable 4 milliGRAM(s) IV Push every 6 hours PRN Nausea
2019: feels a bit better, had a couple stools but not collected yet; no cp, sob, n/v/f/c; feels very tired. urinating fine    ROS: AS PER HPI OTHERWISE ALL SYSTEMS REVIEWED AND NEGATIVE  Vital Signs Last 24 Hrs  T(C): 36.2 (2019 11:13), Max: 37 (2019 04:50)  T(F): 97.1 (2019 11:13), Max: 98.6 (2019 04:50)  HR: 76 (2019 11:) (66 - 78)  BP: 148/55 (2019 11:13) (126/48 - 148/55)  BP(mean): --  RR: 17 (2019 11:) (17 - 17)  SpO2: 95% (:) (95% - 98%)    GEN: A and O, NAD,  mood stable, [place]  HEENT:   NC/AT, EOMI, no oropharyngeal lesions,    NECK:   supple    CV:  +S1, +S2, regular, no murmurs or rubs    RESP:   lungs clear to auscultation bilaterally, no wheezing, rales, rhonchi, good air entry bilaterally DECREASED BS    GI:  abdomen soft, non-tender, non-distended, normal BS,  no abdominal masses, no palpable masses    RECTAL:  not examined    :  not examined    MSK:   normal muscle tone, no atrophy, no rigidity, no contractions    EXT:   no clubbing, no cyanosis, no edema, no calf pain, swelling or erythema    VASCULAR:  pulses equal and symmetric in the upper and lower extremities    NEURO:  AAOX3, no focal neurological deficits, follows all commands, able to move extremities spontaneously    SKIN:  no ulcers, lesions or rashes    LABS                            9.4    7.36  )-----------( 186      ( 2019 06:02 )             30.1     02-    144  |  115<H>  |  131<H>  ----------------------------<  85  4.8   |  19<L>  |  4.39<H>    Ca    5.3<LL>      2019 06:02  Phos  6.4     01-31  Mg     1.6         TPro  7.5  /  Alb  3.1<L>  /  TBili  0.4  /  DBili  x   /  AST  17  /  ALT  15  /  AlkPhos  109          LIVER FUNCTIONS - ( 2019 18:42 )  Alb: 3.1 g/dL / Pro: 7.5 gm/dL / ALK PHOS: 109 U/L / ALT: 15 U/L / AST: 17 U/L / GGT: x           PT/INR - ( 2019 18:42 )   PT: 13.7 sec;   INR: 1.23 ratio         PTT - ( 2019 18:42 )  PTT:32.2 sec  Urinalysis Basic - ( 2019 22:04 )    Color: Yellow / Appearance: Slightly Turbid / S.015 / pH: x  Gluc: x / Ketone: Negative  / Bili: Negative / Urobili: Negative mg/dL   Blood: x / Protein: 100 mg/dL / Nitrite: Negative   Leuk Esterase: Moderate / RBC: 11-25 /HPF / WBC >50   Sq Epi: x / Non Sq Epi: Moderate / Bacteria: Many        MEDICATIONS  (STANDING):  aspirin  chewable 81 milliGRAM(s) Oral daily  atorvastatin 20 milliGRAM(s) Oral at bedtime  cefTRIAXone Injectable. 1000 milliGRAM(s) IV Push every 24 hours  cefTRIAXone Injectable.      dextrose 5%. 1000 milliLiter(s) (50 mL/Hr) IV Continuous <Continuous>  dextrose 50% Injectable 12.5 Gram(s) IV Push once  diltiazem    milliGRAM(s) Oral daily  folic acid 1 milliGRAM(s) Oral daily  heparin  Injectable 5000 Unit(s) SubCutaneous every 12 hours  influenza   Vaccine 0.5 milliLiter(s) IntraMuscular once  insulin lispro (HumaLOG) corrective regimen sliding scale   SubCutaneous three times a day before meals  levothyroxine 112 MICROGram(s) Oral daily  montelukast 10 milliGRAM(s) Oral at bedtime  pantoprazole    Tablet 40 milliGRAM(s) Oral before breakfast  sodium bicarbonate 325 milliGRAM(s) Oral two times a day  sodium chloride 0.45%. 1500 milliLiter(s) (75 mL/Hr) IV Continuous <Continuous>  tiotropium 18 MICROgram(s) Capsule 1 Capsule(s) Inhalation daily    MEDICATIONS  (PRN):  dextrose 40% Gel 15 Gram(s) Oral once PRN Blood Glucose LESS THAN 70 milliGRAM(s)/deciliter  glucagon  Injectable 1 milliGRAM(s) IntraMuscular once PRN Glucose LESS THAN 70 milligrams/deciliter  ondansetron Injectable 4 milliGRAM(s) IV Push every 6 hours PRN Nausea
2019: feels a bit better, had a couple stools but not collected yet; no cp, sob, n/v/f/c; feels very tired. urinating fine  19: States she feels better and more awake today, stools are more formed now; no cp, sob, n/v/f/c    ROS: AS PER HPI OTHERWISE ALL SYSTEMS REVIEWED AND NEGATIVE    Vital Signs Last 24 Hrs  T(C): 36.2 (2019 04:54), Max: 36.3 (2019 17:14)  T(F): 97.2 (2019 04:54), Max: 97.3 (2019 17:14)  HR: 66 (2019 04:54) (62 - 76)  BP: 157/44 (2019 04:54) (133/54 - 157/44)  BP(mean): --  RR: 18 (2019 04:54) (17 - 18)  SpO2: 100% (2019 04:54) (95% - 100%)    GEN: A and O, NAD,  mood stable, [place]  HEENT:   NC/AT, EOMI, no oropharyngeal lesions,    NECK:   supple    CV:  +S1, +S2, regular, no murmurs or rubs    RESP:   lungs clear to auscultation bilaterally, no wheezing, sone scattered rales at bases, rhonchi, good air entry bilaterally DECREASED BS    GI:  abdomen soft, non-tender, non-distended, normal BS,  no abdominal masses, no palpable masses    RECTAL:  not examined    :  not examined    MSK:   normal muscle tone, no atrophy, no rigidity, no contractions    EXT:   no clubbing, no cyanosis, no edema, no calf pain, swelling or erythema    VASCULAR:  pulses equal and symmetric in the upper and lower extremities    NEURO:  AAOX3, no focal neurological deficits, follows all commands, able to move extremities spontaneously    SKIN:  no ulcers, lesions or rashes    LABS  AM LABS PENDING                          9.4    7.36  )-----------( 186      ( 2019 06:02 )             30.1     02-    144  |  115<H>  |  131<H>  ----------------------------<  85  4.8   |  19<L>  |  4.39<H>    Ca    5.3<LL>      2019 06:02  Phos  6.4       Mg     1.6         TPro  7.5  /  Alb  3.1<L>  /  TBili  0.4  /  DBili  x   /  AST  17  /  ALT  15  /  AlkPhos  109          LIVER FUNCTIONS - ( 2019 18:42 )  Alb: 3.1 g/dL / Pro: 7.5 gm/dL / ALK PHOS: 109 U/L / ALT: 15 U/L / AST: 17 U/L / GGT: x           PT/INR - ( 2019 18:42 )   PT: 13.7 sec;   INR: 1.23 ratio         PTT - ( 2019 18:42 )  PTT:32.2 sec  Urinalysis Basic - ( 2019 22:04 )    Color: Yellow / Appearance: Slightly Turbid / S.015 / pH: x  Gluc: x / Ketone: Negative  / Bili: Negative / Urobili: Negative mg/dL   Blood: x / Protein: 100 mg/dL / Nitrite: Negative   Leuk Esterase: Moderate / RBC: 11-25 /HPF / WBC >50   Sq Epi: x / Non Sq Epi: Moderate / Bacteria: Many        MEDICATIONS  (STANDING):  aspirin  chewable 81 milliGRAM(s) Oral daily  atorvastatin 20 milliGRAM(s) Oral at bedtime  calcium carbonate    500 mG (Tums) Chewable 1 Tablet(s) Chew three times a day  cefTRIAXone Injectable. 1000 milliGRAM(s) IV Push every 24 hours  cefTRIAXone Injectable.      dextrose 5%. 1000 milliLiter(s) (50 mL/Hr) IV Continuous <Continuous>  dextrose 50% Injectable 12.5 Gram(s) IV Push once  diltiazem    milliGRAM(s) Oral daily  folic acid 1 milliGRAM(s) Oral daily  heparin  Injectable 5000 Unit(s) SubCutaneous every 12 hours  influenza   Vaccine 0.5 milliLiter(s) IntraMuscular once  insulin lispro (HumaLOG) corrective regimen sliding scale   SubCutaneous three times a day before meals  levothyroxine 112 MICROGram(s) Oral daily  melatonin 5 milliGRAM(s) Oral at bedtime  montelukast 10 milliGRAM(s) Oral at bedtime  pantoprazole    Tablet 40 milliGRAM(s) Oral before breakfast  sodium chloride 0.45%. 1000 milliLiter(s) (50 mL/Hr) IV Continuous <Continuous>  tiotropium 18 MICROgram(s) Capsule 1 Capsule(s) Inhalation daily    MEDICATIONS  (PRN):  dextrose 40% Gel 15 Gram(s) Oral once PRN Blood Glucose LESS THAN 70 milliGRAM(s)/deciliter  diphenhydrAMINE 25 milliGRAM(s) Oral every 8 hours PRN Rash and/or Itching  glucagon  Injectable 1 milliGRAM(s) IntraMuscular once PRN Glucose LESS THAN 70 milligrams/deciliter  ondansetron Injectable 4 milliGRAM(s) IV Push every 6 hours PRN Nausea
COVERING FOR DR PALMA   HPI:  77 y/o female with diastolic HF, COPD (on home O2), HTN, DM - insulin dependent, s/p PPM presents with a few days aof diarrhea and feeling lethragic.  pt reports falling and hittng her head after feeling dizzy a couple weeks ago -- but no issues thereafter except feeling tired. Pt's  reports her feeling more lethargic bringing her to the ER.  No cp, sob, n/v/f/c  Above obtained from chart   renal eval called for hypocalcemia while on  bumex and metolazone      today still having diarrhea    PAST MEDICAL & SURGICAL HISTORY:  [place] (hard of hearing): hearing aid  Anxiety  Hypothyroid  GERD (gastroesophageal reflux disease)  CKD (chronic kidney disease)  MI, old: x3  Chronic UTI  Chronic diastolic congestive heart failure: last exacerbation 2017  Hyperlipidemia, unspecified hyperlipidemia type  Essential hypertension  Neuropathy  Diabetes  Chronic obstructive pulmonary disease, unspecified COPD type  History of cataract surgery: bilateral IOL  S/P lobectomy of lung: left lung pre-cancerous  History of ear surgery  History of cholecystectomy  S/P ORIF (open reduction internal fixation) fracture: left hip 2017  History of total knee replacement, unspecified laterality: bilateral right  left   H/O hernia repair: umbilical and incisional   delivery delivered  S/P appendectomy    FAMILY HISTORY:  Family history of CHF (congestive heart failure) (Mother)    :Home Medications:  Advair Diskus 250 mcg-50 mcg inhalation powder: 1 puff(s) inhaled 2 times a day (19 Oct 2018 12:03)  aspirin 81 mg oral tablet: 1 tab(s) orally once a day (19 Oct 2018 12:03)  atorvastatin 20 mg oral tablet: 1 tab(s) orally once a day (at bedtime) (19 Oct 2018 12:03)  Caltrate 600 + D oral tablet: 1 tab(s) orally 2 times a day (19 Oct 2018 12:03)  DilTIAZem Hydrochloride  mg/24 hours oral capsule, extended release: 1 cap(s) orally once a day (19 Oct 2018 12:03)  folic acid 1 mg oral tablet: 1 tab(s) orally once a day (in the morning) (19 Oct 2018 12:03)  folic acid 1 mg oral tablet: 1 tab(s) orally once a day (22 Oct 2018 15:54)  HumaLOG 100 units/mL injectable solution: 14 unit(s) injectable 3 times a day (before meals) (19 Oct 2018 12:03)  HumaLOG 100 units/mL injectable solution: injectable 3 times a day (before meals) **SLIDING SCALE IN ADDITION TO 14 UNITS TID ** (19 Oct 2018 12:03)  levothyroxine 112 mcg (0.112 mg) oral tablet: 1 tab(s) orally once a day (19 Oct 2018 12:03)  losartan 50 mg oral tablet: 1 tab(s) orally once a day (19 Oct 2018 12:03)  Lyrica 50 mg oral capsule: 1 cap(s) orally once a day (at bedtime) (19 Oct 2018 12:03)  montelukast 10 mg oral tablet: 1 tab(s) orally once a day (at bedtime) (19 Oct 2018 12:03)  Multiple Vitamins oral tablet: 1 tab(s) orally once a day (19 Oct 2018 12:03)  Spiriva 18 mcg inhalation capsule: 1 cap(s) inhaled once a day (in the evening) (19 Oct 2018 12:03)  Toujeo SoloStar 300 units/mL subcutaneous solution: 45 unit(s) subcutaneous once a day (at bedtime) (19 Oct 2018 12:03)    MEDICATIONS  (STANDING):  aspirin  chewable 81 milliGRAM(s) Oral daily  atorvastatin 20 milliGRAM(s) Oral at bedtime  calcium carbonate    500 mG (Tums) Chewable 1 Tablet(s) Chew three times a day  cefTRIAXone Injectable. 1000 milliGRAM(s) IV Push every 24 hours  cefTRIAXone Injectable.      dextrose 5%. 1000 milliLiter(s) (50 mL/Hr) IV Continuous <Continuous>  dextrose 50% Injectable 12.5 Gram(s) IV Push once  diltiazem    milliGRAM(s) Oral daily  folic acid 1 milliGRAM(s) Oral daily  heparin  Injectable 5000 Unit(s) SubCutaneous every 12 hours  influenza   Vaccine 0.5 milliLiter(s) IntraMuscular once  insulin lispro (HumaLOG) corrective regimen sliding scale   SubCutaneous three times a day before meals  levothyroxine 112 MICROGram(s) Oral daily  melatonin 5 milliGRAM(s) Oral at bedtime  montelukast 10 milliGRAM(s) Oral at bedtime  pantoprazole    Tablet 40 milliGRAM(s) Oral before breakfast  sodium chloride 0.45%. 1000 milliLiter(s) (50 mL/Hr) IV Continuous <Continuous>  tiotropium 18 MICROgram(s) Capsule 1 Capsule(s) Inhalation daily    MEDICATIONS  (PRN):  dextrose 40% Gel 15 Gram(s) Oral once PRN Blood Glucose LESS THAN 70 milliGRAM(s)/deciliter  diphenhydrAMINE 25 milliGRAM(s) Oral every 8 hours PRN Rash and/or Itching  glucagon  Injectable 1 milliGRAM(s) IntraMuscular once PRN Glucose LESS THAN 70 milligrams/deciliter  ondansetron Injectable 4 milliGRAM(s) IV Push every 6 hours PRN Nausea    Allergies    Bactrim (Other)  ciprofloxacin (Other (Mild))  clindamycin (Unknown)  ibuprofen (Unknown)  penicillins (Other)  sulfa drugs (Unknown)    Intolerances        I&O's Summary        REVIEW OF SYSTEMS:    CONSTITUTIONAL:  As per HPI.  CONSTITUTIONAL: No weakness, fevers or chills  EYES/ENT: No visual changes;  No vertigo or throat pain   NECK: No pain or stiffness  CARDIOVASCULAR: No chest pain or palpitations  GASTROINTESTINAL:+ diarrhea  GENITOURINARY:  +dysuria,  NEUROLOGICAL: No numbness or weakness  SKIN: No itching, burning, rashes, or lesions   All other review of systems is negative unless indicated above      Vital Signs Last 24 Hrs  T(C): 36.4 (2019 16:40), Max: 36.7 (2019 10:53)  T(F): 97.5 (2019 16:40), Max: 98.1 (2019 10:53)  HR: 61 (2019 16:40) (61 - 70)  BP: 157/47 (2019 16:40) (155/43 - 157/47)  BP(mean): --  RR: 18 (2019 16:40) (18 - 18)  SpO2: 98% (2019 16:40) (98% - 100%)  SpO2: 95% (2019 11:13) (95% - 98%)    I&O's Summary    2019 07:01  -  2019 07:00  --------------------------------------------------------  IN: 1150 mL / OUT: 0 mL / NET: 1150 mL  PHYSICAL EXAM:    General: Alert, well-developed ,No acute distress.  Neuro: Moving all extremities and alert and awake   HEENT: No JVD, no masses, Eyes anicteric,   Cardiovascular: S1 S2   Lungs: clear. no rales, no wheezing, .  Abdomen: Normoactive bowel sounds. Soft, flat, non-tender, and non-distended.  positive bowel sounds  Skin: Warm, dry, well-perfused. No rashes or other lesions.   Extremities: no edema      144    |  115    |  107    ----------------------------<  194       2019 11:27  5.0     |  20     |  3.76     144    |  115    |  131    ----------------------------<  85        2019 06:02  4.8     |  19     |  4.39       Ca    5.8        2019 11:27  Albumin, Serum: 3.1 g/dL (19 @ 18:42)  corrected Calcium 6.5    Vitamin D, 25-Hydroxy: 27.8: 30 - 80 ng/mL                 Intact PTH: 374   Phosphorus Level, Serum: 6.4 mg/dL (19 @ 07:06)  Creatinine, Serum: 2.12 mg/dL (10.21.18 @ 06:51)
COVERING FOR DR PALMA   HPI:  79 y/o female with diastolic HF, COPD (on home O2), HTN, DM - insulin dependent, s/p PPM presents with a few days aof diarrhea and feeling lethragic.  pt reports falling and hittng her head after feeling dizzy a couple weeks ago -- but no issues thereafter except feeling tired. Pt's  reports her feeling more lethargic bringing her to the ER.  No cp, sob, n/v/f/c  Above obtained from chart   renal eval called for hypocalcemia while on  bumex and metolazone    today still having diarrhea but less   eating somehwat     PAST MEDICAL & SURGICAL HISTORY:  [place] (hard of hearing): hearing aid  Anxiety  Hypothyroid  GERD (gastroesophageal reflux disease)  CKD (chronic kidney disease)  MI, old: x3  Chronic UTI  Chronic diastolic congestive heart failure: last exacerbation 2017  Hyperlipidemia, unspecified hyperlipidemia type  Essential hypertension  Neuropathy  Diabetes  Chronic obstructive pulmonary disease, unspecified COPD type  History of cataract surgery: bilateral IOL  S/P lobectomy of lung: left lung pre-cancerous  History of ear surgery  History of cholecystectomy  S/P ORIF (open reduction internal fixation) fracture: left hip 2017  History of total knee replacement, unspecified laterality: bilateral right  left   H/O hernia repair: umbilical and incisional   delivery delivered  S/P appendectomy    FAMILY HISTORY:  Family history of CHF (congestive heart failure) (Mother)    :Home Medications:  Advair Diskus 250 mcg-50 mcg inhalation powder: 1 puff(s) inhaled 2 times a day (19 Oct 2018 12:03)  aspirin 81 mg oral tablet: 1 tab(s) orally once a day (19 Oct 2018 12:03)  atorvastatin 20 mg oral tablet: 1 tab(s) orally once a day (at bedtime) (19 Oct 2018 12:03)  Caltrate 600 + D oral tablet: 1 tab(s) orally 2 times a day (19 Oct 2018 12:03)  DilTIAZem Hydrochloride  mg/24 hours oral capsule, extended release: 1 cap(s) orally once a day (19 Oct 2018 12:03)  folic acid 1 mg oral tablet: 1 tab(s) orally once a day (in the morning) (19 Oct 2018 12:03)  folic acid 1 mg oral tablet: 1 tab(s) orally once a day (22 Oct 2018 15:54)  HumaLOG 100 units/mL injectable solution: 14 unit(s) injectable 3 times a day (before meals) (19 Oct 2018 12:03)  HumaLOG 100 units/mL injectable solution: injectable 3 times a day (before meals) **SLIDING SCALE IN ADDITION TO 14 UNITS TID ** (19 Oct 2018 12:03)  levothyroxine 112 mcg (0.112 mg) oral tablet: 1 tab(s) orally once a day (19 Oct 2018 12:03)  losartan 50 mg oral tablet: 1 tab(s) orally once a day (19 Oct 2018 12:03)  Lyrica 50 mg oral capsule: 1 cap(s) orally once a day (at bedtime) (19 Oct 2018 12:03)  montelukast 10 mg oral tablet: 1 tab(s) orally once a day (at bedtime) (19 Oct 2018 12:03)  Multiple Vitamins oral tablet: 1 tab(s) orally once a day (19 Oct 2018 12:03)  Spiriva 18 mcg inhalation capsule: 1 cap(s) inhaled once a day (in the evening) (19 Oct 2018 12:03)  Toujeo SoloStar 300 units/mL subcutaneous solution: 45 unit(s) subcutaneous once a day (at bedtime) (19 Oct 2018 12:03)    MEDICATIONS  (STANDING):  aspirin  chewable 81 milliGRAM(s) Oral daily  atorvastatin 20 milliGRAM(s) Oral at bedtime  calcitriol   Capsule 0.5 MICROGram(s) Oral daily  calcium carbonate    500 mG (Tums) Chewable 1 Tablet(s) Chew three times a day  dextrose 5%. 1000 milliLiter(s) (50 mL/Hr) IV Continuous <Continuous>  dextrose 50% Injectable 12.5 Gram(s) IV Push once  diltiazem    milliGRAM(s) Oral daily  folic acid 1 milliGRAM(s) Oral daily  heparin  Injectable 5000 Unit(s) SubCutaneous every 12 hours  influenza   Vaccine 0.5 milliLiter(s) IntraMuscular once  insulin lispro (HumaLOG) corrective regimen sliding scale   SubCutaneous three times a day before meals  levothyroxine 112 MICROGram(s) Oral daily  melatonin 5 milliGRAM(s) Oral at bedtime  montelukast 10 milliGRAM(s) Oral at bedtime  pantoprazole    Tablet 40 milliGRAM(s) Oral before breakfast  sodium chloride 0.45%. 1000 milliLiter(s) (50 mL/Hr) IV Continuous <Continuous>  tiotropium 18 MICROgram(s) Capsule 1 Capsule(s) Inhalation daily    MEDICATIONS  (PRN):  dextrose 40% Gel 15 Gram(s) Oral once PRN Blood Glucose LESS THAN 70 milliGRAM(s)/deciliter  diphenhydrAMINE 25 milliGRAM(s) Oral every 8 hours PRN Rash and/or Itching  glucagon  Injectable 1 milliGRAM(s) IntraMuscular once PRN Glucose LESS THAN 70 milligrams/deciliter  ondansetron Injectable 4 milliGRAM(s) IV Push every 6 hours PRN Nausea      Allergies    Bactrim (Other)  ciprofloxacin (Other (Mild))  clindamycin (Unknown)  ibuprofen (Unknown)  penicillins (Other)  sulfa drugs (Unknown)    Intolerances      REVIEW OF SYSTEMS:    CONSTITUTIONAL:  As per HPI.  CONSTITUTIONAL: No weakness, fevers or chills  EYES/ENT: No visual changes;  No vertigo or throat pain   NECK: No pain or stiffness  CARDIOVASCULAR: No chest pain or palpitations  GASTROINTESTINAL:+ diarrhea  GENITOURINARY:  +dysuria,  NEUROLOGICAL: No numbness or weakness  SKIN: No itching, burning, rashes, or lesions   All other review of systems is negative unless indicated above    Vital Signs Last 24 Hrs  T(C): 36.4 (2019 11:17), Max: 36.4 (2019 16:40)  T(F): 97.6 (2019 11:17), Max: 97.6 (2019 11:17)  HR: 114 (2019 11:17) (61 - 114)  BP: 136/86 (2019 11:17) (136/86 - 172/51)  BP(mean): --  RR: 18 (2019 11:17) (18 - 18)  SpO2: 91% (2019 11:17) (91% - 98%)    I&O's Summary      PHYSICAL EXAM:    General: Alert, well-developed ,No acute distress.  Neuro: Moving all extremities and alert and awake   HEENT: No JVD, no masses, Eyes anicteric,   Cardiovascular: S1 S2   Lungs: clear. no rales, no wheezing, .  Abdomen: Normoactive bowel sounds. Soft, flat, non-tender, and non-distended.  positive bowel sounds  Skin: Warm, dry, well-perfused. No rashes or other lesions.   Extremities: no edema      146    |  118    |  93     ----------------------------<  135       2019 06:54  4.7     |  18     |  3.32     144    |  115    |  107    ----------------------------<  194       2019 11:27  5.0     |  20     |  3.76     144    |  115    |  131    ----------------------------<  85        2019 06:02  4.8     |  19     |  4.39     Ca    6.4        2019 06:54  - corrected ca 7.44  Albumin, Serum: 2.7 g/dL (19 @ 06:54)  Ca    5.8        2019 11:27    Phos  3.8       2019 06:54  Phos  6.4       2019 07:06    Mg     1.4       2019 06:54  Mg     1.6       2019 07:06     Albumin, Serum: 3.1 g/dL (19 @ 18:42)  corrected Calcium 6.5    Vitamin D, 25-Hydroxy: 27.8: 30 - 80 ng/mL                 Intact PTH: 374   Phosphorus Level, Serum: 6.4 mg/dL (19 @ 07:06)  Creatinine, Serum: 2.12 mg/dL (10.21.18 @ 06:51)
NEPHROLOGY CONSULT  HPI:  77 y/o female with diastolic HF, COPD (on home O2), HTN, DM - insulin dependent, s/p PPM presents with a few days aof diarrhea and feeling lethragic.  pt reports falling and hittng her head after feeling dizzy a couple weeks ago -- but no issues thereafter except feeling tired.  Pt's  reports her feeling more lethargic bringing her to the ER.  No cp, sob, n/v/f/c  Above obtained from chart  Pt known to our service  Admitted w dehydration  hypocalcemia , most likely side effects of diuretics  on bumex and metolazone  also had diarrhea  recently treated with ceftin for UTI  being evaluated for frequent utis by urology  case d/w dr Phelps  Pt reports increasing her bumex dose recently to 4mg daily as per cardio recommendation  today still c/o dysuria  does not know if esbl    2/1  feels well today  Ca remains low 3rd dose of CaGluconate ordered, 1 g IV  creat, PTH elevated c/w  secondary hyperpara..       PAST MEDICAL & SURGICAL HISTORY:  [place] (hard of hearing): hearing aid  Anxiety  Hypothyroid  GERD (gastroesophageal reflux disease)  CKD (chronic kidney disease)  MI, old: x3  Chronic UTI  Chronic diastolic congestive heart failure: last exacerbation 2017  Hyperlipidemia, unspecified hyperlipidemia type  Essential hypertension  Neuropathy  Diabetes  Chronic obstructive pulmonary disease, unspecified COPD type  History of cataract surgery: bilateral IOL  S/P lobectomy of lung: left lung pre-cancerous  History of ear surgery  History of cholecystectomy  S/P ORIF (open reduction internal fixation) fracture: left hip 2017  History of total knee replacement, unspecified laterality: bilateral right 2008 left   H/O hernia repair: umbilical and incisional   delivery delivered  S/P appendectomy      FAMILY HISTORY:  Family history of CHF (congestive heart failure) (Mother)    MEDICATIONS  (STANDING):  aspirin  chewable 81 milliGRAM(s) Oral daily  atorvastatin 20 milliGRAM(s) Oral at bedtime  cefTRIAXone Injectable. 1000 milliGRAM(s) IV Push every 24 hours  cefTRIAXone Injectable.      dextrose 5%. 1000 milliLiter(s) (50 mL/Hr) IV Continuous <Continuous>  dextrose 50% Injectable 12.5 Gram(s) IV Push once  diltiazem    milliGRAM(s) Oral daily  folic acid 1 milliGRAM(s) Oral daily  heparin  Injectable 5000 Unit(s) SubCutaneous every 12 hours  influenza   Vaccine 0.5 milliLiter(s) IntraMuscular once  insulin lispro (HumaLOG) corrective regimen sliding scale   SubCutaneous three times a day before meals  levothyroxine 112 MICROGram(s) Oral daily  montelukast 10 milliGRAM(s) Oral at bedtime  pantoprazole    Tablet 40 milliGRAM(s) Oral before breakfast  sodium bicarbonate 325 milliGRAM(s) Oral two times a day  sodium chloride 0.45%. 1500 milliLiter(s) (75 mL/Hr) IV Continuous <Continuous>  tiotropium 18 MICROgram(s) Capsule 1 Capsule(s) Inhalation daily    MEDICATIONS  (PRN):  dextrose 40% Gel 15 Gram(s) Oral once PRN Blood Glucose LESS THAN 70 milliGRAM(s)/deciliter  glucagon  Injectable 1 milliGRAM(s) IntraMuscular once PRN Glucose LESS THAN 70 milligrams/deciliter  ondansetron Injectable 4 milliGRAM(s) IV Push every 6 hours PRN Nausea      Allergies    Bactrim (Other)  ciprofloxacin (Other (Mild))  clindamycin (Unknown)  ibuprofen (Unknown)  penicillins (Other)  sulfa drugs (Unknown)    Intolerances        I&O's Summary        REVIEW OF SYSTEMS:    CONSTITUTIONAL:  As per HPI.  CONSTITUTIONAL: No weakness, fevers or chills  EYES/ENT: No visual changes;  No vertigo or throat pain   NECK: No pain or stiffness  CARDIOVASCULAR: No chest pain or palpitations  GASTROINTESTINAL:+ diarrhea  GENITOURINARY:  +dysuria,  NEUROLOGICAL: No numbness or weakness  SKIN: No itching, burning, rashes, or lesions   All other review of systems is negative unless indicated above      Vital Signs Last 24 Hrs  T(C): 36.2 (2019 11:13), Max: 37 (2019 04:50)  T(F): 97.1 (2019 11:13), Max: 98.6 (2019 04:50)  HR: 76 (2019 11:13) (66 - 78)  BP: 148/55 (2019 11:13) (140/41 - 148/55)  BP(mean): --  RR: 17 (2019 11:13) (17 - 17)  SpO2: 95% (2019 11:13) (95% - 98%)    PHYSICAL EXAM:    General:  Alert, well-developed ,No acute distress.    Neuro:  Alert and oriented to person, place, and time.     HEENT:  No JVD, no masses, Eyes anicteric,     Cardiovascular:  Regular rate and rhythm, with normal S1 and S2.     Lungs:  clear. no rales, no wheezing, .    Abdomen:  Normoactive bowel sounds. Soft, flat, non-tender, and non-distended.  No hepatosplenomegaly, positive bowel sounds    Skin:  Warm, dry, well-perfused. No rashes or other lesions.     Extremities:  no edema        144  |  115<H>  |  131<H>  ----------------------------<  85  4.8   |  19<L>  |  4.39<H>    Ca    5.3<LL>      2019 06:02  Phos  6.4       Mg     1.6         TPro  7.5  /  Alb  3.1<L>  /  TBili  0.4  /  DBili  x   /  AST  17  /  ALT  15  /  AlkPhos  109                              9.4    7.36  )-----------( 186      ( 2019 06:02 )             30.1
NEPHROLOGY INTERVAL HPI/OVERNIGHT EVENTS:    Date of Service: 02-04-19 @ 14:07  2/4 SY  No acute events.  Diarrhea resolved.  Reports good appetite.    HPI: 1/31  79 y/o female with diastolic HF, COPD (on home O2), HTN, DM - insulin dependent, s/p PPM presents with a few days aof diarrhea and feeling lethragic.  pt reports falling and hittng her head after feeling dizzy a couple weeks ago -- but no issues thereafter except feeling tired. Pt's  reports her feeling more lethargic bringing her to the ER.  No cp, sob, n/v/f/c  Above obtained from chart   renal eval called for hypocalcemia while on  bumex and metolazone    MEDICATIONS  (STANDING):  aspirin  chewable 81 milliGRAM(s) Oral daily  atorvastatin 20 milliGRAM(s) Oral at bedtime  calcitriol   Capsule 0.5 MICROGram(s) Oral daily  dextrose 5%. 1000 milliLiter(s) (50 mL/Hr) IV Continuous <Continuous>  dextrose 50% Injectable 12.5 Gram(s) IV Push once  diltiazem    milliGRAM(s) Oral daily  folic acid 1 milliGRAM(s) Oral daily  heparin  Injectable 5000 Unit(s) SubCutaneous every 12 hours  influenza   Vaccine 0.5 milliLiter(s) IntraMuscular once  insulin lispro (HumaLOG) corrective regimen sliding scale   SubCutaneous three times a day before meals  levothyroxine 112 MICROGram(s) Oral daily  melatonin 5 milliGRAM(s) Oral at bedtime  montelukast 10 milliGRAM(s) Oral at bedtime  pantoprazole    Tablet 40 milliGRAM(s) Oral before breakfast  sodium chloride 0.45%. 1000 milliLiter(s) (50 mL/Hr) IV Continuous <Continuous>  tiotropium 18 MICROgram(s) Capsule 1 Capsule(s) Inhalation daily    MEDICATIONS  (PRN):  dextrose 40% Gel 15 Gram(s) Oral once PRN Blood Glucose LESS THAN 70 milliGRAM(s)/deciliter  diphenhydrAMINE 25 milliGRAM(s) Oral every 8 hours PRN Rash and/or Itching  glucagon  Injectable 1 milliGRAM(s) IntraMuscular once PRN Glucose LESS THAN 70 milligrams/deciliter  ondansetron Injectable 4 milliGRAM(s) IV Push every 6 hours PRN Nausea      Vital Signs Last 24 Hrs  T(C): 36.2 (04 Feb 2019 11:16), Max: 36.5 (04 Feb 2019 05:07)  T(F): 97.2 (04 Feb 2019 11:16), Max: 97.7 (04 Feb 2019 05:07)  HR: 68 (04 Feb 2019 11:16) (61 - 78)  BP: 132/40 (04 Feb 2019 11:16) (132/40 - 163/42)  BP(mean): --  RR: 17 (04 Feb 2019 11:16) (17 - 18)  SpO2: 100% (04 Feb 2019 11:16) (96% - 100%)  Daily     Daily       PHYSICAL EXAM:  Alert and appropriate  GENERAL: no distress  CHEST/LUNG: fair air entry  HEART: S1S2 RRR  ABDOMEN: soft  EXTREMITIES: no edema  SKIN:     LABS:                        9.5    7.18  )-----------( 188      ( 04 Feb 2019 07:46 )             29.2     02-04    144  |  118<H>  |  78<H>  ----------------------------<  138<H>  5.2   |  20<L>  |  2.90<H>    Ca    6.6<L>      04 Feb 2019 07:46  Phos  3.8     02-04  Mg     2.2     02-04    TPro  x   /  Alb  2.7<L>  /  TBili  x   /  DBili  x   /  AST  x   /  ALT  x   /  AlkPhos  x   02-03        Magnesium, Serum: 2.2 mg/dL (02-04 @ 07:46)  Phosphorus Level, Serum: 3.8 mg/dL (02-04 @ 07:46)          RADIOLOGY & ADDITIONAL TESTS:
Patient is a 78y old  Female who presents with a chief complaint of diarrhea, lethargy (02 Feb 2019 10:43)      Date of service: 02-03-19 @ 10:37    Patient sitting in chair; having formed stools        ROS: no fever or chills; denies dizziness, no HA, no SOB or cough, no abdominal pain, no diarrhea or constipation; no dysuria, no urinary frequency, no legs pain, no rashes    MEDICATIONS  (STANDING):  aspirin  chewable 81 milliGRAM(s) Oral daily  atorvastatin 20 milliGRAM(s) Oral at bedtime  calcitriol   Capsule 0.5 MICROGram(s) Oral daily  calcium carbonate    500 mG (Tums) Chewable 1 Tablet(s) Chew three times a day  cefTRIAXone Injectable. 1000 milliGRAM(s) IV Push every 24 hours  cefTRIAXone Injectable.      dextrose 5%. 1000 milliLiter(s) (50 mL/Hr) IV Continuous <Continuous>  dextrose 50% Injectable 12.5 Gram(s) IV Push once  diltiazem    milliGRAM(s) Oral daily  folic acid 1 milliGRAM(s) Oral daily  heparin  Injectable 5000 Unit(s) SubCutaneous every 12 hours  influenza   Vaccine 0.5 milliLiter(s) IntraMuscular once  insulin lispro (HumaLOG) corrective regimen sliding scale   SubCutaneous three times a day before meals  levothyroxine 112 MICROGram(s) Oral daily  melatonin 5 milliGRAM(s) Oral at bedtime  montelukast 10 milliGRAM(s) Oral at bedtime  pantoprazole    Tablet 40 milliGRAM(s) Oral before breakfast  sodium chloride 0.45%. 1000 milliLiter(s) (50 mL/Hr) IV Continuous <Continuous>  tiotropium 18 MICROgram(s) Capsule 1 Capsule(s) Inhalation daily    MEDICATIONS  (PRN):  dextrose 40% Gel 15 Gram(s) Oral once PRN Blood Glucose LESS THAN 70 milliGRAM(s)/deciliter  diphenhydrAMINE 25 milliGRAM(s) Oral every 8 hours PRN Rash and/or Itching  glucagon  Injectable 1 milliGRAM(s) IntraMuscular once PRN Glucose LESS THAN 70 milligrams/deciliter  ondansetron Injectable 4 milliGRAM(s) IV Push every 6 hours PRN Nausea      Vital Signs Last 24 Hrs  T(C): 36.1 (03 Feb 2019 05:45), Max: 36.7 (02 Feb 2019 10:53)  T(F): 97 (03 Feb 2019 05:45), Max: 98.1 (02 Feb 2019 10:53)  HR: 75 (03 Feb 2019 05:45) (61 - 75)  BP: 172/51 (03 Feb 2019 05:45) (155/43 - 172/51)  BP(mean): --  RR: 18 (03 Feb 2019 05:45) (18 - 18)  SpO2: 97% (03 Feb 2019 05:45) (97% - 100%)    Physical Exam:        PE:    Constitutional: frail looking  HEENT: NC/AT, EOMI, PERRLA, conjunctivae clear; ears and nose atraumatic; pharynx clear  Neck: supple; thyroid not palpable  Back: no tenderness  Respiratory: respiratory effort normal; clear to auscultation  Cardiovascular: S1S2 regular, no murmurs  Abdomen: soft, not tender, not distended, positive BS; no liver or spleen organomegaly  Genitourinary: no suprapubic tenderness  Musculoskeletal: no muscle tenderness, no joint swelling or tenderness  Neurological/ Psychiatric:   moving all extremities  Skin: no rashes; no palpable lesions    Labs: all available labs reviewed                        Labs:                        9.4    6.27  )-----------( 182      ( 03 Feb 2019 06:54 )             28.4     02-03    146<H>  |  118<H>  |  93<H>  ----------------------------<  135<H>  4.7   |  18<L>  |  3.32<H>    Ca    6.4<LL>      03 Feb 2019 06:54  Phos  3.8     02-03  Mg     1.4     02-03    TPro  x   /  Alb  2.7<L>  /  TBili  x   /  DBili  x   /  AST  x   /  ALT  x   /  AlkPhos  x   02-03           Cultures:       Culture - Urine (collected 01-30-19 @ 22:04)  Source: .Urine None  Final Report (02-01-19 @ 16:39):    >100,000 CFU/ml Klebsiella pneumoniae  Organism: Klebsiella pneumoniae (02-01-19 @ 16:39)  Organism: Klebsiella pneumoniae (02-01-19 @ 16:39)      -  Amikacin: S <=16      -  Ampicillin: R >16 These ampicillin results predict results for amoxicillin      -  Ampicillin/Sulbactam: S <=8/4      -  Aztreonam: S <=4      -  Cefepime: S <=4      -  Cefoxitin: S <=8      -  Ceftriaxone: S <=1 Enterobacter, Citrobacter, and Serratia may develop resistance during prolonged therapy      -  Ciprofloxacin: S <=1      -  Ertapenem: S <=1      -  Gentamicin: S <=4      -  Imipenem: S <=1      -  Levofloxacin: S <=2      -  Meropenem: S <=1      -  Nitrofurantoin: R >64 Should not be used to treat pyelonephritis      -  Piperacillin/Tazobactam: S <=16      -  Tigecycline: S <=2      -  Tobramycin: S <=4      -  Trimethoprim/Sulfamethoxazole: S <=2/38      Method Type: TESSIE            Radiology: all available radiological tests reviewed    Advanced directives addressed: full resuscitation
Patient is a 78y old  Female who presents with a chief complaint of diarrhea, lethargy.     HPI:  77 y/o female with diastolic HF, COPD (on home O2), HTN, DM - insulin dependent, s/p PPM presents with a few days aof diarrhea and feeling lethargic.  Pt reports falling and hitting her head after feeling dizzy a couple weeks ago -- but no issues thereafter except feeling tired.  Pt's  reports her feeling more lethargic bringing her to the ER.  I called her a couple of days ago and told to come to ER but family refused.  She denies any CP or SOB now.  Recently she had bout of decompensated heart failure with 20pound wt gain.  She was taking increased doses of diuretics even before the diarrhea started.  She came to see me in the office and she stated the diarrhea resolved but not completely , was having one loose bowel movement per day at that time.    2/- pt denies any symptoms this am, sleeping but arousable.     2/3- pt seen and examined. No SOB or CP.     2/4- pt seen and exmained. she denies any symptoms and anxious to go home.     2/- -pt seen and exmained by me today. She denies any symptoms.     PAST MEDICAL & SURGICAL HISTORY:  [place] (hard of hearing): hearing aid  Anxiety  Hypothyroid  GERD (gastroesophageal reflux disease)  CKD (chronic kidney disease)  MI, old: x3  Chronic UTI  Chronic diastolic congestive heart failure: last exacerbation 2017  Hyperlipidemia, unspecified hyperlipidemia type  Essential hypertension  Neuropathy  Diabetes  Chronic obstructive pulmonary disease, unspecified COPD type  History of cataract surgery: bilateral IOL  S/P lobectomy of lung: left lung pre-cancerous  History of ear surgery  History of cholecystectomy  S/P ORIF (open reduction internal fixation) fracture: left hip 2017  History of total knee replacement, unspecified laterality: bilateral right  left   H/O hernia repair: umbilical and incisional   delivery delivered  S/P appendectomy      MEDICATIONS  (STANDING):  aspirin  chewable 81 milliGRAM(s) Oral daily  atorvastatin 20 milliGRAM(s) Oral at bedtime  cefTRIAXone Injectable.      dextrose 5%. 1000 milliLiter(s) (50 mL/Hr) IV Continuous <Continuous>  dextrose 50% Injectable 12.5 Gram(s) IV Push once  diltiazem    milliGRAM(s) Oral daily  folic acid 1 milliGRAM(s) Oral daily  heparin  Injectable 5000 Unit(s) SubCutaneous every 12 hours  influenza   Vaccine 0.5 milliLiter(s) IntraMuscular once  insulin lispro (HumaLOG) corrective regimen sliding scale   SubCutaneous three times a day before meals  levothyroxine 112 MICROGram(s) Oral daily  montelukast 10 milliGRAM(s) Oral at bedtime  pantoprazole    Tablet 40 milliGRAM(s) Oral before breakfast  sodium bicarbonate 325 milliGRAM(s) Oral two times a day  sodium chloride 0.45%. 1500 milliLiter(s) (75 mL/Hr) IV Continuous <Continuous>  tiotropium 18 MICROgram(s) Capsule 1 Capsule(s) Inhalation daily    MEDICATIONS  (PRN):  dextrose 40% Gel 15 Gram(s) Oral once PRN Blood Glucose LESS THAN 70 milliGRAM(s)/deciliter  glucagon  Injectable 1 milliGRAM(s) IntraMuscular once PRN Glucose LESS THAN 70 milligrams/deciliter  ondansetron Injectable 4 milliGRAM(s) IV Push every 6 hours PRN Nausea      FAMILY HISTORY:  Family history of CHF (congestive heart failure) (Mother)      SOCIAL HISTORY:  ex smoker    REVIEW OF SYSTEMS:  CONSTITUTIONAL:    No fatigue, malaise, lethargy.  No fever or chills.  HEENT:  Eyes:  No visual changes.     ENT:  No epistaxis.  No sinus pain.    RESPIRATORY:  No cough.  No wheeze.  No hemoptysis.  No shortness of breath.  CARDIOVASCULAR:  No chest pains.  No palpitations. No shortness of breath, No orthopnea or PND.  GASTROINTESTINAL:  c/o diarrhea  GENITOURINARY:    No hematuria.    MUSCULOSKELETAL:  No musculoskeletal pain.  No joint swelling.  No arthritis.  NEUROLOGICAL:  No tingling or numbness or weakness.  PSYCHIATRIC:  No confusion  SKIN:  No rashes.    ENDOCRINE:  No unexplained weight loss.  No polydipsia.   HEMATOLOGIC:  No anemia.  No prolonged or excessive bleeding.   ALLERGIC AND IMMUNOLOGIC:  No pruritus.          Vital Signs Last 24 Hrs  T(C): 36.7 (2019 05:12), Max: 36.7 (2019 05:12)  T(F): 98 (2019 05:12), Max: 98 (2019 05:12)  HR: 71 (2019 05:21) (71 - 88)  BP: 131/41 (2019 05:21) (115/38 - 155/40)  BP(mean): 63 (2019 00:22) (63 - 63)  RR: 18 (2019 05:12) (16 - 19)  SpO2: 100% (2019 05:12) (97% - 100%)    PHYSICAL EXAM-    Constitutional: no acute distress     Head: Head is normocephalic and atraumatic.      Neck:  There are strong carotid pulses bilaterally. No JVD.     Cardiovascular: Regular rate and rhythm without S3, S4. No murmurs or rubs are appreciated.      Respiratory: Breathsounds are normal. No rales. No wheezing.    Abdomen: Soft, nontender, nondistended with positive bowel sounds.      Extremity: No tenderness. No  pitting edema     Neurologic: The patient is alert and oriented.  Back to her baseline mental status    Skin: No rash, no obvious lesions noted.      Psychiatric: The patient appears to be emotionally stable.      INTERPRETATION OF TELEMETRY: not on     ECG:    I&O's Detail      LABS:                        9.8    8.76  )-----------( 215      ( 2019 07:06 )             31.8         146<H>  |  116<H>  |  133<H>  ----------------------------<  172<H>  5.0   |  19<L>  |  4.85<H>    Ca    5.1<LL>      2019 07:06  Mg     1.6         TPro  7.5  /  Alb  3.1<L>  /  TBili  0.4  /  DBili  x   /  AST  17  /  ALT  15  /  AlkPhos  109          PT/INR - ( 2019 18:42 )   PT: 13.7 sec;   INR: 1.23 ratio         PTT - ( 2019 18:42 )  PTT:32.2 sec  Urinalysis Basic - ( 2019 22:04 )    Color: Yellow / Appearance: Slightly Turbid / S.015 / pH: x  Gluc: x / Ketone: Negative  / Bili: Negative / Urobili: Negative mg/dL   Blood: x / Protein: 100 mg/dL / Nitrite: Negative   Leuk Esterase: Moderate / RBC: 11-25 /HPF / WBC >50   Sq Epi: x / Non Sq Epi: Moderate / Bacteria: Many      I&O's Summary    BNP  RADIOLOGY & ADDITIONAL STUDIES:
Patient is a 78y old  Female who presents with a chief complaint of diarrhea, lethargy.     HPI:  79 y/o female with diastolic HF, COPD (on home O2), HTN, DM - insulin dependent, s/p PPM presents with a few days aof diarrhea and feeling lethargic.  Pt reports falling and hitting her head after feeling dizzy a couple weeks ago -- but no issues thereafter except feeling tired.  Pt's  reports her feeling more lethargic bringing her to the ER.  I called her a couple of days ago and told to come to ER but family refused.  She denies any CP or SOB now.  Recently she had bout of decompensated heart failure with 20pound wt gain.  She was taking increased doses of diuretics even before the diarrhea started.  She came to see me in the office and she stated the diarrhea resolved but not completely , was having one loose bowel movement per day at that time.    - pt denies any symptoms this am, sleeping but arousable.     PAST MEDICAL & SURGICAL HISTORY:  [place] (hard of hearing): hearing aid  Anxiety  Hypothyroid  GERD (gastroesophageal reflux disease)  CKD (chronic kidney disease)  MI, old: x3  Chronic UTI  Chronic diastolic congestive heart failure: last exacerbation 2017  Hyperlipidemia, unspecified hyperlipidemia type  Essential hypertension  Neuropathy  Diabetes  Chronic obstructive pulmonary disease, unspecified COPD type  History of cataract surgery: bilateral IOL  S/P lobectomy of lung: left lung pre-cancerous  History of ear surgery  History of cholecystectomy  S/P ORIF (open reduction internal fixation) fracture: left hip 2017  History of total knee replacement, unspecified laterality: bilateral right  left   H/O hernia repair: umbilical and incisional   delivery delivered  S/P appendectomy      MEDICATIONS  (STANDING):  aspirin  chewable 81 milliGRAM(s) Oral daily  atorvastatin 20 milliGRAM(s) Oral at bedtime  cefTRIAXone Injectable.      dextrose 5%. 1000 milliLiter(s) (50 mL/Hr) IV Continuous <Continuous>  dextrose 50% Injectable 12.5 Gram(s) IV Push once  diltiazem    milliGRAM(s) Oral daily  folic acid 1 milliGRAM(s) Oral daily  heparin  Injectable 5000 Unit(s) SubCutaneous every 12 hours  influenza   Vaccine 0.5 milliLiter(s) IntraMuscular once  insulin lispro (HumaLOG) corrective regimen sliding scale   SubCutaneous three times a day before meals  levothyroxine 112 MICROGram(s) Oral daily  montelukast 10 milliGRAM(s) Oral at bedtime  pantoprazole    Tablet 40 milliGRAM(s) Oral before breakfast  sodium bicarbonate 325 milliGRAM(s) Oral two times a day  sodium chloride 0.45%. 1500 milliLiter(s) (75 mL/Hr) IV Continuous <Continuous>  tiotropium 18 MICROgram(s) Capsule 1 Capsule(s) Inhalation daily    MEDICATIONS  (PRN):  dextrose 40% Gel 15 Gram(s) Oral once PRN Blood Glucose LESS THAN 70 milliGRAM(s)/deciliter  glucagon  Injectable 1 milliGRAM(s) IntraMuscular once PRN Glucose LESS THAN 70 milligrams/deciliter  ondansetron Injectable 4 milliGRAM(s) IV Push every 6 hours PRN Nausea      FAMILY HISTORY:  Family history of CHF (congestive heart failure) (Mother)      SOCIAL HISTORY:  ex smoker    REVIEW OF SYSTEMS:  CONSTITUTIONAL:    No fatigue, malaise, lethargy.  No fever or chills.  HEENT:  Eyes:  No visual changes.     ENT:  No epistaxis.  No sinus pain.    RESPIRATORY:  No cough.  No wheeze.  No hemoptysis.  No shortness of breath.  CARDIOVASCULAR:  No chest pains.  No palpitations. No shortness of breath, No orthopnea or PND.  GASTROINTESTINAL:  c/o diarrhea  GENITOURINARY:    No hematuria.    MUSCULOSKELETAL:  No musculoskeletal pain.  No joint swelling.  No arthritis.  NEUROLOGICAL:  No tingling or numbness or weakness.  PSYCHIATRIC:  No confusion  SKIN:  No rashes.    ENDOCRINE:  No unexplained weight loss.  No polydipsia.   HEMATOLOGIC:  No anemia.  No prolonged or excessive bleeding.   ALLERGIC AND IMMUNOLOGIC:  No pruritus.          Vital Signs Last 24 Hrs  T(C): 36.7 (2019 05:12), Max: 36.7 (2019 05:12)  T(F): 98 (2019 05:12), Max: 98 (2019 05:12)  HR: 71 (2019 05:21) (71 - 88)  BP: 131/41 (2019 05:21) (115/38 - 155/40)  BP(mean): 63 (2019 00:22) (63 - 63)  RR: 18 (2019 05:12) (16 - 19)  SpO2: 100% (2019 05:12) (97% - 100%)    PHYSICAL EXAM-    Constitutional: no acute distress     Head: Head is normocephalic and atraumatic.      Neck:  There are strong carotid pulses bilaterally. No JVD.     Cardiovascular: Regular rate and rhythm without S3, S4. No murmurs or rubs are appreciated.      Respiratory: Breathsounds are normal. No rales. No wheezing.    Abdomen: Soft, nontender, nondistended with positive bowel sounds.      Extremity: No tenderness. No  pitting edema     Neurologic: The patient is alert and oriented.  Back to her baseline mental status    Skin: No rash, no obvious lesions noted.      Psychiatric: The patient appears to be emotionally stable.      INTERPRETATION OF TELEMETRY: not on     ECG:    I&O's Detail      LABS:                        9.8    8.76  )-----------( 215      ( 2019 07:06 )             31.8         146<H>  |  116<H>  |  133<H>  ----------------------------<  172<H>  5.0   |  19<L>  |  4.85<H>    Ca    5.1<LL>      2019 07:06  Mg     1.6         TPro  7.5  /  Alb  3.1<L>  /  TBili  0.4  /  DBili  x   /  AST  17  /  ALT  15  /  AlkPhos  109          PT/INR - ( 2019 18:42 )   PT: 13.7 sec;   INR: 1.23 ratio         PTT - ( 2019 18:42 )  PTT:32.2 sec  Urinalysis Basic - ( 2019 22:04 )    Color: Yellow / Appearance: Slightly Turbid / S.015 / pH: x  Gluc: x / Ketone: Negative  / Bili: Negative / Urobili: Negative mg/dL   Blood: x / Protein: 100 mg/dL / Nitrite: Negative   Leuk Esterase: Moderate / RBC: 11-25 /HPF / WBC >50   Sq Epi: x / Non Sq Epi: Moderate / Bacteria: Many      I&O's Summary    BNP  RADIOLOGY & ADDITIONAL STUDIES:
Patient is a 78y old  Female who presents with a chief complaint of diarrhea, lethargy.     HPI:  79 y/o female with diastolic HF, COPD (on home O2), HTN, DM - insulin dependent, s/p PPM presents with a few days aof diarrhea and feeling lethargic.  Pt reports falling and hitting her head after feeling dizzy a couple weeks ago -- but no issues thereafter except feeling tired.  Pt's  reports her feeling more lethargic bringing her to the ER.  I called her a couple of days ago and told to come to ER but family refused.  She denies any CP or SOB now.  Recently she had bout of decompensated heart failure with 20pound wt gain.  She was taking increased doses of diuretics even before the diarrhea started.  She came to see me in the office and she stated the diarrhea resolved but not completely , was having one loose bowel movement per day at that time.    2/- pt denies any symptoms this am, sleeping but arousable.     2/3- pt seen and examined. No SOB or CP.     2/4- pt seen and exmained. she denies any symptoms and anxious to go home.     PAST MEDICAL & SURGICAL HISTORY:  [place] (hard of hearing): hearing aid  Anxiety  Hypothyroid  GERD (gastroesophageal reflux disease)  CKD (chronic kidney disease)  MI, old: x3  Chronic UTI  Chronic diastolic congestive heart failure: last exacerbation 2017  Hyperlipidemia, unspecified hyperlipidemia type  Essential hypertension  Neuropathy  Diabetes  Chronic obstructive pulmonary disease, unspecified COPD type  History of cataract surgery: bilateral IOL  S/P lobectomy of lung: left lung pre-cancerous  History of ear surgery  History of cholecystectomy  S/P ORIF (open reduction internal fixation) fracture: left hip 2017  History of total knee replacement, unspecified laterality: bilateral right 2008 left   H/O hernia repair: umbilical and incisional   delivery delivered  S/P appendectomy      MEDICATIONS  (STANDING):  aspirin  chewable 81 milliGRAM(s) Oral daily  atorvastatin 20 milliGRAM(s) Oral at bedtime  cefTRIAXone Injectable.      dextrose 5%. 1000 milliLiter(s) (50 mL/Hr) IV Continuous <Continuous>  dextrose 50% Injectable 12.5 Gram(s) IV Push once  diltiazem    milliGRAM(s) Oral daily  folic acid 1 milliGRAM(s) Oral daily  heparin  Injectable 5000 Unit(s) SubCutaneous every 12 hours  influenza   Vaccine 0.5 milliLiter(s) IntraMuscular once  insulin lispro (HumaLOG) corrective regimen sliding scale   SubCutaneous three times a day before meals  levothyroxine 112 MICROGram(s) Oral daily  montelukast 10 milliGRAM(s) Oral at bedtime  pantoprazole    Tablet 40 milliGRAM(s) Oral before breakfast  sodium bicarbonate 325 milliGRAM(s) Oral two times a day  sodium chloride 0.45%. 1500 milliLiter(s) (75 mL/Hr) IV Continuous <Continuous>  tiotropium 18 MICROgram(s) Capsule 1 Capsule(s) Inhalation daily    MEDICATIONS  (PRN):  dextrose 40% Gel 15 Gram(s) Oral once PRN Blood Glucose LESS THAN 70 milliGRAM(s)/deciliter  glucagon  Injectable 1 milliGRAM(s) IntraMuscular once PRN Glucose LESS THAN 70 milligrams/deciliter  ondansetron Injectable 4 milliGRAM(s) IV Push every 6 hours PRN Nausea      FAMILY HISTORY:  Family history of CHF (congestive heart failure) (Mother)      SOCIAL HISTORY:  ex smoker    REVIEW OF SYSTEMS:  CONSTITUTIONAL:    No fatigue, malaise, lethargy.  No fever or chills.  HEENT:  Eyes:  No visual changes.     ENT:  No epistaxis.  No sinus pain.    RESPIRATORY:  No cough.  No wheeze.  No hemoptysis.  No shortness of breath.  CARDIOVASCULAR:  No chest pains.  No palpitations. No shortness of breath, No orthopnea or PND.  GASTROINTESTINAL:  c/o diarrhea  GENITOURINARY:    No hematuria.    MUSCULOSKELETAL:  No musculoskeletal pain.  No joint swelling.  No arthritis.  NEUROLOGICAL:  No tingling or numbness or weakness.  PSYCHIATRIC:  No confusion  SKIN:  No rashes.    ENDOCRINE:  No unexplained weight loss.  No polydipsia.   HEMATOLOGIC:  No anemia.  No prolonged or excessive bleeding.   ALLERGIC AND IMMUNOLOGIC:  No pruritus.          Vital Signs Last 24 Hrs  T(C): 36.7 (2019 05:12), Max: 36.7 (2019 05:12)  T(F): 98 (2019 05:12), Max: 98 (2019 05:12)  HR: 71 (2019 05:21) (71 - 88)  BP: 131/41 (2019 05:21) (115/38 - 155/40)  BP(mean): 63 (2019 00:22) (63 - 63)  RR: 18 (2019 05:12) (16 - 19)  SpO2: 100% (2019 05:12) (97% - 100%)    PHYSICAL EXAM-    Constitutional: no acute distress     Head: Head is normocephalic and atraumatic.      Neck:  There are strong carotid pulses bilaterally. No JVD.     Cardiovascular: Regular rate and rhythm without S3, S4. No murmurs or rubs are appreciated.      Respiratory: Breathsounds are normal. No rales. No wheezing.    Abdomen: Soft, nontender, nondistended with positive bowel sounds.      Extremity: No tenderness. No  pitting edema     Neurologic: The patient is alert and oriented.  Back to her baseline mental status    Skin: No rash, no obvious lesions noted.      Psychiatric: The patient appears to be emotionally stable.      INTERPRETATION OF TELEMETRY: not on     ECG:    I&O's Detail      LABS:                        9.8    8.76  )-----------( 215      ( 2019 07:06 )             31.8         146<H>  |  116<H>  |  133<H>  ----------------------------<  172<H>  5.0   |  19<L>  |  4.85<H>    Ca    5.1<LL>      2019 07:06  Mg     1.6         TPro  7.5  /  Alb  3.1<L>  /  TBili  0.4  /  DBili  x   /  AST  17  /  ALT  15  /  AlkPhos  109          PT/INR - ( 2019 18:42 )   PT: 13.7 sec;   INR: 1.23 ratio         PTT - ( 2019 18:42 )  PTT:32.2 sec  Urinalysis Basic - ( 2019 22:04 )    Color: Yellow / Appearance: Slightly Turbid / S.015 / pH: x  Gluc: x / Ketone: Negative  / Bili: Negative / Urobili: Negative mg/dL   Blood: x / Protein: 100 mg/dL / Nitrite: Negative   Leuk Esterase: Moderate / RBC: 11-25 /HPF / WBC >50   Sq Epi: x / Non Sq Epi: Moderate / Bacteria: Many      I&O's Summary    BNP  RADIOLOGY & ADDITIONAL STUDIES:
Patient is a 78y old  Female who presents with a chief complaint of diarrhea, lethargy.     HPI:  79 y/o female with diastolic HF, COPD (on home O2), HTN, DM - insulin dependent, s/p PPM presents with a few days aof diarrhea and feeling lethargic.  Pt reports falling and hitting her head after feeling dizzy a couple weeks ago -- but no issues thereafter except feeling tired.  Pt's  reports her feeling more lethargic bringing her to the ER.  I called her a couple of days ago and told to come to ER but family refused.  She denies any CP or SOB now.  Recently she had bout of decompensated heart failure with 20pound wt gain.  She was taking increased doses of diuretics even before the diarrhea started.  She came to see me in the office and she stated the diarrhea resolved but not completely , was having one loose bowel movement per day at that time.    2/- pt denies any symptoms this am, sleeping but arousable.     2/3- pt seen and examined. No SOB or CP.     PAST MEDICAL & SURGICAL HISTORY:  [place] (hard of hearing): hearing aid  Anxiety  Hypothyroid  GERD (gastroesophageal reflux disease)  CKD (chronic kidney disease)  MI, old: x3  Chronic UTI  Chronic diastolic congestive heart failure: last exacerbation 2017  Hyperlipidemia, unspecified hyperlipidemia type  Essential hypertension  Neuropathy  Diabetes  Chronic obstructive pulmonary disease, unspecified COPD type  History of cataract surgery: bilateral IOL  S/P lobectomy of lung: left lung pre-cancerous  History of ear surgery  History of cholecystectomy  S/P ORIF (open reduction internal fixation) fracture: left hip 2017  History of total knee replacement, unspecified laterality: bilateral right  left   H/O hernia repair: umbilical and incisional   delivery delivered  S/P appendectomy      MEDICATIONS  (STANDING):  aspirin  chewable 81 milliGRAM(s) Oral daily  atorvastatin 20 milliGRAM(s) Oral at bedtime  cefTRIAXone Injectable.      dextrose 5%. 1000 milliLiter(s) (50 mL/Hr) IV Continuous <Continuous>  dextrose 50% Injectable 12.5 Gram(s) IV Push once  diltiazem    milliGRAM(s) Oral daily  folic acid 1 milliGRAM(s) Oral daily  heparin  Injectable 5000 Unit(s) SubCutaneous every 12 hours  influenza   Vaccine 0.5 milliLiter(s) IntraMuscular once  insulin lispro (HumaLOG) corrective regimen sliding scale   SubCutaneous three times a day before meals  levothyroxine 112 MICROGram(s) Oral daily  montelukast 10 milliGRAM(s) Oral at bedtime  pantoprazole    Tablet 40 milliGRAM(s) Oral before breakfast  sodium bicarbonate 325 milliGRAM(s) Oral two times a day  sodium chloride 0.45%. 1500 milliLiter(s) (75 mL/Hr) IV Continuous <Continuous>  tiotropium 18 MICROgram(s) Capsule 1 Capsule(s) Inhalation daily    MEDICATIONS  (PRN):  dextrose 40% Gel 15 Gram(s) Oral once PRN Blood Glucose LESS THAN 70 milliGRAM(s)/deciliter  glucagon  Injectable 1 milliGRAM(s) IntraMuscular once PRN Glucose LESS THAN 70 milligrams/deciliter  ondansetron Injectable 4 milliGRAM(s) IV Push every 6 hours PRN Nausea      FAMILY HISTORY:  Family history of CHF (congestive heart failure) (Mother)      SOCIAL HISTORY:  ex smoker    REVIEW OF SYSTEMS:  CONSTITUTIONAL:    No fatigue, malaise, lethargy.  No fever or chills.  HEENT:  Eyes:  No visual changes.     ENT:  No epistaxis.  No sinus pain.    RESPIRATORY:  No cough.  No wheeze.  No hemoptysis.  No shortness of breath.  CARDIOVASCULAR:  No chest pains.  No palpitations. No shortness of breath, No orthopnea or PND.  GASTROINTESTINAL:  c/o diarrhea  GENITOURINARY:    No hematuria.    MUSCULOSKELETAL:  No musculoskeletal pain.  No joint swelling.  No arthritis.  NEUROLOGICAL:  No tingling or numbness or weakness.  PSYCHIATRIC:  No confusion  SKIN:  No rashes.    ENDOCRINE:  No unexplained weight loss.  No polydipsia.   HEMATOLOGIC:  No anemia.  No prolonged or excessive bleeding.   ALLERGIC AND IMMUNOLOGIC:  No pruritus.          Vital Signs Last 24 Hrs  T(C): 36.7 (2019 05:12), Max: 36.7 (2019 05:12)  T(F): 98 (2019 05:12), Max: 98 (2019 05:12)  HR: 71 (2019 05:21) (71 - 88)  BP: 131/41 (2019 05:21) (115/38 - 155/40)  BP(mean): 63 (2019 00:22) (63 - 63)  RR: 18 (2019 05:12) (16 - 19)  SpO2: 100% (2019 05:12) (97% - 100%)    PHYSICAL EXAM-    Constitutional: no acute distress     Head: Head is normocephalic and atraumatic.      Neck:  There are strong carotid pulses bilaterally. No JVD.     Cardiovascular: Regular rate and rhythm without S3, S4. No murmurs or rubs are appreciated.      Respiratory: Breathsounds are normal. No rales. No wheezing.    Abdomen: Soft, nontender, nondistended with positive bowel sounds.      Extremity: No tenderness. No  pitting edema     Neurologic: The patient is alert and oriented.  Back to her baseline mental status    Skin: No rash, no obvious lesions noted.      Psychiatric: The patient appears to be emotionally stable.      INTERPRETATION OF TELEMETRY: not on     ECG:    I&O's Detail      LABS:                        9.8    8.76  )-----------( 215      ( 2019 07:06 )             31.8         146<H>  |  116<H>  |  133<H>  ----------------------------<  172<H>  5.0   |  19<L>  |  4.85<H>    Ca    5.1<LL>      2019 07:06  Mg     1.6         TPro  7.5  /  Alb  3.1<L>  /  TBili  0.4  /  DBili  x   /  AST  17  /  ALT  15  /  AlkPhos  109          PT/INR - ( 2019 18:42 )   PT: 13.7 sec;   INR: 1.23 ratio         PTT - ( 2019 18:42 )  PTT:32.2 sec  Urinalysis Basic - ( 2019 22:04 )    Color: Yellow / Appearance: Slightly Turbid / S.015 / pH: x  Gluc: x / Ketone: Negative  / Bili: Negative / Urobili: Negative mg/dL   Blood: x / Protein: 100 mg/dL / Nitrite: Negative   Leuk Esterase: Moderate / RBC: 11-25 /HPF / WBC >50   Sq Epi: x / Non Sq Epi: Moderate / Bacteria: Many      I&O's Summary    BNP  RADIOLOGY & ADDITIONAL STUDIES:
NEPHROLOGY INTERVAL HPI/OVERNIGHT EVENTS:        2/5  feels well  ready for dc home  diarrhea improving  labs noted, abnormal but improving   creat better today      Date of Service: 02-04-19 @ 14:07  2/4 SY  No acute events.  Diarrhea resolved.  Reports good appetite.    HPI: 1/31  79 y/o female with diastolic HF, COPD (on home O2), HTN, DM - insulin dependent, s/p PPM presents with a few days aof diarrhea and feeling lethragic.  pt reports falling and hittng her head after feeling dizzy a couple weeks ago -- but no issues thereafter except feeling tired. Pt's  reports her feeling more lethargic bringing her to the ER.  No cp, sob, n/v/f/c  Above obtained from chart   renal eval called for hypocalcemia while on  bumex and metolazone    MEDICATIONS  (STANDING):  aspirin  chewable 81 milliGRAM(s) Oral daily  atorvastatin 20 milliGRAM(s) Oral at bedtime  calcitriol   Capsule 0.5 MICROGram(s) Oral daily  dextrose 5%. 1000 milliLiter(s) (50 mL/Hr) IV Continuous <Continuous>  dextrose 50% Injectable 12.5 Gram(s) IV Push once  diltiazem    milliGRAM(s) Oral daily  folic acid 1 milliGRAM(s) Oral daily  heparin  Injectable 5000 Unit(s) SubCutaneous every 12 hours  influenza   Vaccine 0.5 milliLiter(s) IntraMuscular once  insulin lispro (HumaLOG) corrective regimen sliding scale   SubCutaneous three times a day before meals  levothyroxine 112 MICROGram(s) Oral daily  melatonin 5 milliGRAM(s) Oral at bedtime  montelukast 10 milliGRAM(s) Oral at bedtime  pantoprazole    Tablet 40 milliGRAM(s) Oral before breakfast  sodium chloride 0.45%. 1000 milliLiter(s) (50 mL/Hr) IV Continuous <Continuous>  tiotropium 18 MICROgram(s) Capsule 1 Capsule(s) Inhalation daily    MEDICATIONS  (PRN):  dextrose 40% Gel 15 Gram(s) Oral once PRN Blood Glucose LESS THAN 70 milliGRAM(s)/deciliter  diphenhydrAMINE 25 milliGRAM(s) Oral every 8 hours PRN Rash and/or Itching  glucagon  Injectable 1 milliGRAM(s) IntraMuscular once PRN Glucose LESS THAN 70 milligrams/deciliter  ondansetron Injectable 4 milliGRAM(s) IV Push every 6 hours PRN Nausea      Vital Signs Last 24 Hrs  T(C): 36.3 (05 Feb 2019 11:11), Max: 36.5 (05 Feb 2019 05:39)  T(F): 97.4 (05 Feb 2019 11:11), Max: 97.7 (05 Feb 2019 05:39)  HR: 63 (05 Feb 2019 11:11) (61 - 65)  BP: 153/47 (05 Feb 2019 11:11) (153/47 - 173/40)  BP(mean): --  RR: 17 (05 Feb 2019 11:11) (17 - 18)  SpO2: 98% (05 Feb 2019 11:11) (97% - 99%)      PHYSICAL EXAM:  Alert and appropriate  GENERAL: no distress  CHEST/LUNG: fair air entry  HEART: S1S2 RRR  ABDOMEN: soft  EXTREMITIES: no edema  SKIN:     LABS:                                   9.5    7.18  )-----------( 188      ( 04 Feb 2019 07:46 )             29.2       02-05    145  |  119<H>  |  69<H>  ----------------------------<  147<H>  5.1   |  19<L>  |  2.81<H>    Ca    6.7<L>      05 Feb 2019 06:08  Phos  3.0     02-05  Mg     2.2     02-04    TPro  x   /  Alb  2.6<L>  /  TBili  x   /  DBili  x   /  AST  x   /  ALT  x   /  AlkPhos  x   02-05

## 2019-02-05 NOTE — DISCHARGE NOTE ADULT - MEDICATION SUMMARY - MEDICATIONS TO TAKE
I will START or STAY ON the medications listed below when I get home from the hospital:    aspirin 81 mg oral tablet  -- 1 tab(s) by mouth once a day  -- Indication: For cad    losartan 50 mg oral tablet  -- 1 tab(s) by mouth once a day  -- Indication: For htn    DilTIAZem Hydrochloride  mg/24 hours oral capsule, extended release  -- 1 cap(s) by mouth once a day  -- Indication: For htn    Lyrica 50 mg oral capsule  -- 1 cap(s) by mouth once a day (at bedtime)  -- Indication: For neuropathy    Toujeo SoloStar 300 units/mL subcutaneous solution  -- 45 unit(s) subcutaneous once a day (at bedtime)  -- Indication: For Dm    HumaLOG 100 units/mL injectable solution  -- 14 unit(s) injectable 3 times a day (before meals)  -- Indication: For Dm    atorvastatin 20 mg oral tablet  -- 1 tab(s) by mouth once a day (at bedtime)  -- Indication: For hl    Spiriva 18 mcg inhalation capsule  -- 1 cap(s) inhaled once a day (in the evening)  -- Indication: For copd    Advair Diskus 250 mcg-50 mcg inhalation powder  -- 1 puff(s) inhaled 2 times a day  -- Indication: For copd    montelukast 10 mg oral tablet  -- 1 tab(s) by mouth once a day (at bedtime)  -- Indication: For Allergies    levothyroxine 112 mcg (0.112 mg) oral tablet  -- 1 tab(s) by mouth once a day  -- Indication: For hypothyroid    Multiple Vitamins oral tablet  -- 1 tab(s) by mouth once a day  -- Indication: For gen health    folic acid 1 mg oral tablet  -- 1 tab(s) by mouth once a day  -- Indication: For gen health    calcitriol 0.5 mcg oral capsule  -- 1 cap(s) by mouth once a day  -- Indication: For hypocalcemia

## 2019-02-05 NOTE — PROGRESS NOTE ADULT - PROVIDER SPECIALTY LIST ADULT
Cardiology
Infectious Disease
Internal Medicine
Nephrology

## 2019-02-05 NOTE — PROGRESS NOTE ADULT - ASSESSMENT
Diarrhea and lethargy- likely metabolic encephalopathy.  Her mental status is normal now and she can increase po intake of fluids.    Renal failure acute on CKD-   Nephrology consult appreciated.  Creatinine improving.  I think now she can be observed off the diuretics and IVF.    Hypocalcemia- Management pre primary team.     Chronic diastolic heart failure- all her edema resolved.  Hold all diuretics now.   Volume status management as stated above.   Close monitoring of volume status recommended.  Strict I/O and daily wt checks. Low sodium diet.    HTN- BP optimal  Continue vinh AC.     D/W Dr Phelps    Other medical issues- Management per primary team.   Thank you for allowing me to participate in the care of this patient. Please feel free to contact me with any questions.

## 2019-02-05 NOTE — PROGRESS NOTE ADULT - REASON FOR ADMISSION
diarrhea, lethargy

## 2019-02-05 NOTE — PROGRESS NOTE ADULT - ASSESSMENT
77 y/o female with the above med hx admitted with ANI due to volume depletion.    *ANI - ACUTE ON CHRONIC KIDNEY DISEASE, resolving, ALMOST AT HER BASELINE  BASELINE CR ABOUT 2.5 - cr at 2.8 today, off ivf  KIDNEY FUNCTION CONTINUES TO IMPROVE BUT ELECTROLYTES STILL ABNORMAL  RECENT DIARRHEA WITH INCREASE IN BUMEX LEADING TO ANI  CONT TO HOLD DIURETICS  STOP ARB UNTIL CR STABLE  RENAL FOLLOWING  *HYPOMAGNESEMIA - REPLETED  *HYPOCALCEMIA - REPLETE. MANAGEMENT AS PER RENAL; STILL LOW BUT IMPROVING BETTER TODAY, still low  *CHF - DIASTOLIC DYSFN, STABLE, OFF DIURETICS, COMPENSATED FOR NOW  WILL MONITOR  CARDIO EVAL NOTED  NO DIURETICS FOR NOW  *ANEMIA - CHRONIC ANEMIA  H/H STABLE  *COPD - NO ACUTE BRONCHOSPASM  WILL CONT TO ASSESS  CONT BRONCHODILATORS  *HYPERNATREMIA - better  * UTI - PT WITH HX OF CHRONIC UTIS AND RECURRENT KLEBSIELLA UTI, TRETED AS OUTPT MULTIPLE TIMES WITH CEPH  s/p rocephin  ID EVAL   *DIARRHEA - C DIFF NEG, STOOLS FORMED, ?VIRAL GASTRO  *DVT PROPHY - SQ HEPARIN

## 2019-02-05 NOTE — DISCHARGE NOTE ADULT - PROVIDER TOKENS
PROVIDER:[TOKEN:[[id number]]],PROVIDER:[TOKEN:[[id number]]],PROVIDER:[TOKEN:[[id number]]]

## 2019-02-05 NOTE — DISCHARGE NOTE ADULT - PLAN OF CARE
resolution of renal failure keep hydrated  hold diuretics until follow up with dr gutierrez  check labs on thursday with dr rosales

## 2019-02-08 ENCOUNTER — APPOINTMENT (OUTPATIENT)
Dept: ELECTROPHYSIOLOGY | Facility: CLINIC | Age: 79
End: 2019-02-08

## 2019-02-08 DIAGNOSIS — N39.0 URINARY TRACT INFECTION, SITE NOT SPECIFIED: ICD-10-CM

## 2019-02-08 DIAGNOSIS — N17.9 ACUTE KIDNEY FAILURE, UNSPECIFIED: ICD-10-CM

## 2019-02-08 DIAGNOSIS — F41.9 ANXIETY DISORDER, UNSPECIFIED: ICD-10-CM

## 2019-02-08 DIAGNOSIS — J44.9 CHRONIC OBSTRUCTIVE PULMONARY DISEASE, UNSPECIFIED: ICD-10-CM

## 2019-02-08 DIAGNOSIS — E03.9 HYPOTHYROIDISM, UNSPECIFIED: ICD-10-CM

## 2019-02-08 DIAGNOSIS — E11.22 TYPE 2 DIABETES MELLITUS WITH DIABETIC CHRONIC KIDNEY DISEASE: ICD-10-CM

## 2019-02-08 DIAGNOSIS — G93.41 METABOLIC ENCEPHALOPATHY: ICD-10-CM

## 2019-02-08 DIAGNOSIS — I13.0 HYPERTENSIVE HEART AND CHRONIC KIDNEY DISEASE WITH HEART FAILURE AND STAGE 1 THROUGH STAGE 4 CHRONIC KIDNEY DISEASE, OR UNSPECIFIED CHRONIC KIDNEY DISEASE: ICD-10-CM

## 2019-02-08 DIAGNOSIS — E87.0 HYPEROSMOLALITY AND HYPERNATREMIA: ICD-10-CM

## 2019-02-08 DIAGNOSIS — E86.0 DEHYDRATION: ICD-10-CM

## 2019-02-08 DIAGNOSIS — I50.32 CHRONIC DIASTOLIC (CONGESTIVE) HEART FAILURE: ICD-10-CM

## 2019-02-08 DIAGNOSIS — E83.51 HYPOCALCEMIA: ICD-10-CM

## 2019-02-08 DIAGNOSIS — E83.42 HYPOMAGNESEMIA: ICD-10-CM

## 2019-02-08 DIAGNOSIS — N18.9 CHRONIC KIDNEY DISEASE, UNSPECIFIED: ICD-10-CM

## 2019-02-08 DIAGNOSIS — I25.2 OLD MYOCARDIAL INFARCTION: ICD-10-CM

## 2019-02-08 DIAGNOSIS — Z88.0 ALLERGY STATUS TO PENICILLIN: ICD-10-CM

## 2019-02-08 DIAGNOSIS — K21.9 GASTRO-ESOPHAGEAL REFLUX DISEASE WITHOUT ESOPHAGITIS: ICD-10-CM

## 2019-07-17 ENCOUNTER — APPOINTMENT (OUTPATIENT)
Dept: UROLOGY | Facility: CLINIC | Age: 79
End: 2019-07-17
Payer: MEDICARE

## 2019-07-17 VITALS
BODY MASS INDEX: 31.56 KG/M2 | HEIGHT: 62 IN | WEIGHT: 171.5 LBS | SYSTOLIC BLOOD PRESSURE: 123 MMHG | HEART RATE: 87 BPM | DIASTOLIC BLOOD PRESSURE: 49 MMHG | TEMPERATURE: 98.1 F

## 2019-07-17 LAB
BILIRUB UR QL STRIP: NEGATIVE
CLARITY UR: CLEAR
COLLECTION METHOD: NORMAL
GLUCOSE UR-MCNC: NEGATIVE
HCG UR QL: 0.2 EU/DL
HGB UR QL STRIP.AUTO: NORMAL
KETONES UR-MCNC: NEGATIVE
LEUKOCYTE ESTERASE UR QL STRIP: NORMAL
NITRITE UR QL STRIP: NEGATIVE
PH UR STRIP: 5.5
PROT UR STRIP-MCNC: NORMAL
SP GR UR STRIP: 1.01

## 2019-07-17 PROCEDURE — 51798 US URINE CAPACITY MEASURE: CPT

## 2019-07-17 PROCEDURE — 81003 URINALYSIS AUTO W/O SCOPE: CPT | Mod: QW

## 2019-07-17 PROCEDURE — 99215 OFFICE O/P EST HI 40 MIN: CPT | Mod: 25

## 2019-07-17 NOTE — ASSESSMENT
[FreeTextEntry1] : Reviewed outside records. \par \par Recurrent UTIs:\par Atrophic vaginitis:\par Recommended: good oral hydration, timed voiding, urinate right after sex, change sanitary pads often, avoid douches, bubble baths and other feminine hygiene products. Asked to change diaper often. \par Continue OTC Cranberry tablets. \par Discussed options of either use low dose vaginal estrogen cream or low prophylactic antibiotics.\par Discussed risks and benefits.\par Discussed common side effects: headache, breast tenderness, irregular vaginal bleeding or spotting, stomach/abdominal cramps and/or bloating, nausea and vomiting, hair loss, vaginal burning, irritation and itching. \par Pt will consider her options. \par

## 2019-07-17 NOTE — HISTORY OF PRESENT ILLNESS
[FreeTextEntry1] : 79 yo female presents for Recurrent urinary tract infections. \par 3 in last 6 months. \par UTIs are characterized by cloudy urine. \par Was following with Dr Headley. Takes Cranberry tablet. \par Takes diuretic and develops frequency and diarrhea. \par Normal daytime frequency is every hours or so. Nocturia of 2 x. \par Endorses urinary incontinence, wears diapers- 2/day. \par Denies dysuria, hematuria, lower abdominal or flank pain, fever, chills or rigors.\par  \par

## 2019-07-17 NOTE — LETTER BODY
[Dear  ___] : Dear  [unfilled], [Consult Letter:] : I had the pleasure of evaluating your patient, [unfilled]. [( Thank you for referring [unfilled] for consultation for _____ )] : Thank you for referring [unfilled] for consultation for [unfilled] [Please see my note below.] : Please see my note below. [Consult Closing:] : Thank you very much for allowing me to participate in the care of this patient.  If you have any questions, please do not hesitate to contact me. [Sincerely,] : Sincerely, [FreeTextEntry3] : Jone Beavers MD\par  of Urology\par [facility]\par \par Offices:\par [facility] @\par [address], [place] [address]\par and\par [address], [place] [address], [address]\par and\par [address] [address]\par \par TEL: [phone]\par FAX: [phone]

## 2019-07-17 NOTE — HISTORY OF PRESENT ILLNESS
[FreeTextEntry1] : 77 yo female presents for Recurrent urinary tract infections. \par 3 in last 6 months. \par UTIs are characterized by cloudy urine. \par Was following with Dr Headley. Takes Cranberry tablet. \par Takes diuretic and develops frequency and diarrhea. \par Normal daytime frequency is every hours or so. Nocturia of 2 x. \par Endorses urinary incontinence, wears diapers- 2/day. \par Denies dysuria, hematuria, lower abdominal or flank pain, fever, chills or rigors.\par  \par

## 2019-07-17 NOTE — LETTER BODY
[Dear  ___] : Dear  [unfilled], [Consult Letter:] : I had the pleasure of evaluating your patient, [unfilled]. [( Thank you for referring [unfilled] for consultation for _____ )] : Thank you for referring [unfilled] for consultation for [unfilled] [Please see my note below.] : Please see my note below. [Consult Closing:] : Thank you very much for allowing me to participate in the care of this patient.  If you have any questions, please do not hesitate to contact me. [Sincerely,] : Sincerely, [FreeTextEntry3] : Jone Beavers MD\par  of Urology\par [facility]\par \par Offices:\par [facility] @\par [address], [place] [address]\par and\par [address], [place] [address], [address]\par and\par [address] [address]\par \par TEL: [phone]\par FAX: [phone]

## 2019-07-17 NOTE — PHYSICAL EXAM
[General Appearance - Well Developed] : well developed [Normal Appearance] : normal appearance [General Appearance - In No Acute Distress] : no acute distress [] : no respiratory distress [Abdomen Soft] : soft [Abdomen Tenderness] : non-tender [Abdomen Mass (___ Cm)] : no abdominal mass palpated [Urethral Meatus] : the meatus of the urethra showed no abnormalities [External Female Genitalia] : normal external genitalia [Atrophy] : atrophy [Skin Color & Pigmentation] : normal skin color and pigmentation [No Focal Deficits] : no focal deficits [Oriented To Time, Place, And Person] : oriented to person, place, and time [FreeTextEntry1] : walks with walker

## 2019-07-18 LAB
APPEARANCE: CLEAR
BACTERIA: NEGATIVE
BILIRUBIN URINE: NEGATIVE
BLOOD URINE: NEGATIVE
COLOR: NORMAL
GLUCOSE QUALITATIVE U: NORMAL
HYALINE CASTS: 2 /LPF
KETONES URINE: NEGATIVE
LEUKOCYTE ESTERASE URINE: ABNORMAL
MICROSCOPIC-UA: NORMAL
NITRITE URINE: NEGATIVE
PH URINE: 6.5
PROTEIN URINE: NORMAL
RED BLOOD CELLS URINE: 1 /HPF
SPECIFIC GRAVITY URINE: 1.01
SQUAMOUS EPITHELIAL CELLS: 4 /HPF
UROBILINOGEN URINE: NORMAL
WHITE BLOOD CELLS URINE: 2 /HPF

## 2019-07-19 LAB — BACTERIA UR CULT: NORMAL

## 2019-08-09 ENCOUNTER — APPOINTMENT (OUTPATIENT)
Dept: UROLOGY | Facility: CLINIC | Age: 79
End: 2019-08-09
Payer: MEDICARE

## 2019-08-09 PROCEDURE — 99214 OFFICE O/P EST MOD 30 MIN: CPT | Mod: 25

## 2019-08-09 PROCEDURE — 51701 INSERT BLADDER CATHETER: CPT

## 2019-08-09 RX ORDER — NITROFURANTOIN (MONOHYDRATE/MACROCRYSTALS) 25; 75 MG/1; MG/1
100 CAPSULE ORAL TWICE DAILY
Qty: 14 | Refills: 0 | Status: ACTIVE | COMMUNITY
Start: 2019-08-09 | End: 1900-01-01

## 2019-08-09 NOTE — LETTER BODY
[Dear  ___] : Dear  [unfilled], [Sincerely,] : Sincerely, [Please see my note below.] : Please see my note below. [Courtesy Letter:] : I had the pleasure of seeing your patient, [unfilled], in my office today. [FreeTextEntry3] : Jone Beavers MD\par  of Urology\par [facility]\par \par Offices:\par [facility] @\par [address], [place] [address]\par and\par [address], [place] [address], [address]\par and\par [address] [address]\par \par TEL: [phone]\par FAX: [phone]

## 2019-08-09 NOTE — PHYSICAL EXAM
[Abdomen Soft] : soft [General Appearance - In No Acute Distress] : no acute distress [Abdomen Tenderness] : non-tender [] : no respiratory distress [Oriented To Time, Place, And Person] : oriented to person, place, and time

## 2019-08-09 NOTE — LETTER BODY
[Dear  ___] : Dear  [unfilled], [Courtesy Letter:] : I had the pleasure of seeing your patient, [unfilled], in my office today. [Please see my note below.] : Please see my note below. [Sincerely,] : Sincerely, [FreeTextEntry3] : Jone Beavers MD\par  of Urology\par [facility]\par \par Offices:\par [facility] @\par [address], [place] [address]\par and\par [address], [place] [address], [address]\par and\par [address] [address]\par \par TEL: [phone]\par FAX: [phone]

## 2019-08-09 NOTE — HISTORY OF PRESENT ILLNESS
[FreeTextEntry1] : 77 yo female presents for follow up. \par Recently was seen. Urine culture from last visit- contamination. \par Complaining of cloudy urine and foul odor. \par Normal daytime frequency is every hours or so. Nocturia of 2 x. \par Endorses urinary incontinence, wears diapers- 2/day. \par Denies dysuria, hematuria, lower abdominal or flank pain, fever, chills or rigors.\par \par Recurrent urinary tract infections. \par 3 in last 6 months. \par UTIs are characterized by cloudy urine. \par Was following with Dr Headley. Takes Cranberry tablet. \par Takes diuretic and develops frequency and diarrhea. \par Normal daytime frequency is every hours or so. Nocturia of 2 x. \par Endorses urinary incontinence, wears diapers- 2/day. \par  \par

## 2019-08-09 NOTE — ASSESSMENT
[FreeTextEntry1] : Urinary tract infection:\par Obtained catheterized specimen. Will get Urinalysis and Urine culture. Will inform results.\par Start Nitrofurantoin. \par \par Recurrent urinary tract infections:\par Pt had MI 20 years ago. Asked to check with Cardiology regarding Estrogen cream. \par Discussed common side effects: headache, breast tenderness, irregular vaginal bleeding or spotting, stomach/abdominal cramps and/or bloating, nausea and vomiting, hair loss, vaginal burning, irritation and itching.\par Will let us know. \par \par Urinary incontinence:\par Asked Pt to do timed voiding every 3 hours. \par \par

## 2019-08-12 LAB
APPEARANCE: CLEAR
BACTERIA UR CULT: ABNORMAL
BACTERIA: ABNORMAL
BILIRUBIN URINE: NEGATIVE
BLOOD URINE: NEGATIVE
COLOR: NORMAL
GLUCOSE QUALITATIVE U: NEGATIVE
HYALINE CASTS: 2 /LPF
KETONES URINE: NEGATIVE
LEUKOCYTE ESTERASE URINE: ABNORMAL
MICROSCOPIC-UA: NORMAL
NITRITE URINE: NEGATIVE
PH URINE: 6
PROTEIN URINE: ABNORMAL
RED BLOOD CELLS URINE: 1 /HPF
SPECIFIC GRAVITY URINE: 1.01
SQUAMOUS EPITHELIAL CELLS: 1 /HPF
UROBILINOGEN URINE: NORMAL
WHITE BLOOD CELLS URINE: 24 /HPF

## 2019-09-05 LAB
APPEARANCE: CLEAR
BILIRUBIN URINE: NEGATIVE
BLOOD URINE: NEGATIVE
COLOR: NORMAL
GLUCOSE QUALITATIVE U: NORMAL
KETONES URINE: NEGATIVE
LEUKOCYTE ESTERASE URINE: NEGATIVE
NITRITE URINE: NEGATIVE
PH URINE: 6
PROTEIN URINE: NORMAL
SPECIFIC GRAVITY URINE: 1.01
UROBILINOGEN URINE: NORMAL

## 2019-09-11 ENCOUNTER — APPOINTMENT (OUTPATIENT)
Dept: UROLOGY | Facility: CLINIC | Age: 79
End: 2019-09-11
Payer: MEDICARE

## 2019-09-11 PROCEDURE — 99214 OFFICE O/P EST MOD 30 MIN: CPT

## 2019-09-11 RX ORDER — ESTRADIOL 0.1 MG/G
0.1 CREAM VAGINAL
Qty: 1 | Refills: 3 | Status: ACTIVE | COMMUNITY
Start: 2019-09-11 | End: 1900-01-01

## 2019-09-15 NOTE — LETTER BODY
[Dear  ___] : Dear  [unfilled], [Courtesy Letter:] : I had the pleasure of seeing your patient, [unfilled], in my office today. [Please see my note below.] : Please see my note below. [Sincerely,] : Sincerely, [FreeTextEntry3] : Jone Beavers MD\par  of Urology\par [facility]\par \par Offices:\par [facility] @\par [address], [place] [address]\par and\par [address], [place] [address], [address]\par and\par [address] [address]\par \par TEL: [phone]\par FAX: [phone]

## 2019-09-15 NOTE — HISTORY OF PRESENT ILLNESS
[FreeTextEntry1] : 77 yo female presents for follow up. \par Urine clear now after Antibiotics course. \par Cardiology cleared for estrogen use.\par Denies dysuria, hematuria, lower abdominal or flank pain, fever, chills or rigors.\par \par Initially seen for Recurrent urinary tract infections. \par 3 in last 6 months. \par UTIs characterized by cloudy urine. \par Was following with Dr Headley. Takes Cranberry tablet. \par Takes diuretic and develops frequency and diarrhea. \par Normal daytime frequency is every hours or so. Nocturia of 2 x. \par Endorses urinary incontinence, wears diapers- 2/day. \par  \par

## 2019-09-15 NOTE — LETTER BODY
[Dear  ___] : Dear  [unfilled], [Courtesy Letter:] : I had the pleasure of seeing your patient, [unfilled], in my office today. [Please see my note below.] : Please see my note below. [Sincerely,] : Sincerely, [FreeTextEntry3] : Jone Beavers MD\par  of Urology\par [facility]\par \par Offices:\par [facility] @\par [address], [place] [address]\par and\par [address], [place] [address], [address]\par and\par [address] [address]\par \par TEL: [phone]\par FAX: [phone]

## 2019-09-15 NOTE — HISTORY OF PRESENT ILLNESS
[FreeTextEntry1] : 79 yo female presents for follow up. \par Urine clear now after Antibiotics course. \par Cardiology cleared for estrogen use.\par Denies dysuria, hematuria, lower abdominal or flank pain, fever, chills or rigors.\par \par Initially seen for Recurrent urinary tract infections. \par 3 in last 6 months. \par UTIs characterized by cloudy urine. \par Was following with Dr Headley. Takes Cranberry tablet. \par Takes diuretic and develops frequency and diarrhea. \par Normal daytime frequency is every hours or so. Nocturia of 2 x. \par Endorses urinary incontinence, wears diapers- 2/day. \par  \par

## 2019-09-15 NOTE — ASSESSMENT
[FreeTextEntry1] : Recurrent urinary tract infections:\par Atrophic vaginitis:\par Discussed options of low dose vaginal estrogen cream, discussed risks and benefits. Pt will like to start Estrogen. \par Estrace prescribed. \par \par Return to office in 3 months or sooner if any issues. Urine test 2 weeks before.

## 2019-10-25 NOTE — PROGRESS NOTE ADULT - ASSESSMENT
Pt is a 78 y/o female with h/o chronic diastolic chf, copd with chronic hypoxic respiratory failure (on home o2), osteoarthritis, anxiety, HTN, type 2 diabetes s/p fall and IM nailing of left hip months prior now s/p left THR who was admitted for profound anemia, then with fever    * Fever - Code sepsis called overnight but BP stable, HR stable. Check cbc this am. Started on vanc and meropenem but unclear source of infection; await cxr results, recheck UA -- just treated UTi with Rocephin for 5 days (?resistent bacteria)  * Profound Anemia-no obvious source of bleeding, ? chronic GI loss, h/h improved after blood transfusion, monitor, endoscopic w/up per GI. 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  * Comm- d/w pt, RN, ID, Dr Morris Normal rate, regular rhythm, normal S1, S2 heart sounds heard.

## 2019-12-10 ENCOUNTER — INPATIENT (INPATIENT)
Facility: HOSPITAL | Age: 79
LOS: 4 days | Discharge: HOME | DRG: 392 | End: 2019-12-15
Attending: INTERNAL MEDICINE | Admitting: INTERNAL MEDICINE
Payer: MEDICARE

## 2019-12-10 VITALS
HEART RATE: 90 BPM | HEIGHT: 62 IN | TEMPERATURE: 98 F | SYSTOLIC BLOOD PRESSURE: 171 MMHG | RESPIRATION RATE: 18 BRPM | WEIGHT: 169.98 LBS | OXYGEN SATURATION: 98 % | DIASTOLIC BLOOD PRESSURE: 54 MMHG

## 2019-12-10 DIAGNOSIS — Z98.49 CATARACT EXTRACTION STATUS, UNSPECIFIED EYE: Chronic | ICD-10-CM

## 2019-12-10 DIAGNOSIS — Z90.2 ACQUIRED ABSENCE OF LUNG [PART OF]: Chronic | ICD-10-CM

## 2019-12-10 DIAGNOSIS — Z98.890 OTHER SPECIFIED POSTPROCEDURAL STATES: Chronic | ICD-10-CM

## 2019-12-10 DIAGNOSIS — Z90.49 ACQUIRED ABSENCE OF OTHER SPECIFIED PARTS OF DIGESTIVE TRACT: Chronic | ICD-10-CM

## 2019-12-10 DIAGNOSIS — R50.9 FEVER, UNSPECIFIED: ICD-10-CM

## 2019-12-10 DIAGNOSIS — Z96.7 PRESENCE OF OTHER BONE AND TENDON IMPLANTS: Chronic | ICD-10-CM

## 2019-12-10 DIAGNOSIS — Z96.659 PRESENCE OF UNSPECIFIED ARTIFICIAL KNEE JOINT: Chronic | ICD-10-CM

## 2019-12-10 LAB
ALBUMIN SERPL ELPH-MCNC: 3.1 G/DL — LOW (ref 3.3–5)
ALP SERPL-CCNC: 90 U/L — SIGNIFICANT CHANGE UP (ref 40–120)
ALT FLD-CCNC: 17 U/L — SIGNIFICANT CHANGE UP (ref 12–78)
ANION GAP SERPL CALC-SCNC: 6 MMOL/L — SIGNIFICANT CHANGE UP (ref 5–17)
APPEARANCE UR: CLEAR — SIGNIFICANT CHANGE UP
APTT BLD: 30.8 SEC — SIGNIFICANT CHANGE UP (ref 27.5–36.3)
AST SERPL-CCNC: 15 U/L — SIGNIFICANT CHANGE UP (ref 15–37)
BASE EXCESS BLDV CALC-SCNC: 0.2 MMOL/L — SIGNIFICANT CHANGE UP (ref -2–2)
BASOPHILS # BLD AUTO: 0.03 K/UL — SIGNIFICANT CHANGE UP (ref 0–0.2)
BASOPHILS NFR BLD AUTO: 0.3 % — SIGNIFICANT CHANGE UP (ref 0–2)
BILIRUB SERPL-MCNC: 0.7 MG/DL — SIGNIFICANT CHANGE UP (ref 0.2–1.2)
BILIRUB UR-MCNC: NEGATIVE — SIGNIFICANT CHANGE UP
BUN SERPL-MCNC: 58 MG/DL — HIGH (ref 7–23)
CALCIUM SERPL-MCNC: 9.3 MG/DL — SIGNIFICANT CHANGE UP (ref 8.5–10.1)
CHLORIDE SERPL-SCNC: 110 MMOL/L — HIGH (ref 96–108)
CO2 SERPL-SCNC: 28 MMOL/L — SIGNIFICANT CHANGE UP (ref 22–31)
COLOR SPEC: YELLOW — SIGNIFICANT CHANGE UP
CREAT SERPL-MCNC: 3.03 MG/DL — HIGH (ref 0.5–1.3)
DIFF PNL FLD: ABNORMAL
EOSINOPHIL # BLD AUTO: 0.04 K/UL — SIGNIFICANT CHANGE UP (ref 0–0.5)
EOSINOPHIL NFR BLD AUTO: 0.4 % — SIGNIFICANT CHANGE UP (ref 0–6)
GLUCOSE SERPL-MCNC: 195 MG/DL — HIGH (ref 70–99)
GLUCOSE UR QL: NEGATIVE MG/DL — SIGNIFICANT CHANGE UP
HCO3 BLDV-SCNC: 26 MMOL/L — SIGNIFICANT CHANGE UP (ref 21–29)
HCT VFR BLD CALC: 37.3 % — SIGNIFICANT CHANGE UP (ref 34.5–45)
HGB BLD-MCNC: 11.7 G/DL — SIGNIFICANT CHANGE UP (ref 11.5–15.5)
IMM GRANULOCYTES NFR BLD AUTO: 0.3 % — SIGNIFICANT CHANGE UP (ref 0–1.5)
INR BLD: 1.22 RATIO — HIGH (ref 0.88–1.16)
KETONES UR-MCNC: NEGATIVE — SIGNIFICANT CHANGE UP
LACTATE SERPL-SCNC: 1.5 MMOL/L — SIGNIFICANT CHANGE UP (ref 0.7–2)
LEUKOCYTE ESTERASE UR-ACNC: ABNORMAL
LYMPHOCYTES # BLD AUTO: 0.42 K/UL — LOW (ref 1–3.3)
LYMPHOCYTES # BLD AUTO: 4.3 % — LOW (ref 13–44)
MAGNESIUM SERPL-MCNC: 2 MG/DL — SIGNIFICANT CHANGE UP (ref 1.6–2.6)
MCHC RBC-ENTMCNC: 28.4 PG — SIGNIFICANT CHANGE UP (ref 27–34)
MCHC RBC-ENTMCNC: 31.4 GM/DL — LOW (ref 32–36)
MCV RBC AUTO: 90.5 FL — SIGNIFICANT CHANGE UP (ref 80–100)
MONOCYTES # BLD AUTO: 0.42 K/UL — SIGNIFICANT CHANGE UP (ref 0–0.9)
MONOCYTES NFR BLD AUTO: 4.3 % — SIGNIFICANT CHANGE UP (ref 2–14)
NEUTROPHILS # BLD AUTO: 8.86 K/UL — HIGH (ref 1.8–7.4)
NEUTROPHILS NFR BLD AUTO: 90.4 % — HIGH (ref 43–77)
NITRITE UR-MCNC: NEGATIVE — SIGNIFICANT CHANGE UP
PCO2 BLDV: 51 MMHG — HIGH (ref 35–50)
PH BLDV: 7.33 — LOW (ref 7.35–7.45)
PH UR: 5 — SIGNIFICANT CHANGE UP (ref 5–8)
PLATELET # BLD AUTO: 192 K/UL — SIGNIFICANT CHANGE UP (ref 150–400)
PO2 BLDV: 193 MMHG — HIGH (ref 25–45)
POTASSIUM SERPL-MCNC: 4.4 MMOL/L — SIGNIFICANT CHANGE UP (ref 3.5–5.3)
POTASSIUM SERPL-SCNC: 4.4 MMOL/L — SIGNIFICANT CHANGE UP (ref 3.5–5.3)
PROT SERPL-MCNC: 7.1 GM/DL — SIGNIFICANT CHANGE UP (ref 6–8.3)
PROT UR-MCNC: 100 MG/DL
PROTHROM AB SERPL-ACNC: 13.6 SEC — HIGH (ref 10–12.9)
RAPID RVP RESULT: SIGNIFICANT CHANGE UP
RBC # BLD: 4.12 M/UL — SIGNIFICANT CHANGE UP (ref 3.8–5.2)
RBC # FLD: 14.8 % — HIGH (ref 10.3–14.5)
SAO2 % BLDV: 99 % — HIGH (ref 67–88)
SODIUM SERPL-SCNC: 144 MMOL/L — SIGNIFICANT CHANGE UP (ref 135–145)
SP GR SPEC: 1.02 — SIGNIFICANT CHANGE UP (ref 1.01–1.02)
TROPONIN I SERPL-MCNC: <0.015 NG/ML — SIGNIFICANT CHANGE UP (ref 0.01–0.04)
UROBILINOGEN FLD QL: NEGATIVE MG/DL — SIGNIFICANT CHANGE UP
WBC # BLD: 9.8 K/UL — SIGNIFICANT CHANGE UP (ref 3.8–10.5)
WBC # FLD AUTO: 9.8 K/UL — SIGNIFICANT CHANGE UP (ref 3.8–10.5)

## 2019-12-10 PROCEDURE — 74176 CT ABD & PELVIS W/O CONTRAST: CPT

## 2019-12-10 PROCEDURE — 83036 HEMOGLOBIN GLYCOSYLATED A1C: CPT

## 2019-12-10 PROCEDURE — 94640 AIRWAY INHALATION TREATMENT: CPT

## 2019-12-10 PROCEDURE — 85379 FIBRIN DEGRADATION QUANT: CPT

## 2019-12-10 PROCEDURE — 81001 URINALYSIS AUTO W/SCOPE: CPT

## 2019-12-10 PROCEDURE — 80048 BASIC METABOLIC PNL TOTAL CA: CPT

## 2019-12-10 PROCEDURE — 99285 EMERGENCY DEPT VISIT HI MDM: CPT | Mod: 25

## 2019-12-10 PROCEDURE — C9113: CPT

## 2019-12-10 PROCEDURE — 74176 CT ABD & PELVIS W/O CONTRAST: CPT | Mod: 26

## 2019-12-10 PROCEDURE — 70450 CT HEAD/BRAIN W/O DYE: CPT | Mod: 26

## 2019-12-10 PROCEDURE — 84484 ASSAY OF TROPONIN QUANT: CPT

## 2019-12-10 PROCEDURE — 93005 ELECTROCARDIOGRAM TRACING: CPT

## 2019-12-10 PROCEDURE — 82962 GLUCOSE BLOOD TEST: CPT

## 2019-12-10 PROCEDURE — 87086 URINE CULTURE/COLONY COUNT: CPT

## 2019-12-10 PROCEDURE — 36415 COLL VENOUS BLD VENIPUNCTURE: CPT

## 2019-12-10 PROCEDURE — 85027 COMPLETE CBC AUTOMATED: CPT

## 2019-12-10 PROCEDURE — 71045 X-RAY EXAM CHEST 1 VIEW: CPT | Mod: 26

## 2019-12-10 PROCEDURE — 87507 IADNA-DNA/RNA PROBE TQ 12-25: CPT

## 2019-12-10 PROCEDURE — 85025 COMPLETE CBC W/AUTO DIFF WBC: CPT

## 2019-12-10 PROCEDURE — 99285 EMERGENCY DEPT VISIT HI MDM: CPT

## 2019-12-10 PROCEDURE — 87493 C DIFF AMPLIFIED PROBE: CPT

## 2019-12-10 RX ORDER — LOSARTAN POTASSIUM 100 MG/1
1 TABLET, FILM COATED ORAL
Qty: 0 | Refills: 0 | DISCHARGE

## 2019-12-10 RX ORDER — ATORVASTATIN CALCIUM 80 MG/1
40 TABLET, FILM COATED ORAL DAILY
Refills: 0 | Status: DISCONTINUED | OUTPATIENT
Start: 2019-12-10 | End: 2019-12-15

## 2019-12-10 RX ORDER — TIOTROPIUM BROMIDE 18 UG/1
1 CAPSULE ORAL; RESPIRATORY (INHALATION)
Qty: 0 | Refills: 0 | DISCHARGE

## 2019-12-10 RX ORDER — SODIUM CHLORIDE 9 MG/ML
1000 INJECTION, SOLUTION INTRAVENOUS
Refills: 0 | Status: DISCONTINUED | OUTPATIENT
Start: 2019-12-10 | End: 2019-12-15

## 2019-12-10 RX ORDER — DEXTROSE 50 % IN WATER 50 %
12.5 SYRINGE (ML) INTRAVENOUS ONCE
Refills: 0 | Status: DISCONTINUED | OUTPATIENT
Start: 2019-12-10 | End: 2019-12-15

## 2019-12-10 RX ORDER — FLUTICASONE PROPIONATE AND SALMETEROL 50; 250 UG/1; UG/1
1 POWDER ORAL; RESPIRATORY (INHALATION)
Qty: 0 | Refills: 0 | DISCHARGE

## 2019-12-10 RX ORDER — INSULIN LISPRO 100/ML
VIAL (ML) SUBCUTANEOUS
Refills: 0 | Status: DISCONTINUED | OUTPATIENT
Start: 2019-12-10 | End: 2019-12-15

## 2019-12-10 RX ORDER — ONDANSETRON 8 MG/1
4 TABLET, FILM COATED ORAL ONCE
Refills: 0 | Status: COMPLETED | OUTPATIENT
Start: 2019-12-10 | End: 2019-12-10

## 2019-12-10 RX ORDER — ONDANSETRON 8 MG/1
4 TABLET, FILM COATED ORAL EVERY 6 HOURS
Refills: 0 | Status: DISCONTINUED | OUTPATIENT
Start: 2019-12-10 | End: 2019-12-15

## 2019-12-10 RX ORDER — DEXTROSE 50 % IN WATER 50 %
15 SYRINGE (ML) INTRAVENOUS ONCE
Refills: 0 | Status: DISCONTINUED | OUTPATIENT
Start: 2019-12-10 | End: 2019-12-15

## 2019-12-10 RX ORDER — VANCOMYCIN HCL 1 G
1000 VIAL (EA) INTRAVENOUS ONCE
Refills: 0 | Status: COMPLETED | OUTPATIENT
Start: 2019-12-10 | End: 2019-12-10

## 2019-12-10 RX ORDER — ACETAMINOPHEN 500 MG
650 TABLET ORAL EVERY 6 HOURS
Refills: 0 | Status: DISCONTINUED | OUTPATIENT
Start: 2019-12-10 | End: 2019-12-15

## 2019-12-10 RX ORDER — SODIUM CHLORIDE 9 MG/ML
1000 INJECTION INTRAMUSCULAR; INTRAVENOUS; SUBCUTANEOUS
Refills: 0 | Status: DISCONTINUED | OUTPATIENT
Start: 2019-12-10 | End: 2019-12-11

## 2019-12-10 RX ORDER — DILTIAZEM HCL 120 MG
180 CAPSULE, EXT RELEASE 24 HR ORAL DAILY
Refills: 0 | Status: DISCONTINUED | OUTPATIENT
Start: 2019-12-10 | End: 2019-12-15

## 2019-12-10 RX ORDER — PANTOPRAZOLE SODIUM 20 MG/1
40 TABLET, DELAYED RELEASE ORAL
Refills: 0 | Status: DISCONTINUED | OUTPATIENT
Start: 2019-12-10 | End: 2019-12-13

## 2019-12-10 RX ORDER — ACETAMINOPHEN 500 MG
1000 TABLET ORAL ONCE
Refills: 0 | Status: COMPLETED | OUTPATIENT
Start: 2019-12-10 | End: 2019-12-10

## 2019-12-10 RX ORDER — FOLIC ACID 0.8 MG
1 TABLET ORAL DAILY
Refills: 0 | Status: DISCONTINUED | OUTPATIENT
Start: 2019-12-10 | End: 2019-12-15

## 2019-12-10 RX ORDER — GLUCAGON INJECTION, SOLUTION 0.5 MG/.1ML
1 INJECTION, SOLUTION SUBCUTANEOUS ONCE
Refills: 0 | Status: DISCONTINUED | OUTPATIENT
Start: 2019-12-10 | End: 2019-12-15

## 2019-12-10 RX ORDER — CALCITRIOL 0.5 UG/1
0.25 CAPSULE ORAL DAILY
Refills: 0 | Status: DISCONTINUED | OUTPATIENT
Start: 2019-12-10 | End: 2019-12-15

## 2019-12-10 RX ORDER — AZTREONAM 2 G
1000 VIAL (EA) INJECTION EVERY 12 HOURS
Refills: 0 | Status: DISCONTINUED | OUTPATIENT
Start: 2019-12-10 | End: 2019-12-12

## 2019-12-10 RX ORDER — MIRTAZAPINE 45 MG/1
7.5 TABLET, ORALLY DISINTEGRATING ORAL AT BEDTIME
Refills: 0 | Status: DISCONTINUED | OUTPATIENT
Start: 2019-12-10 | End: 2019-12-15

## 2019-12-10 RX ORDER — MONTELUKAST 4 MG/1
10 TABLET, CHEWABLE ORAL AT BEDTIME
Refills: 0 | Status: DISCONTINUED | OUTPATIENT
Start: 2019-12-10 | End: 2019-12-15

## 2019-12-10 RX ORDER — SODIUM CHLORIDE 9 MG/ML
1000 INJECTION INTRAMUSCULAR; INTRAVENOUS; SUBCUTANEOUS ONCE
Refills: 0 | Status: COMPLETED | OUTPATIENT
Start: 2019-12-10 | End: 2019-12-10

## 2019-12-10 RX ORDER — LEVOTHYROXINE SODIUM 125 MCG
125 TABLET ORAL DAILY
Refills: 0 | Status: DISCONTINUED | OUTPATIENT
Start: 2019-12-10 | End: 2019-12-15

## 2019-12-10 RX ADMIN — Medication 1000 MILLIGRAM(S): at 17:47

## 2019-12-10 RX ADMIN — SODIUM CHLORIDE 1000 MILLILITER(S): 9 INJECTION INTRAMUSCULAR; INTRAVENOUS; SUBCUTANEOUS at 17:45

## 2019-12-10 RX ADMIN — SODIUM CHLORIDE 75 MILLILITER(S): 9 INJECTION INTRAMUSCULAR; INTRAVENOUS; SUBCUTANEOUS at 23:10

## 2019-12-10 RX ADMIN — MONTELUKAST 10 MILLIGRAM(S): 4 TABLET, CHEWABLE ORAL at 23:11

## 2019-12-10 RX ADMIN — ONDANSETRON 4 MILLIGRAM(S): 8 TABLET, FILM COATED ORAL at 17:19

## 2019-12-10 RX ADMIN — SODIUM CHLORIDE 1000 MILLILITER(S): 9 INJECTION INTRAMUSCULAR; INTRAVENOUS; SUBCUTANEOUS at 16:45

## 2019-12-10 RX ADMIN — ATORVASTATIN CALCIUM 40 MILLIGRAM(S): 80 TABLET, FILM COATED ORAL at 23:11

## 2019-12-10 RX ADMIN — Medication 50 MILLIGRAM(S): at 17:49

## 2019-12-10 RX ADMIN — Medication 75 MILLIGRAM(S): at 23:10

## 2019-12-10 RX ADMIN — Medication 1000 MILLIGRAM(S): at 18:00

## 2019-12-10 NOTE — H&P ADULT - NSHPLABSRESULTS_GEN_ALL_CORE
11.7   9.80  )-----------( 192      ( 10 Dec 2019 17:45 )             37.3     12-10    144  |  110<H>  |  58<H>  ----------------------------<  195<H>  4.4   |  28  |  3.03<H>    Ca    9.3      10 Dec 2019 17:45  Mg     2.0     12-10    TPro  7.1  /  Alb  3.1<L>  /  TBili  0.7  /  DBili  x   /  AST  15  /  ALT  17  /  AlkPhos  90  12-10    CARDIAC MARKERS ( 10 Dec 2019 17:45 )  <0.015 ng/mL / x     / x     / x     / x          LIVER FUNCTIONS - ( 10 Dec 2019 17:45 )  Alb: 3.1 g/dL / Pro: 7.1 gm/dL / ALK PHOS: 90 U/L / ALT: 17 U/L / AST: 15 U/L / GGT: x           PT/INR - ( 10 Dec 2019 17:45 )   PT: 13.6 sec;   INR: 1.22 ratio         PTT - ( 10 Dec 2019 17:45 )  PTT:30.8 sec  Urinalysis Basic - ( 10 Dec 2019 19:31 )    Color: Yellow / Appearance: Clear / S.020 / pH: x  Gluc: x / Ketone: Negative  / Bili: Negative / Urobili: Negative mg/dL   Blood: x / Protein: 100 mg/dL / Nitrite: Negative   Leuk Esterase: Small / RBC: 3-5 /HPF / WBC 3-5   Sq Epi: x / Non Sq Epi: x / Bacteria: Many        Lactate, Blood: 1.5 mmol/L (12-10 @ 17:45)    Blood, Urine: Moderate (12-10 @ 19:31)                            EKG: V PACED    RADIOLOGY STUDIES: PENDING

## 2019-12-10 NOTE — H&P ADULT - HISTORY OF PRESENT ILLNESS
78 Y/O FEMALE WITH ADVANCED COPD (ON HOME O2), HTN, DM, OA, CHF PRESENTS AFTER A FEW EPISODES OF NAUSEA AND VOMITING ASSOCIATED WITH DIARRHEA -- STATES ALL BEGAN EARLIER TODAY. DENIES ANY ABDOMINAL PAIN BUT STATES SHE THINKS SHE HAS A FEVER. NO OTHER SICK CONTACTS IN THE HOME.  DIDNT EAT ANYWHERE OUTSIDE THE NORM.  STATES SHE WAS ON BACTIM A FEW WEEKS AGO FOR AN ENT ISSUE.  PT REPORTS THE STOOLS BEING DARK IN COLOR  POS FATIGUE, NO CP, SOB

## 2019-12-10 NOTE — ED ADULT NURSE NOTE - NSIMPLEMENTINTERV_GEN_ALL_ED
Implemented All Fall Risk Interventions:  [facility] to call system. Call bell, personal items and telephone within reach. Instruct patient to call for assistance. Room bathroom lighting operational. Non-slip footwear when patient is off stretcher. Physically safe environment: no spills, clutter or unnecessary equipment. Stretcher in lowest position, wheels locked, appropriate side rails in place. Provide visual cue, wrist band, yellow gown, etc. Monitor gait and stability. Monitor for mental status changes and reorient to person, place, and time. Review medications for side effects contributing to fall risk. Reinforce activity limits and safety measures with patient and family.

## 2019-12-10 NOTE — ED PROVIDER NOTE - CARE PLAN
Principal Discharge DX:	Fever, unspecified fever cause  Secondary Diagnosis:	Altered mental status, unspecified altered mental status type

## 2019-12-10 NOTE — ED PROVIDER NOTE - CARDIAC, MLM
Normal rate, regular rhythm.  Heart sounds S1, S2.  No murmurs, rubs or gallops. No LE edema. Normal rate, regular rhythm.  Heart sounds S1, S2.  No rubs or gallops. No LE edema.

## 2019-12-10 NOTE — ED ADULT NURSE NOTE - OBJECTIVE STATEMENT
Pt is a 79y female, A & O x 3, as per  pt increasing confused, c/o abdominal pain & diarrhea for two days denies chest pain, shortness of breath. States dark stools, loose.

## 2019-12-10 NOTE — H&P ADULT - NSICDXPASTMEDICALHX_GEN_ALL_CORE_FT
PAST MEDICAL HISTORY:  Anxiety     Chronic diastolic congestive heart failure last exacerbation 7/2017    Chronic obstructive pulmonary disease, unspecified COPD type     Chronic UTI     CKD (chronic kidney disease)     Diabetes     Essential hypertension     GERD (gastroesophageal reflux disease)     [place] (hard of hearing) hearing aid    Hyperlipidemia, unspecified hyperlipidemia type     Hypothyroid     MI, old x3    Neuropathy

## 2019-12-10 NOTE — ED PROVIDER NOTE - CLINICAL SUMMARY MEDICAL DECISION MAKING FREE TEXT BOX
Plan for CT head, labs, urine, troponin. Pt likely failure to thrive, change from baseline status, unsafe to go back home. will have pt admitted for further evaluation.

## 2019-12-10 NOTE — ED ADULT NURSE REASSESSMENT NOTE - NS ED NURSE REASSESS COMMENT FT1
RN spoke with Dr. Phelps concerning discontinued  c-diff by ER doc-order to be replaced and GIPCR to be ordered and pt to be on contact for r/o cdiff-admitting made aware.

## 2019-12-10 NOTE — ED PROVIDER NOTE - OBJECTIVE STATEMENT
80 y/o female with a PMHx of CAD, MI, DM, HLD, COPD, hypothyroidism, CHF, CKD, HTN, GERD, pacemaker presents to the ED c/o diarrhea x2 days, 2-3 episodes per day. +nausea, vomiting. +subjective fever and chills this morning. +lightheadedness. +fatigue. Denies melena, hematochezia, abd pain, CP, dysuria, hematuria, SOB, HA, vaginal bleeding, vaginal discharge. Pt is on home O2 at night. Denies recent fall or trauma. Denies recent hospitalization. Pt reports she was on abx a few weeks ago but does not know why.  at bedside states that pt is confused at baseline. Cardio- Dr. Marquez. PMD- Dr. Phelps. Neph- Dr. Cutler. Allergic to Bactrim, ciprofloxacin, clindamycin, ibuprofen, penicillin, sulfa. History obtained from  at bedside. History limited due to pt is a poor historian.

## 2019-12-10 NOTE — ED ADULT NURSE NOTE - ISOLATION TYPE:
Droplet+Contact precautions/Contact precautions.../Droplet precautions... Droplet precautions... None

## 2019-12-10 NOTE — ED PROVIDER NOTE - PMH
Anxiety    CAD (coronary artery disease)    Chronic diastolic congestive heart failure  last exacerbation 7/2017  Chronic obstructive pulmonary disease, unspecified COPD type    Chronic UTI    CKD (chronic kidney disease)    Diabetes    Essential hypertension    GERD (gastroesophageal reflux disease)    [place] (hard of hearing)  hearing aid  Hyperlipidemia, unspecified hyperlipidemia type    Hypothyroid    MI, old  x3  Neuropathy    Pacemaker

## 2019-12-10 NOTE — H&P ADULT - NSHPREVIEWOFSYSTEMS_GEN_ALL_CORE
REVIEW OF SYSTEMS:    CONSTITUTIONAL: POS weakness, POS fevers NO chills  EYES/ENT: No visual changes;  No vertigo or throat pain   NECK: No pain or stiffness  RESPIRATORY: No cough, wheezing, hemoptysis; No shortness of breath  CARDIOVASCULAR: No chest pain or palpitations  GASTROINTESTINAL: No abdominal or epigastric pain. POS nausea, vomiting, NO  hematemesis; POS DARK diarrhea NO constipation. No melena or hematochezia.  GENITOURINARY: No dysuria, frequency or hematuria  NEUROLOGICAL: No numbness or weakness  SKIN: No itching, burning, rashes, or lesions   All other review of systems is negative unless indicated above.

## 2019-12-10 NOTE — ED PROVIDER NOTE - NS_ ATTENDINGSCRIBEDETAILS _ED_A_ED_FT
I Nitesh Britton MD saw and examined the patient. Scribe documented for me and under my supervision. I have modified the scribe's documentation where necessary to reflect my history, physical exam and other relevant documentations pertinent to the care of the patient.

## 2019-12-10 NOTE — H&P ADULT - NSHPPHYSICALEXAM_GEN_ALL_CORE
Vital Signs Last 24 Hrs  T(C): 37.6 (10 Dec 2019 21:26), Max: 38.4 (10 Dec 2019 17:20)  T(F): 99.7 (10 Dec 2019 21:26), Max: 101.2 (10 Dec 2019 17:20)  HR: 60 (10 Dec 2019 21:26) (60 - 90)  BP: 139/49 (10 Dec 2019 21:26) (128/48 - 171/54)  BP(mean): --  RR: 16 (10 Dec 2019 21:26) (16 - 18)  SpO2: 96% (10 Dec 2019 21:26) (96% - 99%)    GEN: A and O, NAD, [place], APPEARS FATIGUED  mood stable  HEENT:   pupils equal and reactive, EOMI, no oropharyngeal lesions, erythema    NECK:   supple    CV:  +S1, +S2, regular, no murmurs or rubs    RESP:   lungs clear to auscultation bilaterally, no wheezing, rhonchi, good air entry bilaterally DIMINSHED BS ANGELICA, MILS SCATTERED CRACKLES AT BASES    GI:  abdomen soft, non-tender, non-distended, normal BS, NO EPIG TENDERNESS no abdominal masses, no palpable masses    RECTAL:  not examined    :  not examined    MSK:   normal muscle tone, no atrophy, no rigidity, no contractions    EXT:   no clubbing, no cyanosis, no edema, no calf pain, swelling or erythema    VASCULAR:  pulses equal and symmetric in the upper and lower extremities    NEURO:  AAOX3, no focal neurological deficits, follows all commands, able to move extremities spontaneously    SKIN:  no ulcers, lesions or rashes

## 2019-12-10 NOTE — ED PROVIDER NOTE - NEUROLOGICAL, MLM
No focal deficits, no motor or sensory deficits. +tremors, worse than baseline as per family slightly worse No focal deficits, no motor or sensory deficits. +tremors, worse than baseline as per family CNs 2-12 grossly intact.

## 2019-12-10 NOTE — H&P ADULT - NSICDXPASTSURGICALHX_GEN_ALL_CORE_FT
PAST SURGICAL HISTORY:   delivery delivered     H/O hernia repair umbilical and incisional    History of cataract surgery bilateral IOL    History of cholecystectomy     History of ear surgery     History of total knee replacement, unspecified laterality bilateral right 2008 left     S/P appendectomy     S/P lobectomy of lung left lung pre-cancerous    S/P ORIF (open reduction internal fixation) fracture left hip 2017

## 2019-12-10 NOTE — ED ADULT TRIAGE NOTE - CHIEF COMPLAINT QUOTE
Patient comes to ED for diarrhea that began last night. Patient denies any recent abx use. patient denies any pain or discomfort

## 2019-12-10 NOTE — ED PROVIDER NOTE - PROGRESS NOTE DETAILS
Scribe CD for ED attending, Doctor Tobi. Rectal exam- brown stool. Guaiac trace positive. QC reactive. Lot 147. Expiration 12/31/19. Sara CD for ED attending, Doctor Tobi. Rectal exam- brown stool. Guaiac trace positive. QC reactive. Lot 147. Expiration 12/31/19. Rectal temp 101.2F, will treat pt for infection.

## 2019-12-11 ENCOUNTER — APPOINTMENT (OUTPATIENT)
Dept: UROLOGY | Facility: CLINIC | Age: 79
End: 2019-12-11

## 2019-12-11 LAB
ANION GAP SERPL CALC-SCNC: 6 MMOL/L — SIGNIFICANT CHANGE UP (ref 5–17)
BASOPHILS # BLD AUTO: 0.03 K/UL — SIGNIFICANT CHANGE UP (ref 0–0.2)
BASOPHILS NFR BLD AUTO: 0.4 % — SIGNIFICANT CHANGE UP (ref 0–2)
BUN SERPL-MCNC: 55 MG/DL — HIGH (ref 7–23)
C DIFF BY PCR RESULT: SIGNIFICANT CHANGE UP
C DIFF TOX GENS STL QL NAA+PROBE: SIGNIFICANT CHANGE UP
CALCIUM SERPL-MCNC: 8.6 MG/DL — SIGNIFICANT CHANGE UP (ref 8.5–10.1)
CHLORIDE SERPL-SCNC: 115 MMOL/L — HIGH (ref 96–108)
CO2 SERPL-SCNC: 25 MMOL/L — SIGNIFICANT CHANGE UP (ref 22–31)
CREAT SERPL-MCNC: 2.85 MG/DL — HIGH (ref 0.5–1.3)
CULTURE RESULTS: SIGNIFICANT CHANGE UP
D DIMER BLD IA.RAPID-MCNC: 412 NG/ML DDU — HIGH
EOSINOPHIL # BLD AUTO: 0.13 K/UL — SIGNIFICANT CHANGE UP (ref 0–0.5)
EOSINOPHIL NFR BLD AUTO: 1.6 % — SIGNIFICANT CHANGE UP (ref 0–6)
GLUCOSE SERPL-MCNC: 115 MG/DL — HIGH (ref 70–99)
HBA1C BLD-MCNC: 8.6 % — HIGH (ref 4–5.6)
HCT VFR BLD CALC: 36.2 % — SIGNIFICANT CHANGE UP (ref 34.5–45)
HGB BLD-MCNC: 11.2 G/DL — LOW (ref 11.5–15.5)
IMM GRANULOCYTES NFR BLD AUTO: 0.2 % — SIGNIFICANT CHANGE UP (ref 0–1.5)
LYMPHOCYTES # BLD AUTO: 0.8 K/UL — LOW (ref 1–3.3)
LYMPHOCYTES # BLD AUTO: 9.5 % — LOW (ref 13–44)
MCHC RBC-ENTMCNC: 28.9 PG — SIGNIFICANT CHANGE UP (ref 27–34)
MCHC RBC-ENTMCNC: 30.9 GM/DL — LOW (ref 32–36)
MCV RBC AUTO: 93.5 FL — SIGNIFICANT CHANGE UP (ref 80–100)
MONOCYTES # BLD AUTO: 0.57 K/UL — SIGNIFICANT CHANGE UP (ref 0–0.9)
MONOCYTES NFR BLD AUTO: 6.8 % — SIGNIFICANT CHANGE UP (ref 2–14)
NEUTROPHILS # BLD AUTO: 6.83 K/UL — SIGNIFICANT CHANGE UP (ref 1.8–7.4)
NEUTROPHILS NFR BLD AUTO: 81.5 % — HIGH (ref 43–77)
PLATELET # BLD AUTO: 162 K/UL — SIGNIFICANT CHANGE UP (ref 150–400)
POTASSIUM SERPL-MCNC: 4.1 MMOL/L — SIGNIFICANT CHANGE UP (ref 3.5–5.3)
POTASSIUM SERPL-SCNC: 4.1 MMOL/L — SIGNIFICANT CHANGE UP (ref 3.5–5.3)
RBC # BLD: 3.87 M/UL — SIGNIFICANT CHANGE UP (ref 3.8–5.2)
RBC # FLD: 15.5 % — HIGH (ref 10.3–14.5)
SODIUM SERPL-SCNC: 146 MMOL/L — HIGH (ref 135–145)
SPECIMEN SOURCE: SIGNIFICANT CHANGE UP
TROPONIN I SERPL-MCNC: <0.015 NG/ML — SIGNIFICANT CHANGE UP (ref 0.01–0.04)
WBC # BLD: 8.38 K/UL — SIGNIFICANT CHANGE UP (ref 3.8–10.5)
WBC # FLD AUTO: 8.38 K/UL — SIGNIFICANT CHANGE UP (ref 3.8–10.5)

## 2019-12-11 PROCEDURE — 93010 ELECTROCARDIOGRAM REPORT: CPT

## 2019-12-11 RX ORDER — HEPARIN SODIUM 5000 [USP'U]/ML
5000 INJECTION INTRAVENOUS; SUBCUTANEOUS EVERY 12 HOURS
Refills: 0 | Status: DISCONTINUED | OUTPATIENT
Start: 2019-12-11 | End: 2019-12-15

## 2019-12-11 RX ADMIN — ATORVASTATIN CALCIUM 40 MILLIGRAM(S): 80 TABLET, FILM COATED ORAL at 22:28

## 2019-12-11 RX ADMIN — Medication 125 MICROGRAM(S): at 06:01

## 2019-12-11 RX ADMIN — HEPARIN SODIUM 5000 UNIT(S): 5000 INJECTION INTRAVENOUS; SUBCUTANEOUS at 19:14

## 2019-12-11 RX ADMIN — MONTELUKAST 10 MILLIGRAM(S): 4 TABLET, CHEWABLE ORAL at 22:28

## 2019-12-11 RX ADMIN — Medication 180 MILLIGRAM(S): at 06:01

## 2019-12-11 RX ADMIN — Medication 50 MILLIGRAM(S): at 06:01

## 2019-12-11 RX ADMIN — Medication 650 MILLIGRAM(S): at 03:53

## 2019-12-11 RX ADMIN — Medication 50 MILLIGRAM(S): at 19:13

## 2019-12-11 RX ADMIN — Medication 650 MILLIGRAM(S): at 01:34

## 2019-12-11 RX ADMIN — SODIUM CHLORIDE 75 MILLILITER(S): 9 INJECTION INTRAMUSCULAR; INTRAVENOUS; SUBCUTANEOUS at 06:01

## 2019-12-11 RX ADMIN — Medication 1 MILLIGRAM(S): at 11:28

## 2019-12-11 RX ADMIN — PANTOPRAZOLE SODIUM 40 MILLIGRAM(S): 20 TABLET, DELAYED RELEASE ORAL at 19:14

## 2019-12-11 RX ADMIN — PANTOPRAZOLE SODIUM 40 MILLIGRAM(S): 20 TABLET, DELAYED RELEASE ORAL at 06:01

## 2019-12-11 RX ADMIN — CALCITRIOL 0.25 MICROGRAM(S): 0.5 CAPSULE ORAL at 11:28

## 2019-12-11 RX ADMIN — MIRTAZAPINE 7.5 MILLIGRAM(S): 45 TABLET, ORALLY DISINTEGRATING ORAL at 22:28

## 2019-12-11 RX ADMIN — MIRTAZAPINE 7.5 MILLIGRAM(S): 45 TABLET, ORALLY DISINTEGRATING ORAL at 01:33

## 2019-12-11 RX ADMIN — Medication 75 MILLIGRAM(S): at 22:29

## 2019-12-11 NOTE — ED ADULT NURSE REASSESSMENT NOTE - NS ED NURSE REASSESS COMMENT FT1
Pt cleaned and changed-had one more bout of diarrhea. Vitals taken and stable. Pt given morning meds with applesauce. Plan of care reviewed. Pt ready for transfer to floor when bed is available. Call bell in reach.

## 2019-12-11 NOTE — ED ADULT NURSE REASSESSMENT NOTE - NS ED NURSE REASSESS COMMENT FT1
Pt had a bout of diarrhea-stool sample collected and sent to lab. Contact precautions continued. Pt transferred to a hospital bed.

## 2019-12-11 NOTE — ED ADULT NURSE REASSESSMENT NOTE - NS ED NURSE REASSESS COMMENT FT1
pt w c/o chest tightness upon deep breaths. pt placed on bedisde cardiac monitor, showing AV pacing. BP acquired and charted.   12 lead EKG performed. Dr Phelps called. orders received for troponin x1, and DC IVF   interventions implemented.  pt with watery BM x1 and 1 wet diaper. changed, turned and repositioned. will continue close monitoring.

## 2019-12-11 NOTE — CONSULT NOTE ADULT - SUBJECTIVE AND OBJECTIVE BOX
NEPHROLOGY INTERVAL HPI/OVERNIGHT EVENTS:  OLI [id number]  HPI:    78 y/o f with hx of ckd stage 3 ( scr 1.5-1.6) presents with n/v diarrhea watery for past 3 days.  Dec po intake and continued to take her bumex at home.  + uop without change in pattern.  Admitted with GE with stool PCR reveals NOROVIRUS.    Pt with hx of bactrim use at end of nov, only one dose  no chronic nsaid use ( takes it about every 2 weeks)    Earlier today in ER had chest pain, ivf stopped and tropronin send      PAST MEDICAL & SURGICAL HISTORY:  - ckd stage 3  - diastolic chf  - htn  - dm with neuropathy  - ppm   - hypothyroid  - anxiety   - CAD s/p MI   - COPD on home o2  - ccx  - s/p orif left hip aug 2017  - TKR  and left   - hernia repair   - s/p appu  - csection     FAMILY HISTORY:  Family history of CHF (congestive heart failure)    soc hx non smoker, no etoh  lives at home with        MEDICATIONS  (STANDING):  atorvastatin Oral Tab/Cap - Peds 40 milliGRAM(s) Oral daily  aztreonam  IVPB 1000 milliGRAM(s) IV Intermittent every 12 hours  calcitriol   Capsule 0.25 MICROGram(s) Oral daily  dextrose 5%. 1000 milliLiter(s) (50 mL/Hr) IV Continuous <Continuous>  dextrose 50% Injectable 12.5 Gram(s) IV Push once  diltiazem    milliGRAM(s) Oral daily  folic acid 1 milliGRAM(s) Oral daily  insulin lispro (HumaLOG) corrective regimen sliding scale   SubCutaneous three times a day before meals  levothyroxine 125 MICROGram(s) Oral daily  mirtazapine 7.5 milliGRAM(s) Oral at bedtime  montelukast 10 milliGRAM(s) Oral at bedtime  pantoprazole  Injectable 40 milliGRAM(s) IV Push two times a day  pregabalin 75 milliGRAM(s) Oral at bedtime    MEDICATIONS  (PRN):  acetaminophen   Tablet .. 650 milliGRAM(s) Oral every 6 hours PRN Temp greater or equal to 38.5C (101.3F), Moderate Pain (4 - 6)  dextrose 40% Gel 15 Gram(s) Oral once PRN Blood Glucose LESS THAN 70 milliGRAM(s)/deciliter  glucagon  Injectable 1 milliGRAM(s) IntraMuscular once PRN Glucose LESS THAN 70 milligrams/deciliter  ondansetron Injectable 4 milliGRAM(s) IV Push every 6 hours PRN Nausea      Allergies    Bactrim (Other)  ciprofloxacin (Other (Mild))  clindamycin (Unknown)  ibuprofen (Unknown)  penicillins (Other)  sulfa drugs (Unknown)    Intolerances        I&O's Summary      Home Medications:  aspirin 81 mg oral tablet: 1 tab(s) orally once a day (10 Dec 2019 18:10)  atorvastatin 40 mg oral tablet: 1 tab(s) orally once a day (10 Dec 2019 19:24)  biotin 1000 mcg oral tablet: 1 tab(s) orally once a day (10 Dec 2019 19:24)  bumetanide 2 mg oral tablet: 1 tab(s) orally once a day (10 Dec 2019 19:24)  calcitriol 0.25 mcg oral capsule: 1 cap(s) orally once a day (10 Dec 2019 19:24)  Calcium 600+D 600 mg-200 intl units oral tablet: 1 tab(s) orally once a day (10 Dec 2019 19:24)  Cranberry oral capsule: 1 cap(s) orally once a day (10 Dec 2019 19:24)  DilTIAZem Hydrochloride  mg/24 hours oral capsule, extended release: 1 cap(s) orally once a day (10 Dec 2019 19:24)  folic acid 1 mg oral tablet: 1 tab(s) orally once a day (10 Dec 2019 18:10)  HumaLOG 100 units/mL injectable solution: 14 unit(s) injectable 3 times a day (before meals)  ***Plus Sliding Scale:  150-200 = 2 units  201-250 = 4 units  251-300 = 6 units  301-350 = 8 units  351-400 = 10 units  400 + = 12 units (10 Dec 2019 18:10)  levothyroxine 125 mcg (0.125 mg) oral tablet: 1 tab(s) orally once a day (10 Dec 2019 19:24)  Lyrica 75 mg oral capsule: 1 cap(s) orally once a day (at bedtime) (10 Dec 2019 19:24)  Melatonin 10 mg oral capsule: 1 cap(s) orally once a day (at bedtime) (10 Dec 2019 19:24)  mirtazapine 7.5 mg oral tablet: 1 tab(s) orally once a day (at bedtime) (10 Dec 2019 19:24)  montelukast 10 mg oral tablet: 1 tab(s) orally once a day (at bedtime) (10 Dec 2019 18:10)  Multiple Vitamins oral tablet: 1 tab(s) orally once a day (10 Dec 2019 18:10)  Probiotic Formula oral capsule: 1 cap(s) orally once a day (10 Dec 2019 19:24)  sulfamethoxazole-trimethoprim 800 mg-160 mg oral tablet: 1 tab(s) orally every 12 hours  ****Course Stopped*** (10 Dec 2019 19:24)  Toujeo SoloStar 300 units/mL subcutaneous solution: 45 unit(s) subcutaneous once a day (at bedtime) (10 Dec 2019 18:10)      Vital Signs Last 24 Hrs  T(C): 36.7 (11 Dec 2019 10:56), Max: 38.4 (10 Dec 2019 17:20)  T(F): 98 (11 Dec 2019 10:56), Max: 101.2 (10 Dec 2019 17:20)  HR: 70 (11 Dec 2019 12:22) (60 - 90)  BP: 158/50 (11 Dec 2019 12:22) (128/48 - 171/54)  BP(mean): --  RR: 18 (11 Dec 2019 10:56) (16 - 18)  SpO2: 98% (11 Dec 2019 10:56) (96% - 99%)  Daily Height in cm: 157.48 (10 Dec 2019 16:30)    Daily   I&O's Summary      PHYSICAL EXAM:  GEN: alert awake O X 3  HEENT: MMM  NECK supple no jvd  CV: RRR s1s2  LUNGS: b/l CTA  ABD: + soft,   EXT: no edema    LABS:                        11.2   8.38  )-----------( 162      ( 11 Dec 2019 08:28 )             36.2     12-11    146<H>  |  115<H>  |  55<H>  ----------------------------<  115<H>  4.1   |  25  |  2.85<H>    Ca    8.6      11 Dec 2019 08:28  Mg     2.0     12-10    TPro  7.1  /  Alb  3.1<L>  /  TBili  0.7  /  DBili  x   /  AST  15  /  ALT  17  /  AlkPhos  90  12-10    PT/INR - ( 10 Dec 2019 17:45 )   PT: 13.6 sec;   INR: 1.22 ratio         PTT - ( 10 Dec 2019 17:45 )  PTT:30.8 sec  Urinalysis Basic - ( 10 Dec 2019 19:31 )    Color: Yellow / Appearance: Clear / S.020 / pH: x  Gluc: x / Ketone: Negative  / Bili: Negative / Urobili: Negative mg/dL   Blood: x / Protein: 100 mg/dL / Nitrite: Negative   Leuk Esterase: Small / RBC: 3-5 /HPF / WBC 3-5   Sq Epi: x / Non Sq Epi: x / Bacteria: Many      Magnesium, Serum: 2.0 mg/dL (12-10 @ 17:45)        GI PCR Panel, Stool (collected 19 @ 01:18)  Source: .Stool Feces  Final Report (19 @ 12:58):    Norovirus GI/GII    DETECTED by PCR    *******Please Note:*******    GI panel PCR evaluates for:    Campylobacter, Plesiomonas shigelloides, Salmonella,    Vibrio, Yersinia enterocolitica, Enteroaggregative    Escherichia coli (EAEC), Enteropathogenic E.coli (EPEC),    Enterotoxigenic E. coli (ETEC) lt/st, Shiga-like    toxin-producing E. coli (STEC) stx1/stx2,    Shigella/ Enteroinvasive E. coli (EIEC), Cryptosporidium,    Cyclospora cayetanensis, Entamoeba histolytica,    Giardia lamblia, Adenovirus F 40/41, Astrovirus,    Norovirus GI/GII, Rotavirus A, Sapovirus

## 2019-12-11 NOTE — PROGRESS NOTE ADULT - ASSESSMENT
80 Y/O FEMALE WITH THE ABOVE MED HX ADMITTED WITH NAUSEA, VOMITING, ANI    *N/V/D -- WITH GUAIC POSITIVE STOOLS -- PANEL POS FOR NOROVIRUS  H/H STABLE  GI EVAL  HYDRATED  CLINICALLY BETTER  CT ABD/PELVIS  NO ABD PAIN CURRENTLY  ANTIEMETICS  *ANI - SECONDARY TO PRE RENAL AZOTEMIA, VOMITING AND DIARRHEA   HYDRATED AND NOW MONITORING OFF IVF DUE TO CHF, CR IMPROVING  *DM - HOLD OFF ON BASAL INSULIN DUE TO VOMITING; HYDRATE  ISS AND WITH BGM CHECKS STABLE  NOT ON ACE INH OR ARB DUE TO HYPERKALEMIA  *HTN - CARDIZEM WITH BP PARAMETERS  *CHF - CONT TO HOLD BUMEX  *DVT PROPHY - VENODYNES, START SQ HEPARIN SINCE NO GI BLEED  H/H STABLE FOR NOW MONITOR CLOSELY

## 2019-12-11 NOTE — PROGRESS NOTE ADULT - SUBJECTIVE AND OBJECTIVE BOX
19: No sob, no further nausea or vomiting, was able to tolerate some food earlier; no abd pain, had some left sided chest pain earlier today but now resolved, trop neg  POS NOROVIRUS    ROS: as per hpi otherwise all other systems reviewed and are negative    Vital Signs Last 24 Hrs  T(C): 36.7 (11 Dec 2019 15:32), Max: 38.4 (10 Dec 2019 17:20)  T(F): 98.1 (11 Dec 2019 15:32), Max: 101.2 (10 Dec 2019 17:20)  HR: 59 (11 Dec 2019 15:32) (59 - 90)  BP: 152/46 (11 Dec 2019 15:32) (128/48 - 171/54)  BP(mean): 79 (11 Dec 2019 15:) (79 - 79)  RR: 18 (11 Dec 2019 15:32) (16 - 18)  SpO2: 100% (11 Dec 2019 15:) (96% - 100%)    GEN: A and O, NAD, mood stable  HEENT:   NC/AT, EOMI, no oropharyngeal lesions    NECK:   supple    CV:  +S1, +S2, regular, no murmurs or rubs    RESP:   lungs clear to auscultation bilaterally, no wheezing, rales, rhonchi, good air entry bilaterally DECREASED BS ANGELICA    GI:  abdomen soft, non-tender, non-distended, normal BS, no bruits, no abdominal masses, no palpable masses    RECTAL:  not examined    :  not examined    MSK:   normal muscle tone, no atrophy, no rigidity, no contractions    EXT:   no clubbing, no cyanosis, no edema, no calf pain, swelling or erythema    VASCULAR:  pulses equal and symmetric in the upper and lower extremities    NEURO:  AAOX3, no focal neurological deficits, follows all commands, able to move extremities spontaneously    SKIN:  no ulcers, lesions or rashes                              11.2   8.38  )-----------( 162      ( 11 Dec 2019 08:28 )             36.2     12-11    146<H>  |  115<H>  |  55<H>  ----------------------------<  115<H>  4.1   |  25  |  2.85<H>    Ca    8.6      11 Dec 2019 08:28  Mg     2.0     12-10    TPro  7.1  /  Alb  3.1<L>  /  TBili  0.7  /  DBili  x   /  AST  15  /  ALT  17  /  AlkPhos  90  12-10    CARDIAC MARKERS ( 11 Dec 2019 13:05 )  <0.015 ng/mL / x     / x     / x     / x      CARDIAC MARKERS ( 10 Dec 2019 17:45 )  <0.015 ng/mL / x     / x     / x     / x          LIVER FUNCTIONS - ( 10 Dec 2019 17:45 )  Alb: 3.1 g/dL / Pro: 7.1 gm/dL / ALK PHOS: 90 U/L / ALT: 17 U/L / AST: 15 U/L / GGT: x           PT/INR - ( 10 Dec 2019 17:45 )   PT: 13.6 sec;   INR: 1.22 ratio         PTT - ( 10 Dec 2019 17:45 )  PTT:30.8 sec  Urinalysis Basic - ( 10 Dec 2019 19:31 )    Color: Yellow / Appearance: Clear / S.020 / pH: x  Gluc: x / Ketone: Negative  / Bili: Negative / Urobili: Negative mg/dL   Blood: x / Protein: 100 mg/dL / Nitrite: Negative   Leuk Esterase: Small / RBC: 3-5 /HPF / WBC 3-5   Sq Epi: x / Non Sq Epi: x / Bacteria: Many        Lactate, Blood: 1.5 mmol/L (12-10 @ 17:45)    Culture Results:   Norovirus GI/GII  DETECTED by PCR  *******Please Note:*******  GI panel PCR evaluates for:  Campylobacter, Plesiomonas shigelloides, Salmonella,  Vibrio, Yersinia enterocolitica, Enteroaggregative  Escherichia coli (EAEC), Enteropathogenic E.coli (EPEC),  Enterotoxigenic E. coli (ETEC) lt/st, Shiga-like  toxin-producing E. coli (STEC) stx1/stx2,  Shigella/ Enteroinvasive E. coli (EIEC), Cryptosporidium,  Cyclospora cayetanensis, Entamoeba histolytica,  Giardia lamblia, Adenovirus F 40/41, Astrovirus,  Norovirus GI/GII, Rotavirus A, Sapovirus ( @ 01:18)  Blood, Urine: Moderate (12-10 @ 19:31)

## 2019-12-11 NOTE — CONSULT NOTE ADULT - ASSESSMENT
78 y/o f with ANI due to pre-renal azothemia in setting of volume depletion with ckd stage 3 at baseline. Secondary HPT.   + Norovirus GE    PLAN   - monitor off IVF and diuretics  - strict i/o  - encourage po intake  dw  at bedside

## 2019-12-12 LAB
ANION GAP SERPL CALC-SCNC: 3 MMOL/L — LOW (ref 5–17)
BUN SERPL-MCNC: 50 MG/DL — HIGH (ref 7–23)
CALCIUM SERPL-MCNC: 8.2 MG/DL — LOW (ref 8.5–10.1)
CHLORIDE SERPL-SCNC: 115 MMOL/L — HIGH (ref 96–108)
CO2 SERPL-SCNC: 24 MMOL/L — SIGNIFICANT CHANGE UP (ref 22–31)
CREAT SERPL-MCNC: 2.57 MG/DL — HIGH (ref 0.5–1.3)
CULTURE RESULTS: SIGNIFICANT CHANGE UP
GLUCOSE SERPL-MCNC: 118 MG/DL — HIGH (ref 70–99)
HCT VFR BLD CALC: 32.7 % — LOW (ref 34.5–45)
HGB BLD-MCNC: 10 G/DL — LOW (ref 11.5–15.5)
MCHC RBC-ENTMCNC: 28.2 PG — SIGNIFICANT CHANGE UP (ref 27–34)
MCHC RBC-ENTMCNC: 30.6 GM/DL — LOW (ref 32–36)
MCV RBC AUTO: 92.4 FL — SIGNIFICANT CHANGE UP (ref 80–100)
PLATELET # BLD AUTO: 165 K/UL — SIGNIFICANT CHANGE UP (ref 150–400)
POTASSIUM SERPL-MCNC: 3.8 MMOL/L — SIGNIFICANT CHANGE UP (ref 3.5–5.3)
POTASSIUM SERPL-SCNC: 3.8 MMOL/L — SIGNIFICANT CHANGE UP (ref 3.5–5.3)
RBC # BLD: 3.54 M/UL — LOW (ref 3.8–5.2)
RBC # FLD: 15.3 % — HIGH (ref 10.3–14.5)
SODIUM SERPL-SCNC: 142 MMOL/L — SIGNIFICANT CHANGE UP (ref 135–145)
SPECIMEN SOURCE: SIGNIFICANT CHANGE UP
WBC # BLD: 8.07 K/UL — SIGNIFICANT CHANGE UP (ref 3.8–10.5)
WBC # FLD AUTO: 8.07 K/UL — SIGNIFICANT CHANGE UP (ref 3.8–10.5)

## 2019-12-12 RX ORDER — PROCHLORPERAZINE MALEATE 5 MG
2.5 TABLET ORAL ONCE
Refills: 0 | Status: COMPLETED | OUTPATIENT
Start: 2019-12-12 | End: 2019-12-12

## 2019-12-12 RX ORDER — LANOLIN ALCOHOL/MO/W.PET/CERES
10 CREAM (GRAM) TOPICAL AT BEDTIME
Refills: 0 | Status: DISCONTINUED | OUTPATIENT
Start: 2019-12-12 | End: 2019-12-15

## 2019-12-12 RX ORDER — LANOLIN ALCOHOL/MO/W.PET/CERES
10 CREAM (GRAM) TOPICAL ONCE
Refills: 0 | Status: COMPLETED | OUTPATIENT
Start: 2019-12-12 | End: 2019-12-12

## 2019-12-12 RX ADMIN — PANTOPRAZOLE SODIUM 40 MILLIGRAM(S): 20 TABLET, DELAYED RELEASE ORAL at 18:23

## 2019-12-12 RX ADMIN — Medication 2.5 MILLIGRAM(S): at 23:01

## 2019-12-12 RX ADMIN — Medication 1 MILLIGRAM(S): at 12:30

## 2019-12-12 RX ADMIN — Medication 75 MILLIGRAM(S): at 23:01

## 2019-12-12 RX ADMIN — CALCITRIOL 0.25 MICROGRAM(S): 0.5 CAPSULE ORAL at 12:30

## 2019-12-12 RX ADMIN — HEPARIN SODIUM 5000 UNIT(S): 5000 INJECTION INTRAVENOUS; SUBCUTANEOUS at 18:23

## 2019-12-12 RX ADMIN — Medication 10 MILLIGRAM(S): at 23:01

## 2019-12-12 RX ADMIN — ONDANSETRON 4 MILLIGRAM(S): 8 TABLET, FILM COATED ORAL at 18:23

## 2019-12-12 RX ADMIN — PANTOPRAZOLE SODIUM 40 MILLIGRAM(S): 20 TABLET, DELAYED RELEASE ORAL at 06:40

## 2019-12-12 RX ADMIN — MIRTAZAPINE 7.5 MILLIGRAM(S): 45 TABLET, ORALLY DISINTEGRATING ORAL at 23:01

## 2019-12-12 RX ADMIN — MONTELUKAST 10 MILLIGRAM(S): 4 TABLET, CHEWABLE ORAL at 23:01

## 2019-12-12 RX ADMIN — ATORVASTATIN CALCIUM 40 MILLIGRAM(S): 80 TABLET, FILM COATED ORAL at 23:00

## 2019-12-12 RX ADMIN — Medication 50 MILLIGRAM(S): at 06:40

## 2019-12-12 RX ADMIN — Medication 10 MILLIGRAM(S): at 01:48

## 2019-12-12 RX ADMIN — Medication 125 MICROGRAM(S): at 06:40

## 2019-12-12 RX ADMIN — HEPARIN SODIUM 5000 UNIT(S): 5000 INJECTION INTRAVENOUS; SUBCUTANEOUS at 06:40

## 2019-12-12 RX ADMIN — Medication 180 MILLIGRAM(S): at 06:39

## 2019-12-12 NOTE — PROGRESS NOTE ADULT - SUBJECTIVE AND OBJECTIVE BOX
12/11/19: No sob, no further nausea or vomiting, was able to tolerate some food earlier; no abd pain, had some left sided chest pain earlier today but now resolved, trop neg  POS NOROVIRUS  12/12/19: No cp, sob, nausea - mendy breakfast this am; had a few more episodes of diarrhea overnight per rn    ROS: as per hpi otherwise all other systems reviewed and are negative    Vital Signs Last 24 Hrs  T(C): 36.9 (12 Dec 2019 11:35), Max: 36.9 (11 Dec 2019 21:22)  T(F): 98.4 (12 Dec 2019 11:35), Max: 98.4 (11 Dec 2019 21:22)  HR: 65 (12 Dec 2019 11:35) (65 - 73)  BP: 149/41 (12 Dec 2019 11:35) (146/42 - 155/41)  BP(mean): --  RR: 18 (12 Dec 2019 11:35) (18 - 18)  SpO2: 97% (12 Dec 2019 11:35) (95% - 97%)    GEN: A and O, NAD, mood stable  HEENT:   NC/AT, EOMI, no oropharyngeal lesions    NECK:   supple    CV:  +S1, +S2, regular, no murmurs or rubs    RESP:   lungs clear to auscultation bilaterally, no wheezing, rales, rhonchi, good air entry bilaterally DECREASED BS ANGELICA    GI:  abdomen soft, non-tender, non-distended, normal BS,  no abdominal masses, no palpable masses    RECTAL:  not examined    :  not examined    MSK:   normal muscle tone, no atrophy, no rigidity, no contractions    EXT:   no clubbing, no cyanosis, no edema, no calf pain, swelling or erythema    VASCULAR:  pulses equal and symmetric in the upper and lower extremities    NEURO:  AAOX3, no focal neurological deficits, follows all commands, able to move extremities spontaneously    SKIN:  no ulcers, lesions or rashes                                                 10.0   8.07  )-----------( 165      ( 12 Dec 2019 07:54 )             32.7     12-12    142  |  115<H>  |  50<H>  ----------------------------<  118<H>  3.8   |  24  |  2.57<H>    Ca    8.2<L>      12 Dec 2019 07:54      CARDIAC MARKERS ( 11 Dec 2019 13:05 )  <0.015 ng/mL / x     / x     / x     / x          MEDICATIONS  (STANDING):  atorvastatin Oral Tab/Cap - Peds 40 milliGRAM(s) Oral daily  calcitriol   Capsule 0.25 MICROGram(s) Oral daily  dextrose 5%. 1000 milliLiter(s) (50 mL/Hr) IV Continuous <Continuous>  dextrose 50% Injectable 12.5 Gram(s) IV Push once  diltiazem    milliGRAM(s) Oral daily  folic acid 1 milliGRAM(s) Oral daily  heparin  Injectable 5000 Unit(s) SubCutaneous every 12 hours  insulin lispro (HumaLOG) corrective regimen sliding scale   SubCutaneous three times a day before meals  levothyroxine 125 MICROGram(s) Oral daily  melatonin 10 milliGRAM(s) Oral at bedtime  mirtazapine 7.5 milliGRAM(s) Oral at bedtime  montelukast 10 milliGRAM(s) Oral at bedtime  pantoprazole  Injectable 40 milliGRAM(s) IV Push two times a day  pregabalin 75 milliGRAM(s) Oral at bedtime    MEDICATIONS  (PRN):  acetaminophen   Tablet .. 650 milliGRAM(s) Oral every 6 hours PRN Temp greater or equal to 38.5C (101.3F), Moderate Pain (4 - 6)  dextrose 40% Gel 15 Gram(s) Oral once PRN Blood Glucose LESS THAN 70 milliGRAM(s)/deciliter  glucagon  Injectable 1 milliGRAM(s) IntraMuscular once PRN Glucose LESS THAN 70 milligrams/deciliter  ondansetron Injectable 4 milliGRAM(s) IV Push every 6 hours PRN Nausea

## 2019-12-12 NOTE — PROGRESS NOTE ADULT - ASSESSMENT
80 y/o f with ANI due to pre-renal azothemia in setting of volume depletion with ckd stage 3 at baseline. Secondary HPT.   + Norovirus GE    PLAN   - monitor off IVF and diuretics  - strict i/o  - encourage po intake  dw  at bedside     12/12  feels weak, some perianal discomfort  diarrhea still cont  creat improving  on po fluids

## 2019-12-12 NOTE — CONSULT NOTE ADULT - ASSESSMENT
78 y/o female with h/o advanced COPD (on home O2), HTN, CHF, OA, DM type 2, was admitted on 12/11 for N/V and diarrhea. Her symptoms started on her day of admission; denies abdominal pain. She felt feverish and weak. In ER she received vancomycin IV and aztreonam.     1. Diarrheal syndrome. Acute enterocolitis with norovirus. COPD. Multiple abx allergies including PCN.   -hydration  -contact isolation  -no need for abx therapy at this time  -old chart reviewed to assess prior cultures  -monitor temps  -f/u CBC  -supportive care  2. Other issues:   -care per medicine 80 y/o female with h/o advanced COPD (on home O2), HTN, CHF, OA, DM type 2, was admitted on 12/11 for N/V and diarrhea. Her symptoms started on the day prior to admission; she has abdominal pain. She felt feverish and weak. She reports that other family members has a diarrheal illness at home. In ER she received vancomycin IV and aztreonam.     1. Diarrheal syndrome. Acute enterocolitis with norovirus. COPD. ARF. Multiple abx allergies including PCN.   -hydration  -contact isolation  -no need for abx therapy at this time  -old chart reviewed to assess prior cultures  -monitor temps  -f/u CBC  -supportive care  2. Other issues:   -care per medicine

## 2019-12-12 NOTE — CONSULT NOTE ADULT - SUBJECTIVE AND OBJECTIVE BOX
Patient is a 79y old  Female who presents with a chief complaint of nausea, vomiting and diarrhea    HPI:  80 y/o female with h/o advanced COPD (on home O2), HTN, CHF, OA, DM type 2, was admitted on  for N/V and diarrhea. Her symptoms started on her day of admission; denies abdominal pain. She felt feverish and weak. In ER she received vancomycin IV and aztreonam.     PMH: as above  PSH: as above  Meds: per reconciliation sheet, noted below  MEDICATIONS  (STANDING):  atorvastatin Oral Tab/Cap - Peds 40 milliGRAM(s) Oral daily  aztreonam  IVPB 1000 milliGRAM(s) IV Intermittent every 12 hours  calcitriol   Capsule 0.25 MICROGram(s) Oral daily  dextrose 5%. 1000 milliLiter(s) (50 mL/Hr) IV Continuous <Continuous>  dextrose 50% Injectable 12.5 Gram(s) IV Push once  diltiazem    milliGRAM(s) Oral daily  folic acid 1 milliGRAM(s) Oral daily  heparin  Injectable 5000 Unit(s) SubCutaneous every 12 hours  insulin lispro (HumaLOG) corrective regimen sliding scale   SubCutaneous three times a day before meals  levothyroxine 125 MICROGram(s) Oral daily  melatonin 10 milliGRAM(s) Oral at bedtime  mirtazapine 7.5 milliGRAM(s) Oral at bedtime  montelukast 10 milliGRAM(s) Oral at bedtime  pantoprazole  Injectable 40 milliGRAM(s) IV Push two times a day  pregabalin 75 milliGRAM(s) Oral at bedtime    MEDICATIONS  (PRN):  acetaminophen   Tablet .. 650 milliGRAM(s) Oral every 6 hours PRN Temp greater or equal to 38.5C (101.3F), Moderate Pain (4 - 6)  dextrose 40% Gel 15 Gram(s) Oral once PRN Blood Glucose LESS THAN 70 milliGRAM(s)/deciliter  glucagon  Injectable 1 milliGRAM(s) IntraMuscular once PRN Glucose LESS THAN 70 milligrams/deciliter  ondansetron Injectable 4 milliGRAM(s) IV Push every 6 hours PRN Nausea    Allergies    Bactrim (Other)  ciprofloxacin (Other (Mild))  clindamycin (Unknown)  ibuprofen (Unknown)  penicillins (Other)  sulfa drugs (Unknown)    Intolerances      Social: no smoking, no alcohol, no illegal drugs; no recent travel, no exposure to TB  FAMILY HISTORY:  Family history of CHF (congestive heart failure)    no history of premature cardiovascular disease in first degree relatives    ROS: the patient denies fever, no chills, no HA, no seizures, no dizziness, no sore throat, no nasal congestion, no blurry vision, no CP, no palpitations, has SOB, no cough, no abdominal pain, has diarrhea, has N/V, no dysuria, no leg pain, no claudication, no rash, no joint aches, no rectal pain or bleeding, no night sweats  All other systems reviewed and are negative    Vital Signs Last 24 Hrs  T(C): 36.9 (12 Dec 2019 11:35), Max: 36.9 (11 Dec 2019 21:22)  T(F): 98.4 (12 Dec 2019 11:35), Max: 98.4 (11 Dec 2019 21:22)  HR: 65 (12 Dec 2019 11:35) (59 - 73)  BP: 149/41 (12 Dec 2019 11:35) (146/42 - 155/41)  BP(mean): 79 (11 Dec 2019 15:32) (79 - 79)  RR: 18 (12 Dec 2019 11:35) (18 - 18)  SpO2: 97% (12 Dec 2019 11:35) (95% - 100%)  Daily     Daily Weight in k.2 (12 Dec 2019 06:27)    PE:    Constitutional: frail looking  HEENT: NC/AT, EOMI, PERRLA, conjunctivae clear; ears and nose atraumatic; pharynx benign  Neck: supple; thyroid not palpable  Back: no tenderness  Respiratory: respiratory effort normal; clear to auscultation  Cardiovascular: S1S2 regular, no murmurs  Abdomen: soft, not tender, not distended, positive BS; no liver or spleen organomegaly  Genitourinary: no suprapubic tenderness  Lymphatic: no LN palpable  Musculoskeletal: no muscle tenderness, no joint swelling or tenderness  Extremities: no pedal edema  Neurological/ Psychiatric: AxOx3, judgement and insight normal; moving all extremities  Skin: no rashes; no palpable lesions    Labs: all available labs reviewed                        10.0   8.07  )-----------( 165      ( 12 Dec 2019 07:54 )             32.7     12-12    142  |  115<H>  |  50<H>  ----------------------------<  118<H>  3.8   |  24  |  2.57<H>    Ca    8.2<L>      12 Dec 2019 07:54  Mg     2.0     12-10    TPro  7.1  /  Alb  3.1<L>  /  TBili  0.7  /  DBili  x   /  AST  15  /  ALT  17  /  AlkPhos  90  12-10     LIVER FUNCTIONS - ( 10 Dec 2019 17:45 )  Alb: 3.1 g/dL / Pro: 7.1 gm/dL / ALK PHOS: 90 U/L / ALT: 17 U/L / AST: 15 U/L / GGT: x           Urinalysis Basic - ( 10 Dec 2019 19:31 )    Color: Yellow / Appearance: Clear / S.020 / pH: x  Gluc: x / Ketone: Negative  / Bili: Negative / Urobili: Negative mg/dL   Blood: x / Protein: 100 mg/dL / Nitrite: Negative   Leuk Esterase: Small / RBC: 3-5 /HPF / WBC 3-5   Sq Epi: x / Non Sq Epi: x / Bacteria: Many    Clostridium difficile Toxin by PCR (19 @ 01:18)    Clostridium difficile Toxin by PCR: The results of this test should be interpreted with consideration of all  clinical and laboratory findings. This test determines the presence of  the C. difficile tcdB gene at a given time and is not intended to  identify antibiotic associated disease or C. difficile infection without  clinical context.  Successful treatment is based on the resolution of clinical symptoms.  This test should not be used as a "test of cure" because C. difficile DNA  will persist after successful treatment. Repeat testing will not be  permitted.    This test is performed on the BD MAX system using Real-Time PCR and  fluorogenic target-specific hybridization.    C Diff by PCR Result: DivX    GI PCR Panel, Stool (19 @ 01:18)    Specimen Source: .Stool Feces    Culture Results:   Norovirus GI/GII      Radiology: all available radiological tests reviewed    < from: CT Abdomen and Pelvis No Cont (12.10.19 @ 22:07) >  1. Again demonstrated is a segment of colon in a ventral hernia on the   right. (2:49) This was present on the prior study. No obstruction.   2. Mild opacities in the lower lobes may represent atelectasis.   3. The common duct measures 12 mm. This may be due to post   cholecystectomy state and patient age. However, if biliary obstruction is   suspected clinically, recommend further evaluation.    < end of copied text >      Advanced directives addressed: full resuscitation Patient is a 79y old  Female who presents with a chief complaint of nausea, vomiting and diarrhea    HPI:  78 y/o female with h/o advanced COPD (on home O2), HTN, CHF, OA, DM type 2, was admitted on  for N/V and diarrhea. Her symptoms started on the day prior to admission; she has abdominal pain. She felt feverish and weak. She reports that other family members has a diarrheal illness at home. In ER she received vancomycin IV and aztreonam.     PMH: as above  PSH: as above  Meds: per reconciliation sheet, noted below  MEDICATIONS  (STANDING):  atorvastatin Oral Tab/Cap - Peds 40 milliGRAM(s) Oral daily  aztreonam  IVPB 1000 milliGRAM(s) IV Intermittent every 12 hours  calcitriol   Capsule 0.25 MICROGram(s) Oral daily  dextrose 5%. 1000 milliLiter(s) (50 mL/Hr) IV Continuous <Continuous>  dextrose 50% Injectable 12.5 Gram(s) IV Push once  diltiazem    milliGRAM(s) Oral daily  folic acid 1 milliGRAM(s) Oral daily  heparin  Injectable 5000 Unit(s) SubCutaneous every 12 hours  insulin lispro (HumaLOG) corrective regimen sliding scale   SubCutaneous three times a day before meals  levothyroxine 125 MICROGram(s) Oral daily  melatonin 10 milliGRAM(s) Oral at bedtime  mirtazapine 7.5 milliGRAM(s) Oral at bedtime  montelukast 10 milliGRAM(s) Oral at bedtime  pantoprazole  Injectable 40 milliGRAM(s) IV Push two times a day  pregabalin 75 milliGRAM(s) Oral at bedtime    MEDICATIONS  (PRN):  acetaminophen   Tablet .. 650 milliGRAM(s) Oral every 6 hours PRN Temp greater or equal to 38.5C (101.3F), Moderate Pain (4 - 6)  dextrose 40% Gel 15 Gram(s) Oral once PRN Blood Glucose LESS THAN 70 milliGRAM(s)/deciliter  glucagon  Injectable 1 milliGRAM(s) IntraMuscular once PRN Glucose LESS THAN 70 milligrams/deciliter  ondansetron Injectable 4 milliGRAM(s) IV Push every 6 hours PRN Nausea    Allergies    Bactrim (Other)  ciprofloxacin (Other (Mild))  clindamycin (Unknown)  ibuprofen (Unknown)  penicillins (Other)  sulfa drugs (Unknown)    Intolerances      Social: no smoking, no alcohol, no illegal drugs; no recent travel, no exposure to TB  FAMILY HISTORY:  Family history of CHF (congestive heart failure)    no history of premature cardiovascular disease in first degree relatives    ROS: the patient denies HA, no seizures, no dizziness, no sore throat, no nasal congestion, no blurry vision, no CP, no palpitations, has SOB, no cough, has abdominal cramps, has diarrhea, has N/V, no dysuria, no leg pain, no claudication, no rash, no joint aches, no rectal pain or bleeding, no night sweats  All other systems reviewed and are negative    Vital Signs Last 24 Hrs  T(C): 36.9 (12 Dec 2019 11:35), Max: 36.9 (11 Dec 2019 21:22)  T(F): 98.4 (12 Dec 2019 11:35), Max: 98.4 (11 Dec 2019 21:22)  HR: 65 (12 Dec 2019 11:35) (59 - 73)  BP: 149/41 (12 Dec 2019 11:35) (146/42 - 155/41)  BP(mean): 79 (11 Dec 2019 15:32) (79 - 79)  RR: 18 (12 Dec 2019 11:35) (18 - 18)  SpO2: 97% (12 Dec 2019 11:35) (95% - 100%)  Daily     Daily Weight in k.2 (12 Dec 2019 06:27)    PE:    Constitutional: frail looking  HEENT: NC/AT, EOMI, PERRLA, conjunctivae clear; ears and nose atraumatic; pharynx benign  Neck: supple; thyroid not palpable  Back: no tenderness  Respiratory: respiratory effort normal; clear to auscultation  Cardiovascular: S1S2 regular, no murmurs  Abdomen: soft, mild tender abdomen, not distended, positive BS; no liver or spleen organomegaly  Genitourinary: no suprapubic tenderness  Lymphatic: no LN palpable  Musculoskeletal: no muscle tenderness, no joint swelling or tenderness  Extremities: no pedal edema  Neurological/ Psychiatric: AxOx3, judgement and insight normal; moving all extremities  Skin: no rashes; no palpable lesions    Labs: all available labs reviewed                        10.0   8.07  )-----------( 165      ( 12 Dec 2019 07:54 )             32.7     12-    142  |  115<H>  |  50<H>  ----------------------------<  118<H>  3.8   |  24  |  2.57<H>    Ca    8.2<L>      12 Dec 2019 07:54  Mg     2.0     12-10    TPro  7.1  /  Alb  3.1<L>  /  TBili  0.7  /  DBili  x   /  AST  15  /  ALT  17  /  AlkPhos  90  12-10     LIVER FUNCTIONS - ( 10 Dec 2019 17:45 )  Alb: 3.1 g/dL / Pro: 7.1 gm/dL / ALK PHOS: 90 U/L / ALT: 17 U/L / AST: 15 U/L / GGT: x           Urinalysis Basic - ( 10 Dec 2019 19:31 )    Color: Yellow / Appearance: Clear / S.020 / pH: x  Gluc: x / Ketone: Negative  / Bili: Negative / Urobili: Negative mg/dL   Blood: x / Protein: 100 mg/dL / Nitrite: Negative   Leuk Esterase: Small / RBC: 3-5 /HPF / WBC 3-5   Sq Epi: x / Non Sq Epi: x / Bacteria: Many    Clostridium difficile Toxin by PCR (19 @ 01:18)    Clostridium difficile Toxin by PCR: The results of this test should be interpreted with consideration of all  clinical and laboratory findings. This test determines the presence of  the C. difficile tcdB gene at a given time and is not intended to  identify antibiotic associated disease or C. difficile infection without  clinical context.  Successful treatment is based on the resolution of clinical symptoms.  This test should not be used as a "test of cure" because C. difficile DNA  will persist after successful treatment. Repeat testing will not be  permitted.    This test is performed on the BD MAX system using Real-Time PCR and  fluorogenic target-specific hybridization.    C Diff by PCR Result: [employer]    GI PCR Panel, Stool (19 @ 01:18)    Specimen Source: .Stool Feces    Culture Results:   Norovirus GI/GII      Radiology: all available radiological tests reviewed    < from: CT Abdomen and Pelvis No Cont (12.10.19 @ 22:07) >  1. Again demonstrated is a segment of colon in a ventral hernia on the   right. (2:49) This was present on the prior study. No obstruction.   2. Mild opacities in the lower lobes may represent atelectasis.   3. The common duct measures 12 mm. This may be due to post   cholecystectomy state and patient age. However, if biliary obstruction is   suspected clinically, recommend further evaluation.    < end of copied text >    Advanced directives addressed: full resuscitation

## 2019-12-12 NOTE — CONSULT NOTE ADULT - ASSESSMENT
Vomiting and Diarrhea-likely Viral syndrome- improving  REC  Advance Diet  Agree with Checking for CDiff infection

## 2019-12-12 NOTE — PROGRESS NOTE ADULT - SUBJECTIVE AND OBJECTIVE BOX
NEPHROLOGY INTERVAL HPI/OVERNIGHT EVENTS:  OLI [id number]  HPI:    78 y/o f with hx of ckd stage 3 ( scr 1.5-1.6) presents with n/v diarrhea watery for past 3 days.  Dec po intake and continued to take her bumex at home.  + uop without change in pattern.  Admitted with GE with stool PCR reveals NOROVIRUS.    Pt with hx of bactrim use at end of nov, only one dose  no chronic nsaid use ( takes it about every 2 weeks)    Earlier today in ER had chest pain, ivf stopped and troponin send    12/12  feels weak  perirectal area discomfort from diarrhea  IV dc d pt on po fluids  no CP  no sob  Watching TV      PAST MEDICAL & SURGICAL HISTORY:  - ckd stage 3  - diastolic chf  - htn  - dm with neuropathy  - ppm   - hypothyroid  - anxiety   - CAD s/p MI   - COPD on home o2  - ccx  - s/p orif left hip aug 2017  - TKR 2008 and left 2015  - hernia repair   - s/p appu  - csection     FAMILY HISTORY:  Family history of CHF (congestive heart failure)    soc hx non smoker, no etoh  lives at home with        MEDICATIONS  (STANDING):  atorvastatin Oral Tab/Cap - Peds 40 milliGRAM(s) Oral daily  calcitriol   Capsule 0.25 MICROGram(s) Oral daily  dextrose 5%. 1000 milliLiter(s) (50 mL/Hr) IV Continuous <Continuous>  dextrose 50% Injectable 12.5 Gram(s) IV Push once  diltiazem    milliGRAM(s) Oral daily  folic acid 1 milliGRAM(s) Oral daily  heparin  Injectable 5000 Unit(s) SubCutaneous every 12 hours  insulin lispro (HumaLOG) corrective regimen sliding scale   SubCutaneous three times a day before meals  levothyroxine 125 MICROGram(s) Oral daily  melatonin 10 milliGRAM(s) Oral at bedtime  mirtazapine 7.5 milliGRAM(s) Oral at bedtime  montelukast 10 milliGRAM(s) Oral at bedtime  pantoprazole  Injectable 40 milliGRAM(s) IV Push two times a day  pregabalin 75 milliGRAM(s) Oral at bedtime    MEDICATIONS  (PRN):  acetaminophen   Tablet .. 650 milliGRAM(s) Oral every 6 hours PRN Temp greater or equal to 38.5C (101.3F), Moderate Pain (4 - 6)  dextrose 40% Gel 15 Gram(s) Oral once PRN Blood Glucose LESS THAN 70 milliGRAM(s)/deciliter  glucagon  Injectable 1 milliGRAM(s) IntraMuscular once PRN Glucose LESS THAN 70 milligrams/deciliter  ondansetron Injectable 4 milliGRAM(s) IV Push every 6 hours PRN Nausea      Allergies    Bactrim (Other)  ciprofloxacin (Other (Mild))  clindamycin (Unknown)  ibuprofen (Unknown)  penicillins (Other)  sulfa drugs (Unknown)    Intolerances        I&O's Summary      Home Medications:  aspirin 81 mg oral tablet: 1 tab(s) orally once a day (10 Dec 2019 18:10)  atorvastatin 40 mg oral tablet: 1 tab(s) orally once a day (10 Dec 2019 19:24)  biotin 1000 mcg oral tablet: 1 tab(s) orally once a day (10 Dec 2019 19:24)  bumetanide 2 mg oral tablet: 1 tab(s) orally once a day (10 Dec 2019 19:24)  calcitriol 0.25 mcg oral capsule: 1 cap(s) orally once a day (10 Dec 2019 19:24)  Calcium 600+D 600 mg-200 intl units oral tablet: 1 tab(s) orally once a day (10 Dec 2019 19:24)  Cranberry oral capsule: 1 cap(s) orally once a day (10 Dec 2019 19:24)  DilTIAZem Hydrochloride  mg/24 hours oral capsule, extended release: 1 cap(s) orally once a day (10 Dec 2019 19:24)  folic acid 1 mg oral tablet: 1 tab(s) orally once a day (10 Dec 2019 18:10)  HumaLOG 100 units/mL injectable solution: 14 unit(s) injectable 3 times a day (before meals)  ***Plus Sliding Scale:  150-200 = 2 units  201-250 = 4 units  251-300 = 6 units  301-350 = 8 units  351-400 = 10 units  400 + = 12 units (10 Dec 2019 18:10)  levothyroxine 125 mcg (0.125 mg) oral tablet: 1 tab(s) orally once a day (10 Dec 2019 19:24)  Lyrica 75 mg oral capsule: 1 cap(s) orally once a day (at bedtime) (10 Dec 2019 19:24)  Melatonin 10 mg oral capsule: 1 cap(s) orally once a day (at bedtime) (10 Dec 2019 19:24)  mirtazapine 7.5 mg oral tablet: 1 tab(s) orally once a day (at bedtime) (10 Dec 2019 19:24)  montelukast 10 mg oral tablet: 1 tab(s) orally once a day (at bedtime) (10 Dec 2019 18:10)  Multiple Vitamins oral tablet: 1 tab(s) orally once a day (10 Dec 2019 18:10)  Probiotic Formula oral capsule: 1 cap(s) orally once a day (10 Dec 2019 19:24)  sulfamethoxazole-trimethoprim 800 mg-160 mg oral tablet: 1 tab(s) orally every 12 hours  ****Course Stopped*** (10 Dec 2019 19:24)  Toujeo SoloStar 300 units/mL subcutaneous solution: 45 unit(s) subcutaneous once a day (at bedtime) (10 Dec 2019 18:10)    Vital Signs Last 24 Hrs  T(C): 36.9 (12 Dec 2019 11:35), Max: 36.9 (11 Dec 2019 21:22)  T(F): 98.4 (12 Dec 2019 11:35), Max: 98.4 (11 Dec 2019 21:22)  HR: 65 (12 Dec 2019 11:35) (60 - 73)  BP: 149/41 (12 Dec 2019 11:35) (146/42 - 155/41)  BP(mean): --  RR: 18 (12 Dec 2019 11:35) (18 - 18)  SpO2: 97% (12 Dec 2019 11:35) (95% - 99%)    I&O's Detail      PHYSICAL EXAM:  GEN: alert awake O X 3  HEENT: MMM  NECK supple no jvd  CV: RRR s1s2  LUNGS: b/l CTA  ABD: + soft, non tender , non distended  EXT: no edema    LABS:               142    |  115    |  50     ----------------------------<  118       12 Dec 2019 07:54  3.8     |  24     |  2.57     146    |  115    |  55     ----------------------------<  115       11 Dec 2019 08:28  4.1     |  25     |  2.85     144    |  110    |  58     ----------------------------<  195       10 Dec 2019 17:45  4.4     |  28     |  3.03     Ca    8.2        12 Dec 2019 07:54  Ca    8.6        11 Dec 2019 08:28  Ca    9.3        10 Dec 2019 17:45      Mg     2.0       10 Dec 2019 17:45                          10.0   8.07  )-----------( 165      ( 12 Dec 2019 07:54 )             32.7                         11.2   8.38  )-----------( 162      ( 11 Dec 2019 08:28 )             36.2                         11.7   9.80  )-----------( 192      ( 10 Dec 2019 17:45 )             37.3

## 2019-12-12 NOTE — CONSULT NOTE ADULT - SUBJECTIVE AND OBJECTIVE BOX
HPI:  80 Y/O FEMALE WITH ADVANCED COPD (ON HOME O2), HTN, DM, OA, CHF PRESENTS AFTER A FEW EPISODES OF NAUSEA AND VOMITING ASSOCIATED WITH DIARRHEA -- STATES ALL BEGAN EARLIER TODAY. DENIES ANY ABDOMINAL PAIN BUT STATES SHE THINKS SHE HAS A FEVER. NO OTHER SICK CONTACTS IN THE HOME.  DIDNT EAT ANYWHERE OUTSIDE THE NORM.  STATES SHE WAS ON BACTIM A FEW WEEKS AGO FOR AN ENT ISSUE.  PT REPORTS THE STOOLS BEING DARK IN COLOR  POS FATIGUE, NO CP, SOB (10 Dec 2019 21:37)      PAST MEDICAL & SURGICAL HISTORY:  [place] (hard of hearing): hearing aid  Anxiety  Hypothyroid  GERD (gastroesophageal reflux disease)  CKD (chronic kidney disease)  MI, old: x3  Chronic UTI  Chronic diastolic congestive heart failure: last exacerbation 2017  Hyperlipidemia, unspecified hyperlipidemia type  Essential hypertension  Neuropathy  Diabetes  Chronic obstructive pulmonary disease, unspecified COPD type  History of cataract surgery: bilateral IOL  S/P lobectomy of lung: left lung pre-cancerous  History of ear surgery  History of cholecystectomy  S/P ORIF (open reduction internal fixation) fracture: left hip 2017  History of total knee replacement, unspecified laterality: bilateral right  left   H/O hernia repair: umbilical and incisional   delivery delivered  S/P appendectomy      MEDICATIONS  (STANDING):  atorvastatin Oral Tab/Cap - Peds 40 milliGRAM(s) Oral daily  aztreonam  IVPB 1000 milliGRAM(s) IV Intermittent every 12 hours  calcitriol   Capsule 0.25 MICROGram(s) Oral daily  dextrose 5%. 1000 milliLiter(s) (50 mL/Hr) IV Continuous <Continuous>  dextrose 50% Injectable 12.5 Gram(s) IV Push once  diltiazem    milliGRAM(s) Oral daily  folic acid 1 milliGRAM(s) Oral daily  heparin  Injectable 5000 Unit(s) SubCutaneous every 12 hours  insulin lispro (HumaLOG) corrective regimen sliding scale   SubCutaneous three times a day before meals  levothyroxine 125 MICROGram(s) Oral daily  melatonin 10 milliGRAM(s) Oral at bedtime  mirtazapine 7.5 milliGRAM(s) Oral at bedtime  montelukast 10 milliGRAM(s) Oral at bedtime  pantoprazole  Injectable 40 milliGRAM(s) IV Push two times a day  pregabalin 75 milliGRAM(s) Oral at bedtime    MEDICATIONS  (PRN):  acetaminophen   Tablet .. 650 milliGRAM(s) Oral every 6 hours PRN Temp greater or equal to 38.5C (101.3F), Moderate Pain (4 - 6)  dextrose 40% Gel 15 Gram(s) Oral once PRN Blood Glucose LESS THAN 70 milliGRAM(s)/deciliter  glucagon  Injectable 1 milliGRAM(s) IntraMuscular once PRN Glucose LESS THAN 70 milligrams/deciliter  ondansetron Injectable 4 milliGRAM(s) IV Push every 6 hours PRN Nausea      Allergies    Bactrim (Other)  ciprofloxacin (Other (Mild))  clindamycin (Unknown)  ibuprofen (Unknown)  penicillins (Other)  sulfa drugs (Unknown)    Intolerances        SOCIAL HISTORY:    FAMILY HISTORY:  Family history of CHF (congestive heart failure)   Non-contributory    REVIEW OF SYSTEMS      General:	    Respiratory and Thorax:  	  Cardiovascular:	    Gastrointestinal:	    Musculoskeletal:	   Vital Signs Last 24 Hrs  T(C): 36.4 (12 Dec 2019 06:27), Max: 36.9 (11 Dec 2019 21:22)  T(F): 97.5 (12 Dec 2019 06:27), Max: 98.4 (11 Dec 2019 21:22)  HR: 73 (12 Dec 2019 06:27) (59 - 75)  BP: 146/42 (12 Dec 2019 06:27) (146/42 - 161/58)  BP(mean): 79 (11 Dec 2019 15:32) (79 - 79)  RR: 18 (12 Dec 2019 06:27) (18 - 18)  SpO2: 97% (12 Dec 2019 06:27) (95% - 100%)    HEENT :No Pallor.No icterus. EOMI,PERLAA  Chest : Clear to Auscultation  CVS : S1S2 Normal.No murmurs.  Abdomen: Soft.Non tender .Normal bowel sounds.No Organomegaly.  CNS: Alert.Oriented to Time,Place and Person.No focal deficit.  EXT: Normal Range of motion.No pitting edema.    LABS:                        10.0   8.07  )-----------( 165      ( 12 Dec 2019 07:54 )             32.7     12-    146<H>  |  115<H>  |  55<H>  ----------------------------<  115<H>  4.1   |  25  |  2.85<H>    Ca    8.6      11 Dec 2019 08:28  Mg     2.0     12-10    TPro  7.1  /  Alb  3.1<L>  /  TBili  0.7  /  DBili  x   /  AST  15  /  ALT  17  /  AlkPhos  90  12-10    PT/INR - ( 10 Dec 2019 17:45 )   PT: 13.6 sec;   INR: 1.22 ratio         PTT - ( 10 Dec 2019 17:45 )  PTT:30.8 sec  LIVER FUNCTIONS - ( 10 Dec 2019 17:45 )  Alb: 3.1 g/dL / Pro: 7.1 gm/dL / ALK PHOS: 90 U/L / ALT: 17 U/L / AST: 15 U/L / GGT: x             RADIOLOGY & ADDITIONAL STUDIES:

## 2019-12-12 NOTE — PROGRESS NOTE ADULT - ASSESSMENT
78 Y/O FEMALE WITH THE ABOVE MED HX ADMITTED WITH NAUSEA, VOMITING, ANI    *N/V/D -- SECONDARY TO  NOROVIRUS  SUPPORTIVE CARE  HYDRATED  CLINICALLY BETTER  CT ABD/PELVIS STABLE  NO ABD PAIN CURRENTLY  ANTIEMETICS  *ANI - SECONDARY TO PRE RENAL AZOTEMIA, VOMITING AND DIARRHEA   HYDRATED AND NOW MONITORING OFF IVF DUE TO CHF, CR CONT TO IMPROVE  *DM - HOLD OFF ON BASAL INSULIN DUE TO VOMITING; HYDRATE  ISS AND WITH BGM CHECKS STABLE - HOLD OFF ON LONG ACTING UNTIL STARTS EATING PO REGULARLY  NOT ON ACE INH OR ARB DUE TO HYPERKALEMIA  *HTN - CARDIZEM WITH BP PARAMETERS  *CHF - CONT TO HOLD BUMEX, NO S/SX OF FLUID OVERLOAD CURRENTLY  *DVT PROPHY - VENODYNES,  SQ HEPARIN   H/H STABLE FOR NOW MONITOR CLOSELY

## 2019-12-13 LAB
ANION GAP SERPL CALC-SCNC: 5 MMOL/L — SIGNIFICANT CHANGE UP (ref 5–17)
BUN SERPL-MCNC: 51 MG/DL — HIGH (ref 7–23)
CALCIUM SERPL-MCNC: 8.4 MG/DL — LOW (ref 8.5–10.1)
CHLORIDE SERPL-SCNC: 116 MMOL/L — HIGH (ref 96–108)
CO2 SERPL-SCNC: 23 MMOL/L — SIGNIFICANT CHANGE UP (ref 22–31)
CREAT SERPL-MCNC: 2.5 MG/DL — HIGH (ref 0.5–1.3)
GLUCOSE SERPL-MCNC: 123 MG/DL — HIGH (ref 70–99)
HCT VFR BLD CALC: 32.7 % — LOW (ref 34.5–45)
HGB BLD-MCNC: 10.1 G/DL — LOW (ref 11.5–15.5)
MCHC RBC-ENTMCNC: 28.2 PG — SIGNIFICANT CHANGE UP (ref 27–34)
MCHC RBC-ENTMCNC: 30.9 GM/DL — LOW (ref 32–36)
MCV RBC AUTO: 91.3 FL — SIGNIFICANT CHANGE UP (ref 80–100)
PLATELET # BLD AUTO: 167 K/UL — SIGNIFICANT CHANGE UP (ref 150–400)
POTASSIUM SERPL-MCNC: 3.7 MMOL/L — SIGNIFICANT CHANGE UP (ref 3.5–5.3)
POTASSIUM SERPL-SCNC: 3.7 MMOL/L — SIGNIFICANT CHANGE UP (ref 3.5–5.3)
RBC # BLD: 3.58 M/UL — LOW (ref 3.8–5.2)
RBC # FLD: 15.3 % — HIGH (ref 10.3–14.5)
SODIUM SERPL-SCNC: 144 MMOL/L — SIGNIFICANT CHANGE UP (ref 135–145)
WBC # BLD: 7.18 K/UL — SIGNIFICANT CHANGE UP (ref 3.8–10.5)
WBC # FLD AUTO: 7.18 K/UL — SIGNIFICANT CHANGE UP (ref 3.8–10.5)

## 2019-12-13 RX ORDER — IPRATROPIUM/ALBUTEROL SULFATE 18-103MCG
3 AEROSOL WITH ADAPTER (GRAM) INHALATION EVERY 6 HOURS
Refills: 0 | Status: DISCONTINUED | OUTPATIENT
Start: 2019-12-13 | End: 2019-12-15

## 2019-12-13 RX ORDER — PANTOPRAZOLE SODIUM 20 MG/1
40 TABLET, DELAYED RELEASE ORAL EVERY 12 HOURS
Refills: 0 | Status: DISCONTINUED | OUTPATIENT
Start: 2019-12-13 | End: 2019-12-15

## 2019-12-13 RX ADMIN — Medication 180 MILLIGRAM(S): at 06:18

## 2019-12-13 RX ADMIN — PANTOPRAZOLE SODIUM 40 MILLIGRAM(S): 20 TABLET, DELAYED RELEASE ORAL at 06:18

## 2019-12-13 RX ADMIN — HEPARIN SODIUM 5000 UNIT(S): 5000 INJECTION INTRAVENOUS; SUBCUTANEOUS at 17:21

## 2019-12-13 RX ADMIN — Medication 1 MILLIGRAM(S): at 11:38

## 2019-12-13 RX ADMIN — CALCITRIOL 0.25 MICROGRAM(S): 0.5 CAPSULE ORAL at 11:38

## 2019-12-13 RX ADMIN — Medication 125 MICROGRAM(S): at 06:18

## 2019-12-13 RX ADMIN — MIRTAZAPINE 7.5 MILLIGRAM(S): 45 TABLET, ORALLY DISINTEGRATING ORAL at 21:10

## 2019-12-13 RX ADMIN — MONTELUKAST 10 MILLIGRAM(S): 4 TABLET, CHEWABLE ORAL at 21:10

## 2019-12-13 RX ADMIN — ATORVASTATIN CALCIUM 40 MILLIGRAM(S): 80 TABLET, FILM COATED ORAL at 21:10

## 2019-12-13 RX ADMIN — Medication 3 MILLILITER(S): at 19:09

## 2019-12-13 RX ADMIN — HEPARIN SODIUM 5000 UNIT(S): 5000 INJECTION INTRAVENOUS; SUBCUTANEOUS at 06:18

## 2019-12-13 RX ADMIN — PANTOPRAZOLE SODIUM 40 MILLIGRAM(S): 20 TABLET, DELAYED RELEASE ORAL at 17:20

## 2019-12-13 RX ADMIN — Medication 4: at 17:22

## 2019-12-13 RX ADMIN — Medication 75 MILLIGRAM(S): at 21:10

## 2019-12-13 RX ADMIN — Medication 10 MILLIGRAM(S): at 21:11

## 2019-12-13 NOTE — PROGRESS NOTE ADULT - ASSESSMENT
78 Y/O FEMALE WITH THE ABOVE MED HX ADMITTED WITH NAUSEA, VOMITING, ANI    *N/V/D -- SECONDARY TO  NOROVIRUS  CLINICALLY IMPROVING, GET OUT OF BED TODAY  SUPPORTIVE CARE  HYDRATED  CLINICALLY BETTER  CT ABD/PELVIS STABLE  NO ABD PAIN CURRENTLY  ANTIEMETICS  *ANI ON CKD - SECONDARY TO PRE RENAL AZOTEMIA, VOMITING AND DIARRHEA   HYDRATED AND NOW MONITORING OFF IVF DUE TO CHF, CR CONT TO IMPROVE  CR AT HER BASELINE NOW  *ANEMIA - SECONDARY TO HEMEDILUTION, HAD SOME GUAIC POSITIVE STOOLS  H/H STABLE  *DM - HOLD OFF ON BASAL INSULIN DUE TO VOMITING; HYDRATE  ISS AND WITH BGM CHECKS STABLE - HOLD OFF ON LONG ACTING UNTIL STARTS EATING PO REGULARLY  NOT ON ACE INH OR ARB DUE TO HYPERKALEMIA  *HTN - CARDIZEM WITH BP PARAMETERS  *CHF - CONT TO HOLD BUMEX, NO S/SX OF FLUID OVERLOAD CURRENTLY  *ACUTE BRONCHOSPASM - START ON NEB TREATMENTS FOR WHEEZING  *DVT PROPHY - VENODYNES,  SQ HEPARIN   H/H STABLE FOR NOW MONITOR CLOSELY

## 2019-12-13 NOTE — PROGRESS NOTE ADULT - ASSESSMENT
78 y/o f with ANI due to pre-renal azothemia in setting of volume depletion with ckd stage 3 at baseline. Secondary HPT.   + Norovirus GE    PLAN   - monitor off IVF and diuretics  - strict i/o  - encourage po intake  dw  at bedside     12/12  feels weak, some perianal discomfort  diarrhea still cont  creat improving  on po fluids    12/13  Cont to hold diuretics   most likely azotemic due to low po intake and diarrhea  creat cont to improve  Diarrhea stopped yesterday  Nebs for wheezing, CXR to evaluate chf since pt off diuretics  asymptomatic

## 2019-12-13 NOTE — PROGRESS NOTE ADULT - SUBJECTIVE AND OBJECTIVE BOX
NEPHROLOGY INTERVAL HPI/OVERNIGHT EVENTS:  LOI [id number]  HPI:    78 y/o f with hx of ckd stage 3 ( scr 1.5-1.6) presents with n/v diarrhea watery for past 3 days.  Dec po intake and continued to take her bumex at home.  + uop without change in pattern.  Admitted with GE with stool PCR reveals NOROVIRUS.    Pt with hx of bactrim use at end of nov, only one dose  no chronic nsaid use ( takes it about every 2 weeks)    Earlier today in ER had chest pain, ivf stopped and troponin send    12/13  feels much better  no diarrhea  feels stronger  off diuretics      12/12  feels weak  perirectal area discomfort from diarrhea  IV dc d pt on po fluids  no CP  no sob  Watching TV      PAST MEDICAL & SURGICAL HISTORY:  - ckd stage 3  - diastolic chf  - htn  - dm with neuropathy  - ppm   - hypothyroid  - anxiety   - CAD s/p MI   - COPD on home o2  - ccx  - s/p orif left hip aug 2017  - TKR 2008 and left 2015  - hernia repair   - s/p appu  - csection     FAMILY HISTORY:  Family history of CHF (congestive heart failure)    soc hx non smoker, no etoh  lives at home with        MEDICATIONS  (STANDING):  albuterol/ipratropium for Nebulization 3 milliLiter(s) Nebulizer every 6 hours  atorvastatin Oral Tab/Cap - Peds 40 milliGRAM(s) Oral daily  calcitriol   Capsule 0.25 MICROGram(s) Oral daily  dextrose 5%. 1000 milliLiter(s) (50 mL/Hr) IV Continuous <Continuous>  dextrose 50% Injectable 12.5 Gram(s) IV Push once  diltiazem    milliGRAM(s) Oral daily  folic acid 1 milliGRAM(s) Oral daily  heparin  Injectable 5000 Unit(s) SubCutaneous every 12 hours  insulin lispro (HumaLOG) corrective regimen sliding scale   SubCutaneous three times a day before meals  levothyroxine 125 MICROGram(s) Oral daily  melatonin 10 milliGRAM(s) Oral at bedtime  mirtazapine 7.5 milliGRAM(s) Oral at bedtime  montelukast 10 milliGRAM(s) Oral at bedtime  pantoprazole  Injectable 40 milliGRAM(s) IV Push two times a day  pregabalin 75 milliGRAM(s) Oral at bedtime    MEDICATIONS  (PRN):  acetaminophen   Tablet .. 650 milliGRAM(s) Oral every 6 hours PRN Temp greater or equal to 38.5C (101.3F), Moderate Pain (4 - 6)  dextrose 40% Gel 15 Gram(s) Oral once PRN Blood Glucose LESS THAN 70 milliGRAM(s)/deciliter  glucagon  Injectable 1 milliGRAM(s) IntraMuscular once PRN Glucose LESS THAN 70 milligrams/deciliter  ondansetron Injectable 4 milliGRAM(s) IV Push every 6 hours PRN Nausea      Allergies    Bactrim (Other)  ciprofloxacin (Other (Mild))  clindamycin (Unknown)  ibuprofen (Unknown)  penicillins (Other)  sulfa drugs (Unknown)    Intolerances        I&O's Summary      MEDICATIONS  (STANDING):  albuterol/ipratropium for Nebulization 3 milliLiter(s) Nebulizer every 6 hours  atorvastatin Oral Tab/Cap - Peds 40 milliGRAM(s) Oral daily  calcitriol   Capsule 0.25 MICROGram(s) Oral daily  dextrose 5%. 1000 milliLiter(s) (50 mL/Hr) IV Continuous <Continuous>  dextrose 50% Injectable 12.5 Gram(s) IV Push once  diltiazem    milliGRAM(s) Oral daily  folic acid 1 milliGRAM(s) Oral daily  heparin  Injectable 5000 Unit(s) SubCutaneous every 12 hours  insulin lispro (HumaLOG) corrective regimen sliding scale   SubCutaneous three times a day before meals  levothyroxine 125 MICROGram(s) Oral daily  melatonin 10 milliGRAM(s) Oral at bedtime  mirtazapine 7.5 milliGRAM(s) Oral at bedtime  montelukast 10 milliGRAM(s) Oral at bedtime  pantoprazole  Injectable 40 milliGRAM(s) IV Push two times a day  pregabalin 75 milliGRAM(s) Oral at bedtime    MEDICATIONS  (PRN):  acetaminophen   Tablet .. 650 milliGRAM(s) Oral every 6 hours PRN Temp greater or equal to 38.5C (101.3F), Moderate Pain (4 - 6)  dextrose 40% Gel 15 Gram(s) Oral once PRN Blood Glucose LESS THAN 70 milliGRAM(s)/deciliter  glucagon  Injectable 1 milliGRAM(s) IntraMuscular once PRN Glucose LESS THAN 70 milligrams/deciliter  ondansetron Injectable 4 milliGRAM(s) IV Push every 6 hours PRN Nausea    I&O's Detail      PHYSICAL EXAM:  GEN: alert awake O X 3  HEENT: MMM  NECK supple no jvd  CV: RRR s1s2  LUNGS: occ wheeze  ABD: + soft, non tender , non distended  EXT: no edema                          10.1   7.18  )-----------( 167      ( 13 Dec 2019 07:23 )             32.7                         10.0   8.07  )-----------( 165      ( 12 Dec 2019 07:54 )             32.7                         11.2   8.38  )-----------( 162      ( 11 Dec 2019 08:28 )             36.2         144    |  116    |  51     ----------------------------<  123       13 Dec 2019 07:23  3.7     |  23     |  2.50     142    |  115    |  50     ----------------------------<  118       12 Dec 2019 07:54  3.8     |  24     |  2.57     146    |  115    |  55     ----------------------------<  115       11 Dec 2019 08:28  4.1     |  25     |  2.85     Ca    8.4        13 Dec 2019 07:23  Ca    8.2        12 Dec 2019 07:54  Ca    8.6        11 Dec 2019 08:28      Mg     2.0       10 Dec 2019 17:45

## 2019-12-13 NOTE — PROGRESS NOTE ADULT - SUBJECTIVE AND OBJECTIVE BOX
12/11/19: No sob, no further nausea or vomiting, was able to tolerate some food earlier; no abd pain, had some left sided chest pain earlier today but now resolved, trop neg  POS NOROVIRUS  12/12/19: No cp, sob, nausea - mendy breakfast this am; had a few more episodes of diarrhea overnight per rn  12/13/19: No cp, sob, mendy po - no further n/v, loose stools subsiding  just very fatigued    ROS: as per hpi otherwise all other systems reviewed and are negative    Vital Signs Last 24 Hrs  T(C): 36.6 (13 Dec 2019 06:12), Max: 36.9 (12 Dec 2019 11:35)  T(F): 97.8 (13 Dec 2019 06:12), Max: 98.4 (12 Dec 2019 11:35)  HR: 61 (13 Dec 2019 06:12) (61 - 66)  BP: 137/46 (13 Dec 2019 06:12) (137/46 - 149/41)  BP(mean): --  RR: 16 (13 Dec 2019 06:12) (16 - 18)  SpO2: 95% (13 Dec 2019 06:12) (95% - 97%)    GEN: A and O, NAD, mood stable  HEENT:   NC/AT, EOMI, no oropharyngeal lesions    NECK:   supple    CV:  +S1, +S2, regular, no murmurs or rubs    RESP:   lungs clear to auscultation bilaterally, rales, rhonchi, good air entry bilaterally DECREASED BS ANGELICA, SCATTERED EXP WHEEZES    GI:  abdomen soft, non-tender, non-distended, normal BS,  no abdominal masses, no palpable masses    RECTAL:  not examined    :  not examined    MSK:   normal muscle tone, no atrophy, no rigidity, no contractions    EXT:   no clubbing, no cyanosis, no edema, no calf pain, swelling or erythema    VASCULAR:  pulses equal and symmetric in the upper and lower extremities    NEURO:  AAOX3, no focal neurological deficits, follows all commands, able to move extremities spontaneously    SKIN:  no ulcers, lesions or rashes                              10.1   7.18  )-----------( 167      ( 13 Dec 2019 07:23 )             32.7     12-13    144  |  116<H>  |  51<H>  ----------------------------<  123<H>  3.7   |  23  |  2.50<H>    Ca    8.4<L>      13 Dec 2019 07:23      CARDIAC MARKERS ( 11 Dec 2019 13:05 )  <0.015 ng/mL / x     / x     / x     / x                MEDICATIONS  (STANDING):  atorvastatin Oral Tab/Cap - Peds 40 milliGRAM(s) Oral daily  calcitriol   Capsule 0.25 MICROGram(s) Oral daily  dextrose 5%. 1000 milliLiter(s) (50 mL/Hr) IV Continuous <Continuous>  dextrose 50% Injectable 12.5 Gram(s) IV Push once  diltiazem    milliGRAM(s) Oral daily  folic acid 1 milliGRAM(s) Oral daily  heparin  Injectable 5000 Unit(s) SubCutaneous every 12 hours  insulin lispro (HumaLOG) corrective regimen sliding scale   SubCutaneous three times a day before meals  levothyroxine 125 MICROGram(s) Oral daily  melatonin 10 milliGRAM(s) Oral at bedtime  mirtazapine 7.5 milliGRAM(s) Oral at bedtime  montelukast 10 milliGRAM(s) Oral at bedtime  pantoprazole  Injectable 40 milliGRAM(s) IV Push two times a day  pregabalin 75 milliGRAM(s) Oral at bedtime    MEDICATIONS  (PRN):  acetaminophen   Tablet .. 650 milliGRAM(s) Oral every 6 hours PRN Temp greater or equal to 38.5C (101.3F), Moderate Pain (4 - 6)  dextrose 40% Gel 15 Gram(s) Oral once PRN Blood Glucose LESS THAN 70 milliGRAM(s)/deciliter  glucagon  Injectable 1 milliGRAM(s) IntraMuscular once PRN Glucose LESS THAN 70 milligrams/deciliter  ondansetron Injectable 4 milliGRAM(s) IV Push every 6 hours PRN Nausea

## 2019-12-13 NOTE — PROGRESS NOTE ADULT - SUBJECTIVE AND OBJECTIVE BOX
Date of service: 19 @ 09:20    Lying in bed in NAD  Weak looking  Has loose stools  Abdominal cramps are improving    ROS: no fever or chills; denies dizziness, no HA, no SOB or cough, no dysuria, no legs pain, no rashes    MEDICATIONS  (STANDING):  atorvastatin Oral Tab/Cap - Peds 40 milliGRAM(s) Oral daily  calcitriol   Capsule 0.25 MICROGram(s) Oral daily  dextrose 5%. 1000 milliLiter(s) (50 mL/Hr) IV Continuous <Continuous>  dextrose 50% Injectable 12.5 Gram(s) IV Push once  diltiazem    milliGRAM(s) Oral daily  folic acid 1 milliGRAM(s) Oral daily  heparin  Injectable 5000 Unit(s) SubCutaneous every 12 hours  insulin lispro (HumaLOG) corrective regimen sliding scale   SubCutaneous three times a day before meals  levothyroxine 125 MICROGram(s) Oral daily  melatonin 10 milliGRAM(s) Oral at bedtime  mirtazapine 7.5 milliGRAM(s) Oral at bedtime  montelukast 10 milliGRAM(s) Oral at bedtime  pantoprazole  Injectable 40 milliGRAM(s) IV Push two times a day  pregabalin 75 milliGRAM(s) Oral at bedtime      Vital Signs Last 24 Hrs  T(C): 36.6 (13 Dec 2019 06:12), Max: 36.9 (12 Dec 2019 11:35)  T(F): 97.8 (13 Dec 2019 06:12), Max: 98.4 (12 Dec 2019 11:35)  HR: 61 (13 Dec 2019 06:12) (61 - 66)  BP: 137/46 (13 Dec 2019 06:12) (137/46 - 149/41)  BP(mean): --  RR: 16 (13 Dec 2019 06:12) (16 - 18)  SpO2: 95% (13 Dec 2019 06:12) (95% - 97%)    Physical Exam:      Constitutional: frail looking  HEENT: NC/AT, EOMI, PERRLA, conjunctivae clear  Neck: supple; thyroid not palpable  Back: no tenderness  Respiratory: respiratory effort normal; clear to auscultation  Cardiovascular: S1S2 regular, no murmurs  Abdomen: soft, mild tender abdomen, not distended, positive BS  Genitourinary: no suprapubic tenderness  Lymphatic: no LN palpable  Musculoskeletal: no muscle tenderness, no joint swelling or tenderness  Extremities: no pedal edema  Neurological/ Psychiatric: AxOx3,moving all extremities  Skin: no rashes; no palpable lesions    Labs: reviewed                        10.1   718  )-----------( 167      ( 13 Dec 2019 07:23 )             32.7     12    144  |  116<H>  |  51<H>  ----------------------------<  123<H>  3.7   |  23  |  2.50<H>    Ca    8.4<L>      13 Dec 2019 07:23                        10.0   8.07  )-----------( 165      ( 12 Dec 2019 07:54 )             32.7     12    142  |  115<H>  |  50<H>  ----------------------------<  118<H>  3.8   |  24  |  2.57<H>    Ca    8.2<L>      12 Dec 2019 07:54  Mg     2.0     12-10    TPro  7.1  /  Alb  3.1<L>  /  TBili  0.7  /  DBili  x   /  AST  15  /  ALT  17  /  AlkPhos  90  1210     LIVER FUNCTIONS - ( 10 Dec 2019 17:45 )  Alb: 3.1 g/dL / Pro: 7.1 gm/dL / ALK PHOS: 90 U/L / ALT: 17 U/L / AST: 15 U/L / GGT: x           Urinalysis Basic - ( 10 Dec 2019 19:31 )    Color: Yellow / Appearance: Clear / S.020 / pH: x  Gluc: x / Ketone: Negative  / Bili: Negative / Urobili: Negative mg/dL   Blood: x / Protein: 100 mg/dL / Nitrite: Negative   Leuk Esterase: Small / RBC: 3-5 /HPF / WBC 3-5   Sq Epi: x / Non Sq Epi: x / Bacteria: Many    Clostridium difficile Toxin by PCR (19 @ 01:18)    Clostridium difficile Toxin by PCR: The results of this test should be interpreted with consideration of all  clinical and laboratory findings. This test determines the presence of  the C. difficile tcdB gene at a given time and is not intended to  identify antibiotic associated disease or C. difficile infection without  clinical context.  Successful treatment is based on the resolution of clinical symptoms.  This test should not be used as a "test of cure" because C. difficile DNA  will persist after successful treatment. Repeat testing will not be  permitted.    This test is performed on the BD MAX system using Real-Time PCR and  fluorogenic target-specific hybridization.    C Diff by PCR Result: [facility]    GI PCR Panel, Stool (19 @ 01:18)    Specimen Source: .Stool Feces    Culture Results:   Norovirus GI/GII      Radiology: all available radiological tests reviewed    < from: CT Abdomen and Pelvis No Cont (12.10.19 @ 22:07) >  1. Again demonstrated is a segment of colon in a ventral hernia on the   right. (2:49) This was present on the prior study. No obstruction.   2. Mild opacities in the lower lobes may represent atelectasis.   3. The common duct measures 12 mm. This may be due to post   cholecystectomy state and patient age. However, if biliary obstruction is   suspected clinically, recommend further evaluation.    < end of copied text >    Advanced directives addressed: full resuscitation

## 2019-12-13 NOTE — PROGRESS NOTE ADULT - ASSESSMENT
78 y/o female with h/o advanced COPD (on home O2), HTN, CHF, OA, DM type 2, was admitted on 12/11 for N/V and diarrhea. Her symptoms started on the day prior to admission; she has abdominal pain. She felt feverish and weak. She reports that other family members has a diarrheal illness at home. In ER she received vancomycin IV and aztreonam.     1. Diarrheal syndrome. Acute enterocolitis with norovirus. COPD. ARF. Multiple abx allergies including PCN.   -still with loose stools  -vomiting resolving  -renal function still poor  -hydration  -contact isolation  -continue to observe off abx therapy  -monitor temps  -f/u CBC  -supportive care  2. Other issues:   -care per medicine

## 2019-12-14 ENCOUNTER — TRANSCRIPTION ENCOUNTER (OUTPATIENT)
Age: 79
End: 2019-12-14

## 2019-12-14 RX ORDER — INSULIN GLARGINE 100 [IU]/ML
10 INJECTION, SOLUTION SUBCUTANEOUS AT BEDTIME
Refills: 0 | Status: DISCONTINUED | OUTPATIENT
Start: 2019-12-14 | End: 2019-12-15

## 2019-12-14 RX ORDER — L.ACIDOPH/B.ANIMALIS/B.LONGUM 15B CELL
1 CAPSULE ORAL
Qty: 0 | Refills: 0 | DISCHARGE

## 2019-12-14 RX ORDER — INSULIN GLARGINE 100 [IU]/ML
45 INJECTION, SOLUTION SUBCUTANEOUS
Qty: 0 | Refills: 0 | DISCHARGE

## 2019-12-14 RX ADMIN — Medication 3 MILLILITER(S): at 14:21

## 2019-12-14 RX ADMIN — Medication 3 MILLILITER(S): at 07:49

## 2019-12-14 RX ADMIN — Medication 75 MILLIGRAM(S): at 21:33

## 2019-12-14 RX ADMIN — Medication 4: at 07:37

## 2019-12-14 RX ADMIN — ATORVASTATIN CALCIUM 40 MILLIGRAM(S): 80 TABLET, FILM COATED ORAL at 21:33

## 2019-12-14 RX ADMIN — PANTOPRAZOLE SODIUM 40 MILLIGRAM(S): 20 TABLET, DELAYED RELEASE ORAL at 17:18

## 2019-12-14 RX ADMIN — Medication 2: at 12:06

## 2019-12-14 RX ADMIN — PANTOPRAZOLE SODIUM 40 MILLIGRAM(S): 20 TABLET, DELAYED RELEASE ORAL at 05:09

## 2019-12-14 RX ADMIN — Medication 3 MILLILITER(S): at 20:04

## 2019-12-14 RX ADMIN — Medication 1 MILLIGRAM(S): at 12:10

## 2019-12-14 RX ADMIN — HEPARIN SODIUM 5000 UNIT(S): 5000 INJECTION INTRAVENOUS; SUBCUTANEOUS at 17:16

## 2019-12-14 RX ADMIN — MONTELUKAST 10 MILLIGRAM(S): 4 TABLET, CHEWABLE ORAL at 21:33

## 2019-12-14 RX ADMIN — INSULIN GLARGINE 10 UNIT(S): 100 INJECTION, SOLUTION SUBCUTANEOUS at 21:39

## 2019-12-14 RX ADMIN — Medication 10 MILLIGRAM(S): at 21:33

## 2019-12-14 RX ADMIN — Medication 6: at 17:16

## 2019-12-14 RX ADMIN — HEPARIN SODIUM 5000 UNIT(S): 5000 INJECTION INTRAVENOUS; SUBCUTANEOUS at 05:09

## 2019-12-14 RX ADMIN — Medication 180 MILLIGRAM(S): at 05:09

## 2019-12-14 RX ADMIN — CALCITRIOL 0.25 MICROGRAM(S): 0.5 CAPSULE ORAL at 12:10

## 2019-12-14 RX ADMIN — MIRTAZAPINE 7.5 MILLIGRAM(S): 45 TABLET, ORALLY DISINTEGRATING ORAL at 21:33

## 2019-12-14 RX ADMIN — Medication 125 MICROGRAM(S): at 05:09

## 2019-12-14 NOTE — DISCHARGE NOTE PROVIDER - CARE PROVIDER_API CALL
Soco Phelps)  Internal Medicine  [address]  [place] [address]  Phone: [phone]  Fax: [phone]  Follow Up Time:

## 2019-12-14 NOTE — DISCHARGE NOTE PROVIDER - NSDCCPCAREPLAN_GEN_ALL_CORE_FT
PRINCIPAL DISCHARGE DIAGNOSIS  Diagnosis: Norovirus  Assessment and Plan of Treatment:       SECONDARY DISCHARGE DIAGNOSES  Diagnosis: Altered mental status, unspecified altered mental status type  Assessment and Plan of Treatment:

## 2019-12-14 NOTE — PROGRESS NOTE ADULT - ASSESSMENT
78 Y/O FEMALE WITH THE ABOVE MED HX ADMITTED WITH NAUSEA, VOMITING, ANI    *N/V/D -- SECONDARY TO  NOROVIRUS  CLINICALLY IMPROVING, ambulate today (d/w rn)  SUPPORTIVE CARE  HYDRATED  CLINICALLY BETTER  CT ABD/PELVIS STABLE  NO ABD PAIN CURRENTLY  *ANI ON CKD - SECONDARY TO PRE RENAL AZOTEMIA, VOMITING AND DIARRHEA   HYDRATED AND NOW MONITORING OFF IVF DUE TO CHF, CR CONT TO IMPROVE  CR AT HER BASELINE NOW  restart bumex at lower dose tomorrow, then gradually incrase as outpt to her regular dosing schedule  *ANEMIA - SECONDARY TO HEMODILUTION, HAD SOME GUAIC POSITIVE STOOLS  H/H STABLE  *DM - RESTART BASAL INSULIN AS OUTPT AT LOWER DOSE  ISS AND WITH BGM CHECKS STABLE - HOLD OFF ON LONG ACTING UNTIL STARTS EATING PO REGULARLY  NOT ON ACE INH OR ARB DUE TO HYPERKALEMIA  *HTN - CARDIZEM WITH BP PARAMETERS  *CHF - CONT TO HOLD BUMEX, NO S/SX OF FLUID OVERLOAD CURRENTLY  *ACUTE BRONCHOSPASM - START ON NEB TREATMENTS FOR WHEEZING  *DVT PROPHY - VENODYNES,  SQ HEPARIN   H/H STABLE FOR NOW MONITOR CLOSELY    DC PLANNING

## 2019-12-14 NOTE — DISCHARGE NOTE PROVIDER - HOSPITAL COURSE
78 Y/O FEMALE WITH chf, chronic anemia due to ckd, htn, dm ADMITTED WITH NAUSEA, VOMITING, ANI secondarY TO NOROVIRUS        *N/V/D -- SECONDARY TO  NOROVIRUS    CLINICALLY IMPROVING, ambulate today (d/w rn)    SUPPORTIVE CARE    HYDRATED    CLINICALLY BETTER    CT ABD/PELVIS STABLE    NO ABD PAIN CURRENTLY    *ANI ON CKD - SECONDARY TO PRE RENAL AZOTEMIA, VOMITING AND DIARRHEA     HYDRATED AND NOW MONITORING OFF IVF DUE TO CHF, CR CONT TO IMPROVE    CR AT HER BASELINE NOW    restart bumex at lower dose tomorrow, then gradually incrase as outpt to her regular dosing schedule    *ANEMIA - SECONDARY TO HEMODILUTION, HAD SOME GUAIC POSITIVE STOOLS    H/H STABLE    *DM - RESTART BASAL INSULIN AS OUTPT AT LOWER DOSE    ISS AND WITH BGM CHECKS STABLE - HOLD OFF ON LONG ACTING UNTIL STARTS EATING PO REGULARLY    NOT ON ACE INH OR ARB DUE TO HYPERKALEMIA    *HTN - CARDIZEM WITH BP PARAMETERS    *CHF - CONT TO HOLD BUMEX TODAY, RESTART TOMORROW     AT NCE DAILY AND INCREASE Y NEXT WEEK TO TWICE DAILY

## 2019-12-14 NOTE — DISCHARGE NOTE PROVIDER - NSDCMRMEDTOKEN_GEN_ALL_CORE_FT
aspirin 81 mg oral tablet: 1 tab(s) orally once a day  atorvastatin 40 mg oral tablet: 1 tab(s) orally once a day  bumetanide 2 mg oral tablet: 1 tab(s) orally once a day  calcitriol 0.25 mcg oral capsule: 1 cap(s) orally once a day  Calcium 600+D 600 mg-200 intl units oral tablet: 1 tab(s) orally once a day  DilTIAZem Hydrochloride  mg/24 hours oral capsule, extended release: 1 cap(s) orally once a day  folic acid 1 mg oral tablet: 1 tab(s) orally once a day  HumaLOG 100 units/mL injectable solution: 14 unit(s) injectable 3 times a day (before meals)  ***Plus Sliding Scale:  150-200 = 2 units  201-250 = 4 units  251-300 = 6 units  301-350 = 8 units  351-400 = 10 units  400 + = 12 units  levothyroxine 125 mcg (0.125 mg) oral tablet: 1 tab(s) orally once a day  Lyrica 75 mg oral capsule: 1 cap(s) orally once a day (at bedtime)  Melatonin 10 mg oral capsule: 1 cap(s) orally once a day (at bedtime)  mirtazapine 7.5 mg oral tablet: 1 tab(s) orally once a day (at bedtime)  montelukast 10 mg oral tablet: 1 tab(s) orally once a day (at bedtime)  Multiple Vitamins oral tablet: 1 tab(s) orally once a day  Toujeo SoloStar 300 units/mL subcutaneous solution: 45 unit(s) subcutaneous once a day (at bedtime)    START WITH ONLY HALF DOSE UPON DISCHARGE

## 2019-12-14 NOTE — PROGRESS NOTE ADULT - SUBJECTIVE AND OBJECTIVE BOX
12/11/19: No sob, no further nausea or vomiting, was able to tolerate some food earlier; no abd pain, had some left sided chest pain earlier today but now resolved, trop neg  POS NOROVIRUS  12/12/19: No cp, sob, nausea - mendy breakfast this am; had a few more episodes of diarrhea overnight per rn  12/13/19: No cp, sob, mendy po - no further n/v, loose stools subsiding  just very fatigued  12/14/19: feels much better, tolerated full dinner last night; wants to try walking today; no cp, sob, n/v/f/c  no further diarrhea    ROS: as per hpi otherwise all other systems reviewed and are negative    Vital Signs Last 24 Hrs  T(C): 36.3 (14 Dec 2019 05:08), Max: 36.8 (13 Dec 2019 21:02)  T(F): 97.3 (14 Dec 2019 05:08), Max: 98.2 (13 Dec 2019 21:02)  HR: 82 (14 Dec 2019 07:51) (59 - 82)  BP: 146/32 (14 Dec 2019 05:08) (124/47 - 146/32)  BP(mean): --  RR: 16 (14 Dec 2019 05:08) (16 - 16)  SpO2: 93% (14 Dec 2019 05:08) (93% - 94%)    GEN: A and O, NAD, mood stable  HEENT:   NC/AT, EOMI, no oropharyngeal lesions    NECK:   supple    CV:  +S1, +S2, regular, no murmurs or rubs    RESP:   lungs clear to auscultation bilaterally, rales, rhonchi, good air entry bilaterally DECREASED BS ANGELICA, no wheezes    GI:  abdomen soft, non-tender, non-distended, normal BS,  no abdominal masses, no palpable masses    RECTAL:  not examined    :  not examined    MSK:   normal muscle tone, no atrophy, no rigidity, no contractions    EXT:   no clubbing, no cyanosis, no edema, no calf pain, swelling or erythema    VASCULAR:  pulses equal and symmetric in the upper and lower extremities    NEURO:  AAOX3, no focal neurological deficits, follows all commands, able to move extremities spontaneously    SKIN:  no ulcers, lesions or rashes                                10.1   7.18  )-----------( 167      ( 13 Dec 2019 07:23 )             32.7     12-13    144  |  116<H>  |  51<H>  ----------------------------<  123<H>  3.7   |  23  |  2.50<H>    Ca    8.4<L>      13 Dec 2019 07:23          MEDICATIONS  (STANDING):  albuterol/ipratropium for Nebulization 3 milliLiter(s) Nebulizer every 6 hours  atorvastatin Oral Tab/Cap - Peds 40 milliGRAM(s) Oral daily  calcitriol   Capsule 0.25 MICROGram(s) Oral daily  dextrose 5%. 1000 milliLiter(s) (50 mL/Hr) IV Continuous <Continuous>  dextrose 50% Injectable 12.5 Gram(s) IV Push once  diltiazem    milliGRAM(s) Oral daily  folic acid 1 milliGRAM(s) Oral daily  heparin  Injectable 5000 Unit(s) SubCutaneous every 12 hours  insulin glargine Injectable (LANTUS) 10 Unit(s) SubCutaneous at bedtime  insulin lispro (HumaLOG) corrective regimen sliding scale   SubCutaneous three times a day before meals  levothyroxine 125 MICROGram(s) Oral daily  melatonin 10 milliGRAM(s) Oral at bedtime  mirtazapine 7.5 milliGRAM(s) Oral at bedtime  montelukast 10 milliGRAM(s) Oral at bedtime  pantoprazole    Tablet 40 milliGRAM(s) Oral every 12 hours  pregabalin 75 milliGRAM(s) Oral at bedtime    MEDICATIONS  (PRN):  acetaminophen   Tablet .. 650 milliGRAM(s) Oral every 6 hours PRN Temp greater or equal to 38.5C (101.3F), Moderate Pain (4 - 6)  dextrose 40% Gel 15 Gram(s) Oral once PRN Blood Glucose LESS THAN 70 milliGRAM(s)/deciliter  glucagon  Injectable 1 milliGRAM(s) IntraMuscular once PRN Glucose LESS THAN 70 milligrams/deciliter  ondansetron Injectable 4 milliGRAM(s) IV Push every 6 hours PRN Nausea

## 2019-12-15 ENCOUNTER — TRANSCRIPTION ENCOUNTER (OUTPATIENT)
Age: 79
End: 2019-12-15

## 2019-12-15 VITALS
WEIGHT: 169.54 LBS | SYSTOLIC BLOOD PRESSURE: 147 MMHG | TEMPERATURE: 98 F | DIASTOLIC BLOOD PRESSURE: 46 MMHG | RESPIRATION RATE: 16 BRPM | OXYGEN SATURATION: 94 %

## 2019-12-15 LAB
ANION GAP SERPL CALC-SCNC: 7 MMOL/L — SIGNIFICANT CHANGE UP (ref 5–17)
BUN SERPL-MCNC: 56 MG/DL — HIGH (ref 7–23)
CALCIUM SERPL-MCNC: 8.1 MG/DL — LOW (ref 8.5–10.1)
CHLORIDE SERPL-SCNC: 113 MMOL/L — HIGH (ref 96–108)
CO2 SERPL-SCNC: 24 MMOL/L — SIGNIFICANT CHANGE UP (ref 22–31)
CREAT SERPL-MCNC: 2.54 MG/DL — HIGH (ref 0.5–1.3)
GLUCOSE SERPL-MCNC: 219 MG/DL — HIGH (ref 70–99)
POTASSIUM SERPL-MCNC: 4.3 MMOL/L — SIGNIFICANT CHANGE UP (ref 3.5–5.3)
POTASSIUM SERPL-SCNC: 4.3 MMOL/L — SIGNIFICANT CHANGE UP (ref 3.5–5.3)
SODIUM SERPL-SCNC: 144 MMOL/L — SIGNIFICANT CHANGE UP (ref 135–145)

## 2019-12-15 RX ORDER — BUMETANIDE 0.25 MG/ML
2 INJECTION INTRAMUSCULAR; INTRAVENOUS ONCE
Refills: 0 | Status: COMPLETED | OUTPATIENT
Start: 2019-12-15 | End: 2019-12-15

## 2019-12-15 RX ADMIN — Medication 3 MILLILITER(S): at 01:57

## 2019-12-15 RX ADMIN — PANTOPRAZOLE SODIUM 40 MILLIGRAM(S): 20 TABLET, DELAYED RELEASE ORAL at 06:36

## 2019-12-15 RX ADMIN — Medication 180 MILLIGRAM(S): at 06:36

## 2019-12-15 RX ADMIN — Medication 4: at 11:36

## 2019-12-15 RX ADMIN — Medication 4: at 08:06

## 2019-12-15 RX ADMIN — Medication 125 MICROGRAM(S): at 06:36

## 2019-12-15 RX ADMIN — BUMETANIDE 2 MILLIGRAM(S): 0.25 INJECTION INTRAMUSCULAR; INTRAVENOUS at 11:36

## 2019-12-15 RX ADMIN — Medication 1 MILLIGRAM(S): at 11:36

## 2019-12-15 RX ADMIN — CALCITRIOL 0.25 MICROGRAM(S): 0.5 CAPSULE ORAL at 11:36

## 2019-12-15 RX ADMIN — HEPARIN SODIUM 5000 UNIT(S): 5000 INJECTION INTRAVENOUS; SUBCUTANEOUS at 06:36

## 2019-12-15 NOTE — PROGRESS NOTE ADULT - SUBJECTIVE AND OBJECTIVE BOX
12/11/19: No sob, no further nausea or vomiting, was able to tolerate some food earlier; no abd pain, had some left sided chest pain earlier today but now resolved, trop neg  POS NOROVIRUS  12/12/19: No cp, sob, nausea - mendy breakfast this am; had a few more episodes of diarrhea overnight per rn  12/13/19: No cp, sob, mendy po - no further n/v, loose stools subsiding  just very fatigued  12/14/19: feels much better, tolerated full dinner last night; wants to try walking today; no cp, sob, n/v/f/c  no further diarrhea  12/15/19: No cp, sob, n/v/f/c; feels weak but wants to go home    ROS: as per hpi otherwise all other systems reviewed and are negative    Vital Signs Last 24 Hrs  T(C): 36.8 (15 Dec 2019 05:52), Max: 36.8 (14 Dec 2019 21:39)  T(F): 98.3 (15 Dec 2019 05:52), Max: 98.3 (14 Dec 2019 21:39)  HR: 59 (15 Dec 2019 05:52) (59 - 82)  BP: 159/38 (15 Dec 2019 05:52) (136/43 - 159/38)  BP(mean): --  RR: 18 (15 Dec 2019 05:52) (17 - 19)  SpO2: 93% (15 Dec 2019 05:52) (93% - 94%)    GEN: A and O, NAD, mood stable  HEENT:   NC/AT, EOMI, no oropharyngeal lesions    NECK:   supple    CV:  +S1, +S2, regular, no murmurs or rubs    RESP:   lungs clear to auscultation bilaterally, scattered rales, rhonchi, good air entry bilaterally DECREASED BS ANGELICA, no wheezes    GI:  abdomen soft, non-tender, non-distended, normal BS,  no abdominal masses, no palpable masses    RECTAL:  not examined    :  not examined    MSK:   normal muscle tone, no atrophy, no rigidity, no contractions    EXT:   no clubbing, no cyanosis, no edema, no calf pain, swelling or erythema    VASCULAR:  pulses equal and symmetric in the upper and lower extremities    NEURO:  AAOX3, no focal neurological deficits, follows all commands, able to move extremities spontaneously    SKIN:  no ulcers, lesions or rashes    labs        12-15    144  |  113<H>  |  56<H>  ----------------------------<  219<H>  4.3   |  24  |  2.54<H>    Ca    8.1<L>      15 Dec 2019 07:19            MEDICATIONS  (STANDING):  albuterol/ipratropium for Nebulization 3 milliLiter(s) Nebulizer every 6 hours  atorvastatin Oral Tab/Cap - Peds 40 milliGRAM(s) Oral daily  buMETAnide 2 milliGRAM(s) Oral once  calcitriol   Capsule 0.25 MICROGram(s) Oral daily  dextrose 5%. 1000 milliLiter(s) (50 mL/Hr) IV Continuous <Continuous>  dextrose 50% Injectable 12.5 Gram(s) IV Push once  diltiazem    milliGRAM(s) Oral daily  folic acid 1 milliGRAM(s) Oral daily  heparin  Injectable 5000 Unit(s) SubCutaneous every 12 hours  insulin glargine Injectable (LANTUS) 10 Unit(s) SubCutaneous at bedtime  insulin lispro (HumaLOG) corrective regimen sliding scale   SubCutaneous three times a day before meals  levothyroxine 125 MICROGram(s) Oral daily  melatonin 10 milliGRAM(s) Oral at bedtime  mirtazapine 7.5 milliGRAM(s) Oral at bedtime  montelukast 10 milliGRAM(s) Oral at bedtime  pantoprazole    Tablet 40 milliGRAM(s) Oral every 12 hours  pregabalin 75 milliGRAM(s) Oral at bedtime    MEDICATIONS  (PRN):  acetaminophen   Tablet .. 650 milliGRAM(s) Oral every 6 hours PRN Temp greater or equal to 38.5C (101.3F), Moderate Pain (4 - 6)  dextrose 40% Gel 15 Gram(s) Oral once PRN Blood Glucose LESS THAN 70 milliGRAM(s)/deciliter  glucagon  Injectable 1 milliGRAM(s) IntraMuscular once PRN Glucose LESS THAN 70 milligrams/deciliter  ondansetron Injectable 4 milliGRAM(s) IV Push every 6 hours PRN Nausea

## 2019-12-15 NOTE — PROGRESS NOTE ADULT - REASON FOR ADMISSION
NAUSEA/VOMITING/DIARRHEA

## 2019-12-15 NOTE — DISCHARGE NOTE NURSING/CASE MANAGEMENT/SOCIAL WORK - PATIENT PORTAL LINK FT
You can access the FollowMyHealth Patient Portal offered by [facility] by registering at the following website: [email or web address]. By joining [facility]’s FollowMyHealth portal, you will also be able to view your health information using other applications (apps) compatible with our system.

## 2019-12-15 NOTE — PROGRESS NOTE ADULT - ASSESSMENT
80 Y/O FEMALE WITH THE ABOVE MED HX ADMITTED WITH NAUSEA, VOMITING, ANI    *N/V/D -- SECONDARY TO  NOROVIRUS  CLINICALLY IMPROVING, ambulate today (d/w rn)  SUPPORTIVE CARE  HYDRATED  CLINICALLY BETTER  CT ABD/PELVIS STABLE  NO ABD PAIN CURRENTLY  *ANI ON CKD - SECONDARY TO PRE RENAL AZOTEMIA, VOMITING AND DIARRHEA   HYDRATED AND NOW MONITORING OFF IVF DUE TO CHF, CR ABOUT THE SAME  CR AT HER BASELINE NOW  restart bumex at lower dose tomorrow, then gradually incrase as outpt to her regular dosing schedule  *ANEMIA - SECONDARY TO HEMODILUTION, HAD SOME GUAIC POSITIVE STOOLS  H/H STABLE  *DM - RESTART BASAL INSULIN AS OUTPT AT LOWER DOSE  ISS AND WITH BGM CHECKS STABLE - HOLD OFF ON LONG ACTING UNTIL STARTS EATING PO REGULARLY  NOT ON ACE INH OR ARB DUE TO HYPERKALEMIA  *HTN - CARDIZEM WITH BP PARAMETERS  *CHF - RESTART BUMEX ONLY ONCE A DAY, NOW ASTRID PO AND HYDRATED  SOME CRACKLES ON EXAM  WILL INCREASE BUMEX TO TWICE DAILY AS OUTPT  *DVT PROPHY - VENODYNES,  SQ HEPARIN   H/H STABLE FOR NOW MONITOR CLOSELY    DC PLANNING

## 2019-12-16 LAB
CULTURE RESULTS: SIGNIFICANT CHANGE UP
SPECIMEN SOURCE: SIGNIFICANT CHANGE UP

## 2019-12-19 DIAGNOSIS — Z88.2 ALLERGY STATUS TO SULFONAMIDES: ICD-10-CM

## 2019-12-19 DIAGNOSIS — I13.0 HYPERTENSIVE HEART AND CHRONIC KIDNEY DISEASE WITH HEART FAILURE AND STAGE 1 THROUGH STAGE 4 CHRONIC KIDNEY DISEASE, OR UNSPECIFIED CHRONIC KIDNEY DISEASE: ICD-10-CM

## 2019-12-19 DIAGNOSIS — N25.81 SECONDARY HYPERPARATHYROIDISM OF RENAL ORIGIN: ICD-10-CM

## 2019-12-19 DIAGNOSIS — J98.01 ACUTE BRONCHOSPASM: ICD-10-CM

## 2019-12-19 DIAGNOSIS — Z95.0 PRESENCE OF CARDIAC PACEMAKER: ICD-10-CM

## 2019-12-19 DIAGNOSIS — D64.9 ANEMIA, UNSPECIFIED: ICD-10-CM

## 2019-12-19 DIAGNOSIS — E11.42 TYPE 2 DIABETES MELLITUS WITH DIABETIC POLYNEUROPATHY: ICD-10-CM

## 2019-12-19 DIAGNOSIS — Z88.1 ALLERGY STATUS TO OTHER ANTIBIOTIC AGENTS STATUS: ICD-10-CM

## 2019-12-19 DIAGNOSIS — E11.22 TYPE 2 DIABETES MELLITUS WITH DIABETIC CHRONIC KIDNEY DISEASE: ICD-10-CM

## 2019-12-19 DIAGNOSIS — Z79.4 LONG TERM (CURRENT) USE OF INSULIN: ICD-10-CM

## 2019-12-19 DIAGNOSIS — I50.32 CHRONIC DIASTOLIC (CONGESTIVE) HEART FAILURE: ICD-10-CM

## 2019-12-19 DIAGNOSIS — H91.90 UNSPECIFIED HEARING LOSS, UNSPECIFIED EAR: ICD-10-CM

## 2019-12-19 DIAGNOSIS — Z96.653 PRESENCE OF ARTIFICIAL KNEE JOINT, BILATERAL: ICD-10-CM

## 2019-12-19 DIAGNOSIS — Z99.81 DEPENDENCE ON SUPPLEMENTAL OXYGEN: ICD-10-CM

## 2019-12-19 DIAGNOSIS — I25.10 ATHEROSCLEROTIC HEART DISEASE OF NATIVE CORONARY ARTERY WITHOUT ANGINA PECTORIS: ICD-10-CM

## 2019-12-19 DIAGNOSIS — M19.90 UNSPECIFIED OSTEOARTHRITIS, UNSPECIFIED SITE: ICD-10-CM

## 2019-12-19 DIAGNOSIS — Z90.49 ACQUIRED ABSENCE OF OTHER SPECIFIED PARTS OF DIGESTIVE TRACT: ICD-10-CM

## 2019-12-19 DIAGNOSIS — E03.9 HYPOTHYROIDISM, UNSPECIFIED: ICD-10-CM

## 2019-12-19 DIAGNOSIS — Z88.0 ALLERGY STATUS TO PENICILLIN: ICD-10-CM

## 2019-12-19 DIAGNOSIS — N17.9 ACUTE KIDNEY FAILURE, UNSPECIFIED: ICD-10-CM

## 2019-12-19 DIAGNOSIS — E87.5 HYPERKALEMIA: ICD-10-CM

## 2019-12-19 DIAGNOSIS — E78.5 HYPERLIPIDEMIA, UNSPECIFIED: ICD-10-CM

## 2019-12-19 DIAGNOSIS — N39.0 URINARY TRACT INFECTION, SITE NOT SPECIFIED: ICD-10-CM

## 2019-12-19 DIAGNOSIS — I25.2 OLD MYOCARDIAL INFARCTION: ICD-10-CM

## 2019-12-19 DIAGNOSIS — J44.9 CHRONIC OBSTRUCTIVE PULMONARY DISEASE, UNSPECIFIED: ICD-10-CM

## 2019-12-19 DIAGNOSIS — K21.9 GASTRO-ESOPHAGEAL REFLUX DISEASE WITHOUT ESOPHAGITIS: ICD-10-CM

## 2019-12-19 DIAGNOSIS — N18.3 CHRONIC KIDNEY DISEASE, STAGE 3 (MODERATE): ICD-10-CM

## 2019-12-19 DIAGNOSIS — R41.82 ALTERED MENTAL STATUS, UNSPECIFIED: ICD-10-CM

## 2019-12-19 DIAGNOSIS — F41.9 ANXIETY DISORDER, UNSPECIFIED: ICD-10-CM

## 2019-12-19 DIAGNOSIS — A08.11 ACUTE GASTROENTEROPATHY DUE TO NORWALK AGENT: ICD-10-CM

## 2020-01-01 ENCOUNTER — NON-APPOINTMENT (OUTPATIENT)
Age: 80
End: 2020-01-01

## 2020-01-01 ENCOUNTER — APPOINTMENT (OUTPATIENT)
Dept: UROLOGY | Facility: CLINIC | Age: 80
End: 2020-01-01
Payer: MEDICARE

## 2020-01-01 ENCOUNTER — OUTPATIENT (OUTPATIENT)
Dept: OUTPATIENT SERVICES | Facility: HOSPITAL | Age: 80
LOS: 1 days | Discharge: HOME | End: 2020-01-01
Payer: MEDICARE

## 2020-01-01 VITALS
SYSTOLIC BLOOD PRESSURE: 183 MMHG | RESPIRATION RATE: 15 BRPM | TEMPERATURE: 97 F | DIASTOLIC BLOOD PRESSURE: 73 MMHG | HEART RATE: 75 BPM | HEIGHT: 62 IN | WEIGHT: 169.09 LBS | OXYGEN SATURATION: 100 %

## 2020-01-01 VITALS
WEIGHT: 167 LBS | HEART RATE: 80 BPM | DIASTOLIC BLOOD PRESSURE: 78 MMHG | BODY MASS INDEX: 30.73 KG/M2 | HEIGHT: 62 IN | SYSTOLIC BLOOD PRESSURE: 176 MMHG | OXYGEN SATURATION: 94 %

## 2020-01-01 DIAGNOSIS — Z88.0 ALLERGY STATUS TO PENICILLIN: ICD-10-CM

## 2020-01-01 DIAGNOSIS — Z96.7 PRESENCE OF OTHER BONE AND TENDON IMPLANTS: Chronic | ICD-10-CM

## 2020-01-01 DIAGNOSIS — Z90.2 ACQUIRED ABSENCE OF LUNG [PART OF]: Chronic | ICD-10-CM

## 2020-01-01 DIAGNOSIS — F41.8 OTHER SPECIFIED ANXIETY DISORDERS: ICD-10-CM

## 2020-01-01 DIAGNOSIS — Z96.653 PRESENCE OF ARTIFICIAL KNEE JOINT, BILATERAL: ICD-10-CM

## 2020-01-01 DIAGNOSIS — K21.9 GASTRO-ESOPHAGEAL REFLUX DISEASE WITHOUT ESOPHAGITIS: ICD-10-CM

## 2020-01-01 DIAGNOSIS — Z88.2 ALLERGY STATUS TO SULFONAMIDES: ICD-10-CM

## 2020-01-01 DIAGNOSIS — Z90.49 ACQUIRED ABSENCE OF OTHER SPECIFIED PARTS OF DIGESTIVE TRACT: ICD-10-CM

## 2020-01-01 DIAGNOSIS — Z79.82 LONG TERM (CURRENT) USE OF ASPIRIN: ICD-10-CM

## 2020-01-01 DIAGNOSIS — M81.0 AGE-RELATED OSTEOPOROSIS WITHOUT CURRENT PATHOLOGICAL FRACTURE: ICD-10-CM

## 2020-01-01 DIAGNOSIS — Z98.890 OTHER SPECIFIED POSTPROCEDURAL STATES: Chronic | ICD-10-CM

## 2020-01-01 DIAGNOSIS — N39.0 URINARY TRACT INFECTION, SITE NOT SPECIFIED: ICD-10-CM

## 2020-01-01 DIAGNOSIS — Z12.11 ENCOUNTER FOR SCREENING FOR MALIGNANT NEOPLASM OF COLON: ICD-10-CM

## 2020-01-01 DIAGNOSIS — Z96.642 PRESENCE OF LEFT ARTIFICIAL HIP JOINT: ICD-10-CM

## 2020-01-01 DIAGNOSIS — Z79.51 LONG TERM (CURRENT) USE OF INHALED STEROIDS: ICD-10-CM

## 2020-01-01 DIAGNOSIS — K55.20 ANGIODYSPLASIA OF COLON WITHOUT HEMORRHAGE: ICD-10-CM

## 2020-01-01 DIAGNOSIS — Z88.1 ALLERGY STATUS TO OTHER ANTIBIOTIC AGENTS STATUS: ICD-10-CM

## 2020-01-01 DIAGNOSIS — Z90.49 ACQUIRED ABSENCE OF OTHER SPECIFIED PARTS OF DIGESTIVE TRACT: Chronic | ICD-10-CM

## 2020-01-01 DIAGNOSIS — Z96.659 PRESENCE OF UNSPECIFIED ARTIFICIAL KNEE JOINT: Chronic | ICD-10-CM

## 2020-01-01 DIAGNOSIS — E11.40 TYPE 2 DIABETES MELLITUS WITH DIABETIC NEUROPATHY, UNSPECIFIED: ICD-10-CM

## 2020-01-01 DIAGNOSIS — Z98.49 CATARACT EXTRACTION STATUS, UNSPECIFIED EYE: Chronic | ICD-10-CM

## 2020-01-01 DIAGNOSIS — E78.5 HYPERLIPIDEMIA, UNSPECIFIED: ICD-10-CM

## 2020-01-01 DIAGNOSIS — N95.2 POSTMENOPAUSAL ATROPHIC VAGINITIS: ICD-10-CM

## 2020-01-01 DIAGNOSIS — D64.9 ANEMIA, UNSPECIFIED: ICD-10-CM

## 2020-01-01 DIAGNOSIS — Z79.4 LONG TERM (CURRENT) USE OF INSULIN: ICD-10-CM

## 2020-01-01 DIAGNOSIS — Z88.6 ALLERGY STATUS TO ANALGESIC AGENT: ICD-10-CM

## 2020-01-01 DIAGNOSIS — Z95.0 PRESENCE OF CARDIAC PACEMAKER: ICD-10-CM

## 2020-01-01 DIAGNOSIS — I10 ESSENTIAL (PRIMARY) HYPERTENSION: ICD-10-CM

## 2020-01-01 DIAGNOSIS — J44.9 CHRONIC OBSTRUCTIVE PULMONARY DISEASE, UNSPECIFIED: ICD-10-CM

## 2020-01-01 DIAGNOSIS — K62.1 RECTAL POLYP: ICD-10-CM

## 2020-01-01 LAB
APPEARANCE: ABNORMAL
BACTERIA UR CULT: ABNORMAL
BACTERIA: ABNORMAL
BILIRUBIN URINE: NEGATIVE
BLOOD URINE: NEGATIVE
COLOR: NORMAL
GLUCOSE QUALITATIVE U: NEGATIVE
HYALINE CASTS: 4 /LPF
KETONES URINE: NEGATIVE
LEUKOCYTE ESTERASE URINE: ABNORMAL
MICROSCOPIC-UA: NORMAL
NITRITE URINE: NEGATIVE
PH URINE: 6.5
PROTEIN URINE: ABNORMAL
RED BLOOD CELLS URINE: 2 /HPF
SPECIFIC GRAVITY URINE: 1.01
SQUAMOUS EPITHELIAL CELLS: 1 /HPF
UROBILINOGEN URINE: NORMAL
WHITE BLOOD CELLS URINE: 485 /HPF

## 2020-01-01 PROCEDURE — 82962 GLUCOSE BLOOD TEST: CPT

## 2020-01-01 PROCEDURE — 99214 OFFICE O/P EST MOD 30 MIN: CPT | Mod: 25

## 2020-01-01 PROCEDURE — 51701 INSERT BLADDER CATHETER: CPT

## 2020-01-01 PROCEDURE — 99214 OFFICE O/P EST MOD 30 MIN: CPT

## 2020-01-01 RX ORDER — ESTRADIOL 0.1 MG/G
0.1 CREAM VAGINAL
Qty: 1 | Refills: 3 | Status: ACTIVE | COMMUNITY
Start: 2020-01-01 | End: 1900-01-01

## 2020-01-01 RX ORDER — LEVOFLOXACIN 250 MG/1
250 TABLET, FILM COATED ORAL
Qty: 15 | Refills: 0 | Status: ACTIVE | COMMUNITY
Start: 2020-01-01 | End: 1900-01-01

## 2020-01-03 NOTE — ED ADULT NURSE NOTE - SUICIDE SCREENING QUESTION 1
Patient:   NICOLE CHAPARRO            MRN: [id number]            FIN: [id number]              Age:   64 years     Sex:  MALE     :  55   Associated Diagnoses:   None   Author:   FABIOLA, BRENDA     Subjective                       (Inserted Image. Unable to display)                                   Additional Info   - Eating OK   - No further emesis  - Back on Full liquid diet  - Remains off IVF's  - Breathing and chronic dependent edema stable   - Sugars better this AM  - BP stable  HPI:  63 yo male transferred to [facility] ED from ECF w hypotension; blood pressure of 70 and elevated heart rate in the 160s. ECF claimed he seem less active.  Noted to be severely hypernatremia (Na - 172); hypotensive w lactic acidodis  Noted to have SVT and given adenosine w NSR response  Given 2+ L NSS in ED  PMH:  from F record:  cerebral infarct w R hemiparesis  heart failure  obesity  PAF  TYpe 2 DM  HTN  NH lymphoma  Vascular dementia  Medication:  Allopurinol 300 mg tab  300 mg 1 tab, Oral, Daily  ApixaBAN 5 mg tab  5 mg 1 tab, Oral, Q12H  Atorvastatin 80 mg tab  80 mg 1 tab, Oral, Q Bedtime  Bacitracin ointment UD packet  1 application, Topical, BID  Carboxymethylcellulose tears 1% ophth gel soln 0.4 mL  1 drop, Left Eye, QID  Cholecalciferol 1,000 unit (25 mcg) tab  1,000 unit 1 tab, Oral, Daily  insulin glargine  40 unit 0.4 mL, Subcutaneous, Q Bedtime  Insulin human lispro 1 unit/0.01 mL inj  2-10 unit, Subcutaneous, QID [after meals & HS]  Ketotifen 0.025% (as fumarate 0.035%) ophth soln 5 mL  1 drop, Left Eye, BID  Metoprolol tartrate 25 mg tab  25 mg 1 tab, Oral, Q12H  Nystatin powder 15 gm  1 application, Topical, Q Shift (8 hr)   .       History of Present Illness   NOTES   Past Medical History:  Cerebral infarction  Essential hypertension  Heart failure  Hemiplegia and hemiparesis  Hyperlipemia  Lymphedema  Morbid obesity due to excess calories  Non Hodgkin's lymphoma  Superficial mycosis, unspecified  Type 2  diabetes mellitus  Vascular dementia without behavioral disturbance  Past Surgical History:  No qualifying data available.  Family History:  No family history recorded.  Social History:No qualifying data available.  has state guardian  Allergies (1) Active Reaction  NKA None Documented  Home Medications (15) Active  acetaminophen 325 mg oral tablet, disintegrating 650 mg = 2 tab, PRN, Oral, Q4H  allopurinol oral 300 mg tablet 300 mg = 1 tab, Oral, Daily  apixaBAN oral 5 mg tablet 5 mg = 1 tab, Oral, Q12H  Artificial Tears ophthalmic solution (preserved) 1 drop, Left Eye, QID  atorvastatin oral 80 mg tablet 80 mg = 1 tab, Oral, Q Bedtime  bacitracin topical 500 unit/gm ointment 1 application, Topical, BID  cholecalciferol 1000 intl units oral capsule 1,000 unit = 1 cap, Oral, Daily  guaiFENesin oral 100 mg/5 mL liquid (Robitussin) 200 mg = 10 mL, PRN, Oral, Q6H  insulin glargine (Lantus) subcutaneous injection 100 unit/mL 39 unit, Subcutaneous, Q Bedtime  Metoprolol Tartrate 25 mg oral tablet 25 mg = 1 tab, Oral, Q12H  NovoLOG (insulin aspart) 100 units/mL injectable solution See Instructions  nystatin topical 100,000 unit/gm powder (Mycostatin) 1 application, Topical, Q Shift (8 hr)  Pataday 0.2% ophthalmic solution 1 drop, Left Eye, Daily  potassium CHLORIDE oral 20 mEq ER tablet (KCl / K-Dur) 20 mEq = 1 tab, Oral, BID  Santyl topical 250 unit/gm ointment 1 application, Topical, Daily  ROS - unable to assessdue to MS change.       Physical Examination   VS/Measurements     Vitals between:   02-JAN-2020 12:58:59   TO   03-JAN-2020 12:58:59                   LAST RESULT MINIMUM MAXIMUM  Temperature 36.4 36.1 36.6  Heart Rate 82 81 90  Respiratory Rate 16 16 18  NISBP           130 104 148  NIDBP           68 68 89  NIMBP           109 80 109  SpO2                    98 96 98    General:  alert.    Eye:  Normal conjunctiva.    HENT:  moist mucus membranes.    Neck:  Supple.    Respiratory:  Lungs are clear to  auscultation, Respirations are non-labored.   Cardiovascular:  Normal rate, Regular rhythm, trace dependent edema .   Gastrointestinal:  Soft, Non-tender.    Musculoskeletal:  hemiparesis R .    Integumentary:  Warm.    Neurologic:  Alert.      Review / Management   Laboratory results:    Chemistry  Sodium: 142 mmol/L (01/03/20 05:55:16)  Chloride: 110 mmol/L High (01/03/20 05:55:16)  BUN: 21 mg/dL High (01/03/20 05:55:16)  Glucose Level: 152 mg/dL High (01/03/20 05:55:16)  Potassium: 3.3 mmol/L Low (01/03/20 05:55:16)  Carbon Dioxide (CO2): 26 mmol/L (01/03/20 05:55:16)  Creatinine: 1.06 mg/dL (01/03/20 05:55:16)  Calcium: 7.9 mg/dL Low (01/03/20 05:55:16)  Magnesium: 1.9 mg/dL (01/03/20 05:55:17)  Phosphorus: 2.4 mg/dL (01/03/20 05:55:18)  CBC  WBC: 5.4 THOUSAND/mcL (01/03/20 05:24:10)  Hemoglobin: 11.9 gm/dL Low (01/03/20 05:24:10)  Hematocrit: 35.6 % Low (01/03/20 05:24:10)  Platelet: 112 THOUSAND/mcL Low (01/03/20 05:24:10)  MCV: 92.2 fL (01/03/20 05:24:10)  RDW-CV: 14.3 % (01/03/20 05:24:10)   .      Impression and Plan   Dx and Plan:  Diagnosis     IMPRESSION:  - Hypernatermia w/ lack of intake +/- osmotic diuresis from hyperglyceia  --> resolved  - MAUREEN from hypovolemia -->resolved  - 3rd spacing and suspect low colloids  - Hypokalemia and hypophosphatemia a/w refeeding and renal recovery  - DM poorly controlled but better BS's today   - HTN and s/p stroke  - Projectile vomiting 1/1 suspected due to ileus -> resolved  PLAN:  - Advance diet as tolerated  - No diuretics  - KCl 20 mEq PO X 3 today  - Serial bladder US's for PVR check  - Daily weights   - BMP, Phos and Mg in AM  - Will sign off, call if questions  Charlie Girard MD  [facility], FACP   Office [phone]   [facility]   [address]   [address]   [place] [address].     .   No

## 2020-02-20 NOTE — H&P ADULT - ASSESSMENT
80 Y/O FEMALE WITH THE ABOVE MED HX ADMITTED WITH NAUSEA, VOMITING, ANI    *N/V/D -- WITH GUAIC POSITIVE STOOLS  PLACE ON PPI  H/H STABLE  GI EVAL  HYDRATE GENTLY  MONITOR FOR ANY BLEEDING  ?GASTROENTERITIS - CHECK STOOL STUDIES AND C DIFF WITH RECENT ABX  NO ABD PAIN CURRENTLY  ANTIEMETICS  *ANI - SECONDARY TO PRE RENAL AZOTEMIA, VOMITING AND DIARRHEA   HYDRATE GENTLY, HX OF CHF  *DM - HOLD OFF ON BASAL INSULIN DUE TO VOMITING; HYDRATE  ISS AND WITH BGM CHECKS  NOT ON ACE INH OR ARB DUE TO HYPERKALEMIA  *HTN - CARDIZEM WITH BP PARAMETERS  *CHF - GENTLE HYDRATION, HOLD BUMEX  *DVT PROPHY - VENODYNES, IN THE SETTING OF DARK STOOLS WILL HOL DOFF ON SQ HEPARIN  H/H STABLE FOR NOW MONITOR CLOSELY  IF STAYS STABLE WILL START SQ HEPARIN IN AM
n/a

## 2020-02-25 NOTE — CONSULT NOTE ADULT - HEMATOLOGY/LYMPHATICS
Seen and examined, chart and note reviewed. case discussed with SICU team    Intestinal obstruction, resolved  a.  Denies pain  b. Tolerating diet  c.  Rehab plaement    Pulmonary fibrosis/hyponatremia  a.  Lasix 40mg negative

## 2020-04-15 NOTE — H&P ADULT - PROBLEM/PLAN-1
Contacted pt to discuss options for OT/PTevaluation during public health emergency. Discussed use of video visit however pt does not have a computer or cell phone. Pt does have swelling with his hand however is elevating the hand. Discussed importance of continuing with HEP as provided from IRP and home health until clinic is reopened. Will continue to monitor pt via phone calls and update pt when clinic is to be reopened.  
DISPLAY PLAN FREE TEXT

## 2020-05-07 NOTE — ED PROVIDER NOTE - CADM POA PRESS ULCER
0130: patient removed BiPAP refusing to put back on. Patient educated on importance. RT also at bedside. Patient became more hypoxic and confused, unable to say where she is at. MD called to bedside. Patient placed back into bed. patient oxygen saturations 50-60%, patient more grey in color.     0140: time out called, patient re-intubated    No

## 2020-08-21 NOTE — ED ADULT NURSE NOTE - NSSISCREENINGQ4_ED_A_ED
Adam Lee  1962  [id number]    Have you had any Chest Pain - No       Have you had any Shortness of Breath - Yes  If Yes - When on exertion    Have you had any dizziness - No       Have you had any palpitations - Yes  If Yes DO EKG - Do you feel your heart fluttering  How long does it last - seconds     Is the patient on any of the following medications - NONE  If Yes DO EKG    Do you have any edema - swelling in Legs to feet    If Yes - CHECK TO SEE IF THE EDEMA IS PITTING  How long have they been having edema - Months  If Yes - Have they worn compression stockings Yes    Check Venous \"LEG PROBLEM Questionnaire\"    Do you have a surgery or procedure scheduled in the near future - No  If Yes- DO EKG      Ask patient if they want to sign up for MyChart if they are not already signed up    Check to see if we have an E-MAIL on file for the patient    Check medication list thoroughly!!!  BE SURE TO ASK PATIENT IF THEY NEED MEDICATION REFILLS
No

## 2020-10-06 PROBLEM — I25.10 ATHEROSCLEROTIC HEART DISEASE OF NATIVE CORONARY ARTERY WITHOUT ANGINA PECTORIS: Chronic | Status: ACTIVE | Noted: 2019-12-20

## 2020-10-06 PROBLEM — Z95.0 PRESENCE OF CARDIAC PACEMAKER: Chronic | Status: ACTIVE | Noted: 2019-12-20

## 2020-10-21 NOTE — ASU PATIENT PROFILE, ADULT - NS PRO AD ANY ON CHART
----- Message from Leticia Cadena sent at 9/16/2019  1:16 PM CDT -----  Contact: daughter [phone]  Patient says James has been sending refill requests since last week and has not gotten any response. Please send refills for valsartan (DIOVAN) 160 MG tablet, traMADol (ULTRAM) 50 mg tablet, and levothyroxine (SYNTHROID) 125 MCG tablet to [id number] - [place] - [address] AT [facility] [address]   Yes/damaris harley [phone]

## 2020-10-21 NOTE — ASU PATIENT PROFILE, ADULT - PMH
Anxiety    CAD (coronary artery disease)    Chronic diastolic congestive heart failure  last exacerbation 7/2017  Chronic obstructive pulmonary disease, unspecified COPD type    Chronic UTI    CKD (chronic kidney disease)    Diabetes    Essential hypertension    GERD (gastroesophageal reflux disease)    [place] (hard of hearing)  hearing aid  Hyperlipidemia, unspecified hyperlipidemia type    Hypothyroid    MI, old  x3  Neuropathy    Pacemaker

## 2020-12-04 PROBLEM — N39.0 ACUTE UTI: Status: ACTIVE | Noted: 2019-08-09

## 2020-12-09 NOTE — LETTER BODY
[Dear  ___] : Dear  [unfilled], [Courtesy Letter:] : I had the pleasure of seeing your patient, [unfilled], in my office today. [Please see my note below.] : Please see my note below. [Sincerely,] : Sincerely, [FreeTextEntry3] : Jone Beavers MD\par  of Urology\par [facility]\par \par Offices:\par [facility] @\par [address], [place] [address]\par and\par [address], [place] [address], [address]\par and\par [address] [address]\par \par TEL: [phone]\par FAX: [phone]

## 2020-12-09 NOTE — PHYSICAL EXAM
[Normal Appearance] : normal appearance [General Appearance - In No Acute Distress] : no acute distress [Abdomen Soft] : soft [Abdomen Tenderness] : non-tender [Costovertebral Angle Tenderness] : no ~M costovertebral angle tenderness [Urethral Meatus] : the meatus of the urethra showed no abnormalities [Atrophy] : atrophy [Skin Color & Pigmentation] : normal skin color and pigmentation [FreeTextEntry1] : normal peripheral circulation  [] : no respiratory distress [Oriented To Time, Place, And Person] : oriented to person, place, and time

## 2020-12-09 NOTE — ASSESSMENT
[FreeTextEntry1] : Urinary tract infection:\par Recurrent urinary tract infections:\par Atrophic vaginitis:\par \par Reviewed outside records: Cr- 2.38 in October 2020. \par Obtained catheterized specimen. Will get Urinalysis and Urine culture. Will inform results. \par Has Ciprofloxacin allergy but tolerates Levaquin\par Will treat with Antibiotics, continue prophylaxis and start Estrogen. \par \par \par

## 2020-12-09 NOTE — HISTORY OF PRESENT ILLNESS
[FreeTextEntry1] : 81 yo female presents for Recurrent urinary tract infections. \par Accompanied by Daughter: Celia(RN). Tele: [phone].\par In the last few few months has had recurrent Urinary tract infections. Has mental status changes, dysuria and pelvic pain. \par Takes diuretic, urinates frequently, has urinary incontinence and wears diaper.\par Not using Estrogen cream. \par \par Initially seen for Recurrent urinary tract infections. \par 3 in last 6 months. \par UTIs characterized by cloudy urine. \par Was following with Dr Headley. Takes Cranberry tablet. \par Takes diuretic and develops frequency and diarrhea. \par Normal daytime frequency is every hours or so. Nocturia of 2 x. \par Endorses urinary incontinence, wears diapers- 2/day. \par  \par

## 2020-12-31 PROBLEM — N95.2 ATROPHIC VAGINITIS: Status: ACTIVE | Noted: 2019-07-17

## 2020-12-31 NOTE — PHYSICAL EXAM
[General Appearance - In No Acute Distress] : no acute distress [FreeTextEntry1] : normal peripheral circulation  [] : no respiratory distress [Oriented To Time, Place, And Person] : oriented to person, place, and time

## 2020-12-31 NOTE — ASSESSMENT
[FreeTextEntry1] : Recurrent urinary tract infections:\par Atrophic vaginitis:\par Continue Estrogen cream. \par Will do Levaquin for 2 weeks. \par \par Return to office in 2 months or sooner if any issues.

## 2020-12-31 NOTE — LETTER BODY
[Dear  ___] : Dear  [unfilled], [Courtesy Letter:] : I had the pleasure of seeing your patient, [unfilled], in my office today. [Please see my note below.] : Please see my note below. [Sincerely,] : Sincerely, [FreeTextEntry3] : Jone Beavers MD\par  of Urology\par [facility]\par \par Offices:\par [facility] @\par [address], [place] [address]\par and\par [address], [place] [address], [address]\par and\par [address] [address]\par \par TEL: [phone]\par FAX: [phone]

## 2021-01-01 ENCOUNTER — TRANSCRIPTION ENCOUNTER (OUTPATIENT)
Age: 81
End: 2021-01-01

## 2021-01-01 ENCOUNTER — INPATIENT (INPATIENT)
Facility: HOSPITAL | Age: 81
LOS: 2 days | Discharge: HOME HEALTH | DRG: 205 | End: 2021-04-10
Attending: FAMILY MEDICINE | Admitting: INTERNAL MEDICINE
Payer: MEDICARE

## 2021-01-01 ENCOUNTER — APPOINTMENT (OUTPATIENT)
Dept: UROLOGY | Facility: CLINIC | Age: 81
End: 2021-01-01
Payer: MEDICARE

## 2021-01-01 ENCOUNTER — INPATIENT (INPATIENT)
Facility: HOSPITAL | Age: 81
LOS: 4 days | Discharge: HOME HEALTH | DRG: 312 | End: 2021-03-17
Attending: FAMILY MEDICINE | Admitting: INTERNAL MEDICINE
Payer: MEDICARE

## 2021-01-01 ENCOUNTER — APPOINTMENT (OUTPATIENT)
Dept: NEUROLOGY | Facility: CLINIC | Age: 81
End: 2021-01-01
Payer: MEDICARE

## 2021-01-01 ENCOUNTER — EMERGENCY (EMERGENCY)
Facility: HOSPITAL | Age: 81
LOS: 0 days | End: 2021-04-10
Attending: HOSPITALIST
Payer: MEDICARE

## 2021-01-01 VITALS — HEART RATE: 79 BPM | TEMPERATURE: 97 F | OXYGEN SATURATION: 94 % | RESPIRATION RATE: 18 BRPM

## 2021-01-01 VITALS
HEIGHT: 62 IN | WEIGHT: 157 LBS | HEART RATE: 82 BPM | TEMPERATURE: 97.8 F | BODY MASS INDEX: 28.89 KG/M2 | DIASTOLIC BLOOD PRESSURE: 80 MMHG | SYSTOLIC BLOOD PRESSURE: 128 MMHG

## 2021-01-01 VITALS
RESPIRATION RATE: 18 BRPM | DIASTOLIC BLOOD PRESSURE: 61 MMHG | HEART RATE: 60 BPM | SYSTOLIC BLOOD PRESSURE: 150 MMHG | TEMPERATURE: 98 F | OXYGEN SATURATION: 99 %

## 2021-01-01 VITALS
TEMPERATURE: 97 F | HEIGHT: 62 IN | RESPIRATION RATE: 20 BRPM | HEART RATE: 44 BPM | WEIGHT: 190.04 LBS | OXYGEN SATURATION: 74 % | SYSTOLIC BLOOD PRESSURE: 160 MMHG | DIASTOLIC BLOOD PRESSURE: 90 MMHG

## 2021-01-01 VITALS — HEIGHT: 62 IN

## 2021-01-01 VITALS
RESPIRATION RATE: 20 BRPM | DIASTOLIC BLOOD PRESSURE: 35 MMHG | SYSTOLIC BLOOD PRESSURE: 53 MMHG | OXYGEN SATURATION: 89 % | HEART RATE: 111 BPM

## 2021-01-01 DIAGNOSIS — E11.22 TYPE 2 DIABETES MELLITUS WITH DIABETIC CHRONIC KIDNEY DISEASE: ICD-10-CM

## 2021-01-01 DIAGNOSIS — Z99.81 DEPENDENCE ON SUPPLEMENTAL OXYGEN: ICD-10-CM

## 2021-01-01 DIAGNOSIS — R55 SYNCOPE AND COLLAPSE: ICD-10-CM

## 2021-01-01 DIAGNOSIS — R25.1 TREMOR, UNSPECIFIED: ICD-10-CM

## 2021-01-01 DIAGNOSIS — E11.40 TYPE 2 DIABETES MELLITUS WITH DIABETIC NEUROPATHY, UNSPECIFIED: ICD-10-CM

## 2021-01-01 DIAGNOSIS — I95.1 ORTHOSTATIC HYPOTENSION: ICD-10-CM

## 2021-01-01 DIAGNOSIS — Z79.899 OTHER LONG TERM (CURRENT) DRUG THERAPY: ICD-10-CM

## 2021-01-01 DIAGNOSIS — Z98.49 CATARACT EXTRACTION STATUS, UNSPECIFIED EYE: Chronic | ICD-10-CM

## 2021-01-01 DIAGNOSIS — Z98.890 OTHER SPECIFIED POSTPROCEDURAL STATES: Chronic | ICD-10-CM

## 2021-01-01 DIAGNOSIS — N18.9 CHRONIC KIDNEY DISEASE, UNSPECIFIED: ICD-10-CM

## 2021-01-01 DIAGNOSIS — I25.10 ATHEROSCLEROTIC HEART DISEASE OF NATIVE CORONARY ARTERY WITHOUT ANGINA PECTORIS: ICD-10-CM

## 2021-01-01 DIAGNOSIS — E87.6 HYPOKALEMIA: ICD-10-CM

## 2021-01-01 DIAGNOSIS — Z88.1 ALLERGY STATUS TO OTHER ANTIBIOTIC AGENTS STATUS: ICD-10-CM

## 2021-01-01 DIAGNOSIS — J44.9 CHRONIC OBSTRUCTIVE PULMONARY DISEASE, UNSPECIFIED: ICD-10-CM

## 2021-01-01 DIAGNOSIS — I50.32 CHRONIC DIASTOLIC (CONGESTIVE) HEART FAILURE: ICD-10-CM

## 2021-01-01 DIAGNOSIS — R32 UNSPECIFIED URINARY INCONTINENCE: ICD-10-CM

## 2021-01-01 DIAGNOSIS — I25.2 OLD MYOCARDIAL INFARCTION: ICD-10-CM

## 2021-01-01 DIAGNOSIS — Z90.2 ACQUIRED ABSENCE OF LUNG [PART OF]: Chronic | ICD-10-CM

## 2021-01-01 DIAGNOSIS — R09.02 HYPOXEMIA: ICD-10-CM

## 2021-01-01 DIAGNOSIS — Z95.0 PRESENCE OF CARDIAC PACEMAKER: ICD-10-CM

## 2021-01-01 DIAGNOSIS — I46.9 CARDIAC ARREST, CAUSE UNSPECIFIED: ICD-10-CM

## 2021-01-01 DIAGNOSIS — Z79.82 LONG TERM (CURRENT) USE OF ASPIRIN: ICD-10-CM

## 2021-01-01 DIAGNOSIS — I13.0 HYPERTENSIVE HEART AND CHRONIC KIDNEY DISEASE WITH HEART FAILURE AND STAGE 1 THROUGH STAGE 4 CHRONIC KIDNEY DISEASE, OR UNSPECIFIED CHRONIC KIDNEY DISEASE: ICD-10-CM

## 2021-01-01 DIAGNOSIS — E11.8 TYPE 2 DIABETES MELLITUS WITH UNSPECIFIED COMPLICATIONS: ICD-10-CM

## 2021-01-01 DIAGNOSIS — N17.9 ACUTE KIDNEY FAILURE, UNSPECIFIED: ICD-10-CM

## 2021-01-01 DIAGNOSIS — F41.9 ANXIETY DISORDER, UNSPECIFIED: ICD-10-CM

## 2021-01-01 DIAGNOSIS — M54.9 DORSALGIA, UNSPECIFIED: ICD-10-CM

## 2021-01-01 DIAGNOSIS — Z88.0 ALLERGY STATUS TO PENICILLIN: ICD-10-CM

## 2021-01-01 DIAGNOSIS — Z90.49 ACQUIRED ABSENCE OF OTHER SPECIFIED PARTS OF DIGESTIVE TRACT: Chronic | ICD-10-CM

## 2021-01-01 DIAGNOSIS — E03.9 HYPOTHYROIDISM, UNSPECIFIED: ICD-10-CM

## 2021-01-01 DIAGNOSIS — G90.9 DISORDER OF THE AUTONOMIC NERVOUS SYSTEM, UNSPECIFIED: ICD-10-CM

## 2021-01-01 DIAGNOSIS — N18.4 CHRONIC KIDNEY DISEASE, STAGE 4 (SEVERE): ICD-10-CM

## 2021-01-01 DIAGNOSIS — G90.8 OTHER DISORDERS OF AUTONOMIC NERVOUS SYSTEM: ICD-10-CM

## 2021-01-01 DIAGNOSIS — Z96.7 PRESENCE OF OTHER BONE AND TENDON IMPLANTS: Chronic | ICD-10-CM

## 2021-01-01 DIAGNOSIS — Z88.2 ALLERGY STATUS TO SULFONAMIDES: ICD-10-CM

## 2021-01-01 DIAGNOSIS — K21.9 GASTRO-ESOPHAGEAL REFLUX DISEASE WITHOUT ESOPHAGITIS: ICD-10-CM

## 2021-01-01 DIAGNOSIS — Z96.659 PRESENCE OF UNSPECIFIED ARTIFICIAL KNEE JOINT: ICD-10-CM

## 2021-01-01 DIAGNOSIS — E86.0 DEHYDRATION: ICD-10-CM

## 2021-01-01 DIAGNOSIS — R77.8 OTHER SPECIFIED ABNORMALITIES OF PLASMA PROTEINS: ICD-10-CM

## 2021-01-01 DIAGNOSIS — E78.5 HYPERLIPIDEMIA, UNSPECIFIED: ICD-10-CM

## 2021-01-01 DIAGNOSIS — Z96.659 PRESENCE OF UNSPECIFIED ARTIFICIAL KNEE JOINT: Chronic | ICD-10-CM

## 2021-01-01 DIAGNOSIS — Z79.4 LONG TERM (CURRENT) USE OF INSULIN: ICD-10-CM

## 2021-01-01 DIAGNOSIS — N39.0 URINARY TRACT INFECTION, SITE NOT SPECIFIED: ICD-10-CM

## 2021-01-01 DIAGNOSIS — D63.1 ANEMIA IN CHRONIC KIDNEY DISEASE: ICD-10-CM

## 2021-01-01 DIAGNOSIS — Z79.890 HORMONE REPLACEMENT THERAPY: ICD-10-CM

## 2021-01-01 DIAGNOSIS — I44.2 ATRIOVENTRICULAR BLOCK, COMPLETE: ICD-10-CM

## 2021-01-01 DIAGNOSIS — M25.551 PAIN IN RIGHT HIP: ICD-10-CM

## 2021-01-01 DIAGNOSIS — I50.33 ACUTE ON CHRONIC DIASTOLIC (CONGESTIVE) HEART FAILURE: ICD-10-CM

## 2021-01-01 DIAGNOSIS — Z87.440 PERSONAL HISTORY OF URINARY (TRACT) INFECTIONS: ICD-10-CM

## 2021-01-01 DIAGNOSIS — I24.8 OTHER FORMS OF ACUTE ISCHEMIC HEART DISEASE: ICD-10-CM

## 2021-01-01 DIAGNOSIS — Z20.822 CONTACT WITH AND (SUSPECTED) EXPOSURE TO COVID-19: ICD-10-CM

## 2021-01-01 DIAGNOSIS — M19.91 PRIMARY OSTEOARTHRITIS, UNSPECIFIED SITE: ICD-10-CM

## 2021-01-01 DIAGNOSIS — N18.5 CHRONIC KIDNEY DISEASE, STAGE 5: ICD-10-CM

## 2021-01-01 DIAGNOSIS — I95.9 HYPOTENSION, UNSPECIFIED: ICD-10-CM

## 2021-01-01 LAB
A1C WITH ESTIMATED AVERAGE GLUCOSE RESULT: 8.7 % — HIGH (ref 4–5.6)
ALBUMIN SERPL ELPH-MCNC: 2.9 G/DL — LOW (ref 3.3–5)
ALBUMIN SERPL ELPH-MCNC: 2.9 G/DL — LOW (ref 3.3–5)
ALBUMIN SERPL ELPH-MCNC: 3 G/DL — LOW (ref 3.3–5)
ALBUMIN SERPL ELPH-MCNC: 3 G/DL — LOW (ref 3.3–5)
ALBUMIN SERPL ELPH-MCNC: 3.1 G/DL — LOW (ref 3.3–5)
ALBUMIN SERPL ELPH-MCNC: 3.5 G/DL — SIGNIFICANT CHANGE UP (ref 3.3–5)
ALP SERPL-CCNC: 107 U/L — SIGNIFICANT CHANGE UP (ref 40–120)
ALP SERPL-CCNC: 79 U/L — SIGNIFICANT CHANGE UP (ref 40–120)
ALP SERPL-CCNC: 82 U/L — SIGNIFICANT CHANGE UP (ref 40–120)
ALP SERPL-CCNC: 92 U/L — SIGNIFICANT CHANGE UP (ref 40–120)
ALP SERPL-CCNC: 96 U/L — SIGNIFICANT CHANGE UP (ref 40–120)
ALP SERPL-CCNC: 96 U/L — SIGNIFICANT CHANGE UP (ref 40–120)
ALT FLD-CCNC: 15 U/L — SIGNIFICANT CHANGE UP (ref 12–78)
ALT FLD-CCNC: 16 U/L — SIGNIFICANT CHANGE UP (ref 12–78)
ALT FLD-CCNC: 18 U/L — SIGNIFICANT CHANGE UP (ref 12–78)
ALT FLD-CCNC: 25 U/L — SIGNIFICANT CHANGE UP (ref 12–78)
ALT FLD-CCNC: 25 U/L — SIGNIFICANT CHANGE UP (ref 12–78)
ALT FLD-CCNC: 29 U/L — SIGNIFICANT CHANGE UP (ref 12–78)
ANION GAP SERPL CALC-SCNC: 14 MMOL/L — SIGNIFICANT CHANGE UP (ref 5–17)
ANION GAP SERPL CALC-SCNC: 2 MMOL/L — LOW (ref 5–17)
ANION GAP SERPL CALC-SCNC: 3 MMOL/L — LOW (ref 5–17)
ANION GAP SERPL CALC-SCNC: 4 MMOL/L — LOW (ref 5–17)
ANION GAP SERPL CALC-SCNC: 4 MMOL/L — LOW (ref 5–17)
ANION GAP SERPL CALC-SCNC: 5 MMOL/L — SIGNIFICANT CHANGE UP (ref 5–17)
ANION GAP SERPL CALC-SCNC: 6 MMOL/L — SIGNIFICANT CHANGE UP (ref 5–17)
ANION GAP SERPL CALC-SCNC: 6 MMOL/L — SIGNIFICANT CHANGE UP (ref 5–17)
ANION GAP SERPL CALC-SCNC: 7 MMOL/L — SIGNIFICANT CHANGE UP (ref 5–17)
APPEARANCE UR: CLEAR — SIGNIFICANT CHANGE UP
AST SERPL-CCNC: 13 U/L — LOW (ref 15–37)
AST SERPL-CCNC: 17 U/L — SIGNIFICANT CHANGE UP (ref 15–37)
AST SERPL-CCNC: 19 U/L — SIGNIFICANT CHANGE UP (ref 15–37)
AST SERPL-CCNC: 22 U/L — SIGNIFICANT CHANGE UP (ref 15–37)
AST SERPL-CCNC: 32 U/L — SIGNIFICANT CHANGE UP (ref 15–37)
AST SERPL-CCNC: 69 U/L — HIGH (ref 15–37)
BASOPHILS # BLD AUTO: 0.05 K/UL — SIGNIFICANT CHANGE UP (ref 0–0.2)
BASOPHILS NFR BLD AUTO: 0.6 % — SIGNIFICANT CHANGE UP (ref 0–2)
BASOPHILS NFR BLD AUTO: 0.6 % — SIGNIFICANT CHANGE UP (ref 0–2)
BASOPHILS NFR BLD AUTO: 0.7 % — SIGNIFICANT CHANGE UP (ref 0–2)
BILIRUB SERPL-MCNC: 0.4 MG/DL — SIGNIFICANT CHANGE UP (ref 0.2–1.2)
BILIRUB SERPL-MCNC: 0.5 MG/DL — SIGNIFICANT CHANGE UP (ref 0.2–1.2)
BILIRUB SERPL-MCNC: 0.5 MG/DL — SIGNIFICANT CHANGE UP (ref 0.2–1.2)
BILIRUB SERPL-MCNC: 0.6 MG/DL — SIGNIFICANT CHANGE UP (ref 0.2–1.2)
BILIRUB SERPL-MCNC: 0.6 MG/DL — SIGNIFICANT CHANGE UP (ref 0.2–1.2)
BILIRUB SERPL-MCNC: 0.7 MG/DL — SIGNIFICANT CHANGE UP (ref 0.2–1.2)
BILIRUB UR-MCNC: NEGATIVE — SIGNIFICANT CHANGE UP
BUN SERPL-MCNC: 56 MG/DL — HIGH (ref 7–23)
BUN SERPL-MCNC: 57 MG/DL — HIGH (ref 7–23)
BUN SERPL-MCNC: 57 MG/DL — HIGH (ref 7–23)
BUN SERPL-MCNC: 59 MG/DL — HIGH (ref 7–23)
BUN SERPL-MCNC: 68 MG/DL — HIGH (ref 7–23)
BUN SERPL-MCNC: 74 MG/DL — HIGH (ref 7–23)
BUN SERPL-MCNC: 76 MG/DL — HIGH (ref 7–23)
BUN SERPL-MCNC: 79 MG/DL — HIGH (ref 7–23)
BUN SERPL-MCNC: 82 MG/DL — HIGH (ref 7–23)
CALCIUM SERPL-MCNC: 9.2 MG/DL — SIGNIFICANT CHANGE UP (ref 8.5–10.1)
CALCIUM SERPL-MCNC: 9.3 MG/DL — SIGNIFICANT CHANGE UP (ref 8.5–10.1)
CALCIUM SERPL-MCNC: 9.4 MG/DL — SIGNIFICANT CHANGE UP (ref 8.5–10.1)
CALCIUM SERPL-MCNC: 9.5 MG/DL — SIGNIFICANT CHANGE UP (ref 8.5–10.1)
CALCIUM SERPL-MCNC: 9.6 MG/DL — SIGNIFICANT CHANGE UP (ref 8.5–10.1)
CALCIUM SERPL-MCNC: 9.9 MG/DL — SIGNIFICANT CHANGE UP (ref 8.5–10.1)
CALCIUM SERPL-MCNC: 9.9 MG/DL — SIGNIFICANT CHANGE UP (ref 8.5–10.1)
CHLORIDE SERPL-SCNC: 102 MMOL/L — SIGNIFICANT CHANGE UP (ref 96–108)
CHLORIDE SERPL-SCNC: 104 MMOL/L — SIGNIFICANT CHANGE UP (ref 96–108)
CHLORIDE SERPL-SCNC: 105 MMOL/L — SIGNIFICANT CHANGE UP (ref 96–108)
CHLORIDE SERPL-SCNC: 106 MMOL/L — SIGNIFICANT CHANGE UP (ref 96–108)
CHLORIDE SERPL-SCNC: 109 MMOL/L — HIGH (ref 96–108)
CHLORIDE SERPL-SCNC: 93 MMOL/L — LOW (ref 96–108)
CHLORIDE SERPL-SCNC: 99 MMOL/L — SIGNIFICANT CHANGE UP (ref 96–108)
CO2 SERPL-SCNC: 22 MMOL/L — SIGNIFICANT CHANGE UP (ref 22–31)
CO2 SERPL-SCNC: 27 MMOL/L — SIGNIFICANT CHANGE UP (ref 22–31)
CO2 SERPL-SCNC: 30 MMOL/L — SIGNIFICANT CHANGE UP (ref 22–31)
CO2 SERPL-SCNC: 30 MMOL/L — SIGNIFICANT CHANGE UP (ref 22–31)
CO2 SERPL-SCNC: 31 MMOL/L — SIGNIFICANT CHANGE UP (ref 22–31)
CO2 SERPL-SCNC: 34 MMOL/L — HIGH (ref 22–31)
CO2 SERPL-SCNC: 35 MMOL/L — HIGH (ref 22–31)
CO2 SERPL-SCNC: 35 MMOL/L — HIGH (ref 22–31)
CO2 SERPL-SCNC: 38 MMOL/L — HIGH (ref 22–31)
COLOR SPEC: YELLOW — SIGNIFICANT CHANGE UP
COVID-19 SPIKE DOMAIN AB INTERP: POSITIVE
COVID-19 SPIKE DOMAIN ANTIBODY RESULT: >250 U/ML — HIGH
CREAT SERPL-MCNC: 2.48 MG/DL — HIGH (ref 0.5–1.3)
CREAT SERPL-MCNC: 2.53 MG/DL — HIGH (ref 0.5–1.3)
CREAT SERPL-MCNC: 2.55 MG/DL — HIGH (ref 0.5–1.3)
CREAT SERPL-MCNC: 2.57 MG/DL — HIGH (ref 0.5–1.3)
CREAT SERPL-MCNC: 2.63 MG/DL — HIGH (ref 0.5–1.3)
CREAT SERPL-MCNC: 2.87 MG/DL — HIGH (ref 0.5–1.3)
CREAT SERPL-MCNC: 2.91 MG/DL — HIGH (ref 0.5–1.3)
CREAT SERPL-MCNC: 3.06 MG/DL — HIGH (ref 0.5–1.3)
CREAT SERPL-MCNC: 3.5 MG/DL — HIGH (ref 0.5–1.3)
DIFF PNL FLD: ABNORMAL
EOSINOPHIL # BLD AUTO: 0.1 K/UL — SIGNIFICANT CHANGE UP (ref 0–0.5)
EOSINOPHIL # BLD AUTO: 0.17 K/UL — SIGNIFICANT CHANGE UP (ref 0–0.5)
EOSINOPHIL # BLD AUTO: 0.26 K/UL — SIGNIFICANT CHANGE UP (ref 0–0.5)
EOSINOPHIL NFR BLD AUTO: 1.5 % — SIGNIFICANT CHANGE UP (ref 0–6)
EOSINOPHIL NFR BLD AUTO: 2.1 % — SIGNIFICANT CHANGE UP (ref 0–6)
EOSINOPHIL NFR BLD AUTO: 3.2 % — SIGNIFICANT CHANGE UP (ref 0–6)
ESTIMATED AVERAGE GLUCOSE: 203 MG/DL — HIGH (ref 68–114)
FERRITIN SERPL-MCNC: 37 NG/ML — SIGNIFICANT CHANGE UP (ref 15–150)
FOLATE SERPL-MCNC: >20 NG/ML — SIGNIFICANT CHANGE UP
GLUCOSE BLDC GLUCOMTR-MCNC: 138 MG/DL — HIGH (ref 70–99)
GLUCOSE BLDC GLUCOMTR-MCNC: 161 MG/DL — HIGH (ref 70–99)
GLUCOSE BLDC GLUCOMTR-MCNC: 165 MG/DL — HIGH (ref 70–99)
GLUCOSE BLDC GLUCOMTR-MCNC: 170 MG/DL — HIGH (ref 70–99)
GLUCOSE BLDC GLUCOMTR-MCNC: 190 MG/DL — HIGH (ref 70–99)
GLUCOSE BLDC GLUCOMTR-MCNC: 196 MG/DL — HIGH (ref 70–99)
GLUCOSE BLDC GLUCOMTR-MCNC: 207 MG/DL — HIGH (ref 70–99)
GLUCOSE BLDC GLUCOMTR-MCNC: 219 MG/DL — HIGH (ref 70–99)
GLUCOSE BLDC GLUCOMTR-MCNC: 226 MG/DL — HIGH (ref 70–99)
GLUCOSE BLDC GLUCOMTR-MCNC: 241 MG/DL — HIGH (ref 70–99)
GLUCOSE BLDC GLUCOMTR-MCNC: 261 MG/DL — HIGH (ref 70–99)
GLUCOSE BLDC GLUCOMTR-MCNC: 265 MG/DL — HIGH (ref 70–99)
GLUCOSE BLDC GLUCOMTR-MCNC: 290 MG/DL — HIGH (ref 70–99)
GLUCOSE BLDC GLUCOMTR-MCNC: 307 MG/DL — HIGH (ref 70–99)
GLUCOSE BLDC GLUCOMTR-MCNC: 339 MG/DL — HIGH (ref 70–99)
GLUCOSE BLDC GLUCOMTR-MCNC: 349 MG/DL — HIGH (ref 70–99)
GLUCOSE BLDC GLUCOMTR-MCNC: 372 MG/DL — HIGH (ref 70–99)
GLUCOSE SERPL-MCNC: 111 MG/DL — HIGH (ref 70–99)
GLUCOSE SERPL-MCNC: 116 MG/DL — HIGH (ref 70–99)
GLUCOSE SERPL-MCNC: 121 MG/DL — HIGH (ref 70–99)
GLUCOSE SERPL-MCNC: 151 MG/DL — HIGH (ref 70–99)
GLUCOSE SERPL-MCNC: 159 MG/DL — HIGH (ref 70–99)
GLUCOSE SERPL-MCNC: 169 MG/DL — HIGH (ref 70–99)
GLUCOSE SERPL-MCNC: 209 MG/DL — HIGH (ref 70–99)
GLUCOSE SERPL-MCNC: 287 MG/DL — HIGH (ref 70–99)
GLUCOSE SERPL-MCNC: 309 MG/DL — HIGH (ref 70–99)
GLUCOSE UR QL: NEGATIVE MG/DL — SIGNIFICANT CHANGE UP
HCT VFR BLD CALC: 31.5 % — LOW (ref 34.5–45)
HCT VFR BLD CALC: 31.8 % — LOW (ref 34.5–45)
HCT VFR BLD CALC: 31.8 % — LOW (ref 34.5–45)
HCT VFR BLD CALC: 32.2 % — LOW (ref 34.5–45)
HCT VFR BLD CALC: 32.3 % — LOW (ref 34.5–45)
HCT VFR BLD CALC: 32.9 % — LOW (ref 34.5–45)
HCT VFR BLD CALC: 33.2 % — LOW (ref 34.5–45)
HCT VFR BLD CALC: 33.4 % — LOW (ref 34.5–45)
HCT VFR BLD CALC: 34.7 % — SIGNIFICANT CHANGE UP (ref 34.5–45)
HCT VFR BLD CALC: 35.6 % — SIGNIFICANT CHANGE UP (ref 34.5–45)
HGB BLD-MCNC: 10.1 G/DL — LOW (ref 11.5–15.5)
HGB BLD-MCNC: 10.1 G/DL — LOW (ref 11.5–15.5)
HGB BLD-MCNC: 10.2 G/DL — LOW (ref 11.5–15.5)
HGB BLD-MCNC: 10.3 G/DL — LOW (ref 11.5–15.5)
HGB BLD-MCNC: 10.3 G/DL — LOW (ref 11.5–15.5)
HGB BLD-MCNC: 10.4 G/DL — LOW (ref 11.5–15.5)
HGB BLD-MCNC: 10.4 G/DL — LOW (ref 11.5–15.5)
HGB BLD-MCNC: 11.3 G/DL — LOW (ref 11.5–15.5)
HGB BLD-MCNC: 9.2 G/DL — LOW (ref 11.5–15.5)
HGB BLD-MCNC: 9.4 G/DL — LOW (ref 11.5–15.5)
IMM GRANULOCYTES NFR BLD AUTO: 0.3 % — SIGNIFICANT CHANGE UP (ref 0–1.5)
IMM GRANULOCYTES NFR BLD AUTO: 0.4 % — SIGNIFICANT CHANGE UP (ref 0–1.5)
IMM GRANULOCYTES NFR BLD AUTO: 0.4 % — SIGNIFICANT CHANGE UP (ref 0–1.5)
IRON SATN MFR SERPL: 10 % — LOW (ref 14–50)
IRON SATN MFR SERPL: 33 UG/DL — SIGNIFICANT CHANGE UP (ref 30–160)
KETONES UR-MCNC: ABNORMAL
LEUKOCYTE ESTERASE UR-ACNC: ABNORMAL
LYMPHOCYTES # BLD AUTO: 1.1 K/UL — SIGNIFICANT CHANGE UP (ref 1–3.3)
LYMPHOCYTES # BLD AUTO: 1.46 K/UL — SIGNIFICANT CHANGE UP (ref 1–3.3)
LYMPHOCYTES # BLD AUTO: 1.86 K/UL — SIGNIFICANT CHANGE UP (ref 1–3.3)
LYMPHOCYTES # BLD AUTO: 16.4 % — SIGNIFICANT CHANGE UP (ref 13–44)
LYMPHOCYTES # BLD AUTO: 17.9 % — SIGNIFICANT CHANGE UP (ref 13–44)
LYMPHOCYTES # BLD AUTO: 22.6 % — SIGNIFICANT CHANGE UP (ref 13–44)
MAGNESIUM SERPL-MCNC: 2.3 MG/DL — SIGNIFICANT CHANGE UP (ref 1.6–2.6)
MAGNESIUM SERPL-MCNC: 2.3 MG/DL — SIGNIFICANT CHANGE UP (ref 1.6–2.6)
MAGNESIUM SERPL-MCNC: 2.4 MG/DL — SIGNIFICANT CHANGE UP (ref 1.6–2.6)
MCHC RBC-ENTMCNC: 27.3 PG — SIGNIFICANT CHANGE UP (ref 27–34)
MCHC RBC-ENTMCNC: 27.7 PG — SIGNIFICANT CHANGE UP (ref 27–34)
MCHC RBC-ENTMCNC: 28 PG — SIGNIFICANT CHANGE UP (ref 27–34)
MCHC RBC-ENTMCNC: 28.1 PG — SIGNIFICANT CHANGE UP (ref 27–34)
MCHC RBC-ENTMCNC: 28.3 PG — SIGNIFICANT CHANGE UP (ref 27–34)
MCHC RBC-ENTMCNC: 28.3 PG — SIGNIFICANT CHANGE UP (ref 27–34)
MCHC RBC-ENTMCNC: 28.4 PG — SIGNIFICANT CHANGE UP (ref 27–34)
MCHC RBC-ENTMCNC: 28.7 PG — SIGNIFICANT CHANGE UP (ref 27–34)
MCHC RBC-ENTMCNC: 28.8 PG — SIGNIFICANT CHANGE UP (ref 27–34)
MCHC RBC-ENTMCNC: 28.8 PG — SIGNIFICANT CHANGE UP (ref 27–34)
MCHC RBC-ENTMCNC: 28.9 GM/DL — LOW (ref 32–36)
MCHC RBC-ENTMCNC: 29.8 GM/DL — LOW (ref 32–36)
MCHC RBC-ENTMCNC: 30 GM/DL — LOW (ref 32–36)
MCHC RBC-ENTMCNC: 30.7 GM/DL — LOW (ref 32–36)
MCHC RBC-ENTMCNC: 31.1 GM/DL — LOW (ref 32–36)
MCHC RBC-ENTMCNC: 31.3 GM/DL — LOW (ref 32–36)
MCHC RBC-ENTMCNC: 31.3 GM/DL — LOW (ref 32–36)
MCHC RBC-ENTMCNC: 31.7 GM/DL — LOW (ref 32–36)
MCHC RBC-ENTMCNC: 31.8 GM/DL — LOW (ref 32–36)
MCHC RBC-ENTMCNC: 32 GM/DL — SIGNIFICANT CHANGE UP (ref 32–36)
MCV RBC AUTO: 89.2 FL — SIGNIFICANT CHANGE UP (ref 80–100)
MCV RBC AUTO: 89.7 FL — SIGNIFICANT CHANGE UP (ref 80–100)
MCV RBC AUTO: 90.3 FL — SIGNIFICANT CHANGE UP (ref 80–100)
MCV RBC AUTO: 90.5 FL — SIGNIFICANT CHANGE UP (ref 80–100)
MCV RBC AUTO: 90.6 FL — SIGNIFICANT CHANGE UP (ref 80–100)
MCV RBC AUTO: 91.9 FL — SIGNIFICANT CHANGE UP (ref 80–100)
MCV RBC AUTO: 92.5 FL — SIGNIFICANT CHANGE UP (ref 80–100)
MCV RBC AUTO: 92.9 FL — SIGNIFICANT CHANGE UP (ref 80–100)
MCV RBC AUTO: 93.5 FL — SIGNIFICANT CHANGE UP (ref 80–100)
MCV RBC AUTO: 94.4 FL — SIGNIFICANT CHANGE UP (ref 80–100)
MONOCYTES # BLD AUTO: 0.61 K/UL — SIGNIFICANT CHANGE UP (ref 0–0.9)
MONOCYTES # BLD AUTO: 0.67 K/UL — SIGNIFICANT CHANGE UP (ref 0–0.9)
MONOCYTES # BLD AUTO: 0.72 K/UL — SIGNIFICANT CHANGE UP (ref 0–0.9)
MONOCYTES NFR BLD AUTO: 10 % — SIGNIFICANT CHANGE UP (ref 2–14)
MONOCYTES NFR BLD AUTO: 7.4 % — SIGNIFICANT CHANGE UP (ref 2–14)
MONOCYTES NFR BLD AUTO: 8.8 % — SIGNIFICANT CHANGE UP (ref 2–14)
NEUTROPHILS # BLD AUTO: 4.75 K/UL — SIGNIFICANT CHANGE UP (ref 1.8–7.4)
NEUTROPHILS # BLD AUTO: 5.5 K/UL — SIGNIFICANT CHANGE UP (ref 1.8–7.4)
NEUTROPHILS # BLD AUTO: 5.64 K/UL — SIGNIFICANT CHANGE UP (ref 1.8–7.4)
NEUTROPHILS NFR BLD AUTO: 66.9 % — SIGNIFICANT CHANGE UP (ref 43–77)
NEUTROPHILS NFR BLD AUTO: 69.1 % — SIGNIFICANT CHANGE UP (ref 43–77)
NEUTROPHILS NFR BLD AUTO: 71.1 % — SIGNIFICANT CHANGE UP (ref 43–77)
NITRITE UR-MCNC: NEGATIVE — SIGNIFICANT CHANGE UP
NT-PROBNP SERPL-SCNC: 4236 PG/ML — HIGH (ref 0–450)
NT-PROBNP SERPL-SCNC: HIGH PG/ML (ref 0–450)
PH UR: 5 — SIGNIFICANT CHANGE UP (ref 5–8)
PHOSPHATE SERPL-MCNC: 3.7 MG/DL — SIGNIFICANT CHANGE UP (ref 2.5–4.5)
PLATELET # BLD AUTO: 170 K/UL — SIGNIFICANT CHANGE UP (ref 150–400)
PLATELET # BLD AUTO: 171 K/UL — SIGNIFICANT CHANGE UP (ref 150–400)
PLATELET # BLD AUTO: 180 K/UL — SIGNIFICANT CHANGE UP (ref 150–400)
PLATELET # BLD AUTO: 185 K/UL — SIGNIFICANT CHANGE UP (ref 150–400)
PLATELET # BLD AUTO: 188 K/UL — SIGNIFICANT CHANGE UP (ref 150–400)
PLATELET # BLD AUTO: 191 K/UL — SIGNIFICANT CHANGE UP (ref 150–400)
PLATELET # BLD AUTO: 198 K/UL — SIGNIFICANT CHANGE UP (ref 150–400)
PLATELET # BLD AUTO: 210 K/UL — SIGNIFICANT CHANGE UP (ref 150–400)
PLATELET # BLD AUTO: 211 K/UL — SIGNIFICANT CHANGE UP (ref 150–400)
PLATELET # BLD AUTO: 216 K/UL — SIGNIFICANT CHANGE UP (ref 150–400)
POTASSIUM SERPL-MCNC: 3.4 MMOL/L — LOW (ref 3.5–5.3)
POTASSIUM SERPL-MCNC: 3.4 MMOL/L — LOW (ref 3.5–5.3)
POTASSIUM SERPL-MCNC: 3.8 MMOL/L — SIGNIFICANT CHANGE UP (ref 3.5–5.3)
POTASSIUM SERPL-MCNC: 3.9 MMOL/L — SIGNIFICANT CHANGE UP (ref 3.5–5.3)
POTASSIUM SERPL-MCNC: 4 MMOL/L — SIGNIFICANT CHANGE UP (ref 3.5–5.3)
POTASSIUM SERPL-MCNC: 4.8 MMOL/L — SIGNIFICANT CHANGE UP (ref 3.5–5.3)
POTASSIUM SERPL-SCNC: 3.4 MMOL/L — LOW (ref 3.5–5.3)
POTASSIUM SERPL-SCNC: 3.4 MMOL/L — LOW (ref 3.5–5.3)
POTASSIUM SERPL-SCNC: 3.8 MMOL/L — SIGNIFICANT CHANGE UP (ref 3.5–5.3)
POTASSIUM SERPL-SCNC: 3.9 MMOL/L — SIGNIFICANT CHANGE UP (ref 3.5–5.3)
POTASSIUM SERPL-SCNC: 4 MMOL/L — SIGNIFICANT CHANGE UP (ref 3.5–5.3)
POTASSIUM SERPL-SCNC: 4.8 MMOL/L — SIGNIFICANT CHANGE UP (ref 3.5–5.3)
PROT SERPL-MCNC: 6.1 GM/DL — SIGNIFICANT CHANGE UP (ref 6–8.3)
PROT SERPL-MCNC: 6.3 GM/DL — SIGNIFICANT CHANGE UP (ref 6–8.3)
PROT SERPL-MCNC: 6.5 GM/DL — SIGNIFICANT CHANGE UP (ref 6–8.3)
PROT SERPL-MCNC: 6.7 GM/DL — SIGNIFICANT CHANGE UP (ref 6–8.3)
PROT SERPL-MCNC: 6.8 GM/DL — SIGNIFICANT CHANGE UP (ref 6–8.3)
PROT SERPL-MCNC: 7.8 GM/DL — SIGNIFICANT CHANGE UP (ref 6–8.3)
PROT UR-MCNC: 100 MG/DL
RBC # BLD: 3.37 M/UL — LOW (ref 3.8–5.2)
RBC # BLD: 3.39 M/UL — LOW (ref 3.8–5.2)
RBC # BLD: 3.51 M/UL — LOW (ref 3.8–5.2)
RBC # BLD: 3.57 M/UL — LOW (ref 3.8–5.2)
RBC # BLD: 3.58 M/UL — LOW (ref 3.8–5.2)
RBC # BLD: 3.59 M/UL — LOW (ref 3.8–5.2)
RBC # BLD: 3.59 M/UL — LOW (ref 3.8–5.2)
RBC # BLD: 3.7 M/UL — LOW (ref 3.8–5.2)
RBC # BLD: 3.71 M/UL — LOW (ref 3.8–5.2)
RBC # BLD: 3.99 M/UL — SIGNIFICANT CHANGE UP (ref 3.8–5.2)
RBC # FLD: 14.5 % — SIGNIFICANT CHANGE UP (ref 10.3–14.5)
RBC # FLD: 14.6 % — HIGH (ref 10.3–14.5)
RBC # FLD: 14.9 % — HIGH (ref 10.3–14.5)
RBC # FLD: 15 % — HIGH (ref 10.3–14.5)
RBC # FLD: 15.8 % — HIGH (ref 10.3–14.5)
RBC # FLD: 15.9 % — HIGH (ref 10.3–14.5)
RBC # FLD: 15.9 % — HIGH (ref 10.3–14.5)
RBC # FLD: 16 % — HIGH (ref 10.3–14.5)
SARS-COV-2 IGG+IGM SERPL QL IA: >250 U/ML — HIGH
SARS-COV-2 IGG+IGM SERPL QL IA: POSITIVE
SARS-COV-2 RNA SPEC QL NAA+PROBE: SIGNIFICANT CHANGE UP
SARS-COV-2 RNA SPEC QL NAA+PROBE: SIGNIFICANT CHANGE UP
SODIUM SERPL-SCNC: 137 MMOL/L — SIGNIFICANT CHANGE UP (ref 135–145)
SODIUM SERPL-SCNC: 138 MMOL/L — SIGNIFICANT CHANGE UP (ref 135–145)
SODIUM SERPL-SCNC: 139 MMOL/L — SIGNIFICANT CHANGE UP (ref 135–145)
SODIUM SERPL-SCNC: 141 MMOL/L — SIGNIFICANT CHANGE UP (ref 135–145)
SODIUM SERPL-SCNC: 141 MMOL/L — SIGNIFICANT CHANGE UP (ref 135–145)
SODIUM SERPL-SCNC: 142 MMOL/L — SIGNIFICANT CHANGE UP (ref 135–145)
SODIUM SERPL-SCNC: 145 MMOL/L — SIGNIFICANT CHANGE UP (ref 135–145)
SP GR SPEC: 1.02 — SIGNIFICANT CHANGE UP (ref 1.01–1.02)
T PALLIDUM AB TITR SER: NEGATIVE — SIGNIFICANT CHANGE UP
TIBC SERPL-MCNC: 322 UG/DL — SIGNIFICANT CHANGE UP (ref 220–430)
TROPONIN I SERPL-MCNC: 0.1 NG/ML — HIGH (ref 0.01–0.04)
TROPONIN I SERPL-MCNC: 0.1 NG/ML — HIGH (ref 0.01–0.04)
TROPONIN I SERPL-MCNC: 0.12 NG/ML — HIGH (ref 0.01–0.04)
TROPONIN I SERPL-MCNC: 0.45 NG/ML — HIGH (ref 0.01–0.04)
TROPONIN I SERPL-MCNC: 0.63 NG/ML — HIGH (ref 0.01–0.04)
TROPONIN I SERPL-MCNC: 0.96 NG/ML — HIGH (ref 0.01–0.04)
TROPONIN I SERPL-MCNC: 1.05 NG/ML — HIGH (ref 0.01–0.04)
UIBC SERPL-MCNC: 289 UG/DL — SIGNIFICANT CHANGE UP (ref 110–370)
UROBILINOGEN FLD QL: NEGATIVE MG/DL — SIGNIFICANT CHANGE UP
VIT B12 SERPL-MCNC: 504 PG/ML — SIGNIFICANT CHANGE UP (ref 232–1245)
WBC # BLD: 5.65 K/UL — SIGNIFICANT CHANGE UP (ref 3.8–10.5)
WBC # BLD: 6.69 K/UL — SIGNIFICANT CHANGE UP (ref 3.8–10.5)
WBC # BLD: 6.85 K/UL — SIGNIFICANT CHANGE UP (ref 3.8–10.5)
WBC # BLD: 7.46 K/UL — SIGNIFICANT CHANGE UP (ref 3.8–10.5)
WBC # BLD: 7.93 K/UL — SIGNIFICANT CHANGE UP (ref 3.8–10.5)
WBC # BLD: 8.16 K/UL — SIGNIFICANT CHANGE UP (ref 3.8–10.5)
WBC # BLD: 8.22 K/UL — SIGNIFICANT CHANGE UP (ref 3.8–10.5)
WBC # BLD: 8.45 K/UL — SIGNIFICANT CHANGE UP (ref 3.8–10.5)
WBC # BLD: 9.01 K/UL — SIGNIFICANT CHANGE UP (ref 3.8–10.5)
WBC # BLD: 9.14 K/UL — SIGNIFICANT CHANGE UP (ref 3.8–10.5)
WBC # FLD AUTO: 5.65 K/UL — SIGNIFICANT CHANGE UP (ref 3.8–10.5)
WBC # FLD AUTO: 6.69 K/UL — SIGNIFICANT CHANGE UP (ref 3.8–10.5)
WBC # FLD AUTO: 6.85 K/UL — SIGNIFICANT CHANGE UP (ref 3.8–10.5)
WBC # FLD AUTO: 7.46 K/UL — SIGNIFICANT CHANGE UP (ref 3.8–10.5)
WBC # FLD AUTO: 7.93 K/UL — SIGNIFICANT CHANGE UP (ref 3.8–10.5)
WBC # FLD AUTO: 8.16 K/UL — SIGNIFICANT CHANGE UP (ref 3.8–10.5)
WBC # FLD AUTO: 8.22 K/UL — SIGNIFICANT CHANGE UP (ref 3.8–10.5)
WBC # FLD AUTO: 8.45 K/UL — SIGNIFICANT CHANGE UP (ref 3.8–10.5)
WBC # FLD AUTO: 9.01 K/UL — SIGNIFICANT CHANGE UP (ref 3.8–10.5)
WBC # FLD AUTO: 9.14 K/UL — SIGNIFICANT CHANGE UP (ref 3.8–10.5)

## 2021-01-01 PROCEDURE — 94640 AIRWAY INHALATION TREATMENT: CPT

## 2021-01-01 PROCEDURE — 95700 EEG CONT REC W/VID EEG TECH: CPT

## 2021-01-01 PROCEDURE — 99213 OFFICE O/P EST LOW 20 MIN: CPT

## 2021-01-01 PROCEDURE — 93005 ELECTROCARDIOGRAM TRACING: CPT

## 2021-01-01 PROCEDURE — 97116 GAIT TRAINING THERAPY: CPT | Mod: GP

## 2021-01-01 PROCEDURE — 70450 CT HEAD/BRAIN W/O DYE: CPT | Mod: 26

## 2021-01-01 PROCEDURE — 82728 ASSAY OF FERRITIN: CPT

## 2021-01-01 PROCEDURE — 93880 EXTRACRANIAL BILAT STUDY: CPT

## 2021-01-01 PROCEDURE — 99232 SBSQ HOSP IP/OBS MODERATE 35: CPT

## 2021-01-01 PROCEDURE — 83735 ASSAY OF MAGNESIUM: CPT

## 2021-01-01 PROCEDURE — 85025 COMPLETE CBC W/AUTO DIFF WBC: CPT

## 2021-01-01 PROCEDURE — 99285 EMERGENCY DEPT VISIT HI MDM: CPT | Mod: CS,25

## 2021-01-01 PROCEDURE — 93280 PM DEVICE PROGR EVAL DUAL: CPT | Mod: 26

## 2021-01-01 PROCEDURE — 99223 1ST HOSP IP/OBS HIGH 75: CPT | Mod: AI

## 2021-01-01 PROCEDURE — 95720 EEG PHY/QHP EA INCR W/VEEG: CPT

## 2021-01-01 PROCEDURE — 99215 OFFICE O/P EST HI 40 MIN: CPT

## 2021-01-01 PROCEDURE — 95816 EEG AWAKE AND DROWSY: CPT

## 2021-01-01 PROCEDURE — 95816 EEG AWAKE AND DROWSY: CPT | Mod: 26

## 2021-01-01 PROCEDURE — 83540 ASSAY OF IRON: CPT

## 2021-01-01 PROCEDURE — 86769 SARS-COV-2 COVID-19 ANTIBODY: CPT

## 2021-01-01 PROCEDURE — 97163 PT EVAL HIGH COMPLEX 45 MIN: CPT | Mod: GP

## 2021-01-01 PROCEDURE — 97530 THERAPEUTIC ACTIVITIES: CPT | Mod: GP

## 2021-01-01 PROCEDURE — 36415 COLL VENOUS BLD VENIPUNCTURE: CPT

## 2021-01-01 PROCEDURE — U0005: CPT

## 2021-01-01 PROCEDURE — 84484 ASSAY OF TROPONIN QUANT: CPT

## 2021-01-01 PROCEDURE — 80048 BASIC METABOLIC PNL TOTAL CA: CPT

## 2021-01-01 PROCEDURE — 83550 IRON BINDING TEST: CPT

## 2021-01-01 PROCEDURE — 93306 TTE W/DOPPLER COMPLETE: CPT | Mod: 26

## 2021-01-01 PROCEDURE — 82607 VITAMIN B-12: CPT

## 2021-01-01 PROCEDURE — 85027 COMPLETE CBC AUTOMATED: CPT

## 2021-01-01 PROCEDURE — 80053 COMPREHEN METABOLIC PANEL: CPT

## 2021-01-01 PROCEDURE — 92950 HEART/LUNG RESUSCITATION CPR: CPT

## 2021-01-01 PROCEDURE — 99283 EMERGENCY DEPT VISIT LOW MDM: CPT

## 2021-01-01 PROCEDURE — 84100 ASSAY OF PHOSPHORUS: CPT

## 2021-01-01 PROCEDURE — 82962 GLUCOSE BLOOD TEST: CPT

## 2021-01-01 PROCEDURE — 82746 ASSAY OF FOLIC ACID SERUM: CPT

## 2021-01-01 PROCEDURE — 99233 SBSQ HOSP IP/OBS HIGH 50: CPT

## 2021-01-01 PROCEDURE — 81001 URINALYSIS AUTO W/SCOPE: CPT

## 2021-01-01 PROCEDURE — 72125 CT NECK SPINE W/O DYE: CPT | Mod: 26

## 2021-01-01 PROCEDURE — U0003: CPT

## 2021-01-01 PROCEDURE — 71045 X-RAY EXAM CHEST 1 VIEW: CPT | Mod: 26

## 2021-01-01 PROCEDURE — 71045 X-RAY EXAM CHEST 1 VIEW: CPT

## 2021-01-01 PROCEDURE — 93010 ELECTROCARDIOGRAM REPORT: CPT

## 2021-01-01 PROCEDURE — 99238 HOSP IP/OBS DSCHRG MGMT 30/<: CPT

## 2021-01-01 PROCEDURE — 93306 TTE W/DOPPLER COMPLETE: CPT

## 2021-01-01 PROCEDURE — 97162 PT EVAL MOD COMPLEX 30 MIN: CPT | Mod: GP

## 2021-01-01 PROCEDURE — 99285 EMERGENCY DEPT VISIT HI MDM: CPT | Mod: CS

## 2021-01-01 PROCEDURE — 93880 EXTRACRANIAL BILAT STUDY: CPT | Mod: 26

## 2021-01-01 PROCEDURE — 83036 HEMOGLOBIN GLYCOSYLATED A1C: CPT

## 2021-01-01 PROCEDURE — 86780 TREPONEMA PALLIDUM: CPT

## 2021-01-01 PROCEDURE — 99223 1ST HOSP IP/OBS HIGH 75: CPT

## 2021-01-01 PROCEDURE — 95714 VEEG EA 12-26 HR UNMNTR: CPT

## 2021-01-01 PROCEDURE — 99239 HOSP IP/OBS DSCHRG MGMT >30: CPT

## 2021-01-01 PROCEDURE — 83880 ASSAY OF NATRIURETIC PEPTIDE: CPT

## 2021-01-01 RX ORDER — INSULIN GLARGINE 100 [IU]/ML
24 INJECTION, SOLUTION SUBCUTANEOUS AT BEDTIME
Refills: 0 | Status: DISCONTINUED | OUTPATIENT
Start: 2021-01-01 | End: 2021-01-01

## 2021-01-01 RX ORDER — LIDOCAINE 4 G/100G
2 CREAM TOPICAL
Qty: 0 | Refills: 0 | DISCHARGE

## 2021-01-01 RX ORDER — BUMETANIDE 0.25 MG/ML
0.5 INJECTION INTRAMUSCULAR; INTRAVENOUS DAILY
Refills: 0 | Status: DISCONTINUED | OUTPATIENT
Start: 2021-01-01 | End: 2021-01-01

## 2021-01-01 RX ORDER — ACETAMINOPHEN 500 MG
650 TABLET ORAL EVERY 6 HOURS
Refills: 0 | Status: DISCONTINUED | OUTPATIENT
Start: 2021-01-01 | End: 2021-01-01

## 2021-01-01 RX ORDER — CALCITRIOL 0.5 UG/1
0.25 CAPSULE ORAL DAILY
Refills: 0 | Status: DISCONTINUED | OUTPATIENT
Start: 2021-01-01 | End: 2021-01-01

## 2021-01-01 RX ORDER — MULTIVIT-MIN/FERROUS GLUCONATE 9 MG/15 ML
1 LIQUID (ML) ORAL DAILY
Refills: 0 | Status: DISCONTINUED | OUTPATIENT
Start: 2021-01-01 | End: 2021-01-01

## 2021-01-01 RX ORDER — GLUCAGON INJECTION, SOLUTION 0.5 MG/.1ML
1 INJECTION, SOLUTION SUBCUTANEOUS ONCE
Refills: 0 | Status: DISCONTINUED | OUTPATIENT
Start: 2021-01-01 | End: 2021-01-01

## 2021-01-01 RX ORDER — FOLIC ACID 0.8 MG
1 TABLET ORAL DAILY
Refills: 0 | Status: DISCONTINUED | OUTPATIENT
Start: 2021-01-01 | End: 2021-01-01

## 2021-01-01 RX ORDER — ALPRAZOLAM 0.25 MG
0.5 TABLET ORAL ONCE
Refills: 0 | Status: DISCONTINUED | OUTPATIENT
Start: 2021-01-01 | End: 2021-01-01

## 2021-01-01 RX ORDER — LANOLIN ALCOHOL/MO/W.PET/CERES
1 CREAM (GRAM) TOPICAL
Qty: 0 | Refills: 0 | DISCHARGE

## 2021-01-01 RX ORDER — SODIUM CHLORIDE 9 MG/ML
1000 INJECTION INTRAMUSCULAR; INTRAVENOUS; SUBCUTANEOUS ONCE
Refills: 0 | Status: DISCONTINUED | OUTPATIENT
Start: 2021-01-01 | End: 2021-01-01

## 2021-01-01 RX ORDER — MIDODRINE HYDROCHLORIDE 2.5 MG/1
5 TABLET ORAL THREE TIMES A DAY
Refills: 0 | Status: DISCONTINUED | OUTPATIENT
Start: 2021-01-01 | End: 2021-01-01

## 2021-01-01 RX ORDER — SODIUM CHLORIDE 9 MG/ML
1000 INJECTION, SOLUTION INTRAVENOUS
Refills: 0 | Status: DISCONTINUED | OUTPATIENT
Start: 2021-01-01 | End: 2021-01-01

## 2021-01-01 RX ORDER — DEXTROSE 50 % IN WATER 50 %
25 SYRINGE (ML) INTRAVENOUS ONCE
Refills: 0 | Status: DISCONTINUED | OUTPATIENT
Start: 2021-01-01 | End: 2021-01-01

## 2021-01-01 RX ORDER — MIRTAZAPINE 45 MG/1
1 TABLET, ORALLY DISINTEGRATING ORAL
Qty: 0 | Refills: 0 | DISCHARGE

## 2021-01-01 RX ORDER — DEXTROSE 50 % IN WATER 50 %
12.5 SYRINGE (ML) INTRAVENOUS ONCE
Refills: 0 | Status: DISCONTINUED | OUTPATIENT
Start: 2021-01-01 | End: 2021-01-01

## 2021-01-01 RX ORDER — SODIUM CHLORIDE 9 MG/ML
500 INJECTION INTRAMUSCULAR; INTRAVENOUS; SUBCUTANEOUS ONCE
Refills: 0 | Status: COMPLETED | OUTPATIENT
Start: 2021-01-01 | End: 2021-01-01

## 2021-01-01 RX ORDER — SODIUM CHLORIDE 9 MG/ML
250 INJECTION INTRAMUSCULAR; INTRAVENOUS; SUBCUTANEOUS
Refills: 0 | Status: DISCONTINUED | OUTPATIENT
Start: 2021-01-01 | End: 2021-01-01

## 2021-01-01 RX ORDER — INSULIN LISPRO 100/ML
VIAL (ML) SUBCUTANEOUS
Refills: 0 | Status: DISCONTINUED | OUTPATIENT
Start: 2021-01-01 | End: 2021-01-01

## 2021-01-01 RX ORDER — INSULIN GLARGINE 100 [IU]/ML
44 INJECTION, SOLUTION SUBCUTANEOUS
Qty: 0 | Refills: 0 | DISCHARGE

## 2021-01-01 RX ORDER — MIDODRINE HYDROCHLORIDE 2.5 MG/1
1 TABLET ORAL
Qty: 90 | Refills: 0
Start: 2021-01-01 | End: 2021-04-15

## 2021-01-01 RX ORDER — MONTELUKAST 4 MG/1
1 TABLET, CHEWABLE ORAL
Qty: 0 | Refills: 0 | DISCHARGE

## 2021-01-01 RX ORDER — LANOLIN ALCOHOL/MO/W.PET/CERES
5 CREAM (GRAM) TOPICAL ONCE
Refills: 0 | Status: COMPLETED | OUTPATIENT
Start: 2021-01-01 | End: 2021-01-01

## 2021-01-01 RX ORDER — LIDOCAINE 4 G/100G
1 CREAM TOPICAL DAILY
Refills: 0 | Status: DISCONTINUED | OUTPATIENT
Start: 2021-01-01 | End: 2021-01-01

## 2021-01-01 RX ORDER — INSULIN GLARGINE 100 [IU]/ML
45 INJECTION, SOLUTION SUBCUTANEOUS
Qty: 0 | Refills: 0 | DISCHARGE

## 2021-01-01 RX ORDER — INSULIN LISPRO 100/ML
4 VIAL (ML) SUBCUTANEOUS
Refills: 0 | Status: DISCONTINUED | OUTPATIENT
Start: 2021-01-01 | End: 2021-01-01

## 2021-01-01 RX ORDER — POTASSIUM CHLORIDE 20 MEQ
40 PACKET (EA) ORAL ONCE
Refills: 0 | Status: COMPLETED | OUTPATIENT
Start: 2021-01-01 | End: 2021-01-01

## 2021-01-01 RX ORDER — ASPIRIN/CALCIUM CARB/MAGNESIUM 324 MG
81 TABLET ORAL DAILY
Refills: 0 | Status: DISCONTINUED | OUTPATIENT
Start: 2021-01-01 | End: 2021-01-01

## 2021-01-01 RX ORDER — TIOTROPIUM BROMIDE AND OLODATEROL 3.124; 2.736 UG/1; UG/1
2 SPRAY, METERED RESPIRATORY (INHALATION) DAILY
Refills: 0 | Status: DISCONTINUED | OUTPATIENT
Start: 2021-01-01 | End: 2021-01-01

## 2021-01-01 RX ORDER — BUMETANIDE 0.25 MG/ML
1 INJECTION INTRAMUSCULAR; INTRAVENOUS DAILY
Refills: 0 | Status: DISCONTINUED | OUTPATIENT
Start: 2021-01-01 | End: 2021-01-01

## 2021-01-01 RX ORDER — HEPARIN SODIUM 5000 [USP'U]/ML
5000 INJECTION INTRAVENOUS; SUBCUTANEOUS EVERY 12 HOURS
Refills: 0 | Status: DISCONTINUED | OUTPATIENT
Start: 2021-01-01 | End: 2021-01-01

## 2021-01-01 RX ORDER — ALBUTEROL 90 UG/1
2 AEROSOL, METERED ORAL
Qty: 0 | Refills: 0 | DISCHARGE

## 2021-01-01 RX ORDER — ALBUTEROL 90 UG/1
2 AEROSOL, METERED ORAL EVERY 6 HOURS
Refills: 0 | Status: DISCONTINUED | OUTPATIENT
Start: 2021-01-01 | End: 2021-01-01

## 2021-01-01 RX ORDER — FLUTICASONE FUROATE, UMECLIDINIUM BROMIDE AND VILANTEROL TRIFENATATE 200; 62.5; 25 UG/1; UG/1; UG/1
1 POWDER RESPIRATORY (INHALATION) DAILY
Refills: 0 | Status: DISCONTINUED | OUTPATIENT
Start: 2021-01-01 | End: 2021-01-01

## 2021-01-01 RX ORDER — BUMETANIDE 0.25 MG/ML
2 INJECTION INTRAMUSCULAR; INTRAVENOUS
Qty: 0 | Refills: 0 | DISCHARGE

## 2021-01-01 RX ORDER — ALPRAZOLAM 0.25 MG
0.25 TABLET ORAL ONCE
Refills: 0 | Status: DISCONTINUED | OUTPATIENT
Start: 2021-01-01 | End: 2021-01-01

## 2021-01-01 RX ORDER — BUMETANIDE 0.25 MG/ML
1 INJECTION INTRAMUSCULAR; INTRAVENOUS EVERY 12 HOURS
Refills: 0 | Status: DISCONTINUED | OUTPATIENT
Start: 2021-01-01 | End: 2021-01-01

## 2021-01-01 RX ORDER — ATORVASTATIN CALCIUM 80 MG/1
40 TABLET, FILM COATED ORAL DAILY
Refills: 0 | Status: DISCONTINUED | OUTPATIENT
Start: 2021-01-01 | End: 2021-01-01

## 2021-01-01 RX ORDER — INSULIN LISPRO 100/ML
8 VIAL (ML) SUBCUTANEOUS
Refills: 0 | Status: DISCONTINUED | OUTPATIENT
Start: 2021-01-01 | End: 2021-01-01

## 2021-01-01 RX ORDER — DEXTROSE 50 % IN WATER 50 %
15 SYRINGE (ML) INTRAVENOUS ONCE
Refills: 0 | Status: DISCONTINUED | OUTPATIENT
Start: 2021-01-01 | End: 2021-01-01

## 2021-01-01 RX ORDER — ALBUTEROL 90 UG/1
3 AEROSOL, METERED ORAL
Qty: 0 | Refills: 0 | DISCHARGE

## 2021-01-01 RX ORDER — FLUOXETINE HCL 10 MG
10 CAPSULE ORAL DAILY
Refills: 0 | Status: DISCONTINUED | OUTPATIENT
Start: 2021-01-01 | End: 2021-01-01

## 2021-01-01 RX ORDER — BUMETANIDE 0.25 MG/ML
1 INJECTION INTRAMUSCULAR; INTRAVENOUS
Qty: 0 | Refills: 0 | DISCHARGE

## 2021-01-01 RX ORDER — LEVOTHYROXINE SODIUM 125 MCG
125 TABLET ORAL DAILY
Refills: 0 | Status: DISCONTINUED | OUTPATIENT
Start: 2021-01-01 | End: 2021-01-01

## 2021-01-01 RX ORDER — ATORVASTATIN CALCIUM 80 MG/1
40 TABLET, FILM COATED ORAL AT BEDTIME
Refills: 0 | Status: DISCONTINUED | OUTPATIENT
Start: 2021-01-01 | End: 2021-01-01

## 2021-01-01 RX ORDER — MULTIVIT-MIN/FERROUS GLUCONATE 9 MG/15 ML
1 LIQUID (ML) ORAL
Qty: 0 | Refills: 0 | DISCHARGE

## 2021-01-01 RX ORDER — FLUTICASONE FUROATE, UMECLIDINIUM BROMIDE AND VILANTEROL TRIFENATATE 200; 62.5; 25 UG/1; UG/1; UG/1
1 POWDER RESPIRATORY (INHALATION)
Qty: 0 | Refills: 0 | DISCHARGE

## 2021-01-01 RX ORDER — ASPIRIN/CALCIUM CARB/MAGNESIUM 324 MG
325 TABLET ORAL ONCE
Refills: 0 | Status: COMPLETED | OUTPATIENT
Start: 2021-01-01 | End: 2021-01-01

## 2021-01-01 RX ORDER — BUMETANIDE 0.25 MG/ML
1 INJECTION INTRAMUSCULAR; INTRAVENOUS
Refills: 0 | Status: DISCONTINUED | OUTPATIENT
Start: 2021-01-01 | End: 2021-01-01

## 2021-01-01 RX ORDER — INSULIN GLARGINE 100 [IU]/ML
24 INJECTION, SOLUTION SUBCUTANEOUS
Qty: 0 | Refills: 0 | DISCHARGE

## 2021-01-01 RX ORDER — INSULIN LISPRO 100/ML
VIAL (ML) SUBCUTANEOUS AT BEDTIME
Refills: 0 | Status: DISCONTINUED | OUTPATIENT
Start: 2021-01-01 | End: 2021-01-01

## 2021-01-01 RX ORDER — FLUOXETINE HCL 10 MG
1 CAPSULE ORAL
Qty: 0 | Refills: 0 | DISCHARGE

## 2021-01-01 RX ORDER — LANOLIN ALCOHOL/MO/W.PET/CERES
5 CREAM (GRAM) TOPICAL ONCE
Refills: 0 | Status: DISCONTINUED | OUTPATIENT
Start: 2021-01-01 | End: 2021-01-01

## 2021-01-01 RX ORDER — INSULIN GLARGINE 100 [IU]/ML
20 INJECTION, SOLUTION SUBCUTANEOUS AT BEDTIME
Refills: 0 | Status: DISCONTINUED | OUTPATIENT
Start: 2021-01-01 | End: 2021-01-01

## 2021-01-01 RX ORDER — MONTELUKAST 4 MG/1
10 TABLET, CHEWABLE ORAL AT BEDTIME
Refills: 0 | Status: DISCONTINUED | OUTPATIENT
Start: 2021-01-01 | End: 2021-01-01

## 2021-01-01 RX ORDER — MIDODRINE HYDROCHLORIDE 2.5 MG/1
2.5 TABLET ORAL THREE TIMES A DAY
Refills: 0 | Status: DISCONTINUED | OUTPATIENT
Start: 2021-01-01 | End: 2021-01-01

## 2021-01-01 RX ORDER — BUMETANIDE 0.25 MG/ML
2 INJECTION INTRAMUSCULAR; INTRAVENOUS DAILY
Refills: 0 | Status: DISCONTINUED | OUTPATIENT
Start: 2021-01-01 | End: 2021-01-01

## 2021-01-01 RX ORDER — ASPIRIN/CALCIUM CARB/MAGNESIUM 324 MG
1 TABLET ORAL
Qty: 0 | Refills: 0 | DISCHARGE

## 2021-01-01 RX ORDER — INSULIN LISPRO 100/ML
14 VIAL (ML) SUBCUTANEOUS
Qty: 0 | Refills: 0 | DISCHARGE

## 2021-01-01 RX ORDER — MIDODRINE HYDROCHLORIDE 2.5 MG/1
1 TABLET ORAL
Qty: 90 | Refills: 0
Start: 2021-01-01 | End: 2021-05-09

## 2021-01-01 RX ORDER — ACETAMINOPHEN 500 MG
2 TABLET ORAL
Qty: 0 | Refills: 0 | DISCHARGE

## 2021-01-01 RX ORDER — HEPARIN SODIUM 5000 [USP'U]/ML
5000 INJECTION INTRAVENOUS; SUBCUTANEOUS EVERY 8 HOURS
Refills: 0 | Status: DISCONTINUED | OUTPATIENT
Start: 2021-01-01 | End: 2021-01-01

## 2021-01-01 RX ORDER — MIRABEGRON 25 MG/1
25 TABLET, FILM COATED, EXTENDED RELEASE ORAL
Qty: 30 | Refills: 6 | Status: ACTIVE | COMMUNITY
Start: 2021-01-01 | End: 1900-01-01

## 2021-01-01 RX ORDER — MIDODRINE HYDROCHLORIDE 2.5 MG/1
1 TABLET ORAL
Qty: 0 | Refills: 0 | DISCHARGE
Start: 2021-01-01

## 2021-01-01 RX ADMIN — Medication 10 MILLIGRAM(S): at 11:15

## 2021-01-01 RX ADMIN — Medication 1: at 18:06

## 2021-01-01 RX ADMIN — Medication 50 MILLIGRAM(S): at 05:39

## 2021-01-01 RX ADMIN — Medication 1 MILLIGRAM(S): at 11:13

## 2021-01-01 RX ADMIN — Medication 4 UNIT(S): at 12:26

## 2021-01-01 RX ADMIN — Medication 50 MILLIGRAM(S): at 11:08

## 2021-01-01 RX ADMIN — Medication 0.5 MILLIGRAM(S): at 22:06

## 2021-01-01 RX ADMIN — LIDOCAINE 1 PATCH: 4 CREAM TOPICAL at 00:00

## 2021-01-01 RX ADMIN — Medication 125 MICROGRAM(S): at 05:40

## 2021-01-01 RX ADMIN — CALCITRIOL 0.25 MICROGRAM(S): 0.5 CAPSULE ORAL at 13:03

## 2021-01-01 RX ADMIN — Medication 650 MILLIGRAM(S): at 22:39

## 2021-01-01 RX ADMIN — Medication 10 MILLIGRAM(S): at 10:47

## 2021-01-01 RX ADMIN — Medication 1 MILLIGRAM(S): at 10:31

## 2021-01-01 RX ADMIN — FLUTICASONE FUROATE, UMECLIDINIUM BROMIDE AND VILANTEROL TRIFENATATE 1 PUFF(S): 200; 62.5; 25 POWDER RESPIRATORY (INHALATION) at 11:18

## 2021-01-01 RX ADMIN — Medication 5 MILLIGRAM(S): at 01:56

## 2021-01-01 RX ADMIN — BUMETANIDE 1 MILLIGRAM(S): 0.25 INJECTION INTRAMUSCULAR; INTRAVENOUS at 11:06

## 2021-01-01 RX ADMIN — Medication 650 MILLIGRAM(S): at 05:39

## 2021-01-01 RX ADMIN — Medication 650 MILLIGRAM(S): at 00:02

## 2021-01-01 RX ADMIN — Medication 1 MILLIGRAM(S): at 11:53

## 2021-01-01 RX ADMIN — Medication 1 TABLET(S): at 10:05

## 2021-01-01 RX ADMIN — Medication 650 MILLIGRAM(S): at 05:44

## 2021-01-01 RX ADMIN — CALCITRIOL 0.25 MICROGRAM(S): 0.5 CAPSULE ORAL at 10:05

## 2021-01-01 RX ADMIN — Medication 4: at 17:50

## 2021-01-01 RX ADMIN — Medication 650 MILLIGRAM(S): at 23:00

## 2021-01-01 RX ADMIN — SODIUM CHLORIDE 50 MILLILITER(S): 9 INJECTION INTRAMUSCULAR; INTRAVENOUS; SUBCUTANEOUS at 14:01

## 2021-01-01 RX ADMIN — Medication 4: at 12:21

## 2021-01-01 RX ADMIN — Medication 10 MILLIGRAM(S): at 10:32

## 2021-01-01 RX ADMIN — LIDOCAINE 1 PATCH: 4 CREAM TOPICAL at 22:47

## 2021-01-01 RX ADMIN — Medication 1: at 12:25

## 2021-01-01 RX ADMIN — CALCITRIOL 0.25 MICROGRAM(S): 0.5 CAPSULE ORAL at 10:53

## 2021-01-01 RX ADMIN — Medication 1 MILLIGRAM(S): at 11:08

## 2021-01-01 RX ADMIN — MIDODRINE HYDROCHLORIDE 2.5 MILLIGRAM(S): 2.5 TABLET ORAL at 11:06

## 2021-01-01 RX ADMIN — HEPARIN SODIUM 5000 UNIT(S): 5000 INJECTION INTRAVENOUS; SUBCUTANEOUS at 18:17

## 2021-01-01 RX ADMIN — CALCITRIOL 0.25 MICROGRAM(S): 0.5 CAPSULE ORAL at 12:52

## 2021-01-01 RX ADMIN — Medication 125 MICROGRAM(S): at 05:38

## 2021-01-01 RX ADMIN — Medication 1 TABLET(S): at 10:48

## 2021-01-01 RX ADMIN — Medication 650 MILLIGRAM(S): at 12:22

## 2021-01-01 RX ADMIN — Medication 50 MILLIGRAM(S): at 18:17

## 2021-01-01 RX ADMIN — Medication 4 UNIT(S): at 18:07

## 2021-01-01 RX ADMIN — Medication 8 UNIT(S): at 12:50

## 2021-01-01 RX ADMIN — Medication 4 UNIT(S): at 10:53

## 2021-01-01 RX ADMIN — Medication 650 MILLIGRAM(S): at 13:03

## 2021-01-01 RX ADMIN — INSULIN GLARGINE 20 UNIT(S): 100 INJECTION, SOLUTION SUBCUTANEOUS at 22:34

## 2021-01-01 RX ADMIN — Medication 650 MILLIGRAM(S): at 12:23

## 2021-01-01 RX ADMIN — HEPARIN SODIUM 5000 UNIT(S): 5000 INJECTION INTRAVENOUS; SUBCUTANEOUS at 17:53

## 2021-01-01 RX ADMIN — MONTELUKAST 10 MILLIGRAM(S): 4 TABLET, CHEWABLE ORAL at 21:07

## 2021-01-01 RX ADMIN — Medication 8 UNIT(S): at 08:09

## 2021-01-01 RX ADMIN — Medication 50 MILLIGRAM(S): at 21:58

## 2021-01-01 RX ADMIN — LIDOCAINE 1 PATCH: 4 CREAM TOPICAL at 22:00

## 2021-01-01 RX ADMIN — Medication 2: at 12:50

## 2021-01-01 RX ADMIN — HEPARIN SODIUM 5000 UNIT(S): 5000 INJECTION INTRAVENOUS; SUBCUTANEOUS at 06:08

## 2021-01-01 RX ADMIN — HEPARIN SODIUM 5000 UNIT(S): 5000 INJECTION INTRAVENOUS; SUBCUTANEOUS at 14:01

## 2021-01-01 RX ADMIN — HEPARIN SODIUM 5000 UNIT(S): 5000 INJECTION INTRAVENOUS; SUBCUTANEOUS at 05:45

## 2021-01-01 RX ADMIN — Medication 81 MILLIGRAM(S): at 13:02

## 2021-01-01 RX ADMIN — TIOTROPIUM BROMIDE AND OLODATEROL 2 PUFF(S): 3.124; 2.736 SPRAY, METERED RESPIRATORY (INHALATION) at 07:47

## 2021-01-01 RX ADMIN — HEPARIN SODIUM 5000 UNIT(S): 5000 INJECTION INTRAVENOUS; SUBCUTANEOUS at 05:40

## 2021-01-01 RX ADMIN — Medication 325 MILLIGRAM(S): at 23:09

## 2021-01-01 RX ADMIN — Medication 50 MILLIGRAM(S): at 17:49

## 2021-01-01 RX ADMIN — Medication 10 MILLIGRAM(S): at 09:03

## 2021-01-01 RX ADMIN — MONTELUKAST 10 MILLIGRAM(S): 4 TABLET, CHEWABLE ORAL at 22:08

## 2021-01-01 RX ADMIN — ATORVASTATIN CALCIUM 40 MILLIGRAM(S): 80 TABLET, FILM COATED ORAL at 22:08

## 2021-01-01 RX ADMIN — Medication 2: at 08:33

## 2021-01-01 RX ADMIN — Medication 1 TABLET(S): at 09:28

## 2021-01-01 RX ADMIN — Medication 81 MILLIGRAM(S): at 10:32

## 2021-01-01 RX ADMIN — BUMETANIDE 1 MILLIGRAM(S): 0.25 INJECTION INTRAMUSCULAR; INTRAVENOUS at 10:05

## 2021-01-01 RX ADMIN — Medication 4: at 11:15

## 2021-01-01 RX ADMIN — Medication 81 MILLIGRAM(S): at 10:47

## 2021-01-01 RX ADMIN — ATORVASTATIN CALCIUM 40 MILLIGRAM(S): 80 TABLET, FILM COATED ORAL at 22:28

## 2021-01-01 RX ADMIN — INSULIN GLARGINE 20 UNIT(S): 100 INJECTION, SOLUTION SUBCUTANEOUS at 21:07

## 2021-01-01 RX ADMIN — HEPARIN SODIUM 5000 UNIT(S): 5000 INJECTION INTRAVENOUS; SUBCUTANEOUS at 22:08

## 2021-01-01 RX ADMIN — Medication 125 MICROGRAM(S): at 05:44

## 2021-01-01 RX ADMIN — ATORVASTATIN CALCIUM 40 MILLIGRAM(S): 80 TABLET, FILM COATED ORAL at 22:06

## 2021-01-01 RX ADMIN — Medication 1 MILLIGRAM(S): at 09:03

## 2021-01-01 RX ADMIN — Medication 1 MILLIGRAM(S): at 13:02

## 2021-01-01 RX ADMIN — MONTELUKAST 10 MILLIGRAM(S): 4 TABLET, CHEWABLE ORAL at 22:06

## 2021-01-01 RX ADMIN — ATORVASTATIN CALCIUM 40 MILLIGRAM(S): 80 TABLET, FILM COATED ORAL at 22:00

## 2021-01-01 RX ADMIN — Medication 650 MILLIGRAM(S): at 23:01

## 2021-01-01 RX ADMIN — Medication 125 MICROGRAM(S): at 05:39

## 2021-01-01 RX ADMIN — Medication 1: at 08:08

## 2021-01-01 RX ADMIN — Medication 1: at 21:08

## 2021-01-01 RX ADMIN — Medication 650 MILLIGRAM(S): at 23:15

## 2021-01-01 RX ADMIN — ATORVASTATIN CALCIUM 40 MILLIGRAM(S): 80 TABLET, FILM COATED ORAL at 21:07

## 2021-01-01 RX ADMIN — CALCITRIOL 0.25 MICROGRAM(S): 0.5 CAPSULE ORAL at 10:31

## 2021-01-01 RX ADMIN — Medication 50 MILLIGRAM(S): at 10:48

## 2021-01-01 RX ADMIN — Medication 81 MILLIGRAM(S): at 09:03

## 2021-01-01 RX ADMIN — Medication 1 TABLET(S): at 09:03

## 2021-01-01 RX ADMIN — INSULIN GLARGINE 24 UNIT(S): 100 INJECTION, SOLUTION SUBCUTANEOUS at 22:08

## 2021-01-01 RX ADMIN — Medication 4: at 12:25

## 2021-01-01 RX ADMIN — Medication 1: at 17:22

## 2021-01-01 RX ADMIN — MIDODRINE HYDROCHLORIDE 5 MILLIGRAM(S): 2.5 TABLET ORAL at 13:03

## 2021-01-01 RX ADMIN — Medication 650 MILLIGRAM(S): at 17:53

## 2021-01-01 RX ADMIN — Medication 650 MILLIGRAM(S): at 11:53

## 2021-01-01 RX ADMIN — MONTELUKAST 10 MILLIGRAM(S): 4 TABLET, CHEWABLE ORAL at 21:58

## 2021-01-01 RX ADMIN — ATORVASTATIN CALCIUM 40 MILLIGRAM(S): 80 TABLET, FILM COATED ORAL at 22:37

## 2021-01-01 RX ADMIN — FLUTICASONE FUROATE, UMECLIDINIUM BROMIDE AND VILANTEROL TRIFENATATE 1 PUFF(S): 200; 62.5; 25 POWDER RESPIRATORY (INHALATION) at 08:05

## 2021-01-01 RX ADMIN — MONTELUKAST 10 MILLIGRAM(S): 4 TABLET, CHEWABLE ORAL at 21:43

## 2021-01-01 RX ADMIN — Medication 10 MILLIGRAM(S): at 09:28

## 2021-01-01 RX ADMIN — Medication 81 MILLIGRAM(S): at 10:05

## 2021-01-01 RX ADMIN — Medication 1 MILLIGRAM(S): at 09:28

## 2021-01-01 RX ADMIN — Medication 6: at 17:19

## 2021-01-01 RX ADMIN — Medication 125 MICROGRAM(S): at 12:53

## 2021-01-01 RX ADMIN — Medication 50 MILLIGRAM(S): at 05:40

## 2021-01-01 RX ADMIN — MONTELUKAST 10 MILLIGRAM(S): 4 TABLET, CHEWABLE ORAL at 22:39

## 2021-01-01 RX ADMIN — Medication 40 MILLIEQUIVALENT(S): at 11:12

## 2021-01-01 RX ADMIN — Medication 650 MILLIGRAM(S): at 06:20

## 2021-01-01 RX ADMIN — TIOTROPIUM BROMIDE AND OLODATEROL 2 PUFF(S): 3.124; 2.736 SPRAY, METERED RESPIRATORY (INHALATION) at 09:30

## 2021-01-01 RX ADMIN — Medication 4: at 13:02

## 2021-01-01 RX ADMIN — CALCITRIOL 0.25 MICROGRAM(S): 0.5 CAPSULE ORAL at 09:28

## 2021-01-01 RX ADMIN — LIDOCAINE 1 PATCH: 4 CREAM TOPICAL at 22:14

## 2021-01-01 RX ADMIN — HEPARIN SODIUM 5000 UNIT(S): 5000 INJECTION INTRAVENOUS; SUBCUTANEOUS at 17:21

## 2021-01-01 RX ADMIN — Medication 8 UNIT(S): at 12:26

## 2021-01-01 RX ADMIN — ATORVASTATIN CALCIUM 40 MILLIGRAM(S): 80 TABLET, FILM COATED ORAL at 21:43

## 2021-01-01 RX ADMIN — MONTELUKAST 10 MILLIGRAM(S): 4 TABLET, CHEWABLE ORAL at 22:28

## 2021-01-01 RX ADMIN — LIDOCAINE 1 PATCH: 4 CREAM TOPICAL at 10:53

## 2021-01-01 RX ADMIN — Medication 8 UNIT(S): at 17:22

## 2021-01-01 RX ADMIN — MIDODRINE HYDROCHLORIDE 5 MILLIGRAM(S): 2.5 TABLET ORAL at 05:40

## 2021-01-01 RX ADMIN — Medication 81 MILLIGRAM(S): at 11:14

## 2021-01-01 RX ADMIN — Medication 5: at 18:10

## 2021-01-01 RX ADMIN — Medication 1 TABLET(S): at 10:53

## 2021-01-01 RX ADMIN — Medication 125 MICROGRAM(S): at 05:59

## 2021-01-01 RX ADMIN — MIDODRINE HYDROCHLORIDE 5 MILLIGRAM(S): 2.5 TABLET ORAL at 12:52

## 2021-01-01 RX ADMIN — Medication 10 MILLIGRAM(S): at 10:04

## 2021-01-01 RX ADMIN — MIDODRINE HYDROCHLORIDE 5 MILLIGRAM(S): 2.5 TABLET ORAL at 11:59

## 2021-01-01 RX ADMIN — Medication 50 MILLIGRAM(S): at 11:13

## 2021-01-01 RX ADMIN — Medication 650 MILLIGRAM(S): at 00:06

## 2021-01-01 RX ADMIN — Medication 125 MICROGRAM(S): at 06:13

## 2021-01-01 RX ADMIN — Medication 1: at 06:11

## 2021-01-01 RX ADMIN — FLUTICASONE FUROATE, UMECLIDINIUM BROMIDE AND VILANTEROL TRIFENATATE 1 PUFF(S): 200; 62.5; 25 POWDER RESPIRATORY (INHALATION) at 08:29

## 2021-01-01 RX ADMIN — Medication 650 MILLIGRAM(S): at 18:22

## 2021-01-01 RX ADMIN — CALCITRIOL 0.25 MICROGRAM(S): 0.5 CAPSULE ORAL at 11:15

## 2021-01-01 RX ADMIN — Medication 325 MILLIGRAM(S): at 06:57

## 2021-01-01 RX ADMIN — Medication 650 MILLIGRAM(S): at 17:51

## 2021-01-01 RX ADMIN — Medication 650 MILLIGRAM(S): at 05:40

## 2021-01-01 RX ADMIN — Medication 1 TABLET(S): at 17:49

## 2021-01-01 RX ADMIN — MIDODRINE HYDROCHLORIDE 2.5 MILLIGRAM(S): 2.5 TABLET ORAL at 05:45

## 2021-01-01 RX ADMIN — Medication 2: at 07:43

## 2021-01-01 RX ADMIN — SODIUM CHLORIDE 250 MILLILITER(S): 9 INJECTION INTRAMUSCULAR; INTRAVENOUS; SUBCUTANEOUS at 22:55

## 2021-01-01 RX ADMIN — CALCITRIOL 0.25 MICROGRAM(S): 0.5 CAPSULE ORAL at 09:03

## 2021-01-01 RX ADMIN — Medication 2: at 22:34

## 2021-01-01 RX ADMIN — LIDOCAINE 1 PATCH: 4 CREAM TOPICAL at 09:03

## 2021-01-01 RX ADMIN — HEPARIN SODIUM 5000 UNIT(S): 5000 INJECTION INTRAVENOUS; SUBCUTANEOUS at 05:58

## 2021-01-01 RX ADMIN — Medication 1 TABLET(S): at 11:15

## 2021-01-01 RX ADMIN — Medication 50 MILLIGRAM(S): at 09:28

## 2021-01-01 RX ADMIN — Medication 4: at 12:22

## 2021-01-01 RX ADMIN — Medication 50 MILLIGRAM(S): at 22:39

## 2021-01-01 RX ADMIN — BUMETANIDE 1 MILLIGRAM(S): 0.25 INJECTION INTRAMUSCULAR; INTRAVENOUS at 17:49

## 2021-01-01 RX ADMIN — INSULIN GLARGINE 24 UNIT(S): 100 INJECTION, SOLUTION SUBCUTANEOUS at 22:38

## 2021-01-01 RX ADMIN — Medication 81 MILLIGRAM(S): at 11:53

## 2021-01-01 RX ADMIN — Medication 650 MILLIGRAM(S): at 19:00

## 2021-01-01 RX ADMIN — Medication 1 TABLET(S): at 11:53

## 2021-01-01 RX ADMIN — Medication 10 MILLIGRAM(S): at 13:03

## 2021-01-01 RX ADMIN — Medication 50 MILLIGRAM(S): at 21:07

## 2021-01-01 RX ADMIN — Medication 125 MICROGRAM(S): at 06:08

## 2021-01-01 RX ADMIN — Medication 3: at 18:07

## 2021-01-01 RX ADMIN — Medication 650 MILLIGRAM(S): at 17:21

## 2021-01-01 RX ADMIN — MIDODRINE HYDROCHLORIDE 2.5 MILLIGRAM(S): 2.5 TABLET ORAL at 05:41

## 2021-01-01 RX ADMIN — LIDOCAINE 1 PATCH: 4 CREAM TOPICAL at 10:50

## 2021-01-01 RX ADMIN — LIDOCAINE 1 PATCH: 4 CREAM TOPICAL at 11:15

## 2021-01-01 RX ADMIN — Medication 8 UNIT(S): at 18:06

## 2021-01-01 RX ADMIN — Medication 125 MICROGRAM(S): at 06:11

## 2021-01-01 RX ADMIN — Medication 4: at 11:25

## 2021-01-01 RX ADMIN — Medication 81 MILLIGRAM(S): at 11:08

## 2021-01-01 RX ADMIN — Medication 1 MILLIGRAM(S): at 10:48

## 2021-01-01 RX ADMIN — Medication 50 MILLIGRAM(S): at 09:03

## 2021-01-01 RX ADMIN — LIDOCAINE 1 PATCH: 4 CREAM TOPICAL at 09:28

## 2021-01-01 RX ADMIN — CALCITRIOL 0.25 MICROGRAM(S): 0.5 CAPSULE ORAL at 10:47

## 2021-01-01 RX ADMIN — LIDOCAINE 1 PATCH: 4 CREAM TOPICAL at 20:50

## 2021-01-01 RX ADMIN — FLUTICASONE FUROATE, UMECLIDINIUM BROMIDE AND VILANTEROL TRIFENATATE 1 PUFF(S): 200; 62.5; 25 POWDER RESPIRATORY (INHALATION) at 08:11

## 2021-01-01 RX ADMIN — Medication 2: at 18:17

## 2021-01-01 RX ADMIN — MIDODRINE HYDROCHLORIDE 2.5 MILLIGRAM(S): 2.5 TABLET ORAL at 12:22

## 2021-01-01 RX ADMIN — Medication 10 MILLIGRAM(S): at 10:53

## 2021-01-01 RX ADMIN — Medication 10 MILLIGRAM(S): at 12:52

## 2021-01-01 RX ADMIN — TIOTROPIUM BROMIDE AND OLODATEROL 2 PUFF(S): 3.124; 2.736 SPRAY, METERED RESPIRATORY (INHALATION) at 14:56

## 2021-01-01 RX ADMIN — Medication 1 TABLET(S): at 10:31

## 2021-01-01 RX ADMIN — Medication 650 MILLIGRAM(S): at 11:44

## 2021-01-01 RX ADMIN — Medication 650 MILLIGRAM(S): at 10:31

## 2021-01-01 RX ADMIN — MIDODRINE HYDROCHLORIDE 2.5 MILLIGRAM(S): 2.5 TABLET ORAL at 18:17

## 2021-01-01 RX ADMIN — HEPARIN SODIUM 5000 UNIT(S): 5000 INJECTION INTRAVENOUS; SUBCUTANEOUS at 05:39

## 2021-01-01 RX ADMIN — Medication 50 MILLIGRAM(S): at 22:30

## 2021-01-01 RX ADMIN — Medication 50 MILLIGRAM(S): at 05:44

## 2021-01-01 RX ADMIN — Medication 1 MILLIGRAM(S): at 10:05

## 2021-01-01 RX ADMIN — Medication 650 MILLIGRAM(S): at 18:19

## 2021-01-01 RX ADMIN — Medication 50 MILLIGRAM(S): at 22:08

## 2021-01-01 RX ADMIN — MIDODRINE HYDROCHLORIDE 5 MILLIGRAM(S): 2.5 TABLET ORAL at 17:22

## 2021-01-01 RX ADMIN — Medication 650 MILLIGRAM(S): at 05:41

## 2021-01-01 RX ADMIN — HEPARIN SODIUM 5000 UNIT(S): 5000 INJECTION INTRAVENOUS; SUBCUTANEOUS at 22:38

## 2021-01-01 RX ADMIN — Medication 81 MILLIGRAM(S): at 09:28

## 2021-01-01 RX ADMIN — Medication 1: at 08:51

## 2021-02-24 PROBLEM — N39.0 RECURRENT URINARY TRACT INFECTION: Status: ACTIVE | Noted: 2019-07-17

## 2021-03-02 NOTE — PHYSICAL EXAM
[Normal Appearance] : normal appearance [General Appearance - In No Acute Distress] : no acute distress [Skin Color & Pigmentation] : normal skin color and pigmentation [FreeTextEntry1] : normal peripheral circulation  [] : no respiratory distress [Oriented To Time, Place, And Person] : oriented to person, place, and time

## 2021-03-02 NOTE — HISTORY OF PRESENT ILLNESS
[FreeTextEntry1] : 79 yo female presents for follow up. \par Accompanied by Daughter: Celia(RN). Tele: [phone].\par Using estrogen cream, no longer on Levaquin. No longer has odor to urine. \par On diuretic has increased urinary incontinence.\par Denies dysuria, hematuria, lower abdominal or flank pain, nausea, vomiting, fever, chills or rigors. \par \par Recently seen for Recurrent urinary tract infections. Treated with Levaquin.\par In the last few few months had recurrent Urinary tract infections. Had mental status changes, dysuria and pelvic pain. \par On diuretic, urinated frequently, had urinary incontinence and wears diaper.\par Was not using Estrogen cream. \par \par Initially seen for Recurrent urinary tract infections. \par 3 in last 6 months. \par UTIs characterized by cloudy urine. \par Was following with Dr Headley. Takes Cranberry tablet. \par Takes diuretic and develops frequency and diarrhea. \par Normal daytime frequency is every hours or so. Nocturia of 2 x. \par Endorses urinary incontinence, wears diapers- 2/day. \par  \par

## 2021-03-02 NOTE — ASSESSMENT
[FreeTextEntry1] : Urinary incontinence:\par Recurrent urinary tract infections:\par Will get Renal and Bladder Ultrasound. Will inform results. \par Will consider anti cholinergic or B3 agonist. \par Continue Estrogen cream. \par \par Return to office in 3 months or sooner if any issues.

## 2021-03-05 PROBLEM — R32 URINARY INCONTINENCE IN FEMALE: Status: ACTIVE | Noted: 2019-08-09

## 2021-03-12 NOTE — ED ADULT NURSE NOTE - NSIMPLEMENTINTERV_GEN_ALL_ED
Implemented All Fall Risk Interventions:  [place] to call system. Call bell, personal items and telephone within reach. Instruct patient to call for assistance. Room bathroom lighting operational. Non-slip footwear when patient is off stretcher. Physically safe environment: no spills, clutter or unnecessary equipment. Stretcher in lowest position, wheels locked, appropriate side rails in place. Provide visual cue, wrist band, yellow gown, etc. Monitor gait and stability. Monitor for mental status changes and reorient to person, place, and time. Review medications for side effects contributing to fall risk. Reinforce activity limits and safety measures with patient and family.

## 2021-03-12 NOTE — ED ADULT NURSE REASSESSMENT NOTE - NS ED NURSE REASSESS COMMENT FT1
patient resting in bed comfortably. alert and oriented x 4, respirations even and unlabored on 3liters oxygen via nasal cannula. denies chest pain or discomfort. vital signs stable. no complaints at this time. awaiting covid-19 result. plan to admit for observation. call bell within reach. daughter at bedside. will continue to monitor.

## 2021-03-12 NOTE — H&P ADULT - NSICDXPASTMEDICALHX_GEN_ALL_CORE_FT
PAST MEDICAL HISTORY:  Anxiety     CAD (coronary artery disease)     Chronic diastolic congestive heart failure last exacerbation 7/2017    Chronic obstructive pulmonary disease, unspecified COPD type     Chronic UTI     CKD (chronic kidney disease)     Diabetes     Essential hypertension     GERD (gastroesophageal reflux disease)     [place] (hard of hearing) hearing aid    Hyperlipidemia, unspecified hyperlipidemia type     Hypothyroid     MI, old x3    Neuropathy     Pacemaker

## 2021-03-12 NOTE — ED ADULT NURSE NOTE - PMH
Anxiety    CAD (coronary artery disease)    Chronic diastolic congestive heart failure  last exacerbation 7/2017  Chronic obstructive pulmonary disease, unspecified COPD type    Chronic UTI    CKD (chronic kidney disease)    Diabetes    Essential hypertension    GERD (gastroesophageal reflux disease)    [place] (hard of hearing)  hearing aid  Hyperlipidemia, unspecified hyperlipidemia type    Hypothyroid    MI, old  x3  Neuropathy    Pacemaker

## 2021-03-12 NOTE — ED ADULT NURSE NOTE - OBJECTIVE STATEMENT
PT brought in by daughter c/o near syncope at doctor office this morning. As per daughter, pt with episode of HR in the 40s. States pacemaker has had no transmissions sent since January and sent to the ED for PPM interrogation. PT alert and oriented x4, hard of hearing. Pt uses 2 liters oxygen at baseline. Cardiac monitoring in progress, EKG done, safety maintained. PT denies chest pain, shortness of breath, dizziness, nausea, vomiting, diarrhea. Pt vpaced 70s on monitor, oxygen saturation 100% on 2 liters nasal cannula

## 2021-03-12 NOTE — H&P ADULT - ASSESSMENT
79 y/o F PMHx significant for CAD, MI x 3, complete heart block s/p pacemaker placement, advanced COPD on home oxygen, CHFpEF, CKD4, diabetes mellitus (insulin dependent), hypertension, hyperlipidemia, hypothyroidism, and recurrent UTIs, was referred to  by her Cardiologist for further evaluation and management of witnessed near syncope. 79 y/o F PMHx significant for CAD, MI x 3, complete heart block s/p pacemaker placement, advanced COPD on home oxygen, CHFpEF, CKD4, diabetes mellitus (insulin dependent), hypertension, hyperlipidemia, hypothyroidism, and recurrent UTIs, was referred to  by her Cardiologist for further evaluation and management of witnessed near syncope.  Labs => K 3.4, HCO3 38, BUN/Cr 82/3.5, Glu 287, TnI 0.096, proBNP 4236. In the ED the patient received ASA 325mg PO x 1, and NS x 500mL x 1.    #Syncope  ~admit to Telemetry  ~serial [place]/EKGs  ~f/u w/ Cardiology in the am  ~f/u w/ EP for PPM interrogation  ~fall precautions    #ANI on CKD  ~will hold Bumetanide for now (please reassess further in the am)  ~strict I/Os  ~f/u w/ urine studies    #CAD/CHF  ~cont. ASA 81mg po daily    #COPD  ~cont. supplemental O2  ~cont. ALbuterol MDI  ~cont. Trelegy Ellipta  ~cont. Montelukast 10mg po qhs    #Hyperlipidemia  ~cont. statin therapy with Atorvastatin 40mg po qhs    #Hypothyroidism  ~cont. Levothyroxine 125mcg po qan    #Diabetes Mellitus  ~FS qAC/HS  ~cont. Basal/Bolus Insulin regimen    #Vte ppx  ~IMPROVE Vte Risk Score is 1  ~cont. SCDs for now 81 y/o F PMHx significant for CAD, MI x 3, complete heart block s/p pacemaker placement, advanced COPD on home oxygen, CHFpEF, CKD4, diabetes mellitus (insulin dependent), hypertension, hyperlipidemia, hypothyroidism, and recurrent UTIs, was referred to  by her Cardiologist for further evaluation and management of witnessed near syncope.  Labs => K 3.4, HCO3 38, BUN/Cr 82/3.5, Glu 287, TnI 0.096, proBNP 4236. In the ED the patient received ASA 325mg PO x 1, and NS x 500mL x 1.    #Syncope  #Elevated Cardiac Markers  ~admit to Telemetry  ~serial [place]/EKGs  ~f/u w/ Cardiology in the am  ~f/u w/ EP for PPM interrogation  ~fall precautions  ~DDx includes ACS  ~elevation of [place] may be multifactorial; component of CKD, and possible demand ischemia vs ACS    #ANI on CKD  ~will hold Bumetanide for now (please reassess further in the am)  ~strict I/Os  ~f/u w/ urine studies    #CAD/CHF  ~cont. ASA 81mg po daily    #COPD  ~cont. supplemental O2  ~cont. ALbuterol MDI  ~cont. Trelegy Ellipta  ~cont. Montelukast 10mg po qhs    #Hyperlipidemia  ~cont. statin therapy with Atorvastatin 40mg po qhs    #Hypothyroidism  ~cont. Levothyroxine 125mcg po qan    #Diabetes Mellitus  ~FS qAC/HS  ~cont. Basal/Bolus Insulin regimen    #Vte ppx  ~IMPROVE Vte Risk Score is 1  ~cont. SCDs for now

## 2021-03-12 NOTE — ED ADULT TRIAGE NOTE - CHIEF COMPLAINT QUOTE
patient sent in by Dr. Lund for near syncope in office this morning and PPM interrogation.  as per daughter no transmissions send since January.

## 2021-03-12 NOTE — ED STATDOCS - PMH
Anxiety    CAD (coronary artery disease)    Chronic diastolic congestive heart failure  last exacerbation 7/2017  Chronic obstructive pulmonary disease, unspecified COPD type    Chronic UTI    CKD (chronic kidney disease)    Diabetes    Essential hypertension    GERD (gastroesophageal reflux disease)    [place] (hard of hearing)  hearing aid  Hyperlipidemia, unspecified hyperlipidemia type    Hypothyroid    MI, old  x3  Neuropathy    Pacemaker

## 2021-03-12 NOTE — ED STATDOCS - PROGRESS NOTE DETAILS
Sara Maciel for attending Dr. Martinez: 79 y/o female with  a PMHx of CAD, MI, DM, HLD, COPD, hypothyroidism, CHF, CKD (not on dialysis), HTN, GERD, s/p PPM, presents to the sent in by Dr. Marquez c/o near syncopal episode in office this morning. As per daughter at bedside, pt with episode of HR in the 40s. States pacemaker has had no transmissions sent since January and sent to the ED for PPM interrogation. Will send pt to main ED for further evaluation.

## 2021-03-12 NOTE — ED PROVIDER NOTE - RESPIRATORY, MLM
Breath sounds clear and equal bilaterally. Breath sounds clear and equal bilaterally. No retractions. No tachypnea.

## 2021-03-12 NOTE — ED PROVIDER NOTE - MUSCULOSKELETAL, MLM
Spine appears normal, range of motion is not limited, no muscle or joint tenderness Spine appears normal, range of motion is not limited, no muscle or joint tenderness. Palpable ppm. No nuchal rigidity. 5/5 strength on flexion and extension of all limbs.

## 2021-03-12 NOTE — ED ADULT NURSE NOTE - CHPI ED NUR SYMPTOMS NEG
no back pain/no chest pain/no chills/no congestion/no diaphoresis/no dizziness/no fever/no nausea/no shortness of breath/no vomiting

## 2021-03-12 NOTE — ED PROVIDER NOTE - PMH
Anxiety    CAD (coronary artery disease)    Chronic diastolic congestive heart failure  last exacerbation 7/2017  Chronic obstructive pulmonary disease, unspecified COPD type    Chronic UTI    CKD (chronic kidney disease)    Diabetes    Essential hypertension    GERD (gastroesophageal reflux disease)    [place] (hard of hearing)  hearing aid  Hyperlipidemia, unspecified hyperlipidemia type    Hypothyroid    MI, old  x3  Neuropathy    Pacemaker

## 2021-03-12 NOTE — ED PROVIDER NOTE - CARE PLAN
Principal Discharge DX:	Syncope, unspecified syncope type  Secondary Diagnosis:	Acute renal failure, unspecified acute renal failure type  Secondary Diagnosis:	Elevated troponin I level   Principal Discharge DX:	Syncope, unspecified syncope type  Goal:	ppm requires interrogation, rule out ACS, not safe to go home, evaluation for cardiogenic syncope  Secondary Diagnosis:	Acute renal failure, unspecified acute renal failure type  Secondary Diagnosis:	Elevated troponin I level

## 2021-03-12 NOTE — ED PROVIDER NOTE - NEUROLOGICAL, MLM
Alert and oriented, no focal deficits, no motor or sensory deficits, NIH: 0, GCS: 15, CN 2-12 intact, no nuchal rigidity, 5/5 strength in upper and lower extremities upon flexion and extension.

## 2021-03-12 NOTE — H&P ADULT - NSHPPHYSICALEXAM_GEN_ALL_CORE
Vital Signs Last 24 Hrs  T(C): 36.6 (12 Mar 2021 20:53), Max: 36.6 (12 Mar 2021 20:53)  T(F): 97.8 (12 Mar 2021 20:53), Max: 97.8 (12 Mar 2021 20:53)  HR: 80 (12 Mar 2021 23:15) (77 - 80)  BP: 151/61 (12 Mar 2021 23:15) (125/59 - 151/61)  RR: 16 (12 Mar 2021 23:15) (16 - 19)  SpO2: 98% (12 Mar 2021 23:15) (98% - 100%)

## 2021-03-12 NOTE — ED PROVIDER NOTE - OBJECTIVE STATEMENT
81 y/o female with PMHx of anxiety, CAD, chronic diastolic congestive heart failure, COPD, chronic UTI, CKD, diabetes, essential HTN, GERD, [place], HLD, hypothyroid, MI x 3, neuropathy, pacemaker, renal failure, s/p cholecystectomy s/p b/l total knee replacement, s/p lobectomy of lung, s/p ORIF presents to the ED s/p syncopal episode at cardiologist Dr. Ro's office today. Daughter at beside states pt's HR has been in the 40s todya. Pt states this happened a couple of years ago where pt's legs "gave out" and  sudden onset of weakness which causes the pt to fall. At that time, pacemaker was fixed and all the sx stopped. Pt's transmission machine was not able to read pacemaker today so daughter is concerned of pacemaker problems. Daughter states pt's O2 sat has been normal and today was 94. Pt takes Bumex.  Diabetes: Dr. Rucker   Nephrologist: Dr. Cutler   EP: Dr. Armando 79 y/o female with PMHx of anxiety, CAD, chronic diastolic congestive heart failure, COPD, chronic UTI, CKD, diabetes, essential HTN, GERD, [place], HLD, hypothyroid, MI x 3, neuropathy, pacemaker, renal failure, s/p cholecystectomy s/p b/l total knee replacement, s/p lobectomy of lung, s/p ORIF presents to the ED s/p syncopal episode at cardiologist Dr. Ro's office today. Daughter at beside states pt's HR has been in the 40s today. Pt states this happened a couple of years ago where pt's legs "gave out" and  sudden onset of weakness which caused the pt to fall. At that time, pacemaker was fixed and all the sx stopped. Pt's transmission machine was not able to read pacemaker today so daughter is concerned of pacemaker problems. Daughter states pt's O2 sat has been normal and today was 94. Pt takes Bumex.  Diabetes: Dr. Rucker   Nephrologist: Dr. Cutler   EP: Dr. Armando

## 2021-03-12 NOTE — H&P ADULT - HISTORY OF PRESENT ILLNESS
81 y/o F PMHx significant for CAD, MI x 3, complete heart block s/p pacemaker placement, advanced COPD on home oxygen, CHFpEF, CKD4, diabetes mellitus (insulin dependent), hypertension, hyperlipidemia, hypothyroidism, and recurrent UTIs, was referred to  by her Cardiologist for further evaluation and management of witnessed near syncope. 81 y/o F PMHx significant for CAD, MI x 3, complete heart block s/p pacemaker placement, advanced COPD on home oxygen, CHFpEF, CKD4, diabetes mellitus (insulin dependent), hypertension, hyperlipidemia, hypothyroidism, and recurrent UTIs, was referred to  by her Cardiologist for further evaluation and management of witnessed near syncope.  Labs => K 3.4, HCO3 38, BUN/Cr 82/3.5, Glu 287, TnI 0.096, proBNP 4236. In the ED the patient received ASA 325mg PO x 1, and NS x 500mL x 1

## 2021-03-13 NOTE — PATIENT PROFILE ADULT - FALL HARM RISK
hx of falls/bones(Osteoporosis,prev fx,steroid use,metastatic bone ca)/coagulation(Bleeding disorder R/T clinical cond/anti-coags)/other

## 2021-03-13 NOTE — CONSULT NOTE ADULT - SUBJECTIVE AND OBJECTIVE BOX
Chief complaints.  Presents with about 2 months hx of generalized malaise with LE weakness    HPI:  81 yo woman with DM, HTN, CAD post MI x 3, PPM for complete Heart Block and known CKD ( baseline creat ~ 2.5 per pt's daughter) and CHF with CardioMEMS guiding diuretic therapy.  Reports, Metolazone is given in addition to Bumex daily per MEMS parameters.  Last took Metolazone 3 weeeks ago and 3 days later creat ~ 2.45.  Pt was seen by her cardiologist due to about 2 months hx of feeling weak with her legs buckling with attempts to walk adn unclear episodes of staring with loss of affect.  Pt sent to ED with near syncope and possible seizure episodes.  Reports weight recently decreased to 166 from 175 lbs.  Has had stable po intake.    PMHX and PSHX.  1.CKD ( recent creat ~ 2.45)  2.CAD, Hx of MI x 3  3.PPM for CHB  4.CHF with cardioMEMs in place  5.DM with Neuropathy  6.CHF  7.HTN  8.COPD, on home O2.  9.Hx of Left Hip repair.    Earlier today in ER had chest pain, ivf stopped and tropronin send    FAMILY HISTORY:  Family history of CHF (congestive heart failure)    SOCIAL HISTORY :  Remote former smoker, No etoh.  Lives at home with her daughter.  Allergies    Bactrim (Other)  ciprofloxacin (Other (Mild))  clindamycin (Unknown)  ibuprofen (Unknown)  penicillins (Other)  sulfa drugs (Unknown)    Intolerances    Home Medications:   * Patient Currently Takes Medications as of 12-Mar-2021 23:59 documented in Structured Notes  · 	calcitriol 0.25 mcg oral capsule: Last Dose Taken:  , 1 cap(s) orally once a day  · 	folic acid 1 mg oral tablet: Last Dose Taken:  , 1 tab(s) orally once a day  · 	levothyroxine 125 mcg (0.125 mg) oral tablet: Last Dose Taken:  , 1 tab(s) orally once a day  · 	atorvastatin 40 mg oral tablet: Last Dose Taken:  , 1 tab(s) orally once a day  · 	aspirin 81 mg oral tablet: Last Dose Taken:  , 1 tab(s) orally once a day  · 	montelukast 10 mg oral tablet: Last Dose Taken:  , 1 tab(s) orally once a day (at bedtime)  · 	HumaLOG 100 units/mL injectable solution: Last Dose Taken:  , 14 unit(s) injectable 3 times a day (before meals)  · 	Melatonin 10 mg oral capsule: Last Dose Taken:  , 1 cap(s) orally once a day (at bedtime)  · 	bumetanide 2 mg oral tablet: Last Dose Taken:  , 2 tab(s) orally once a day  · 	Lyrica 50 mg oral capsule: Last Dose Taken:  , 1 cap(s) orally 2 times a day  · 	Toujeo SoloStar 300 units/mL subcutaneous solution: Last Dose Taken:  , 44 unit(s) subcutaneous once a day (at bedtime)  · 	Trelegy Ellipta 100 mcg-62.5 mcg-25 mcg/inh inhalation powder: Last Dose Taken:  , 1 puff(s) inhaled once a day  · 	FLUoxetine 10 mg oral capsule: Last Dose Taken:  , 1 cap(s) orally once a day  · 	ProAir HFA 90 mcg/inh inhalation aerosol: Last Dose Taken:  , 2 puff(s) inhaled every 6 hours, As Needed - for shortness of breath and/or wheezing  · 	Centrum Silver oral tablet: Last Dose Taken:  , 1 tab(s) orally once a day  · 	lidocaine 5% patch: Last Dose Taken:  , 2 pad(s) orally once a day  	****Lower Back***  · 	acetaminophen 500 mg oral tablet: Last Dose Taken:  , 2 tab(s) orally every 6 hours, As Needed - for mild pain  · 	albuterol 2.5 mg/3 mL (0.083%) inhalation solution: Last Dose Taken:  , 3 milliliter(s) inhaled every 6 hours, As Needed - for shortness of breath and/or wheezing    MEDICATIONS  (STANDING):  aspirin enteric coated 81 milliGRAM(s) Oral daily  atorvastatin 40 milliGRAM(s) Oral at bedtime  calcitriol   Capsule 0.25 MICROGram(s) Oral daily  dextrose 40% Gel 15 Gram(s) Oral once  dextrose 5%. 1000 milliLiter(s) (50 mL/Hr) IV Continuous <Continuous>  dextrose 5%. 1000 milliLiter(s) (100 mL/Hr) IV Continuous <Continuous>  dextrose 50% Injectable 25 Gram(s) IV Push once  dextrose 50% Injectable 12.5 Gram(s) IV Push once  dextrose 50% Injectable 25 Gram(s) IV Push once  FLUoxetine 10 milliGRAM(s) Oral daily  fluticasone furoate/umeclidinium/vilanterol 100-62.5-25 MICROgram(s) Inhaler 1 Puff(s) Inhalation daily  folic acid 1 milliGRAM(s) Oral daily  glucagon  Injectable 1 milliGRAM(s) IntraMuscular once  insulin glargine Injectable (LANTUS) 20 Unit(s) SubCutaneous at bedtime  insulin lispro (ADMELOG) corrective regimen sliding scale   SubCutaneous three times a day before meals  insulin lispro (ADMELOG) corrective regimen sliding scale   SubCutaneous at bedtime  levothyroxine 125 MICROGram(s) Oral daily  lidocaine   Patch 1 Patch Transdermal daily  montelukast 10 milliGRAM(s) Oral at bedtime  multivitamin/minerals 1 Tablet(s) Oral daily  pregabalin 50 milliGRAM(s) Oral two times a day    MEDICATIONS  (PRN):  acetaminophen   Tablet .. 650 milliGRAM(s) Oral every 6 hours PRN Temp greater or equal to 38C (100.4F), Mild Pain (1 - 3)  ALBUTerol    90 MICROgram(s) HFA Inhaler 2 Puff(s) Inhalation every 6 hours PRN for shortness of breath and/or wheezing         Vital Signs Last 24 Hrs  T(C): 36.5 (13 Mar 2021 08:15), Max: 36.6 (12 Mar 2021 20:53)  T(F): 97.7 (13 Mar 2021 08:15), Max: 97.8 (12 Mar 2021 20:53)  HR: 64 (13 Mar 2021 03:55) (64 - 80)  BP: 135/62 (13 Mar 2021 03:55) (125/59 - 151/61)  BP(mean): --  RR: 18 (13 Mar 2021 08:15) (14 - 19)  SpO2: 100% (13 Mar 2021 08:15) (98% - 100%)  Daily Height in cm: 157.48 (12 Mar 2021 20:14)    Daily   I&O's Summary      PHYSICAL EXAM:  GEN: Comfortable.  HEENT: WNL  NECK : supple  CV: S1S2 RRR  LUNGS: Left base rhonchi  ABD: soft  EXT: no edema    LABS:                        10.4   8.16  )-----------( 198      ( 13 Mar 2021 08:52 )             33.4     03-13    141  |  99  |  77<H>  ----------------------------<  151<H>  3.7   |  36<H>  |  3.14<H>    Ca    9.1      13 Mar 2021 08:52  Phos  3.6     03-13  Mg     2.2     03-13    TPro  7.0  /  Alb  3.1<L>  /  TBili  0.5  /  DBili  x   /  AST  20  /  ALT  16  /  AlkPhos  82  03-13    PT/INR - ( 12 Mar 2021 21:14 )   PT: 13.5 sec;   INR: 1.17 ratio         PTT - ( 12 Mar 2021 21:14 )  PTT:29.5 sec    Magnesium, Serum: 2.2 mg/dL (03-13 @ 08:52)  Phosphorus Level, Serum: 3.6 mg/dL (03-13 @ 08:52)  Magnesium, Serum: 2.4 mg/dL (03-12 @ 21:14)      
Patient is a 80y old  Female who presents with a chief complaint of Syncope (13 Mar 2021 06:39)      HPI:  79 y/o F PMHx significant for CAD, MI x 3, complete heart block s/p pacemaker placement, advanced COPD on home oxygen, CHFpEF, CKD4, diabetes mellitus (insulin dependent), hypertension, hyperlipidemia, hypothyroidism, and recurrent UTIs, was referred to  by her Cardiologist for further evaluation and management of witnessed near syncope.  Labs => K 3.4, HCO3 38, BUN/Cr 82/3.5, Glu 287, TnI 0.096, proBNP 4236. In the ED the patient received ASA 325mg PO x 1, and NS x 500mL x 1 (12 Mar 2021 23:41)    I spoke ot her daughter Anabelle ([phone]) over the telephone.  She says that prior to the pacemaker placement about 3 years ago she would pass out.  Now the episodes are different. It appears that her legs are buckling as if they cannot hold her. She will then have a blank stare.  She does not seem to have a complete loss of consciousness but will not respond during these episodes. She is not coherent enough to respond. It only lasts for a few seconds.   She wears a diaper for incontinence and it is not clear if these particular episodes are associated with incontinence.  Her daughter checked her pulse with a pulse ox during and episode and her HR was 44. Her pulse ox was 96%.  She wears oxygen 24/7.   The episodes have only been witnessed when she is standing. However, this is when her daughter is watching her more closely.  She has been unsteady on her feet.  She sees Dr. Douglas for tremors and was told that she has benign tremors.  Her daughter notes a decline in her functioning over the last year.    An EEG in 2018 did not show epileptiform activity. Episodes of asystole were captured.     Her daughter also reports that anxiety has been a big problem for her recently.    PAST MEDICAL & SURGICAL HISTORY:  Pacemaker  CAD (coronary artery disease)  [place] (hard of hearing)  hearing aid  Anxiety  Hypothyroid  GERD (gastroesophageal reflux disease)  CKD (chronic kidney disease)  MI, old  x3  Chronic UTI  Chronic diastolic congestive heart failure  last exacerbation 2017  Hyperlipidemia, unspecified hyperlipidemia type  Essential hypertension  Neuropathy  Diabetes  Chronic obstructive pulmonary disease, unspecified COPD type  History of cataract surgery  bilateral IOL  S/P lobectomy of lung  left lung pre-cancerous  History of ear surgery  History of cholecystectomy    S/P ORIF (open reduction internal fixation) fracture  left hip 2017    History of total knee replacement, unspecified laterality  bilateral right  left     H/O hernia repair  umbilical and incisional     delivery delivered    S/P appendectomy        FAMILY HISTORY:  Family history of CHF (congestive heart failure)        Social Hx:  Nonsmoker, no drug or alcohol use    MEDICATIONS  (STANDING):  aspirin enteric coated 81 milliGRAM(s) Oral daily  atorvastatin 40 milliGRAM(s) Oral at bedtime  calcitriol   Capsule 0.25 MICROGram(s) Oral daily  dextrose 40% Gel 15 Gram(s) Oral once  dextrose 5%. 1000 milliLiter(s) (50 mL/Hr) IV Continuous <Continuous>  dextrose 5%. 1000 milliLiter(s) (100 mL/Hr) IV Continuous <Continuous>  dextrose 50% Injectable 25 Gram(s) IV Push once  dextrose 50% Injectable 12.5 Gram(s) IV Push once  dextrose 50% Injectable 25 Gram(s) IV Push once  FLUoxetine 10 milliGRAM(s) Oral daily  fluticasone furoate/umeclidinium/vilanterol 100-62.5-25 MICROgram(s) Inhaler 1 Puff(s) Inhalation daily  folic acid 1 milliGRAM(s) Oral daily  glucagon  Injectable 1 milliGRAM(s) IntraMuscular once  insulin glargine Injectable (LANTUS) 20 Unit(s) SubCutaneous at bedtime  insulin lispro (ADMELOG) corrective regimen sliding scale   SubCutaneous three times a day before meals  insulin lispro (ADMELOG) corrective regimen sliding scale   SubCutaneous at bedtime  levothyroxine 125 MICROGram(s) Oral daily  lidocaine   Patch 1 Patch Transdermal daily  montelukast 10 milliGRAM(s) Oral at bedtime  multivitamin/minerals 1 Tablet(s) Oral daily  pregabalin 50 milliGRAM(s) Oral two times a day       Allergies    Bactrim (Other)  ciprofloxacin (Other (Mild))  clindamycin (Unknown)  ibuprofen (Unknown)  penicillins (Other)  sulfa drugs (Unknown)    Intolerances        ROS: Pertinent positives in HPI, all other ROS were reviewed and are negative.      Vital Signs Last 24 Hrs  T(C): 36.5 (13 Mar 2021 08:15), Max: 36.6 (12 Mar 2021 20:53)  T(F): 97.7 (13 Mar 2021 08:15), Max: 97.8 (12 Mar 2021 20:53)  HR: 64 (13 Mar 2021 03:55) (64 - 80)  BP: 135/62 (13 Mar 2021 03:55) (125/59 - 151/61)  BP(mean): --  RR: 18 (13 Mar 2021 08:15) (14 - 19)  SpO2: 100% (13 Mar 2021 08:15) (98% - 100%)        Constitutional: awake and alert.  HEENT: PERRLA, EOMI,   Neck: Supple.  Respiratory: Breath sounds are clear bilaterally  Cardiovascular: S1 and S2, regular / irregular rhythm  Gastrointestinal: soft, nontender  Extremities:  no edema  Vascular: Caritid Bruit - no  Musculoskeletal: no joint swelling/tenderness, no abnormal movements  Skin: No rashes    Neurological exam:  HF: A x O x 3. Appropriately interactive, normal affect. Speech fluent, No Aphasia or paraphasic errors.  CN: AYE, EOMI, VFF, facial sensation normal, no NLFD, tongue midline, Palate moves equally, SCM equal bilaterally  Motor: No pronator drift, Strength 5/5 in all 4 ext, normal bulk and tone, no tremor, rigidity or bradykinesia.    Sens: Intact to light touch  Reflexes: Symmetric and normal . BJ 1, BR 1, KJ 1, downgoing toes b/l  Coord:  No FNFA, dysmetria, RANJIT intact         Labs:       141  |  99  |  77<H>  ----------------------------<  151<H>  3.7   |  36<H>  |  3.14<H>    Ca    9.1      13 Mar 2021 08:52  Phos  3.6     03-13  Mg     2.2     03-13    TPro  7.0  /  Alb  3.1<L>  /  TBili  0.5  /  DBili  x   /  AST  20  /  ALT  16  /  AlkPhos  82  03-13                              10.4   8.16  )-----------( 198      ( 13 Mar 2021 08:52 )             33.4       Radiology:  CT head 3/12/21:    No acute intracranial hemorrhage, mass effect, or CT evidence of a large vascular territory infarct. Indeterminate tiny right basal ganglia infarct of indeterminate age. Involutional and chronic microvascular ischemic gliotic changes.    If there are new or persistent symptoms, consider further evaluation with MRI provided no contraindications.    
Patient is a 80y old  Female who presents with a chief complaint of Syncope.       HPI:  81 y/o F PMHx significant for CAD, MI x 3, complete heart block s/p pacemaker placement, advanced COPD on home oxygen, CHFpEF, CKD4, diabetes mellitus (insulin dependent), hypertension, hyperlipidemia, hypothyroidism, and recurrent UTIs, was referred to  by her Cardiologist for further evaluation and management of witnessed near syncope.  Labs => K 3.4, HCO3 38, BUN/Cr 82/3.5, Glu 287, TnI 0.096, proBNP 4236. In the ED the patient received ASA 325mg PO x 1, and NS x 500mL x 1     Pt had been having episodes of loss of affect and blankk stare and she is weak and then later on regains the affect.  She was extremely weak in her legs yesterday in office that her duaghter and office staff had to make her sit in chair.  Per daughter episodes more frequent recently.     PAST MEDICAL & SURGICAL HISTORY:  Pacemaker    CAD (coronary artery disease)    [place] (hard of hearing)  hearing aid    Anxiety    Hypothyroid    GERD (gastroesophageal reflux disease)    CKD (chronic kidney disease)    MI, old  x3    Chronic UTI    Chronic diastolic congestive heart failure  last exacerbation 2017    Hyperlipidemia, unspecified hyperlipidemia type    Essential hypertension    Neuropathy    Diabetes    Chronic obstructive pulmonary disease, unspecified COPD type    History of cataract surgery  bilateral IOL    S/P lobectomy of lung  left lung pre-cancerous    History of ear surgery    History of cholecystectomy    S/P ORIF (open reduction internal fixation) fracture  left hip 2017    History of total knee replacement, unspecified laterality  bilateral right  left     H/O hernia repair  umbilical and incisional     delivery delivered    S/P appendectomy        MEDICATIONS  (STANDING):  aspirin enteric coated 81 milliGRAM(s) Oral daily  atorvastatin 40 milliGRAM(s) Oral at bedtime  calcitriol   Capsule 0.25 MICROGram(s) Oral daily  dextrose 40% Gel 15 Gram(s) Oral once  dextrose 5%. 1000 milliLiter(s) (50 mL/Hr) IV Continuous <Continuous>  dextrose 5%. 1000 milliLiter(s) (100 mL/Hr) IV Continuous <Continuous>  dextrose 50% Injectable 25 Gram(s) IV Push once  dextrose 50% Injectable 12.5 Gram(s) IV Push once  dextrose 50% Injectable 25 Gram(s) IV Push once  FLUoxetine 10 milliGRAM(s) Oral daily  fluticasone furoate/umeclidinium/vilanterol 100-62.5-25 MICROgram(s) Inhaler 1 Puff(s) Inhalation daily  folic acid 1 milliGRAM(s) Oral daily  glucagon  Injectable 1 milliGRAM(s) IntraMuscular once  insulin glargine Injectable (LANTUS) 20 Unit(s) SubCutaneous at bedtime  insulin lispro (ADMELOG) corrective regimen sliding scale   SubCutaneous three times a day before meals  insulin lispro (ADMELOG) corrective regimen sliding scale   SubCutaneous at bedtime  levothyroxine 125 MICROGram(s) Oral daily  lidocaine   Patch 1 Patch Transdermal daily  montelukast 10 milliGRAM(s) Oral at bedtime  multivitamin/minerals 1 Tablet(s) Oral daily  pregabalin 50 milliGRAM(s) Oral two times a day    MEDICATIONS  (PRN):  acetaminophen   Tablet .. 650 milliGRAM(s) Oral every 6 hours PRN Temp greater or equal to 38C (100.4F), Mild Pain (1 - 3)  ALBUTerol    90 MICROgram(s) HFA Inhaler 2 Puff(s) Inhalation every 6 hours PRN for shortness of breath and/or wheezing      FAMILY HISTORY:  Family history of CHF (congestive heart failure)        SOCIAL HISTORY:    REVIEW OF SYSTEMS:  CONSTITUTIONAL:    No fatigue, malaise, lethargy.  No fever or chills.  RESPIRATORY:  No cough.  No wheeze.  No hemoptysis.  No shortness of breath.  CARDIOVASCULAR:  No chest pains.  No palpitations. No shortness of breath, No orthopnea or PND.  GASTROINTESTINAL:  No abdominal pain.  No nausea or vomiting.    GENITOURINARY:    No hematuria.    MUSCULOSKELETAL:  No musculoskeletal pain.  No joint swelling.  No arthritis.  NEUROLOGICAL:  No tingling or numbness c/o  weakness.  PSYCHIATRIC:  No confusion  SKIN:  No rashes.            Vital Signs Last 24 Hrs  T(C): 36.4 (13 Mar 2021 03:55), Max: 36.6 (12 Mar 2021 20:53)  T(F): 97.5 (13 Mar 2021 03:55), Max: 97.8 (12 Mar 2021 20:53)  HR: 64 (13 Mar 2021 03:55) (64 - 80)  BP: 135/62 (13 Mar 2021 03:55) (125/59 - 151/61)  BP(mean): --  RR: 14 (13 Mar 2021 03:55) (14 - 19)  SpO2: 98% (13 Mar 2021 03:55) (98% - 100%)    PHYSICAL EXAM-    Constitutional: elderly frail female in no acute distress     Head: Head is normocephalic and atraumatic.      Neck: No JVD.     Cardiovascular: Regular rate and rhythm without S3, S4. No murmurs or rubs are appreciated.      Respiratory: Breath sounds are normal. No rales. No wheezing.    Abdomen: Soft, nontender, nondistended with positive bowel sounds.      Extremity: No tenderness. No  pitting edema     Neurologic: The patient is alert and oriented.      Skin: No rash, no obvious lesions noted.      Psychiatric: The patient appears to be emotionally stable.      INTERPRETATION OF TELEMETRY: SR     ECG:     I&O's Detail      LABS:                        11.3   6.69  )-----------( 211      ( 12 Mar 2021 21:14 )             35.6     03-12    137  |  93<L>  |  82<H>  ----------------------------<  287<H>  3.4<L>   |  38<H>  |  3.50<H>    Ca    9.9      12 Mar 2021 21:14  Mg     2.4     12    TPro  7.8  /  Alb  3.5  /  TBili  0.6  /  DBili  x   /  AST  17  /  ALT  16  /  AlkPhos  96  03-12    CARDIAC MARKERS ( 13 Mar 2021 03:28 )  0.124 ng/mL / x     / x     / x     / x      CARDIAC MARKERS ( 12 Mar 2021 21:14 )  0.096 ng/mL / x     / x     / x     / x          PT/INR - ( 12 Mar 2021 21:14 )   PT: 13.5 sec;   INR: 1.17 ratio         PTT - ( 12 Mar 2021 21:14 )  PTT:29.5 sec    I&O's Summary    BNPSerum Pro-Brain Natriuretic Peptide: 4236 pg/mL ( @ 21:14)    RADIOLOGY & ADDITIONAL STUDIES:  < from: CT Head No Cont (21 @ 22:44) >  IMPRESSION:    No acute intracranial hemorrhage, mass effect, or CT evidence of a large vascular territory infarct. Indeterminate tiny right basal ganglia infarct of indeterminate age. Involutional and chronic microvascular ischemic gliotic changes.    If there are new or persistent symptoms, consider further evaluation with MRI provided no contraindications.                JAMEE RUTH MD; Attending Radiologist  This document has been electronically signed. Mar 12 2021 11:25PM    < end of copied text >

## 2021-03-13 NOTE — CONSULT NOTE ADULT - ASSESSMENT
Near syncope and syncope -  Her pulse ox and BS normal at the time of events.  Check orthostatic BP readings. Allow 5 mins between readings.  Recently her renal function was noted john at her baseline as outpt.  Her volume status being monitored by [facility].  HOld diuretics for now.  Recommend neurology team evaluation.  Seizures concern as well.    Chronic HFPEF- euvolemic  Hold diuretics for now given renal function slightly worse than baseline.  NO IVF.      HTN- continue home meds.  BP elevated at times and some times optimal.    Hyperlipidemia- continue statin.      She will need PT evaluation and rehab at discharge.   Other medical issues- Management per primary team.   Thank you for allowing me to participate in the care of this patient. Please feel free to contact me with any questions.

## 2021-03-13 NOTE — PROVIDER CONTACT NOTE (OTHER) - SITUATION
Office was notified
PPM interrogation    left message with ans service for dr to see pt in the morning
elevated creatinine, worsening CKD    left message with ans service for dr to see pt in the morning
history of PPM, admitted with near syncope    left message with ans service for dr to see pt in the morning

## 2021-03-13 NOTE — CONSULT NOTE ADULT - ASSESSMENT
79 y/o F PMHx significant for CAD, MI x 3, complete heart block s/p pacemaker placement, advanced COPD on home oxygen, CHFpEF, CKD4, diabetes mellitus (insulin dependent), hypertension, hyperlipidemia, hypothyroidism, and recurrent UTIs, was referred to  by her Cardiologist for further evaluation and management of witnessed near syncope.    More recent episodes are characterized by buckling of legs, blank stare/unresponsiveness.  Although there seems to be an orthostatic component, it is unclear if she also has these episodes at rest but they are not being observed as family is not watching her as closely.    -Agree with checking orthostatics  -Will get 24 hour video EEG to look for any epileptiform activity which may suggest that these are focal seizures with impaired awareness.  -Patient is pacemaker dependent. If focal findings are seen on video EEG may need additional imaging.    Will f/u

## 2021-03-13 NOTE — PROGRESS NOTE ADULT - ASSESSMENT
79 y/o F PMHx significant for CAD, MI x 3, complete heart block s/p pacemaker placement, advanced COPD on home oxygen, CHFpEF, CKD4, diabetes mellitus (insulin dependent), hypertension, hyperlipidemia, hypothyroidism, and recurrent UTIs, was referred to  by her Cardiologist for further evaluation and management of witnessed near syncope.  Labs => K 3.4, HCO3 38, BUN/Cr 82/3.5, Glu 287, TnI 0.096, proBNP 4236. In the ED the patient received ASA 325mg PO x 1, and NS x 500mL x 1.    #Syncope  #Elevated Cardiac Markers  #Blank stares will r/o seizures  ~admit to Telemetry  ~trop trending down  ~appreciate cardio/neuro recs  ~fall precautions  ~elevation of Mateus likely due to CKD,  - EEG r/o seizures  -PPM interrogation-Pt in NSR No events, normal functioning device    #ANI on CKD  ~will hold Bumetanide for now (please reassess further in the am)  ~strict I/Os  ~f/u w/ urine studies    #CAD/CHF  ~cont. ASA 81mg po daily    #COPD  ~cont. supplemental O2  ~cont. ALbuterol MDI  ~cont. Trelegy Ellipta  ~cont. Montelukast 10mg po qhs    #Hyperlipidemia  ~cont. statin therapy with Atorvastatin 40mg po qhs    #Hypothyroidism  ~cont. Levothyroxine 125mcg po qan    #Diabetes Mellitus  ~FS qAC/HS  ~cont. Basal/Bolus Insulin regimen    #Vte ppx  ~IMPROVE Vte Risk Score is 1  ~cont. SCDs for now   81 y/o F PMHx significant for CAD, MI x 3, complete heart block s/p pacemaker placement, advanced COPD on home oxygen, CHFpEF, CKD4, diabetes mellitus (insulin dependent), hypertension, hyperlipidemia, hypothyroidism, and recurrent UTIs, was referred to  by her Cardiologist for further evaluation and management of witnessed near syncope.  Labs => K 3.4, HCO3 38, BUN/Cr 82/3.5, Glu 287, TnI 0.096, proBNP 4236. In the ED the patient received ASA 325mg PO x 1, and NS x 500mL x 1.    #Syncope  #Elevated Cardiac Markers  #Blank stares will r/o seizures  ~admit to Telemetry  ~trop trending down  ~appreciate cardio/neuro recs  ~fall precautions  ~elevation of Mateus likely due to CKD,  - EEG r/o seizures  -CTH-No acute intracranial hemorrhage, mass effect, or CT evidence of a large vascular territory infarct. Indeterminate tiny right basal ganglia infarct of indeterminate age. Involutional and chronic microvascular ischemic gliotic changes.  -PPM interrogation-Pt in NSR No events, normal functioning device    #ANI on CKD  ~will hold Bumetanide for now (please reassess further in the am)  ~strict I/Os  ~f/u w/ urine studies    #CAD/CHF  ~cont. ASA 81mg po daily    #COPD  ~cont. supplemental O2  ~cont. ALbuterol MDI  ~cont. Trelegy Ellipta  ~cont. Montelukast 10mg po qhs    #Hyperlipidemia  ~cont. statin therapy with Atorvastatin 40mg po qhs    #Hypothyroidism  ~cont. Levothyroxine 125mcg po qan    #Diabetes Mellitus  ~FS qAC/HS  ~cont. Basal/Bolus Insulin regimen    #Vte ppx  ~IMPROVE Vte Risk Score is 1  ~cont. SCDs for now   79 y/o F PMHx significant for CAD, MI x 3, complete heart block s/p pacemaker placement, advanced COPD on home oxygen, CHFpEF, CKD4, diabetes mellitus (insulin dependent), hypertension, hyperlipidemia, hypothyroidism, and recurrent UTIs, was referred to  by her Cardiologist for further evaluation and management of witnessed near syncope.  Labs => K 3.4, HCO3 38, BUN/Cr 82/3.5, Glu 287, TnI 0.096, proBNP 4236. In the ED the patient received ASA 325mg PO x 1, and NS x 500mL x 1.    #Syncope  #Elevated Cardiac Markers  #Blank stares will r/o seizures  ~admit to Telemetry  ~trop trending down  ~appreciate cardio/neuro recs  ~fall precautions  ~elevation of Mateus likely due to CKD,  - EEG r/o seizures  -CTH-No acute intracranial hemorrhage, mass effect, or CT evidence of a large vascular territory infarct. Indeterminate tiny right basal ganglia infarct of indeterminate age. Involutional and chronic microvascular ischemic gliotic changes.  -PPM interrogation-Pt in NSR No events, normal functioning device    #ANI on CKD  ~will hold Bumetanide for now (please reassess further in the am)  ~strict I/Os  ~f/u w/ urine studies    #CAD/CHF  ~cont. ASA 81mg po daily    #COPD  ~cont. supplemental O2  ~cont. ALbuterol MDI  ~cont. Trelegy Ellipta  ~cont. Montelukast 10mg po qhs    #Hyperlipidemia  ~cont. statin therapy with Atorvastatin 40mg po qhs    #Hypothyroidism  ~cont. Levothyroxine 125mcg po qan    #Diabetes Mellitus  ~FS qAC/HS  ~cont. Basal/Bolus Insulin regimen    #Vte ppx  ~IMPROVE Vte Risk Score is 1  ~cont. SCDs for now    Called daughter Zari for update but no answer

## 2021-03-13 NOTE — CONSULT NOTE ADULT - ASSESSMENT
81 yo woman with DM, HTN, CAD post MI x 3, PPM for complete Heart Block and known CKD ( baseline creat ~ 2.5 per pt's daughter) and CHF with CardioMEMS guiding diuretic therapy admitted post near syncope and weakness.  Unclear whether primarily cardiac decompensation.  Renal function likely very dependent on volume variability.  Will need to set goal weight at home.  Hold diuretics for another day as pt's fluid status stable.  Cardiology evaluation appreciated : agree with holding diuretics. for now.

## 2021-03-14 NOTE — PROGRESS NOTE ADULT - ASSESSMENT
Near syncope and syncope -  Her orthostatic BP readings showed orthostasis with systolic reading but the diastolic reading showed reverse orthostasis.  Recommend repeat orthostatic BP readings.   Her volume status being monitored by [facility].  HOld diuretics for now.  Recommend neurology team evaluation.  Seizures concern as well. EEG ongoing.  Appreciated Neurology team input.     Chronic HFPEF- euvolemic  No rales on physical exam today.   Hold diuretics for now given renal function slightly worse than baseline.  NO IVF.      HTN- continue home meds.  BP elevated at times and some times optimal.    Hyperlipidemia- continue statin.    CKD- renal function improving.    She is unreliable with sodium intake and her fluid intake fluctuates as outpt as well.     She will need PT evaluation and rehab at discharge.   Other medical issues- Management per primary team.   Thank you for allowing me to participate in the care of this patient. Please feel free to contact me with any questions.

## 2021-03-14 NOTE — PROGRESS NOTE ADULT - ASSESSMENT
79 y/o F PMHx significant for CAD, MI x 3, complete heart block s/p pacemaker placement, advanced COPD on home oxygen, CHFpEF, CKD4, diabetes mellitus (insulin dependent), hypertension, hyperlipidemia, hypothyroidism, and recurrent UTIs, was referred to  by her Cardiologist for further evaluation and management of witnessed near syncope.    More recent episodes are characterized by buckling of legs, blank stare/unresponsiveness.  Although there seems to be an orthostatic component, it is unclear if she also has these episodes at rest but they are not being observed as family is not watching her as closely.    -24 hour EEG shows slowing but no definite epileptiform activity.  -Slightly positive orthostatics  -Discussed with daughter, Anabelle, who says that TSH and B12 have been tested as outpatient and were normal  -? Vertebrobasilar insufficiency. Cannot have MRI/MRA due to PPM. Currently with increased creatinine so would not do CTA. If carotid ultrasounds not recently done as outpatient, can get carotid ultrasound while here.    Discussed with patient's daughter as well as Dr. Marquez  Will f/u

## 2021-03-14 NOTE — PROGRESS NOTE ADULT - ASSESSMENT
81 y/o F PMHx significant for CAD, MI x 3, complete heart block s/p pacemaker placement, advanced COPD on home oxygen, CHFpEF, CKD4, diabetes mellitus (insulin dependent), hypertension, hyperlipidemia, hypothyroidism, and recurrent UTIs, was referred to  by her Cardiologist for further evaluation and management of witnessed near syncope.  Labs => K 3.4, HCO3 38, BUN/Cr 82/3.5, Glu 287, TnI 0.096, proBNP 4236. In the ED the patient received ASA 325mg PO x 1, and NS x 500mL x 1.    #Syncope; 2/2 orthostatic hypotension vs seizure  #Elevated Cardiac Markers  #Blank stares will r/o seizures  -+orthostatics (systolic)  -PPM interrogation-Pt in NSR No events, normal functioning device  ~trop trending down  ~appreciate cardio/neuro recs  ~fall precautions/PT eval  ~elevation of Mateus likely due to CKD,  - EEG -slowing, no epileptiform changes  -CTH-No acute intracranial hemorrhage, mass effect, or CT evidence of a large vascular territory infarct. Indeterminate tiny right basal ganglia infarct of indeterminate age. Involutional and chronic microvascular ischemic gliotic changes.  -f/u carotid doppler. cant get MRI due to PPM  -f/u b12/folate/iron studies    #ANI on CKD->improving  ~will hold Bumetanide for now   ~strict I/Os    #CAD/CHF  ~cont. ASA 81mg po daily    #COPD  ~cont. supplemental O2  ~cont. ALbuterol MDI  ~cont. Trelegy Ellipta  ~cont. Montelukast 10mg po qhs    #Anemia likely 2/2 CKD  --f/u b12/folate/iron studies    #hypokalemia  -replete and monitor    #Hyperlipidemia  ~cont. statin therapy with Atorvastatin 40mg po qhs    #Hypothyroidism  ~cont. Levothyroxine 125mcg po qan    #Diabetes Mellitus  ~FS qAC/HS  ~cont. Basal/Bolus Insulin regimen  -A1c-8.7-f/u outpt endocrine    #Vte ppx  ~IMPROVE Vte Risk Score is 1  -ambulating  ~cont. SCDs for now    Called daughter Zari for update but no answer    Dispo: f/u carotid doppler, neuro eval. PT eval   79 y/o F PMHx significant for CAD, MI x 3, complete heart block s/p pacemaker placement, advanced COPD on home oxygen, CHFpEF, CKD4, diabetes mellitus (insulin dependent), hypertension, hyperlipidemia, hypothyroidism, and recurrent UTIs, was referred to  by her Cardiologist for further evaluation and management of witnessed near syncope.  Labs => K 3.4, HCO3 38, BUN/Cr 82/3.5, Glu 287, TnI 0.096, proBNP 4236. In the ED the patient received ASA 325mg PO x 1, and NS x 500mL x 1.    #Syncope; 2/2 orthostatic hypotension vs seizure  #Elevated Cardiac Markers  #Blank stares will r/o seizures  -+orthostatics (systolic)  -PPM interrogation-Pt in NSR No events, normal functioning device  ~trop trending down  ~appreciate cardio/neuro recs  ~fall precautions/PT eval  ~elevation of Mateus likely due to CKD,  - EEG -slowing, no epileptiform changes  -CTH-No acute intracranial hemorrhage, mass effect, or CT evidence of a large vascular territory infarct. Indeterminate tiny right basal ganglia infarct of indeterminate age. Involutional and chronic microvascular ischemic gliotic changes.  -f/u carotid doppler. cant get MRI due to PPM  -f/u b12/folate/iron studies    #ANI on CKD->improving  ~will hold Bumetanide for now   ~strict I/Os    #CAD/CHF  ~cont. ASA 81mg po daily    #COPD  ~cont. supplemental O2  ~cont. ALbuterol MDI  ~cont. Trelegy Ellipta  ~cont. Montelukast 10mg po qhs    #Anemia likely 2/2 CKD  --f/u b12/folate/iron studies    #hypokalemia  -replete and monitor    #Hyperlipidemia  ~cont. statin therapy with Atorvastatin 40mg po qhs    #Hypothyroidism  ~cont. Levothyroxine 125mcg po qan    #Diabetes Mellitus  ~FS qAC/HS  ~cont. Basal/Bolus Insulin regimen  -A1c-8.7-f/u outpt endocrine    #Vte ppx  ~IMPROVE Vte Risk Score is 1  -ambulating  ~cont. SCDs for now    Discussed plan of care with daughter Zari.     Dispo: f/u carotid doppler, neuro eval. PT eval. Daughter states dispo to home, no rehab.

## 2021-03-15 NOTE — PHYSICAL THERAPY INITIAL EVALUATION ADULT - GENERAL OBSERVATIONS, REHAB EVAL
HM, O2 @ 1.5 L/min via NC; pt rec'd in bed supine eating breakfast; pt denies pain currently; HR 84 bpm; BP L UE electronically supine: 127/52

## 2021-03-15 NOTE — PROGRESS NOTE ADULT - ASSESSMENT
81 yo woman with DM, HTN, CAD post MI x 3, PPM for complete Heart Block and known CKD ( baseline creat ~ 2.5 per pt's daughter) and CHF with CardioMEMS guiding diuretic therapy admitted post near syncope and weakness.  Unclear whether primarily cardiac decompensation.  Renal function likely very dependent on volume variability.  Will need to set goal weight at home.  Hold diuretics for another day as pt's fluid status stable.  Cardiology evaluation appreciated : agree with holding diuretics. for now.    3/15   - ANI/ CKD stage 4, pt near baseline     ok to start the bumex     will fu closely the renal fx as outpt   dw dr sebastián chacko with no renal barrier for dc,  pt to fu with dr mg in 1-2 weeks

## 2021-03-15 NOTE — DISCHARGE NOTE NURSING/CASE MANAGEMENT/SOCIAL WORK - PATIENT PORTAL LINK FT
You can access the FollowMyHealth Patient Portal offered by [facility] by registering at the following website: [email or web address]. By joining [employer]’s FollowMyHealth portal, you will also be able to view your health information using other applications (apps) compatible with our system.

## 2021-03-15 NOTE — PROGRESS NOTE ADULT - ASSESSMENT
79 y/o F PMHx significant for CAD, MI x 3, complete heart block s/p pacemaker placement, advanced COPD on home oxygen, CHFpEF, CKD4, diabetes mellitus (insulin dependent), hypertension, hyperlipidemia, hypothyroidism, and recurrent UTIs, was referred to  by her Cardiologist for further evaluation and management of witnessed near syncope.    More recent episodes are characterized by buckling of legs, blank stare/unresponsiveness.  Although there seems to be an orthostatic component, it is unclear if she also has these episodes at rest but they are not being observed as family is not watching her as closely.    -24 hour EEG shows slowing but no definite epileptiform activity.  -F/U routine EEG also without epileptiform activity.  -Discussed with daughter, Anabelle, who says that TSH and B12 have been tested as outpatient and were normal  -No significant stenosis on carotid duplex.  -Discussed Lyrica with daughter - may be contributing to some cognitive slowing although she is on a low dose. Must weight risks/benefits (is helping neuropathic pain).      Patient sees Dr. Douglas and can f/u with her in the office.    Will sign off for now.  Please call back if additional input is needed from neurology service.

## 2021-03-15 NOTE — PHYSICAL THERAPY INITIAL EVALUATION ADULT - LEVEL OF INDEPENDENCE: SIT/STAND, REHAB EVAL
transfer training / gait training 15'; BP L UE electronically standin/97 mmHg/minimum assist (75% patients effort)

## 2021-03-15 NOTE — PHYSICAL THERAPY INITIAL EVALUATION ADULT - MODALITIES TREATMENT COMMENTS
pt left in chair post Eval @ RN Daksha's request; HM intact, O2 increased to 2L/min via NC intact @ RN Daksha's request; call bell in reach, chair alarm donned; pt told not to get up alone, call nursing for assist; mendy well; pt denies pain

## 2021-03-15 NOTE — PROGRESS NOTE ADULT - ASSESSMENT
Near syncope and syncope -  Her orthostatic BP readings showed orthostasis with systolic reading but the diastolic reading showed reverse orthostasis.  Recommend repeat orthostasis noted on yesterday BP readings.   Appreciated Neurology team input.     Chronic HFPEF- gaining wt.     reestart bumex today at 2mg daily.    NO IVF.      HTN- continue home meds.  BP elevated at times and some times optimal.    Hyperlipidemia- continue statin.    CKD- renal function baseline   She is unreliable with sodium intake and her fluid intake fluctuates as outpt as well.     She will need PT evaluation and rehab at discharge.   Other medical issues- Management per primary team.   Thank you for allowing me to participate in the care of this patient. Please feel free to contact me with any questions.

## 2021-03-15 NOTE — PHYSICAL THERAPY INITIAL EVALUATION ADULT - ACTIVE RANGE OF MOTION EXAMINATION, REHAB EVAL
except R hip flexion limited due to stiffness; therex 1:1 x 10': B LE AROM ex: HS, SLR, hip abd/add, SAQ, ankle pumps 2 x 10 reps each/no Active ROM deficits were identified

## 2021-03-16 NOTE — PROGRESS NOTE ADULT - ASSESSMENT
81 yo woman with DM, HTN, CAD post MI x 3, PPM for complete Heart Block and known CKD ( baseline creat ~ 2.5 per pt's daughter) and CHF with CardioMEMS guiding diuretic therapy admitted post near syncope and weakness.  Unclear whether primarily cardiac decompensation.  Renal function likely very dependent on volume variability.  Will need to set goal weight at home.  Hold diuretics for another day as pt's fluid status stable.  Cardiology evaluation appreciated : agree with holding diuretics. for now.    3/15   - ANI/ CKD stage 4, pt near baseline     ok to start the bumex     will fu closely the renal fx as outpt   dw dr sebastián chacko with no renal barrier for dc,  pt to fu with dr mg in 1-2 weeks    3/16  orthastatic, for IV bolus 250 ml  Will order knee highs  hold bumex  creat higher today, may be due to pre-renal/ azotemia/ hypotension

## 2021-03-16 NOTE — PROGRESS NOTE ADULT - ASSESSMENT
Near syncope and syncope -  Today BP readings reveal gross orthostasis.  This am she felt weakness in her knees when she was doing the orthostatics.   Will recommend 250cc NSS.  She has very tenuous fluid balance with CHF.   Hold Bumex today.  repeat orthostatic BP readings today.  Concern for autonomic dysfunction causing this.    Appreciated Neurology team input.     Chronic HFPEF- gaining wt.   Hold Bumex today.     HTN- continue home meds.  BP elevated at times and some times optimal.    Hyperlipidemia- continue statin.    CKD- renal function improved to baseline.   She is unreliable with sodium intake and her fluid intake fluctuates as outpt as well.      Noted PT evaluation.   D/W Dr Campos.   Other medical issues- Management per primary team.   Thank you for allowing me to participate in the care of this patient. Please feel free to contact me with any questions.

## 2021-03-16 NOTE — PROGRESS NOTE ADULT - ASSESSMENT
79 y/o F PMHx significant for CAD, MI x 3, complete heart block s/p pacemaker placement, advanced COPD on home oxygen, CHFpEF, CKD4, diabetes mellitus (insulin dependent), hypertension, hyperlipidemia, hypothyroidism, and recurrent UTIs, chronic back pain and OA admitted for:     # Near Syncope/ Blank stares will r/o seizures 2/2 orthostatic hypotension vs seizure vs Bradycardia   -pt. with severe orthostatic hypotension 2/16 - will continue to hold bumex  - will give 250 cc NS at 50 cc and recheck orthostatics after 2 hrs and then in AM - plan d./w Dr. Eagle  -PPM interrogated Normal FX? As per FAmily Pt noted to have Bradycardia in 40s  few episodes corresponding to staring episodes and near syncope. LAst episode at cardio office, HR was at 47, normal Pulse ox   - tele: NSR  - Bumex on hold for now   - recent TSH and B12 outPt wnl as per family   -24h EEG with no epileptic activity   - -CTH-No acute intracranial hemorrhage, mass effect, or CT evidence of a large vascular territory infarct.   Indeterminate tiny right basal ganglia infarct of indeterminate age. Involutional and chronic microvascular ischemic gliotic changes  - cardotid duplex neg   - Unable to do CTA due to renal Fx   - PPM is compatible with MRI, however no need for MRI as per neurology   - D/w Dr Eagle   - D/w Dr Roach no evidence of seizure, no need in AEDs  - PT     # Elevated Troponin   ~trop trending down  - No CP   ~elevation of Mateus likely due to CKD,  - C/w ASa/lipitor       #ANI on CKD- stage 3   - creatinine trending up - maybe due to pre renal azotemia and hypotension   - continue to hold bumex   ~strict I/Os  - D/w nephrology need to f/u outPt     #CAD/CHF  ~cont. ASA 81mg po daily    #COPD, stable   ~cont. supplemental O2  ~cont. ALbuterol MDI  ~cont. Trelegy Ellipta  ~cont. Montelukast 10mg po qhs    #Anemia likely 2/2 CKD  --f/u b12/folate/iron studies    #hypokalemia  -replete and monitor    #Hyperlipidemia  ~cont. statin therapy with Atorvastatin 40mg po qhs    #Hypothyroidism  ~cont. Levothyroxine 125mcg po qan    #Diabetes Mellitus  ~FS qAC/HS  ~cont. Basal/Bolus Insulin regimen, dose adjusted   -A1c-8.7-    #Vte ppx  ~cont. SCDs for now/SQH

## 2021-03-17 NOTE — DISCHARGE NOTE PROVIDER - HOSPITAL COURSE
79 y/o F PMHx significant for CAD, MI x 3, complete heart block s/p pacemaker placement, advanced COPD on home oxygen, CHFpEF, CKD4, diabetes mellitus (insulin dependent), hypertension, hyperlipidemia, hypothyroidism, and recurrent UTIs, chronic back pain and OA admitted for:     # Near Syncope/ Blank stares will r/o seizures 2/2 orthostatic hypotension vs seizure vs Bradycardia   -pt. with severe orthostatic hypotension 2/16 - will dc bumex for now and start midodrine 5 mg po tid   orthostatic negative when repeated after midodrine   -24h EEG with no epileptic activity   - -CTH-No acute intracranial hemorrhage, mass effect, or CT evidence of a large vascular territory infarct.   Indeterminate tiny right basal ganglia infarct of indeterminate age. Involutional and chronic microvascular ischemic gliotic changes  - cardotid duplex neg   - Unable to do CTA due to renal Fx   - PPM is compatible with MRI, however no need for MRI as per neurology   - D/w Dr Eagle   - D/w Dr Roach no evidence of seizure, no need in AEDs  - stable for discharge, will follow up with cardiology and neurology     # Elevated Troponin   ~trop trending down  - No CP   ~elevation of Mateus likely due to CKD,  - C/w ASa/lipitor     #ANI on CKD- stage 3   - creatinine improved after IV fluids - maybe due to pre renal azotemia and hypotension   - bumex dced   ~strict I/Os  - D/w nephrology need to f/u outPt     #CAD/CHF  ~cont. ASA 81mg po daily    #COPD, stable   ~cont. supplemental O2  ~cont. ALbuterol MDI  ~cont. Trelegy Ellipta  ~cont. Montelukast 10mg po qhs    #Anemia likely 2/2 CKD  - b12/folate/iron studies - wnl     #Hypokalemia  -resolved afeter being repleted     #Hyperlipidemia  ~cont. statin therapy with Atorvastatin 40mg po qhs    #Hypothyroidism  ~cont. Levothyroxine 125mcg po qan    #Diabetes Mellitus  ~FS qAC/HS  ~cont. Basal/Bolus Insulin regimen, dose adjusted   -A1c-8.7-    Pt. is stable for discharge, will follow up with cardiology, neurology and nephrology.     PHYSICAL EXAM  Vital Signs Last 24 Hrs  T(C): 36.2 (17 Mar 2021 13:30), Max: 36.9 (16 Mar 2021 15:26)  T(F): 97.2 (17 Mar 2021 13:30), Max: 98.5 (16 Mar 2021 15:26)  HR: 79 (17 Mar 2021 13:30) (62 - 80)  BP: 132/40 (17 Mar 2021 09:36) (124/51 - 151/63)  BP(mean): --  RR: 18 (17 Mar 2021 13:30) (18 - 19)  SpO2: 94% (17 Mar 2021 13:30) (92% - 95%)  General: Well developed; well nourished; in no acute distress  Eyes: PERRLA, EOMI; conjunctiva and sclera clear  Head: Normocephalic; atraumatic  ENMT: No nasal discharge; airway clear  Neck: Supple; non tender; no masses  Respiratory: No wheezes, rales or rhonchi  Cardiovascular: Regular rate and rhythm. S1 and S2 Normal; No murmurs, gallops or rubs  Gastrointestinal: Soft non-tender non-distended; Normal bowel sounds  Genitourinary: No  suprapubic  tenderness  Extremities: Normal range of motion, No clubbing, cyanosis or edema  Vascular: Peripheral pulses palpable 2+ bilaterally  Neurological: Alert and oriented x4  Skin: Warm and dry. No acute rash  Lymph Nodes: No acute cervical adenopathy  Musculoskeletal: Normal muscle tone, without deformities

## 2021-03-17 NOTE — PROGRESS NOTE ADULT - ASSESSMENT
Near syncope and syncope -  Today BP readings reveal gross orthostasis.  This am she felt weakness in her knees when she was doing the orthostatics.   Will recommend 250cc NSS.  She has very tenuous fluid balance with CHF.   Hold Bumex today.  still orthostatic  Concern for autonomic dysfunction causing this.    Appreciated Neurology team input.   Would start Midodrin 5mg TID cont to hold Bumex  f/u DR Eagle within 1 week   need to check supine BP on Midodrin        HTN- continue home meds.  BP elevated at times and some times optimal.    Hyperlipidemia- continue statin.    CKD- renal function improved to baseline.   She is unreliable with sodium intake and her fluid intake fluctuates as outpt as well.

## 2021-03-17 NOTE — PROGRESS NOTE ADULT - PROVIDER SPECIALTY LIST ADULT
Hospitalist
Nephrology
Cardiology
Hospitalist
Nephrology
Neurology
Neurology
Cardiology
Hospitalist
Cardiology
Hospitalist
Cardiology

## 2021-03-17 NOTE — DISCHARGE NOTE PROVIDER - NSDCCPCAREPLAN_GEN_ALL_CORE_FT
PRINCIPAL DISCHARGE DIAGNOSIS  Diagnosis: Syncope, unspecified syncope type  Assessment and Plan of Treatment: # Near Syncope/ Blank stares will r/o seizures 2/2 orthostatic hypotension vs seizure vs Bradycardia   -pt. with severe orthostatic hypotension 2/16 - will dc bumex for now and start midodrine 5 mg po tid   orthostatic negative when repeated after midodrine   -24h EEG with no epileptic activity   - -CTH-No acute intracranial hemorrhage, mass effect, or CT evidence of a large vascular territory infarct.   Indeterminate tiny right basal ganglia infarct of indeterminate age. Involutional and chronic microvascular ischemic gliotic changes  - cardotid duplex neg   - Unable to do CTA due to renal Fx   - PPM is compatible with MRI, however no need for MRI as per neurology   - D/w Dr Eagle   - D/w Dr Roach no evidence of seizure, no need in AEDs  - stable for discharge, will follow up with cardiology and neurology         SECONDARY DISCHARGE DIAGNOSES  Diagnosis: Elevated troponin I level  Assessment and Plan of Treatment: # Elevated Troponin   ~trop trending down  - No CP   ~elevation of Mateus likely due to CKD,  - C/w ASa/lipitor       Diagnosis: Acute renal failure, unspecified acute renal failure type  Assessment and Plan of Treatment: #ANI on CKD- stage 3   - creatinine improved after IV fluids - maybe due to pre renal azotemia and hypotension   - bumex dced   ~strict I/Os  - D/w nephrology need to f/u outPt

## 2021-03-17 NOTE — DISCHARGE NOTE PROVIDER - PROVIDER TOKENS
PROVIDER:[TOKEN:[[id number]]],PROVIDER:[TOKEN:[[id number]]],PROVIDER:[TOKEN:[[id number]]],PROVIDER:[TOKEN:[[id number]]]

## 2021-03-17 NOTE — DISCHARGE NOTE PROVIDER - NSDCMRMEDTOKEN_GEN_ALL_CORE_FT
acetaminophen 500 mg oral tablet: 2 tab(s) orally every 6 hours, As Needed - for mild pain  albuterol 2.5 mg/3 mL (0.083%) inhalation solution: 3 milliliter(s) inhaled every 6 hours, As Needed - for shortness of breath and/or wheezing  aspirin 81 mg oral tablet: 1 tab(s) orally once a day  atorvastatin 40 mg oral tablet: 1 tab(s) orally once a day  calcitriol 0.25 mcg oral capsule: 1 cap(s) orally once a day  Centrum Silver oral tablet: 1 tab(s) orally once a day  FLUoxetine 10 mg oral capsule: 1 cap(s) orally once a day  folic acid 1 mg oral tablet: 1 tab(s) orally once a day  HumaLOG 100 units/mL injectable solution: 14 unit(s) injectable 3 times a day (before meals)  ***Plus Sliding Scale:  150-200 = 2 units  201-250 = 4 units  251-300 = 6 units  301-350 = 8 units  351-400 = 10 units  400 + = 12 units  levothyroxine 125 mcg (0.125 mg) oral tablet: 1 tab(s) orally once a day  lidocaine 5% patch: 2 pad(s) orally once a day  ****Lower Back***  Lyrica 50 mg oral capsule: 1 cap(s) orally 2 times a day  midodrine 5 mg oral tablet: 1 tab(s) orally 3 times a day  hold for SBP&lt;145  montelukast 10 mg oral tablet: 1 tab(s) orally once a day (at bedtime)  ProAir HFA 90 mcg/inh inhalation aerosol: 2 puff(s) inhaled every 6 hours, As Needed - for shortness of breath and/or wheezing  T[place]ino SoloStar 300 units/mL subcutaneous solution: 24 unit(s) subcutaneous once a day (at bedtime)  Trelegy Ellipta 100 mcg-62.5 mcg-25 mcg/inh inhalation powder: 1 puff(s) inhaled once a day

## 2021-03-17 NOTE — DISCHARGE NOTE PROVIDER - CARE PROVIDER_API CALL
Kathie Marquez  CARDIOVASCULAR DISEASE  [address] [id number]  Phone: [phone]  Fax: [phone]  Follow Up Time:     Toribio Cutler  NEPHROLOGY  [address] [facility], [address]  [place] [address]  Phone: [phone]  Fax: [phone]  Follow Up Time:     Soco Phelps)  Internal Medicine  [address] [facility], [address]  [place] [address]  Phone: [phone]  Fax: [phone]  Follow Up Time:     Slime Douglas)  Neurology  [address] [place] [address]  Phone: [phone]  Fax: [phone]  Follow Up Time:

## 2021-04-01 PROBLEM — R55 PRE-SYNCOPE: Status: ACTIVE | Noted: 2021-01-01

## 2021-04-01 PROBLEM — M54.9 BACK PAIN: Status: ACTIVE | Noted: 2021-01-01

## 2021-04-01 PROBLEM — R25.1 OCCASIONAL TREMORS: Status: ACTIVE | Noted: 2021-01-01

## 2021-04-01 PROBLEM — M25.551 RIGHT HIP PAIN: Status: ACTIVE | Noted: 2021-01-01

## 2021-04-01 NOTE — DATA REVIEWED
[de-identified] : Video EEG 3/13/21-3/14/21:\par EEG Summary/Classification:\par This is an abnormal prolonged video EEG due to the presence of:\par -Mild generalized slowing\par -Bursts of delta activity seen during wakefulness.\par \par EEG Impression/Clinical Correlate:\par The findings are suggestive of diffuse cerebral dysfunction.\par It is unclear if the bursts of delta activity are an expression of an underlying seizure disorder.\par A repeat  routine EEG will be performed at which time the patient will be challenged during these discharges in order to determine if there is a clinical correlate with these EEG findings.\par \par routine EEG 3/15/21:\par This was an abnormal EEG study in the awake and drowsy states due to the presence of:\par -Mild generalized slowing\par -FIRDA (frontal intermittent rhythmic delta activity)\par -Intermittent slowing seen over the left hemisphere, most prominently in the temporal region.\par \par EEG Impression/Clinical Correlate:\par The findings are most suggestive of diffuse cerebral dysfunction/encephalopathy and superimposed focal dysfunction of the left hemisphere (most notably in the temporal region).\par FIRDA can be seen with focal lesions as well as metabolic disturbances and encephalopathy.\par No epileptiform activity was seen and no clinical events or seizures were recorded.\par The abnormal findings did not correlate with any behavioral change.\par Clinical correlation and correlation with neuroimaging is recommended. [de-identified] : CT head 3/12/21:\par No acute intracranial hemorrhage, mass effect, or CT evidence of a large vascular territory infarct. Indeterminate tiny right basal ganglia infarct of indeterminate age. Involutional and chronic microvascular ischemic gliotic changes.\par \par Carotid ultrasound 3/14/21:\par No significant hemodynamic stenosis of either carotid artery.

## 2021-04-01 NOTE — DISCUSSION/SUMMARY
[FreeTextEntry1] : Ms. Carlos is an 80 year old woman with multiple medical problems.\par She was seen in the hospital for episodes of knee buckling and glassy stare as well as presyncope.\par \par Presyncope/staring spells\par -Prolonged EEG obtained in hospital without evidence of definite epileptiform activity\par -Found to have orthostatic hypotension which was likely cause as these spells did occur upon standing\par -Management of orthostatic hypotension by cardiology. Cannot overhydrate due to CHF\par \par Neuropathy/pain\par -Well controlled at this time with Lyrica 50 mg BID\par \par Low back pain/right hip pain\par -No imaging available for review at this time.\par -Does have some weakness of right hip flexion, potentially pain related.\par -Will get x-ray of right hip. If significant arthritis, may benefit from an injection.\par -Trial of increasing Lyrica to 50 mg TID. If this results in cognitive slowing, sleepiness or is not helpful for back/hip pain, will decrease back to 50 mg BID. \par Will have very low threshold for decreasing dose back to total of 100 mg/day given her renal impairment.\par Options somewhat limited in setting of kidney disease.\par Perhaps acupuncture may be useful.\par Continue Lidocaine patch\par May get CT cervical spine if Hip x-rays are unrevealing.\par \par Cognitive Impairment\par -Seems improved since hospitalization\par -Focus on good sleep, healthy diet and mentally stimulating activities.\par \par Tremors\par -Mild tremor with extension of arms\par -No pill rolling rest tremor or other features of parkinsonism.\par \par f/u 6 weeks, sooner if needed.\par f/u 6 weeks, sooner if needed\par \par

## 2021-04-01 NOTE — CONSULT LETTER
[Dear  ___] : Dear  [unfilled], [Consult Letter:] : I had the pleasure of evaluating your patient, [unfilled]. [Please see my note below.] : Please see my note below. [Consult Closing:] : Thank you very much for allowing me to participate in the care of this patient.  If you have any questions, please do not hesitate to contact me. [FreeTextEntry2] : Soco Phelps [FreeTextEntry3] : Sincerely,\par \par \par Vera Roach MD\par Diplomate, American Academy of Psychiatry and Neurology\par Board Certified in the Subspecialty of Clinical Neurophysiology\par Board Certified in the Subspecialty of Sleep Medicine\par Board Certified in the Subspecialty of Epilepsy\par

## 2021-04-01 NOTE — PHYSICAL EXAM
[FreeTextEntry1] : Examination:\par Constitutional: normal, no apparent distress\par Eyes: normal conjunctiva b/l, no ptosis, visual fields full\par Respiratory: no respiratory distress, normal effort, normal auscultation\par Cardiovascular: normal rate, rhythm, no murmurs\par Neck: supple, no masses\par Vascular: carotids normal\par Skin: normal color, no rashes\par Psych: normal mood, affect\par \par Neurological:\par Memory: normal memory, oriented to person, place, time\par Language intact/no aphasia\par Cranial Nerves: II-XII intact, Pupils equally round and reactive to light, ocular muscles/movements intact, no ptosis, no facial weakness, tongue protrudes normally in the midline, \par Motor: full strength in upper extremities, full strength in LLE, giveway weakness in right hip flexion\par Coordination: Fine motor movements intact, rapid alternating movements intact, finger to nose intact bilaterally, mild tremor with extension of arms.\par Sensory: intact to light touch\par DTRs: 2+ in b/l biceps, radialis, triceps, patellars\par Gait: using walker, antalgic\par

## 2021-04-01 NOTE — HISTORY OF PRESENT ILLNESS
[FreeTextEntry1] : Ms. Carlos is here today for follow-up.\par She was admitted to the hospital on 3/12/21 at which point she was seen by myself for neurology consultation.\par She was admitted for a near syncopal episode.\par The hospital chart was reviewed.\par She had a previous history of syncope and had a pacemaker placed about 3 years ago.\par It appears that her legs are buckling as if they cannot hold her. She will then have a blank stare.\par She does not seem to have a complete loss of consciousness but will not respond during these episodes. She is not coherent enough to respond. It only lasts for a few seconds. \par She wears a diaper for incontinence and it is not clear if these particular episodes are associated with incontinence.\par Her family has noted an overall decline over the last year.\par She has followed with Dr. Douglas for tremors.\par \par 24 hour EEG and routine EEG were performed in the hospital.\par These showed some FIRDA like activity but no definite epileptiform activity.\par \par She was found to have orthostatic hypotension as well.\par \par \par Interval History 4/1/21:\par She has been retaining fluid. Dr. Griggs is away. She has been speaking with her NP.\par She has a prescription for midodrine but she has not used it because her BP has been higher.\par She has not had any similar spells since leaving the hospital.\par Her daughter has not noted any buckling of her legs or the glassy stare.\par \par She is taking Lyrica 50 mg in the AM and PM. This helps with neuropathy but not with her back pain.\par The pain radiates down the legs, right worse than left.\par Pain is worse when standing.\par She is sleeping on a recliner because she has difficulty breathing when lying downi n bed. She thinks this exacerbates her back pain.\par She reports feeling fuzzy on higher doses but now her daughter wonders if it was from dehydration.\par A lidocaine patch provides some relief.\par She [facility]par \par She requires assistance with most of her ADLs because of her pain.\par She cannot stand for long periods of time.\par \par Her daughter has not noticed recent tremors. \par \par Current medications:\par aspirin 81 mg\par Levothyroxine\par Bumex 1 mg in the AM\par Lyrica 50 mg BID\par Lidocaine patch\par Calcitriol\par Fuloxetine 10 mg/day\par Atorvastatin\par Humalog\par Melatonin

## 2021-04-07 NOTE — ED PROVIDER NOTE - CARE PLAN
Principal Discharge DX:	Syncope, unspecified syncope type   Principal Discharge DX:	Syncope, unspecified syncope type  Secondary Diagnosis:	ACS (acute coronary syndrome)  Secondary Diagnosis:	Pacemaker failure, initial encounter

## 2021-04-07 NOTE — H&P ADULT - HISTORY OF PRESENT ILLNESS
Pt is an 80 F BIBEMS for syncope, with a PMHx notable for CAD, CHF, HLD, as well as O2 dependent COPD (7LPM Home O2) who states that she had tripped and fell today while getting up to walk in her home at which point her  had assisted her thereafter. She states that she was aware of the events occuring at the time of her fall and states that she has had not lost consciousness at the time and denies any dizziness preceding the event. She also states that she had hit her head at the time of the fall, but denies any severe HA or head contusion or pain. She also denies any neurologic changes such as poor thought patterns or mental confusion or any limb weakness or numbness noted. She also denies any cardiogenic symptoms such as cp, severe sob, or any palpitations and denies any orthopnea.

## 2021-04-07 NOTE — H&P ADULT - PROBLEM SELECTOR PLAN 1
-R/O cardiogenic vs neurogenic cause of syncope vs volume w/ dehydration  -Telemetry given cardiac Hx  -Interrogate Pt's pacemaker for events  -carotid dupplers  -orthostatic measures  -medication reconciliation applying Beer's criteria  -2D echo pending  -Cycle troponins  -Cardiology consult pending   -Brain MRI pending -R/O cardiogenic vs neurogenic cause of syncope vs volume w/ dehydration  -Telemetry given cardiac Hx  -Interrogate Pt's pacemaker for events  -carotid dupplers  -orthostatic measures  -medication reconciliation applying Beer's criteria  -2D echo pending  -Cycle troponins  -Cardiology consult pending   -Ct Brain w/out acute changes

## 2021-04-07 NOTE — ED ADULT TRIAGE NOTE - CHIEF COMPLAINT QUOTE
Pt BIBEMS from home, lives with family. As per EMS, pt had syncopal event and went unresponsive. EMS states they were dispatched to pt as cardiac arrest, no CPR, just assisted breathing and pt suddenly became responsive again. Pt is now awake, alert and oriented. Does not remember falling, unknown if head strike, pt on baby ASA. Pt 74% on 4L O2 put on by EMS. Pt now on NRB O2 sat 100%. EKG in progress. NA as per MD Stahl. Pt BIBEMS from home, lives with family. As per EMS, pt had syncopal event and went unresponsive. EMS states they were dispatched to pt as cardiac arrest, no CPR, just assisted breathing and pt suddenly became responsive again. Pt is now awake, alert and oriented. Does not remember falling, unknown if head strike, pt on baby ASA. Pt 74% on 4L O2 put on by EMS. Pt now on NRB O2 sat 100%. EKG in progress. Pt has pacemaker. NA as per MD Stahl.

## 2021-04-07 NOTE — ED PROVIDER NOTE - WET READ LAUNCH FT
Pt is having a hard time finding a PCP that accepts new patients in [place]. She is asking if Dr. Tri Apple will refill her oxyCODONE-acetaminophen (PERCOCET)  MG Oral Tab and Xanax to [facility] in [place]. There are no Wet Read(s) to document.

## 2021-04-07 NOTE — ED PROVIDER NOTE - OBJECTIVE STATEMENT
81 y/o female in ED s/p syncope at home.   pt states that she got up to turn off the lights and believed that she tripped and fell but does not remember.   pt denies any fever, HA, cp, sob, n/v/d/abd pain.   pt states was admitted to  2 weeks ago for syncope as well.

## 2021-04-07 NOTE — CONSULT NOTE ADULT - ASSESSMENT
81 yo h/o CAD MI times 3 CRI, CHF DM , prior syncope , PPM presents after getting up from  and " passing out"" with head trauma . No preceding symtpoms of CP palps or SOB  Of note she was restarted on BUmex bc of elevated PASP on cardiomems and middrinw was DCd as oupt even though she was diagnosed with significant orthostatic intolerance   1) hold Bumex for now   2) resume Midodrine 5 mg TID   3) cont home cardiac meds

## 2021-04-07 NOTE — ED ADULT NURSE NOTE - OBJECTIVE STATEMENT
Pt BIBEMS from home, lives with family. As per EMS, pt had syncopal event and went unresponsive. EMS states they were dispatched to pt as cardiac arrest, no CPR, just assisted breathing and pt suddenly became responsive again. Pt is now awake, alert and oriented. Does not remember falling, unknown if head strike, pt on baby ASA. Pt 74% on 4L O2 put on by EMS. Pt now on NRB O2 sat 100%. EKG in progress. Pt has pacemaker. NA as per MD Stahl.

## 2021-04-07 NOTE — H&P ADULT - NSHPREVIEWOFSYSTEMS_GEN_ALL_CORE
Review of Systems:   CONSTITUTIONAL: No fever, no fatigue  EYES: No eye pain or discharge  ENMT:  No sinus or throat pain  NECK: No pain or stiffness  RESPIRATORY: No cough, wheezing, chills or hemoptysis; No shortness of breath  CARDIOVASCULAR: No chest pain, palpitations, dizziness, or leg swelling  GASTROINTESTINAL: No abdominal or epigastric pain. No nausea, vomiting, or hematemesis; No diarrhea or constipation. No melena or hematochezia.  GENITOURINARY: No dysuria or incontinence  MUSCULOSKELETAL: No joint pain or swelling; No muscle, back, or extremity pain  NEUROLOGICAL: No headaches, memory loss, loss of strength, numbness, or tremors  PSYCHIATRIC: No depression, anxiety, mood swings, or difficulty sleeping  SKIN: No rashes, no skin changes

## 2021-04-07 NOTE — H&P ADULT - PROBLEM SELECTOR PLAN 7
-acute on chronic stage 5 CKD with ANI with baseline SCr 2.5 and now 3.04  -conservative light IV hydration w/ NS @ 50cc/hr  -avoid nephrotoxic agents  -renally dose meds  -monitor renal markers

## 2021-04-07 NOTE — H&P ADULT - NSHPPHYSICALEXAM_GEN_ALL_CORE
Vital Signs Last 24 Hrs  T(C): 36.4 (07 Apr 2021 10:31), Max: 36.4 (07 Apr 2021 08:09)  T(F): 97.5 (07 Apr 2021 10:31), Max: 97.5 (07 Apr 2021 08:09)  HR: 64 (07 Apr 2021 10:31) (44 - 78)  BP: 154/59 (07 Apr 2021 10:31) (154/59 - 160/90)  BP(mean): 85 (07 Apr 2021 10:31) (85 - 90)  RR: 13 (07 Apr 2021 10:31) (13 - 20)  SpO2: 100% (07 Apr 2021 10:31) (74% - 100%)    CONSTITUTIONAL: well-developed, well-groomed, no apparent distress  EYES: no conjunctival or scleral injection, non-icteric, PERRLA  ENMT: no external nasal lesions, oral mucosa with moist membranes  NECK: trachea midline, no palpable neck mass   RESPIRATORY: breathing comfortably, lungs CTA without wheeze/rales/rhonchi  CARDDIOVASCULAR: regular rate and rhythm; +S1S2, no murmurs, rubs, or gallops, no lower extremity edema, 2+ peripheral pulses  GASTROINTESTINAL: soft, nontender, nondistended; +BS throughout, no rebound/guarding  MUSCULOSKELETAL: no joint effusions, normal strength and tone of extremities, 5/5 strength in upper/lower extremities   NEUROLOGIC: non-focal, sensation intact to light touch in b/l upper and lower extremities   PSYCHIATRIC: AAOx3, appropriate mood and affect  SKIN: no rashes or lesions, warm

## 2021-04-07 NOTE — H&P ADULT - NSHPLABSRESULTS_GEN_ALL_CORE
10.4   8.22  )-----------( 216      ( 2021 06:02 )             34.7       04-07    141  |  105  |  57<H>  ----------------------------<  309<H>  4.8   |  22  |  3.06<H>    Ca    9.4      2021 06:02    TPro  6.5  /  Alb  2.9<L>  /  TBili  0.6  /  DBili  x   /  AST  69<H>  /  ALT  29  /  AlkPhos  107  04-07      CARDIAC MARKERS ( 2021 06:02 )  0.628 ng/mL / x     / x     / x     / x                    Urinalysis Basic - ( 2021 10:08 )    Color: Yellow / Appearance: Clear / S.020 / pH: x  Gluc: x / Ketone: Trace  / Bili: Negative / Urobili: Negative mg/dL   Blood: x / Protein: 100 mg/dL / Nitrite: Negative   Leuk Esterase: Moderate / RBC: 0-2 /HPF / WBC 3-5   Sq Epi: x / Non Sq Epi: Occasional / Bacteria: Moderate

## 2021-04-07 NOTE — H&P ADULT - ASSESSMENT
Pt is an 80 F BIBEMS for syncope, with a PMHx notable for CAD, CHF, HLD, as well as O2 dependent COPD (7LPM Home O2) who states that she had tripped and fell today while getting up to walk in her home at which point her  had assisted her thereafter. Requires syncopal w/u given her risk factors/Hx.

## 2021-04-07 NOTE — H&P ADULT - NSICDXPASTMEDICALHX_GEN_ALL_CORE_FT
PAST MEDICAL HISTORY:  Anxiety     CAD (coronary artery disease)     Chronic diastolic congestive heart failure last exacerbation 7/2017    Chronic obstructive pulmonary disease, unspecified COPD type     Chronic UTI     CKD (chronic kidney disease)     Diabetes     Essential hypertension     GERD (gastroesophageal reflux disease)     [place] (hard of hearing) hearing aid    Hyperlipidemia, unspecified hyperlipidemia type     Hypothyroid     MI, old x3    Neuropathy     Pacemaker

## 2021-04-07 NOTE — H&P ADULT - PROBLEM SELECTOR PLAN 4
-O2 dependent on 7 LPM at home as per patient   -cont use of ALBARO + anticholinergic agent + ICS + LABA  -cont home O2

## 2021-04-07 NOTE — ED PROVIDER NOTE - PROGRESS NOTE DETAILS
results noted.   increase trop with failing PM.   will admit.   case d/w Wesr and agree with tele admit with carlin and RUBEN.   told AM consult made for Dr Gotti cards

## 2021-04-07 NOTE — ED PROVIDER NOTE - PMH
Anxiety    CAD (coronary artery disease)    Chronic diastolic congestive heart failure  last exacerbation 7/2017  Chronic obstructive pulmonary disease, unspecified COPD type    Chronic UTI    CKD (chronic kidney disease)    Diabetes    Essential hypertension    GERD (gastroesophageal reflux disease)    [place] (hard of hearing)  hearing aid  Hyperlipidemia, unspecified hyperlipidemia type    Hypothyroid    MI, old  x3  Neuropathy    Pacemaker

## 2021-04-07 NOTE — PATIENT PROFILE ADULT - FALLEN IN THE PAST
Note opened for chart review purposes. Unable to see patient due to covid restrictions.    The health plan new enrollment has happened. I have reviewed the  MDS, the preventative needs,  and facility care plan. The level of care is appropriate. I have reviewed the code status/advanced directives.     BETTY Cuello  
yes

## 2021-04-08 NOTE — PHYSICAL THERAPY INITIAL EVALUATION ADULT - GENERAL OBSERVATIONS, REHAB EVAL
Pt seen on 3E, on Tele, +HM, also having PPM checked during session, +NC O2 2.5-3L noted O2 ranging from 86%-95% DBE's needed to be encouraged throughout session. BP in standing 118/49, after session in chair 105/29 returned Pt to bed no c/o dizziness, some SOB during ambulation and fatigue noted. RN aware.

## 2021-04-08 NOTE — PROCEDURE NOTE - ADDITIONAL PROCEDURE DETAILS
There is far field sensing on atrial channel.  Atrial sensing polarity changed from bipolar to unipolar configuration.  Atrial sensitivity changed to 0.45 mV.  No atrial fibrillation seen.  No other events recorded.  No pauses.  Normal PPM function.

## 2021-04-08 NOTE — PHYSICAL THERAPY INITIAL EVALUATION ADULT - PRECAUTIONS/LIMITATIONS, REHAB EVAL
fall precautions [place], home O2 2.5-3L/fall precautions/oxygen therapy device and L/min [place], home O2 2.5-3L (as per pt)/fall precautions/oxygen therapy device and L/min

## 2021-04-08 NOTE — PHYSICAL THERAPY INITIAL EVALUATION ADULT - LIVES WITH, PROFILE
Pt lives w/ her  and 2 adult children, in a house w/ 4 NIRU and she states having a Chair Lift inside of the home./spouse

## 2021-04-08 NOTE — PROGRESS NOTE ADULT - SUBJECTIVE AND OBJECTIVE BOX
Patient is a 80y old  Female who presents with a chief complaint of Syncopal episode (2021 08:53)      SUBJECTIVE / OVERNIGHT EVENTS: Pt examined today at the bedside; the Pt's vitals/labs/charts were reviewed.    MEDICATIONS  (STANDING):  acetaminophen   Tablet .. 650 milliGRAM(s) Oral every 6 hours  aspirin enteric coated 81 milliGRAM(s) Oral daily  atorvastatin Oral Tab/Cap - Peds 40 milliGRAM(s) Oral daily  buMETAnide Injectable 1 milliGRAM(s) IV Push two times a day  calcitriol   Capsule 0.25 MICROGram(s) Oral daily  dextrose 40% Gel 15 Gram(s) Oral once  dextrose 5%. 1000 milliLiter(s) (50 mL/Hr) IV Continuous <Continuous>  dextrose 5%. 1000 milliLiter(s) (100 mL/Hr) IV Continuous <Continuous>  dextrose 50% Injectable 25 Gram(s) IV Push once  dextrose 50% Injectable 25 Gram(s) IV Push once  dextrose 50% Injectable 12.5 Gram(s) IV Push once  FLUoxetine 10 milliGRAM(s) Oral daily  folic acid 1 milliGRAM(s) Oral daily  glucagon  Injectable 1 milliGRAM(s) IntraMuscular once  heparin   Injectable 5000 Unit(s) SubCutaneous every 12 hours  insulin lispro (ADMELOG) corrective regimen sliding scale   SubCutaneous three times a day before meals  insulin lispro (ADMELOG) corrective regimen sliding scale   SubCutaneous at bedtime  levothyroxine 125 MICROGram(s) Oral daily  midodrine. 2.5 milliGRAM(s) Oral three times a day  montelukast 10 milliGRAM(s) Oral at bedtime  multivitamin 1 Tablet(s) Oral daily  pregabalin 50 milliGRAM(s) Oral two times a day  tiotropium 2.5 MICROgram(s)/olodaterol 2.5 MICROgram(s) (STIOLTO) Inhaler 2 Puff(s) Inhalation daily    MEDICATIONS  (PRN):  ALBUTerol    90 MICROgram(s) HFA Inhaler 2 Puff(s) Inhalation every 6 hours PRN for shortness of breath and/or wheezing      PHYSICAL EXAM:  Vital Signs Last 24 Hrs  T(C): 36.3 (2021 07:45), Max: 36.3 (2021 20:51)  T(F): 97.3 (2021 07:45), Max: 97.4 (2021 20:51)  HR: 69 (2021 17:24) (60 - 69)  BP: 155/67 (2021 17:24) (148/54 - 156/63)  BP(mean): --  RR: 20 (2021 07:45) (18 - 20)  SpO2: 95% (2021 09:32) (93% - 95%)    CONSTITUTIONAL: NAD, well-developed, well-groomed  RESPIRATORY: Normal respiratory effort; lungs are clear to auscultation bilaterally  CARDIOVASCULAR: Regular rate and rhythm, normal S1 and S2, no murmur/rub/gallop; No lower extremity edema  GASTROINTESTINAL: Nontender to palpation, normoactive bowel sounds, no rebound/guarding; No hepatosplenomegaly  MUSCULOSKELETAL:  no clubbing or cyanosis of digits; no joint swelling or tenderness to palpation  NEUROLOGY: non-focal; no gross sensory deficits   PSYCH: A+O to person, place, and time; affect appropriate  SKIN: No rashes; warm     LABS:                        9.4    6.85  )-----------( 185      ( 2021 07:30 )             31.5     04-07    139  |  105  |  59<H>  ----------------------------<  209<H>  4.0   |  30  |  2.91<H>    Ca    9.3      2021 20:40    TPro  6.3  /  Alb  3.0<L>  /  TBili  0.4  /  DBili  x   /  AST  32  /  ALT  25  /  AlkPhos  96  04-07      CARDIAC MARKERS ( 2021 14:58 )  1.050 ng/mL / x     / x     / x     / x      CARDIAC MARKERS ( 2021 13:01 )  0.958 ng/mL / x     / x     / x     / x      CARDIAC MARKERS ( 2021 06:02 )  0.628 ng/mL / x     / x     / x     / x          Urinalysis Basic - ( 2021 10:08 )    Color: Yellow / Appearance: Clear / S.020 / pH: x  Gluc: x / Ketone: Trace  / Bili: Negative / Urobili: Negative mg/dL   Blood: x / Protein: 100 mg/dL / Nitrite: Negative   Leuk Esterase: Moderate / RBC: 0-2 /HPF / WBC 3-5   Sq Epi: x / Non Sq Epi: Occasional / Bacteria: Moderate          RADIOLOGY & ADDITIONAL TESTS:  Results Reviewed:   Imaging Personally Reviewed:  Electrocardiogram Personally Reviewed:    COORDINATION OF CARE:  Care Discussed with Consultants/Other Providers [Y/N]:  Prior or Outpatient Records Reviewed [Y/N]:

## 2021-04-08 NOTE — PHYSICAL THERAPY INITIAL EVALUATION ADULT - DIAGNOSIS, PT EVAL
s/p syncope, R/O cardiogenic vs neurogenic cause of syncope vs volume w/ dehydration, Elevated troponin I level.

## 2021-04-08 NOTE — PROGRESS NOTE ADULT - SUBJECTIVE AND OBJECTIVE BOX
Patient is a 80y old  Female who presents with a chief complaint of Syncopal episode.       HPI:  Pt is an 80 F BIBEMS for syncope, with a PMHx notable for CAD, CHF, HLD, as well as O2 dependent COPD (7LPM Home O2) who states that she had tripped and fell today while getting up to walk in her home at which point her  had assisted her thereafter. She states that she was aware of the events occuring at the time of her fall and states that she has had not lost consciousness at the time and denies any dizziness preceding the event. She also states that she had hit her head at the time of the fall, but denies any severe HA or head contusion or pain. She also denies any neurologic changes such as poor thought patterns or mental confusion or any limb weakness or numbness noted. She also denies any cardiogenic symptoms such as cp, severe sob, or any palpitations and denies any orthopnea.      PAST MEDICAL & SURGICAL HISTORY:  Chronic obstructive pulmonary disease, unspecified COPD type    Diabetes    Neuropathy    Essential hypertension    Hyperlipidemia, unspecified hyperlipidemia type    Chronic diastolic congestive heart failure  last exacerbation 2017    Chronic UTI    MI, old  x3    CKD (chronic kidney disease)    GERD (gastroesophageal reflux disease)    Hypothyroid    Anxiety    [place] (hard of hearing)  hearing aid    CAD (coronary artery disease)    Pacemaker    S/P appendectomy     delivery delivered    H/O hernia repair  umbilical and incisional    History of total knee replacement, unspecified laterality  bilateral right  left     S/P ORIF (open reduction internal fixation) fracture  left hip 2017    History of cholecystectomy    History of ear surgery    S/P lobectomy of lung  left lung pre-cancerous    History of cataract surgery  bilateral IOL        MEDICATIONS  (STANDING):  acetaminophen   Tablet .. 650 milliGRAM(s) Oral every 6 hours  aspirin enteric coated 81 milliGRAM(s) Oral daily  atorvastatin Oral Tab/Cap - Peds 40 milliGRAM(s) Oral daily  buMETAnide 0.5 milliGRAM(s) Oral daily  calcitriol   Capsule 0.25 MICROGram(s) Oral daily  dextrose 40% Gel 15 Gram(s) Oral once  dextrose 5%. 1000 milliLiter(s) (50 mL/Hr) IV Continuous <Continuous>  dextrose 5%. 1000 milliLiter(s) (100 mL/Hr) IV Continuous <Continuous>  dextrose 50% Injectable 25 Gram(s) IV Push once  dextrose 50% Injectable 12.5 Gram(s) IV Push once  dextrose 50% Injectable 25 Gram(s) IV Push once  FLUoxetine 10 milliGRAM(s) Oral daily  folic acid 1 milliGRAM(s) Oral daily  glucagon  Injectable 1 milliGRAM(s) IntraMuscular once  heparin   Injectable 5000 Unit(s) SubCutaneous every 12 hours  insulin lispro (ADMELOG) corrective regimen sliding scale   SubCutaneous three times a day before meals  insulin lispro (ADMELOG) corrective regimen sliding scale   SubCutaneous at bedtime  levothyroxine 125 MICROGram(s) Oral daily  midodrine. 5 milliGRAM(s) Oral three times a day  montelukast 10 milliGRAM(s) Oral at bedtime  multivitamin 1 Tablet(s) Oral daily  pregabalin 50 milliGRAM(s) Oral two times a day  tiotropium 2.5 MICROgram(s)/olodaterol 2.5 MICROgram(s) (STIOLTO) Inhaler 2 Puff(s) Inhalation daily    MEDICATIONS  (PRN):  ALBUTerol    90 MICROgram(s) HFA Inhaler 2 Puff(s) Inhalation every 6 hours PRN for shortness of breath and/or wheezing      FAMILY HISTORY:  Family history of CHF (congestive heart failure)        SOCIAL HISTORY:    REVIEW OF SYSTEMS:  CONSTITUTIONAL:    No fatigue, malaise, lethargy.  No fever or chills.  HEENT:  Eyes:  No visual changes.     ENT:  No epistaxis.  No sinus pain.    RESPIRATORY:  No cough.  No wheeze.  No hemoptysis.  No shortness of breath.  CARDIOVASCULAR:  No chest pains.  No palpitations. No shortness of breath, No orthopnea or PND.  GASTROINTESTINAL:  No abdominal pain.  No nausea or vomiting.    GENITOURINARY:    No hematuria.    MUSCULOSKELETAL:  No musculoskeletal pain.  No joint swelling.  No arthritis.  NEUROLOGICAL:  No tingling or numbness or weakness.  PSYCHIATRIC:  No confusion  SKIN:  No rashes.    ENDOCRINE:  No unexplained weight loss.  No polydipsia.   HEMATOLOGIC:  No anemia.  No prolonged or excessive bleeding.   ALLERGIC AND IMMUNOLOGIC:  No pruritus.          Vital Signs Last 24 Hrs  T(C): 36.3 (2021 20:51), Max: 36.7 (2021 12:54)  T(F): 97.4 (2021 20:51), Max: 98 (2021 12:54)  HR: 60 (2021 05:35) (60 - 70)  BP: 148/54 (2021 05:35) (145/63 - 155/57)  BP(mean): 83 (2021 12:54) (83 - 85)  RR: 18 (2021 20:51) (13 - 20)  SpO2: 93% (2021 20:51) (93% - 100%)    PHYSICAL EXAM-    Constitutional: The patient appears to be normal, well developed, well nourished and alert and oriented to time, place and person. The patient does not appear acutely ill.     Head: Head is normocephalic and atraumatic.      Neck: No jugular venous distention. No audible carotid bruits. There are strong carotid pulses bilaterally. No JVD.     Cardiovascular: Regular rate and rhythm without S3, S4. No murmurs or rubs are appreciated.      Respiratory: Breathsounds are normal. No rales. No wheezing.    Abdomen: Soft, nontender, nondistended with positive bowel sounds.      Extremity: No tenderness. No  pitting edema     Neurologic: The patient is alert and oriented.      Skin: No rash, no obvious lesions noted.      Psychiatric: The patient appears to be emotionally stable.      INTERPRETATION OF TELEMETRY:    ECG: Sinus rythm , normal axis, no ST T changes.     I&O's Detail    2021 07:01  -  2021 07:00  --------------------------------------------------------  IN:  Total IN: 0 mL    OUT:    Stool (mL): 1 mL  Total OUT: 1 mL    Total NET: -1 mL          LABS:                        9.4    6.85  )-----------( 185      ( 2021 07:30 )             31.5     04-07    139  |  105  |  59<H>  ----------------------------<  209<H>  4.0   |  30  |  2.91<H>    Ca    9.3      2021 20:40    TPro  6.3  /  Alb  3.0<L>  /  TBili  0.4  /  DBili  x   /  AST  32  /  ALT  25  /  AlkPhos  96  04-07    CARDIAC MARKERS ( 2021 14:58 )  1.050 ng/mL / x     / x     / x     / x      CARDIAC MARKERS ( 2021 13:01 )  0.958 ng/mL / x     / x     / x     / x      CARDIAC MARKERS ( 2021 06:02 )  0.628 ng/mL / x     / x     / x     / x            Urinalysis Basic - ( 2021 10:08 )    Color: Yellow / Appearance: Clear / S.020 / pH: x  Gluc: x / Ketone: Trace  / Bili: Negative / Urobili: Negative mg/dL   Blood: x / Protein: 100 mg/dL / Nitrite: Negative   Leuk Esterase: Moderate / RBC: 0-2 /HPF / WBC 3-5   Sq Epi: x / Non Sq Epi: Occasional / Bacteria: Moderate      I&O's Summary    2021 07:01  -  2021 07:00  --------------------------------------------------------  IN: 0 mL / OUT: 1 mL / NET: -1 mL      BNPSerum Pro-Brain Natriuretic Peptide: 63679 pg/mL ( @ 13:29)    RADIOLOGY & ADDITIONAL STUDIES: Patient is a 80y old  Female who presents with a chief complaint of Syncopal episode.       HPI:  Pt is an 80 F BIBEMS for syncope, with a PMHx notable for CAD, CHF, HLD, as well as O2 dependent COPD (7LPM Home O2) who states that she had tripped and fell today while getting up to walk in her home at which point her  had assisted her thereafter. She states that she was aware of the events occuring at the time of her fall and states that she has had not lost consciousness at the time and denies any dizziness preceding the event. She also states that she had hit her head at the time of the fall, but denies any severe HA or head contusion or pain. She also denies any neurologic changes such as poor thought patterns or mental confusion or any limb weakness or numbness noted. She also denies any cardiogenic symptoms such as cp, severe sob, or any palpitations and denies any orthopnea.    - pt seen and examined by me this am.  She denies any symptoms, she states that she is "feeling better today compared to yesterday"     PAST MEDICAL & SURGICAL HISTORY:  Chronic obstructive pulmonary disease, unspecified COPD type    Diabetes    Neuropathy    Essential hypertension    Hyperlipidemia, unspecified hyperlipidemia type    Chronic diastolic congestive heart failure  last exacerbation 2017    Chronic UTI    MI, old  x3    CKD (chronic kidney disease)    GERD (gastroesophageal reflux disease)    Hypothyroid    Anxiety    [place] (hard of hearing)  hearing aid    CAD (coronary artery disease)    Pacemaker    S/P appendectomy     delivery delivered    H/O hernia repair  umbilical and incisional    History of total knee replacement, unspecified laterality  bilateral right  left     S/P ORIF (open reduction internal fixation) fracture  left hip 2017    History of cholecystectomy    History of ear surgery    S/P lobectomy of lung  left lung pre-cancerous    History of cataract surgery  bilateral IOL        MEDICATIONS  (STANDING):  acetaminophen   Tablet .. 650 milliGRAM(s) Oral every 6 hours  aspirin enteric coated 81 milliGRAM(s) Oral daily  atorvastatin Oral Tab/Cap - Peds 40 milliGRAM(s) Oral daily  buMETAnide 0.5 milliGRAM(s) Oral daily  calcitriol   Capsule 0.25 MICROGram(s) Oral daily  dextrose 40% Gel 15 Gram(s) Oral once  dextrose 5%. 1000 milliLiter(s) (50 mL/Hr) IV Continuous <Continuous>  dextrose 5%. 1000 milliLiter(s) (100 mL/Hr) IV Continuous <Continuous>  dextrose 50% Injectable 25 Gram(s) IV Push once  dextrose 50% Injectable 12.5 Gram(s) IV Push once  dextrose 50% Injectable 25 Gram(s) IV Push once  FLUoxetine 10 milliGRAM(s) Oral daily  folic acid 1 milliGRAM(s) Oral daily  glucagon  Injectable 1 milliGRAM(s) IntraMuscular once  heparin   Injectable 5000 Unit(s) SubCutaneous every 12 hours  insulin lispro (ADMELOG) corrective regimen sliding scale   SubCutaneous three times a day before meals  insulin lispro (ADMELOG) corrective regimen sliding scale   SubCutaneous at bedtime  levothyroxine 125 MICROGram(s) Oral daily  midodrine. 5 milliGRAM(s) Oral three times a day  montelukast 10 milliGRAM(s) Oral at bedtime  multivitamin 1 Tablet(s) Oral daily  pregabalin 50 milliGRAM(s) Oral two times a day  tiotropium 2.5 MICROgram(s)/olodaterol 2.5 MICROgram(s) (STIOLTO) Inhaler 2 Puff(s) Inhalation daily    MEDICATIONS  (PRN):  ALBUTerol    90 MICROgram(s) HFA Inhaler 2 Puff(s) Inhalation every 6 hours PRN for shortness of breath and/or wheezing      FAMILY HISTORY:  Family history of CHF (congestive heart failure)        SOCIAL HISTORY: ex smoker     REVIEW OF SYSTEMS:  CONSTITUTIONAL:    No fatigue, malaise, lethargy.  No fever or chills.  RESPIRATORY:  No cough.  No wheeze.  No hemoptysis.  c/o shortness of breath.  CARDIOVASCULAR:  No chest pains.  No palpitations. c/o shortness of breath, No orthopnea or PND. c/o syncope.   GASTROINTESTINAL:  No abdominal pain.  No nausea or vomiting.    GENITOURINARY:    No hematuria.    MUSCULOSKELETAL:  No musculoskeletal pain.  No joint swelling.  No arthritis.  NEUROLOGICAL:  No tingling or numbness or weakness.  PSYCHIATRIC:  No confusion  SKIN:  No rashes.          Vital Signs Last 24 Hrs  T(C): 36.3 (2021 20:51), Max: 36.7 (2021 12:54)  T(F): 97.4 (2021 20:51), Max: 98 (2021 12:54)  HR: 60 (2021 05:35) (60 - 70)  BP: 148/54 (2021 05:35) (145/63 - 155/57)  BP(mean): 83 (2021 12:54) (83 - 85)  RR: 18 (2021 20:51) (13 - 20)  SpO2: 93% (2021 20:51) (93% - 100%)    PHYSICAL EXAM-    Constitutional: no acute distress     Head: Head is normocephalic and atraumatic.      Neck:  No JVD.     Cardiovascular: Regular rate and rhythm without S3, S4. No murmurs or rubs are appreciated.      Respiratory: Breath sounds are normal. No rales. No wheezing.    Abdomen: Soft, nontender, nondistended with positive bowel sounds.      Extremity: No tenderness. No  pitting edema     Neurologic: The patient is alert and oriented.      Skin: No rash, no obvious lesions noted.      Psychiatric: The patient appears to be emotionally stable.      INTERPRETATION OF TELEMETRY: A paced rythm.     ECG: A sensed V paced rythm, occasional PVCs.     I&O's Detail    2021 07:01  -  2021 07:00  --------------------------------------------------------  IN:  Total IN: 0 mL    OUT:    Stool (mL): 1 mL  Total OUT: 1 mL    Total NET: -1 mL          LABS:                        9.4    6.85  )-----------( 185      ( 2021 07:30 )             31.5     04-07    139  |  105  |  59<H>  ----------------------------<  209<H>  4.0   |  30  |  2.91<H>    Ca    9.3      2021 20:40    TPro  6.3  /  Alb  3.0<L>  /  TBili  0.4  /  DBili  x   /  AST  32  /  ALT  25  /  AlkPhos  96  04-07    CARDIAC MARKERS ( 2021 14:58 )  1.050 ng/mL / x     / x     / x     / x      CARDIAC MARKERS ( 2021 13:01 )  0.958 ng/mL / x     / x     / x     / x      CARDIAC MARKERS ( 2021 06:02 )  0.628 ng/mL / x     / x     / x     / x            Urinalysis Basic - ( 2021 10:08 )    Color: Yellow / Appearance: Clear / S.020 / pH: x  Gluc: x / Ketone: Trace  / Bili: Negative / Urobili: Negative mg/dL   Blood: x / Protein: 100 mg/dL / Nitrite: Negative   Leuk Esterase: Moderate / RBC: 0-2 /HPF / WBC 3-5   Sq Epi: x / Non Sq Epi: Occasional / Bacteria: Moderate      I&O's Summary    2021 07:01  -  2021 07:00  --------------------------------------------------------  IN: 0 mL / OUT: 1 mL / NET: -1 mL      BNPSerum Pro-Brain Natriuretic Peptide: 09818 pg/mL ( @ 13:29)    RADIOLOGY & ADDITIONAL STUDIES:  < from: Xray Chest 1 View- PORTABLE-Urgent (21 @ 07:37) >    EXAM:  XR CHEST PORTABLE URGENT 1V                            PROCEDURE DATE:  2021          INTERPRETATION:  Chest pain.    AP chest. Prior 3/12/2021.  Impression:  Left ICD in situ. No change heart mediastinum. Interval increase in vascularcongestion bilateral interstitial edema. Trace right pleural effusion. No definite focal infiltrate.            LESLIE VENEGAS MD; Attending Radiologist  This document has been electronically signed. 2021  2:42PM    < end of copied text >  < from: Transthoracic Echocardiogram (10.20.18 @ 08:10) >   Summary     Moderate to severe mitral annular calcification is present.   Mean transmitral gradient is 6 mmHg; this finding is consistent with   moderate mitral stenosis.   Mild (1+) mitral regurgitation is present.   Mild to Moderate Tricuspid regurgitation is present.   Severe pulmonary hypertension.   The left atrium is mildly dilated.   The left ventricle is normal in size, wall thickness, wall motion and   contractility.   Left ventricular wall thickness is at the upper limits of normal.   Estimated left ventricular ejection fraction is 55 %.     Signature     ----------------------------------------------------------------   Electronically signed by Ab Cox MD(Interpreting    < end of copied text >

## 2021-04-08 NOTE — PHYSICAL THERAPY INITIAL EVALUATION ADULT - MODALITIES TREATMENT COMMENTS
Pt was OOB in chair after session then BP taken by PT dropped to 105/29 , PT place pt back to supine in bed, +alarm, +HM, NAD, denies pain or dizziness. RN aware. Pt resting comfortable

## 2021-04-08 NOTE — PROGRESS NOTE ADULT - ASSESSMENT
80 yof with multiple medical issues, presented with recurrent syncope.    1. Syncope- I had extensive discussion with daughter Sara over the phone.  She stated that they heard a sound and they went to check on her and pt was sitting on the floor with O2 sat of 77/min.  Pt was alert and coherent while that was happening and later she became in coherent at which time her O2 sat was improving per family.  911 was called by the family at that time.  Pt presented to the ER.  Per daughter Sara a person in the ER by name Roberto told her that her HR was in 40/min and she was having premature ventricular contractions.  Pts daughter concerned that her HR dropping to 40/min when these episodes happening even in past.   She was concerned that her mother's pacemaker may not be functioning normal despite explaining that the PPM was interrogated in the last admission and speaking with EP team who told us the PPM function did not have any issues.  I consulted EP team and d/w ep team to re interrogate the PPM and discuss with family.    Syncope could be multifactorial.  Hypoxia and orthostasis could be contributing to the issue.  Pt at times non compliant with O2 at home and some times she is not taking proper breaths and mouth breathing.  I discussed this with pts daughter who was a nurse in the last office visit after discharge.    In the last office visit after discharge from last admission pt along with her daughter present stated that she was feeling better than before and less lethargic and did not want to take higher dose of diuretics.  She was restarted on 1mg Bumex and was being continued on this.  She is being monitored with cardioMEMS however she is noncompliant at times with sending readings, stating that she wakes up late or slept all day.  She had full family support at home.    2. Orthostasis- after the last discharge from the hospital she adamantly refused to take the midodrine prescribed saying that her SBP is always > 140mm Hg at home.  It was not discontinued by the physicians or providers.  She was noted to have orthostasis now and BP readings not < 100 systolic.  It is proven that hypovolemia is not contributing to the orthostasis.  She has severe neuropathy and saw her neurologist Dr Mendoza after last discharge.  I discussed the details with Dr Mendoza this am.     3. Acute decompensated HFPEF- mild hypervolemia.  She did not send the readings since 4/5.  She has CKD and probnp elevated.  will continue bumex. Can give 2 mg daily iv bumex.  Avoid multiple diuretics at same time. Concern for hyponatremia.  Diuresis with close monitoring of the renal function and electrolytes.  Goal potassium of 4 and magnesium of 2.   Strict I/O and daily wt checks. Low sodium diet. Nutrition education.     4. HTN- BP elevated this am on midodrine.  Please monitor in the next few hours.  If BP increasing then will need med adjustment including midodrine. May be 2.5mg and check orthostatics.   Pt now almost sitting in chair when elevated readings noted.     5. CKD- Management per primary team.     6. Hyperlipidemia- continue atorvastatin.    7. COPD- Management per primary team.     Other medical issues- Management per primary team.   Thank you for allowing me to participate in the care of this patient. Please feel free to contact me with any questions.

## 2021-04-08 NOTE — PHYSICAL THERAPY INITIAL EVALUATION ADULT - PERTINENT HX OF CURRENT PROBLEM, REHAB EVAL
Pt is an 80 F BIBEMS for syncope,( O2 dependent COPD (7LPM Home O2)) who states that she had tripped and fell today while getting up to walk in her home.  PMHx notable for CAD, CHF, HLD,, CT head and c/s neg acute findings

## 2021-04-09 NOTE — CDI QUERY NOTE - NSCDIOTHERTXTBX_GEN_ALL_CORE_HH
This patient was admitted with syncope and there is conflicting documentation regarding a diagnosis of congestive heart failure:    Progress Note Adult-Hospitalist Attending 4-8-21 @ 18:49  Problem/Plan - 9:  ·  Problem: Chronic diastolic congestive heart failure.  Plan: -euvolemic at present and appears compensated; pending Pro-BNP  -cont. loop diuresis.     Progress Note Adult-Cardiology Attending 4-9-21 @ 09:02  3. Acute decompensated HFPEF- euvolemic.  bumex 1mg oral to be started from today.   Diuresis with close monitoring of the renal function and electrolytes.  Goal potassium of 4 and magnesium of 2.   Strict I/O and daily wt checks. Low sodium diet. Nutrition education.     Labs:  Serum Pro-Brain Natriuretic Peptide: 80496 pg/mL (04.07.21 @ 13:29)    Patient received IV Bumex.    Xray Chest 1 View- PORTABLE-Urgent (04.07.21 @ 07:37)   Interval increase in vascularcongestion bilateral interstitial edema. Trace right pleural effusion. No definite focal infiltrate.    Can you please clarify the acuity of the congestive heart failure?  A) Acute diastolic congestive heart failure.  B) Acute on chronic diastolic congestive heart failure.  C) No clinical indication of exacerbation of diastolic congestive heart failure.  D) Other, please specify:

## 2021-04-09 NOTE — PROGRESS NOTE ADULT - SUBJECTIVE AND OBJECTIVE BOX
Patient is a 80y old  Female who presents with a chief complaint of Syncopal episode (09 Apr 2021 13:07)      SUBJECTIVE / OVERNIGHT EVENTS: Pt examined at the bedside today; vitals/labs/charts were reviewed. Pt today states that she has been experiencing some mild anxiety overnight as confirmed by nursing. She denies any cp or sob at his time. A discussion had with the Pt's daughter and  was such, where they expressed concerns over the accuracy of the interrogation performed on the medtronic pacemaker. They have requested a sit-down meeting the EP physician team.    MEDICATIONS  (STANDING):  acetaminophen   Tablet .. 650 milliGRAM(s) Oral every 6 hours  ALPRAZolam 0.5 milliGRAM(s) Oral once  aspirin enteric coated 81 milliGRAM(s) Oral daily  atorvastatin Oral Tab/Cap - Peds 40 milliGRAM(s) Oral daily  buMETAnide 1 milliGRAM(s) Oral daily  calcitriol   Capsule 0.25 MICROGram(s) Oral daily  dextrose 40% Gel 15 Gram(s) Oral once  dextrose 5%. 1000 milliLiter(s) (50 mL/Hr) IV Continuous <Continuous>  dextrose 5%. 1000 milliLiter(s) (100 mL/Hr) IV Continuous <Continuous>  dextrose 50% Injectable 25 Gram(s) IV Push once  dextrose 50% Injectable 12.5 Gram(s) IV Push once  dextrose 50% Injectable 25 Gram(s) IV Push once  FLUoxetine 10 milliGRAM(s) Oral daily  folic acid 1 milliGRAM(s) Oral daily  glucagon  Injectable 1 milliGRAM(s) IntraMuscular once  heparin   Injectable 5000 Unit(s) SubCutaneous every 12 hours  insulin lispro (ADMELOG) corrective regimen sliding scale   SubCutaneous three times a day before meals  insulin lispro (ADMELOG) corrective regimen sliding scale   SubCutaneous at bedtime  levothyroxine 125 MICROGram(s) Oral daily  midodrine. 2.5 milliGRAM(s) Oral three times a day  montelukast 10 milliGRAM(s) Oral at bedtime  multivitamin 1 Tablet(s) Oral daily  pregabalin 50 milliGRAM(s) Oral two times a day  tiotropium 2.5 MICROgram(s)/olodaterol 2.5 MICROgram(s) (STIOLTO) Inhaler 2 Puff(s) Inhalation daily    MEDICATIONS  (PRN):  ALBUTerol    90 MICROgram(s) HFA Inhaler 2 Puff(s) Inhalation every 6 hours PRN for shortness of breath and/or wheezing      PHYSICAL EXAM:  Vital Signs Last 24 Hrs  T(C): 35.6 (09 Apr 2021 07:45), Max: 36.3 (08 Apr 2021 19:58)  T(F): 96 (09 Apr 2021 07:45), Max: 97.4 (08 Apr 2021 19:58)  HR: 80 (09 Apr 2021 07:49) (66 - 80)  BP: 144/57 (09 Apr 2021 07:45) (111/90 - 155/67)  BP(mean): 75 (09 Apr 2021 07:45) (75 - 75)  RR: 18 (09 Apr 2021 07:45) (18 - 18)  SpO2: 96% (09 Apr 2021 07:49) (92% - 96%)    CONSTITUTIONAL: NAD  RESPIRATORY: Normal respiratory effort; lungs are clear to auscultation bilaterally  CARDIOVASCULAR: Regular rate and rhythm, normal S1 and S2, pedal edema  GASTROINTESTINAL: Nontender to palpation, normoactive bowel sounds, no rebound/guarding; No hepatosplenomegaly  MUSCULOSKELETAL:  no clubbing or cyanosis of digits; no joint swelling or tenderness to palpation  NEUROLOGY: non-focal; no gross sensory deficits   PSYCH: A+O to person, place, and time; affect appropriate  SKIN: No rashes; warm     LABS:                        10.2   7.46  )-----------( 210      ( 09 Apr 2021 08:38 )             33.2     04-09    138  |  104  |  56<H>  ----------------------------<  159<H>  4.0   |  27  |  2.55<H>    Ca    9.5      09 Apr 2021 08:38  Mg     2.3     04-09    TPro  6.7  /  Alb  3.1<L>  /  TBili  0.5  /  DBili  x   /  AST  22  /  ALT  25  /  AlkPhos  92  04-09      CARDIAC MARKERS ( 08 Apr 2021 22:39 )  0.450 ng/mL / x     / x     / x     / x      CARDIAC MARKERS ( 07 Apr 2021 14:58 )  1.050 ng/mL / x     / x     / x     / x                RADIOLOGY & ADDITIONAL TESTS:  Results Reviewed:   Imaging Personally Reviewed:  Electrocardiogram Personally Reviewed:    COORDINATION OF CARE:  Care Discussed with Consultants/Other Providers [Y/N]:  Prior or Outpatient Records Reviewed [Y/N]:

## 2021-04-09 NOTE — PROGRESS NOTE ADULT - ASSESSMENT
80 yof with multiple medical issues, presented with recurrent syncope.    1. Syncope-    Syncope could be multifactorial.  Hypoxia and orthostasis could be contributing to the issue.  Compliance with oxygen recommended at home.  I discussed compression stockings with family in past and they said it is very hard to get them on and cause indentations and they were reluctant to use them.     2. s/p PPM- yesterday the PPM was reinterrogated to see if she had any events of bradycardia.  PPM function noted to be normal and no events of bradycardia noted.        2. Orthostasis- likely secondary to autonomic insufficiency.  Will continue midodrine low dose with monitoring of the BP at home.     3. Acute decompensated HFPEF- euvolemic.  bumex 1mg oral to be started from today.   Diuresis with close monitoring of the renal function and electrolytes.  Goal potassium of 4 and magnesium of 2.   Strict I/O and daily wt checks. Low sodium diet. Nutrition education.     4. HTN- BP optimal this am.   will monitor BP     5. CKD- Management per primary team.     6. Hyperlipidemia- continue atorvastatin.    7. COPD- Management per primary team.     Other medical issues- Management per primary team.   Thank you for allowing me to participate in the care of this patient. Please feel free to contact me with any questions.              80 yof with multiple medical issues, presented with recurrent syncope.    1. Syncope-    Syncope could be multifactorial.  Hypoxia and orthostasis could be contributing to the issue.  Compliance with oxygen recommended at home.  I discussed compression stockings with family in past and they said it is very hard to get them on and cause indentations and they were reluctant to use them.     2. s/p PPM- yesterday the PPM was reinterrogated to see if she had any events of bradycardia.  PPM function noted to be normal and no events of bradycardia noted.        2. Orthostasis- likely secondary to autonomic insufficiency.  Will continue midodrine low dose with monitoring of the BP at home.     3. Acute decompensated HFPEF- euvolemic.  bumex 1mg oral to be started from today.   Diuresis with close monitoring of the renal function and electrolytes.  Goal potassium of 4 and magnesium of 2.   Strict I/O and daily wt checks. Low sodium diet. Nutrition education.     4. HTN- BP optimal this am.   will monitor BP     5. CKD- Management per primary team.     6. Hyperlipidemia- continue atorvastatin.    7. COPD- Management per primary team.     8. Elevated cardiac enzymes- mildly elevated.  WIll monitor for now.  Outpt stress test did not reveal any major ischemia in past.  Will contemplate outpt stress test.   Other medical issues- Management per primary team.   Thank you for allowing me to participate in the care of this patient. Please feel free to contact me with any questions.

## 2021-04-09 NOTE — CDI QUERY NOTE - NSCDIOTHERTXTBX2_GEN_ALL_CORE_FT
On arrival, documentation reflects:    SPO2: 74% on room air with RR of 20.    Patient placed on 15L non rebreather and increased SPO2 to 100% with a RR of 18.    Progressively titrated down to 6L nasal cannula.    HP Adult 4-7-21 @ 10:40   Problem: Chronic obstructive pulmonary disease, unspecified COPD type.  Plan: -O2 dependent on 7 LPM at home as per patient     Progress Note Adult Cardiology Attending 4/8/21 @ 08:53  c/o shortness of breath.    Is the above clinical criteria indicative of a further diagnosis?  A) Acute on chronic hypoxic respiratory failure.  B) No clinical indication of acute respiratory failure.  C) Other, please specify:

## 2021-04-09 NOTE — PROGRESS NOTE ADULT - SUBJECTIVE AND OBJECTIVE BOX
Patient is a 80y old  Female who presents with a chief complaint of Syncopal episode.       HPI:  Pt is an 80 F BIBEMS for syncope, with a PMHx notable for CAD, CHF, HLD, as well as O2 dependent COPD (7LPM Home O2) who states that she had tripped and fell today while getting up to walk in her home at which point her  had assisted her thereafter. She states that she was aware of the events occuring at the time of her fall and states that she has had not lost consciousness at the time and denies any dizziness preceding the event. She also states that she had hit her head at the time of the fall, but denies any severe HA or head contusion or pain. She also denies any neurologic changes such as poor thought patterns or mental confusion or any limb weakness or numbness noted. She also denies any cardiogenic symptoms such as cp, severe sob, or any palpitations and denies any orthopnea.    - pt seen and examined by me this am.  She denies any symptoms, she states that she is "feeling better today compared to yesterday"     - pt seen and examined by me this am.  Pt states that her SOB is better.  denies any dizziness.    PAST MEDICAL & SURGICAL HISTORY:  Chronic obstructive pulmonary disease, unspecified COPD type    Diabetes    Neuropathy    Essential hypertension    Hyperlipidemia, unspecified hyperlipidemia type    Chronic diastolic congestive heart failure  last exacerbation 2017    Chronic UTI    MI, old  x3    CKD (chronic kidney disease)    GERD (gastroesophageal reflux disease)    Hypothyroid    Anxiety    [place] (hard of hearing)  hearing aid    CAD (coronary artery disease)    Pacemaker    S/P appendectomy     delivery delivered    H/O hernia repair  umbilical and incisional    History of total knee replacement, unspecified laterality  bilateral right  left     S/P ORIF (open reduction internal fixation) fracture  left hip 2017    History of cholecystectomy    History of ear surgery    S/P lobectomy of lung  left lung pre-cancerous    History of cataract surgery  bilateral IOL        MEDICATIONS  (STANDING):  acetaminophen   Tablet .. 650 milliGRAM(s) Oral every 6 hours  aspirin enteric coated 81 milliGRAM(s) Oral daily  atorvastatin Oral Tab/Cap - Peds 40 milliGRAM(s) Oral daily  buMETAnide 0.5 milliGRAM(s) Oral daily  calcitriol   Capsule 0.25 MICROGram(s) Oral daily  dextrose 40% Gel 15 Gram(s) Oral once  dextrose 5%. 1000 milliLiter(s) (50 mL/Hr) IV Continuous <Continuous>  dextrose 5%. 1000 milliLiter(s) (100 mL/Hr) IV Continuous <Continuous>  dextrose 50% Injectable 25 Gram(s) IV Push once  dextrose 50% Injectable 12.5 Gram(s) IV Push once  dextrose 50% Injectable 25 Gram(s) IV Push once  FLUoxetine 10 milliGRAM(s) Oral daily  folic acid 1 milliGRAM(s) Oral daily  glucagon  Injectable 1 milliGRAM(s) IntraMuscular once  heparin   Injectable 5000 Unit(s) SubCutaneous every 12 hours  insulin lispro (ADMELOG) corrective regimen sliding scale   SubCutaneous three times a day before meals  insulin lispro (ADMELOG) corrective regimen sliding scale   SubCutaneous at bedtime  levothyroxine 125 MICROGram(s) Oral daily  midodrine. 5 milliGRAM(s) Oral three times a day  montelukast 10 milliGRAM(s) Oral at bedtime  multivitamin 1 Tablet(s) Oral daily  pregabalin 50 milliGRAM(s) Oral two times a day  tiotropium 2.5 MICROgram(s)/olodaterol 2.5 MICROgram(s) (STIOLTO) Inhaler 2 Puff(s) Inhalation daily    MEDICATIONS  (PRN):  ALBUTerol    90 MICROgram(s) HFA Inhaler 2 Puff(s) Inhalation every 6 hours PRN for shortness of breath and/or wheezing      FAMILY HISTORY:  Family history of CHF (congestive heart failure)        SOCIAL HISTORY: ex smoker     REVIEW OF SYSTEMS:  CONSTITUTIONAL:    No fatigue, malaise, lethargy.  No fever or chills.  RESPIRATORY:  No cough.  No wheeze.  No hemoptysis.  no shortness of breath.  CARDIOVASCULAR:  No chest pains.  No palpitations. no shortness of breath, No orthopnea or PND. c/o syncope.   GASTROINTESTINAL:  No abdominal pain.  No nausea or vomiting.    GENITOURINARY:    No hematuria.    MUSCULOSKELETAL:  No musculoskeletal pain.  No joint swelling.  No arthritis.  NEUROLOGICAL:  No tingling or numbness or weakness.  PSYCHIATRIC:  No confusion  SKIN:  No rashes.          ICU Vital Signs Last 24 Hrs  T(C): 35.6 (2021 07:45), Max: 36.3 (2021 19:58)  T(F): 96 (2021 07:45), Max: 97.4 (2021 19:58)  HR: 80 (2021 07:49) (66 - 80)  BP: 144/57 (2021 07:45) (111/90 - 155/67)  BP(mean): 75 (2021 07:45) (75 - 75)  ABP: --  ABP(mean): --  RR: 18 (2021 07:45) (18 - 18)  SpO2: 96% (2021 07:49) (92% - 96%)      PHYSICAL EXAM-    Constitutional: no acute distress     Head: Head is normocephalic and atraumatic.      Neck:  No JVD.     Cardiovascular: Regular rate and rhythm without S3, S4. No murmurs or rubs are appreciated.      Respiratory: Breath sounds are normal. No rales. No wheezing.    Abdomen: Soft, nontender, nondistended with positive bowel sounds.      Extremity: No tenderness. No  pitting edema     Neurologic: The patient is alert and oriented.      Skin: No rash, no obvious lesions noted.      Psychiatric: The patient appears to be emotionally stable.      INTERPRETATION OF TELEMETRY: A V paced rythm.     ECG: A sensed V paced rythm, occasional PVCs.     I&O's Detail    2021 07:01  -  2021 07:00  --------------------------------------------------------  IN:  Total IN: 0 mL    OUT:    Stool (mL): 1 mL  Total OUT: 1 mL    Total NET: -1 mL          LABS:                        10.2   7.46  )-----------( 210      ( 2021 08:38 )             33.2     04-09    138  |  104  |  56<H>  ----------------------------<  159<H>  4.0   |  27  |  2.55<H>    Ca    9.5      2021 08:38  Mg     2.3     04-09    TPro  6.7  /  Alb  3.1<L>  /  TBili  0.5  /  DBili  x   /  AST  22  /  ALT  25  /  AlkPhos  92  04-09    CARDIAC MARKERS ( 2021 22:39 )  0.450 ng/mL / x     / x     / x     / x      CARDIAC MARKERS ( 2021 14:58 )  1.050 ng/mL / x     / x     / x     / x      CARDIAC MARKERS ( 2021 13:01 )  0.958 ng/mL / x     / x     / x     / x          LIVER FUNCTIONS - ( 2021 08:38 )  Alb: 3.1 g/dL / Pro: 6.7 gm/dL / ALK PHOS: 92 U/L / ALT: 25 U/L / AST: 22 U/L / GGT: x                     I&O's Summary    2021 07:01  -  2021 07:00  --------------------------------------------------------  IN: 0 mL / OUT: 1 mL / NET: -1 mL      BNPSerum Pro-Brain Natriuretic Peptide: 63712 pg/mL ( @ 13:29)    RADIOLOGY & ADDITIONAL STUDIES:  < from: Xray Chest 1 View- PORTABLE-Urgent (21 @ 07:37) >    EXAM:  XR CHEST PORTABLE URGENT 1V                            PROCEDURE DATE:  2021          INTERPRETATION:  Chest pain.    AP chest. Prior 3/12/2021.  Impression:  Left ICD in situ. No change heart mediastinum. Interval increase in vascularcongestion bilateral interstitial edema. Trace right pleural effusion. No definite focal infiltrate.            LESLIE VENEGAS MD; Attending Radiologist  This document has been electronically signed. 2021  2:42PM    < end of copied text >  < from: Transthoracic Echocardiogram (10.20. @ 08:10) >   Summary     Moderate to severe mitral annular calcification is present.   Mean transmitral gradient is 6 mmHg; this finding is consistent with   moderate mitral stenosis.   Mild (1+) mitral regurgitation is present.   Mild to Moderate Tricuspid regurgitation is present.   Severe pulmonary hypertension.   The left atrium is mildly dilated.   The left ventricle is normal in size, wall thickness, wall motion and   contractility.   Left ventricular wall thickness is at the upper limits of normal.   Estimated left ventricular ejection fraction is 55 %.     Signature     ----------------------------------------------------------------   Electronically signed by Ab Cox MD(Interpreting    < end of copied text >

## 2021-04-09 NOTE — DISCHARGE NOTE PROVIDER - NSDCMRMEDTOKEN_GEN_ALL_CORE_FT
acetaminophen 500 mg oral tablet: 2 tab(s) orally every 6 hours, As Needed - for mild pain  albuterol 2.5 mg/3 mL (0.083%) inhalation solution: 3 milliliter(s) inhaled every 6 hours, As Needed - for shortness of breath and/or wheezing  aspirin 81 mg oral tablet: 1 tab(s) orally once a day  atorvastatin 40 mg oral tablet: 1 tab(s) orally once a day  bumetanide 2 mg oral tablet: 1 tab(s) orally once a day  calcitriol 0.25 mcg oral capsule: 1 cap(s) orally once a day  Centrum Silver oral tablet: 1 tab(s) orally once a day  FLUoxetine 10 mg oral capsule: 1 cap(s) orally once a day  folic acid 1 mg oral tablet: 1 tab(s) orally once a day  HumaLOG 100 units/mL injectable solution: 14 unit(s) injectable 3 times a day (before meals)  ***Plus Sliding Scale:  150-200 = 2 units  201-250 = 4 units  251-300 = 6 units  301-350 = 8 units  351-400 = 10 units  400 + = 12 units  levothyroxine 125 mcg (0.125 mg) oral tablet: 1 tab(s) orally once a day  lidocaine 5% patch: 2 pad(s) orally once a day  ****Lower Back***  Lyrica 50 mg oral capsule: 1 cap(s) orally 2 times a day  midodrine 5 mg oral tablet: 1 tab(s) orally 3 times a day  hold for SBP&lt;145  ***was prescribed on 3/17/21 but patient is not sure if they are taking this***  montelukast 10 mg oral tablet: 1 tab(s) orally once a day (at bedtime)  ProAir HFA 90 mcg/inh inhalation aerosol: 2 puff(s) inhaled every 6 hours, As Needed - for shortness of breath and/or wheezing  Toujeo SoloStar 300 units/mL subcutaneous solution: 44 unit(s) subcutaneous once a day (at bedtime)  Trelegy Ellipta 100 mcg-62.5 mcg-25 mcg/inh inhalation powder: 1 puff(s) inhaled once a day   acetaminophen 500 mg oral tablet: 2 tab(s) orally every 6 hours, As Needed - for mild pain  albuterol 2.5 mg/3 mL (0.083%) inhalation solution: 3 milliliter(s) inhaled every 6 hours, As Needed - for shortness of breath and/or wheezing  aspirin 81 mg oral tablet: 1 tab(s) orally once a day  atorvastatin 40 mg oral tablet: 1 tab(s) orally once a day  bumetanide 2 mg oral tablet: 1 tab(s) orally once a day  calcitriol 0.25 mcg oral capsule: 1 cap(s) orally once a day  Centrum Silver oral tablet: 1 tab(s) orally once a day  FLUoxetine 10 mg oral capsule: 1 cap(s) orally once a day  folic acid 1 mg oral tablet: 1 tab(s) orally once a day  HumaLOG 100 units/mL injectable solution: 14 unit(s) injectable 3 times a day (before meals)  ***Plus Sliding Scale:  150-200 = 2 units  201-250 = 4 units  251-300 = 6 units  301-350 = 8 units  351-400 = 10 units  400 + = 12 units  levothyroxine 125 mcg (0.125 mg) oral tablet: 1 tab(s) orally once a day  lidocaine 5% patch: 2 pad(s) orally once a day  ****Lower Back***  Lyrica 50 mg oral capsule: 1 cap(s) orally 2 times a day  midodrine 2.5 mg oral tablet: 1 tab(s) orally 3 times a day MDD:7.5 mg  montelukast 10 mg oral tablet: 1 tab(s) orally once a day (at bedtime)  ProAir HFA 90 mcg/inh inhalation aerosol: 2 puff(s) inhaled every 6 hours, As Needed - for shortness of breath and/or wheezing  Toujeo SoloStar 300 units/mL subcutaneous solution: 44 unit(s) subcutaneous once a day (at bedtime)  Trelegy Ellipta 100 mcg-62.5 mcg-25 mcg/inh inhalation powder: 1 puff(s) inhaled once a day

## 2021-04-09 NOTE — DISCHARGE NOTE PROVIDER - CARE PROVIDER_API CALL
Soco Phelps (MD)  Internal Medicine  [address], [address]  [place] [address]  Phone: [phone]  Fax: [phone]  Established Patient  Follow Up Time:

## 2021-04-09 NOTE — DISCHARGE NOTE PROVIDER - HOSPITAL COURSE
Pt is an 80 F BIBEMS for syncope, with a PMHx notable for CAD, CHF, HLD, as well as O2 dependent COPD (7LPM Home O2) who states that she had tripped and fell today while getting up to walk in her home at which point her  had assisted her thereafter. She states that she was aware of the events occuring at the time of her fall and states that she has had not lost consciousness at the time and denies any dizziness preceding the event. She also states that she had hit her head at the time of the fall, but denies any severe HA or head contusion or pain. She also denies any neurologic changes such as poor thought patterns or mental confusion or any limb weakness or numbness noted. She also denies any cardiogenic symptoms such as cp, severe sob, or any palpitations and denies any orthopnea.    Cardiology:  1. Syncope-  Resolved - Syncope could be multifactorial.  Hypoxia and orthostasis could be contributing to the issue.  Compliance with oxygen recommended at home.  I discussed compression stockings with family in past and they said it is very hard to get them on and cause indentations and they were reluctant to use them.     2. s/p PPM- yesterday the PPM was reinterrogated to see if she had any events of bradycardia.  PPM function noted to be normal and no events of bradycardia noted.    2. Orthostasis- likely secondary to autonomic insufficiency.  Will continue midodrine low dose with monitoring of the BP at home.     3. Acute decompensated HFPEF- euvolemic.  bumex 1mg oral to be started from today.   Diuresis with close monitoring of the renal function and electrolytes.  Goal potassium of 4 and magnesium of 2.   Strict I/O and daily wt checks. Low sodium diet. Nutrition education.     4. HTN- BP optimal this am.   will monitor BP     5. CKD- Management per primary team.     6. Hyperlipidemia- continue atorvastatin.    7. COPD- Management per primary team.     8. Elevated cardiac enzymes- mildly elevated.  WIll monitor for now.  Outpt stress test did not reveal any major ischemia in past.  Will contemplate outpt stress test.             Pt is an 80 F BIBEMS for syncope, with a PMHx notable for CAD, CHF, HLD, as well as O2 dependent COPD (7LPM Home O2) who states that she had tripped and fell today while getting up to walk in her home at which point her  had assisted her thereafter. She states that she was aware of the events occuring at the time of her fall and states that she has had not lost consciousness at the time and denies any dizziness preceding the event. She also states that she had hit her head at the time of the fall, but denies any severe HA or head contusion or pain. She also denies any neurologic changes such as poor thought patterns or mental confusion or any limb weakness or numbness noted. She also denies any cardiogenic symptoms such as cp, severe sob, or any palpitations and denies any orthopnea.    Cardiology: Cardiac PPM interrogation x 2 was noted to show no dysfunction in the medtronic device.      Resolved - Syncope could be multifactorial.  Hypoxia and orthostasis could be contributing to the issue. Compliance with oxygen recommended at home.  I discussed compression stockings with family in past and they said it is very hard to get them on and cause indentations and they were reluctant to use them.     s/p PPM- 4/8 the PPM was reinterrogated to see if she had any events of bradycardia. PPM function noted to be normal and no events of bradycardia noted.    Orthostasis- likely secondary to autonomic insufficiency.    Acute decompensated HFPEF- euvolemic.  cont. bumex 1mg oral to be started from today.       Hyperlipidemia- continue atorvastatin.       Elevated cardiac enzymes- mildly elevated. Outpt stress test did not reveal any major ischemia in past; requires outpt stress test.

## 2021-04-10 NOTE — PROGRESS NOTE ADULT - PROBLEM SELECTOR PROBLEM 8
Type 2 diabetes mellitus with chronic kidney disease, with long-term current use of insulin, unspecified CKD stage

## 2021-04-10 NOTE — PROGRESS NOTE ADULT - PROBLEM SELECTOR PLAN 1
4/10  - repeat interrogation of the Pt's pacemaker (medtronic) noted no events as a different interrogation device was used.  -Notable + orthostatic findings on assessment  -unlikely volume w/ dehydration  -Carotid dopplers 4/7: 50-69% bilateral internal carotid artery stenosis which could lead to poor cerebral perfusion  -medication reconciliation applying Beer's criteria  -2D echo ----> Estimated left ventricular ejection fraction is 40-45 %.  -Cycle troponins - tapered back to baseline   -Cardiology -requesting outpatient stress testing despite previous findings showing no ischemia.  -Ct Brain w/out acute changes    GOC/Dispo: Repeated interrogation using a different device as requested by the family. Medications adjusted appropriately; Pt's family requests a meeting with the EP team; d/c either home of to rehab facility depending on the family's request. Likely d/c on 4/12.
4/9  -Notable + orthostatic findings on assessment  -unlikely volume w/ dehydration  -Carotid dopplers 4/7: 50-69% bilateral internal carotid artery stenosis which could lead to poor cerebral perfusion  -medication reconciliation applying Beer's criteria  -2D echo ----> Estimated left ventricular ejection fraction is 40-45 %.  -Cycle troponins - tapered back to baseline   -Cardiology --requesting outpatient stress testing despite previous findings showing no ischemia.  -Ct Brain w/out acute changes    GOC/Dispo: Medications adjusted appropriately; Pt's family requests a meeting with the EP team; d/c either home of to rehab facility depending on the family's request.
4/8  -Notable + orthostatic findings on assessment  -unlikely volume w/ dehydration  -Carotid dopplers 4/7: 50-69% bilateral internal carotid artery stenosis which could lead to poor cerebral perfusion  -Telemetry given cardiac Hx  -Interrogate Pt's pacemaker for events  -carotid dupplers  -orthostatic measures  -medication reconciliation applying Beer's criteria  -2D echo pending  -Cycle troponins  -Cardiology consult pending   -Ct Brain w/out acute changes

## 2021-04-10 NOTE — DISCHARGE NOTE NURSING/CASE MANAGEMENT/SOCIAL WORK - PATIENT PORTAL LINK FT
You can access the FollowMyHealth Patient Portal offered by [facility] by registering at the following website: [email or web address]. By joining [employer]’s FollowMyHealth portal, you will also be able to view your health information using other applications (apps) compatible with our system.

## 2021-04-10 NOTE — PROGRESS NOTE ADULT - PROBLEM SELECTOR PLAN 2
-Noted elevations 2/2 demand ischemia likely given poor CHF control with poor coronary perfusion with increased cardiac demand.  -cont to cycle troponins; without acute ST elevations on ECG  -cardiology following and in discussions with Pt's family   -cont. Tele
-stable trops 0.4 (downtrending)  -Noted elevations 2/2 demand ischemia likely given poor CHF control with poor coronary perfusion with increased cardiac demand.  -cont to cycle troponins; without acute ST elevations on ECG  -cardiology following and in discussions with Pt's family   -cont. Tele
-stable trops 0.4 (downtrending)  -Noted elevations 2/2 demand ischemia likely given poor CHF control with poor coronary perfusion with increased cardiac demand.  -cont to cycle troponins; without acute ST elevations on ECG  -cardiology following and in discussions with Pt's family   -cont. Tele

## 2021-04-10 NOTE — ED PROVIDER NOTE - PMH
Anxiety    CAD (coronary artery disease)    Chronic diastolic congestive heart failure  last exacerbation 7/2017  Chronic obstructive pulmonary disease, unspecified COPD type    Chronic UTI    CKD (chronic kidney disease)    Diabetes    Essential hypertension    GERD (gastroesophageal reflux disease)    [place] (hard of hearing)  hearing aid  Hyperlipidemia, unspecified hyperlipidemia type    Hypothyroid    MI, old  x3  Neuropathy    Pacemaker

## 2021-04-10 NOTE — ED PROVIDER NOTE - NS_ ATTENDINGSCRIBEDETAILS _ED_A_ED_FT
Carol Ann Avendaño MD: The history, relevant review of systems, past medical and surgical history, medical decision making, and physical examination was documented by the scribe in my presence and I attest to the accuracy of the documentation.

## 2021-04-10 NOTE — PROGRESS NOTE ADULT - PROBLEM SELECTOR PLAN 5
-baseline TSH; cont synthroid 125mcg daily

## 2021-04-10 NOTE — PROGRESS NOTE ADULT - SUBJECTIVE AND OBJECTIVE BOX
Patient is a 80y old  Female who presents with a chief complaint of Syncopal episode.       HPI:  Pt is an 80 F BIBEMS for syncope, with a PMHx notable for CAD, CHF, HLD, as well as O2 dependent COPD (7LPM Home O2) who states that she had tripped and fell today while getting up to walk in her home at which point her  had assisted her thereafter. She states that she was aware of the events occuring at the time of her fall and states that she has had not lost consciousness at the time and denies any dizziness preceding the event. She also states that she had hit her head at the time of the fall, but denies any severe HA or head contusion or pain. She also denies any neurologic changes such as poor thought patterns or mental confusion or any limb weakness or numbness noted. She also denies any cardiogenic symptoms such as cp, severe sob, or any palpitations and denies any orthopnea.    - pt seen and examined by me this am.  She denies any symptoms, she states that she is "feeling better today compared to yesterday"     - pt seen and examined by me this am.  Pt states that her SOB is better.  denies any dizziness.    4/10- pt seen this am.  No overnight events per overnight RN.     PAST MEDICAL & SURGICAL HISTORY:  Chronic obstructive pulmonary disease, unspecified COPD type    Diabetes    Neuropathy    Essential hypertension    Hyperlipidemia, unspecified hyperlipidemia type    Chronic diastolic congestive heart failure  last exacerbation 2017    Chronic UTI    MI, old  x3    CKD (chronic kidney disease)    GERD (gastroesophageal reflux disease)    Hypothyroid    Anxiety    [place] (hard of hearing)  hearing aid    CAD (coronary artery disease)    Pacemaker    S/P appendectomy     delivery delivered    H/O hernia repair  umbilical and incisional    History of total knee replacement, unspecified laterality  bilateral right  left     S/P ORIF (open reduction internal fixation) fracture  left hip 2017    History of cholecystectomy    History of ear surgery    S/P lobectomy of lung  left lung pre-cancerous    History of cataract surgery  bilateral IOL        MEDICATIONS  (STANDING):  acetaminophen   Tablet .. 650 milliGRAM(s) Oral every 6 hours  aspirin enteric coated 81 milliGRAM(s) Oral daily  atorvastatin Oral Tab/Cap - Peds 40 milliGRAM(s) Oral daily  buMETAnide 0.5 milliGRAM(s) Oral daily  calcitriol   Capsule 0.25 MICROGram(s) Oral daily  dextrose 40% Gel 15 Gram(s) Oral once  dextrose 5%. 1000 milliLiter(s) (50 mL/Hr) IV Continuous <Continuous>  dextrose 5%. 1000 milliLiter(s) (100 mL/Hr) IV Continuous <Continuous>  dextrose 50% Injectable 25 Gram(s) IV Push once  dextrose 50% Injectable 12.5 Gram(s) IV Push once  dextrose 50% Injectable 25 Gram(s) IV Push once  FLUoxetine 10 milliGRAM(s) Oral daily  folic acid 1 milliGRAM(s) Oral daily  glucagon  Injectable 1 milliGRAM(s) IntraMuscular once  heparin   Injectable 5000 Unit(s) SubCutaneous every 12 hours  insulin lispro (ADMELOG) corrective regimen sliding scale   SubCutaneous three times a day before meals  insulin lispro (ADMELOG) corrective regimen sliding scale   SubCutaneous at bedtime  levothyroxine 125 MICROGram(s) Oral daily  midodrine. 5 milliGRAM(s) Oral three times a day  montelukast 10 milliGRAM(s) Oral at bedtime  multivitamin 1 Tablet(s) Oral daily  pregabalin 50 milliGRAM(s) Oral two times a day  tiotropium 2.5 MICROgram(s)/olodaterol 2.5 MICROgram(s) (STIOLTO) Inhaler 2 Puff(s) Inhalation daily    MEDICATIONS  (PRN):  ALBUTerol    90 MICROgram(s) HFA Inhaler 2 Puff(s) Inhalation every 6 hours PRN for shortness of breath and/or wheezing      FAMILY HISTORY:  Family history of CHF (congestive heart failure)        SOCIAL HISTORY: ex smoker     REVIEW OF SYSTEMS:  CONSTITUTIONAL:    No fatigue, malaise, lethargy.  No fever or chills.  RESPIRATORY:  No cough.  No wheeze.  No hemoptysis.  no shortness of breath.  CARDIOVASCULAR:  No chest pains.  No palpitations. no shortness of breath, No orthopnea or PND. c/o syncope.   GASTROINTESTINAL:  No abdominal pain.  No nausea or vomiting.    GENITOURINARY:    No hematuria.    MUSCULOSKELETAL:  c/o musculoskeletal pain.  No joint swelling.  No arthritis.  NEUROLOGICAL:  No tingling or numbness or weakness.  PSYCHIATRIC:  No confusion  SKIN:  No rashes.          ICU Vital Signs Last 24 Hrs  T(C): 36.4 (2021 20:22), Max: 36.4 (2021 20:22)  T(F): 97.6 (2021 20:22), Max: 97.6 (2021 20:22)  HR: 65 (10 Apr 2021 05:30) (65 - 80)  BP: 137/56 (10 Apr 2021 05:30) (137/45 - 144/57)  BP(mean): 75 (2021 07:45) (75 - 75)  ABP: --  ABP(mean): --  RR: 18 (2021 20:22) (18 - 18)  SpO2: 99% (2021 20:22) (92% - 99%)      PHYSICAL EXAM-    Constitutional: no acute distress     Head: Head is normocephalic and atraumatic.      Neck:  No JVD.     Cardiovascular: Regular rate and rhythm without S3, S4. No murmurs or rubs are appreciated.      Respiratory: Breath sounds are normal. No rales. No wheezing.    Abdomen: Soft, nontender, nondistended with positive bowel sounds.      Extremity: No tenderness. No  pitting edema     Neurologic: The patient is alert and oriented.      Skin: No rash, no obvious lesions noted.      Psychiatric: The patient appears to be emotionally stable.      INTERPRETATION OF TELEMETRY: A V paced rythm.     ECG: A sensed V paced rythm, occasional PVCs.     I&O's Detail    2021 07:01  -  2021 07:00  --------------------------------------------------------  IN:  Total IN: 0 mL    OUT:    Stool (mL): 1 mL  Total OUT: 1 mL    Total NET: -1 mL          LABS:                                   10.2   7.46  )-----------( 210      ( 2021 08:38 )             33.2     04-09    138  |  104  |  56<H>  ----------------------------<  159<H>  4.0   |  27  |  2.55<H>    Ca    9.5      2021 08:38  Mg     2.3     04-09    TPro  6.7  /  Alb  3.1<L>  /  TBili  0.5  /  DBili  x   /  AST  22  /  ALT  25  /  AlkPhos  92  04-    CARDIAC MARKERS ( 2021 22:39 )  0.450 ng/mL / x     / x     / x     / x          LIVER FUNCTIONS - ( 2021 08:38 )  Alb: 3.1 g/dL / Pro: 6.7 gm/dL / ALK PHOS: 92 U/L / ALT: 25 U/L / AST: 22 U/L / GGT: x                         I&O's Summary    2021 07:01  -  2021 07:00  --------------------------------------------------------  IN: 0 mL / OUT: 1 mL / NET: -1 mL      BNPSerum Pro-Brain Natriuretic Peptide: 23278 pg/mL ( @ 13:29)    RADIOLOGY & ADDITIONAL STUDIES:  < from: Xray Chest 1 View- PORTABLE-Urgent (21 @ 07:37) >    EXAM:  XR CHEST PORTABLE URGENT 1V                            PROCEDURE DATE:  2021          INTERPRETATION:  Chest pain.    AP chest. Prior 3/12/2021.  Impression:  Left ICD in situ. No change heart mediastinum. Interval increase in vascularcongestion bilateral interstitial edema. Trace right pleural effusion. No definite focal infiltrate.            LESLIE VENEGAS MD; Attending Radiologist  This document has been electronically signed. 2021  2:42PM    < end of copied text >  < from: Transthoracic Echocardiogram (10.20.18 @ 08:10) >   Summary     Moderate to severe mitral annular calcification is present.   Mean transmitral gradient is 6 mmHg; this finding is consistent with   moderate mitral stenosis.   Mild (1+) mitral regurgitation is present.   Mild to Moderate Tricuspid regurgitation is present.   Severe pulmonary hypertension.   The left atrium is mildly dilated.   The left ventricle is normal in size, wall thickness, wall motion and   contractility.   Left ventricular wall thickness is at the upper limits of normal.   Estimated left ventricular ejection fraction is 55 %.     Signature     ----------------------------------------------------------------   Electronically signed by Ab Cox MD(Interpreting    < end of copied text >  < from: TTE Echo Complete w/o Contrast w/ Doppler (21 @ 12:44) >     Impression     Summary     Fibrocalcific changes noted to the mitral valve leaflets with restriction   in mitral leaflet excursion. Mean transmitral gradient is 5 mmHg; this is   finding is consistent with mild mitral stenosis.   Mild to Moderate mitral regurgitation is present.   Severe mitral annular calcification is present.   EA reversal of the mitral inflow consistent with reduced compliance of the   left ventricle.   Fibrocalcific changes noted to the Aortic valve leaflets with preserved   leaflet excursion.   Mild (1+) aortic regurgitation is present.   Normal appearing tricuspid valve structure.   Moderate to severe (3+) tricuspid valve regurgitation is present.   Moderate pulmonary hypertension.   Pulmonic valve not well seen.   Mild to moderate pulmonic valvular regurgitation is present.   The left atrium is mildly dilated.    Signature     ----------------------------------------------------------------   Electronically signed by Venugopal Palla, MD(Interpreting   physician) on 2021 12:32 AM    < end of copied text >  < from: TTE Echo Complete w/o Contrast w/ Doppler (21 @ 12:44) >     Impression     Summary     Fibrocalcific changes noted to the mitral valve leaflets with restriction   in mitral leaflet excursion. Mean transmitral gradient is 5 mmHg; this is   finding is consistent with mild mitral stenosis.   Mild to Moderate mitral regurgitation is present.   Severe mitral annular calcification is present.   EA reversal of the mitral inflow consistent with reduced compliance of the   left ventricle.   Fibrocalcific changes noted to the Aortic valve leaflets with preserved   leaflet excursion.   Mild (1+) aortic regurgitation is present.   Normal appearing tricuspid valve structure.   Moderate to severe (3+) tricuspid valve regurgitation is present.   Moderate pulmonary hypertension.   Pulmonic valve not well seen.   Mild to moderate pulmonic valvular regurgitation is present.   The left atrium is mildly dilated.    Signature     ----------------------------------------------------------------   Electronically signed by Venugopal Palla, MD(Interpreting   physician) on 2021 12:32 AM    < end of copied text >

## 2021-04-10 NOTE — PROGRESS NOTE ADULT - SUBJECTIVE AND OBJECTIVE BOX
Patient is a 80y old  Female who presents with a chief complaint of Syncopal episode (10 Apr 2021 07:00)      SUBJECTIVE / OVERNIGHT EVENTS: Pt examined at the bedside today; vitals/labs/charts were reviewed. Pt notes feeling well today. Denies cp or sob.     MEDICATIONS  (STANDING):  acetaminophen   Tablet .. 650 milliGRAM(s) Oral every 6 hours  aspirin enteric coated 81 milliGRAM(s) Oral daily  atorvastatin Oral Tab/Cap - Peds 40 milliGRAM(s) Oral daily  buMETAnide 1 milliGRAM(s) Oral daily  calcitriol   Capsule 0.25 MICROGram(s) Oral daily  dextrose 40% Gel 15 Gram(s) Oral once  dextrose 5%. 1000 milliLiter(s) (50 mL/Hr) IV Continuous <Continuous>  dextrose 5%. 1000 milliLiter(s) (100 mL/Hr) IV Continuous <Continuous>  dextrose 50% Injectable 25 Gram(s) IV Push once  dextrose 50% Injectable 12.5 Gram(s) IV Push once  dextrose 50% Injectable 25 Gram(s) IV Push once  FLUoxetine 10 milliGRAM(s) Oral daily  folic acid 1 milliGRAM(s) Oral daily  glucagon  Injectable 1 milliGRAM(s) IntraMuscular once  heparin   Injectable 5000 Unit(s) SubCutaneous every 12 hours  insulin lispro (ADMELOG) corrective regimen sliding scale   SubCutaneous three times a day before meals  insulin lispro (ADMELOG) corrective regimen sliding scale   SubCutaneous at bedtime  levothyroxine 125 MICROGram(s) Oral daily  midodrine. 2.5 milliGRAM(s) Oral three times a day  montelukast 10 milliGRAM(s) Oral at bedtime  multivitamin 1 Tablet(s) Oral daily  pregabalin 50 milliGRAM(s) Oral two times a day  tiotropium 2.5 MICROgram(s)/olodaterol 2.5 MICROgram(s) (STIOLTO) Inhaler 2 Puff(s) Inhalation daily    MEDICATIONS  (PRN):  ALBUTerol    90 MICROgram(s) HFA Inhaler 2 Puff(s) Inhalation every 6 hours PRN for shortness of breath and/or wheezing      PHYSICAL EXAM:  Vital Signs Last 24 Hrs  T(C): 36.6 (10 Apr 2021 09:32), Max: 36.6 (10 Apr 2021 09:32)  T(F): 97.8 (10 Apr 2021 09:32), Max: 97.8 (10 Apr 2021 09:32)  HR: 60 (10 Apr 2021 09:32) (60 - 65)  BP: 150/61 (10 Apr 2021 09:32) (137/45 - 150/61)  BP(mean): --  RR: 18 (10 Apr 2021 09:32) (18 - 18)  SpO2: 99% (10 Apr 2021 09:32) (99% - 99%)    CONSTITUTIONAL: NAD  RESPIRATORY: Normal respiratory effort; lungs are clear to auscultation bilaterally  CARDIOVASCULAR: Regular rate and rhythm, normal S1 and S2, no murmur/rub/gallop; No lower extremity edema  GASTROINTESTINAL: Nontender to palpation, normoactive bowel sounds, no rebound/guarding; No hepatosplenomegaly  MUSCULOSKELETAL:  no clubbing or cyanosis of digits; no joint swelling or tenderness to palpation  NEUROLOGY: non-focal; no gross sensory deficits   PSYCH: A+O to person, place, and time; affect appropriate  SKIN: No rashes; warm     LABS:                        9.2    5.65  )-----------( 188      ( 10 Apr 2021 08:19 )             31.8     04-10    141  |  106  |  57<H>  ----------------------------<  151<H>  3.8   |  31  |  2.48<H>    Ca    9.4      10 Apr 2021 08:19  Mg     2.3     04-09    TPro  6.1  /  Alb  2.9<L>  /  TBili  0.5  /  DBili  x   /  AST  13<L>  /  ALT  18  /  AlkPhos  82  04-10      CARDIAC MARKERS ( 08 Apr 2021 22:39 )  0.450 ng/mL / x     / x     / x     / x                RADIOLOGY & ADDITIONAL TESTS:  Results Reviewed:   Imaging Personally Reviewed:  Electrocardiogram Personally Reviewed:    COORDINATION OF CARE:  Care Discussed with Consultants/Other Providers [Y/N]:  Prior or Outpatient Records Reviewed [Y/N]:

## 2021-04-10 NOTE — PROGRESS NOTE ADULT - PROBLEM SELECTOR PLAN 8
-consistent carb diet  -ISS for now  -A1c pending

## 2021-04-10 NOTE — PROGRESS NOTE ADULT - PROBLEM SELECTOR PLAN 9
-euvolemic at present and appears compensated; pending Pro-BNP  -cont. loop diuresis

## 2021-04-10 NOTE — PROGRESS NOTE ADULT - ASSESSMENT
80 yof with multiple medical issues, presented with recurrent syncope.    1. Syncope-    Syncope could be multifactorial.  Hypoxia and orthostasis could be contributing to the issue. Sever physical deconditioning. Pt hypoxic to 77% at home at the time of syncope.  Family saw her awake after the fall.  Pt stated that she got up to turn off light and next thing she knew was she was sitting on the floor.   Compliance with oxygen recommended at home.  I discussed compression stockings with family in past and they said it is very hard to get them on and cause indentations and they were reluctant to use them.     2. s/p PPM- Pts family per staff had extensive discussion with the EP team regarding the PPM function.  PPM function noted to be normal and no events of bradycardia noted.    I called pts daughters Sara and her other daughter who is a nurse and explained at length the current clinical diagnosis , management plan.   They expressed full understanding of this.  All questions answered at length.           2. Orthostasis- likely secondary to autonomic insufficiency. Since COVID started her ambulation was less and she became severely physically deconditioned. Despite suggesting rehab to family they refused in the last admission.   Will continue midodrine low dose 2.5mg TID and with monitoring of the BP at home.     3. Acute decompensated HFPEF- euvolemic.  bumex 1mg oral to continue.   Diuresis with close monitoring of the renal function and electrolytes.  Goal potassium of 4 and magnesium of 2.   Strict I/O and daily wt checks. Low sodium diet. Nutrition education.     4. HTN- BP optimal this am.   will monitor BP     5. CKD- Management per primary team.     6. Hyperlipidemia- continue atorvastatin.    7. COPD- Management per primary team.     8. Elevated cardiac enzymes- mildly elevated.  WIll monitor for now.  Outpt stress test did not reveal any major ischemia in past.  Will contemplate outpt stress test.     D/W hospitalist attending yesterday DR Rios.   Other medical issues- Management per primary team.   Thank you for allowing me to participate in the care of this patient. Please feel free to contact me with any questions.              80 yof with multiple medical issues, presented with recurrent syncope.    1. Syncope-    Syncope could be multifactorial.  Hypoxia and orthostasis could be contributing to the issue. Sever physical deconditioning. Pt hypoxic to 77% at home at the time of syncope.  Family saw her awake after the fall.  Pt stated that she got up to turn off light and next thing she knew was she was sitting on the floor.   Compliance with oxygen recommended at home.  I discussed compression stockings with family in past and they said it is very hard to get them on and cause indentations and they were reluctant to use them.     2. s/p PPM- Pts family per staff had extensive discussion with the EP team regarding the PPM function.  PPM function noted to be normal and no events of bradycardia noted.    I called pts daughters Sara and her other daughter who is a nurse and explained at length the current clinical diagnosis , management plan.   They expressed full understanding of this.  All questions answered at length.           2. Orthostasis- likely secondary to autonomic insufficiency. Since COVID started her ambulation was less and she became severely physically deconditioned. Despite suggesting rehab to family they refused in the last admission.   Will continue midodrine low dose 2.5mg TID and with monitoring of the BP at home.     3. Acute decompensated HFPEF- euvolemic.  Cardiorenal syndrome.  Creatinine improved with diuresis.   bumex 1mg oral to continue.   Diuresis with close monitoring of the renal function and electrolytes.  Goal potassium of 4 and magnesium of 2.   Strict I/O and daily wt checks. Low sodium diet. Nutrition education.     4. HTN- BP optimal this am.   will monitor BP     5. CKD- Management per primary team.     6. Hyperlipidemia- continue atorvastatin.    7. COPD- Management per primary team.     8. Elevated cardiac enzymes- mildly elevated.  WIll monitor for now.  Outpt stress test did not reveal any major ischemia in past.  Will contemplate outpt stress test.     D/W hospitalist attending yesterday DR Rios.   Other medical issues- Management per primary team.   Thank you for allowing me to participate in the care of this patient. Please feel free to contact me with any questions.

## 2021-04-10 NOTE — PROGRESS NOTE ADULT - REASON FOR ADMISSION
Syncopal episode

## 2021-04-10 NOTE — PROGRESS NOTE ADULT - PROBLEM SELECTOR PLAN 3
-cont. high intensity statin therapy + ASA

## 2021-04-10 NOTE — ED ADULT NURSE NOTE - OBJECTIVE STATEMENT
BIBA to ED from home for c/o SOB. PT d/c from  today. HX CHF and DM. On EMS arrival, PT was apneic with a pulse, BP 90/50, Fluids started by EMS. EMS en route to hospital when patient cardiac arrested and CPR was started. 10min down time PTA to ED and 1 epi administered PTA to ED. CPR and ACLS continued in ED. PT intubated by EMS PTA, 7.0 et tube. See code flow sheet.

## 2021-04-10 NOTE — ED ADULT TRIAGE NOTE - CHIEF COMPLAINT QUOTE
pt bibems from home for cardiac arrest, as per ems, received a call for possible syncopal episode, upon arrival, pt was unconscious, not breathing, cyanotic in field, initial bp was 90/50, no pulse, asystole.  1 dose of epi given on the field, cpr initiated at 1820, IO placed on right tibia, pt intubated size 7.0, 24 at the lip.

## 2021-04-10 NOTE — ED PROVIDER NOTE - OBJECTIVE STATEMENT
81 y/o F PMHx notable for CAD, CHF w/ [employer] pacemaker, HLD, as well as O2 dependent COPD (7LPM Home O2) biba to ed for witnessed cardiac arrest. Pt arrived intubated. EMS was called on pt for syncopal episode, found by family. On EMS arrival, pt was apneic, hypoxic w/ pulse. En route, pt given fluids and entered cardiac arrest. EMS started CPR around 6:19, total downtime 12 minutes PTA, 1 epi administered at 6:28. Pt was d/c from here today after x3 day admission (04/07/21-04/10/21) s/p syncopal episode. 79 y/o F PMHx notable for CAD, CHF w/ [employer] pacemaker, HLD, as well as O2 dependent COPD (7LPM Home O2), and s/p recent admission to  s/p syncope on 4/7 and d/c this morning presents to the ED [facility] s/p witnessed cardiac arrest. EMS was called on pt for syncopal episode, found by family. On EMS arrival, pt was apneic, hypoxic w/ pulse. En route, pt given fluids and entered cardiac arrest and was intubated PTA. EMS started CPR around 6:19, total downtime 12 minutes PTA, 1 epi administered at 6:28.

## 2021-04-11 LAB — SARS-COV-2 RNA SPEC QL NAA+PROBE: SIGNIFICANT CHANGE UP

## 2021-04-13 ENCOUNTER — NON-APPOINTMENT (OUTPATIENT)
Age: 81
End: 2021-04-13

## 2021-04-26 NOTE — H&P ADULT - PROBLEM SELECTOR PLAN 2
ON METOPROLOL  MONITOR ON TELE  HOLD OFF ON FURTHER METOPROLOL UNTIL HEART RATE IMPROVES  CARDIO EVAL 22.6

## 2021-04-30 NOTE — PROGRESS NOTE ADULT - SUBJECTIVE AND OBJECTIVE BOX
CC: Syncope (15 Mar 2021 14:18)    HPI: 79 y/o F PMHx significant for CAD, MI x 3, complete heart block s/p pacemaker placement, advanced COPD on home oxygen, CHFpEF, CKD4, diabetes mellitus (insulin dependent), hypertension, hyperlipidemia, hypothyroidism, and recurrent UTIs, was referred to  by her Cardiologist for further evaluation and management of witnessed near syncope.  Labs => K 3.4, HCO3 38, BUN/Cr 82/3.5, Glu 287, TnI 0.096, proBNP 4236. In the ED the patient received ASA 325mg PO x 1, and NS x 500mL x 1     INTERVAL HPI/ OVERNIGHT EVENTS:    Vital Signs Last 24 Hrs  T(C): 36.5 (14 Mar 2021 23:11), Max: 36.5 (14 Mar 2021 23:11)  T(F): 97.7 (14 Mar 2021 23:11), Max: 97.7 (14 Mar 2021 23:11)  HR: 73 (15 Mar 2021 08:31) (70 - 77)  BP: 165/66 (14 Mar 2021 23:11) (138/59 - 165/66)  RR: 16 (14 Mar 2021 23:11) (16 - 18)  SpO2: 94% (14 Mar 2021 23:11) (94% - 100%)    REVIEW OF SYSTEMS:    CONSTITUTIONAL: No weakness, fevers or chills  EYES/ENT: No visual changes;  No vertigo or throat pain   NECK: No pain or stiffness  RESPIRATORY: No cough, wheezing, hemoptysis; No shortness of breath  CARDIOVASCULAR: No chest pain or palpitations  GASTROINTESTINAL: No abdominal or epigastric pain. No nausea, vomiting, or hematemesis; No diarrhea or constipation. No melena or hematochezia.  GENITOURINARY: No dysuria, frequency or hematuria  NEUROLOGICAL: No numbness or weakness  SKIN: No itching, burning, rashes, or lesions   All other review of systems is negative unless indicated above.  PHYSICAL EXAM:    General: Well developed; well nourished; in no acute distress  Eyes: PERRLA, EOMI; conjunctiva and sclera clear  Head: Normocephalic; atraumatic  ENMT: No nasal discharge; airway clear  Neck: Supple; non tender; no masses  Respiratory: No wheezes, rales or rhonchi  Cardiovascular: Regular rate and rhythm. S1 and S2 Normal; No murmurs, gallops or rubs  Gastrointestinal: Soft non-tender non-distended; Normal bowel sounds  Genitourinary: No  suprapubic  tenderness  Extremities: Normal range of motion, No clubbing, cyanosis or edema  Vascular: Peripheral pulses palpable 2+ bilaterally  Neurological: Alert and oriented x4  Skin: Warm and dry. No acute rash  Lymph Nodes: No acute cervical adenopathy  Musculoskeletal: Normal muscle tone, without deformities  Psychiatric: Cooperative and appropriate                            10.1   9.14  )-----------( 171      ( 15 Mar 2021 07:52 )             32.3     15 Mar 2021 07:52    142    |  105    |  76     ----------------------------<  121    4.0     |  34     |  2.57     Ca    9.9        15 Mar 2021 07:52  Phos  3.7       14 Mar 2021 07:35  Mg     2.3       14 Mar 2021 07:35    TPro  6.8    /  Alb  3.0    /  TBili  0.7    /  DBili  x      /  AST  19     /  ALT  15     /  AlkPhos  79     14 Mar 2021 07:35      CAPILLARY BLOOD GLUCOSE      POCT Blood Glucose.: 339 mg/dL (15 Mar 2021 11:13)  POCT Blood Glucose.: 290 mg/dL (14 Mar 2021 21:06)  POCT Blood Glucose.: 265 mg/dL (14 Mar 2021 18:06)    LIVER FUNCTIONS - ( 14 Mar 2021 07:35 )  Alb: 3.0 g/dL / Pro: 6.8 gm/dL / ALK PHOS: 79 U/L / ALT: 15 U/L / AST: 19 U/L / GGT: x               MEDICATIONS  (STANDING):  aspirin enteric coated 81 milliGRAM(s) Oral daily  atorvastatin 40 milliGRAM(s) Oral at bedtime  buMETAnide 2 milliGRAM(s) Oral daily  calcitriol   Capsule 0.25 MICROGram(s) Oral daily  dextrose 40% Gel 15 Gram(s) Oral once  dextrose 5%. 1000 milliLiter(s) (50 mL/Hr) IV Continuous <Continuous>  dextrose 5%. 1000 milliLiter(s) (100 mL/Hr) IV Continuous <Continuous>  dextrose 50% Injectable 25 Gram(s) IV Push once  dextrose 50% Injectable 12.5 Gram(s) IV Push once  dextrose 50% Injectable 25 Gram(s) IV Push once  FLUoxetine 10 milliGRAM(s) Oral daily  fluticasone furoate/umeclidinium/vilanterol 100-62.5-25 MICROgram(s) Inhaler 1 Puff(s) Inhalation daily  folic acid 1 milliGRAM(s) Oral daily  glucagon  Injectable 1 milliGRAM(s) IntraMuscular once  insulin glargine Injectable (LANTUS) 24 Unit(s) SubCutaneous at bedtime  insulin lispro (ADMELOG) corrective regimen sliding scale   SubCutaneous three times a day before meals  insulin lispro (ADMELOG) corrective regimen sliding scale   SubCutaneous at bedtime  insulin lispro Injectable (ADMELOG) 8 Unit(s) SubCutaneous three times a day before meals  levothyroxine 125 MICROGram(s) Oral daily  lidocaine   Patch 1 Patch Transdermal daily  montelukast 10 milliGRAM(s) Oral at bedtime  multivitamin/minerals 1 Tablet(s) Oral daily  pregabalin 50 milliGRAM(s) Oral two times a day    MEDICATIONS  (PRN):  acetaminophen   Tablet .. 650 milliGRAM(s) Oral every 6 hours PRN Temp greater or equal to 38C (100.4F), Mild Pain (1 - 3)  ALBUTerol    90 MICROgram(s) HFA Inhaler 2 Puff(s) Inhalation every 6 hours PRN for shortness of breath and/or wheezing      RADIOLOGY & ADDITIONAL TESTS:
Interval History:  3/15/21:Feels slightly better today.    MEDICATIONS  (STANDING):  aspirin enteric coated 81 milliGRAM(s) Oral daily  atorvastatin 40 milliGRAM(s) Oral at bedtime  buMETAnide 2 milliGRAM(s) Oral daily  calcitriol   Capsule 0.25 MICROGram(s) Oral daily  dextrose 40% Gel 15 Gram(s) Oral once  dextrose 5%. 1000 milliLiter(s) (50 mL/Hr) IV Continuous <Continuous>  dextrose 5%. 1000 milliLiter(s) (100 mL/Hr) IV Continuous <Continuous>  dextrose 50% Injectable 25 Gram(s) IV Push once  dextrose 50% Injectable 12.5 Gram(s) IV Push once  dextrose 50% Injectable 25 Gram(s) IV Push once  FLUoxetine 10 milliGRAM(s) Oral daily  fluticasone furoate/umeclidinium/vilanterol 100-62.5-25 MICROgram(s) Inhaler 1 Puff(s) Inhalation daily  folic acid 1 milliGRAM(s) Oral daily  glucagon  Injectable 1 milliGRAM(s) IntraMuscular once  insulin glargine Injectable (LANTUS) 20 Unit(s) SubCutaneous at bedtime  insulin lispro (ADMELOG) corrective regimen sliding scale   SubCutaneous three times a day before meals  insulin lispro (ADMELOG) corrective regimen sliding scale   SubCutaneous at bedtime  insulin lispro Injectable (ADMELOG) 4 Unit(s) SubCutaneous three times a day before meals  levothyroxine 125 MICROGram(s) Oral daily  lidocaine   Patch 1 Patch Transdermal daily  montelukast 10 milliGRAM(s) Oral at bedtime  multivitamin/minerals 1 Tablet(s) Oral daily  pregabalin 50 milliGRAM(s) Oral two times a day    MEDICATIONS  (PRN):  acetaminophen   Tablet .. 650 milliGRAM(s) Oral every 6 hours PRN Temp greater or equal to 38C (100.4F), Mild Pain (1 - 3)  ALBUTerol    90 MICROgram(s) HFA Inhaler 2 Puff(s) Inhalation every 6 hours PRN for shortness of breath and/or wheezing      Allergies    Bactrim (Other)  ciprofloxacin (Other (Mild))  clindamycin (Unknown)  ibuprofen (Unknown)  penicillins (Other)  sulfa drugs (Unknown)    Intolerances        PHYSICAL EXAM:  Vital Signs Last 24 Hrs  T(F): 97.7 (03-14-21 @ 23:11)  HR: 73 (03-15-21 @ 08:31)  BP: 165/66 (03-14-21 @ 23:11)  RR: 16 (03-14-21 @ 23:11)    GENERAL: NAD, well-groomed, well-developed  HEAD:  Atraumatic, Normocephalic  Neuro:  Awake, alert, no aphasia  CN: PERRL, EOMI, no nystagmus, no facial weakness, tongue protrudes in the midline  motor: normal tone, no pronator drift, full strength in all four extremities  sensory: intact to light touch  coordination: finger to nose intact bilaterally      LABS:                        10.1   9.14  )-----------( 171      ( 15 Mar 2021 07:52 )             32.3     03-15    142  |  105  |  76<H>  ----------------------------<  121<H>  4.0   |  34<H>  |  2.57<H>    Ca    9.9      15 Mar 2021 07:52  Phos  3.7     03-14  Mg     2.3     03-14    TPro  6.8  /  Alb  3.0<L>  /  TBili  0.7  /  DBili  x   /  AST  19  /  ALT  15  /  AlkPhos  79  03-14          RADIOLOGY & ADDITIONAL STUDIES:      CT head 3/12/21:  No acute intracranial hemorrhage, mass effect, or CT evidence of a large vascular territory infarct. Indeterminate tiny right basal ganglia infarct of indeterminate age. Involutional and chronic microvascular ischemic gliotic changes.    24 hour EEG 3/13/21-3/14/21:  This is an abnormal prolonged video EEG due to the presence of:  -Mild generalized slowing  -Bursts of delta activity seen during wakefulness.    Carotid ultrasound 3/14/21:  No significant hemodynamic stenosis of either carotid artery.      routine EEG 3/15/21:  This was an abnormal EEG study in the awake and drowsy states due to the presence of:  -Mild generalized slowing  -FIRDA ? frontal intermittent rhythmic delta activity  -Intermittent slowing seen over the left hemisphere, most prominently in the temporal region.            
Reviewed with pt positive covid results. Pt states, \"S/s are still there no worse.\" Pt was encouraged to follow CDC guidelines, quarantine for 10 days and follow up with PCP or go to ER if s/s worsen. Pt verbalized understanding of plan of care and she has no further questions at this time.      
"81 y/o F PMHx significant for CAD, MI x 3, complete heart block s/p pacemaker placement, advanced COPD on home oxygen, CHFpEF, CKD4, diabetes mellitus (insulin dependent), hypertension, hyperlipidemia, hypothyroidism, and recurrent UTIs, was referred to  by her Cardiologist for further evaluation and management of witnessed near syncope."    3/13-states feeling better. Denies any complaints  3/14-appears comfortable, sitting. Denies sob, cough, chest pain, headaches or dizziness.    Review of Systems:   CONSTITUTIONAL: No fever.   EYES: No eye pain or discharge.  ENMT:  No sinus or throat pain  NECK: No pain or stiffness  RESPIRATORY: No cough, wheezing, chills or hemoptysis; No shortness of breath  CARDIOVASCULAR: No chest pain, palpitations, dizziness, or leg swelling  GASTROINTESTINAL: No abdominal or epigastric pain. No nausea, vomiting, or hematemesis; No diarrhea or constipation. No melena or hematochezia.  GENITOURINARY: No dysuria or incontinence  NEUROLOGICAL: No headaches, memory loss, loss of strength, numbness, or tremors  SKIN: No rashes.  MUSCULOSKELETAL: No joint pain or swelling; No muscle, back, or extremity pain    Vital Signs Last 24 Hrs  T(C): 36.5 (14 Mar 2021 09:38), Max: 36.8 (13 Mar 2021 16:04)  T(F): 97.7 (14 Mar 2021 09:38), Max: 98.3 (13 Mar 2021 16:04)  HR: 83 (13 Mar 2021 16:04) (83 - 83)  BP: 154/59 (13 Mar 2021 16:04) (154/59 - 154/59)  BP(mean): --  RR: 18 (14 Mar 2021 09:38) (18 - 18)  SpO2: 93% (14 Mar 2021 09:38) (93% - 96%)    PHYSICAL EXAM:    GENERAL: NAD, well-developed  HEAD:  Atraumatic, Normocephalic  EYES: EOMI, PERRLA, conjunctiva and sclera clear  HEENT: Moist mucous membranes  NECK: Supple, No JVD  CHEST/LUNG: Clear to auscultation bilaterally; No rales, rhonchi, wheezing, or rubs  HEART: Regular rate and rhythm; No murmurs, rubs, or gallops  ABDOMEN: Soft, Nontender, Nondistended; Bowel sounds present  GENITOURINARY: Voiding, no palpable bladder  EXTREMITIES:  2+ Peripheral Pulses, No clubbing, cyanosis, or edema  MUSCULOSKELTAL- No muscle tenderness, Muscle tone normal, No joint tenderness, no Joint swelling, Joint range of motion-normal  SKIN- no rash or ulcer  NERVOUS SYSTEM:  Awake and alert, oriented x3, CNII-XII intact;  no focal deficits                          10.3   9.01  )-----------( 191      ( 14 Mar 2021 07:35 )             32.2   03-14    139  |  99  |  79<H>  ----------------------------<  169<H>  3.4<L>   |  35<H>  |  2.87<H>    Ca    9.6      14 Mar 2021 07:35  Phos  3.7     03-14  Mg     2.3     03-14    TPro  6.8  /  Alb  3.0<L>  /  TBili  0.7  /  DBili  x   /  AST  19  /  ALT  15  /  AlkPhos  79  03-14    TPro  7.0  /  Alb  3.1<L>  /  TBili  0.5  /  DBili  x   /  AST  20  /  ALT  16  /  AlkPhos  82  03-13  CARDIAC MARKERS ( 13 Mar 2021 08:52 )  0.095 ng/mL / x     / x     / x     / x      CARDIAC MARKERS ( 13 Mar 2021 03:28 )  0.124 ng/mL / x     / x     / x     / x      CARDIAC MARKERS ( 12 Mar 2021 21:14 )  0.096 ng/mL / x     / x     / x     / x        CTH-No acute intracranial hemorrhage, mass effect, or CT evidence of a large vascular territory infarct. Indeterminate tiny right basal ganglia infarct of indeterminate age. Involutional and chronic microvascular ischemic gliotic changes.    CXR-A cardiac device overlies and obscures the left hemithorax with leads in place.  HEART:  Enlarged  LUNGS: Congestive changes with left effusion. Biapical pleural thickening    MEDICATIONS  (STANDING):  aspirin enteric coated 81 milliGRAM(s) Oral daily  atorvastatin 40 milliGRAM(s) Oral at bedtime  calcitriol   Capsule 0.25 MICROGram(s) Oral daily  dextrose 40% Gel 15 Gram(s) Oral once  dextrose 5%. 1000 milliLiter(s) (50 mL/Hr) IV Continuous <Continuous>  dextrose 5%. 1000 milliLiter(s) (100 mL/Hr) IV Continuous <Continuous>  dextrose 50% Injectable 25 Gram(s) IV Push once  dextrose 50% Injectable 12.5 Gram(s) IV Push once  dextrose 50% Injectable 25 Gram(s) IV Push once  FLUoxetine 10 milliGRAM(s) Oral daily  fluticasone furoate/umeclidinium/vilanterol 100-62.5-25 MICROgram(s) Inhaler 1 Puff(s) Inhalation daily  folic acid 1 milliGRAM(s) Oral daily  glucagon  Injectable 1 milliGRAM(s) IntraMuscular once  insulin glargine Injectable (LANTUS) 20 Unit(s) SubCutaneous at bedtime  insulin lispro (ADMELOG) corrective regimen sliding scale   SubCutaneous three times a day before meals  insulin lispro (ADMELOG) corrective regimen sliding scale   SubCutaneous at bedtime  insulin lispro Injectable (ADMELOG) 4 Unit(s) SubCutaneous three times a day before meals  levothyroxine 125 MICROGram(s) Oral daily  lidocaine   Patch 1 Patch Transdermal daily  montelukast 10 milliGRAM(s) Oral at bedtime  multivitamin/minerals 1 Tablet(s) Oral daily  pregabalin 50 milliGRAM(s) Oral two times a day    MEDICATIONS  (PRN):  acetaminophen   Tablet .. 650 milliGRAM(s) Oral every 6 hours PRN Temp greater or equal to 38C (100.4F), Mild Pain (1 - 3)  ALBUTerol    90 MICROgram(s) HFA Inhaler 2 Puff(s) Inhalation every 6 hours PRN for shortness of breath and/or wheezing  
"81 y/o F PMHx significant for CAD, MI x 3, complete heart block s/p pacemaker placement, advanced COPD on home oxygen, CHFpEF, CKD4, diabetes mellitus (insulin dependent), hypertension, hyperlipidemia, hypothyroidism, and recurrent UTIs, was referred to  by her Cardiologist for further evaluation and management of witnessed near syncope."    3/13-states feeling better. Denies any complaints    Review of Systems:   CONSTITUTIONAL: No fever.   EYES: No eye pain or discharge.  ENMT:  No sinus or throat pain  NECK: No pain or stiffness  RESPIRATORY: No cough, wheezing, chills or hemoptysis; No shortness of breath  CARDIOVASCULAR: No chest pain, palpitations, dizziness, or leg swelling  GASTROINTESTINAL: No abdominal or epigastric pain. No nausea, vomiting, or hematemesis; No diarrhea or constipation. No melena or hematochezia.  GENITOURINARY: No dysuria or incontinence  NEUROLOGICAL: No headaches, memory loss, loss of strength, numbness, or tremors  SKIN: No rashes.  MUSCULOSKELETAL: No joint pain or swelling; No muscle, back, or extremity pain    Vital Signs Last 24 Hrs  T(C): 36.5 (13 Mar 2021 08:15), Max: 36.6 (12 Mar 2021 20:53)  T(F): 97.7 (13 Mar 2021 08:15), Max: 97.8 (12 Mar 2021 20:53)  HR: 64 (13 Mar 2021 03:55) (64 - 80)  BP: 135/62 (13 Mar 2021 03:55) (125/59 - 151/61)  BP(mean): --  RR: 18 (13 Mar 2021 08:15) (14 - 19)  SpO2: 100% (13 Mar 2021 08:15) (98% - 100%)    PHYSICAL EXAM:    GENERAL: NAD, well-developed  HEAD:  Atraumatic, Normocephalic  EYES: EOMI, PERRLA, conjunctiva and sclera clear  HEENT: Moist mucous membranes  NECK: Supple, No JVD  CHEST/LUNG: Clear to auscultation bilaterally; No rales, rhonchi, wheezing, or rubs  HEART: Regular rate and rhythm; No murmurs, rubs, or gallops  ABDOMEN: Soft, Nontender, Nondistended; Bowel sounds present  GENITOURINARY: Voiding, no palpable bladder  EXTREMITIES:  2+ Peripheral Pulses, No clubbing, cyanosis, or edema  MUSCULOSKELTAL- No muscle tenderness, Muscle tone normal, No joint tenderness, no Joint swelling, Joint range of motion-normal  SKIN- no rash or ulcer  NERVOUS SYSTEM:  Awake and alert, oriented x3, CNII-XII intact;  no focal deficits                          10.4   8.16  )-----------( 198      ( 13 Mar 2021 08:52 )             33.4   03-13    141  |  99  |  77<H>  ----------------------------<  151<H>  3.7   |  36<H>  |  3.14<H>    Ca    9.1      13 Mar 2021 08:52  Phos  3.6     03-13  Mg     2.2     03-13    TPro  7.0  /  Alb  3.1<L>  /  TBili  0.5  /  DBili  x   /  AST  20  /  ALT  16  /  AlkPhos  82  03-13  CARDIAC MARKERS ( 13 Mar 2021 08:52 )  0.095 ng/mL / x     / x     / x     / x      CARDIAC MARKERS ( 13 Mar 2021 03:28 )  0.124 ng/mL / x     / x     / x     / x      CARDIAC MARKERS ( 12 Mar 2021 21:14 )  0.096 ng/mL / x     / x     / x     / x        CTH-No acute intracranial hemorrhage, mass effect, or CT evidence of a large vascular territory infarct. Indeterminate tiny right basal ganglia infarct of indeterminate age. Involutional and chronic microvascular ischemic gliotic changes.    CXR-A cardiac device overlies and obscures the left hemithorax with leads in place.  HEART:  Enlarged  LUNGS: Congestive changes with left effusion. Biapical pleural thickening    MEDICATIONS  (STANDING):  aspirin enteric coated 81 milliGRAM(s) Oral daily  atorvastatin 40 milliGRAM(s) Oral at bedtime  calcitriol   Capsule 0.25 MICROGram(s) Oral daily  dextrose 40% Gel 15 Gram(s) Oral once  dextrose 5%. 1000 milliLiter(s) (50 mL/Hr) IV Continuous <Continuous>  dextrose 5%. 1000 milliLiter(s) (100 mL/Hr) IV Continuous <Continuous>  dextrose 50% Injectable 25 Gram(s) IV Push once  dextrose 50% Injectable 12.5 Gram(s) IV Push once  dextrose 50% Injectable 25 Gram(s) IV Push once  FLUoxetine 10 milliGRAM(s) Oral daily  fluticasone furoate/umeclidinium/vilanterol 100-62.5-25 MICROgram(s) Inhaler 1 Puff(s) Inhalation daily  folic acid 1 milliGRAM(s) Oral daily  glucagon  Injectable 1 milliGRAM(s) IntraMuscular once  insulin glargine Injectable (LANTUS) 20 Unit(s) SubCutaneous at bedtime  insulin lispro (ADMELOG) corrective regimen sliding scale   SubCutaneous three times a day before meals  insulin lispro (ADMELOG) corrective regimen sliding scale   SubCutaneous at bedtime  levothyroxine 125 MICROGram(s) Oral daily  lidocaine   Patch 1 Patch Transdermal daily  montelukast 10 milliGRAM(s) Oral at bedtime  multivitamin/minerals 1 Tablet(s) Oral daily  pregabalin 50 milliGRAM(s) Oral two times a day    MEDICATIONS  (PRN):  acetaminophen   Tablet .. 650 milliGRAM(s) Oral every 6 hours PRN Temp greater or equal to 38C (100.4F), Mild Pain (1 - 3)  ALBUTerol    90 MICROgram(s) HFA Inhaler 2 Puff(s) Inhalation every 6 hours PRN for shortness of breath and/or wheezing    
Patient is a 80y old  Female who presents with a chief complaint of Syncope.       HPI:  81 y/o F PMHx significant for CAD, MI x 3, complete heart block s/p pacemaker placement, advanced COPD on home oxygen, CHFpEF, CKD4, diabetes mellitus (insulin dependent), hypertension, hyperlipidemia, hypothyroidism, and recurrent UTIs, was referred to  by her Cardiologist for further evaluation and management of witnessed near syncope.  Labs => K 3.4, HCO3 38, BUN/Cr 82/3.5, Glu 287, TnI 0.096, proBNP 4236. In the ED the patient received ASA 325mg PO x 1, and NS x 500mL x 1     Pt had been having episodes of loss of affect and blankk stare and she is weak and then later on regains the affect.  She was extremely weak in her legs yesterday in office that her duaghter and office staff had to make her sit in chair.  Per daughter episodes more frequent recently.     3/14- Pt seen this am.  She denies any symptoms. Undergoing EEG.  No dizziness.    3/15- pt seen this am.  Pt denies any symptoms now.    3/16- pt seen this am. She denies any symptoms in bed.     PAST MEDICAL & SURGICAL HISTORY:  Pacemaker    CAD (coronary artery disease)    [place] (hard of hearing)  hearing aid    Anxiety    Hypothyroid    GERD (gastroesophageal reflux disease)    CKD (chronic kidney disease)    MI, old  x3    Chronic UTI    Chronic diastolic congestive heart failure  last exacerbation 2017    Hyperlipidemia, unspecified hyperlipidemia type    Essential hypertension    Neuropathy    Diabetes    Chronic obstructive pulmonary disease, unspecified COPD type    History of cataract surgery  bilateral IOL    S/P lobectomy of lung  left lung pre-cancerous    History of ear surgery    History of cholecystectomy    S/P ORIF (open reduction internal fixation) fracture  left hip 2017    History of total knee replacement, unspecified laterality  bilateral right  left     H/O hernia repair  umbilical and incisional     delivery delivered    S/P appendectomy        MEDICATIONS  (STANDING):  aspirin enteric coated 81 milliGRAM(s) Oral daily  atorvastatin 40 milliGRAM(s) Oral at bedtime  calcitriol   Capsule 0.25 MICROGram(s) Oral daily  dextrose 40% Gel 15 Gram(s) Oral once  dextrose 5%. 1000 milliLiter(s) (50 mL/Hr) IV Continuous <Continuous>  dextrose 5%. 1000 milliLiter(s) (100 mL/Hr) IV Continuous <Continuous>  dextrose 50% Injectable 25 Gram(s) IV Push once  dextrose 50% Injectable 12.5 Gram(s) IV Push once  dextrose 50% Injectable 25 Gram(s) IV Push once  FLUoxetine 10 milliGRAM(s) Oral daily  fluticasone furoate/umeclidinium/vilanterol 100-62.5-25 MICROgram(s) Inhaler 1 Puff(s) Inhalation daily  folic acid 1 milliGRAM(s) Oral daily  glucagon  Injectable 1 milliGRAM(s) IntraMuscular once  insulin glargine Injectable (LANTUS) 20 Unit(s) SubCutaneous at bedtime  insulin lispro (ADMELOG) corrective regimen sliding scale   SubCutaneous three times a day before meals  insulin lispro (ADMELOG) corrective regimen sliding scale   SubCutaneous at bedtime  levothyroxine 125 MICROGram(s) Oral daily  lidocaine   Patch 1 Patch Transdermal daily  montelukast 10 milliGRAM(s) Oral at bedtime  multivitamin/minerals 1 Tablet(s) Oral daily  pregabalin 50 milliGRAM(s) Oral two times a day    MEDICATIONS  (PRN):  acetaminophen   Tablet .. 650 milliGRAM(s) Oral every 6 hours PRN Temp greater or equal to 38C (100.4F), Mild Pain (1 - 3)  ALBUTerol    90 MICROgram(s) HFA Inhaler 2 Puff(s) Inhalation every 6 hours PRN for shortness of breath and/or wheezing      FAMILY HISTORY:  Family history of CHF (congestive heart failure)        SOCIAL HISTORY: ex smoker     REVIEW OF SYSTEMS:  CONSTITUTIONAL:    No fatigue, malaise, lethargy.  No fever or chills.  RESPIRATORY:  No cough.  No wheeze.  No hemoptysis.  No shortness of breath.  CARDIOVASCULAR:  No chest pains.  No palpitations. No shortness of breath, No orthopnea or PND.  GASTROINTESTINAL:  No abdominal pain.  No nausea or vomiting.    GENITOURINARY:    No hematuria.    MUSCULOSKELETAL:  No musculoskeletal pain.  No joint swelling.  No arthritis.  NEUROLOGICAL:  No tingling or numbness c/o  weakness.  PSYCHIATRIC:  No confusion  SKIN:  No rashes.            ICU Vital Signs Last 24 Hrs  T(C): 36.6 (16 Mar 2021 08:10), Max: 36.7 (16 Mar 2021 00:17)  T(F): 97.8 (16 Mar 2021 08:10), Max: 98 (16 Mar 2021 00:17)  HR: 76 (16 Mar 2021 08:11) (75 - 89)  BP: 137/58 (16 Mar 2021 00:17) (137/58 - 137/58)  BP(mean): 81 (16 Mar 2021 00:17) (81 - 81)  ABP: --  ABP(mean): --  RR: 19 (16 Mar 2021 08:10) (18 - 19)  SpO2: 96% (16 Mar 2021 08:10) (95% - 96%)        PHYSICAL EXAM-    Constitutional: elderly frail female in no acute distress     Head: Head is normocephalic and atraumatic.      Neck: No JVD.     Cardiovascular: Regular rate and rhythm without S3, S4. No murmurs or rubs are appreciated.      Respiratory: Breath sounds are normal. No rales. No wheezing.    Abdomen: Soft, nontender, nondistended with positive bowel sounds.      Extremity: No tenderness. No  pitting edema     Neurologic: The patient is alert and oriented.      Skin: No rash, no obvious lesions noted.      Psychiatric: The patient appears to be emotionally stable.      INTERPRETATION OF TELEMETRY: SR     ECG:     I&O's Detail      LABS:                                                         10.1   8.45  )-----------( 180      ( 16 Mar 2021 07:01 )             31.8     03-16    139  |  102  |  74<H>  ----------------------------<  116<H>  3.9   |  35<H>  |  2.63<H>    Ca    9.4      16 Mar 2021 07:01                      LIVER FUNCTIONS - ( 14 Mar 2021 07:35 )  Alb: 3.0 g/dL / Pro: 6.8 gm/dL / ALK PHOS: 79 U/L / ALT: 15 U/L / AST: 19 U/L / GGT: x           PT/INR - ( 12 Mar 2021 21:14 )   PT: 13.5 sec;   INR: 1.17 ratio         PTT - ( 12 Mar 2021 21:14 )  PTT:29.5 sec        PT/INR - ( 12 Mar 2021 21:14 )   PT: 13.5 sec;   INR: 1.17 ratio         PTT - ( 12 Mar 2021 21:14 )  PTT:29.5 sec    I&O's Summary    BNPSerum Pro-Brain Natriuretic Peptide: 4236 pg/mL ( @ 21:14)    RADIOLOGY & ADDITIONAL STUDIES:  < from: CT Head No Cont (21 @ 22:44) >  IMPRESSION:    No acute intracranial hemorrhage, mass effect, or CT evidence of a large vascular territory infarct. Indeterminate tiny right basal ganglia infarct of indeterminate age. Involutional and chronic microvascular ischemic gliotic changes.    If there are new or persistent symptoms, consider further evaluation with MRI provided no contraindications.      < from: Xray Chest 1 View AP/PA. (21 @ 21:30) >  EXAM:  XR CHEST 1 VIEW                            PROCEDURE DATE:  2021          INTERPRETATION:  HISTORY: ; ; Chest Pain;  TECHNIQUE: Portable frontal view of the chest, 1 view.  COMPARISON: 12/10/2019.  FINDINGS/  IMPRESSION:  A cardiac device overlies and obscures the left hemithorax with leads in place.  HEART:  Enlarged  LUNGS: Congestive changes with left effusion. Biapical pleural thickening  BONES: degenerative changes      HADLEY BENAVIDES MD; Attending Interventional Radiologist  This document has been electronically signed. Mar 13 2021  9:24AM    < end of copied text >  < from: CT Head No Cont (21 @ 22:44) >  IMPRESSION:    No acute intracranial hemorrhage, mass effect, or CT evidence of a large vascular territory infarct. Indeterminate tiny right basal ganglia infarct of indeterminate age. Involutional and chronic microvascular ischemic gliotic changes.    If there are new or persistent symptoms, consider further evaluation with MRI provided no contraindications.                JAMEE RUTH MD; Attending Radiologist  This document has been electronically signed. Mar 12 2021 11:25PM    < end of copied text >            JAMEE RUTH MD; Attending Radiologist  This document has been electronically signed. Mar 12 2021 11:25PM    < end of copied text >  
Interval History:  3/14/21: No new events or complaints today.    MEDICATIONS  (STANDING):  aspirin enteric coated 81 milliGRAM(s) Oral daily  atorvastatin 40 milliGRAM(s) Oral at bedtime  calcitriol   Capsule 0.25 MICROGram(s) Oral daily  dextrose 40% Gel 15 Gram(s) Oral once  dextrose 5%. 1000 milliLiter(s) (50 mL/Hr) IV Continuous <Continuous>  dextrose 5%. 1000 milliLiter(s) (100 mL/Hr) IV Continuous <Continuous>  dextrose 50% Injectable 25 Gram(s) IV Push once  dextrose 50% Injectable 12.5 Gram(s) IV Push once  dextrose 50% Injectable 25 Gram(s) IV Push once  FLUoxetine 10 milliGRAM(s) Oral daily  fluticasone furoate/umeclidinium/vilanterol 100-62.5-25 MICROgram(s) Inhaler 1 Puff(s) Inhalation daily  folic acid 1 milliGRAM(s) Oral daily  glucagon  Injectable 1 milliGRAM(s) IntraMuscular once  insulin glargine Injectable (LANTUS) 20 Unit(s) SubCutaneous at bedtime  insulin lispro (ADMELOG) corrective regimen sliding scale   SubCutaneous three times a day before meals  insulin lispro (ADMELOG) corrective regimen sliding scale   SubCutaneous at bedtime  insulin lispro Injectable (ADMELOG) 4 Unit(s) SubCutaneous three times a day before meals  levothyroxine 125 MICROGram(s) Oral daily  lidocaine   Patch 1 Patch Transdermal daily  montelukast 10 milliGRAM(s) Oral at bedtime  multivitamin/minerals 1 Tablet(s) Oral daily  pregabalin 50 milliGRAM(s) Oral two times a day    MEDICATIONS  (PRN):  acetaminophen   Tablet .. 650 milliGRAM(s) Oral every 6 hours PRN Temp greater or equal to 38C (100.4F), Mild Pain (1 - 3)  ALBUTerol    90 MICROgram(s) HFA Inhaler 2 Puff(s) Inhalation every 6 hours PRN for shortness of breath and/or wheezing      Allergies    Bactrim (Other)  ciprofloxacin (Other (Mild))  clindamycin (Unknown)  ibuprofen (Unknown)  penicillins (Other)  sulfa drugs (Unknown)    Intolerances        PHYSICAL EXAM:  Vital Signs Last 24 Hrs  T(F): 97.7 (03-14-21 @ 09:38)  HR: 83 (03-13-21 @ 16:04)  BP: 154/59 (03-13-21 @ 16:04)  RR: 18 (03-14-21 @ 09:38)    GENERAL: NAD, well-groomed, well-developed  HEAD:  Atraumatic, Normocephalic  Neuro:  Awake, alert, no aphasia  CN: PERRL, EOMI, no nystagmus, no facial weakness, tongue protrudes in the midline  motor: normal tone, no pronator drift, full strength in all four extremities  sensory: intact to light touch  coordination: finger to nose intact bilaterally      LABS:                        10.3   9.01  )-----------( 191      ( 14 Mar 2021 07:35 )             32.2     03-14    139  |  99  |  79<H>  ----------------------------<  169<H>  3.4<L>   |  35<H>  |  2.87<H>    Ca    9.6      14 Mar 2021 07:35  Phos  3.7     03-14  Mg     2.3     03-14    TPro  6.8  /  Alb  3.0<L>  /  TBili  0.7  /  DBili  x   /  AST  19  /  ALT  15  /  AlkPhos  79  03-14    PT/INR - ( 12 Mar 2021 21:14 )   PT: 13.5 sec;   INR: 1.17 ratio         PTT - ( 12 Mar 2021 21:14 )  PTT:29.5 sec      RADIOLOGY & ADDITIONAL STUDIES:    CT head 3/12/21:  No acute intracranial hemorrhage, mass effect, or CT evidence of a large vascular territory infarct. Indeterminate tiny right basal ganglia infarct of indeterminate age. Involutional and chronic microvascular ischemic gliotic changes.    If there are new or persistent symptoms, consider further evaluation with MRI provided no contraindications.        
NEPHROLOGY INTERVAL HPI/OVERNIGHT EVENTS:    3/16  orthostatic today to get a bolus of ivf 250 ml, Bumex on hold    03-15-21 @ 14:18  3/15 doing well, states sob is improved, tolerating po wants to go home   HPI:  81 yo woman with DM, HTN, CAD post MI x 3, PPM for complete Heart Block and known CKD ( baseline creat ~ 2.5 per pt's daughter) and CHF with CardioMEMS guiding diuretic therapy.  Reports, Metolazone is given in addition to Bumex daily per MEMS parameters.  Last took Metolazone 3 weeeks ago and 3 days later creat ~ 2.45.  Pt was seen by her cardiologist due to about 2 months hx of feeling weak with her legs buckling with attempts to walk adn unclear episodes of staring with loss of affect.  Pt sent to ED with near syncope and possible seizure episodes.  Reports weight recently decreased to 166 from 175 lbs.  Has had stable po intake.    PMHX and PSHX.  1.CKD ( recent creat ~ 2.45)  2.CAD, Hx of MI x 3  3.PPM for CHB  4.CHF with cardioMEMs in place  5.DM with Neuropathy  6.CHF  7.HTN  8.COPD, on home O2.  9.Hx of Left Hip repair.    MEDICATIONS  (STANDING):  aspirin enteric coated 81 milliGRAM(s) Oral daily  atorvastatin 40 milliGRAM(s) Oral at bedtime  buMETAnide 2 milliGRAM(s) Oral daily  calcitriol   Capsule 0.25 MICROGram(s) Oral daily  dextrose 40% Gel 15 Gram(s) Oral once  dextrose 5%. 1000 milliLiter(s) (50 mL/Hr) IV Continuous <Continuous>  dextrose 5%. 1000 milliLiter(s) (100 mL/Hr) IV Continuous <Continuous>  dextrose 50% Injectable 25 Gram(s) IV Push once  dextrose 50% Injectable 12.5 Gram(s) IV Push once  dextrose 50% Injectable 25 Gram(s) IV Push once  FLUoxetine 10 milliGRAM(s) Oral daily  fluticasone furoate/umeclidinium/vilanterol 100-62.5-25 MICROgram(s) Inhaler 1 Puff(s) Inhalation daily  folic acid 1 milliGRAM(s) Oral daily  glucagon  Injectable 1 milliGRAM(s) IntraMuscular once  heparin   Injectable 5000 Unit(s) SubCutaneous every 8 hours  insulin glargine Injectable (LANTUS) 24 Unit(s) SubCutaneous at bedtime  insulin lispro (ADMELOG) corrective regimen sliding scale   SubCutaneous three times a day before meals  insulin lispro (ADMELOG) corrective regimen sliding scale   SubCutaneous at bedtime  insulin lispro Injectable (ADMELOG) 8 Unit(s) SubCutaneous three times a day before meals  levothyroxine 125 MICROGram(s) Oral daily  lidocaine   Patch 1 Patch Transdermal daily  montelukast 10 milliGRAM(s) Oral at bedtime  multivitamin/minerals 1 Tablet(s) Oral daily  pregabalin 50 milliGRAM(s) Oral two times a day    MEDICATIONS  (PRN):  acetaminophen   Tablet .. 650 milliGRAM(s) Oral every 6 hours PRN Temp greater or equal to 38C (100.4F), Mild Pain (1 - 3)  ALBUTerol    90 MICROgram(s) HFA Inhaler 2 Puff(s) Inhalation every 6 hours PRN for shortness of breath and/or wheezing        Allergies    Bactrim (Other)  ciprofloxacin (Other (Mild))  clindamycin (Unknown)  ibuprofen (Unknown)  penicillins (Other)  sulfa drugs (Unknown)    Intolerances      I&O's Detail       Vital Signs Last 24 Hrs  T(C): 36.6 (16 Mar 2021 08:10), Max: 36.7 (16 Mar 2021 00:17)  T(F): 97.8 (16 Mar 2021 08:10), Max: 98 (16 Mar 2021 00:17)  HR: 76 (16 Mar 2021 08:11) (75 - 89)  BP: 137/58 (16 Mar 2021 00:17) (137/58 - 137/58)  BP(mean): 81 (16 Mar 2021 00:17) (81 - 81)  RR: 19 (16 Mar 2021 08:10) (18 - 19)  SpO2: 96% (16 Mar 2021 08:10) (95% - 96%)      PHYSICAL EXAM:  General: alert. sleeping , arousable for converstaion  HEENT: MMM  CV: s1s2 rrr  LUNGS: B/L CTA  EXT: no edema    LABS:                          10.1   8.45  )-----------( 180      ( 16 Mar 2021 07:01 )             31.8                           10.1   9.14  )-----------( 171      ( 15 Mar 2021 07:52 )             32.3       03-16    139  |  102  |  74<H>  ----------------------------<  116<H>  3.9   |  35<H>  |  2.63<H>    Ca    9.4      16 Mar 2021 07:01        03-15    142  |  105  |  76<H>  ----------------------------<  121<H>  4.0   |  34<H>  |  2.57<H>    Ca    9.9      15 Mar 2021 07:52  Phos  3.7     03-14  Mg     2.3     03-14    TPro  6.8  /  Alb  3.0<L>  /  TBili  0.7  /  DBili  x   /  AST  19  /  ALT  15  /  AlkPhos  79  03-14            
NEPHROLOGY INTERVAL HPI/OVERNIGHT EVENTS:  03-15-21 @ 14:18  3/15 doing well, states sob is improved, tolerating po wants to go home   HPI:  79 yo woman with DM, HTN, CAD post MI x 3, PPM for complete Heart Block and known CKD ( baseline creat ~ 2.5 per pt's daughter) and CHF with CardioMEMS guiding diuretic therapy.  Reports, Metolazone is given in addition to Bumex daily per MEMS parameters.  Last took Metolazone 3 weeeks ago and 3 days later creat ~ 2.45.  Pt was seen by her cardiologist due to about 2 months hx of feeling weak with her legs buckling with attempts to walk adn unclear episodes of staring with loss of affect.  Pt sent to ED with near syncope and possible seizure episodes.  Reports weight recently decreased to 166 from 175 lbs.  Has had stable po intake.    PMHX and PSHX.  1.CKD ( recent creat ~ 2.45)  2.CAD, Hx of MI x 3  3.PPM for CHB  4.CHF with cardioMEMs in place  5.DM with Neuropathy  6.CHF  7.HTN  8.COPD, on home O2.  9.Hx of Left Hip repair.      MEDICATIONS  (STANDING):  aspirin enteric coated 81 milliGRAM(s) Oral daily  atorvastatin 40 milliGRAM(s) Oral at bedtime  buMETAnide 2 milliGRAM(s) Oral daily  calcitriol   Capsule 0.25 MICROGram(s) Oral daily  dextrose 40% Gel 15 Gram(s) Oral once  dextrose 5%. 1000 milliLiter(s) (50 mL/Hr) IV Continuous <Continuous>  dextrose 5%. 1000 milliLiter(s) (100 mL/Hr) IV Continuous <Continuous>  dextrose 50% Injectable 25 Gram(s) IV Push once  dextrose 50% Injectable 12.5 Gram(s) IV Push once  dextrose 50% Injectable 25 Gram(s) IV Push once  FLUoxetine 10 milliGRAM(s) Oral daily  fluticasone furoate/umeclidinium/vilanterol 100-62.5-25 MICROgram(s) Inhaler 1 Puff(s) Inhalation daily  folic acid 1 milliGRAM(s) Oral daily  glucagon  Injectable 1 milliGRAM(s) IntraMuscular once  insulin glargine Injectable (LANTUS) 20 Unit(s) SubCutaneous at bedtime  insulin lispro (ADMELOG) corrective regimen sliding scale   SubCutaneous three times a day before meals  insulin lispro (ADMELOG) corrective regimen sliding scale   SubCutaneous at bedtime  insulin lispro Injectable (ADMELOG) 4 Unit(s) SubCutaneous three times a day before meals  levothyroxine 125 MICROGram(s) Oral daily  lidocaine   Patch 1 Patch Transdermal daily  montelukast 10 milliGRAM(s) Oral at bedtime  multivitamin/minerals 1 Tablet(s) Oral daily  pregabalin 50 milliGRAM(s) Oral two times a day    MEDICATIONS  (PRN):  acetaminophen   Tablet .. 650 milliGRAM(s) Oral every 6 hours PRN Temp greater or equal to 38C (100.4F), Mild Pain (1 - 3)  ALBUTerol    90 MICROgram(s) HFA Inhaler 2 Puff(s) Inhalation every 6 hours PRN for shortness of breath and/or wheezing      Allergies    Bactrim (Other)  ciprofloxacin (Other (Mild))  clindamycin (Unknown)  ibuprofen (Unknown)  penicillins (Other)  sulfa drugs (Unknown)    Intolerances        I&O's Detail      Vital Signs Last 24 Hrs  T(C): 36.5 (14 Mar 2021 23:11), Max: 36.5 (14 Mar 2021 23:11)  T(F): 97.7 (14 Mar 2021 23:11), Max: 97.7 (14 Mar 2021 23:11)  HR: 73 (15 Mar 2021 08:31) (70 - 77)  BP: 165/66 (14 Mar 2021 23:11) (138/59 - 165/66)  BP(mean): --  RR: 16 (14 Mar 2021 23:11) (16 - 18)  SpO2: 94% (14 Mar 2021 23:11) (94% - 100%)  Daily     Daily Weight in k.5 (15 Mar 2021 06:37)    PHYSICAL EXAM:  General: alert. awake NAD  HEENT: MMM  CV: s1s2 rrr  LUNGS: B/L CTA  EXT: no edema    LABS:                        10.1   9.14  )-----------( 171      ( 15 Mar 2021 07:52 )             32.3     03-15    142  |  105  |  76<H>  ----------------------------<  121<H>  4.0   |  34<H>  |  2.57<H>    Ca    9.9      15 Mar 2021 07:52  Phos  3.7     -  Mg     2.3         TPro  6.8  /  Alb  3.0<L>  /  TBili  0.7  /  DBili  x   /  AST  19  /  ALT  15  /  AlkPhos  79              
Patient is a 80y old  Female who presents with a chief complaint of Syncope.       HPI:  79 y/o F PMHx significant for CAD, MI x 3, complete heart block s/p pacemaker placement, advanced COPD on home oxygen, CHFpEF, CKD4, diabetes mellitus (insulin dependent), hypertension, hyperlipidemia, hypothyroidism, and recurrent UTIs, was referred to  by her Cardiologist for further evaluation and management of witnessed near syncope.  Labs => K 3.4, HCO3 38, BUN/Cr 82/3.5, Glu 287, TnI 0.096, proBNP 4236. In the ED the patient received ASA 325mg PO x 1, and NS x 500mL x 1     Pt had been having episodes of loss of affect and blankk stare and she is weak and then later on regains the affect.  She was extremely weak in her legs yesterday in office that her duaghter and office staff had to make her sit in chair.  Per daughter episodes more frequent recently.     3/14- Pt seen this am.  She denies any symptoms. Undergoing EEG.  No dizziness.    3/15- pt seen this am.  Pt denies any symptoms now.    PAST MEDICAL & SURGICAL HISTORY:  Pacemaker    CAD (coronary artery disease)    [place] (hard of hearing)  hearing aid    Anxiety    Hypothyroid    GERD (gastroesophageal reflux disease)    CKD (chronic kidney disease)    MI, old  x3    Chronic UTI    Chronic diastolic congestive heart failure  last exacerbation 2017    Hyperlipidemia, unspecified hyperlipidemia type    Essential hypertension    Neuropathy    Diabetes    Chronic obstructive pulmonary disease, unspecified COPD type    History of cataract surgery  bilateral IOL    S/P lobectomy of lung  left lung pre-cancerous    History of ear surgery    History of cholecystectomy    S/P ORIF (open reduction internal fixation) fracture  left hip 2017    History of total knee replacement, unspecified laterality  bilateral right 2008 left     H/O hernia repair  umbilical and incisional     delivery delivered    S/P appendectomy        MEDICATIONS  (STANDING):  aspirin enteric coated 81 milliGRAM(s) Oral daily  atorvastatin 40 milliGRAM(s) Oral at bedtime  calcitriol   Capsule 0.25 MICROGram(s) Oral daily  dextrose 40% Gel 15 Gram(s) Oral once  dextrose 5%. 1000 milliLiter(s) (50 mL/Hr) IV Continuous <Continuous>  dextrose 5%. 1000 milliLiter(s) (100 mL/Hr) IV Continuous <Continuous>  dextrose 50% Injectable 25 Gram(s) IV Push once  dextrose 50% Injectable 12.5 Gram(s) IV Push once  dextrose 50% Injectable 25 Gram(s) IV Push once  FLUoxetine 10 milliGRAM(s) Oral daily  fluticasone furoate/umeclidinium/vilanterol 100-62.5-25 MICROgram(s) Inhaler 1 Puff(s) Inhalation daily  folic acid 1 milliGRAM(s) Oral daily  glucagon  Injectable 1 milliGRAM(s) IntraMuscular once  insulin glargine Injectable (LANTUS) 20 Unit(s) SubCutaneous at bedtime  insulin lispro (ADMELOG) corrective regimen sliding scale   SubCutaneous three times a day before meals  insulin lispro (ADMELOG) corrective regimen sliding scale   SubCutaneous at bedtime  levothyroxine 125 MICROGram(s) Oral daily  lidocaine   Patch 1 Patch Transdermal daily  montelukast 10 milliGRAM(s) Oral at bedtime  multivitamin/minerals 1 Tablet(s) Oral daily  pregabalin 50 milliGRAM(s) Oral two times a day    MEDICATIONS  (PRN):  acetaminophen   Tablet .. 650 milliGRAM(s) Oral every 6 hours PRN Temp greater or equal to 38C (100.4F), Mild Pain (1 - 3)  ALBUTerol    90 MICROgram(s) HFA Inhaler 2 Puff(s) Inhalation every 6 hours PRN for shortness of breath and/or wheezing      FAMILY HISTORY:  Family history of CHF (congestive heart failure)        SOCIAL HISTORY: ex smoker     REVIEW OF SYSTEMS:  CONSTITUTIONAL:    No fatigue, malaise, lethargy.  No fever or chills.  RESPIRATORY:  No cough.  No wheeze.  No hemoptysis.  No shortness of breath.  CARDIOVASCULAR:  No chest pains.  No palpitations. No shortness of breath, No orthopnea or PND.  GASTROINTESTINAL:  No abdominal pain.  No nausea or vomiting.    GENITOURINARY:    No hematuria.    MUSCULOSKELETAL:  No musculoskeletal pain.  No joint swelling.  No arthritis.  NEUROLOGICAL:  No tingling or numbness c/o  weakness.  PSYCHIATRIC:  No confusion  SKIN:  No rashes.            ICU Vital Signs Last 24 Hrs  T(C): 36.5 (14 Mar 2021 23:11), Max: 36.5 (14 Mar 2021 23:11)  T(F): 97.7 (14 Mar 2021 23:11), Max: 97.7 (14 Mar 2021 23:11)  HR: 73 (15 Mar 2021 08:31) (70 - 77)  BP: 165/66 (14 Mar 2021 23:11) (138/59 - 165/66)  BP(mean): --  ABP: --  ABP(mean): --  RR: 16 (14 Mar 2021 23:11) (16 - 18)  SpO2: 94% (14 Mar 2021 23:11) (94% - 100%)        PHYSICAL EXAM-    Constitutional: elderly frail female in no acute distress     Head: Head is normocephalic and atraumatic.      Neck: No JVD.     Cardiovascular: Regular rate and rhythm without S3, S4. No murmurs or rubs are appreciated.      Respiratory: Breath sounds are normal. No rales. No wheezing.    Abdomen: Soft, nontender, nondistended with positive bowel sounds.      Extremity: No tenderness. No  pitting edema     Neurologic: The patient is alert and oriented.      Skin: No rash, no obvious lesions noted.      Psychiatric: The patient appears to be emotionally stable.      INTERPRETATION OF TELEMETRY: SR     ECG:     I&O's Detail      LABS:                                              10.1   9.14  )-----------( 171      ( 15 Mar 2021 07:52 )             32.3     03-15    142  |  105  |  76<H>  ----------------------------<  121<H>  4.0   |  34<H>  |  2.57<H>    Ca    9.9      15 Mar 2021 07:52  Phos  3.7     03-14  Mg     2.3     03-14    TPro  6.8  /  Alb  3.0<L>  /  TBili  0.7  /  DBili  x   /  AST  19  /  ALT  15  /  AlkPhos  79  03-14        LIVER FUNCTIONS - ( 14 Mar 2021 07:35 )  Alb: 3.0 g/dL / Pro: 6.8 gm/dL / ALK PHOS: 79 U/L / ALT: 15 U/L / AST: 19 U/L / GGT: x                   LIVER FUNCTIONS - ( 14 Mar 2021 07:35 )  Alb: 3.0 g/dL / Pro: 6.8 gm/dL / ALK PHOS: 79 U/L / ALT: 15 U/L / AST: 19 U/L / GGT: x           PT/INR - ( 12 Mar 2021 21:14 )   PT: 13.5 sec;   INR: 1.17 ratio         PTT - ( 12 Mar 2021 21:14 )  PTT:29.5 sec        PT/INR - ( 12 Mar 2021 21:14 )   PT: 13.5 sec;   INR: 1.17 ratio         PTT - ( 12 Mar 2021 21:14 )  PTT:29.5 sec    I&O's Summary    BNPSerum Pro-Brain Natriuretic Peptide: 4236 pg/mL ( @ 21:14)    RADIOLOGY & ADDITIONAL STUDIES:  < from: CT Head No Cont (21 @ 22:44) >  IMPRESSION:    No acute intracranial hemorrhage, mass effect, or CT evidence of a large vascular territory infarct. Indeterminate tiny right basal ganglia infarct of indeterminate age. Involutional and chronic microvascular ischemic gliotic changes.    If there are new or persistent symptoms, consider further evaluation with MRI provided no contraindications.                JAMEE RUTH MD; Attending Radiologist  This document has been electronically signed. Mar 12 2021 11:25PM    < end of copied text >  
CC: Syncope (15 Mar 2021 14:18)    HPI: 81 y/o F PMHx significant for CAD, MI x 3, complete heart block s/p pacemaker placement, advanced COPD on home oxygen, CHFpEF, CKD4, diabetes mellitus (insulin dependent), hypertension, hyperlipidemia, hypothyroidism, and recurrent UTIs, was referred to  by her Cardiologist for further evaluation and management of witnessed near syncope.  Labs => K 3.4, HCO3 38, BUN/Cr 82/3.5, Glu 287, TnI 0.096, proBNP 4236. In the ED the patient received ASA 325mg PO x 1, and NS x 500mL x 1     REVIEW OF SYSTEMS:  All other review of systems is negative unless indicated above    Vital Signs Last 24 Hrs  T(C): 36.6 (16 Mar 2021 08:10), Max: 36.7 (16 Mar 2021 00:17)  T(F): 97.8 (16 Mar 2021 08:10), Max: 98 (16 Mar 2021 00:17)  HR: 76 (16 Mar 2021 08:11) (75 - 89)  BP: 137/58 (16 Mar 2021 00:17) (137/58 - 137/58)  BP(mean): 81 (16 Mar 2021 00:17) (81 - 81)  RR: 19 (16 Mar 2021 08:10) (18 - 19)  SpO2: 96% (16 Mar 2021 08:10) (95% - 96%)    I&O's Summary      CAPILLARY BLOOD GLUCOSE      POCT Blood Glucose.: 198 mg/dL (16 Mar 2021 12:19)  POCT Blood Glucose.: 182 mg/dL (16 Mar 2021 08:01)  POCT Blood Glucose.: 225 mg/dL (15 Mar 2021 22:01)  POCT Blood Glucose.: 197 mg/dL (15 Mar 2021 17:49)      PHYSICAL EXAM:    General: Well developed; well nourished; in no acute distress  Eyes: PERRLA, EOMI; conjunctiva and sclera clear  Head: Normocephalic; atraumatic  ENMT: No nasal discharge; airway clear  Neck: Supple; non tender; no masses  Respiratory: No wheezes, rales or rhonchi  Cardiovascular: Regular rate and rhythm. S1 and S2 Normal; No murmurs, gallops or rubs  Gastrointestinal: Soft non-tender non-distended; Normal bowel sounds  Genitourinary: No  suprapubic  tenderness  Extremities: Normal range of motion, No clubbing, cyanosis or edema  Vascular: Peripheral pulses palpable 2+ bilaterally  Neurological: Alert and oriented x4  Skin: Warm and dry. No acute rash  Lymph Nodes: No acute cervical adenopathy  Musculoskeletal: Normal muscle tone, without deformities    Medications:  MEDICATIONS  (STANDING):  aspirin enteric coated 81 milliGRAM(s) Oral daily  atorvastatin 40 milliGRAM(s) Oral at bedtime  calcitriol   Capsule 0.25 MICROGram(s) Oral daily  dextrose 40% Gel 15 Gram(s) Oral once  dextrose 5%. 1000 milliLiter(s) (50 mL/Hr) IV Continuous <Continuous>  dextrose 5%. 1000 milliLiter(s) (100 mL/Hr) IV Continuous <Continuous>  dextrose 50% Injectable 25 Gram(s) IV Push once  dextrose 50% Injectable 12.5 Gram(s) IV Push once  dextrose 50% Injectable 25 Gram(s) IV Push once  FLUoxetine 10 milliGRAM(s) Oral daily  fluticasone furoate/umeclidinium/vilanterol 100-62.5-25 MICROgram(s) Inhaler 1 Puff(s) Inhalation daily  folic acid 1 milliGRAM(s) Oral daily  glucagon  Injectable 1 milliGRAM(s) IntraMuscular once  heparin   Injectable 5000 Unit(s) SubCutaneous every 8 hours  insulin glargine Injectable (LANTUS) 24 Unit(s) SubCutaneous at bedtime  insulin lispro (ADMELOG) corrective regimen sliding scale   SubCutaneous three times a day before meals  insulin lispro (ADMELOG) corrective regimen sliding scale   SubCutaneous at bedtime  insulin lispro Injectable (ADMELOG) 8 Unit(s) SubCutaneous three times a day before meals  levothyroxine 125 MICROGram(s) Oral daily  lidocaine   Patch 1 Patch Transdermal daily  montelukast 10 milliGRAM(s) Oral at bedtime  multivitamin/minerals 1 Tablet(s) Oral daily  pregabalin 50 milliGRAM(s) Oral two times a day  sodium chloride 0.9%. 250 milliLiter(s) (50 mL/Hr) IV Continuous <Continuous>      Labs: All Labs Reviewed:                        10.1   8.45  )-----------( 180      ( 16 Mar 2021 07:01 )             31.8     03-16    139  |  102  |  74<H>  ----------------------------<  116<H>  3.9   |  35<H>  |  2.63<H>    Ca    9.4      16 Mar 2021 07:01    RADIOLOGY/EKG: all tests were reviewed   r< from: US Duplex Carotid Arteries Complete, Bilateral (03.14.21 @ 14:59) >  EXAM:  US DPLX CAROTIDS COMPL BI                            PROCEDURE DATE:  03/14/2021          INTERPRETATION:  CLINICAL INFORMATION: Syncope    COMPARISON: None available.    TECHNIQUE: Grayscale, color and spectral Doppler examination of both carotid arteries was performed.    FINDINGS:    No elevated velocities or abnormal waveforms are encountered.    Peak systolic velocities are as follows:    RIGHT:  PROX CCA = 50 cm/s  DIST CCA = 38 cm/s  PROX ICA = 62 cm/s  DIST ICA = 66 cm/s  ECA = 60 cm/s    LEFT:  PROX CCA = 60 cm/s  DIST CCA = 77 cm/s  PROX ICA = 76 cm/s  DIST ICA = 93 cm/s  ECA = 59 cm/s    Antegrade flow is noted within both vertebral arteries.    IMPRESSION: No significant hemodynamic stenosis of either carotid artery.    < end of copied text >  < from: CT Head No Cont (03.12.21 @ 22:44) >    EXAM:  CT BRAIN                            PROCEDURE DATE:  03/12/2021          INTERPRETATION:  NONCONTRAST CT OF THE BRAIN    CLINICAL HISTORY: Syncope.    TECHNIQUE: Axial CT images are obtained from the cranial vertex to the skull base without the administration of IV contrast.    Comparison is made to the prior CT head 12/10/2019.    FINDINGS:    Beam hardening artifact slightly obscures evaluation of the posterior fossa and brainstem.    There is no acute intra-axial or extra-axial hemorrhage. No mass effect or shift of the midline. The basal cisterns are not effaced. There is cerebral volume loss with prominence of the ventricles and sulci. There are patchy areas of low attenuation within the periventricular and subcortical white matter which are nonspecific but likely the sequela of chronic microvascular change. Indeterminate tiny right basal ganglia infarct of indeterminate age. There is no CT evidence of a large vascular territory infarct.    The visualized paranasal sinuses and mastoid air cells are well aerated. Replaced bilateral ocular lenses.    No acute displaced calvarium fracture. No significant scalp soft tissue swelling.    IMPRESSION:    No acute intracranial hemorrhage, mass effect, or CT evidence of a large vascular territory infarct. Indeterminate tiny right basal ganglia infarct of indeterminate age. Involutional and chronic microvascular ischemic gliotic changes.    < end of copied text >        DVT PPX:    Advance Directive:    Disposition:  
Patient is a 80y old  Female who presents with a chief complaint of Syncope.       HPI:  81 y/o F PMHx significant for CAD, MI x 3, complete heart block s/p pacemaker placement, advanced COPD on home oxygen, CHFpEF, CKD4, diabetes mellitus (insulin dependent), hypertension, hyperlipidemia, hypothyroidism, and recurrent UTIs, was referred to  by her Cardiologist for further evaluation and management of witnessed near syncope.  Labs => K 3.4, HCO3 38, BUN/Cr 82/3.5, Glu 287, TnI 0.096, proBNP 4236. In the ED the patient received ASA 325mg PO x 1, and NS x 500mL x 1     Pt had been having episodes of loss of affect and blankk stare and she is weak and then later on regains the affect.  She was extremely weak in her legs yesterday in office that her duaghter and office staff had to make her sit in chair.  Per daughter episodes more frequent recently.     3/14- Pt seen this am.  She denies any symptoms. Undergoing EEG.  No dizziness.   PAST MEDICAL & SURGICAL HISTORY:  Pacemaker    CAD (coronary artery disease)    Torres Martinez (hard of hearing)  hearing aid    Anxiety    Hypothyroid    GERD (gastroesophageal reflux disease)    CKD (chronic kidney disease)    MI, old  x3    Chronic UTI    Chronic diastolic congestive heart failure  last exacerbation 2017    Hyperlipidemia, unspecified hyperlipidemia type    Essential hypertension    Neuropathy    Diabetes    Chronic obstructive pulmonary disease, unspecified COPD type    History of cataract surgery  bilateral IOL    S/P lobectomy of lung  left lung pre-cancerous    History of ear surgery    History of cholecystectomy    S/P ORIF (open reduction internal fixation) fracture  left hip 2017    History of total knee replacement, unspecified laterality  bilateral right  left     H/O hernia repair  umbilical and incisional     delivery delivered    S/P appendectomy        MEDICATIONS  (STANDING):  aspirin enteric coated 81 milliGRAM(s) Oral daily  atorvastatin 40 milliGRAM(s) Oral at bedtime  calcitriol   Capsule 0.25 MICROGram(s) Oral daily  dextrose 40% Gel 15 Gram(s) Oral once  dextrose 5%. 1000 milliLiter(s) (50 mL/Hr) IV Continuous <Continuous>  dextrose 5%. 1000 milliLiter(s) (100 mL/Hr) IV Continuous <Continuous>  dextrose 50% Injectable 25 Gram(s) IV Push once  dextrose 50% Injectable 12.5 Gram(s) IV Push once  dextrose 50% Injectable 25 Gram(s) IV Push once  FLUoxetine 10 milliGRAM(s) Oral daily  fluticasone furoate/umeclidinium/vilanterol 100-62.5-25 MICROgram(s) Inhaler 1 Puff(s) Inhalation daily  folic acid 1 milliGRAM(s) Oral daily  glucagon  Injectable 1 milliGRAM(s) IntraMuscular once  insulin glargine Injectable (LANTUS) 20 Unit(s) SubCutaneous at bedtime  insulin lispro (ADMELOG) corrective regimen sliding scale   SubCutaneous three times a day before meals  insulin lispro (ADMELOG) corrective regimen sliding scale   SubCutaneous at bedtime  levothyroxine 125 MICROGram(s) Oral daily  lidocaine   Patch 1 Patch Transdermal daily  montelukast 10 milliGRAM(s) Oral at bedtime  multivitamin/minerals 1 Tablet(s) Oral daily  pregabalin 50 milliGRAM(s) Oral two times a day    MEDICATIONS  (PRN):  acetaminophen   Tablet .. 650 milliGRAM(s) Oral every 6 hours PRN Temp greater or equal to 38C (100.4F), Mild Pain (1 - 3)  ALBUTerol    90 MICROgram(s) HFA Inhaler 2 Puff(s) Inhalation every 6 hours PRN for shortness of breath and/or wheezing      FAMILY HISTORY:  Family history of CHF (congestive heart failure)        SOCIAL HISTORY: ex smoker     REVIEW OF SYSTEMS:  CONSTITUTIONAL:    No fatigue, malaise, lethargy.  No fever or chills.  RESPIRATORY:  No cough.  No wheeze.  No hemoptysis.  No shortness of breath.  CARDIOVASCULAR:  No chest pains.  No palpitations. No shortness of breath, No orthopnea or PND.  GASTROINTESTINAL:  No abdominal pain.  No nausea or vomiting.    GENITOURINARY:    No hematuria.    MUSCULOSKELETAL:  No musculoskeletal pain.  No joint swelling.  No arthritis.  NEUROLOGICAL:  No tingling or numbness c/o  weakness.  PSYCHIATRIC:  No confusion  SKIN:  No rashes.            ICU Vital Signs Last 24 Hrs  T(C): 36.5 (14 Mar 2021 09:38), Max: 36.8 (13 Mar 2021 16:04)  T(F): 97.7 (14 Mar 2021 09:38), Max: 98.3 (13 Mar 2021 16:04)  HR: 83 (13 Mar 2021 16:04) (83 - 83)  BP: 154/59 (13 Mar 2021 16:04) (154/59 - 154/59)  BP(mean): --  ABP: --  ABP(mean): --  RR: 18 (14 Mar 2021 09:38) (18 - 18)  SpO2: 93% (14 Mar 2021 09:38) (93% - 96%)      PHYSICAL EXAM-    Constitutional: elderly frail female in no acute distress     Head: Head is normocephalic and atraumatic.      Neck: No JVD.     Cardiovascular: Regular rate and rhythm without S3, S4. No murmurs or rubs are appreciated.      Respiratory: Breath sounds are normal. No rales. No wheezing.    Abdomen: Soft, nontender, nondistended with positive bowel sounds.      Extremity: No tenderness. No  pitting edema     Neurologic: The patient is alert and oriented.      Skin: No rash, no obvious lesions noted.      Psychiatric: The patient appears to be emotionally stable.      INTERPRETATION OF TELEMETRY: SR     ECG:     I&O's Detail      LABS:                                   10.3   9.01  )-----------( 191      ( 14 Mar 2021 07:35 )             32.2     03-14    139  |  99  |  79<H>  ----------------------------<  169<H>  3.4<L>   |  35<H>  |  2.87<H>    Ca    9.6      14 Mar 2021 07:35  Phos  3.7     03-14  Mg     2.3     03-14    TPro  6.8  /  Alb  3.0<L>  /  TBili  0.7  /  DBili  x   /  AST  19  /  ALT  15  /  AlkPhos  79  03-14    CARDIAC MARKERS ( 13 Mar 2021 08:52 )  0.095 ng/mL / x     / x     / x     / x      CARDIAC MARKERS ( 13 Mar 2021 03:28 )  0.124 ng/mL / x     / x     / x     / x      CARDIAC MARKERS ( 12 Mar 2021 21:14 )  0.096 ng/mL / x     / x     / x     / x          LIVER FUNCTIONS - ( 14 Mar 2021 07:35 )  Alb: 3.0 g/dL / Pro: 6.8 gm/dL / ALK PHOS: 79 U/L / ALT: 15 U/L / AST: 19 U/L / GGT: x           PT/INR - ( 12 Mar 2021 21:14 )   PT: 13.5 sec;   INR: 1.17 ratio         PTT - ( 12 Mar 2021 21:14 )  PTT:29.5 sec        PT/INR - ( 12 Mar 2021 21:14 )   PT: 13.5 sec;   INR: 1.17 ratio         PTT - ( 12 Mar 2021 21:14 )  PTT:29.5 sec    I&O's Summary    BNPSerum Pro-Brain Natriuretic Peptide: 4236 pg/mL ( @ 21:14)    RADIOLOGY & ADDITIONAL STUDIES:  < from: CT Head No Cont (21 @ 22:44) >  IMPRESSION:    No acute intracranial hemorrhage, mass effect, or CT evidence of a large vascular territory infarct. Indeterminate tiny right basal ganglia infarct of indeterminate age. Involutional and chronic microvascular ischemic gliotic changes.    If there are new or persistent symptoms, consider further evaluation with MRI provided no contraindications.                JAMEE RUTH MD; Attending Radiologist  This document has been electronically signed. Mar 12 2021 11:25PM    < end of copied text >  
Patient is a 80y old  Female who presents with a chief complaint of Syncope (16 Mar 2021 15:10)    3/17- still orthostatic this -90 when standing    MEDICATIONS  (STANDING):  aspirin enteric coated 81 milliGRAM(s) Oral daily  atorvastatin 40 milliGRAM(s) Oral at bedtime  calcitriol   Capsule 0.25 MICROGram(s) Oral daily  dextrose 40% Gel 15 Gram(s) Oral once  dextrose 5%. 1000 milliLiter(s) (100 mL/Hr) IV Continuous <Continuous>  dextrose 5%. 1000 milliLiter(s) (50 mL/Hr) IV Continuous <Continuous>  dextrose 50% Injectable 25 Gram(s) IV Push once  dextrose 50% Injectable 12.5 Gram(s) IV Push once  dextrose 50% Injectable 25 Gram(s) IV Push once  FLUoxetine 10 milliGRAM(s) Oral daily  fluticasone furoate/umeclidinium/vilanterol 100-62.5-25 MICROgram(s) Inhaler 1 Puff(s) Inhalation daily  folic acid 1 milliGRAM(s) Oral daily  glucagon  Injectable 1 milliGRAM(s) IntraMuscular once  heparin   Injectable 5000 Unit(s) SubCutaneous every 8 hours  insulin glargine Injectable (LANTUS) 24 Unit(s) SubCutaneous at bedtime  insulin lispro (ADMELOG) corrective regimen sliding scale   SubCutaneous three times a day before meals  insulin lispro (ADMELOG) corrective regimen sliding scale   SubCutaneous at bedtime  insulin lispro Injectable (ADMELOG) 8 Unit(s) SubCutaneous three times a day before meals  levothyroxine 125 MICROGram(s) Oral daily  lidocaine   Patch 1 Patch Transdermal daily  melatonin 5 milliGRAM(s) Oral once  montelukast 10 milliGRAM(s) Oral at bedtime  multivitamin/minerals 1 Tablet(s) Oral daily  pregabalin 50 milliGRAM(s) Oral two times a day  sodium chloride 0.9%. 250 milliLiter(s) (50 mL/Hr) IV Continuous <Continuous>    MEDICATIONS  (PRN):  acetaminophen   Tablet .. 650 milliGRAM(s) Oral every 6 hours PRN Temp greater or equal to 38C (100.4F), Mild Pain (1 - 3)  ALBUTerol    90 MICROgram(s) HFA Inhaler 2 Puff(s) Inhalation every 6 hours PRN for shortness of breath and/or wheezing  ALPRAZolam 0.25 milliGRAM(s) Oral once PRN anxiety            Vital Signs Last 24 Hrs  T(C): 36.3 (17 Mar 2021 04:53), Max: 36.9 (16 Mar 2021 15:26)  T(F): 97.4 (17 Mar 2021 04:53), Max: 98.5 (16 Mar 2021 15:26)  HR: 74 (17 Mar 2021 08:06) (71 - 80)  BP: 124/51 (17 Mar 2021 01:43) (124/51 - 151/63)  BP(mean): --  RR: 19 (17 Mar 2021 01:43) (18 - 19)  SpO2: 94% (17 Mar 2021 01:43) (92% - 95%)            INTERPRETATION OF TELEMETRY:    ECG:        LABS:                        10.1   8.45  )-----------( 180      ( 16 Mar 2021 07:01 )             31.8     03-16    139  |  102  |  74<H>  ----------------------------<  116<H>  3.9   |  35<H>  |  2.63<H>    Ca    9.4      16 Mar 2021 07:01              I&O's Summary    BNP  RADIOLOGY & ADDITIONAL STUDIES:

## 2021-05-12 ENCOUNTER — APPOINTMENT (OUTPATIENT)
Dept: NEUROLOGY | Facility: CLINIC | Age: 81
End: 2021-05-12

## 2021-05-28 NOTE — ASU PATIENT PROFILE, ADULT - NS TRANSFER HEARING AID
Patient was advised and in agreement they do not meet Urgent Care criteria based on triage symptoms.   
Pt complains of an increase in neck, back and left shoulder pain. Pt has been seen multiple times for s/s and notes no improvement.   
viktoriya /B/L t home

## 2021-06-09 NOTE — PHYSICAL EXAM
Lab Results   Component Value Date    EGFR 57 03/23/2021    EGFR 61 01/08/2021    EGFR 71 07/11/2020    CREATININE 1 33 (H) 03/23/2021    CREATININE 1 42 (H) 01/08/2021    CREATININE 1 27 07/11/2020     Recommend he avoid NSAIDs  Continue with amlodipine  Make sure to stay well hydrated  We will get renal ultrasound  [General Appearance - Well Developed] : well developed [Normal Appearance] : normal appearance [General Appearance - In No Acute Distress] : no acute distress [] : no respiratory distress [Abdomen Soft] : soft [Abdomen Tenderness] : non-tender [Abdomen Mass (___ Cm)] : no abdominal mass palpated [Urethral Meatus] : the meatus of the urethra showed no abnormalities [External Female Genitalia] : normal external genitalia [Atrophy] : atrophy [Skin Color & Pigmentation] : normal skin color and pigmentation [No Focal Deficits] : no focal deficits [Oriented To Time, Place, And Person] : oriented to person, place, and time [FreeTextEntry1] : walks with walker

## 2021-08-23 NOTE — ED ADULT NURSE NOTE - NS PRO AD PATIENT TYPE
Patient cleared for discharge. Needs transportation home. CM requested medivan  from [facility] ([phone]) for today at 1330.   Do Not Resuscitate (DNR)/Health Care Proxy (HCP)

## 2022-04-15 NOTE — ED ADULT NURSE NOTE - DRUG PRE-SCREENING (DAST -1)
Estiven Anderson  Supervising provider: ROSEY PALMA 4/15/2022)   Full warfarin instructions:  4/15: Estiven Anderson DO Referring  Cardiovascular Disease [phone]        Statement Selected

## 2022-05-03 NOTE — PROGRESS NOTE ADULT - PROVIDER SPECIALTY LIST ADULT
Orthopedics Pulmonary, Critical Care, and Sleep Medicine    Patient Consult    Name: Salvador Josue. MRN: [id number]   : 1950 Hospital: [facility]   Date: 5/3/2022        IMPRESSION:   · Acute respiratory failure, on 6L NC.   · Ruled out for Covid. 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 NO intervention has been needed up to this point. IF the hemoptysis returns would refer to [facility] for Laser Rx of the mucosal abnormalities. · Hemoptysis seems worse recently, May be due to acute infection due to CAP. · Agree with Cardiology doing Cath, can be on blood thinners if needed. Discussed with Dr. Jer Garcia this am.   · Constipation seemed to have a lot of straining last pm with his bm. RECOMMENDATIONS:   · Wean O2 to Room air if able. · Pt underwent a bronch that was non dx in 2019  · IV abx per attending MD, Can been placed on Doxycycline 100mg po bid to complete total of 10 days of therapy. · Decreased prednisone to 20mg po every day. Subjective:   Last 24 hrs:   Pt has been feeling pretty good. NO chest pain. No back pain. He has been doing much better with his IS. He feels ready to go home. Slept ok last pm. No aspiration issues. 22:   Had a tough time with BM last pm. He developed dizziness. Reports he sat on toilet for 45 minutes trying to have a BM. NO GERD. NO further hemoptysis since yesterday. NO wheezing. NO chest pain. He reports his main complaint as worsening dyspnea. Has more dyspnea with his bowling. Less endurance. Had recurrent dizziness that prompted him to go to ER.         --  This patient has been seen and evaluated at the request of Dr. Catherine Anthony for abnormal chest ct and hemoptysis for more than 1-2 years.  Patient is a 70 y.o. male admitted with sob, hypoxia  Started on empiric abx  Today pt on isolation being ruled  out for covid      Past Medical History:   Diagnosis Date    Cancer ([facility] [address] prostate    Elevated troponin 4/29/2022      Past Surgical History:   Procedure Laterality Date    HX ORTHOPAEDIC  1999    spinal fusion      Prior to Admission medications    Medication Sig Start Date End Date Taking? Authorizing Provider   amLODIPine-benazepril (LOTREL) 10-40 mg per capsule Take 1 Capsule by mouth daily. Yes Provider, Historical   albuterol (PROAIR HFA) 90 mcg/actuation inhaler Take 1-2 Puffs by inhalation every four (4) hours as needed for Wheezing. 12/27/19  Yes Evelyn Oseguera PA   ferrous sulfate (IRON) 325 mg (65 mg iron) tablet Take  by mouth Daily (before breakfast). Yes Provider, Historical   multivitamin (ONE A DAY) tablet Take 1 Tab by mouth daily. Yes Other, MD Dillon   loratadine (CLARITIN) 10 mg tablet Take 10 mg by mouth. Patient not taking: Reported on 4/29/2022    Other, MD Dillon     No Known Allergies   Social History     Tobacco Use    Smoking status: Never Smoker    Smokeless tobacco: Never Used   Substance Use Topics    Alcohol use:  Yes     Alcohol/week: 5.0 standard drinks     Types: 6 Shots of liquor per week      Family History   Problem Relation Age of Onset    Lung Disease Mother     Cancer Father         Current Facility-Administered Medications   Medication Dose Route Frequency    [START ON 5/4/2022] predniSONE (DELTASONE) tablet 20 mg  20 mg Oral DAILY WITH BREAKFAST    aspirin chewable tablet 81 mg  81 mg Oral DAILY    atorvastatin (LIPITOR) tablet 40 mg  40 mg Oral QHS    metoprolol tartrate (LOPRESSOR) tablet 25 mg  25 mg Oral Q12H    sodium chloride (NS) flush 5-40 mL  5-40 mL IntraVENous Q8H    azithromycin (ZITHROMAX) 500 mg in 0.9% sodium chloride 250 mL (VIAL-MATE)  500 mg IntraVENous Q24H       Review of Systems:  A comprehensive review of systems was negative except for: Respiratory: positive for dyspnea on exertion    Objective:   Vital Signs:    Visit Vitals  /73 (BP 1 Location: Left upper arm, BP Patient Position: At rest) Pulse 69   Temp 97.7 °F (36.5 °C)   Resp 16   Ht 5' 8\" (1.727 m)   Wt 110.4 kg (243 lb 6.2 oz)   SpO2 93%   BMI 37.01 kg/m²       O2 Device: Nasal cannula   O2 Flow Rate (L/min): 2 l/min   Temp (24hrs), Av °F (36.7 °C), Min:97.6 °F (36.4 °C), Max:98.4 °F (36.9 °C)       Intake/Output:   Last shift:      701 - 1900  In: 300 [P.O.:300]  Out: -   Last 3 shifts: 1901 - 700  In: -   Out: 800 [Urine:800]    Intake/Output Summary (Last 24 hours) at 5/3/2022 1009  Last data filed at 5/3/2022 0715  Gross per 24 hour   Intake 300 ml   Output 800 ml   Net -500 ml      Physical Exam:   General:  Alert, cooperative, no distress, appears stated age. Head:  Normocephalic, without obvious abnormality, atraumatic. Lungs: CTA anteriorly. NO wheezing, no rhonchi. Breathing comfortably. Using IS, maxing it out. CV; Normal S1, 2. NO MRG  Abd: +BS, Soft, nt, nd. No HSM. Extrem: NO C, C, E.   Has a birthmark on his LUE. Chest wall:  No tenderness or deformity. No accessory muscle use                           Neurologic: Grossly nonfocal, motor seems to be intact. Psych: no overt, anxiety or depression.       Data review:     Recent Results (from the past 24 hour(s))   METABOLIC PANEL, BASIC    Collection Time: 22  4:22 AM   Result Value Ref Range    Sodium 136 136 - 145 mmol/L    Potassium 4.6 3.5 - 5.1 mmol/L    Chloride 105 97 - 108 mmol/L    CO2 27 21 - 32 mmol/L    Anion gap 4 (L) 5 - 15 mmol/L    Glucose 109 (H) 65 - 100 mg/dL    BUN 14 6 - 20 MG/DL    Creatinine 0.75 0.70 - 1.30 MG/DL    BUN/Creatinine ratio 19 12 - 20      GFR est AA >60 >60 ml/min/1.73m2    GFR est non-AA >60 >60 ml/min/1.73m2    Calcium 9.0 8.5 - 10.1 MG/DL   CBC WITH AUTOMATED DIFF    Collection Time: 22  4:22 AM   Result Value Ref Range    WBC 9.4 4.1 - 11.1 K/uL    RBC 3.14 (L) 4.10 - 5.70 M/uL    HGB 8.8 (L) 12.1 - 17.0 g/dL    HCT 27.5 (L) 36.6 - 50.3 %    MCV 87.6 80.0 - 99.0 FL MCH 28.0 26.0 - 34.0 PG    MCHC 32.0 30.0 - 36.5 g/dL    RDW 16.0 (H) 11.5 - 14.5 %    PLATELET 603 436 - 558 K/uL    MPV 9.3 8.9 - 12.9 FL    NRBC 0.0 0  WBC    ABSOLUTE NRBC 0.00 0.00 - 0.01 K/uL    NEUTROPHILS 81 (H) 32 - 75 %    LYMPHOCYTES 11 (L) 12 - 49 %    MONOCYTES 6 5 - 13 %    EOSINOPHILS 1 0 - 7 %    BASOPHILS 0 0 - 1 %    IMMATURE GRANULOCYTES 1 (H) 0.0 - 0.5 %    ABS. NEUTROPHILS 7.6 1.8 - 8.0 K/UL    ABS. LYMPHOCYTES 1.1 0.8 - 3.5 K/UL    ABS. MONOCYTES 0.5 0.0 - 1.0 K/UL    ABS. EOSINOPHILS 0.1 0.0 - 0.4 K/UL    ABS. BASOPHILS 0.0 0.0 - 0.1 K/UL    ABS. IMM. GRANS. 0.1 (H) 0.00 - 0.04 K/UL    DF AUTOMATED         Imaging:  I have personally reviewed the patients radiographs and have reviewed the reports:  Chest ct reviewed 4-26-22:   THYROID: No nodule. MEDIASTINUM: No mass or lymphadenopathy. ZELDA: No mass or lymphadenopathy. THORACIC AORTA: No aneurysm. HEART: Normal in size. Mild coronary artery calcifications. ESOPHAGUS: No wall thickening or dilatation. TRACHEA/BRONCHI: Patent. PLEURA: No effusion or pneumothorax. LUNGS: Multilobar patchy groundglass opacification. UPPER ABDOMEN: Slightly nodular thickening of the bilateral adrenal glands is  unchanged compared to March 2019. Hyperdense cystic lesion of the upper pole of  the right kidney is also unchanged compared March 2019. BONES: No aggressive bone lesion or fracture.     IMPRESSION  1. Patchy multilobar airspace disease suspicious for Covid pneumonia. 2. No pulmonary embolism.         Charity Barrientos MD

## 2022-10-12 NOTE — ASU PREOP CHECKLIST - SPO2 (%)
Group Therapy Documentation    PATIENT'S NAME: Kris Angelo  MRN:   [id number]  :   2010  ACCT. NUMBER: [id number]  DATE OF SERVICE: 10/12/22  START TIME:  9:30 AM  END TIME: 10:30 AM  FACILITATOR(S): Tree Partida  TOPIC: Child/Adol Group Therapy  Number of patients attending the group:    Group Length:  1 Hours  Interactive Complexity: Yes, visit entailed Interactive Complexity evidenced by:  -The need to manage maladaptive communication (related to, e.g., high anxiety, high reactivity, repeated questions, or disagreement) among participants that complicates delivery of care    Summary of Group / Topics Discussed:    Coping Skill Building:    Objective(s):      Provide open opportunity to try instruments, singing, or songwriting    Identify and express emotion    Develop creative thinking    Promote decision-making    Develop coping skills    Increase self-esteem    Encourage positive peer feedback    Expected therapeutic outcome(s):    Increased awareness of therapeutic benefit of singing, instrument playing, and songwriting    Increased emotional literacy    Development of creative thinking    Increased self-esteem    Increased awareness of music-making as a coping skill    Increased ability to decision-make    Therapeutic outcome(s) measured by:    Therapists  observation and charting of emotion statements    Therapists  questioning    Patient s musical outcome (learned instrument, songs written)    Patients  report of emotional state before and after intervention    Therapists  observation and charting of patient s self-statements    Therapists  observation and charting of peer interactions    Patient participation    Music Therapy Overview:  Music Therapy is the clinical and evidence-based use of music interventions to accomplish individualized goals within a therapeutic relationship by a credentialed professional ([employer]).  Music therapy in the adolescent day treatment setting incorporates a  variety of music interventions and musical interaction designed for patients to learn new coping skills, identify and express emotion, develop social skills, and develop intrapersonal understanding. Music therapy in this context is meant to help patients develop relationships and address issues that they may not be able to using words alone. In addition, music therapy sessions are designed to educate patients about mental health diagnoses and symptom management.       Group Attendance:  Attended group session  Interactive Complexity: Yes, visit entailed Interactive Complexity evidenced by:  -The need to manage maladaptive communication (related to, e.g., high anxiety, high reactivity, repeated questions, or disagreement) among participants that complicates delivery of care    Patient's response to the group topic/interactions:  cooperative with task    Patient appeared to be Actively participating, Attentive and Engaged.       Client specific details:     Coping skills inventory/open studio. Pt worked on the coping skills inventory, and then explored many of the instruments around the room. It was difficult keeping the pt's attention to a singular task for very long. Towards the end of the session, pt was listening to music and working on their MT intake form         95

## 2022-10-15 NOTE — PROGRESS NOTE ADULT - ENMT
I have reviewed discharge instructions with the patient. The patient verbalized understanding. Patient left ED via Discharge Method: ambulatory to Home. Opportunity for questions and clarification provided. Patient given 0 scripts. To continue your aftercare when you leave the hospital, you may receive an automated call from our care team to check in on how you are doing.  This is a free service and part of our promise to provide the best care and service to meet your aftercare needs. \" If you have questions, or wish to unsubscribe from this service please call [phone].  Thank you for Choosing our [facility] Emergency Department.       Claudette Garsia RN  10/15/22 6680
negative
negative

## 2022-11-12 NOTE — PRE-OP CHECKLIST - LAST TOOK
Progress Note - Mariangel Hernandes 46 y o  female MRN: [id number]    Unit/Bed#[facility] 204-01 Encounter: [id number]        Subjective:   Patient seen and examined at bedside after reviewing the chart and discussing the case with the caring staff  Patient examined at bedside  Patient denied any acute symptoms  Physical Exam   Vitals: Blood pressure 94/59, pulse 95, temperature 97 5 °F (36 4 °C), temperature source Temporal, resp  rate 18, height 5' 1" (1 549 m), weight 68 9 kg (151 lb 12 8 oz), SpO2 100 %, not currently breastfeeding  ,Body mass index is 28 68 kg/m²  Constitutional: Patient in no acute distress  HEENT: PERR, EOMI, MMM, rhinorrhea  Cardiovascular: Normal rate and regular rhythm  Pulmonary/Chest: Effort normal and breath sounds normal    Abdomen:  Non distended  Assessment/Plan:  Mariangel Hernandes is a(n) 46y o  year old female with schizoaffective disorder      1  GERD/abd cramping  Continue Protonix 40 mg daily, Mylanta and Bentyl as needed  2  Tobacco abuse  Patient is on nicotine transdermal patch 21 mg/24 hr, nicotine gum as needed  3  Hyperlipidemia  Continue fenofibrate 145 mg daily  4  Constipation  Patient has been put on Colace daily, MiraLax as needed  5  Asthma  Patient may use albuterol inhaler as needed  6  DDD/OA  Patient may take Tylenol and use Voltaren gel as needed  Discontinue Flexeril and trial Robaxin  7  Nasal congestion  Zyrtec not available in hospital   Patient placed on Claritin 10 mg daily and Flonase nasal spray twice daily  8  Vitamin-D deficiency  Patient started on vitamin D3 1000 units daily  clears/apple juice

## 2022-12-15 NOTE — PATIENT PROFILE ADULT - STATED REASON FOR ADMISSION
fall  [employer] Pregnancy And Lactation Text: This medication is Pregnancy Category X and is absolutely contraindicated during pregnancy. It is unknown if it is excreted in breast milk.

## 2023-01-06 NOTE — ASU PATIENT PROFILE, ADULT - BLOOD TRANSFUSION, PREVIOUS, PROFILE
Spoke with RN at Dr Burt Aguilera office. E[facility] surgical consultants. Pre-op orders:  H&P    Pt is scheduled for surgery on 3/6/23 at [facility]. yes

## 2023-01-09 NOTE — ED PROVIDER NOTE - CHIEF COMPLAINT
The patient is a 69y Male complaining of shortness of breath. The patient is a 77y Female complaining of abnormal lab result.

## 2023-01-16 NOTE — PATIENT PROFILE ADULT. - MEDICATION PUMPS, PROFILE
Iud insertions    Date/Time: 1/16/2023 2:26 PM  Performed by: Radha Lancaster MD  Authorized by: Radha Lancaster MD     Other Assisting Provider: No    Verbal consent obtained?: Yes    Risks and benefits: Risks, benefits and alternatives were discussed    Consent given by:  Patient  Time Out:     Time out: Immediately prior to the procedure a time out was called    Patient states understanding of procedure being performed: Yes    Patient's understanding of procedure matches consent: Yes    Procedure consent matches procedure scheduled: Yes    Required items: Required blood products, implants, devices and special equipment available    Patient identity confirmed:  Verbally with patient  Procedure:     Pelvic exam performed: yes      Negative GC/chlamydia test: Swab collected at time of device inseriton        Negative urine pregnancy test: yes      Cervix cleaned and prepped: yes      Speculum placed in vagina: yes      Tenaculum applied to cervix: yes      Uterus sounded: yes      Uterus sound depth (cm):  7    IUD inserted with no complications: yes      IUD type:  ParaGard    Strings trimmed: yes    Post-procedure:     Patient tolerated procedure well: yes      Patient will follow up after next period: yes      Kel Barlow MD  1/16/2023 2:27 PM none

## 2023-02-01 NOTE — ED PROVIDER NOTE - CADM POA CENTRAL LINE
Caller: Tasneem Valentino    Relationship to patient: Self    Best call back number: [phone]    Patient is needing: PATIENT STATES THAT JOLYNN WOULD LIKE THE OFFICE NOTES FROM LAST VISIT AND ANYTHING IN BETWEEN FOR HER SHORT TERM DIABILITY AND FAX TO [facility] -[phone].PATIENT STATES THAT THEY TOLD HER THEY HAD NOT RECIEVD ANYTHING AND IF IT COULD BE FAXED AGAIN.PATIENT REQUESTING A CALLBACK FOR UPDATES.      
I faxed this then informed patient.  
No

## 2023-05-22 NOTE — PACU DISCHARGE NOTE - PAIN:
Discharge instructions given, patient acknowledged understanding, rx given x2, patient ambulated out of ed upon discharge      Karma Cruz RN  05/22/23 2737 Controlled with current regime

## 2023-05-22 NOTE — ASU PATIENT PROFILE, ADULT - TEACHING/LEARNING LEARNING PREFERENCES
[facility] Established Patient        PCP:   Dante Negro MD    CC:   Chief Complaint   Patient presents with    Low-back Pain          HPI:   Lucía Coreas is a 37 y.o. year old female patient who has a past medical history of Allergy, Amenorrhea, Asthma, Diabetes, GERD (gastroesophageal reflux disease), Infertility associated with anovulation, Iron deficiency anemia, Knee pain, Metabolic syndrome, PCO (polycystic ovaries), Recurrent boils, and Status post repeat low transverse  section. She presents for follow up of lower back pain.  She was last seen in 2022 for lower back with bilateral leg pain.  [facility]  She has been managing well at home until recently when she changed jobs.  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  Pain is felt in the right lower back to the right posterior thigh .  She has a pulling sensation in the right posterior knee and a numbness sensation in the right right lower back and posterior thigh.  She is taking advil, tylenol and gabapentin. Pain increases with walking.     Denies bowel/ bladder incontinence.    HPI from last visit 22:  37 year old female with asthma, GERD, diabetes, anemia, allergies, obesity presents for follow up of lower back pain.  Pain started a few months ago and felt across the lower lumbar region.  She also had upper back pain and at times radiation into the bilateral legs in a generalized distribution.  Pain wouuld increases with standing and walking and worse at the end of the day.  She takes gabapentin daily  and alternates advil, tylenol for pain control.  Recalls PT for the shoulders/ neck af few years ago.  Since last visit, she has been going to PT with dry needling.  She is doing much better.  She is very happy with her progress thus far.  Severity and frequency of pain is much less.  She has no pain today.       \Past and current  medications:  Antineuropathics:  gabapentin  NSAIDs:  advil  Antidepressants:  Muscle relaxers: (past zanaflex sparingly)  Opioids:  Antiplatelets/Anticoagulants:  Others:  tylneol    Physical Therapy/ Chiropractic care:  PT - 3 years ago for the neck/ shoulders  PT 2022 with benefit.     Pain Intervention History:  none    Past Spine Surgical History:  none        History:    Current Outpatient Medications:     albuterol (PROVENTIL) 2.5 mg /3 mL (0.083 %) nebulizer solution, Take 3 mLs (2.5 mg total) by nebulization every 4 to 6 hours as needed for Wheezing., Disp: 60 each, Rfl: 3    albuterol (PROVENTIL/VENTOLIN HFA) 90 mcg/actuation inhaler, USE 1 TO 2 INHALATIONS EVERY 4 HOURS AS NEEDED FOR WHEEZING, Disp: 8.5 g, Rfl: 10    azelastine (ASTELIN) 137 mcg (0.1 %) nasal spray, SMARTSIG:Both Nares, Disp: , Rfl:     cholecalciferol, vitamin D3, (VITAMIN D3) 25 mcg (1,000 unit) capsule, Take 2 capsules (2,000 Units total) by mouth once daily., Disp: 90 capsule, Rfl: 4    doxycycline (VIBRA-TABS) 100 MG tablet, Take 1 tablet (100 mg total) by mouth 2 (two) times daily. Take for 5 days prn flares, Disp: 30 tablet, Rfl: 2    EPINEPHrine (EPIPEN) 0.3 mg/0.3 mL AtIn, Inject 0.3 mLs (0.3 mg total) into the muscle once. for 1 dose, Disp: 2 each, Rfl: 11    fluocinonide (LIDEX) 0.05 % external solution, Apply topically 2 (two) times daily. Use on scalp, Disp: 60 mL, Rfl: 5    fluticasone furoate-vilanteroL (BREO) 200-25 mcg/dose DsDv diskus inhaler, Inhale 1 puff into the lungs once daily. Controller, Disp: 1 each, Rfl: 4    gabapentin (NEURONTIN) 300 MG capsule, Take 1 capsule (300 mg total) by mouth 3 (three) times daily., Disp: 90 capsule, Rfl: 11    iron-vit c-b12-folic acid (IRON 100 PLUS) Tab, Take 1 tablet by mouth once daily., Disp: 90 tablet, Rfl: 1    ketoconazole (NIZORAL) 2 % shampoo, Apply topically once a week. Lather in for 5-10 min before rinsing, Disp: 360 mL, Rfl: 3    Lactobacillus rhamnosus GG (CULTURELLE)  "10 billion cell capsule, Take 1 capsule by mouth once daily., Disp: , Rfl:     lactulose (CHRONULAC) 10 gram/15 mL solution, SMARTSI Tablespoon By Mouth Daily, Disp: , Rfl:     levocetirizine (XYZAL) 5 MG tablet, Take 1 tablet (5 mg total) by mouth every evening., Disp: 30 tablet, Rfl: 4    linaCLOtide (LINZESS) 72 mcg Cap capsule, Take 1 capsule (72 mcg total) by mouth once daily., Disp: 30 capsule, Rfl: 1    lisinopriL (PRINIVIL,ZESTRIL) 2.5 MG tablet, Take 1 tablet (2.5 mg total) by mouth once daily., Disp: 90 tablet, Rfl: 3    loratadine (CLARITIN) 10 mg tablet, Take 1 tablet (10 mg total) by mouth once daily., Disp: 90 tablet, Rfl: 4    loteprednol (LOTEMAX) 0.5 % ophthalmic suspension, Place 1 drop into both eyes daily as needed (to control allergy symptoms. Not for chronic daily use.)., Disp: 5 mL, Rfl: 1    [START ON 2023] methylPREDNISolone (MEDROL, MARTIN,) 4 mg tablet, use as directed, Disp: 1 each, Rfl: 0    montelukast (SINGULAIR) 10 mg tablet, Take 1 tablet (10 mg total) by mouth once daily., Disp: 30 tablet, Rfl: 3    mupirocin (BACTROBAN) 2 % ointment, Apply topically 3 (three) times daily., Disp: , Rfl:     naproxen (NAPROSYN) 500 MG tablet, Take 1 tablet (500 mg total) by mouth 2 (two) times daily., Disp: 60 tablet, Rfl: 1    NIFEdipine (PROCARDIA-XL) 30 MG (OSM) 24 hr tablet, Take 30 mg by mouth once daily., Disp: , Rfl:     NOVOFINE PLUS 32 gauge x 1/6" Ndle, use as directed, Disp: , Rfl:     nystatin (MYCOSTATIN) powder, Apply topically 2 (two) times daily., Disp: 60 g, Rfl: 1    omalizumab (XOLAIR) 150 mg injection, Inject 300 mg into the skin every 14 (fourteen) days., Disp: 2 each, Rfl: 11    ondansetron (ZOFRAN-ODT) 8 MG TbDL, DISSOLVE ONE TABLET UNDER TONGUE EVERY 8 HOURS AS NEEDED FOR NAUSEA, Disp: , Rfl:     pantoprazole (PROTONIX) 40 MG tablet, Take 1 tablet (40 mg total) by mouth once daily., Disp: 90 tablet, Rfl: 4    spironolactone (ALDACTONE) 100 MG tablet, Take 1 tablet (100 mg " total) by mouth once daily., Disp: 90 tablet, Rfl: 3    tirzepatide 10 mg/0.5 mL PnIj, Inject 10 mg into the skin every 7 days., Disp: 4 pen, Rfl: 12    tiZANidine (ZANAFLEX) 4 MG tablet, Take 1 tablet (4 mg total) by mouth every 12 (twelve) hours as needed (muscle spasms/ pain)., Disp: 40 tablet, Rfl: 0    tretinoin (RETIN-A) 0.05 % cream, Apply topically every evening. Use on face, Disp: 45 g, Rfl: 5    Current Facility-Administered Medications:     cyanocobalamin injection 1,000 mcg, 1,000 mcg, Intramuscular, Q30 Days, Dante Negro MD, 1,000 mcg at 23 1416    cyanocobalamin injection 1,000 mcg, 1,000 mcg, Intramuscular, Q30 Days, Dante Negro MD, 1,000 mcg at 23 0948    omalizumab injection 150 mg, 150 mg, Subcutaneous, Q14 Days, Rossy Kramer MD, 150 mg at 05/10/23 0921    omalizumab injection 150 mg, 150 mg, Subcutaneous, Q14 Days, Rossy Kramer MD, 150 mg at 05/10/23 0920    omalizumab injection 300 mg, 300 mg, Subcutaneous, Q28 Days, Rossy Kramer MD, 300 mg at 04/10/23 0942    Past Medical History:   Diagnosis Date    Allergy     Amenorrhea     Asthma     Diabetes     GERD (gastroesophageal reflux disease)     Infertility associated with anovulation     Iron deficiency anemia     Knee pain     Metabolic syndrome     PCO (polycystic ovaries)     Recurrent boils     Status post repeat low transverse  section 2020    Formatting of this note might be different from the original. With BTL 21       Past Surgical History:   Procedure Laterality Date    BTL      CARPAL TUNNEL RELEASE Left 2019    CARPAL TUNNEL RELEASE Right 2020    Procedure: RELEASE, CARPAL TUNNEL;  Surgeon: Adeel Laughlin MD;  Location: [facility];  Service: Orthopedics;  Laterality: Right;     SECTION      X2    EXPLORATORY LAPAROTOMY      1 week postop with WOUND VAK, reopened uterus    WOUND VAK      3 months post-op Section, 2 weeks in hospital       Family History   Problem Relation  "Age of Onset    Diabetes Father     Heart disease Father 69    Hypertension Father     Prostate cancer Father     Diabetes Mother     Lupus Mother     AMY disease Brother     Ovarian cancer Sister     Breast cancer Neg Hx        Social History     Socioeconomic History    Marital status:    Tobacco Use    Smoking status: Never    Smokeless tobacco: Never   Substance and Sexual Activity    Alcohol use: Never    Drug use: Never    Sexual activity: Yes     Partners: Male     Birth control/protection: See Surgical Hx       Review of patient's allergies indicates:   Allergen Reactions    Metformin Diarrhea    Sulfa (sulfonamide antibiotics) Anaphylaxis and Swelling     Swelling (eyes)^, Swelling (throat)^  Swelling (eyes)^, Swelling (throat)^      Diclofenac      Gastritis     Latex, natural rubber      Contact dermatitis    Other reaction(s): Other (See Comments)  Contact dermatitis    Shellfish containing products      anaphylaxis       Labs:  Lab Results   Component Value Date    HGBA1C 5.3 02/03/2023       Lab Results   Component Value Date    WBC 9.39 02/03/2023    WBC 9.39 02/03/2023    HGB 10.7 (L) 02/03/2023    HGB 10.7 (L) 02/03/2023    HCT 35.1 (L) 02/03/2023    HCT 35.1 (L) 02/03/2023    MCV 75 (L) 02/03/2023    MCV 75 (L) 02/03/2023     02/03/2023     02/03/2023           Review of Systems:  Low back pain.  Right leg pain. .  Balance of review of systems is negative.    Physical Exam:  Vitals:    05/22/23 0943   BP: 136/88   Pulse: 86   Weight: (!) 138.9 kg (306 lb 1.7 oz)   Height: 5' 4" (1.626 m)     Body mass index is 52.54 kg/m².    Gen: NAD  Psych: mood appropriate for given condition  HEENT: eyes anicteric   CV: RRR, 2+ radial pulse  HEENT: anicteric   Respiratory: non-labored, no signs of respiratory distress  Abd: non-distended  Skin: warm, dry and intact.  Gait: Able to heel walk, toe walk. No antalgic gait.     Coordination:   Romberg: negative  Finger to nose coordination: " normal  Heel to shin coordination: normal  Tandem walking coordination: normal    Cervical spine: ROM is full in flexion, extension and lateral rotation without increased pain.  Spurling's maneuver causes no neck pain to either side.  Myofascial exam: No Tenderness to palpation across cervical paraspinous region bilaterally.    Lumbar spine:  Lumbar spine: ROM is full with flexion extension and oblique extension with no increased pain.    Nino's test causes no increased pain on either side.    Supine straight leg raise is negative bilaterally.    Internal and external rotation of the hip causes no increased pain on either side.  Myofascial exam: No tenderness to palpation across lumbar paraspinous muscles. No tenderness to palpation over the bilateral greater trochanters and bilateral SI joint    Sensory:  Intact and symmetrical to light touch in C4-T1 dermatomes bilaterally. Intact and symmetrical to light touch in L1-S1 dermatomes bilaterally.    Motor:    Right Left   C4 Shoulder Abduction  5  5   C5 Elbow Flexion    5  5   C6 Wrist Extension  5  5   C7 Elbow Extension   5  5   C8/T1 Hand Intrinsics   5  5        Right Left   L2/3 Iliacus Hip flexion  5  5   L3/4 Qudratus Femoris Knee Extension  5  5   L4/5 Tib Anterior Ankle Dorsiflexion   5  5   L5/S1 Extensor Hallicus Longus Great toe extension  5  5   S1/S2 Gastroc/Soleus Plantar Flexion  5  5      Right Left   Triceps DTR 2+ 2+   Biceps DTR 2+ 2+   Brachioradialis DTR 2+ 2+   Patellar DTR 0+ 2+   Achilles DTR 2+ 2+   Fournier Absent  Absent   Clonus Absent Absent   Babinski Absent Absent       Imaging:    Xray lumbar spine from 8-30-22 reveiwed:  there is no fracture.  Mild anterolisthesis of L5 on S1 noted with mild disk height loss.  No pard defect noted.      Assessment:   Lucía Coreas is a 37 y.o. year old female patient who has a past medical history of Allergy, Amenorrhea, Asthma, Diabetes, GERD (gastroesophageal reflux disease), Infertility  associated with anovulation, Iron deficiency anemia, Knee pain, Metabolic syndrome, PCO (polycystic ovaries), Recurrent boils, and Status post repeat low transverse  section. She presents for lower back and right leg pain.  Likely right L4, L5 radiculopathy given distribution of pain and depressed right patella reflex.      Problem List Items Addressed This Visit    None  Visit Diagnoses       Chronic right-sided low back pain with right-sided sciatica    -  Primary    Relevant Medications    methylPREDNISolone (MEDROL, MARTIN,) 4 mg tablet (Start on 2023)    tiZANidine (ZANAFLEX) 4 MG tablet    ketorolac injection 60 mg (Completed)    Spondylolisthesis of lumbar region        Relevant Medications    methylPREDNISolone (MEDROL, MARTIN,) 4 mg tablet (Start on 2023)    tiZANidine (ZANAFLEX) 4 MG tablet    ketorolac injection 60 mg (Completed)            Plan:  - discussed exercise, good posture and weight loss as long term solutions to help with pain.  - we will try toradol IM today (last dose of advil yesterday morning) and start medrol taper tomorrow; hold on any nsaids including recent naproxen rx by orthopedics until steroid completed.  - rx given for zanaflex to help with pain as needed.  - if no improvement over the next 1 week then restart PT.  Instructions given on home exercise.       Follow Up: RTC in 1  week if no improvement.    : Not applicable    Thank you for referring this interesting patient, and I look forward to continuing to collaborate in her care.        Yanci Sierra PA-C  [facility]       individual instruction/verbal instruction/written material/skill demonstration

## 2023-06-29 NOTE — ED ADULT NURSE NOTE - BREATH SOUNDS, MLM
Clear Complex Repair And Graft Additional Text (Will Appearing After The Standard Complex Repair Text): The complex repair was not sufficient to completely close the primary defect. The remaining additional defect was repaired with the graft mentioned below.

## 2023-07-16 NOTE — DISCHARGE NOTE ADULT - HOSPITAL COURSE
78 year old female with PMHx of CAD s/p MI x3, HFpEF, chronic hypoxic respiratory failure due to COPD on 2L O2, HTN, Hyperlipidemia, type 2 Dibaetes, stage 3 CKD, hypothyroidism, admitted for syncopal episode who had found to have symptomatic bradycardia.    * Symptomatic Bradycardia-s/p PPM, continue cardizem, monitor in CCU.  * HTN-bp much better controlled on Cardizem   * Fever-? atelectasis, no obvious signs of infection, monitor for now, await cult, ID eval, order CXR.  Monitor off abx.  No fevers thereafter, monitor  * Hyperlipidemia-atorvastatin  * Type 2 Diabetes- Lantus with ISS , bgms stable one episode of hyperglycemia  * Stage 3 CKD- stable  * Hypernatremia-resolved.  *CHF - compensated; hasnt been restarted on bumex yet - will defer to cardio  *left flank pain - f/u xr Car

## 2023-07-26 NOTE — ASU PATIENT PROFILE, ADULT - FALLEN IN THE PAST
[facility] Progress Note    Client Name: Jose Antonio Dee  Date: July 25, 2023  Service Type:  Group Therapy  Session Start Time:  1940  Session End Time: 2025  Session Length: 45  Attendees: Patient and other group members  Facilitator:JUANA Lora     Topic:   Goals, strengths, coping    Intervention:    Group process: support, challenge, affirm, psycho-education.     Response:  Patient did not participate in group. Behavior in group was NA. Patient shared NA.       Geovanny Wilson        yes

## 2023-08-10 NOTE — PROGRESS NOTE ADULT - PROBLEM SELECTOR PLAN 6
CURRENTLY STABLE  CONT OXYGEN SUPPLEMENTATION  NO BRONCHOSPASM ON EXAM CURRENTLY
CHRONIC RESPIRATORY FAILURE  CURRENTLY STABLE  CONT OXYGEN SUPPLEMENTATION  NO BRONCHOSPASM ON EXAM CURRENTLY
CURRENTLY STABLE  CONT OXYGEN SUPPLEMENTATION  NO BRONCHOSPASM ON EXAM CURRENTLY
Libtayo Counseling- I discussed with the patient the risks of Libtayo including but not limited to nausea, vomiting, diarrhea, and bone or muscle pain.  The patient verbalized understanding of the proper use and possible adverse effects of Libtayo.  All of the patient's questions and concerns were addressed.

## 2023-08-10 NOTE — CHART NOTE - NSCHARTNOTEFT_GEN_A_CORE
Rapid Response called re: pt w/>10second pause on tele    CHART REVIEW:   78F w/HTN, COPD (on O2 at night), recurrent UTI, CHF presents to  [facility] w/complaints of near syncope.         Pt seen and examined at bedside. States during time of pause, she felt very hot but denies any pain, malaise, confusion, visual changes, HA, CP, palpitations, N/V, numbness/tingling/weakness in extremities  Dr. Hull at bedside.     Vitals  T(F): 97.4  BP: 171/41  HR: 61  RR: 18  O2 Sat 98%    PHYSICAL EXAM:  Constitutional: NAD, awake and alert, well-developed, with EEG leads on  Respiratory: Good air movement BL  Cardiovascular: S1 and S2, regular rate and rhythm  Gastrointestinal: Bowel Sounds present, soft, nontender, nondistended  Neurological: A/O x 3    LABs                        9.5    6.66  )-----------( 211      ( 19 Oct 2018 06:55 )             29.4     19 Oct 2018 06:55  145    |  112    |  64     ----------------------------<  91     4.0     |  24     |  2.47     Ca    8.2        19 Oct 2018 06:55    TPro  7.5    /  Alb  3.1    /  TBili  0.3    /  DBili  x      /  AST  17     /  ALT  13     /  AlkPhos  108    18 Oct 2018 20:43    LIVER FUNCTIONS - ( 18 Oct 2018 20:43 )  Alb: 3.1 g/dL / Pro: 7.5 gm/dL / ALK PHOS: 108 U/L / ALT: 13 U/L / AST: 17 U/L / GGT: x             CAPILLARY BLOOD GLUCOSE        CARDIAC MARKERS ( 19 Oct 2018 01:51 )  <0.015 ng/mL / x     / 43 U/L / x     / x      CARDIAC MARKERS ( 18 Oct 2018 23:44 )  <0.015 ng/mL / x     / x     / x     / x      CARDIAC MARKERS ( 18 Oct 2018 20:43 )  <0.015 ng/mL / x     / x     / x     / x              Assessment    Plan  - d/c any av ty blockades agents if applicable  - Will need pacer pads in place  - will need EP evaluation  - Transfer to CCU for higher level of care  - Notify Dr. Phelps  - Notify Cardiology  - Discussed case w/RN who is aware and will contact MD w/further concerns should they arise    Discussed w/Dr. Hull (4) walks frequently

## 2023-08-30 NOTE — PROVIDER CONTACT NOTE (OTHER) - SITUATION
[facility] Perioperative Clinic Visit Progress Note    Patient educated to increase physical activity by at least 15 minutes, 3 times a week prior to surgery. Patient states plan for increasing activity includes : Encouraged patient to walk 15 minutes daily until surgery.    Patient educated and instructed in stir-up regimen: deep breathing exercises, coughing, positioning, mobilization and pain management.      Preoperative Chlorhexidine Gluconate skin prep (CHG Wipes) given to patient with instructions to use evening before surgery.      DIABETICS ONLY:  Instructed patient to check blood sugar with glucometer day of surgery.   Patient is instructed to drink 10 oz.of water 1 hour prior to arriving at hospital the day of surgery.    CLEAR program   Reviewed program with patient to prevent post-op pneumonia:   C- Clean mouth, brush and floss, use Chlorhexidine Gluconate (CHG) mouth wash x 3 days  L- Lungs ( IS and direct cough & deep breathing hourly)  E- Elevate HOB 30 degrees  A- Ambulate & elevate feet, eat meals in chair  R- Report document bundle    Reviewed pre-op surgical education with patient and family on:  -Fasting time  -Diet and nutrition goals  -Pain control  -Mobility  -Showering  -Deep breathing and coughing  -Discharge criteria    Procedural questions from patient addressed      Discharge planning discussed with patient   The Day Before Discharge Reminders:  1. Notify the person picking you up when you expect to go home.  2. Send home any extra personal items and flowers with family or friends.  3. Goal is to discharge by 11:00 a.m. whenever possible.  Emphasis placed on support system including responsible adult to assist patient in care when discharged from hospital.      Patient's plan includes: Home with spouse assist.    Patient engagement platform downloaded on patient's smart phone. Pre-op workflow started.  Patients educated to answer the questions and to  read reminders regarding Bowel prep and  when to take antibiotics.      Patient to go to Ostomy RN appointment on 8/31/23.       Dr. Griggs is aware of consult.

## 2023-10-03 NOTE — PROGRESS NOTE ADULT - ASSESSMENT
OUTPATIENT PROGRESS NOTE    HISTORY      Chief Complaint   Patient presents with   â¢ Office Visit     Blood in urine       Patient Care Team:  Peggy Meza as PCP - General (Nurse Practitioner)  Ashlee Segura MD as Referring Provider (Neurology)  Gayle Doshi OD (Optometry)     The patient is a 68year old female who comes in today with blood in urine with slight discomfort starting 3 days ago, significant amounts on Sunday. None since yesterday afternoon. Denies urinary burning. Denies recent intercourse. Denies new back pain, has had kidney stones once. Underweight: eats regularly and snacking. Work makes her hungry. Watches what she eats because fatty, heavy foods do not agree with her. Eats protein twice a day. Reviewed and updated patient's allergies, medications, problem list and past medical history to include surgical history. Reviewed and updated health maintenance. Discussed risks and benefits of recommended preventive care screenings and immunizations. Health Maintenance   Topic Date Due   â¢ Breast Cancer Screening  10/23/2016   â¢ DTaP/Tdap/Td Vaccine (2 - Td or Tdap) 01/14/2023   â¢ Influenza Vaccine (1) 09/01/2023   â¢ Depression Screening  07/11/2024   â¢ Colorectal Cancer Screen-  05/30/2033   â¢ Osteoporosis Screening  Completed   â¢ Hepatitis C Screening  Completed   â¢ Shingles Vaccine  Completed   â¢ Medicare Advantage- Medicare Wellness Visit  Completed   â¢ COVID-19 Vaccine  Completed   â¢ Pneumococcal Vaccine 65+  Completed   â¢ Meningococcal Vaccine  Aged Out   â¢ Hepatitis B Vaccine (For Physician/APC Discussion)  Aged Out   â¢ HPV Vaccine  Aged Out       ALLERGIES:   Allergen Reactions   â¢ Drisdol SWELLING     Other reaction(s): Unknown   â¢ Vitamin D Other (See Comments)     Efrain doses cause swelling.        Current Outpatient Medications   Medication Sig   â¢ metoPROLOL tartrate (LOPRESSOR) 25 MG tablet Take 1 tablet by mouth in the morning and 1 tablet in the evening. â¢ furosemide (LASIX) 20 MG tablet Take 1-2 tablets by mouth daily as needed (Swelling/Edema). â¢ warfarin (COUMADIN) 2.5 MG tablet Take 2 tablets by mouth daily. â¢ potassium chloride (K-TAB) 20 MEQ ER tablet Take 20 Meq one daily on days taking Furosemide. â¢ linaclotide (LINZESS) 72 MCG Cap Take 1 capsule by mouth daily. â¢ galcanezumab-gnlm (EMGALITY) 120 MG/ML injection Inject 120 mg into the skin. â¢ albuterol 108 (90 Base) MCG/ACT inhaler Inhale 2 puffs into the lungs every 4 hours as needed for Shortness of Breath or Wheezing. â¢ fluticasone-vilanterol (Breo Ellipta) 100-25 MCG/ACT inhaler Inhale 1 puff into the lungs daily. â¢ VITAMIN D, CHOLECALCIFEROL, PO Take 5,000 mg by mouth daily. â¢ sumatriptan (IMITREX) 100 MG tablet 1/2 tab qd prn.      Current Facility-Administered Medications   Medication   â¢ zoledronic acid (RECLAST) IVPB 5 mg       Patient Active Problem List   Diagnosis   â¢ Iron deficiency anemia, unspecified   â¢ Osteoporosis, unspecified   â¢ Raynaud's disease without gangrene   â¢ Vitamin D deficiency   â¢ COPD, moderate (CMD)   â¢ Hyperthyroidism   â¢ Migraine with aura, with intractable migraine, so stated, without mention of status migrainosus   â¢ History of benign gastric tumor   â¢ Gastroesophageal reflux disease without esophagitis   â¢ Chronic left-sided low back pain with left-sided sciatica   â¢ Nodules of vocal cords   â¢ Cervical disc disease   â¢ Paroxysmal atrial fibrillation (CMD)   â¢ Chronic constipation   â¢ Long term (current) use of anticoagulants [Z79.01]   â¢ Encounter for therapeutic drug monitoring [Z51.81]   â¢ Generalized osteoarthritis   â¢ Localized edema   â¢ Tendinitis of left shoulder   â¢ History of Graves' disease   â¢ Other iron deficiency anemia   â¢ Screen for colon cancer       Past Medical History:   Diagnosis Date   â¢ A-fib (CMD)    â¢ Alopecia    â¢ Arthritis     severe postmenopausal osteoporosis   â¢ Atrophic gastritis    â¢ Bowel obstruction (CMD) Multiple   â¢ Chronic sinusitis    â¢ Congenital scoliosis    â¢ COPD (chronic obstructive pulmonary disease) (CMD)    â¢ Edema     chronic edema of lower extremities   â¢ Fall 11/18/2017    Nondisplaced rib fractures   â¢ Gastric tumor 2010    Gastric Adenoma   â¢ Graves disease    â¢ Hiatal hernia 10/20/2015    per EGD/EUS   â¢ Hydronephrosis 02/12/2019   â¢ Hypercholesterolemia    â¢ Hyperthyroidism    â¢ Iron deficiency anemia    â¢ Knee pain, bilateral     right knee pain is much worse then the left knee   â¢ Migraine headache     chronic   â¢ Osteoporosis, unspecified 06/26/2013   â¢ Pulmonary nodule    â¢ Rheumatic fever     in childhood   â¢ Rib fracture 11/18/2017    Nondisplaced fractures fo the posterior aspect of the right 7th, 8th, and nonspecific fracture of the right 9th spinous process   â¢ Scoliosis    â¢ Wears glasses     Reading       Past Surgical History:   Procedure Laterality Date   â¢ ------------other-------------      Lumpectomy at [address] for benign gastric tumor   â¢ ------------other-------------  2010    Gastric Adenoma Resection   â¢ Back surgery  2010    Multiple surgical interventions, including application of intervertebral mechanical device , anterior instrumentation T4-7, and posterior segmental instrumentation T7- T12 and Total Spinal Fusion   â¢ Bowel resection  04/21/2012   â¢ Closed rx metacarpal fx  03/21/2007    Closed repair ulnar collateral ligament rupture of the metacarpophalangeal joint, right thumb   â¢ Colonoscopy  05/30/2023    normal   â¢ Colonoscopy diagnostic  01/28/2010    Colonoscopy, Dx   â¢ Cystoscopy N/A 02/22/2019   â¢ Cystoscopy,ureteroscopy,stone remv Right 02/22/2019   â¢ Dexa bone density axial skeleton  07/28/2010   â¢ Egd  05/30/2023   â¢ Esophagogastroduodenoscopy  10/20/2015    EGD and EUS: no evidence of gastgric adenoma, positive hiatal hernia   â¢ Esophagogastroduodenoscopy transoral flex w/bx single or mult  05/13/2010    EGD with Bx   â¢ Fracture surgery     â¢ "Laparoscopy,lysis of adhesions  04/24/2012   â¢ Spine surgery     â¢ Xray upper gastrointestinal single contrast study  06/27/2013    Upper GI normal.        Family History   Problem Relation Age of Onset   â¢ Hypertension Mother    â¢ Heart disease Mother    â¢ Cataracts Mother    â¢ Stroke Mother    â¢ Hypertension Father    â¢ Heart disease Father    â¢ Cataracts Father    â¢ Hyperlipidemia Father    â¢ Myocardial Infarction Father    â¢ Cancer, Breast Maternal Aunt 61   â¢ Macular degeneration Paternal Aunt    â¢ Stroke Paternal Grandmother    â¢ Stroke Paternal Grandfather    â¢ Cancer, Breast Other 36        cousin   â¢ Amblyopia Neg Hx    â¢ Blindness Neg Hx    â¢ Glaucoma Neg Hx    â¢ Retinal detachment Neg Hx    â¢ Sjogren's Syndrome Neg Hx    â¢ Strabismus Neg Hx          PHYSICAL EXAM  Vitals: Blood pressure 110/62, pulse 62, height 5' 4"" (1.626 m), weight 38.4 kg (84 lb 11.2 oz). Body mass index is 14.54 kg/mÂ². She is a well-developed, well-nourished female in no acute distress. No CVA tenderness. Component      Latest Ref Rng 10/3/2023  11:13 AM   Urine Color Yellow    Urine Character Clear    Urine Glucose      Negative, Normal mg/dL Negative    Urine Bilirubin      Negative, About 10 Sanchez/ul, 5-10 Sanchez/ul, 50 Sanchez/ul  Negative    URINE KETONES      Negative, 100 mg/dl mg/dL Negative    Urine Specific Gravity      1.005, 1.010, 1.015, 1.020, 1.025, 1.030  1.015    Urine Blood      Negative  Small ! Urine PH      5.0, 6.0, 7.0, 5.5, 6.5  5.0    Urine Protein      Negative, 1000 mg/dl, 20 mg/dl, 40 mg/dl, 2000 mg/dl mg/dL Negative    POCT Urobilinogen      1.0, 0.2, Normal mg/dL 0.2    Urine Nitrite      Negative  Negative    WBC (Leukocyte) Esterase POC      Negative  Negative       Legend:  ! Abnormal      ASSESSMENT/PLAN  Hematuria, underweight, history of kidney stones: stat US bladder, kidney, ureters.      Follow Up:  1/6/23: chronics and MWV   " 76 Y/O FEMALE WITH THE ABOVE MED HX ADMITTED FOR SYMPTOMATIC PROFOUND ANEMIA

## 2023-11-03 NOTE — ED PROVIDER NOTE - NS_HEARTSCRISKFACTOR_ED_A_ED
Take prednisone and doxy  call me next week if not getting better   
Greater than or equal to 3 Risk Factors or History of Arthero Diseas

## 2024-01-23 NOTE — PROGRESS NOTE ADULT - ASSESSMENT
RN called and left message. Instructed to call [phone] with questions.   81 y/o F PMHx significant for CAD, MI x 3, complete heart block s/p pacemaker placement, advanced COPD on home oxygen, CHFpEF, CKD4, diabetes mellitus (insulin dependent), hypertension, hyperlipidemia, hypothyroidism, and recurrent UTIs, chronic back pain and OA admitted for:       # Near Syncope/ Blank stares will r/o seizures 2/2 orthostatic hypotension vs seizure vs Bradycardia   -+orthostatics (systolic), will repeat, had IVF on admission   -PPM interrogated Normal FX? As per FAmily Pt noted to have Bradycardia in 40s  few episodes corresponding to staring episodes and near syncope. LAst episode at cardio office, HR was at 47, normal Pulse ox   - tele: NSR  - Bumex on hold for now   - recent TSH and B12 outPt wnl as per family   -24h EEG with no epileptic activity   - -CTH-No acute intracranial hemorrhage, mass effect, or CT evidence of a large vascular territory infarct. Indeterminate tiny right basal ganglia infarct of indeterminate age. Involutional and chronic microvascular ischemic gliotic changes  - cardotid duplex neg   - Unable to di CTA or MRI due to renal Fx and non compatible PPM   - D/w Dr Eagle   - D/w Dr Roach no evidence of seizure, no need in AEDs  - PT       # Elevated Troponin   ~trop trending down  - No CP   ~elevation of Mateus likely due to CKD,  - C/w ASa/lipitor         #ANI on CKD- stage 3   - improving  - resume  Bumetanide  in am   ~strict I/Os  - D/w DR Colon need to f/u outPt     #CAD/CHF  ~cont. ASA 81mg po daily    #COPD, stable   ~cont. supplemental O2  ~cont. ALbuterol MDI  ~cont. Trelegy Ellipta  ~cont. Montelukast 10mg po qhs    #Anemia likely 2/2 CKD  --f/u b12/folate/iron studies    #hypokalemia  -replete and monitor    #Hyperlipidemia  ~cont. statin therapy with Atorvastatin 40mg po qhs    #Hypothyroidism  ~cont. Levothyroxine 125mcg po qan    #Diabetes Mellitus  ~FS qAC/HS  ~cont. Basal/Bolus Insulin regimen, dose adjusted   -A1c-8.7-      #Vte ppx  ~cont. SCDs for now/SQH    Discussed plan of care with daughter Zari and Esther at length     D/w EP team, will evaluate PT  for possible bradycardia     Dispo: repeat orthostatics/EP eval

## 2024-01-29 NOTE — ED PROVIDER NOTE - CPE EDP SKIN NORM
[FreeTextEntry1] : She has a history of insulin dependent diabetes, eczema, ADD, autism, depression, hyperlipidemia, PCOS, and vitamin D deficiency.  She saw her endocrinologist in 12/2023 and reports that her HgbA1c was down to 7.0.  She saw her surgeon for a postop visit on 12/18/23.  normal...

## 2024-04-12 NOTE — PATIENT PROFILE ADULT. - PRO PAIN LIFE ADAPT
Pt spouse called because pt is having surgery on 04.19.24. Pt post op appt is 05.02.24. Inquired about when pt should schedule an appt to see Dr. RIVERA    decreased activity level

## 2024-06-10 NOTE — PATIENT PROFILE ADULT. - FUNCTIONAL SCREEN CURRENT LEVEL: DRESSING, MLM
Medication(s) to Refill:   Requested Prescriptions     Pending Prescriptions Disp Refills    TRIAMCINOLONE 0.5 % External Cream [Pharmacy Med Name: TRIAMCINOLONE 0.5% CREAM 15GM] 30 g 2     Sig: APPLY TOPICALLY TO THE AFFECTED AREA TWICE DAILY. DO NOT USE FOR. NO MORE THAN 14 DAYS CONTINUOUSLY         Reason for Medication Refill being sent to Provider / Reason Protocol Failed:  [] 90 day refill has already been granted  [] Blood Pressure out of range  [] Labs Abnormal/over due  [] Medication not previously prescribed by Provider  [x] Non-Protocol Medication  [x] Controlled Substance   [] Due for appointment- no future appointment scheduled  [] No Follow up specified    Last Time Medication was Filled:  8/31/23    Last Office Visit with PCP: 4/4/24    When Patient was Due Back to the Office: 9/2024     Future Appointments:  Future Appointments   Date Time Provider Department Center   9/5/2024  4:00 PM Mike Hackett MD [facility]   12/9/2024  9:15 AM Juanito Hudson MD [id number]       Last Blood Pressures:  BP Readings from Last 2 Encounters:   04/04/24 136/78   09/21/23 138/88     Action taken:  [] Refill approved per protocol  [x] Routing to provider for approval    
(3) assistive equipment and person

## 2024-07-15 NOTE — ED PROVIDER NOTE - P[facility]
"PSYCHIATRIC FOLLOW-UP VISIT NOTE    Chief Complaint   Patient presents with    Medication Management     Medication management         History of Present Illness  64 y.o. year old White female with hx of bipolar disorder, LILLY, insomnia, and RLS seen today for follow-up appointment and medication management.  Patient reports that her depression has been minimal recently.  Mood has been stable and she denies any increased irritability, increased impulsivity, or mood lability.  Has been in a new romantic relationship for the past 2 months and reports some anxiety regarding the Newness of their relationship but states it was going well.  She has had some increased health issues it was now seeing a GI specialist.  Did a 2 week liver cleanse at their direction and reports some increased anxiety during that time.  She finished the cleanse this past Thursday and denies any panic attacks and she completed it.  We agreed on no further medication changes today.  May consider changes at next visit if anxiety is not improved.  She is still sleeping well at night and feels rested in the morning.    Objective:     Vitals:  Vitals:    07/15/24 1029   Weight: 79.7 kg (175 lb 11.3 oz)   Height: 4' 11.02" (1.499 m)       Wt Readings from Last 3 Encounters:   07/15/24 1029 79.7 kg (175 lb 11.3 oz)   06/04/24 1309 79.7 kg (175 lb 9.6 oz)   05/20/24 0742 81.6 kg (179 lb 14.3 oz)         Medication:    Current Outpatient Medications:     albuterol (PROVENTIL/VENTOLIN HFA) 90 mcg/actuation inhaler, INHALE 2 PUFFS EVERY 4 HOURS FOR SHORTNESS OF BREATH OR WHEEZING -- SHAKE WELL BEFORE USE, Disp: 18 g, Rfl: 2    atorvastatin (LIPITOR) 40 MG tablet, Take 1 tablet (40 mg total) by mouth every evening., Disp: 90 tablet, Rfl: 3    ergocalciferol (ERGOCALCIFEROL) 50,000 unit Cap, TAKE 1 CAPSULE EVERY 7 DAYS AS DIRECTED, Disp: 12 capsule, Rfl: 3    fluticasone propionate (FLONASE) 50 mcg/actuation nasal spray, 1 spray by Each Nostril route " Daily., Disp: , Rfl:     ketoconazole (NIZORAL) 2 % cream, Apply topically once daily., Disp: 60 g, Rfl: 3    meclizine (ANTIVERT) 12.5 mg tablet, Take 1 tablet (12.5 mg total) by mouth 3 (three) times daily as needed for Dizziness or Nausea., Disp: 30 tablet, Rfl: 1    naproxen (NAPROSYN) 500 MG tablet, TAKE 1 TABLET TWICE DAILY WITH FOOD FOR PAIN / INFLAMMATION, Disp: , Rfl:     ondansetron (ZOFRAN-ODT) 4 MG TbDL, Take 1 tablet (4 mg total) by mouth every 6 (six) hours as needed (Nausea and vomitings)., Disp: 20 tablet, Rfl: 0    rizatriptan (MAXALT) 10 MG tablet, TAKE 1 TABLET DAILY AS NEEDED FOR HEADACHE, Disp: 30 tablet, Rfl: 5    rOPINIRole (REQUIP) 1 MG tablet, Take 1 tablet (1 mg total) by mouth every evening., Disp: 90 tablet, Rfl: 1    topiramate (TOPAMAX) 25 MG tablet, Take 1 tablet (25 mg total) by mouth 2 (two) times daily., Disp: 180 tablet, Rfl: 3    carBAMazepine (TEGRETOL) 200 mg tablet, Take 1 tablet (200 mg total) by mouth 2 (two) times daily., Disp: 60 tablet, Rfl: 1    pantoprazole (PROTONIX) 40 MG tablet, Take 1 tablet (40 mg total) by mouth once daily. for 15 days, Disp: 30 tablet, Rfl: 05    paroxetine (PAXIL) 30 MG tablet, Take 2 tablets (60 mg total) by mouth once daily., Disp: 60 tablet, Rfl: 1    traZODone (DESYREL) 150 MG tablet, TAKE 1 TABLET AT BEDTIME AS NEEDED FOR INSOMNIA, Disp: 90 tablet, Rfl: 1       Significant Labs: - none at this time    Significant Imaging: - none at this time    Physical Exam  Vitals and nursing note reviewed.   Constitutional:       General: She is awake.      Appearance: Normal appearance.   Musculoskeletal:      Comments: deferred   Neurological:      Mental Status: She is alert.   Psychiatric:         Attention and Perception: Attention and perception normal. She does not perceive auditory or visual hallucinations.         Mood and Affect: Affect normal. Mood is anxious.         Speech: Speech normal.         Behavior: Behavior is cooperative.           delivery delivered    H/O hernia repair  umbilical and incisional  History of cataract surgery  bilateral IOL  History of cholecystectomy    History of ear surgery    History of total knee replacement, unspecified laterality  bilateral right 2008 left   S/P appendectomy    S/P lobectomy of lung  left lung pre-cancerous  S/P ORIF (open reduction internal fixation) fracture  left hip 2017   "Thought Content: Thought content does not include homicidal or suicidal ideation.         Cognition and Memory: Cognition and memory normal.          Review of Systems     Mental Status Exam:  Presentation:  - Appearance: 64 y.o. year old White female, appears stated age, appears Casually dressed and Well groomed  - Motility: No EPS or Tremors, No psychomotor agitation or retardation appreciated  - Behavior: calm, cooperative, good eye contact  Speech:  - Character/Organization: spontaneous, fluent, normal volume, normal rate, normal rhythm  Emotional State:  - Mood: "anxious"   - Affect: congruent and anxious  Thought:  - Process: logical, linear, organized , goal-directed  - Preoccupations: no ruminations, rituals, or phobias appreciated  - Delusions: no persecutory, paranoid, or grandiose delusions appreciated  - Perception: denies AVH, not actively responding to internal stimuli  - SI/HI: denies/denies  Sensorium & Intellect:  - Sensorium: AAOx4  - Memory: intact to recent and remote events  - Attention/Concentration: good/good  - Insight/Judgement: good/good    Gait: deferred   MSK:deferred     All other systems without acute issues unless noted in HPI      Assessment/Plan      ICD-10-CM ICD-9-CM    1. Bipolar affective disorder, remission status unspecified  F31.9 296.80 paroxetine (PAXIL) 30 MG tablet      carBAMazepine (TEGRETOL) 200 mg tablet      2. Generalized anxiety disorder  F41.1 300.02 paroxetine (PAXIL) 30 MG tablet      3. Insomnia, unspecified type  G47.00 780.52 traZODone (DESYREL) 150 MG tablet      4. Restless legs syndrome  G25.81 333.94          Continue current medications without change    Potential side effects and risks vs benefits of current treatment plan reviewed with patient. Applicable black box warnings reviewed. Encouraged patient not to alter dosages or abruptly discontinue medications without contacting prescriber first, due to risk of worsening symptoms and decompensation of " mental status. Warned of risks associated with herbal remedies and supplements while taking psychotropic medications and of the need to consult prescriber prior to adding any of these to current regimen. Patient should abstain from abuse of alcohol, prescription medications, and illicit drugs. Reviewed when to contact clinic and/or seek emergent care, such as but not limited to, onset/worsening SI/HI, hallucinations, delusions, manic symptoms. Pt verbalized understanding and agreement of these warnings/recommendations and verbally consented to treatment plan.    The patient was informed of the restrictions of face-to-face healthcare visits. The patient verbally consented to proceed with a telemedicine visit, following discussion of the options of face-to-face or telemedicine visits. The telemedicine visit was completed using EPIC Video call without complication. The visit was conducted with limited physical exam and was medically appropriate to meet the patient's needs. Duration of call: 10 minutes      Follow up in about 4 weeks (around 8/12/2024) for Medication Management.    MAYTE OrtizP

## 2025-03-03 NOTE — ED PROVIDER NOTE - CARDIAC, MLM
Cardiology Fellow/Event Note Normal rate, regular rhythm.  Heart sounds S1, S2.  No murmurs, rubs or gallops.
